# Patient Record
Sex: FEMALE | Race: WHITE | Employment: OTHER | URBAN - METROPOLITAN AREA
[De-identification: names, ages, dates, MRNs, and addresses within clinical notes are randomized per-mention and may not be internally consistent; named-entity substitution may affect disease eponyms.]

---

## 2017-02-02 DIAGNOSIS — K21.9 GASTROESOPHAGEAL REFLUX DISEASE, ESOPHAGITIS PRESENCE NOT SPECIFIED: ICD-10-CM

## 2017-02-02 RX ORDER — PANTOPRAZOLE SODIUM 40 MG/1
40 TABLET, DELAYED RELEASE ORAL DAILY
Qty: 60 TABLET | Refills: 5 | Status: SHIPPED | OUTPATIENT
Start: 2017-02-02 | End: 2017-02-20 | Stop reason: SDUPTHER

## 2017-02-20 DIAGNOSIS — K21.9 GASTROESOPHAGEAL REFLUX DISEASE, ESOPHAGITIS PRESENCE NOT SPECIFIED: ICD-10-CM

## 2017-02-20 RX ORDER — PANTOPRAZOLE SODIUM 40 MG/1
40 TABLET, DELAYED RELEASE ORAL 2 TIMES DAILY
Qty: 180 TABLET | Refills: 1 | Status: SHIPPED | OUTPATIENT
Start: 2017-02-20 | End: 2017-03-06 | Stop reason: SDUPTHER

## 2017-03-03 DIAGNOSIS — E78.2 MIXED HYPERLIPIDEMIA: Primary | ICD-10-CM

## 2017-03-03 RX ORDER — PRAVASTATIN SODIUM 10 MG
10 TABLET ORAL DAILY
Qty: 90 TABLET | Refills: 1 | Status: SHIPPED | OUTPATIENT
Start: 2017-03-03 | End: 2017-08-21 | Stop reason: SDUPTHER

## 2017-03-06 ENCOUNTER — TELEPHONE (OUTPATIENT)
Dept: FAMILY MEDICINE CLINIC | Age: 62
End: 2017-03-06

## 2017-03-06 DIAGNOSIS — K21.9 GASTROESOPHAGEAL REFLUX DISEASE, ESOPHAGITIS PRESENCE NOT SPECIFIED: ICD-10-CM

## 2017-03-06 RX ORDER — PANTOPRAZOLE SODIUM 40 MG/1
40 TABLET, DELAYED RELEASE ORAL DAILY
Qty: 90 TABLET | Refills: 1 | Status: SHIPPED | OUTPATIENT
Start: 2017-03-06 | End: 2017-06-08 | Stop reason: SDUPTHER

## 2017-03-08 ENCOUNTER — TELEPHONE (OUTPATIENT)
Dept: FAMILY MEDICINE CLINIC | Age: 62
End: 2017-03-08

## 2017-03-08 DIAGNOSIS — I10 ESSENTIAL HYPERTENSION: ICD-10-CM

## 2017-03-08 DIAGNOSIS — Z11.59 NEED FOR HEPATITIS C SCREENING TEST: ICD-10-CM

## 2017-03-08 DIAGNOSIS — E78.2 MIXED HYPERLIPIDEMIA: ICD-10-CM

## 2017-03-08 DIAGNOSIS — Z11.4 SCREENING FOR HIV (HUMAN IMMUNODEFICIENCY VIRUS): Primary | ICD-10-CM

## 2017-03-11 DIAGNOSIS — Z11.4 SCREENING FOR HIV (HUMAN IMMUNODEFICIENCY VIRUS): ICD-10-CM

## 2017-03-11 DIAGNOSIS — E78.2 MIXED HYPERLIPIDEMIA: ICD-10-CM

## 2017-03-11 DIAGNOSIS — Z11.59 NEED FOR HEPATITIS C SCREENING TEST: ICD-10-CM

## 2017-03-11 DIAGNOSIS — I10 ESSENTIAL HYPERTENSION: ICD-10-CM

## 2017-03-11 LAB
A/G RATIO: 1.7 (ref 1.1–2.2)
ALBUMIN SERPL-MCNC: 4.3 G/DL (ref 3.4–5)
ALP BLD-CCNC: 98 U/L (ref 40–129)
ALT SERPL-CCNC: 30 U/L (ref 10–40)
ANION GAP SERPL CALCULATED.3IONS-SCNC: 14 MMOL/L (ref 3–16)
AST SERPL-CCNC: 22 U/L (ref 15–37)
BILIRUB SERPL-MCNC: 0.4 MG/DL (ref 0–1)
BUN BLDV-MCNC: 11 MG/DL (ref 7–20)
CALCIUM SERPL-MCNC: 9.6 MG/DL (ref 8.3–10.6)
CHLORIDE BLD-SCNC: 104 MMOL/L (ref 99–110)
CHOLESTEROL, TOTAL: 162 MG/DL (ref 0–199)
CO2: 25 MMOL/L (ref 21–32)
CREAT SERPL-MCNC: <0.5 MG/DL (ref 0.6–1.2)
GFR AFRICAN AMERICAN: >60
GFR NON-AFRICAN AMERICAN: >60
GLOBULIN: 2.5 G/DL
GLUCOSE BLD-MCNC: 89 MG/DL (ref 70–99)
HDLC SERPL-MCNC: 70 MG/DL (ref 40–60)
HEPATITIS C ANTIBODY INTERPRETATION: NORMAL
LDL CHOLESTEROL CALCULATED: 71 MG/DL
POTASSIUM SERPL-SCNC: 5 MMOL/L (ref 3.5–5.1)
SODIUM BLD-SCNC: 143 MMOL/L (ref 136–145)
TOTAL PROTEIN: 6.8 G/DL (ref 6.4–8.2)
TRIGL SERPL-MCNC: 103 MG/DL (ref 0–150)
VLDLC SERPL CALC-MCNC: 21 MG/DL

## 2017-03-13 ENCOUNTER — OFFICE VISIT (OUTPATIENT)
Dept: FAMILY MEDICINE CLINIC | Age: 62
End: 2017-03-13

## 2017-03-13 VITALS
BODY MASS INDEX: 42.14 KG/M2 | DIASTOLIC BLOOD PRESSURE: 80 MMHG | OXYGEN SATURATION: 94 % | HEART RATE: 85 BPM | WEIGHT: 229 LBS | TEMPERATURE: 98 F | HEIGHT: 62 IN | SYSTOLIC BLOOD PRESSURE: 110 MMHG

## 2017-03-13 DIAGNOSIS — B02.8 HERPES ZOSTER WITH OTHER COMPLICATION: ICD-10-CM

## 2017-03-13 DIAGNOSIS — I10 ESSENTIAL HYPERTENSION: ICD-10-CM

## 2017-03-13 DIAGNOSIS — E78.2 MIXED HYPERLIPIDEMIA: ICD-10-CM

## 2017-03-13 DIAGNOSIS — R21 RASH: Primary | ICD-10-CM

## 2017-03-13 LAB — HIV-1 AND HIV-2 ANTIBODIES: NORMAL

## 2017-03-13 PROCEDURE — 99214 OFFICE O/P EST MOD 30 MIN: CPT | Performed by: NURSE PRACTITIONER

## 2017-03-13 RX ORDER — METRONIDAZOLE 7.5 MG/G
GEL TOPICAL
Qty: 1 TUBE | Refills: 2 | Status: SHIPPED | OUTPATIENT
Start: 2017-03-13 | End: 2017-06-08 | Stop reason: ALTCHOICE

## 2017-03-13 RX ORDER — GABAPENTIN 100 MG/1
100 CAPSULE ORAL DAILY
Qty: 90 CAPSULE | Refills: 3 | Status: SHIPPED | OUTPATIENT
Start: 2017-03-13 | End: 2018-03-31 | Stop reason: SDUPTHER

## 2017-03-13 RX ORDER — ALBUTEROL SULFATE 90 UG/1
2 AEROSOL, METERED RESPIRATORY (INHALATION) EVERY 6 HOURS PRN
Qty: 3 INHALER | Refills: 1 | Status: SHIPPED | OUTPATIENT
Start: 2017-03-13 | End: 2020-12-31

## 2017-03-14 ASSESSMENT — ENCOUNTER SYMPTOMS
NAIL CHANGES: 0
BLURRED VISION: 0
EYE PAIN: 0
ORTHOPNEA: 0
COUGH: 0
SORE THROAT: 0
DIARRHEA: 0
RHINORRHEA: 0
ALLERGIC/IMMUNOLOGIC NEGATIVE: 1
GASTROINTESTINAL NEGATIVE: 1
RESPIRATORY NEGATIVE: 1
VOMITING: 0
EYES NEGATIVE: 1
SHORTNESS OF BREATH: 0

## 2017-03-19 ENCOUNTER — PATIENT MESSAGE (OUTPATIENT)
Dept: FAMILY MEDICINE CLINIC | Age: 62
End: 2017-03-19

## 2017-03-19 RX ORDER — AMOXICILLIN 500 MG/1
CAPSULE ORAL
Qty: 12 CAPSULE | Refills: 1 | Status: SHIPPED | OUTPATIENT
Start: 2017-03-19 | End: 2017-06-08 | Stop reason: ALTCHOICE

## 2017-05-28 DIAGNOSIS — F32.4 MAJOR DEPRESSIVE DISORDER WITH SINGLE EPISODE, IN PARTIAL REMISSION (HCC): ICD-10-CM

## 2017-05-28 DIAGNOSIS — I10 ESSENTIAL HYPERTENSION: ICD-10-CM

## 2017-05-28 RX ORDER — BUPROPION HYDROCHLORIDE 300 MG/1
300 TABLET ORAL EVERY MORNING
Qty: 90 TABLET | Refills: 1 | Status: SHIPPED | OUTPATIENT
Start: 2017-05-28 | End: 2017-11-17 | Stop reason: SDUPTHER

## 2017-05-28 RX ORDER — LOSARTAN POTASSIUM 50 MG/1
50 TABLET ORAL DAILY
Qty: 90 TABLET | Refills: 1 | Status: SHIPPED | OUTPATIENT
Start: 2017-05-28 | End: 2017-09-13 | Stop reason: SDUPTHER

## 2017-06-08 ENCOUNTER — OFFICE VISIT (OUTPATIENT)
Dept: FAMILY MEDICINE CLINIC | Age: 62
End: 2017-06-08

## 2017-06-08 VITALS
OXYGEN SATURATION: 96 % | TEMPERATURE: 98 F | WEIGHT: 233 LBS | BODY MASS INDEX: 41.29 KG/M2 | HEIGHT: 63 IN | SYSTOLIC BLOOD PRESSURE: 120 MMHG | DIASTOLIC BLOOD PRESSURE: 80 MMHG | HEART RATE: 59 BPM

## 2017-06-08 DIAGNOSIS — D48.9 NEOPLASM OF UNCERTAIN BEHAVIOR: Primary | ICD-10-CM

## 2017-06-08 DIAGNOSIS — K21.9 GASTROESOPHAGEAL REFLUX DISEASE, ESOPHAGITIS PRESENCE NOT SPECIFIED: ICD-10-CM

## 2017-06-08 PROCEDURE — 99213 OFFICE O/P EST LOW 20 MIN: CPT | Performed by: NURSE PRACTITIONER

## 2017-06-08 PROCEDURE — 11100 PR BIOPSY OF SKIN LESION: CPT | Performed by: NURSE PRACTITIONER

## 2017-06-08 RX ORDER — ACETAMINOPHEN AND CODEINE PHOSPHATE 300; 30 MG/1; MG/1
TABLET ORAL
Refills: 0 | COMMUNITY
Start: 2017-03-21 | End: 2017-06-08 | Stop reason: ALTCHOICE

## 2017-06-08 RX ORDER — HYDROCODONE BITARTRATE AND ACETAMINOPHEN 5; 325 MG/1; MG/1
TABLET ORAL
Refills: 0 | COMMUNITY
Start: 2017-04-24 | End: 2017-06-08 | Stop reason: ALTCHOICE

## 2017-06-08 RX ORDER — PANTOPRAZOLE SODIUM 40 MG/1
40 TABLET, DELAYED RELEASE ORAL 2 TIMES DAILY
Qty: 180 TABLET | Refills: 1 | Status: SHIPPED | OUTPATIENT
Start: 2017-06-08 | End: 2017-07-25 | Stop reason: ALTCHOICE

## 2017-06-08 ASSESSMENT — ENCOUNTER SYMPTOMS
RESPIRATORY NEGATIVE: 1
EYES NEGATIVE: 1
GASTROINTESTINAL NEGATIVE: 1
ALLERGIC/IMMUNOLOGIC NEGATIVE: 1

## 2017-06-12 ENCOUNTER — TELEPHONE (OUTPATIENT)
Dept: FAMILY MEDICINE CLINIC | Age: 62
End: 2017-06-12

## 2017-06-16 ENCOUNTER — OFFICE VISIT (OUTPATIENT)
Dept: FAMILY MEDICINE CLINIC | Age: 62
End: 2017-06-16

## 2017-06-16 VITALS
HEIGHT: 63 IN | WEIGHT: 233.6 LBS | SYSTOLIC BLOOD PRESSURE: 120 MMHG | HEART RATE: 100 BPM | DIASTOLIC BLOOD PRESSURE: 86 MMHG | BODY MASS INDEX: 41.39 KG/M2 | TEMPERATURE: 98.1 F | OXYGEN SATURATION: 99 %

## 2017-06-16 DIAGNOSIS — D04.5 SQUAMOUS CELL CARCINOMA IN SITU OF SKIN OF CHEST: Primary | ICD-10-CM

## 2017-06-16 DIAGNOSIS — R10.31 ABDOMINAL PAIN, RLQ: ICD-10-CM

## 2017-06-16 LAB
BILIRUBIN, POC: NORMAL
BLOOD URINE, POC: NORMAL
CLARITY, POC: CLEAR
COLOR, POC: YELLOW
GLUCOSE URINE, POC: NORMAL
KETONES, POC: NORMAL
LEUKOCYTE EST, POC: NORMAL
NITRITE, POC: NORMAL
PH, POC: 6
PROTEIN, POC: NORMAL
SPECIFIC GRAVITY, POC: 1
UROBILINOGEN, POC: 0.2

## 2017-06-16 PROCEDURE — 99213 OFFICE O/P EST LOW 20 MIN: CPT | Performed by: FAMILY MEDICINE

## 2017-06-16 PROCEDURE — 81002 URINALYSIS NONAUTO W/O SCOPE: CPT | Performed by: FAMILY MEDICINE

## 2017-06-16 PROCEDURE — 11601 EXC TR-EXT MAL+MARG 0.6-1 CM: CPT | Performed by: FAMILY MEDICINE

## 2017-06-17 ASSESSMENT — ENCOUNTER SYMPTOMS
RESPIRATORY NEGATIVE: 1
EYES NEGATIVE: 1
GASTROINTESTINAL NEGATIVE: 1

## 2017-06-25 DIAGNOSIS — I10 ESSENTIAL HYPERTENSION: ICD-10-CM

## 2017-06-25 RX ORDER — MONTELUKAST SODIUM 10 MG/1
10 TABLET ORAL NIGHTLY
Qty: 90 TABLET | Refills: 1 | Status: SHIPPED | OUTPATIENT
Start: 2017-06-25 | End: 2017-12-13 | Stop reason: SDUPTHER

## 2017-06-25 RX ORDER — METOPROLOL SUCCINATE 25 MG/1
25 TABLET, EXTENDED RELEASE ORAL DAILY
Qty: 90 TABLET | Refills: 1 | Status: SHIPPED | OUTPATIENT
Start: 2017-06-25 | End: 2017-12-13 | Stop reason: SDUPTHER

## 2017-06-30 ENCOUNTER — NURSE ONLY (OUTPATIENT)
Dept: FAMILY MEDICINE CLINIC | Age: 62
End: 2017-06-30

## 2017-06-30 DIAGNOSIS — Z48.02 VISIT FOR SUTURE REMOVAL: Primary | ICD-10-CM

## 2017-06-30 PROCEDURE — 99024 POSTOP FOLLOW-UP VISIT: CPT | Performed by: FAMILY MEDICINE

## 2017-07-11 ENCOUNTER — TELEPHONE (OUTPATIENT)
Dept: FAMILY MEDICINE CLINIC | Age: 62
End: 2017-07-11

## 2017-07-25 ENCOUNTER — OFFICE VISIT (OUTPATIENT)
Dept: FAMILY MEDICINE CLINIC | Age: 62
End: 2017-07-25

## 2017-07-25 VITALS
DIASTOLIC BLOOD PRESSURE: 70 MMHG | HEIGHT: 63 IN | OXYGEN SATURATION: 94 % | WEIGHT: 235.6 LBS | SYSTOLIC BLOOD PRESSURE: 120 MMHG | BODY MASS INDEX: 41.75 KG/M2 | HEART RATE: 73 BPM | TEMPERATURE: 98.1 F

## 2017-07-25 DIAGNOSIS — K21.9 GASTROESOPHAGEAL REFLUX DISEASE, ESOPHAGITIS PRESENCE NOT SPECIFIED: ICD-10-CM

## 2017-07-25 DIAGNOSIS — I10 ESSENTIAL HYPERTENSION: ICD-10-CM

## 2017-07-25 PROCEDURE — 99214 OFFICE O/P EST MOD 30 MIN: CPT | Performed by: NURSE PRACTITIONER

## 2017-07-25 RX ORDER — DEXLANSOPRAZOLE 60 MG/1
60 CAPSULE, DELAYED RELEASE ORAL DAILY
Qty: 30 CAPSULE | Refills: 3 | Status: SHIPPED | OUTPATIENT
Start: 2017-07-25 | End: 2018-11-01

## 2017-07-25 ASSESSMENT — ENCOUNTER SYMPTOMS
EYES NEGATIVE: 1
STRIDOR: 0
ALLERGIC/IMMUNOLOGIC NEGATIVE: 1
BELCHING: 1
NAUSEA: 0
SHORTNESS OF BREATH: 0
COUGH: 0
WHEEZING: 0
HOARSE VOICE: 0
WATER BRASH: 1
HEARTBURN: 1
SORE THROAT: 0
ORTHOPNEA: 0
BLURRED VISION: 0
GLOBUS SENSATION: 1
RESPIRATORY NEGATIVE: 1
ABDOMINAL PAIN: 1
CHOKING: 0

## 2017-07-31 RX ORDER — LANSOPRAZOLE 30 MG/1
60 CAPSULE, DELAYED RELEASE ORAL DAILY
Qty: 60 CAPSULE | Refills: 3 | Status: SHIPPED | OUTPATIENT
Start: 2017-07-31 | End: 2018-11-01

## 2017-08-04 ENCOUNTER — TELEPHONE (OUTPATIENT)
Dept: FAMILY MEDICINE CLINIC | Age: 62
End: 2017-08-04

## 2017-08-21 DIAGNOSIS — E78.2 MIXED HYPERLIPIDEMIA: ICD-10-CM

## 2017-08-21 RX ORDER — PRAVASTATIN SODIUM 10 MG
10 TABLET ORAL DAILY
Qty: 90 TABLET | Refills: 1 | Status: SHIPPED | OUTPATIENT
Start: 2017-08-21 | End: 2018-01-11 | Stop reason: SDUPTHER

## 2017-09-13 DIAGNOSIS — I10 ESSENTIAL HYPERTENSION: ICD-10-CM

## 2017-09-13 RX ORDER — LOSARTAN POTASSIUM 100 MG/1
100 TABLET ORAL DAILY
Qty: 90 TABLET | Refills: 1 | Status: SHIPPED | OUTPATIENT
Start: 2017-09-13 | End: 2017-09-15 | Stop reason: SDUPTHER

## 2017-09-15 ENCOUNTER — TELEPHONE (OUTPATIENT)
Dept: FAMILY MEDICINE CLINIC | Age: 62
End: 2017-09-15

## 2017-09-15 DIAGNOSIS — I10 ESSENTIAL HYPERTENSION: ICD-10-CM

## 2017-09-15 RX ORDER — LOSARTAN POTASSIUM 50 MG/1
50 TABLET ORAL DAILY
Qty: 90 TABLET | Refills: 1 | Status: SHIPPED | OUTPATIENT
Start: 2017-09-15 | End: 2018-02-07 | Stop reason: SDUPTHER

## 2017-09-18 ENCOUNTER — TELEPHONE (OUTPATIENT)
Dept: FAMILY MEDICINE CLINIC | Age: 62
End: 2017-09-18

## 2017-09-19 ENCOUNTER — PATIENT MESSAGE (OUTPATIENT)
Dept: FAMILY MEDICINE CLINIC | Age: 62
End: 2017-09-19

## 2017-09-19 DIAGNOSIS — M17.11 ARTHRITIS OF RIGHT KNEE: Primary | ICD-10-CM

## 2017-09-23 DIAGNOSIS — E78.2 MIXED HYPERLIPIDEMIA: ICD-10-CM

## 2017-09-23 LAB
ALBUMIN SERPL-MCNC: 4.2 G/DL (ref 3.4–5)
ANION GAP SERPL CALCULATED.3IONS-SCNC: 14 MMOL/L (ref 3–16)
BUN BLDV-MCNC: 13 MG/DL (ref 7–20)
CALCIUM SERPL-MCNC: 9.8 MG/DL (ref 8.3–10.6)
CHLORIDE BLD-SCNC: 106 MMOL/L (ref 99–110)
CHOLESTEROL, TOTAL: 147 MG/DL (ref 0–199)
CO2: 26 MMOL/L (ref 21–32)
CREAT SERPL-MCNC: 0.5 MG/DL (ref 0.6–1.2)
GFR AFRICAN AMERICAN: >60
GFR NON-AFRICAN AMERICAN: >60
GLUCOSE BLD-MCNC: 99 MG/DL (ref 70–99)
HDLC SERPL-MCNC: 64 MG/DL (ref 40–60)
LDL CHOLESTEROL CALCULATED: 65 MG/DL
PHOSPHORUS: 3.4 MG/DL (ref 2.5–4.9)
POTASSIUM SERPL-SCNC: 5 MMOL/L (ref 3.5–5.1)
SODIUM BLD-SCNC: 146 MMOL/L (ref 136–145)
TRIGL SERPL-MCNC: 92 MG/DL (ref 0–150)
VLDLC SERPL CALC-MCNC: 18 MG/DL

## 2017-09-26 ENCOUNTER — OFFICE VISIT (OUTPATIENT)
Dept: FAMILY MEDICINE CLINIC | Age: 62
End: 2017-09-26

## 2017-09-26 DIAGNOSIS — D23.39 DERMOID CYST OF SKIN OF NOSE: Primary | ICD-10-CM

## 2017-09-26 DIAGNOSIS — Z23 NEED FOR INFLUENZA VACCINATION: ICD-10-CM

## 2017-09-26 DIAGNOSIS — F32.4 MAJOR DEPRESSIVE DISORDER WITH SINGLE EPISODE, IN PARTIAL REMISSION (HCC): ICD-10-CM

## 2017-09-26 DIAGNOSIS — E78.2 MIXED HYPERLIPIDEMIA: ICD-10-CM

## 2017-09-26 PROCEDURE — 99214 OFFICE O/P EST MOD 30 MIN: CPT | Performed by: NURSE PRACTITIONER

## 2017-09-26 PROCEDURE — 90471 IMMUNIZATION ADMIN: CPT | Performed by: NURSE PRACTITIONER

## 2017-09-26 PROCEDURE — 90662 IIV NO PRSV INCREASED AG IM: CPT | Performed by: NURSE PRACTITIONER

## 2017-09-27 VITALS
BODY MASS INDEX: 43.91 KG/M2 | HEART RATE: 76 BPM | TEMPERATURE: 97.8 F | WEIGHT: 238.6 LBS | OXYGEN SATURATION: 97 % | HEIGHT: 62 IN | SYSTOLIC BLOOD PRESSURE: 128 MMHG | DIASTOLIC BLOOD PRESSURE: 70 MMHG

## 2017-09-27 ASSESSMENT — ENCOUNTER SYMPTOMS
SHORTNESS OF BREATH: 0
EYES NEGATIVE: 1
RESPIRATORY NEGATIVE: 1
ALLERGIC/IMMUNOLOGIC NEGATIVE: 1
GASTROINTESTINAL NEGATIVE: 1

## 2017-10-02 ENCOUNTER — OFFICE VISIT (OUTPATIENT)
Dept: ORTHOPEDIC SURGERY | Age: 62
End: 2017-10-02

## 2017-10-02 VITALS — BODY MASS INDEX: 38.98 KG/M2 | WEIGHT: 220 LBS | HEIGHT: 63 IN

## 2017-10-02 DIAGNOSIS — G89.29 CHRONIC PAIN OF BOTH KNEES: Primary | ICD-10-CM

## 2017-10-02 DIAGNOSIS — M25.562 CHRONIC PAIN OF BOTH KNEES: Primary | ICD-10-CM

## 2017-10-02 DIAGNOSIS — M25.561 CHRONIC PAIN OF BOTH KNEES: Primary | ICD-10-CM

## 2017-10-02 PROCEDURE — G8417 CALC BMI ABV UP PARAM F/U: HCPCS | Performed by: ORTHOPAEDIC SURGERY

## 2017-10-02 PROCEDURE — G8484 FLU IMMUNIZE NO ADMIN: HCPCS | Performed by: ORTHOPAEDIC SURGERY

## 2017-10-02 PROCEDURE — 99214 OFFICE O/P EST MOD 30 MIN: CPT | Performed by: ORTHOPAEDIC SURGERY

## 2017-10-02 PROCEDURE — 73564 X-RAY EXAM KNEE 4 OR MORE: CPT | Performed by: ORTHOPAEDIC SURGERY

## 2017-10-02 PROCEDURE — 1036F TOBACCO NON-USER: CPT | Performed by: ORTHOPAEDIC SURGERY

## 2017-10-02 PROCEDURE — G8427 DOCREV CUR MEDS BY ELIG CLIN: HCPCS | Performed by: ORTHOPAEDIC SURGERY

## 2017-10-02 PROCEDURE — 3017F COLORECTAL CA SCREEN DOC REV: CPT | Performed by: ORTHOPAEDIC SURGERY

## 2017-10-02 PROCEDURE — 3014F SCREEN MAMMO DOC REV: CPT | Performed by: ORTHOPAEDIC SURGERY

## 2017-10-02 NOTE — MR AVS SNAPSHOT
After Visit Summary             Sultana Miles   10/2/2017 3:00 PM   Office Visit    Description:  Female : 1955   Provider:  Jodie Wang MD   Department:  [facility] and Future Appointments         Below is a list of your follow-up and future appointments. This may not be a complete list as you may have made appointments directly with providers that we are not aware of or your providers may have made some for you. Please call your providers to confirm appointments. It is important to keep your appointments. Please bring your current insurance card, photo ID, co-pay, and all medication bottles to your appointment. If self-pay, payment is expected at the time of service. Information from Your Visit        Department     Name Address Phone Fax    [address] [facility] [address]  [address] [address],[address]  [address] [phone]      You Were Seen for:         Comments    Acute pain of both knees   [[id number]]         Vital Signs     Height Weight Last Menstrual Period Body Mass Index Smoking Status       5' 3\" (1.6 m) 220 lb (99.8 kg) (LMP Unknown) 38.97 kg/m2 Former Smoker       Additional Information about your Body Mass Index (BMI)           Your BMI as listed above is considered obese (30 or more). BMI is an estimate of body fat, calculated from your height and weight. The higher your BMI, the greater your risk of heart disease, high blood pressure, type 2 diabetes, stroke, gallstones, arthritis, sleep apnea, and certain cancers. BMI is not perfect. It may overestimate body fat in athletes and people who are more muscular. Even a small weight loss (between 5 and 10 percent of your current weight) by decreasing your calorie intake and becoming more physically active will help lower your risk of developing or worsening diseases associated with obesity.      Learn more at: [email or web address].uk for radiology results                 Result Summary for XR Knee Bilateral 1 or 2 VW      Result Information     Status          Final result (Exam End: 10/2/2017  3:01 PM)           10/2/2017  3:01 PM      Narrative & Impression           Radiology result is complete; follow up with provider / physician office for radiology results                       Additional Information        Basic Information     Date Of Birth Sex Race Ethnicity Preferred Language    1955 Female White Non-/Non  English      Problem List as of 10/2/2017  Date Reviewed: 10/2/2017                Mixed hyperlipidemia    Major depressive disorder with single episode, 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[facility] [address]    Essential hypertension    Arthritis of right knee    Degenerative disc disease, lumbar    Left knee DJD (Chronic)    Cardiac dysrhythmia    Asymptomatic varicose veins    Vitamin D deficiency    Cervicalgia    Murmur    Generalized osteoarthrosis, involving multiple sites    Chronic low back pain    Hemiplegic migraine    Allergic rhinitis    GERD (gastroesophageal reflux disease)    Hyperlipidemia      Your Goals as of 10/2/2017 at 4:01 PM                 Weight    Weight < 200 lb (90.719 kg)     Notes    The patient has a goal of losing five pounds in the next three months. He/she feels they have an 8/10 chance of doing this. They will do this through lifestyle changes including less caloric intake and increased exercise.            Immunizations as of 10/2/2017     Name Date    Influenza Virus Vaccine 11/19/2015, 11/11/2014, 11/5/2013, 9/21/2009    Influenza, High Dose 9/26/2017    Influenza, Gerardo Geller, 3 Years and older, IM 10/10/2016, 9/21/2009, 11/11/2008, 10/23/2007, 11/10/2006    Pneumococcal Polysaccharide ([id number]) 3/4/2015    Tdap (Boostrix, Adacel) 11/5/2013      Preventive Care        Date Due    Zoster Vaccine 2/9/2015    Mammograms are recommended every 2 years for low/average risk patients

## 2017-10-30 NOTE — PROGRESS NOTES
Date of Encounter: 10/2/2017  Patient Name:Connie Elige Bamberger  Medical Record Number: [id number]    Chief Complaint   Patient presents with   Geno Marcano, Dr Brenda Wise referral for B knee pain. LT TKR 3/2015, RT TKR 6/2016. no injury. needs a doctor for her yearly checks        History of Present Illness:  Peter Kay is a 58 y.o. female here for evaluation of her  bilateral knees. Her current symptoms are described below and I reviewed them with her today. She underwent total knee arthroplasty on the left side in 2015 by Dr. Lazaro Cordoba and the right knee 6/2016 by Dr. Jadiel Medina  their practice no longer accepts her insurance and she needs a new physician for long-term follow-up of her components. She still gets intermittent fleeting pains in the knees especially with use of stairs. No falls or trauma. No erythema or swelling in the knees. No fevers or chills. She feels she made good improvement after her surgeries with the assistance of physical therapy. She is not on any long-term narcotics.     Pain Assessment  Location of Pain: Knee  Location Modifiers: Right, Left  Severity of Pain: 5  Quality of Pain: Sharp  Duration of Pain: A few minutes  Frequency of Pain: Intermittent  Aggravating Factors: Stairs  Limiting Behavior: Some  Relieving Factors: Rest  Result of Injury: No  Work-Related Injury: No  Are there other pain locations you wish to document?: No    Past Medical History:   Diagnosis Date    Allergic rhinitis     Allergy     environmental    Anxiety     Carpal tunnel syndrome     Carpal tunnel syndrome     Cervicalgia     Chronic back pain     low    Depression     Family history of osteopenia     Fibroids     GERD (gastroesophageal reflux disease)     Headache(784.0)     hemiplegic migraines    Hernia hiatal     History of varicose veins     Hyperlipidemia     Hypertension     Mitral (valve) prolapse     Murmur     Obesity     Osteoarthritis     multiple joints    PONV (postoperative and family histories were reviewed and updated as appropriate. Review of Systems:  Relevant review of systems reviewed and available in the patient's chart    Vital Signs:  Ht 5' 3\" (1.6 m)   Wt 220 lb (99.8 kg)   LMP  (LMP Unknown)   BMI 38.97 kg/m²     General Exam:   Constitutional: She is adequately groomed with no evidence of malnutrition, class II obesity noted  Mental Status: She is oriented to time, place and person. Normal mentation and affect for age. Lymphatic: The lymphatic examination bilaterally reveals all areas to be without enlargement or induration. Vascular: Examination reveals no swelling or calf tenderness. Peripheral pulses are palpable and 2+. Neurological: She has good coordination and balance. There is no focal weakness or sensory deficit. Bilateral Knee Examination:    Inspection:  Both knees show well-healed midline anterior incisions. No significant effusion or soft tissue swelling. Quad muscle bulk and tone match her age and activity level. Palpation:  Both knees have a trace of tenderness to palpation of the soft tissues along the components. Extensor mechanisms both palpates intact. Range of Motion:  Right 0-115, left 0-115 easily in the office    Strength:  Quad 5 minus bilaterally / 5  ; Hamstrings 5 / 5. Gross motor to hip and ankle intact 5/5    Special Tests of both knees:      negative anterior drawer    no posterior drawer    does not open to valgus or varus stress at 0 or 30°     negative Homans    Posterior tibial pulses are +2/4 capillary refill is brisk sensation is intact. Skin: There are no rashes, ulcerations or lesions. Gait: Tandem gait with no significant limp in the office today. She does still utilizes her hands to help get up from a chair. Radiology:     X-rays obtained today and reviewed in office:  Views 3 Bilateral  Knees  Impression No evidence for acute fracture or subluxation / dislocation.   Both knee show stable

## 2017-11-17 DIAGNOSIS — F32.4 MAJOR DEPRESSIVE DISORDER WITH SINGLE EPISODE, IN PARTIAL REMISSION (HCC): ICD-10-CM

## 2017-11-17 RX ORDER — BUPROPION HYDROCHLORIDE 300 MG/1
300 TABLET ORAL EVERY MORNING
Qty: 90 TABLET | Refills: 1 | Status: SHIPPED | OUTPATIENT
Start: 2017-11-17 | End: 2018-05-08 | Stop reason: SDUPTHER

## 2017-12-06 ENCOUNTER — TELEPHONE (OUTPATIENT)
Dept: FAMILY MEDICINE CLINIC | Age: 62
End: 2017-12-06

## 2017-12-06 RX ORDER — AMOXICILLIN 500 MG/1
500 CAPSULE ORAL 3 TIMES DAILY
Qty: 30 CAPSULE | Refills: 0 | Status: SHIPPED | OUTPATIENT
Start: 2017-12-06 | End: 2017-12-16

## 2017-12-06 RX ORDER — BENZONATATE 100 MG/1
100 CAPSULE ORAL 3 TIMES DAILY PRN
Qty: 20 CAPSULE | Refills: 1 | Status: SHIPPED | OUTPATIENT
Start: 2017-12-06 | End: 2017-12-13

## 2017-12-06 NOTE — TELEPHONE ENCOUNTER
Pt has been sick for about a week and has a really bad burning sensation in throat and nose  Feels coughing is what is making her throat so raw  Has sinus and chest congestion, bad cough and wants to know what she can take especially for the burning in her nose and throat  Please advise  Please use pharmacy listed if applicable

## 2017-12-13 DIAGNOSIS — I10 ESSENTIAL HYPERTENSION: ICD-10-CM

## 2017-12-13 RX ORDER — METOPROLOL SUCCINATE 25 MG/1
25 TABLET, EXTENDED RELEASE ORAL DAILY
Qty: 90 TABLET | Refills: 1 | Status: SHIPPED | OUTPATIENT
Start: 2017-12-13 | End: 2018-06-27 | Stop reason: SDUPTHER

## 2017-12-13 RX ORDER — MONTELUKAST SODIUM 10 MG/1
10 TABLET ORAL NIGHTLY
Qty: 90 TABLET | Refills: 1 | Status: SHIPPED | OUTPATIENT
Start: 2017-12-13 | End: 2018-06-19 | Stop reason: SDUPTHER

## 2017-12-17 DIAGNOSIS — K21.9 GASTROESOPHAGEAL REFLUX DISEASE, ESOPHAGITIS PRESENCE NOT SPECIFIED: ICD-10-CM

## 2017-12-17 RX ORDER — PANTOPRAZOLE SODIUM 40 MG/1
40 TABLET, DELAYED RELEASE ORAL 2 TIMES DAILY
Qty: 180 TABLET | Refills: 1 | Status: SHIPPED | OUTPATIENT
Start: 2017-12-17 | End: 2018-06-19 | Stop reason: SDUPTHER

## 2017-12-26 ENCOUNTER — HOSPITAL ENCOUNTER (OUTPATIENT)
Dept: MAMMOGRAPHY | Age: 62
Discharge: HOME | End: 2017-12-26
Attending: FAMILY MEDICINE | Admitting: FAMILY MEDICINE

## 2017-12-26 DIAGNOSIS — Z12.31 VISIT FOR SCREENING MAMMOGRAM: ICD-10-CM

## 2018-01-11 DIAGNOSIS — E78.2 MIXED HYPERLIPIDEMIA: ICD-10-CM

## 2018-01-11 RX ORDER — PRAVASTATIN SODIUM 10 MG
10 TABLET ORAL DAILY
Qty: 90 TABLET | Refills: 1 | Status: SHIPPED | OUTPATIENT
Start: 2018-01-11 | End: 2018-07-20 | Stop reason: SDUPTHER

## 2018-01-26 ENCOUNTER — TELEPHONE (OUTPATIENT)
Dept: FAMILY MEDICINE CLINIC | Age: 63
End: 2018-01-26

## 2018-01-27 DIAGNOSIS — E78.2 MIXED HYPERLIPIDEMIA: ICD-10-CM

## 2018-01-27 LAB
A/G RATIO: 2.2 (ref 1.1–2.2)
ALBUMIN SERPL-MCNC: 4.7 G/DL (ref 3.4–5)
ALP BLD-CCNC: 93 U/L (ref 40–129)
ALT SERPL-CCNC: 23 U/L (ref 10–40)
ANION GAP SERPL CALCULATED.3IONS-SCNC: 12 MMOL/L (ref 3–16)
AST SERPL-CCNC: 19 U/L (ref 15–37)
BILIRUB SERPL-MCNC: 0.4 MG/DL (ref 0–1)
BUN BLDV-MCNC: 12 MG/DL (ref 7–20)
CALCIUM SERPL-MCNC: 9.6 MG/DL (ref 8.3–10.6)
CHLORIDE BLD-SCNC: 106 MMOL/L (ref 99–110)
CHOLESTEROL, TOTAL: 153 MG/DL (ref 0–199)
CO2: 27 MMOL/L (ref 21–32)
CREAT SERPL-MCNC: <0.5 MG/DL (ref 0.6–1.2)
GFR AFRICAN AMERICAN: >60
GFR NON-AFRICAN AMERICAN: >60
GLOBULIN: 2.1 G/DL
GLUCOSE BLD-MCNC: 90 MG/DL (ref 70–99)
HDLC SERPL-MCNC: 70 MG/DL (ref 40–60)
LDL CHOLESTEROL CALCULATED: 62 MG/DL
POTASSIUM SERPL-SCNC: 4.6 MMOL/L (ref 3.5–5.1)
SODIUM BLD-SCNC: 145 MMOL/L (ref 136–145)
T4 FREE: 1.2 NG/DL (ref 0.9–1.8)
TOTAL PROTEIN: 6.8 G/DL (ref 6.4–8.2)
TRIGL SERPL-MCNC: 107 MG/DL (ref 0–150)
TSH SERPL DL<=0.05 MIU/L-ACNC: 1.51 UIU/ML (ref 0.27–4.2)
VLDLC SERPL CALC-MCNC: 21 MG/DL

## 2018-01-29 ENCOUNTER — OFFICE VISIT (OUTPATIENT)
Dept: FAMILY MEDICINE CLINIC | Age: 63
End: 2018-01-29

## 2018-01-29 VITALS
TEMPERATURE: 98.3 F | HEIGHT: 63 IN | SYSTOLIC BLOOD PRESSURE: 130 MMHG | BODY MASS INDEX: 41.57 KG/M2 | DIASTOLIC BLOOD PRESSURE: 86 MMHG | WEIGHT: 234.6 LBS

## 2018-01-29 DIAGNOSIS — I10 ESSENTIAL HYPERTENSION: ICD-10-CM

## 2018-01-29 DIAGNOSIS — F32.4 MAJOR DEPRESSIVE DISORDER WITH SINGLE EPISODE, IN PARTIAL REMISSION (HCC): ICD-10-CM

## 2018-01-29 DIAGNOSIS — E78.2 MIXED HYPERLIPIDEMIA: ICD-10-CM

## 2018-01-29 DIAGNOSIS — K21.9 GASTROESOPHAGEAL REFLUX DISEASE, ESOPHAGITIS PRESENCE NOT SPECIFIED: ICD-10-CM

## 2018-01-29 DIAGNOSIS — L29.9 PRURITIC DERMATITIS: Primary | ICD-10-CM

## 2018-01-29 DIAGNOSIS — M17.11 ARTHRITIS OF RIGHT KNEE: ICD-10-CM

## 2018-01-29 PROCEDURE — 1036F TOBACCO NON-USER: CPT | Performed by: FAMILY MEDICINE

## 2018-01-29 PROCEDURE — 99214 OFFICE O/P EST MOD 30 MIN: CPT | Performed by: FAMILY MEDICINE

## 2018-01-29 PROCEDURE — G8427 DOCREV CUR MEDS BY ELIG CLIN: HCPCS | Performed by: FAMILY MEDICINE

## 2018-01-29 PROCEDURE — G8417 CALC BMI ABV UP PARAM F/U: HCPCS | Performed by: FAMILY MEDICINE

## 2018-01-29 PROCEDURE — G8484 FLU IMMUNIZE NO ADMIN: HCPCS | Performed by: FAMILY MEDICINE

## 2018-01-29 PROCEDURE — 3014F SCREEN MAMMO DOC REV: CPT | Performed by: FAMILY MEDICINE

## 2018-01-29 PROCEDURE — 3017F COLORECTAL CA SCREEN DOC REV: CPT | Performed by: FAMILY MEDICINE

## 2018-01-29 RX ORDER — MOMETASONE FUROATE 1 MG/G
OINTMENT TOPICAL
Qty: 30 G | Refills: 0 | Status: SHIPPED | OUTPATIENT
Start: 2018-01-29 | End: 2018-11-01

## 2018-01-31 ASSESSMENT — ENCOUNTER SYMPTOMS
RESPIRATORY NEGATIVE: 1
GASTROINTESTINAL NEGATIVE: 1
EYES NEGATIVE: 1

## 2018-01-31 NOTE — PROGRESS NOTES
Allergic rhinitis     Allergy     environmental    Anxiety     Carpal tunnel syndrome     Carpal tunnel syndrome     Cervicalgia     Chronic back pain     low    Depression     Family history of osteopenia     Fibroids     GERD (gastroesophageal reflux disease)     Headache(784.0)     hemiplegic migraines    Hernia hiatal     History of varicose veins     Hyperlipidemia     Hypertension     Mitral (valve) prolapse     Murmur     Obesity     Osteoarthritis     multiple joints    PONV (postoperative nausea and vomiting)     Shingles     Sleep apnea     CPAP set of 4     Past Surgical History:   Procedure Laterality Date    BUNIONECTOMY      right     JOINT REPLACEMENT Right 6/7/16    Right total knee arthroplasty    KNEE ARTHROSCOPY      KNEE CARTILAGE SURGERY      LOBECTOMY      partial right lung lobectomy    PARTIAL HYSTERECTOMY      SINUS SURGERY      TOTAL KNEE ARTHROPLASTY Left 3/3/2015    LEFT TOTAL KNEE ARTHROPLASTY               Family History   Problem Relation Age of Onset    High Blood Pressure Mother     Heart Disease Mother     Cancer Mother      vaginal    High Blood Pressure Father     Heart Disease Father     Heart Disease Sister     Cancer Sister      colon    Heart Disease Brother     High Blood Pressure Brother     Cancer Brother      oral    Heart Disease Brother     Hearing Loss Brother      chf and transplant    High Blood Pressure Brother     Diabetes Paternal Aunt      Social History     Social History    Marital status:      Spouse name: N/A    Number of children: N/A    Years of education: N/A     Occupational History    Not on file.      Social History Main Topics    Smoking status: Former Smoker     Quit date: 1/1/1990    Smokeless tobacco: Never Used    Alcohol use No    Drug use: No    Sexual activity: Not on file     Other Topics Concern    Not on file     Social History Narrative    No narrative on file         Any recent diagnostic tests, hospital reports, office notes or consultation letters were reviewed prior to and during the visit. Review of Systems   Constitutional: Negative. HENT: Negative. Eyes: Negative. Respiratory: Negative. Cardiovascular: Negative. Gastrointestinal: Negative. Genitourinary: Negative. Musculoskeletal: Negative. Psychiatric/Behavioral: Negative. Physical Exam   Constitutional: She appears well-developed and well-nourished. No distress. Neck: Normal range of motion. Neck supple. Normal carotid pulses, no hepatojugular reflux and no JVD present. Carotid bruit is not present. No tracheal deviation present. No thyromegaly present. Cardiovascular: Normal rate, regular rhythm, S2 normal, normal heart sounds and intact distal pulses. PMI is not displaced. Exam reveals no gallop and no friction rub. No murmur heard. Pulses:       Carotid pulses are 2+ on the right side, and 2+ on the left side. Dorsalis pedis pulses are 2+ on the right side, and 2+ on the left side. Posterior tibial pulses are 2+ on the right side, and 2+ on the left side. Pulmonary/Chest: Effort normal and breath sounds normal. No stridor. No respiratory distress. She has no wheezes. She has no rales. She exhibits no tenderness. Abdominal: Soft. Bowel sounds are normal. She exhibits no distension and no mass. There is no tenderness. There is no rebound and no guarding. Musculoskeletal:        Right ankle: She exhibits no swelling. Left ankle: She exhibits no swelling. Legs:  Lymphadenopathy:     She has no cervical adenopathy. Skin: She is not diaphoretic. Psychiatric: She has a normal mood and affect. Her behavior is normal. Judgment and thought content normal.         1. Pruritic dermatitis  The condition is deteriorating, will change treatment, investigate cause and make further recommendations when data back.  Will treat  mometasone (ELOCON) 0.1 % ointment 2. Gastroesophageal reflux disease, esophagitis presence not specified  Condition stable continue the medications, treatments, will check labs as appropriate       3. Mixed hyperlipidemia  Condition stable continue the medications, treatments, will check labs as appropriate       The patient received counseling on the following healthy behaviors: nutrition, exercise, medication adherence and decrease in alcohol consumption    Patient given educational materials on Hyperlipidemia and Nutrition if appropriate    I have instructed the patient to complete a self tracking handout on diet and instructed them to bring it with them to the  next appointment. Discussed use, benefit, and side effects of prescribed medications. Barriers to medication compliance addressed. All patient questions answered. Pt voiced understanding. Lipid Panel    Comprehensive Metabolic Panel   4. Essential hypertension  Condition stable continue the medications, treatments, will check labs as appropriate       The patient received counseling on the following healthy behaviors: nutrition, exercise, medication adherence and decrease in alcohol consumption    Patient given educational materials on Nutrition, Exercise and Hypertension as appropriate. I have instructed the patient to complete a self tracking handout on Blood Pressures  and instructed them to bring it with them to the next appointment. Discussed use, benefit, and side effects of prescribed medications. Barriers to medication compliance addressed. All patient questions answered. Pt voiced understanding. Lipid Panel    Comprehensive Metabolic Panel   5. Major depressive disorder with single episode, 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[facility] [address]  Condition stable continue the medications, treatments, will check labs as appropriate       6.  Arthritis of right knee  The condition is deteriorating, will change treatment, investigate cause and make further recommendations when data

## 2018-02-07 DIAGNOSIS — I10 ESSENTIAL HYPERTENSION: ICD-10-CM

## 2018-02-07 RX ORDER — LOSARTAN POTASSIUM 50 MG/1
50 TABLET ORAL DAILY
Qty: 90 TABLET | Refills: 1 | Status: SHIPPED | OUTPATIENT
Start: 2018-02-07 | End: 2018-07-23 | Stop reason: SDUPTHER

## 2018-02-15 ENCOUNTER — PATIENT MESSAGE (OUTPATIENT)
Dept: FAMILY MEDICINE CLINIC | Age: 63
End: 2018-02-15

## 2018-02-15 DIAGNOSIS — B96.89 BACTERIAL URI: Primary | ICD-10-CM

## 2018-02-15 DIAGNOSIS — J06.9 BACTERIAL URI: Primary | ICD-10-CM

## 2018-02-15 PROCEDURE — 99444 PR PHYSICIAN ONLINE EVALUATION & MANAGEMENT SERVICE: CPT | Performed by: FAMILY MEDICINE

## 2018-02-16 RX ORDER — AMOXICILLIN AND CLAVULANATE POTASSIUM 875; 125 MG/1; MG/1
1 TABLET, FILM COATED ORAL 2 TIMES DAILY
Qty: 20 TABLET | Refills: 0 | Status: SHIPPED | OUTPATIENT
Start: 2018-02-16 | End: 2018-02-26

## 2018-02-16 RX ORDER — BENZONATATE 100 MG/1
100 CAPSULE ORAL 3 TIMES DAILY PRN
Qty: 60 CAPSULE | Refills: 1 | Status: SHIPPED | OUTPATIENT
Start: 2018-02-16 | End: 2018-03-18

## 2018-02-22 ENCOUNTER — TELEPHONE (OUTPATIENT)
Dept: ORTHOPEDIC SURGERY | Age: 63
End: 2018-02-22

## 2018-02-22 NOTE — TELEPHONE ENCOUNTER
Pt is calling about getting a letter sent her Dr Aiyana Perez, about what was wrong with her left knee.

## 2018-03-15 ENCOUNTER — PATIENT MESSAGE (OUTPATIENT)
Dept: FAMILY MEDICINE CLINIC | Age: 63
End: 2018-03-15

## 2018-03-15 DIAGNOSIS — B39.2 PULMONARY HISTOPLASMOSIS (HCC): Primary | ICD-10-CM

## 2018-03-19 ENCOUNTER — HOSPITAL ENCOUNTER (OUTPATIENT)
Dept: OTHER | Age: 63
Discharge: HOME | End: 2018-03-19
Attending: FAMILY MEDICINE | Admitting: FAMILY MEDICINE

## 2018-03-19 DIAGNOSIS — B39.2 PULMONARY HISTOPLASMOSIS (HCC): ICD-10-CM

## 2018-03-31 DIAGNOSIS — B02.8 HERPES ZOSTER WITH OTHER COMPLICATION: ICD-10-CM

## 2018-04-01 RX ORDER — GABAPENTIN 100 MG/1
100 CAPSULE ORAL DAILY
Qty: 90 CAPSULE | Refills: 2 | Status: SHIPPED | OUTPATIENT
Start: 2018-04-01 | End: 2018-07-23 | Stop reason: SDUPTHER

## 2018-04-09 ENCOUNTER — TELEPHONE (OUTPATIENT)
Dept: ORTHOPEDIC SURGERY | Age: 63
End: 2018-04-09

## 2018-04-10 ENCOUNTER — OFFICE VISIT (OUTPATIENT)
Dept: ORTHOPEDIC SURGERY | Age: 63
End: 2018-04-10

## 2018-04-10 VITALS
HEART RATE: 76 BPM | BODY MASS INDEX: 42.1 KG/M2 | WEIGHT: 237.6 LBS | HEIGHT: 63 IN | DIASTOLIC BLOOD PRESSURE: 100 MMHG | SYSTOLIC BLOOD PRESSURE: 188 MMHG

## 2018-04-10 DIAGNOSIS — M25.562 CHRONIC PAIN OF BOTH KNEES: Primary | ICD-10-CM

## 2018-04-10 DIAGNOSIS — M70.51 PES ANSERINUS BURSITIS OF RIGHT KNEE: ICD-10-CM

## 2018-04-10 DIAGNOSIS — M25.561 CHRONIC PAIN OF BOTH KNEES: Primary | ICD-10-CM

## 2018-04-10 DIAGNOSIS — G89.29 CHRONIC PAIN OF BOTH KNEES: Primary | ICD-10-CM

## 2018-04-10 PROCEDURE — G8427 DOCREV CUR MEDS BY ELIG CLIN: HCPCS | Performed by: ORTHOPAEDIC SURGERY

## 2018-04-10 PROCEDURE — 99212 OFFICE O/P EST SF 10 MIN: CPT | Performed by: ORTHOPAEDIC SURGERY

## 2018-04-10 PROCEDURE — G8417 CALC BMI ABV UP PARAM F/U: HCPCS | Performed by: ORTHOPAEDIC SURGERY

## 2018-04-10 PROCEDURE — 1036F TOBACCO NON-USER: CPT | Performed by: ORTHOPAEDIC SURGERY

## 2018-04-10 PROCEDURE — 3017F COLORECTAL CA SCREEN DOC REV: CPT | Performed by: ORTHOPAEDIC SURGERY

## 2018-04-10 PROCEDURE — 20551 NJX 1 TENDON ORIGIN/INSJ: CPT | Performed by: ORTHOPAEDIC SURGERY

## 2018-04-11 RX ORDER — BETAMETHASONE SODIUM PHOSPHATE AND BETAMETHASONE ACETATE 3; 3 MG/ML; MG/ML
6 INJECTION, SUSPENSION INTRA-ARTICULAR; INTRALESIONAL; INTRAMUSCULAR; SOFT TISSUE ONCE
Status: DISCONTINUED | OUTPATIENT
Start: 2018-04-11 | End: 2019-11-11 | Stop reason: CLARIF

## 2018-05-08 DIAGNOSIS — F32.4 MAJOR DEPRESSIVE DISORDER WITH SINGLE EPISODE, IN PARTIAL REMISSION (HCC): ICD-10-CM

## 2018-05-08 RX ORDER — BUPROPION HYDROCHLORIDE 300 MG/1
300 TABLET ORAL EVERY MORNING
Qty: 90 TABLET | Refills: 1 | Status: SHIPPED | OUTPATIENT
Start: 2018-05-08 | End: 2019-01-10 | Stop reason: SDUPTHER

## 2018-05-21 DIAGNOSIS — I10 ESSENTIAL HYPERTENSION: ICD-10-CM

## 2018-05-21 DIAGNOSIS — E78.2 MIXED HYPERLIPIDEMIA: ICD-10-CM

## 2018-05-21 LAB
A/G RATIO: 2 (ref 1.1–2.2)
ALBUMIN SERPL-MCNC: 4.3 G/DL (ref 3.4–5)
ALP BLD-CCNC: 78 U/L (ref 40–129)
ALT SERPL-CCNC: 22 U/L (ref 10–40)
ANION GAP SERPL CALCULATED.3IONS-SCNC: 14 MMOL/L (ref 3–16)
AST SERPL-CCNC: 18 U/L (ref 15–37)
BILIRUB SERPL-MCNC: 0.3 MG/DL (ref 0–1)
BUN BLDV-MCNC: 11 MG/DL (ref 7–20)
CALCIUM SERPL-MCNC: 9.4 MG/DL (ref 8.3–10.6)
CHLORIDE BLD-SCNC: 104 MMOL/L (ref 99–110)
CHOLESTEROL, TOTAL: 156 MG/DL (ref 0–199)
CO2: 27 MMOL/L (ref 21–32)
CREAT SERPL-MCNC: 0.5 MG/DL (ref 0.6–1.2)
GFR AFRICAN AMERICAN: >60
GFR NON-AFRICAN AMERICAN: >60
GLOBULIN: 2.1 G/DL
GLUCOSE BLD-MCNC: 92 MG/DL (ref 70–99)
HDLC SERPL-MCNC: 61 MG/DL (ref 40–60)
LDL CHOLESTEROL CALCULATED: 68 MG/DL
POTASSIUM SERPL-SCNC: 4.2 MMOL/L (ref 3.5–5.1)
SODIUM BLD-SCNC: 145 MMOL/L (ref 136–145)
TOTAL PROTEIN: 6.4 G/DL (ref 6.4–8.2)
TRIGL SERPL-MCNC: 134 MG/DL (ref 0–150)
VLDLC SERPL CALC-MCNC: 27 MG/DL

## 2018-06-19 DIAGNOSIS — K21.9 GASTROESOPHAGEAL REFLUX DISEASE, ESOPHAGITIS PRESENCE NOT SPECIFIED: ICD-10-CM

## 2018-06-19 RX ORDER — MONTELUKAST SODIUM 10 MG/1
10 TABLET ORAL NIGHTLY
Qty: 90 TABLET | Refills: 1 | Status: SHIPPED | OUTPATIENT
Start: 2018-06-19 | End: 2019-04-11 | Stop reason: SDUPTHER

## 2018-06-19 RX ORDER — PANTOPRAZOLE SODIUM 40 MG/1
40 TABLET, DELAYED RELEASE ORAL 2 TIMES DAILY
Qty: 180 TABLET | Refills: 1 | Status: SHIPPED | OUTPATIENT
Start: 2018-06-19 | End: 2019-01-22 | Stop reason: SDUPTHER

## 2018-06-20 ENCOUNTER — TELEPHONE (OUTPATIENT)
Dept: FAMILY MEDICINE CLINIC | Age: 63
End: 2018-06-20

## 2018-06-21 ENCOUNTER — OFFICE VISIT (OUTPATIENT)
Dept: FAMILY MEDICINE CLINIC | Age: 63
End: 2018-06-21

## 2018-06-21 VITALS
TEMPERATURE: 97.8 F | SYSTOLIC BLOOD PRESSURE: 116 MMHG | DIASTOLIC BLOOD PRESSURE: 84 MMHG | WEIGHT: 236.6 LBS | BODY MASS INDEX: 41.91 KG/M2

## 2018-06-21 DIAGNOSIS — R07.89 OTHER CHEST PAIN: Primary | ICD-10-CM

## 2018-06-21 DIAGNOSIS — E55.9 VITAMIN D DEFICIENCY: ICD-10-CM

## 2018-06-21 DIAGNOSIS — E78.2 MIXED HYPERLIPIDEMIA: ICD-10-CM

## 2018-06-21 DIAGNOSIS — I10 ESSENTIAL HYPERTENSION: ICD-10-CM

## 2018-06-21 DIAGNOSIS — F41.0 PANIC ATTACK: ICD-10-CM

## 2018-06-21 DIAGNOSIS — R07.89 OTHER CHEST PAIN: ICD-10-CM

## 2018-06-21 DIAGNOSIS — K21.9 GASTROESOPHAGEAL REFLUX DISEASE, ESOPHAGITIS PRESENCE NOT SPECIFIED: ICD-10-CM

## 2018-06-21 LAB
A/G RATIO: 1.9 (ref 1.1–2.2)
ALBUMIN SERPL-MCNC: 4.6 G/DL (ref 3.4–5)
ALP BLD-CCNC: 87 U/L (ref 40–129)
ALT SERPL-CCNC: 27 U/L (ref 10–40)
ANION GAP SERPL CALCULATED.3IONS-SCNC: 14 MMOL/L (ref 3–16)
AST SERPL-CCNC: 20 U/L (ref 15–37)
BASOPHILS ABSOLUTE: 0.1 K/UL (ref 0–0.2)
BASOPHILS RELATIVE PERCENT: 1.1 %
BILIRUB SERPL-MCNC: 0.3 MG/DL (ref 0–1)
BUN BLDV-MCNC: 13 MG/DL (ref 7–20)
CALCIUM SERPL-MCNC: 9.7 MG/DL (ref 8.3–10.6)
CHLORIDE BLD-SCNC: 100 MMOL/L (ref 99–110)
CO2: 27 MMOL/L (ref 21–32)
CREAT SERPL-MCNC: 0.5 MG/DL (ref 0.6–1.2)
EOSINOPHILS ABSOLUTE: 0.2 K/UL (ref 0–0.6)
EOSINOPHILS RELATIVE PERCENT: 2.4 %
GFR AFRICAN AMERICAN: >60
GFR NON-AFRICAN AMERICAN: >60
GLOBULIN: 2.4 G/DL
GLUCOSE BLD-MCNC: 82 MG/DL (ref 70–99)
HCT VFR BLD CALC: 37.6 % (ref 36–48)
HEMOGLOBIN: 13.2 G/DL (ref 12–16)
LYMPHOCYTES ABSOLUTE: 2.6 K/UL (ref 1–5.1)
LYMPHOCYTES RELATIVE PERCENT: 39.3 %
MCH RBC QN AUTO: 31.1 PG (ref 26–34)
MCHC RBC AUTO-ENTMCNC: 35 G/DL (ref 31–36)
MCV RBC AUTO: 88.7 FL (ref 80–100)
MONOCYTES ABSOLUTE: 0.7 K/UL (ref 0–1.3)
MONOCYTES RELATIVE PERCENT: 9.9 %
NEUTROPHILS ABSOLUTE: 3.1 K/UL (ref 1.7–7.7)
NEUTROPHILS RELATIVE PERCENT: 47.3 %
PDW BLD-RTO: 13.6 % (ref 12.4–15.4)
PLATELET # BLD: 259 K/UL (ref 135–450)
PMV BLD AUTO: 7.4 FL (ref 5–10.5)
POTASSIUM SERPL-SCNC: 3.7 MMOL/L (ref 3.5–5.1)
RBC # BLD: 4.24 M/UL (ref 4–5.2)
SODIUM BLD-SCNC: 141 MMOL/L (ref 136–145)
TOTAL PROTEIN: 7 G/DL (ref 6.4–8.2)
WBC # BLD: 6.6 K/UL (ref 4–11)

## 2018-06-21 PROCEDURE — 99214 OFFICE O/P EST MOD 30 MIN: CPT | Performed by: FAMILY MEDICINE

## 2018-06-21 PROCEDURE — G8427 DOCREV CUR MEDS BY ELIG CLIN: HCPCS | Performed by: FAMILY MEDICINE

## 2018-06-21 PROCEDURE — 93000 ELECTROCARDIOGRAM COMPLETE: CPT | Performed by: FAMILY MEDICINE

## 2018-06-21 PROCEDURE — G8417 CALC BMI ABV UP PARAM F/U: HCPCS | Performed by: FAMILY MEDICINE

## 2018-06-21 PROCEDURE — 1036F TOBACCO NON-USER: CPT | Performed by: FAMILY MEDICINE

## 2018-06-21 PROCEDURE — 3017F COLORECTAL CA SCREEN DOC REV: CPT | Performed by: FAMILY MEDICINE

## 2018-06-22 LAB
FOLATE: >20 NG/ML (ref 4.78–24.2)
T4 FREE: 1.3 NG/DL (ref 0.9–1.8)
TSH SERPL DL<=0.05 MIU/L-ACNC: 1.21 UIU/ML (ref 0.27–4.2)
VITAMIN B-12: 670 PG/ML (ref 211–911)
VITAMIN D 25-HYDROXY: 40.1 NG/ML

## 2018-06-24 ASSESSMENT — ENCOUNTER SYMPTOMS
GASTROINTESTINAL NEGATIVE: 1
RESPIRATORY NEGATIVE: 1
EYES NEGATIVE: 1

## 2018-06-27 DIAGNOSIS — I10 ESSENTIAL HYPERTENSION: ICD-10-CM

## 2018-06-27 RX ORDER — METOPROLOL SUCCINATE 25 MG/1
25 TABLET, EXTENDED RELEASE ORAL DAILY
Qty: 90 TABLET | Refills: 0 | Status: SHIPPED | OUTPATIENT
Start: 2018-06-27 | End: 2018-10-22 | Stop reason: SDUPTHER

## 2018-07-20 DIAGNOSIS — E78.2 MIXED HYPERLIPIDEMIA: ICD-10-CM

## 2018-07-20 RX ORDER — PRAVASTATIN SODIUM 10 MG
10 TABLET ORAL DAILY
Qty: 90 TABLET | Refills: 0 | Status: SHIPPED | OUTPATIENT
Start: 2018-07-20 | End: 2019-02-24 | Stop reason: SDUPTHER

## 2018-07-20 NOTE — TELEPHONE ENCOUNTER
Have you picked out a new primary care physician? Because I am retiring after July 27th. Dar Johnson is no longer able to see you at the office. If not, please call [phone] to help locate a [facility] doctor near to your home or office.

## 2018-07-23 ENCOUNTER — TELEPHONE (OUTPATIENT)
Dept: FAMILY MEDICINE CLINIC | Age: 63
End: 2018-07-23

## 2018-07-23 DIAGNOSIS — B02.8 HERPES ZOSTER WITH OTHER COMPLICATION: ICD-10-CM

## 2018-07-23 DIAGNOSIS — I10 ESSENTIAL HYPERTENSION: ICD-10-CM

## 2018-07-23 RX ORDER — GABAPENTIN 100 MG/1
100 CAPSULE ORAL DAILY
Qty: 90 CAPSULE | Refills: 1 | Status: SHIPPED | OUTPATIENT
Start: 2018-07-23 | End: 2019-08-05 | Stop reason: SDUPTHER

## 2018-07-23 RX ORDER — LOSARTAN POTASSIUM 50 MG/1
50 TABLET ORAL DAILY
Qty: 90 TABLET | Refills: 1 | Status: SHIPPED | OUTPATIENT
Start: 2018-07-23 | End: 2019-04-15 | Stop reason: SDUPTHER

## 2018-10-22 DIAGNOSIS — I10 ESSENTIAL HYPERTENSION: ICD-10-CM

## 2018-10-22 RX ORDER — METOPROLOL SUCCINATE 25 MG/1
25 TABLET, EXTENDED RELEASE ORAL DAILY
Qty: 90 TABLET | Refills: 0 | Status: SHIPPED | OUTPATIENT
Start: 2018-10-22 | End: 2019-02-08 | Stop reason: SDUPTHER

## 2018-10-22 NOTE — TELEPHONE ENCOUNTER
Patient requesting a refill last visit was on 06/21/2018 and has an appointment scheduled with Dr. Ja Jimenez on 11/01/2018.

## 2018-11-01 ENCOUNTER — HOSPITAL ENCOUNTER (OUTPATIENT)
Dept: GENERAL RADIOLOGY | Age: 63
Discharge: HOME | End: 2018-11-01
Payer: COMMERCIAL

## 2018-11-01 ENCOUNTER — OFFICE VISIT (OUTPATIENT)
Dept: INTERNAL MEDICINE CLINIC | Age: 63
End: 2018-11-01
Payer: COMMERCIAL

## 2018-11-01 ENCOUNTER — HOSPITAL ENCOUNTER (OUTPATIENT)
Age: 63
Discharge: HOME | End: 2018-11-01
Payer: COMMERCIAL

## 2018-11-01 VITALS
OXYGEN SATURATION: 97 % | SYSTOLIC BLOOD PRESSURE: 124 MMHG | WEIGHT: 243 LBS | BODY MASS INDEX: 43.05 KG/M2 | HEART RATE: 78 BPM | DIASTOLIC BLOOD PRESSURE: 74 MMHG

## 2018-11-01 DIAGNOSIS — M79.641 RIGHT HAND PAIN: ICD-10-CM

## 2018-11-01 DIAGNOSIS — B02.23 SHINGLES (HERPES ZOSTER) POLYNEUROPATHY: ICD-10-CM

## 2018-11-01 DIAGNOSIS — Z23 FLU VACCINE NEED: ICD-10-CM

## 2018-11-01 DIAGNOSIS — K21.9 GASTROESOPHAGEAL REFLUX DISEASE, ESOPHAGITIS PRESENCE NOT SPECIFIED: ICD-10-CM

## 2018-11-01 DIAGNOSIS — E78.2 MIXED HYPERLIPIDEMIA: ICD-10-CM

## 2018-11-01 DIAGNOSIS — J30.1 NON-SEASONAL ALLERGIC RHINITIS DUE TO POLLEN: ICD-10-CM

## 2018-11-01 DIAGNOSIS — L71.9 ROSACEA: ICD-10-CM

## 2018-11-01 DIAGNOSIS — I10 ESSENTIAL HYPERTENSION: Primary | ICD-10-CM

## 2018-11-01 PROCEDURE — 90471 IMMUNIZATION ADMIN: CPT | Performed by: INTERNAL MEDICINE

## 2018-11-01 PROCEDURE — 3017F COLORECTAL CA SCREEN DOC REV: CPT | Performed by: INTERNAL MEDICINE

## 2018-11-01 PROCEDURE — 1036F TOBACCO NON-USER: CPT | Performed by: INTERNAL MEDICINE

## 2018-11-01 PROCEDURE — G8482 FLU IMMUNIZE ORDER/ADMIN: HCPCS | Performed by: INTERNAL MEDICINE

## 2018-11-01 PROCEDURE — 99202 OFFICE O/P NEW SF 15 MIN: CPT | Performed by: INTERNAL MEDICINE

## 2018-11-01 PROCEDURE — 73130 X-RAY EXAM OF HAND: CPT

## 2018-11-01 PROCEDURE — G8427 DOCREV CUR MEDS BY ELIG CLIN: HCPCS | Performed by: INTERNAL MEDICINE

## 2018-11-01 PROCEDURE — G8417 CALC BMI ABV UP PARAM F/U: HCPCS | Performed by: INTERNAL MEDICINE

## 2018-11-01 PROCEDURE — 90686 IIV4 VACC NO PRSV 0.5 ML IM: CPT | Performed by: INTERNAL MEDICINE

## 2018-11-01 RX ORDER — DOXYCYCLINE 100 MG/1
100 CAPSULE ORAL DAILY
Qty: 30 CAPSULE | Refills: 3 | Status: SHIPPED | OUTPATIENT
Start: 2018-11-01 | End: 2019-02-20 | Stop reason: SDUPTHER

## 2018-11-01 RX ORDER — LANSOPRAZOLE 30 MG/1
60 CAPSULE, DELAYED RELEASE ORAL 2 TIMES DAILY
Qty: 60 CAPSULE | Refills: 3
Start: 2018-11-01 | End: 2019-02-06

## 2018-11-01 ASSESSMENT — ENCOUNTER SYMPTOMS
GASTROINTESTINAL NEGATIVE: 1
SHORTNESS OF BREATH: 0
RESPIRATORY NEGATIVE: 1
CHEST TIGHTNESS: 0
WHEEZING: 0

## 2018-11-02 ENCOUNTER — TELEPHONE (OUTPATIENT)
Dept: INTERNAL MEDICINE CLINIC | Age: 63
End: 2018-11-02

## 2018-11-02 NOTE — TELEPHONE ENCOUNTER
Pt is wanting to know when she is due for blood   She has not had blood work in [employer]   Also patient would like to know what type of Arthritis is in her hand

## 2018-12-11 ENCOUNTER — TELEPHONE (OUTPATIENT)
Dept: INTERNAL MEDICINE CLINIC | Age: 63
End: 2018-12-11

## 2018-12-11 DIAGNOSIS — I10 ESSENTIAL HYPERTENSION: ICD-10-CM

## 2018-12-11 DIAGNOSIS — E78.2 MIXED HYPERLIPIDEMIA: Primary | ICD-10-CM

## 2018-12-11 NOTE — TELEPHONE ENCOUNTER
Calling because she would like to have labs put in   Pt would not like to wait until March to have this done      Please put in labs and advise pt

## 2018-12-14 DIAGNOSIS — I10 ESSENTIAL HYPERTENSION: ICD-10-CM

## 2018-12-14 DIAGNOSIS — E78.2 MIXED HYPERLIPIDEMIA: ICD-10-CM

## 2018-12-14 LAB
BASOPHILS ABSOLUTE: 0 K/UL (ref 0–0.2)
BASOPHILS RELATIVE PERCENT: 0.8 %
EOSINOPHILS ABSOLUTE: 0.2 K/UL (ref 0–0.6)
EOSINOPHILS RELATIVE PERCENT: 2.8 %
HCT VFR BLD CALC: 42.9 % (ref 36–48)
HEMOGLOBIN: 14.1 G/DL (ref 12–16)
LYMPHOCYTES ABSOLUTE: 2.1 K/UL (ref 1–5.1)
LYMPHOCYTES RELATIVE PERCENT: 38.1 %
MCH RBC QN AUTO: 30 PG (ref 26–34)
MCHC RBC AUTO-ENTMCNC: 32.9 G/DL (ref 31–36)
MCV RBC AUTO: 91.2 FL (ref 80–100)
MONOCYTES ABSOLUTE: 0.5 K/UL (ref 0–1.3)
MONOCYTES RELATIVE PERCENT: 10 %
NEUTROPHILS ABSOLUTE: 2.6 K/UL (ref 1.7–7.7)
NEUTROPHILS RELATIVE PERCENT: 48.3 %
PDW BLD-RTO: 13.6 % (ref 12.4–15.4)
PLATELET # BLD: 268 K/UL (ref 135–450)
PMV BLD AUTO: 7.7 FL (ref 5–10.5)
RBC # BLD: 4.7 M/UL (ref 4–5.2)
WBC # BLD: 5.4 K/UL (ref 4–11)

## 2018-12-15 LAB
A/G RATIO: 1.9 (ref 1.1–2.2)
ALBUMIN SERPL-MCNC: 4.8 G/DL (ref 3.4–5)
ALP BLD-CCNC: 92 U/L (ref 40–129)
ALT SERPL-CCNC: 22 U/L (ref 10–40)
ANION GAP SERPL CALCULATED.3IONS-SCNC: 17 MMOL/L (ref 3–16)
AST SERPL-CCNC: 18 U/L (ref 15–37)
BILIRUB SERPL-MCNC: 0.5 MG/DL (ref 0–1)
BUN BLDV-MCNC: 14 MG/DL (ref 7–20)
CALCIUM SERPL-MCNC: 10.2 MG/DL (ref 8.3–10.6)
CHLORIDE BLD-SCNC: 106 MMOL/L (ref 99–110)
CHOLESTEROL, FASTING: 171 MG/DL (ref 0–199)
CO2: 23 MMOL/L (ref 21–32)
CREAT SERPL-MCNC: 0.6 MG/DL (ref 0.6–1.2)
GFR AFRICAN AMERICAN: >60
GFR NON-AFRICAN AMERICAN: >60
GLOBULIN: 2.5 G/DL
GLUCOSE FASTING: 90 MG/DL (ref 70–99)
HDLC SERPL-MCNC: 66 MG/DL (ref 40–60)
LDL CHOLESTEROL CALCULATED: 84 MG/DL
POTASSIUM SERPL-SCNC: 5 MMOL/L (ref 3.5–5.1)
SODIUM BLD-SCNC: 146 MMOL/L (ref 136–145)
TOTAL PROTEIN: 7.3 G/DL (ref 6.4–8.2)
TRIGLYCERIDE, FASTING: 105 MG/DL (ref 0–150)
VLDLC SERPL CALC-MCNC: 21 MG/DL

## 2018-12-17 ENCOUNTER — TELEPHONE (OUTPATIENT)
Dept: INTERNAL MEDICINE CLINIC | Age: 63
End: 2018-12-17

## 2018-12-17 NOTE — TELEPHONE ENCOUNTER
Pt had blood drawn Friday  Pt states she received a call saying someone had questions about her blood draw  Please return pt call

## 2019-01-10 ENCOUNTER — TELEPHONE (OUTPATIENT)
Dept: INTERNAL MEDICINE CLINIC | Age: 64
End: 2019-01-10

## 2019-01-10 DIAGNOSIS — F32.4 MAJOR DEPRESSIVE DISORDER WITH SINGLE EPISODE, IN PARTIAL REMISSION (HCC): ICD-10-CM

## 2019-01-10 RX ORDER — BUPROPION HYDROCHLORIDE 300 MG/1
300 TABLET ORAL EVERY MORNING
Qty: 90 TABLET | Refills: 0 | Status: SHIPPED | OUTPATIENT
Start: 2019-01-10 | End: 2019-04-07 | Stop reason: SDUPTHER

## 2019-01-21 ENCOUNTER — HOSPITAL ENCOUNTER (OUTPATIENT)
Dept: MAMMOGRAPHY | Age: 64
Discharge: HOME | End: 2019-01-21
Payer: COMMERCIAL

## 2019-01-21 DIAGNOSIS — Z12.31 VISIT FOR SCREENING MAMMOGRAM: ICD-10-CM

## 2019-01-21 PROCEDURE — 77063 BREAST TOMOSYNTHESIS BI: CPT

## 2019-01-22 DIAGNOSIS — K21.9 GASTROESOPHAGEAL REFLUX DISEASE, ESOPHAGITIS PRESENCE NOT SPECIFIED: ICD-10-CM

## 2019-01-22 RX ORDER — PANTOPRAZOLE SODIUM 40 MG/1
40 TABLET, DELAYED RELEASE ORAL 2 TIMES DAILY
Qty: 180 TABLET | Refills: 1 | Status: SHIPPED | OUTPATIENT
Start: 2019-01-22 | End: 2019-07-28 | Stop reason: SDUPTHER

## 2019-01-23 DIAGNOSIS — I10 ESSENTIAL HYPERTENSION: ICD-10-CM

## 2019-01-23 RX ORDER — METOPROLOL SUCCINATE 25 MG/1
TABLET, EXTENDED RELEASE ORAL
Qty: 90 TABLET | Refills: 0 | OUTPATIENT
Start: 2019-01-23

## 2019-02-06 ENCOUNTER — HOSPITAL ENCOUNTER (OUTPATIENT)
Age: 64
Discharge: HOME | End: 2019-02-06
Payer: COMMERCIAL

## 2019-02-06 ENCOUNTER — OFFICE VISIT (OUTPATIENT)
Dept: INTERNAL MEDICINE CLINIC | Age: 64
End: 2019-02-06
Payer: COMMERCIAL

## 2019-02-06 ENCOUNTER — HOSPITAL ENCOUNTER (OUTPATIENT)
Dept: GENERAL RADIOLOGY | Age: 64
Discharge: HOME | End: 2019-02-06
Payer: COMMERCIAL

## 2019-02-06 VITALS
BODY MASS INDEX: 43.75 KG/M2 | DIASTOLIC BLOOD PRESSURE: 82 MMHG | HEART RATE: 72 BPM | SYSTOLIC BLOOD PRESSURE: 122 MMHG | WEIGHT: 247 LBS | OXYGEN SATURATION: 96 %

## 2019-02-06 DIAGNOSIS — K21.9 GASTROESOPHAGEAL REFLUX DISEASE, ESOPHAGITIS PRESENCE NOT SPECIFIED: ICD-10-CM

## 2019-02-06 DIAGNOSIS — E78.2 MIXED HYPERLIPIDEMIA: ICD-10-CM

## 2019-02-06 DIAGNOSIS — B02.23 SHINGLES (HERPES ZOSTER) POLYNEUROPATHY: ICD-10-CM

## 2019-02-06 DIAGNOSIS — M51.36 DEGENERATIVE DISC DISEASE, LUMBAR: ICD-10-CM

## 2019-02-06 DIAGNOSIS — I10 ESSENTIAL HYPERTENSION: Primary | ICD-10-CM

## 2019-02-06 DIAGNOSIS — L71.9 ROSACEA: ICD-10-CM

## 2019-02-06 PROCEDURE — 99214 OFFICE O/P EST MOD 30 MIN: CPT | Performed by: INTERNAL MEDICINE

## 2019-02-06 PROCEDURE — 72100 X-RAY EXAM L-S SPINE 2/3 VWS: CPT

## 2019-02-06 ASSESSMENT — ENCOUNTER SYMPTOMS
WHEEZING: 0
SHORTNESS OF BREATH: 0
GASTROINTESTINAL NEGATIVE: 1
BACK PAIN: 1
CHEST TIGHTNESS: 0
RESPIRATORY NEGATIVE: 1

## 2019-02-07 ENCOUNTER — TELEPHONE (OUTPATIENT)
Dept: INTERNAL MEDICINE CLINIC | Age: 64
End: 2019-02-07

## 2019-02-07 DIAGNOSIS — I10 ESSENTIAL HYPERTENSION: ICD-10-CM

## 2019-02-07 RX ORDER — METOPROLOL SUCCINATE 25 MG/1
TABLET, EXTENDED RELEASE ORAL
Qty: 90 TABLET | Refills: 0 | OUTPATIENT
Start: 2019-02-07

## 2019-02-08 ENCOUNTER — TELEPHONE (OUTPATIENT)
Dept: INTERNAL MEDICINE CLINIC | Age: 64
End: 2019-02-08

## 2019-02-08 DIAGNOSIS — I10 ESSENTIAL HYPERTENSION: ICD-10-CM

## 2019-02-10 RX ORDER — METOPROLOL SUCCINATE 25 MG/1
25 TABLET, EXTENDED RELEASE ORAL DAILY
Qty: 90 TABLET | Refills: 0 | Status: SHIPPED | OUTPATIENT
Start: 2019-02-10 | End: 2019-04-30

## 2019-02-12 ENCOUNTER — HOSPITAL ENCOUNTER (OUTPATIENT)
Dept: PHYSICAL THERAPY | Age: 64
Setting detail: THERAPIES SERIES
Discharge: HOME | End: 2019-02-12
Payer: COMMERCIAL

## 2019-02-12 PROCEDURE — 97110 THERAPEUTIC EXERCISES: CPT

## 2019-02-12 PROCEDURE — 97162 PT EVAL MOD COMPLEX 30 MIN: CPT

## 2019-02-21 ENCOUNTER — HOSPITAL ENCOUNTER (OUTPATIENT)
Dept: PHYSICAL THERAPY | Age: 64
Setting detail: THERAPIES SERIES
Discharge: HOME | End: 2019-02-21
Payer: COMMERCIAL

## 2019-02-21 PROCEDURE — 97110 THERAPEUTIC EXERCISES: CPT

## 2019-02-24 DIAGNOSIS — E78.2 MIXED HYPERLIPIDEMIA: ICD-10-CM

## 2019-02-25 RX ORDER — PRAVASTATIN SODIUM 10 MG
10 TABLET ORAL DAILY
Qty: 90 TABLET | Refills: 0 | Status: SHIPPED | OUTPATIENT
Start: 2019-02-25 | End: 2019-05-26 | Stop reason: SDUPTHER

## 2019-02-26 ENCOUNTER — HOSPITAL ENCOUNTER (OUTPATIENT)
Dept: PHYSICAL THERAPY | Age: 64
Setting detail: THERAPIES SERIES
Discharge: HOME | End: 2019-02-26
Payer: COMMERCIAL

## 2019-02-26 PROCEDURE — 97110 THERAPEUTIC EXERCISES: CPT

## 2019-03-01 ENCOUNTER — TELEPHONE (OUTPATIENT)
Dept: INTERNAL MEDICINE CLINIC | Age: 64
End: 2019-03-01

## 2019-03-05 ENCOUNTER — HOSPITAL ENCOUNTER (OUTPATIENT)
Dept: PHYSICAL THERAPY | Age: 64
Setting detail: THERAPIES SERIES
Discharge: HOME | End: 2019-03-05
Payer: COMMERCIAL

## 2019-03-05 PROCEDURE — 97110 THERAPEUTIC EXERCISES: CPT

## 2019-03-07 ENCOUNTER — HOSPITAL ENCOUNTER (OUTPATIENT)
Dept: PHYSICAL THERAPY | Age: 64
Setting detail: THERAPIES SERIES
Discharge: HOME | End: 2019-03-07
Payer: COMMERCIAL

## 2019-03-13 ENCOUNTER — TELEPHONE (OUTPATIENT)
Dept: INTERNAL MEDICINE CLINIC | Age: 64
End: 2019-03-13

## 2019-03-19 ENCOUNTER — HOSPITAL ENCOUNTER (OUTPATIENT)
Dept: PHYSICAL THERAPY | Age: 64
Setting detail: THERAPIES SERIES
Discharge: HOME | End: 2019-03-19
Payer: COMMERCIAL

## 2019-03-19 PROCEDURE — 97110 THERAPEUTIC EXERCISES: CPT

## 2019-03-21 ENCOUNTER — HOSPITAL ENCOUNTER (OUTPATIENT)
Dept: PHYSICAL THERAPY | Age: 64
Setting detail: THERAPIES SERIES
Discharge: HOME | End: 2019-03-21
Payer: COMMERCIAL

## 2019-03-26 ENCOUNTER — NURSE ONLY (OUTPATIENT)
Dept: CARDIOLOGY CLINIC | Age: 64
End: 2019-03-26

## 2019-03-26 ENCOUNTER — OFFICE VISIT (OUTPATIENT)
Dept: CARDIOLOGY CLINIC | Age: 64
End: 2019-03-26
Payer: COMMERCIAL

## 2019-03-26 VITALS
DIASTOLIC BLOOD PRESSURE: 80 MMHG | SYSTOLIC BLOOD PRESSURE: 130 MMHG | HEART RATE: 59 BPM | BODY MASS INDEX: 44.29 KG/M2 | WEIGHT: 250 LBS

## 2019-03-26 DIAGNOSIS — Z82.49 FAMILY HISTORY OF EARLY CAD: ICD-10-CM

## 2019-03-26 DIAGNOSIS — I10 ESSENTIAL HYPERTENSION: Primary | ICD-10-CM

## 2019-03-26 DIAGNOSIS — E78.2 MIXED HYPERLIPIDEMIA: ICD-10-CM

## 2019-03-26 DIAGNOSIS — R06.09 EXERTIONAL DYSPNEA: ICD-10-CM

## 2019-03-26 DIAGNOSIS — R00.2 PALPITATIONS: ICD-10-CM

## 2019-03-26 PROCEDURE — 99243 OFF/OP CNSLTJ NEW/EST LOW 30: CPT | Performed by: INTERNAL MEDICINE

## 2019-03-26 PROCEDURE — 93000 ELECTROCARDIOGRAM COMPLETE: CPT | Performed by: INTERNAL MEDICINE

## 2019-03-26 PROCEDURE — 0298T PR EXT ECG > 48HR TO 21 DAY REVIEW AND INTERPRETATN: CPT | Performed by: INTERNAL MEDICINE

## 2019-04-07 DIAGNOSIS — F32.4 MAJOR DEPRESSIVE DISORDER WITH SINGLE EPISODE, IN PARTIAL REMISSION (HCC): ICD-10-CM

## 2019-04-08 RX ORDER — BUPROPION HYDROCHLORIDE 300 MG/1
TABLET ORAL
Qty: 90 TABLET | Refills: 0 | Status: SHIPPED | OUTPATIENT
Start: 2019-04-08 | End: 2019-07-15 | Stop reason: SDUPTHER

## 2019-04-09 ENCOUNTER — HOSPITAL ENCOUNTER (OUTPATIENT)
Dept: NON INVASIVE DIAGNOSTICS | Age: 64
Discharge: HOME | End: 2019-04-09
Payer: COMMERCIAL

## 2019-04-09 DIAGNOSIS — R06.09 EXERTIONAL DYSPNEA: ICD-10-CM

## 2019-04-09 LAB
LV EF: 65 %
LVEF MODALITY: NORMAL

## 2019-04-09 PROCEDURE — 93306 TTE W/DOPPLER COMPLETE: CPT

## 2019-04-15 DIAGNOSIS — I10 ESSENTIAL HYPERTENSION: ICD-10-CM

## 2019-04-15 RX ORDER — MONTELUKAST SODIUM 10 MG/1
10 TABLET ORAL NIGHTLY
Qty: 90 TABLET | Refills: 1 | Status: SHIPPED | OUTPATIENT
Start: 2019-04-15 | End: 2019-07-24 | Stop reason: SDUPTHER

## 2019-04-16 RX ORDER — LOSARTAN POTASSIUM 50 MG/1
TABLET ORAL
Qty: 90 TABLET | Refills: 1 | Status: SHIPPED | OUTPATIENT
Start: 2019-04-16 | End: 2019-04-30 | Stop reason: ALTCHOICE

## 2019-04-22 DIAGNOSIS — R00.2 PALPITATIONS: ICD-10-CM

## 2019-04-22 RX ORDER — DOXYCYCLINE 100 MG/1
CAPSULE ORAL
Qty: 30 CAPSULE | Refills: 1 | Status: SHIPPED | OUTPATIENT
Start: 2019-04-22 | End: 2019-10-04 | Stop reason: SDUPTHER

## 2019-04-30 ENCOUNTER — OFFICE VISIT (OUTPATIENT)
Dept: CARDIOLOGY CLINIC | Age: 64
End: 2019-04-30
Payer: COMMERCIAL

## 2019-04-30 VITALS
HEART RATE: 68 BPM | WEIGHT: 246 LBS | SYSTOLIC BLOOD PRESSURE: 130 MMHG | BODY MASS INDEX: 43.58 KG/M2 | DIASTOLIC BLOOD PRESSURE: 82 MMHG

## 2019-04-30 DIAGNOSIS — R06.09 EXERTIONAL DYSPNEA: ICD-10-CM

## 2019-04-30 DIAGNOSIS — I10 ESSENTIAL HYPERTENSION: ICD-10-CM

## 2019-04-30 DIAGNOSIS — Z82.49 FAMILY HISTORY OF EARLY CAD: ICD-10-CM

## 2019-04-30 DIAGNOSIS — E78.2 MIXED HYPERLIPIDEMIA: ICD-10-CM

## 2019-04-30 DIAGNOSIS — R00.2 PALPITATIONS: Primary | ICD-10-CM

## 2019-04-30 PROCEDURE — 99213 OFFICE O/P EST LOW 20 MIN: CPT | Performed by: INTERNAL MEDICINE

## 2019-04-30 RX ORDER — METOPROLOL SUCCINATE 25 MG/1
25 TABLET, EXTENDED RELEASE ORAL 2 TIMES DAILY
Qty: 180 TABLET | Refills: 5 | Status: SHIPPED | OUTPATIENT
Start: 2019-04-30 | End: 2019-05-09

## 2019-04-30 NOTE — PROGRESS NOTES
mouth sores or sore throat. CARDIOVASCULAR: No chest pain/chest pressure/chest discomfort. No palpitations. No edema. RESPIRATORY: No dyspnea. No cough or wheezing, no sputum production. GASTROINTESTINAL: No N/V/D. No abdominal pain, appetite loss, blood in stools. GENITOURINARY: No dysuria, trouble voiding, or hematuria. MUSCULOSKELETAL:  No gait disturbance, weakness or joint complaints. NEUROLOGICAL: No headache, diplopia, change in muscle strength, numbness or tingling. No change in gait, balance, coordination, mood, affect, memory, mentation, behavior. PSHYCH: No anxiety, loss of interest, change in sexual behavior, feelings of self-harm, or confusion. ENDOCRINE: No excessive thirst, fluid intake, or urination. No tremor. HEMATOLOGIC: No abnormal bruising or bleeding. ALLERGY: No nasal congestion or hives.       Physical Examination:     Vitals:    04/30/19 1600   BP: 130/82   Pulse: 68   Weight: 246 lb (111.6 kg)       Wt Readings from Last 3 Encounters:   04/30/19 246 lb (111.6 kg)   03/26/19 250 lb (113.4 kg)   02/06/19 247 lb (112 kg)         General Appearance:  Alert, cooperative, no distress, appears stated age Appropriate weight   Head:  Normocephalic, without obvious abnormality, atraumatic   Eyes:  PERRL, conjunctiva/corneas clear EOM intact  Ears normal   Throat no lesions       Nose: Nares normal, no drainage or sinus tenderness   Throat: Lips, mucosa, and tongue normal   Neck: Supple, symmetrical, trachea midline, no adenopathy, thyroid: not enlarged, symmetric, no tenderness/mass/nodules, no carotid bruit       Lungs:   Clear to auscultation bilaterally, respirations unlabored   Chest Wall:  No tenderness or deformity   Heart:  Regular rhythm, rate is controlled, S1, S2 normal, there is no murmur, there is no rub or gallop, no jvd, no bilateral lower extremity edema   Abdomen:   Soft, non-tender, bowel sounds active all four quadrants,  no masses, no organomegaly       Extremities: No      Diabetic: No      Tobacco smoker: No      Systolic Blood Pressure: 646 mmHg      Is BP treated: No      HDL Cholesterol: 66 mg/dL      Total Cholesterol: 171 mg/dL    Imaging:       Assessment / Plan:      Diagnosis Orders   1. Palpitations     2. Essential hypertension  metoprolol succinate (TOPROL XL) 25 MG extended release tablet   3. Exertional dyspnea     4. Family history of early CAD     11. Mixed hyperlipidemia          1. SVT:   Patient has palpitations and monitor revealed episodes of SVT. -Will increase Toprol to 25 bid from daily  -Stop losartan 50 daily to avoid hypotension  -Will refer to Dr. Sachi Enamorado (EP) for further evaluation regarding her arrhythmia  -She will need to decrease the amount of caffeine in her diet     2. Exertional dyspnea:   I doubt significant CAD in view of normal coronaries in 2014. Echo was normal this time.      3. HLP:   She is on pravastatin 10 daily     4. HTN:   BP is controlled with current meds. Return in about 2 months (around 6/30/2019). I have personally reviewed the reports and images of labs, radiological studies, cardiac studies including ECG's and telemetry, current and old medical records. The note was completed using EMR. Every effort was made to ensure accuracy; however, inadvertent computerized transcription errors may be present. All questions and concerns were addressed to the patient/family. Alternatives to my treatment were discussed. I would like to thank you for providing me the opportunity to participate in the care of your patient. If you have any questions, please do not hesitate to contact me.      Jordan Olivera MD, [facility] [place], [address]  The [address] [address]  Ph: [phone]  Fax: [phone]

## 2019-05-01 ENCOUNTER — HOSPITAL ENCOUNTER (OUTPATIENT)
Dept: PHYSICAL THERAPY | Age: 64
Setting detail: THERAPIES SERIES
Discharge: HOME | End: 2019-05-01
Payer: COMMERCIAL

## 2019-05-06 NOTE — PROGRESS NOTES
1: Pt will demo good understanding and knowledge of HEP progressions MET  Long term goal 2: Decreased pain 3/10 at worst to allow for pt able to get comfortable to be able to get to sleep without difficulty partially met  Long term goal 3: Trunk AROM WFLs, hip PROM IR WFLs and B HS 90-90 (-) 15 deg to allow for N gait pattern and no difficulty after prolonged sitting partially met  Long term goal 4: Strength B hips 4+/5 to allow for standing/walking tolerance as desired without increased pain partially met  Long term goal 5: Decreased impairment per modified oswestry <20% impaired not assessed     Goal Status:  [] Achieved [x] Partially Achieved  [] Not Achieved     Patient Status: [x] Patient now discharged, pt has made good progress towards goals. After last seen, pt cancelled visit due to having a bad HA and did not reschedule. Will D/C at this time. Recommend pt continue with HEP as instructed and F/U with Dr as needed. Electronically signed by:   Helen Lance, DPT [id number]

## 2019-05-09 DIAGNOSIS — I10 ESSENTIAL HYPERTENSION: ICD-10-CM

## 2019-05-09 RX ORDER — METOPROLOL SUCCINATE 25 MG/1
TABLET, EXTENDED RELEASE ORAL
Qty: 90 TABLET | Refills: 0 | Status: SHIPPED | OUTPATIENT
Start: 2019-05-09 | End: 2019-07-24 | Stop reason: SDUPTHER

## 2019-05-26 DIAGNOSIS — E78.2 MIXED HYPERLIPIDEMIA: ICD-10-CM

## 2019-05-28 RX ORDER — PRAVASTATIN SODIUM 10 MG
10 TABLET ORAL DAILY
Qty: 90 TABLET | Refills: 0 | Status: SHIPPED | OUTPATIENT
Start: 2019-05-28 | End: 2019-09-07 | Stop reason: SDUPTHER

## 2019-06-03 ENCOUNTER — OFFICE VISIT (OUTPATIENT)
Dept: INTERNAL MEDICINE CLINIC | Age: 64
End: 2019-06-03
Payer: COMMERCIAL

## 2019-06-03 VITALS
WEIGHT: 244 LBS | TEMPERATURE: 98.6 F | BODY MASS INDEX: 43.22 KG/M2 | SYSTOLIC BLOOD PRESSURE: 152 MMHG | OXYGEN SATURATION: 94 % | DIASTOLIC BLOOD PRESSURE: 88 MMHG | HEART RATE: 72 BPM

## 2019-06-03 DIAGNOSIS — I10 ESSENTIAL HYPERTENSION: ICD-10-CM

## 2019-06-03 DIAGNOSIS — J40 BRONCHITIS: Primary | ICD-10-CM

## 2019-06-03 PROBLEM — Z82.49 FAMILY HISTORY OF EARLY CAD: Status: RESOLVED | Noted: 2019-03-26 | Resolved: 2019-06-03

## 2019-06-03 PROCEDURE — 99213 OFFICE O/P EST LOW 20 MIN: CPT | Performed by: INTERNAL MEDICINE

## 2019-06-03 RX ORDER — LOSARTAN POTASSIUM 50 MG/1
TABLET ORAL
Qty: 30 TABLET | Refills: 1 | Status: SHIPPED | OUTPATIENT
Start: 2019-06-03 | End: 2019-07-24 | Stop reason: SDUPTHER

## 2019-06-03 ASSESSMENT — ENCOUNTER SYMPTOMS
VOMITING: 0
COUGH: 1
SINUS PRESSURE: 1
CHEST TIGHTNESS: 0
RHINORRHEA: 1
FACIAL SWELLING: 0
SHORTNESS OF BREATH: 0
NAUSEA: 0
WHEEZING: 0

## 2019-06-03 ASSESSMENT — PATIENT HEALTH QUESTIONNAIRE - PHQ9
2. FEELING DOWN, DEPRESSED OR HOPELESS: 0
SUM OF ALL RESPONSES TO PHQ QUESTIONS 1-9: 0
SUM OF ALL RESPONSES TO PHQ QUESTIONS 1-9: 0
1. LITTLE INTEREST OR PLEASURE IN DOING THINGS: 0
SUM OF ALL RESPONSES TO PHQ9 QUESTIONS 1 & 2: 0

## 2019-06-03 NOTE — PROGRESS NOTES
Subjective:      Patient ID: Epifanio Bui is a 59 y.o. y.o. female. Chief Complaint   Patient presents with    Cough       HPI    Patient is here for a sick visit. Vitals:    06/03/19 1645   BP: (!) 152/88   Pulse:    Temp: 98.6 °F (37 °C)   SpO2:        Wt Readings from Last 3 Encounters:   06/03/19 244 lb (110.7 kg)   04/30/19 246 lb (111.6 kg)   03/26/19 250 lb (113.4 kg)     She complains of coughing for the last week. She complains of cough productive thick yellow phlegm. She denies fever but has had chills. She feels sinus pressure and pain. She has no chest pain or palpitations. Discussed use, benefit, and side effects of prescribed medications. Barriers to medication compliance addressed. All patient questions answered. Pt voiced understanding. Review of Systems   Constitutional: Negative for activity change, appetite change, chills, diaphoresis and fever. HENT: Positive for congestion, postnasal drip, rhinorrhea, sinus pressure and sneezing. Negative for ear discharge, ear pain, facial swelling and hearing loss. Respiratory: Positive for cough. Negative for chest tightness, shortness of breath and wheezing. Gastrointestinal: Negative for nausea and vomiting. Musculoskeletal: Negative for neck pain. Objective:   Physical Exam   Constitutional: She appears well-developed and well-nourished. HENT:   Head: Normocephalic and atraumatic. Neck: Carotid bruit is not present. No thyromegaly present. Cardiovascular: Normal rate, regular rhythm, S1 normal, S2 normal and normal heart sounds. Pulmonary/Chest: Effort normal. She has wheezes. She has no rhonchi. She has no rales. Musculoskeletal: She exhibits no edema.        Assessment:      See Problem List assessment and plan       Plan:     Bronchitis  Doxycyline 100 mg bid for 10 days  Mucin ex bid  Call if no better      Essential hypertension  BP is up  Cardiology stopped Losartan  She was advised to take Metoprolol twice daily. She is only taking it once daily and does not want to take it twice daily  Restart Losartan  BP check two weeks      Patient engaged in shared decision making. Information given to evaluateoptions of treatment, understand what is needed and discuss importance of following plan.

## 2019-06-03 NOTE — ASSESSMENT & PLAN NOTE
BP is up  Cardiology stopped Losartan  She was advised to take Metoprolol twice daily.   She is only taking it once daily and does not want to take it twice daily  Restart Losartan  BP check two weeks

## 2019-06-08 DIAGNOSIS — I10 ESSENTIAL HYPERTENSION: ICD-10-CM

## 2019-06-08 LAB
A/G RATIO: 2 (ref 1.1–2.2)
ALBUMIN SERPL-MCNC: 4.4 G/DL (ref 3.4–5)
ALP BLD-CCNC: 91 U/L (ref 40–129)
ALT SERPL-CCNC: 25 U/L (ref 10–40)
ANION GAP SERPL CALCULATED.3IONS-SCNC: 13 MMOL/L (ref 3–16)
AST SERPL-CCNC: 20 U/L (ref 15–37)
BILIRUB SERPL-MCNC: 0.3 MG/DL (ref 0–1)
BUN BLDV-MCNC: 10 MG/DL (ref 7–20)
CALCIUM SERPL-MCNC: 9.5 MG/DL (ref 8.3–10.6)
CHLORIDE BLD-SCNC: 106 MMOL/L (ref 99–110)
CHOLESTEROL, TOTAL: 148 MG/DL (ref 0–199)
CO2: 25 MMOL/L (ref 21–32)
CREAT SERPL-MCNC: <0.5 MG/DL (ref 0.6–1.2)
GFR AFRICAN AMERICAN: >60
GFR NON-AFRICAN AMERICAN: >60
GLOBULIN: 2.2 G/DL
GLUCOSE BLD-MCNC: 96 MG/DL (ref 70–99)
HDLC SERPL-MCNC: 59 MG/DL (ref 40–60)
LDL CHOLESTEROL CALCULATED: 69 MG/DL
POTASSIUM SERPL-SCNC: 4 MMOL/L (ref 3.5–5.1)
SODIUM BLD-SCNC: 144 MMOL/L (ref 136–145)
TOTAL PROTEIN: 6.6 G/DL (ref 6.4–8.2)
TRIGL SERPL-MCNC: 101 MG/DL (ref 0–150)
VLDLC SERPL CALC-MCNC: 20 MG/DL

## 2019-07-10 ENCOUNTER — OFFICE VISIT (OUTPATIENT)
Dept: ORTHOPEDIC SURGERY | Age: 64
End: 2019-07-10
Payer: COMMERCIAL

## 2019-07-10 VITALS
HEIGHT: 63 IN | HEART RATE: 66 BPM | DIASTOLIC BLOOD PRESSURE: 78 MMHG | WEIGHT: 245 LBS | SYSTOLIC BLOOD PRESSURE: 137 MMHG | BODY MASS INDEX: 43.41 KG/M2

## 2019-07-10 DIAGNOSIS — T84.84XD PAIN OF BOTH LOWER EXTREMITIES DUE TO BILATERAL TOTAL KNEE REPLACEMENT, SUBSEQUENT ENCOUNTER: Primary | ICD-10-CM

## 2019-07-10 DIAGNOSIS — Z96.653 PAIN OF BOTH LOWER EXTREMITIES DUE TO BILATERAL TOTAL KNEE REPLACEMENT, SUBSEQUENT ENCOUNTER: Primary | ICD-10-CM

## 2019-07-10 PROCEDURE — 99213 OFFICE O/P EST LOW 20 MIN: CPT | Performed by: ORTHOPAEDIC SURGERY

## 2019-07-10 NOTE — LETTER
[facility]  [address] [address] [address]  Phone: [phone]  Fax: [phone]    No ref. provider found        July 28, 2019       Patient: Diallo Londono   MR Number: [id number]   YOB: 1955   Date of Visit: 7/10/2019       Dear Dr. Sirena Kamara ref. provider found: Thank you for the request for consultation for Bonilla Lorenz to me for the evaluation of bilateral knee replacements and pes tendonitis. Below are the relevant portions of my assessment and plan of care. If you have questions, please do not hesitate to call me. I look forward to following Alireza Bautista along with you.     Sincerely,        Doug Anthony MD    CC providers:  Brennen Kamara MD  [address]  [place] [address]

## 2019-07-15 DIAGNOSIS — F32.4 MAJOR DEPRESSIVE DISORDER WITH SINGLE EPISODE, IN PARTIAL REMISSION (HCC): ICD-10-CM

## 2019-07-16 RX ORDER — BUPROPION HYDROCHLORIDE 300 MG/1
TABLET ORAL
Qty: 30 TABLET | Refills: 2 | Status: SHIPPED | OUTPATIENT
Start: 2019-07-16 | End: 2019-10-04 | Stop reason: SDUPTHER

## 2019-07-17 NOTE — PROGRESS NOTES
HYSTERECTOMY      SINUS SURGERY      TOTAL KNEE ARTHROPLASTY Bilateral 03/03/2015       Allergies: Allergies   Allergen Reactions    Latex Rash    Reglan [Metoclopramide Hcl] Rash    Univasc [Moexipril]     Celecoxib Palpitations    Keflex [Cephalexin] Diarrhea    Lipitor     Metoclopramide Anxiety    Prochlorperazine Anxiety    Prochlorperazine Edisylate     Sulfa Antibiotics Nausea And Vomiting    Vioxx        Medication:   Prior to Admission medications    Medication Sig Start Date End Date Taking? Authorizing Provider   aspirin 81 MG tablet Take 81 mg by mouth daily   Yes Historical Provider, MD   Omega-3 Fatty Acids (FISH OIL) 1200 MG CAPS Take 1,200 mg by mouth 2 times daily   Yes Historical Provider, MD   buPROPion (WELLBUTRIN XL) 300 MG extended release tablet TAKE 1 TABLET BY MOUTH EVERY DAY IN THE MORNING 7/16/19  Yes Isabel Nielsen MD   losartan (COZAAR) 50 MG tablet TAKE 1 TABLET BY MOUTH EVERY DAY 6/3/19  Yes Isabel Nielsen MD   pravastatin (PRAVACHOL) 10 MG tablet TAKE 1 TABLET BY MOUTH DAILY 5/28/19  Yes Isabel Nielsen MD   metoprolol succinate (TOPROL XL) 25 MG extended release tablet TAKE 1 TABLET BY MOUTH EVERY DAY 5/9/19  Yes Isabel Nielsen MD   doxycycline monohydrate (MONODOX) 100 MG capsule TAKE 1 CAPSULE BY MOUTH EVERY DAY 4/22/19  Yes LISA Rojo CNP   montelukast (SINGULAIR) 10 MG tablet TAKE 1 TABLET BY MOUTH NIGHTLY 4/15/19  Yes Isabel Nielsen MD   pantoprazole (PROTONIX) 40 MG tablet TAKE 1 TABLET BY MOUTH 2 TIMES DAILY 1/22/19  Yes Isabel Nielsen MD   gabapentin (NEURONTIN) 100 MG capsule Take 1 capsule by mouth daily. . 7/23/18 7/23/19 Yes Jamie Booker MD   albuterol sulfate HFA (PROVENTIL HFA) 108 (90 BASE) MCG/ACT inhaler Inhale 2 puffs into the lungs every 6 hours as needed for Wheezing 3/13/17  Yes LISA Joaquin CNP   Multiple Vitamins-Minerals (CENTRUM SILVER PO) Take 1 tablet by mouth daily    Yes Historical Provider, MD       Social History:   reports that she quit smoking about 29 years ago. She has never used smokeless tobacco. She reports that she does not drink alcohol or use drugs. Family History:  family history includes Cancer in her brother and mother; Colon Cancer in her sister; Hearing Loss in her brother; Heart Disease in her brother, brother, father, mother, and sister; High Blood Pressure in her brother, brother, father, and mother. Reviewed. Denies family history of sudden cardiac death, arrhythmia, premature CAD    Review of System:    · General ROS: negative for - chills, fever   · Psychological ROS: negative for - anxiety or depression  · Ophthalmic ROS: negative for - eye pain or loss of vision  · ENT ROS: negative for - epistaxis, headaches, nasal discharge, sore throat   · Allergy and Immunology ROS: negative for - hives, nasal congestion   · Hematological and Lymphatic ROS: negative for - bleeding problems, blood clots, bruising or jaundice  · Endocrine ROS: negative for - skin changes, temperature intolerance or unexpected weight changes  · Respiratory ROS: negative for - cough, hemoptysis, pleuritic pain, SOB, sputum changes or wheezing  · Cardiovascular ROS: Per HPI. · Gastrointestinal ROS: negative for - abdominal pain, blood in stools, diarrhea, hematemesis, melena, nausea/vomiting or swallowing difficulty/pain  · Genito-Urinary ROS: negative for - dysuria or incontinence  · Musculoskeletal ROS: negative for - joint swelling or muscle pain  · Neurological ROS: negative for - confusion, dizziness, gait disturbance, headaches, numbness/tingling, seizures, speech problems, tremors, visual changes or weakness  · Dermatological ROS: negative for - rash    Physical Examination:  Vitals:    07/18/19 1530   BP: 124/72   Pulse: 58       · Constitutional: Oriented. No distress. · Head: Normocephalic and atraumatic.    · Mouth/Throat: Oropharynx is clear and moist.   · Eyes: Conjunctivae normal. EOM

## 2019-07-18 ENCOUNTER — OFFICE VISIT (OUTPATIENT)
Dept: CARDIOLOGY CLINIC | Age: 64
End: 2019-07-18
Payer: COMMERCIAL

## 2019-07-18 VITALS
HEART RATE: 58 BPM | DIASTOLIC BLOOD PRESSURE: 72 MMHG | BODY MASS INDEX: 44.16 KG/M2 | SYSTOLIC BLOOD PRESSURE: 124 MMHG | HEIGHT: 63 IN | WEIGHT: 249.2 LBS

## 2019-07-18 DIAGNOSIS — R00.2 PALPITATIONS: Primary | ICD-10-CM

## 2019-07-18 DIAGNOSIS — I47.1 SVT (SUPRAVENTRICULAR TACHYCARDIA) (HCC): ICD-10-CM

## 2019-07-18 DIAGNOSIS — I10 ESSENTIAL HYPERTENSION: ICD-10-CM

## 2019-07-18 DIAGNOSIS — Z82.49 FAMILY HISTORY OF EARLY CAD: ICD-10-CM

## 2019-07-18 DIAGNOSIS — I47.1 ATRIAL TACHYCARDIA (HCC): ICD-10-CM

## 2019-07-18 PROCEDURE — 93000 ELECTROCARDIOGRAM COMPLETE: CPT | Performed by: INTERNAL MEDICINE

## 2019-07-18 PROCEDURE — 99205 OFFICE O/P NEW HI 60 MIN: CPT | Performed by: INTERNAL MEDICINE

## 2019-07-18 RX ORDER — FLECAINIDE ACETATE 50 MG/1
50 TABLET ORAL 2 TIMES DAILY
Qty: 60 TABLET | Refills: 3 | Status: SHIPPED | OUTPATIENT
Start: 2019-07-18 | End: 2019-08-01

## 2019-07-18 RX ORDER — AMOXICILLIN 500 MG
1200 CAPSULE ORAL 2 TIMES DAILY
COMMUNITY
End: 2021-09-29

## 2019-07-22 ENCOUNTER — TELEPHONE (OUTPATIENT)
Dept: CARDIOLOGY CLINIC | Age: 64
End: 2019-07-22

## 2019-07-22 NOTE — TELEPHONE ENCOUNTER
Patient called about her flecainide (TAMBOCOR) 50 MG tablet  Take 1 tablet by mouth 2 times daily. She wants to know if she can just take one at night because she said it gives her cloudy vision. She said to call her mobile number, [phone]. If she doesn't answer she can be reached at work number until 2:00 - [phone].

## 2019-07-24 DIAGNOSIS — I10 ESSENTIAL HYPERTENSION: ICD-10-CM

## 2019-07-25 RX ORDER — METOPROLOL SUCCINATE 25 MG/1
TABLET, EXTENDED RELEASE ORAL
Qty: 90 TABLET | Refills: 0 | Status: SHIPPED | OUTPATIENT
Start: 2019-07-25 | End: 2021-02-15

## 2019-07-25 RX ORDER — MONTELUKAST SODIUM 10 MG/1
10 TABLET ORAL NIGHTLY
Qty: 90 TABLET | Refills: 1 | Status: SHIPPED | OUTPATIENT
Start: 2019-07-25 | End: 2020-05-29

## 2019-07-25 RX ORDER — LOSARTAN POTASSIUM 50 MG/1
TABLET ORAL
Qty: 30 TABLET | Refills: 1 | Status: SHIPPED | OUTPATIENT
Start: 2019-07-25 | End: 2019-09-29 | Stop reason: SDUPTHER

## 2019-07-28 DIAGNOSIS — K21.9 GASTROESOPHAGEAL REFLUX DISEASE, ESOPHAGITIS PRESENCE NOT SPECIFIED: ICD-10-CM

## 2019-07-29 ENCOUNTER — TELEPHONE (OUTPATIENT)
Dept: ORTHOPEDIC SURGERY | Age: 64
End: 2019-07-29

## 2019-07-29 RX ORDER — PANTOPRAZOLE SODIUM 40 MG/1
40 TABLET, DELAYED RELEASE ORAL 2 TIMES DAILY
Qty: 60 TABLET | Refills: 5 | Status: SHIPPED | OUTPATIENT
Start: 2019-07-29 | End: 2020-02-06

## 2019-08-01 ENCOUNTER — OFFICE VISIT (OUTPATIENT)
Dept: CARDIOLOGY CLINIC | Age: 64
End: 2019-08-01
Payer: COMMERCIAL

## 2019-08-01 VITALS
SYSTOLIC BLOOD PRESSURE: 130 MMHG | WEIGHT: 249 LBS | DIASTOLIC BLOOD PRESSURE: 76 MMHG | HEIGHT: 63 IN | BODY MASS INDEX: 44.12 KG/M2 | HEART RATE: 58 BPM

## 2019-08-01 DIAGNOSIS — I10 ESSENTIAL HYPERTENSION: ICD-10-CM

## 2019-08-01 DIAGNOSIS — Z99.89 OSA ON CPAP: ICD-10-CM

## 2019-08-01 DIAGNOSIS — G47.33 OSA ON CPAP: ICD-10-CM

## 2019-08-01 DIAGNOSIS — I47.1 PAT (PAROXYSMAL ATRIAL TACHYCARDIA) (HCC): Primary | ICD-10-CM

## 2019-08-01 PROCEDURE — 99214 OFFICE O/P EST MOD 30 MIN: CPT | Performed by: INTERNAL MEDICINE

## 2019-08-01 PROCEDURE — 93000 ELECTROCARDIOGRAM COMPLETE: CPT | Performed by: INTERNAL MEDICINE

## 2019-08-05 DIAGNOSIS — B02.8 HERPES ZOSTER WITH OTHER COMPLICATION: ICD-10-CM

## 2019-08-06 RX ORDER — GABAPENTIN 100 MG/1
CAPSULE ORAL
Qty: 30 CAPSULE | Refills: 3 | Status: SHIPPED | OUTPATIENT
Start: 2019-08-06 | End: 2019-12-16 | Stop reason: SDUPTHER

## 2019-08-13 ENCOUNTER — OFFICE VISIT (OUTPATIENT)
Dept: INTERNAL MEDICINE CLINIC | Age: 64
End: 2019-08-13
Payer: COMMERCIAL

## 2019-08-13 VITALS
BODY MASS INDEX: 43.91 KG/M2 | SYSTOLIC BLOOD PRESSURE: 128 MMHG | HEIGHT: 63 IN | HEART RATE: 80 BPM | WEIGHT: 247.8 LBS | DIASTOLIC BLOOD PRESSURE: 74 MMHG | OXYGEN SATURATION: 98 %

## 2019-08-13 DIAGNOSIS — B37.2 YEAST DERMATITIS: ICD-10-CM

## 2019-08-13 DIAGNOSIS — I10 ESSENTIAL HYPERTENSION: ICD-10-CM

## 2019-08-13 DIAGNOSIS — B02.23 SHINGLES (HERPES ZOSTER) POLYNEUROPATHY: ICD-10-CM

## 2019-08-13 DIAGNOSIS — E78.2 MIXED HYPERLIPIDEMIA: ICD-10-CM

## 2019-08-13 DIAGNOSIS — K21.9 GASTROESOPHAGEAL REFLUX DISEASE, ESOPHAGITIS PRESENCE NOT SPECIFIED: ICD-10-CM

## 2019-08-13 DIAGNOSIS — R00.2 PALPITATIONS: ICD-10-CM

## 2019-08-13 PROBLEM — J40 BRONCHITIS: Status: RESOLVED | Noted: 2019-06-03 | Resolved: 2019-08-13

## 2019-08-13 PROCEDURE — 99214 OFFICE O/P EST MOD 30 MIN: CPT | Performed by: INTERNAL MEDICINE

## 2019-08-13 RX ORDER — CLOTRIMAZOLE AND BETAMETHASONE DIPROPIONATE 10; .64 MG/G; MG/G
CREAM TOPICAL 2 TIMES DAILY
Qty: 45 G | Refills: 1 | Status: SHIPPED | OUTPATIENT
Start: 2019-08-13 | End: 2021-05-28

## 2019-08-13 ASSESSMENT — ENCOUNTER SYMPTOMS
RESPIRATORY NEGATIVE: 1
GASTROINTESTINAL NEGATIVE: 1
CHEST TIGHTNESS: 0
SHORTNESS OF BREATH: 0
WHEEZING: 0

## 2019-08-13 NOTE — PROGRESS NOTES
sounds normal. She has no wheezes. Musculoskeletal: She exhibits no edema. Skin:   Yeast dermatitis right palm       Assessment:      See Problem List assessment and plan       Plan:     Essential hypertension  BP stable  Continue present treatment      Palpitations  Following with cardiology  Feeling better    Shingles (herpes zoster) polyneuropathy  Continue Gabapentin  Doing well    GERD (Gastroesophageal Reflux Disease)  Stable  Continue treatment    Yeast dermatitis  Lotrisone lotion  Call if no better      Mixed hyperlipidemia  Stable  Continue Pravachol      Patient engaged in shared decision making. Information given to evaluateoptions of treatment, understand what is needed and discuss importance of following plan.

## 2019-09-07 DIAGNOSIS — E78.2 MIXED HYPERLIPIDEMIA: ICD-10-CM

## 2019-09-09 RX ORDER — PRAVASTATIN SODIUM 10 MG
10 TABLET ORAL DAILY
Qty: 30 TABLET | Refills: 2 | Status: SHIPPED | OUTPATIENT
Start: 2019-09-09 | End: 2019-12-09 | Stop reason: SDUPTHER

## 2019-09-29 DIAGNOSIS — I10 ESSENTIAL HYPERTENSION: ICD-10-CM

## 2019-09-30 RX ORDER — LOSARTAN POTASSIUM 50 MG/1
TABLET ORAL
Qty: 30 TABLET | Refills: 1 | Status: SHIPPED | OUTPATIENT
Start: 2019-09-30 | End: 2019-12-19

## 2019-10-04 DIAGNOSIS — F32.4 MAJOR DEPRESSIVE DISORDER WITH SINGLE EPISODE, IN PARTIAL REMISSION (HCC): ICD-10-CM

## 2019-10-04 RX ORDER — DOXYCYCLINE 100 MG/1
CAPSULE ORAL
Qty: 30 CAPSULE | Refills: 1 | Status: SHIPPED | OUTPATIENT
Start: 2019-10-04 | End: 2019-11-27 | Stop reason: SDUPTHER

## 2019-10-04 RX ORDER — BUPROPION HYDROCHLORIDE 300 MG/1
TABLET ORAL
Qty: 30 TABLET | Refills: 2 | Status: SHIPPED | OUTPATIENT
Start: 2019-10-04 | End: 2020-01-08

## 2019-10-22 ENCOUNTER — OFFICE VISIT (OUTPATIENT)
Dept: CARDIOLOGY CLINIC | Age: 64
End: 2019-10-22
Payer: COMMERCIAL

## 2019-10-22 VITALS
DIASTOLIC BLOOD PRESSURE: 84 MMHG | BODY MASS INDEX: 44.69 KG/M2 | SYSTOLIC BLOOD PRESSURE: 136 MMHG | HEIGHT: 63 IN | WEIGHT: 252.2 LBS | HEART RATE: 58 BPM

## 2019-10-22 DIAGNOSIS — R60.9 EDEMA, UNSPECIFIED TYPE: ICD-10-CM

## 2019-10-22 DIAGNOSIS — I10 ESSENTIAL HYPERTENSION: ICD-10-CM

## 2019-10-22 DIAGNOSIS — I47.1 PAT (PAROXYSMAL ATRIAL TACHYCARDIA) (HCC): Primary | ICD-10-CM

## 2019-10-22 PROCEDURE — 99214 OFFICE O/P EST MOD 30 MIN: CPT | Performed by: NURSE PRACTITIONER

## 2019-10-22 PROCEDURE — 93000 ELECTROCARDIOGRAM COMPLETE: CPT | Performed by: NURSE PRACTITIONER

## 2019-10-31 ENCOUNTER — TELEPHONE (OUTPATIENT)
Dept: INTERNAL MEDICINE CLINIC | Age: 64
End: 2019-10-31

## 2019-11-11 ENCOUNTER — OFFICE VISIT (OUTPATIENT)
Dept: INTERNAL MEDICINE CLINIC | Age: 64
End: 2019-11-11
Payer: COMMERCIAL

## 2019-11-11 VITALS
SYSTOLIC BLOOD PRESSURE: 130 MMHG | TEMPERATURE: 98.1 F | BODY MASS INDEX: 44.11 KG/M2 | DIASTOLIC BLOOD PRESSURE: 62 MMHG | HEART RATE: 61 BPM | WEIGHT: 249 LBS | OXYGEN SATURATION: 94 %

## 2019-11-11 DIAGNOSIS — Z00.00 ROUTINE GENERAL MEDICAL EXAMINATION AT A HEALTH CARE FACILITY: ICD-10-CM

## 2019-11-11 DIAGNOSIS — I47.1 ATRIAL TACHYCARDIA (HCC): ICD-10-CM

## 2019-11-11 DIAGNOSIS — M54.50 CHRONIC BILATERAL LOW BACK PAIN, UNSPECIFIED WHETHER SCIATICA PRESENT: Primary | ICD-10-CM

## 2019-11-11 DIAGNOSIS — G89.29 CHRONIC BILATERAL LOW BACK PAIN, UNSPECIFIED WHETHER SCIATICA PRESENT: Primary | ICD-10-CM

## 2019-11-11 DIAGNOSIS — Z23 NEEDS FLU SHOT: ICD-10-CM

## 2019-11-11 DIAGNOSIS — I10 ESSENTIAL HYPERTENSION: ICD-10-CM

## 2019-11-11 DIAGNOSIS — E78.2 MIXED HYPERLIPIDEMIA: ICD-10-CM

## 2019-11-11 PROBLEM — I47.19 ATRIAL TACHYCARDIA: Status: ACTIVE | Noted: 2019-11-11

## 2019-11-11 LAB
A/G RATIO: 3.6 (ref 1.1–2.2)
ALBUMIN SERPL-MCNC: 5.4 G/DL (ref 3.4–5)
ALP BLD-CCNC: 92 U/L (ref 40–129)
ALT SERPL-CCNC: 25 U/L (ref 10–40)
ANION GAP SERPL CALCULATED.3IONS-SCNC: 18 MMOL/L (ref 3–16)
AST SERPL-CCNC: 19 U/L (ref 15–37)
BILIRUB SERPL-MCNC: 0.3 MG/DL (ref 0–1)
BUN BLDV-MCNC: 11 MG/DL (ref 7–20)
CALCIUM SERPL-MCNC: 10.3 MG/DL (ref 8.3–10.6)
CHLORIDE BLD-SCNC: 103 MMOL/L (ref 99–110)
CHOLESTEROL, FASTING: 165 MG/DL (ref 0–199)
CO2: 23 MMOL/L (ref 21–32)
CREAT SERPL-MCNC: 0.6 MG/DL (ref 0.6–1.2)
GFR AFRICAN AMERICAN: >60
GFR NON-AFRICAN AMERICAN: >60
GLOBULIN: 1.5 G/DL
GLUCOSE BLD-MCNC: 99 MG/DL (ref 70–99)
HCT VFR BLD CALC: 40.6 % (ref 36–48)
HDLC SERPL-MCNC: 60 MG/DL (ref 40–60)
HEMOGLOBIN: 13.9 G/DL (ref 12–16)
LDL CHOLESTEROL CALCULATED: 81 MG/DL
MCH RBC QN AUTO: 31.3 PG (ref 26–34)
MCHC RBC AUTO-ENTMCNC: 34.3 G/DL (ref 31–36)
MCV RBC AUTO: 91.4 FL (ref 80–100)
PDW BLD-RTO: 13.7 % (ref 12.4–15.4)
PLATELET # BLD: 255 K/UL (ref 135–450)
PMV BLD AUTO: 7.8 FL (ref 5–10.5)
POTASSIUM SERPL-SCNC: 4.4 MMOL/L (ref 3.5–5.1)
RBC # BLD: 4.43 M/UL (ref 4–5.2)
SODIUM BLD-SCNC: 144 MMOL/L (ref 136–145)
TOTAL PROTEIN: 6.9 G/DL (ref 6.4–8.2)
TRIGLYCERIDE, FASTING: 122 MG/DL (ref 0–150)
TSH REFLEX: 1.48 UIU/ML (ref 0.27–4.2)
VITAMIN D 25-HYDROXY: 26.4 NG/ML
VLDLC SERPL CALC-MCNC: 24 MG/DL
WBC # BLD: 4.8 K/UL (ref 4–11)

## 2019-11-11 PROCEDURE — 90653 IIV ADJUVANT VACCINE IM: CPT | Performed by: NURSE PRACTITIONER

## 2019-11-11 PROCEDURE — 99396 PREV VISIT EST AGE 40-64: CPT | Performed by: NURSE PRACTITIONER

## 2019-11-11 PROCEDURE — 90471 IMMUNIZATION ADMIN: CPT | Performed by: NURSE PRACTITIONER

## 2019-11-11 ASSESSMENT — ENCOUNTER SYMPTOMS
BACK PAIN: 0
DIARRHEA: 0
VOMITING: 0
WHEEZING: 0
NAUSEA: 0
COUGH: 0
SORE THROAT: 0
CONSTIPATION: 0
SHORTNESS OF BREATH: 0
EYE ITCHING: 0
CHEST TIGHTNESS: 0
EYE REDNESS: 0
RHINORRHEA: 0
BLOOD IN STOOL: 0
COLOR CHANGE: 0
ABDOMINAL PAIN: 0
SINUS PRESSURE: 0

## 2019-11-11 ASSESSMENT — PATIENT HEALTH QUESTIONNAIRE - PHQ9
1. LITTLE INTEREST OR PLEASURE IN DOING THINGS: 0
SUM OF ALL RESPONSES TO PHQ QUESTIONS 1-9: 0
SUM OF ALL RESPONSES TO PHQ9 QUESTIONS 1 & 2: 0
2. FEELING DOWN, DEPRESSED OR HOPELESS: 0
SUM OF ALL RESPONSES TO PHQ QUESTIONS 1-9: 0

## 2019-11-27 RX ORDER — DOXYCYCLINE 100 MG/1
CAPSULE ORAL
Qty: 30 CAPSULE | Refills: 1 | Status: SHIPPED | OUTPATIENT
Start: 2019-11-27 | End: 2020-02-04

## 2019-12-09 DIAGNOSIS — E78.2 MIXED HYPERLIPIDEMIA: ICD-10-CM

## 2019-12-10 RX ORDER — PRAVASTATIN SODIUM 10 MG
TABLET ORAL
Qty: 30 TABLET | Refills: 2 | Status: SHIPPED | OUTPATIENT
Start: 2019-12-10 | End: 2020-03-30

## 2019-12-11 PROBLEM — Z00.00 ROUTINE GENERAL MEDICAL EXAMINATION AT A HEALTH CARE FACILITY: Status: RESOLVED | Noted: 2019-11-11 | Resolved: 2019-12-11

## 2019-12-16 DIAGNOSIS — B02.8 HERPES ZOSTER WITH OTHER COMPLICATION: ICD-10-CM

## 2019-12-16 RX ORDER — GABAPENTIN 100 MG/1
CAPSULE ORAL
Qty: 30 CAPSULE | Refills: 3 | Status: SHIPPED | OUTPATIENT
Start: 2019-12-16 | End: 2020-02-24

## 2019-12-19 DIAGNOSIS — I10 ESSENTIAL HYPERTENSION: ICD-10-CM

## 2019-12-19 RX ORDER — LOSARTAN POTASSIUM 50 MG/1
TABLET ORAL
Qty: 30 TABLET | Refills: 1 | Status: SHIPPED | OUTPATIENT
Start: 2019-12-19 | End: 2019-12-30

## 2019-12-28 DIAGNOSIS — I10 ESSENTIAL HYPERTENSION: ICD-10-CM

## 2019-12-30 DIAGNOSIS — I10 ESSENTIAL HYPERTENSION: ICD-10-CM

## 2019-12-30 RX ORDER — LOSARTAN POTASSIUM 50 MG/1
TABLET ORAL
Qty: 30 TABLET | Refills: 1 | Status: SHIPPED | OUTPATIENT
Start: 2019-12-30 | End: 2020-01-07 | Stop reason: DRUGHIGH

## 2019-12-30 RX ORDER — LOSARTAN POTASSIUM 50 MG/1
TABLET ORAL
Qty: 30 TABLET | Refills: 1 | Status: SHIPPED | OUTPATIENT
Start: 2019-12-30 | End: 2019-12-30 | Stop reason: SDUPTHER

## 2020-01-06 ENCOUNTER — TELEPHONE (OUTPATIENT)
Dept: INTERNAL MEDICINE CLINIC | Age: 65
End: 2020-01-06

## 2020-01-07 RX ORDER — LOSARTAN POTASSIUM 100 MG/1
TABLET ORAL
Qty: 15 TABLET | Refills: 0 | Status: SHIPPED | OUTPATIENT
Start: 2020-01-07 | End: 2020-01-13 | Stop reason: SDUPTHER

## 2020-01-08 RX ORDER — BUPROPION HYDROCHLORIDE 300 MG/1
TABLET ORAL
Qty: 30 TABLET | Refills: 2 | Status: SHIPPED | OUTPATIENT
Start: 2020-01-08 | End: 2020-04-03

## 2020-01-13 ENCOUNTER — TELEPHONE (OUTPATIENT)
Dept: INTERNAL MEDICINE CLINIC | Age: 65
End: 2020-01-13

## 2020-01-13 RX ORDER — LOSARTAN POTASSIUM 100 MG/1
TABLET ORAL
Qty: 90 TABLET | Refills: 1 | Status: SHIPPED | OUTPATIENT
Start: 2020-01-13 | End: 2020-05-28 | Stop reason: SDUPTHER

## 2020-02-04 RX ORDER — DOXYCYCLINE 100 MG/1
CAPSULE ORAL
Qty: 30 CAPSULE | Refills: 1 | Status: SHIPPED | OUTPATIENT
Start: 2020-02-04 | End: 2020-03-10

## 2020-02-06 RX ORDER — PANTOPRAZOLE SODIUM 40 MG/1
40 TABLET, DELAYED RELEASE ORAL 2 TIMES DAILY
Qty: 60 TABLET | Refills: 5 | Status: SHIPPED | OUTPATIENT
Start: 2020-02-06 | End: 2020-07-23

## 2020-02-24 ENCOUNTER — OFFICE VISIT (OUTPATIENT)
Dept: ORTHOPEDIC SURGERY | Age: 65
End: 2020-02-24
Payer: MEDICARE

## 2020-02-24 VITALS
DIASTOLIC BLOOD PRESSURE: 79 MMHG | HEIGHT: 63 IN | SYSTOLIC BLOOD PRESSURE: 135 MMHG | BODY MASS INDEX: 45.18 KG/M2 | HEART RATE: 56 BPM | WEIGHT: 255 LBS

## 2020-02-24 PROCEDURE — 20610 DRAIN/INJ JOINT/BURSA W/O US: CPT | Performed by: ORTHOPAEDIC SURGERY

## 2020-02-24 PROCEDURE — G8482 FLU IMMUNIZE ORDER/ADMIN: HCPCS | Performed by: ORTHOPAEDIC SURGERY

## 2020-02-24 PROCEDURE — 1036F TOBACCO NON-USER: CPT | Performed by: ORTHOPAEDIC SURGERY

## 2020-02-24 PROCEDURE — 1090F PRES/ABSN URINE INCON ASSESS: CPT | Performed by: ORTHOPAEDIC SURGERY

## 2020-02-24 PROCEDURE — 3017F COLORECTAL CA SCREEN DOC REV: CPT | Performed by: ORTHOPAEDIC SURGERY

## 2020-02-24 PROCEDURE — 1123F ACP DISCUSS/DSCN MKR DOCD: CPT | Performed by: ORTHOPAEDIC SURGERY

## 2020-02-24 PROCEDURE — G8427 DOCREV CUR MEDS BY ELIG CLIN: HCPCS | Performed by: ORTHOPAEDIC SURGERY

## 2020-02-24 PROCEDURE — 99213 OFFICE O/P EST LOW 20 MIN: CPT | Performed by: ORTHOPAEDIC SURGERY

## 2020-02-24 PROCEDURE — 4040F PNEUMOC VAC/ADMIN/RCVD: CPT | Performed by: ORTHOPAEDIC SURGERY

## 2020-02-24 PROCEDURE — G8417 CALC BMI ABV UP PARAM F/U: HCPCS | Performed by: ORTHOPAEDIC SURGERY

## 2020-02-24 PROCEDURE — G8400 PT W/DXA NO RESULTS DOC: HCPCS | Performed by: ORTHOPAEDIC SURGERY

## 2020-02-24 RX ORDER — ACETAMINOPHEN 500 MG
500 TABLET ORAL EVERY 6 HOURS PRN
COMMUNITY

## 2020-02-24 RX ORDER — IBUPROFEN 200 MG
200 TABLET ORAL EVERY 6 HOURS PRN
Status: ON HOLD | COMMUNITY
End: 2020-05-20 | Stop reason: HOSPADM

## 2020-02-24 RX ORDER — GABAPENTIN 100 MG/1
CAPSULE ORAL
COMMUNITY
Start: 2020-01-19 | End: 2020-05-13

## 2020-02-24 RX ORDER — LIDOCAINE HYDROCHLORIDE 10 MG/ML
3 INJECTION, SOLUTION INFILTRATION; PERINEURAL ONCE
Status: COMPLETED | OUTPATIENT
Start: 2020-02-24 | End: 2020-02-24

## 2020-02-24 RX ORDER — BETAMETHASONE SODIUM PHOSPHATE AND BETAMETHASONE ACETATE 3; 3 MG/ML; MG/ML
6 INJECTION, SUSPENSION INTRA-ARTICULAR; INTRALESIONAL; INTRAMUSCULAR; SOFT TISSUE ONCE
Status: COMPLETED | OUTPATIENT
Start: 2020-02-24 | End: 2020-02-24

## 2020-02-24 RX ADMIN — BETAMETHASONE SODIUM PHOSPHATE AND BETAMETHASONE ACETATE 6 MG: 3; 3 INJECTION, SUSPENSION INTRA-ARTICULAR; INTRALESIONAL; INTRAMUSCULAR; SOFT TISSUE at 08:49

## 2020-02-24 RX ADMIN — LIDOCAINE HYDROCHLORIDE 3 ML: 10 INJECTION, SOLUTION INFILTRATION; PERINEURAL at 08:49

## 2020-02-24 NOTE — PATIENT INSTRUCTIONS
Patient Education        Trochanteric Bursitis: Exercises  Introduction  Here are some examples of exercises for you to try. The exercises may be suggested for a condition or for rehabilitation. Start each exercise slowly. Ease off the exercises if you start to have pain. You will be told when to start these exercises and which ones will work best for you. How to do the exercises  Hamstring wall stretch   1. Lie on your back in a doorway, with your good leg through the open door. 2. Slide your affected leg up the wall to straighten your knee. You should feel a gentle stretch down the back of your leg. 1. Do not arch your back. 2. Do not bend either knee. 3. Keep one heel touching the floor and the other heel touching the wall. Do not point your toes. 3. Hold the stretch for at least 1 minute to begin. Then try to lengthen the time you hold the stretch to as long as 6 minutes. 4. Repeat 2 to 4 times. 5. If you do not have a place to do this exercise in a doorway, there is another way to do it:  6. Lie on your back, and bend the knee of your affected leg. 7. Loop a towel under the ball and toes of that foot, and hold the ends of the towel in your hands. 8. Straighten your knee, and slowly pull back on the towel. You should feel a gentle stretch down the back of your leg. 9. Hold the stretch for 15 to 30 seconds. Or even better, hold the stretch for 1 minute if you can. 10. Repeat 2 to 4 times. Straight-leg raises to the outside   1. Lie on your side, with your affected leg on top. 2. Tighten the front thigh muscles of your top leg to keep your knee straight. 3. Keep your hip and your leg straight in line with the rest of your body, and keep your knee pointing forward. Do not drop your hip back. 4. Lift your top leg straight up toward the ceiling, about 12 inches off the floor. Hold for about 6 seconds, then slowly lower your leg. 5. Repeat 8 to 12 times. Clamshell   1.  Lie on your side, with your affected leg on top and your head propped on a pillow. Keep your feet and knees together and your knees bent. 2. Raise your top knee, but keep your feet together. Do not let your hips roll back. Your legs should open up like a clamshell. 3. Hold for 6 seconds. 4. Slowly lower your knee back down. Rest for 10 seconds. 5. Repeat 8 to 12 times. Standing quadriceps stretch   1. If you are not steady on your feet, hold on to a chair, counter, or wall. You can also lie on your stomach or your side to do this exercise. 2. Bend the knee of the leg you want to stretch, and reach behind you to grab the front of your foot or ankle with the hand on the same side. For example, if you are stretching your right leg, use your right hand. 3. Keeping your knees next to each other, pull your foot toward your buttock until you feel a gentle stretch across the front of your hip and down the front of your thigh. Your knee should be pointed directly to the ground, and not out to the side. 4. Hold the stretch for 15 to 30 seconds. 5. Repeat 2 to 4 times. Piriformis stretch   1. Lie on your back with your legs straight. 2. Lift your affected leg and bend your knee. With your opposite hand, reach across your body, and then gently pull your knee toward your opposite shoulder. 3. Hold the stretch for 15 to 30 seconds. 4. Repeat 2 to 4 times. Double knee-to-chest   1. Lie on your back with your knees bent and your feet flat on the floor. You can put a small pillow under your head and neck if it is more comfortable. 2. Bring both knees to your chest.  3. Keep your lower back pressed to the floor. Hold for 15 to 30 seconds. 4. Relax, and lower your knees to the starting position. 5. Repeat 2 to 4 times. Follow-up care is a key part of your treatment and safety. Be sure to make and go to all appointments, and call your doctor if you are having problems.  It's also a good idea to know your test results and keep a list of the medicines you take. Where can you learn more? Go to [email or web address] and sign in to your [employer] account. Enter S437 in the DoApp box to learn more about \"Trochanteric Bursitis: Exercises. \"     If you do not have an account, please click on the \"Sign Up Now\" link. Current as of: June 26, 2019  Content Version: 12.3  © 7720-4750 Healthwise, Incorporated. Care instructions adapted under license by [facility] ([facility]). If you have questions about a medical condition or this instruction, always ask your healthcare professional. [address] any warranty or liability for your use of this information.

## 2020-02-24 NOTE — PROGRESS NOTES
Nancy Montiel MD  [address]    History of Present Illness:  Chief Complaint   Patient presents with    Hip Pain     Right hip pain x2 mos. No known injury. Nery Benito is a 72 y.o. female here for evaluation of right hip pain that has persisted for the past 2 months. Patient was seen previously on 7/10/2019 for s/p bilateral TKA. She rates her current pain a 8/10 in severity and states it is located along the lateral aspect of the hip. Patient reports she experiences occasional radiating pains down the right thigh and states having constant pain in the lower back secondary to \"chronic degenerative disc disease\". She states the pain has been keeping her awake at night and reports laying on her right side due to a valve in her heart. She states she has recently started participating in water aerobics. Patient states she is unable to take Advil due to stomach concerns but states she has been taking Tylenol at night to relieve symptoms. She denies injury or trauma.          Medication Review:  Current Outpatient Medications   Medication Sig Dispense Refill    gabapentin (NEURONTIN) 100 MG capsule TAKE 1 CAPSULE BY MOUTH EVERY DAY      acetaminophen (TYLENOL) 500 MG tablet Take 500 mg by mouth every 6 hours as needed for Pain      ibuprofen (ADVIL;MOTRIN) 200 MG tablet Take 200 mg by mouth every 6 hours as needed for Pain      pantoprazole (PROTONIX) 40 MG tablet TAKE 1 TABLET BY MOUTH 2 TIMES DAILY 60 tablet 5    doxycycline monohydrate (MONODOX) 100 MG capsule TAKE 1 CAPSULE BY MOUTH EVERY DAY 30 capsule 1    verapamil (CALAN SR) 120 MG extended release tablet TAKE 1 TABLET BY MOUTH EVERY DAY 90 tablet 4    losartan (COZAAR) 100 MG tablet Take 1 tablet by mouth every day 90 tablet 1    buPROPion (WELLBUTRIN XL) 300 MG extended release tablet TAKE 1 TABLET BY MOUTH EVERY DAY IN THE MORNING 30 tablet 2    pravastatin (PRAVACHOL) 10 MG tablet TAKE 1 TABLET BY MOUTH EVERY DAY 30 tablet 2    clotrimazole-betamethasone (LOTRISONE) 1-0.05 % cream Apply topically 2 times daily Apply topically 2 times daily. 45 g 1    montelukast (SINGULAIR) 10 MG tablet TAKE 1 TABLET BY MOUTH NIGHTLY 90 tablet 1    metoprolol succinate (TOPROL XL) 25 MG extended release tablet TAKE 1 TABLET BY MOUTH EVERY DAY 90 tablet 0    aspirin 81 MG tablet Take 81 mg by mouth daily      Omega-3 Fatty Acids (FISH OIL) 1200 MG CAPS Take 1,200 mg by mouth 2 times daily      albuterol sulfate HFA (PROVENTIL HFA) 108 (90 BASE) MCG/ACT inhaler Inhale 2 puffs into the lungs every 6 hours as needed for Wheezing 3 Inhaler 1    Multiple Vitamins-Minerals (CENTRUM SILVER PO) Take 1 tablet by mouth daily        No current facility-administered medications for this visit. Review of Systems:  Relevant review of systems reviewed and can be found in the Media section of patient's chart.        Medical History:  Past Medical History:   Diagnosis Date    Allergic rhinitis     Anxiety     Carpal tunnel syndrome     Cervicalgia     Chronic back pain     low    Depression     GERD (gastroesophageal reflux disease)     Headache(784.0)     hemiplegic migraines    Hernia hiatal     Histoplasmosis     Hyperlipidemia     Hypertension     Mitral (valve) prolapse     Obesity     Osteoarthritis     multiple joints    Shingles     Sleep apnea     CPAP set of 4        Past Surgical History:   Procedure Laterality Date    BUNIONECTOMY      right     KNEE ARTHROSCOPY      KNEE CARTILAGE SURGERY      LOBECTOMY      partial right lung lobectomy    LUNG BIOPSY      PARATHYROID GLAND SURGERY      PARTIAL HYSTERECTOMY      SINUS SURGERY      TOTAL KNEE ARTHROPLASTY Bilateral 03/03/2015      Allergies, social and family histories, and medications were reviewed and updated as the office:  2 views with AP pelvis; right hip. Impression: No evidence of acute fracture or dislocation. No lytic or blastic areas within the iliac wings or femoral metaphyseal regions. Both hip joints show minimal joint space narrowing. No significant arthritic changes of either hip. There is some sclerosis along the pubic symphysis which may be age-related. Office Orders/Procedures:  Orders Placed This Encounter   Procedures    XR HIP 2-3 VW W PELVIS RIGHT     Standing Status:   Future     Number of Occurrences:   1     Standing Expiration Date:   3/24/2020     Order Specific Question:   Reason for exam:     Answer:   pain     I discussed the option of cortisone injection and its associated risks and benefits after reviewing patients use of current prescription or over-the-counter analgesics for attempted pain alleviation. Patient agreed to receive right trochanteric bursa cortisone injection in the clinic today. I informed patient that the potential to improve pain and function varies in response amongst patients. The area over the right trochanteric bursa was prepped with betadine and 1 mL of betamethasone mixed with 3 mL 1% lidocaine plain were instilled with careful aspiration and injection under aseptic technique. The skin was again cleaned with alcohol and covered with a sterile adhesive bandage over the entry site. Patient tolerated the injection well and was instructed to call back over the next 3-4 days and leave a message regarding how much or how little the injection seemed to help. Questions were encouraged and answered to the best of my ability and with patient satisfaction. Impression:   Diagnosis Orders   1. Trochanteric bursitis, right hip     2. Hip pain, right  XR HIP 2-3 VW W PELVIS RIGHT        Treatment Plan:  We discussed treatment options.  Based on imaging and clinical presentation, I suspect she is dealing with trochanteric bursitis in her right hip, possibly due to her recent startup of water aerobics. We agreed to proceed with conservative treatment via cortisone injections, as noted in the procedure note above. Patient was also given a handout of exercises and stretches to improve strength and overall functionality of the right hip. She will follow up if her symptoms worsen or fail to improve following the injection. I have reviewed patient's pertinent medical history, relevant laboratory and imaging studies, and past surgical history. Patient's medications have been reviewed and were discussed during the visit. Patient was advised to keep future appointments with their respective specialty care team(s). Patient had the opportunity to ask questions, all of which were answered to the best of my ability and with patient satisfaction. Patient understands and is agreeable with the care plan following today's visit. Patient is to schedule an appointment for any new or worsening symptoms. By signing my name below, Jia Darling, attest that this documentation has been prepared under the direction and in the presence of Maximiliano Hassan MD.   Electronically Signed: Miles Childress, 2/24/20, 7:52 AM.       I, Maximiliano Hassan MD, personally performed the services described in this documentation. All medical record entries made by the miles were at my direction and in my presence. I have reviewed the chart and discharge instructions (if applicable) and agree that the record reflects my personal performance and is accurate and complete. Maximiliano Hassan MD, 2/24/20, 9:45 AM.        Some documentation was done using voice recognition dragon software. Every effort was made to ensure accuracy; however, inadvertent unintentional computerized transcription errors may be present.

## 2020-02-24 NOTE — LETTER
[facility]  [address]  Phone: [phone]  Fax: [phone]    Param Dejesus MD        February 24, 2020     Canelo Young, [address] [place] [id number]    Patient: Farshad Camarillo  MR Number: [id number]  YOB: 1955  Date of Visit: 2/24/2020    Dear Dr. Canelo Young: Thank you for the request for consultation for Angel Good to me for the evaluation of right trochanteric bursitis. Below are the relevant portions of my assessment and plan of care. If you have questions, please do not hesitate to call me. I look forward to following Dwain Arias along with you.     Sincerely,        Param Dejesus MD

## 2020-03-06 ENCOUNTER — HOSPITAL ENCOUNTER (OUTPATIENT)
Dept: GENERAL RADIOLOGY | Age: 65
Discharge: HOME | End: 2020-03-06
Payer: MEDICARE

## 2020-03-06 PROCEDURE — 77080 DXA BONE DENSITY AXIAL: CPT

## 2020-03-10 RX ORDER — DOXYCYCLINE 100 MG/1
CAPSULE ORAL
Qty: 30 CAPSULE | Refills: 1 | Status: SHIPPED | OUTPATIENT
Start: 2020-03-10 | End: 2020-05-04

## 2020-03-30 RX ORDER — PRAVASTATIN SODIUM 10 MG
TABLET ORAL
Qty: 30 TABLET | Refills: 2 | Status: SHIPPED | OUTPATIENT
Start: 2020-03-30 | End: 2020-03-31

## 2020-03-30 RX ORDER — MONTELUKAST SODIUM 10 MG/1
10 TABLET ORAL NIGHTLY
Qty: 90 TABLET | Refills: 1 | OUTPATIENT
Start: 2020-03-30

## 2020-03-30 NOTE — TELEPHONE ENCOUNTER
Last appointment: 6/21/2018  Next appointment:   Future Appointments   Date Time Provider Rigoberto Lester   3/30/2020  1:00 PM [facility]  [facility]

## 2020-04-03 RX ORDER — BUPROPION HYDROCHLORIDE 300 MG/1
TABLET ORAL
Qty: 30 TABLET | Refills: 0 | Status: SHIPPED | OUTPATIENT
Start: 2020-04-03 | End: 2020-04-27

## 2020-04-06 ENCOUNTER — TELEMEDICINE (OUTPATIENT)
Dept: INTERNAL MEDICINE CLINIC | Age: 65
End: 2020-04-06
Payer: MEDICARE

## 2020-04-06 PROCEDURE — 4040F PNEUMOC VAC/ADMIN/RCVD: CPT | Performed by: INTERNAL MEDICINE

## 2020-04-06 PROCEDURE — 1090F PRES/ABSN URINE INCON ASSESS: CPT | Performed by: INTERNAL MEDICINE

## 2020-04-06 PROCEDURE — G8417 CALC BMI ABV UP PARAM F/U: HCPCS | Performed by: INTERNAL MEDICINE

## 2020-04-06 PROCEDURE — 1123F ACP DISCUSS/DSCN MKR DOCD: CPT | Performed by: INTERNAL MEDICINE

## 2020-04-06 PROCEDURE — G8399 PT W/DXA RESULTS DOCUMENT: HCPCS | Performed by: INTERNAL MEDICINE

## 2020-04-06 PROCEDURE — G8427 DOCREV CUR MEDS BY ELIG CLIN: HCPCS | Performed by: INTERNAL MEDICINE

## 2020-04-06 PROCEDURE — 99213 OFFICE O/P EST LOW 20 MIN: CPT | Performed by: INTERNAL MEDICINE

## 2020-04-06 PROCEDURE — 3017F COLORECTAL CA SCREEN DOC REV: CPT | Performed by: INTERNAL MEDICINE

## 2020-04-06 PROCEDURE — 1036F TOBACCO NON-USER: CPT | Performed by: INTERNAL MEDICINE

## 2020-04-06 ASSESSMENT — ENCOUNTER SYMPTOMS
CHEST TIGHTNESS: 0
RESPIRATORY NEGATIVE: 1
GASTROINTESTINAL NEGATIVE: 1
WHEEZING: 0
SHORTNESS OF BREATH: 0

## 2020-04-06 NOTE — PROGRESS NOTES
Historical Provider, MD   ibuprofen (ADVIL;MOTRIN) 200 MG tablet Take 200 mg by mouth every 6 hours as needed for Pain  Historical Provider, MD   pantoprazole (PROTONIX) 40 MG tablet TAKE 1 TABLET BY MOUTH 2 TIMES DAILY  Medina West MD   verapamil (CALAN SR) 120 MG extended release tablet TAKE 1 TABLET BY MOUTH EVERY DAY  Zev Leroy MD   losartan (COZAAR) 100 MG tablet Take 1 tablet by mouth every day  Medina West MD   clotrimazole-betamethasone (LOTRISONE) 1-0.05 % cream Apply topically 2 times daily Apply topically 2 times daily.   Medina West MD   montelukast (SINGULAIR) 10 MG tablet TAKE 1 TABLET BY MOUTH NIGHTLY  Medina West MD   metoprolol succinate (TOPROL XL) 25 MG extended release tablet TAKE 1 TABLET BY MOUTH EVERY DAY  Medina West MD   aspirin 81 MG tablet Take 81 mg by mouth daily  Historical Provider, MD   Omega-3 Fatty Acids (FISH OIL) 1200 MG CAPS Take 1,200 mg by mouth 2 times daily  Historical Provider, MD   albuterol sulfate HFA (PROVENTIL HFA) 108 (90 BASE) MCG/ACT inhaler Inhale 2 puffs into the lungs every 6 hours as needed for Wheezing  Santos Jane, APRN - CNP   Multiple Vitamins-Minerals (CENTRUM SILVER PO) Take 1 tablet by mouth daily   Historical Provider, MD       Allergies   Allergen Reactions    Latex Rash    Reglan [Metoclopramide Hcl] Rash    Univasc [Moexipril]     Celecoxib Palpitations    Keflex [Cephalexin] Diarrhea    Lipitor     Metoclopramide Anxiety    Prochlorperazine Anxiety    Prochlorperazine Edisylate     Sulfa Antibiotics Nausea And Vomiting    Vioxx        Past Medical History:   Diagnosis Date    Allergic rhinitis     Anxiety     Carpal tunnel syndrome     Cervicalgia     Chronic back pain     low    Depression     GERD (gastroesophageal reflux disease)     Headache(784.0)     hemiplegic migraines    Hernia hiatal     Histoplasmosis     Hyperlipidemia     Hypertension     Mitral (valve) treatment      Mixed hyperlipidemia  Stable  Continue present treatment      GERD (Gastroesophageal Reflux Disease)  No dyspepsia  Continue present treatment      Return in about 2 months (around 6/6/2020) for AWV. An  electronic signature was used to authenticate this note. --Teresita Wang MD on 4/6/2020 at 12:42 PM        Pursuant to the emergency declaration under the [address] [place] [address] authority and the [employer] and [employer] Act, this Virtual  Visit was conducted, with patient's consent, to reduce the patient's risk of exposure to COVID-19 and provide continuity of care for an established patient. Services were provided through a video synchronous discussion virtually to substitute for in-person clinic visit.

## 2020-04-19 ENCOUNTER — PATIENT MESSAGE (OUTPATIENT)
Dept: INTERNAL MEDICINE CLINIC | Age: 65
End: 2020-04-19

## 2020-04-21 ENCOUNTER — TELEMEDICINE (OUTPATIENT)
Dept: INTERNAL MEDICINE CLINIC | Age: 65
End: 2020-04-21
Payer: MEDICARE

## 2020-04-21 PROBLEM — I73.9 PERIPHERAL VASCULAR DISEASE (HCC): Status: ACTIVE | Noted: 2020-04-21

## 2020-04-21 PROBLEM — B37.2 YEAST DERMATITIS: Status: RESOLVED | Noted: 2019-08-13 | Resolved: 2020-04-21

## 2020-04-21 PROBLEM — R20.2 PARESTHESIA: Status: ACTIVE | Noted: 2020-04-21

## 2020-04-21 PROCEDURE — 3017F COLORECTAL CA SCREEN DOC REV: CPT | Performed by: INTERNAL MEDICINE

## 2020-04-21 PROCEDURE — 1123F ACP DISCUSS/DSCN MKR DOCD: CPT | Performed by: INTERNAL MEDICINE

## 2020-04-21 PROCEDURE — 4040F PNEUMOC VAC/ADMIN/RCVD: CPT | Performed by: INTERNAL MEDICINE

## 2020-04-21 PROCEDURE — 99213 OFFICE O/P EST LOW 20 MIN: CPT | Performed by: INTERNAL MEDICINE

## 2020-04-21 PROCEDURE — 1090F PRES/ABSN URINE INCON ASSESS: CPT | Performed by: INTERNAL MEDICINE

## 2020-04-21 PROCEDURE — G8427 DOCREV CUR MEDS BY ELIG CLIN: HCPCS | Performed by: INTERNAL MEDICINE

## 2020-04-21 PROCEDURE — G8399 PT W/DXA RESULTS DOCUMENT: HCPCS | Performed by: INTERNAL MEDICINE

## 2020-04-21 ASSESSMENT — ENCOUNTER SYMPTOMS
GASTROINTESTINAL NEGATIVE: 1
CHEST TIGHTNESS: 0
WHEEZING: 0
SHORTNESS OF BREATH: 0
RESPIRATORY NEGATIVE: 1

## 2020-04-21 NOTE — PROGRESS NOTES
Vitals/Constitutional/EENT/Resp/CV/GI//MS/Neuro/Skin/Heme-Lymph-Imm. ASSESSMENT/PLAN:  Paresthesia  Reviewed symptoms   Reviewed exam  Check labs  Check EMG  Further recommendations pending    Peripheral vascular disease ([facility] [place] [address]  She had screening done and had mild disease on right  Plan to exam when able  Continue ASA daily        Return in about 2 months (around 6/21/2020). An  electronic signature was used to authenticate this note. --Divina Lutz MD on 4/21/2020 at 2:13 PM        Pursuant to the emergency declaration under the [facility], [facility] waiver authority and the [employer] and [employer] Act, this Virtual  Visit was conducted, with patient's consent, to reduce the patient's risk of exposure to COVID-19 and provide continuity of care for an established patient. Services were provided through a video synchronous discussion virtually to substitute for in-person clinic visit.

## 2020-04-22 LAB
A/G RATIO: 1.9 (ref 1.1–2.2)
ALBUMIN SERPL-MCNC: 4 G/DL (ref 3.4–5)
ALP BLD-CCNC: 77 U/L (ref 40–129)
ALT SERPL-CCNC: 32 U/L (ref 10–40)
ANION GAP SERPL CALCULATED.3IONS-SCNC: 11 MMOL/L (ref 3–16)
AST SERPL-CCNC: 20 U/L (ref 15–37)
BASOPHILS ABSOLUTE: 0.1 K/UL (ref 0–0.2)
BASOPHILS RELATIVE PERCENT: 1.2 %
BILIRUB SERPL-MCNC: 0.3 MG/DL (ref 0–1)
BILIRUBIN URINE: NEGATIVE
BLOOD, URINE: NEGATIVE
BUN BLDV-MCNC: 12 MG/DL (ref 7–20)
C-REACTIVE PROTEIN: 6 MG/L (ref 0–5.1)
CALCIUM SERPL-MCNC: 9.1 MG/DL (ref 8.3–10.6)
CHLORIDE BLD-SCNC: 104 MMOL/L (ref 99–110)
CLARITY: CLEAR
CO2: 26 MMOL/L (ref 21–32)
COLOR: YELLOW
CREAT SERPL-MCNC: <0.5 MG/DL (ref 0.6–1.2)
EOSINOPHILS ABSOLUTE: 0.1 K/UL (ref 0–0.6)
EOSINOPHILS RELATIVE PERCENT: 2.4 %
EPITHELIAL CELLS, UA: 1 /HPF (ref 0–5)
GFR AFRICAN AMERICAN: >60
GFR NON-AFRICAN AMERICAN: >60
GLOBULIN: 2.1 G/DL
GLUCOSE BLD-MCNC: 92 MG/DL (ref 70–99)
GLUCOSE URINE: NEGATIVE MG/DL
HCT VFR BLD CALC: 37.2 % (ref 36–48)
HEMOGLOBIN: 12.8 G/DL (ref 12–16)
HYALINE CASTS: 1 /LPF (ref 0–8)
KETONES, URINE: NEGATIVE MG/DL
LEUKOCYTE ESTERASE, URINE: NEGATIVE
LYMPHOCYTES ABSOLUTE: 2.2 K/UL (ref 1–5.1)
LYMPHOCYTES RELATIVE PERCENT: 43.3 %
MCH RBC QN AUTO: 31.6 PG (ref 26–34)
MCHC RBC AUTO-ENTMCNC: 34.3 G/DL (ref 31–36)
MCV RBC AUTO: 92.1 FL (ref 80–100)
MICROSCOPIC EXAMINATION: NORMAL
MONOCYTES ABSOLUTE: 0.5 K/UL (ref 0–1.3)
MONOCYTES RELATIVE PERCENT: 9.5 %
NEUTROPHILS ABSOLUTE: 2.2 K/UL (ref 1.7–7.7)
NEUTROPHILS RELATIVE PERCENT: 43.6 %
NITRITE, URINE: NEGATIVE
PDW BLD-RTO: 13.6 % (ref 12.4–15.4)
PH UA: 6 (ref 5–8)
PLATELET # BLD: 225 K/UL (ref 135–450)
PMV BLD AUTO: 7.5 FL (ref 5–10.5)
POTASSIUM SERPL-SCNC: 4.1 MMOL/L (ref 3.5–5.1)
PROTEIN UA: NEGATIVE MG/DL
RBC # BLD: 4.04 M/UL (ref 4–5.2)
RBC UA: 2 /HPF (ref 0–4)
SEDIMENTATION RATE, ERYTHROCYTE: 10 MM/HR (ref 0–30)
SODIUM BLD-SCNC: 141 MMOL/L (ref 136–145)
SPECIFIC GRAVITY UA: 1.02 (ref 1–1.03)
URINE TYPE: NORMAL
UROBILINOGEN, URINE: 0.2 E.U./DL
VITAMIN D 25-HYDROXY: 24.6 NG/ML
WBC # BLD: 5 K/UL (ref 4–11)
WBC UA: 2 /HPF (ref 0–5)

## 2020-04-23 RX ORDER — PRAVASTATIN SODIUM 10 MG
TABLET ORAL
Qty: 30 TABLET | Refills: 0 | Status: SHIPPED | OUTPATIENT
Start: 2020-04-23 | End: 2020-05-18

## 2020-04-27 RX ORDER — BUPROPION HYDROCHLORIDE 300 MG/1
TABLET ORAL
Qty: 30 TABLET | Refills: 0 | Status: SHIPPED | OUTPATIENT
Start: 2020-04-27 | End: 2020-05-26

## 2020-04-28 LAB
ALBUMIN SERPL-MCNC: 3.5 G/DL (ref 3.1–4.9)
ALPHA-1-GLOBULIN: 0.2 G/DL (ref 0.2–0.4)
ALPHA-2-GLOBULIN: 0.7 G/DL (ref 0.4–1.1)
BETA GLOBULIN: 1 G/DL (ref 0.9–1.6)
GAMMA GLOBULIN: 0.7 G/DL (ref 0.6–1.8)
IGA: 14 MG/DL (ref 70–400)
IGG: 765 MG/DL (ref 700–1600)
IGM: 35 MG/DL (ref 40–230)
M SPIKE 1 SERUM PROTEIN ELEC: 0.4 G/DL
SPE/IFE INTERPRETATION: NORMAL
TOTAL PROTEIN: 6.1 G/DL (ref 6.4–8.2)

## 2020-05-01 DIAGNOSIS — R77.8 ABNORMAL SPEP: ICD-10-CM

## 2020-05-01 DIAGNOSIS — R20.2 PARESTHESIA: ICD-10-CM

## 2020-05-01 LAB
24HR URINE VOLUME (ML): 2400 ML
PROTEIN 24 HOUR URINE: 0.14 G/24HR

## 2020-05-04 RX ORDER — DOXYCYCLINE 100 MG/1
CAPSULE ORAL
Qty: 30 CAPSULE | Refills: 1 | Status: ON HOLD | OUTPATIENT
Start: 2020-05-04 | End: 2020-05-20 | Stop reason: HOSPADM

## 2020-05-06 LAB — URINE ELECTROPHORESIS INTERP: NORMAL

## 2020-05-07 ENCOUNTER — VIRTUAL VISIT (OUTPATIENT)
Dept: INTERNAL MEDICINE CLINIC | Age: 65
End: 2020-05-07

## 2020-05-13 RX ORDER — GABAPENTIN 100 MG/1
CAPSULE ORAL
Qty: 28 CAPSULE | Refills: 3 | Status: SHIPPED | OUTPATIENT
Start: 2020-05-13 | End: 2020-08-27

## 2020-05-18 RX ORDER — PRAVASTATIN SODIUM 10 MG
TABLET ORAL
Qty: 30 TABLET | Refills: 0 | Status: SHIPPED | OUTPATIENT
Start: 2020-05-18 | End: 2020-06-22

## 2020-05-19 ENCOUNTER — HOSPITAL ENCOUNTER (INPATIENT)
Age: 65
LOS: 1 days | Discharge: HOME | DRG: 287 | End: 2020-05-21
Attending: EMERGENCY MEDICINE | Admitting: INTERNAL MEDICINE
Payer: MEDICARE

## 2020-05-19 ENCOUNTER — APPOINTMENT (OUTPATIENT)
Dept: GENERAL RADIOLOGY | Age: 65
DRG: 287 | End: 2020-05-19
Payer: MEDICARE

## 2020-05-19 ENCOUNTER — PATIENT MESSAGE (OUTPATIENT)
Dept: INTERNAL MEDICINE CLINIC | Age: 65
End: 2020-05-19

## 2020-05-19 PROBLEM — R07.9 CHEST PAIN: Status: ACTIVE | Noted: 2020-05-19

## 2020-05-19 LAB
ANION GAP SERPL CALCULATED.3IONS-SCNC: 12 MMOL/L (ref 3–16)
BASOPHILS ABSOLUTE: 0.1 K/UL (ref 0–0.2)
BASOPHILS RELATIVE PERCENT: 1 %
BUN BLDV-MCNC: 11 MG/DL (ref 7–20)
CALCIUM SERPL-MCNC: 9.6 MG/DL (ref 8.3–10.6)
CHLORIDE BLD-SCNC: 103 MMOL/L (ref 99–110)
CO2: 25 MMOL/L (ref 21–32)
CREAT SERPL-MCNC: 0.8 MG/DL (ref 0.6–1.2)
EKG ATRIAL RATE: 87 BPM
EKG DIAGNOSIS: NORMAL
EKG P AXIS: 64 DEGREES
EKG P-R INTERVAL: 170 MS
EKG Q-T INTERVAL: 368 MS
EKG QRS DURATION: 86 MS
EKG QTC CALCULATION (BAZETT): 442 MS
EKG R AXIS: 36 DEGREES
EKG T AXIS: 47 DEGREES
EKG VENTRICULAR RATE: 87 BPM
EOSINOPHILS ABSOLUTE: 0.1 K/UL (ref 0–0.6)
EOSINOPHILS RELATIVE PERCENT: 2.7 %
GFR AFRICAN AMERICAN: >60
GFR NON-AFRICAN AMERICAN: >60
GLUCOSE BLD-MCNC: 124 MG/DL (ref 70–99)
HCT VFR BLD CALC: 40.7 % (ref 36–48)
HEMOGLOBIN: 14 G/DL (ref 12–16)
LYMPHOCYTES ABSOLUTE: 2.2 K/UL (ref 1–5.1)
LYMPHOCYTES RELATIVE PERCENT: 43.4 %
MCH RBC QN AUTO: 31.5 PG (ref 26–34)
MCHC RBC AUTO-ENTMCNC: 34.3 G/DL (ref 31–36)
MCV RBC AUTO: 91.8 FL (ref 80–100)
MONOCYTES ABSOLUTE: 0.5 K/UL (ref 0–1.3)
MONOCYTES RELATIVE PERCENT: 9.6 %
NEUTROPHILS ABSOLUTE: 2.2 K/UL (ref 1.7–7.7)
NEUTROPHILS RELATIVE PERCENT: 43.3 %
PDW BLD-RTO: 13.2 % (ref 12.4–15.4)
PLATELET # BLD: 243 K/UL (ref 135–450)
PMV BLD AUTO: 7.1 FL (ref 5–10.5)
POTASSIUM SERPL-SCNC: 4.1 MMOL/L (ref 3.5–5.1)
RBC # BLD: 4.43 M/UL (ref 4–5.2)
SODIUM BLD-SCNC: 140 MMOL/L (ref 136–145)
TROPONIN: <0.01 NG/ML
TROPONIN: <0.01 NG/ML
WBC # BLD: 5 K/UL (ref 4–11)

## 2020-05-19 PROCEDURE — 94761 N-INVAS EAR/PLS OXIMETRY MLT: CPT

## 2020-05-19 PROCEDURE — 6370000000 HC RX 637 (ALT 250 FOR IP): Performed by: EMERGENCY MEDICINE

## 2020-05-19 PROCEDURE — G0378 HOSPITAL OBSERVATION PER HR: HCPCS

## 2020-05-19 PROCEDURE — 85025 COMPLETE CBC W/AUTO DIFF WBC: CPT

## 2020-05-19 PROCEDURE — 99285 EMERGENCY DEPT VISIT HI MDM: CPT

## 2020-05-19 PROCEDURE — 2580000003 HC RX 258: Performed by: INTERNAL MEDICINE

## 2020-05-19 PROCEDURE — 96372 THER/PROPH/DIAG INJ SC/IM: CPT

## 2020-05-19 PROCEDURE — 6360000002 HC RX W HCPCS: Performed by: INTERNAL MEDICINE

## 2020-05-19 PROCEDURE — 6370000000 HC RX 637 (ALT 250 FOR IP): Performed by: INTERNAL MEDICINE

## 2020-05-19 PROCEDURE — 94150 VITAL CAPACITY TEST: CPT

## 2020-05-19 PROCEDURE — 71046 X-RAY EXAM CHEST 2 VIEWS: CPT

## 2020-05-19 PROCEDURE — 36415 COLL VENOUS BLD VENIPUNCTURE: CPT

## 2020-05-19 PROCEDURE — 93005 ELECTROCARDIOGRAM TRACING: CPT | Performed by: EMERGENCY MEDICINE

## 2020-05-19 PROCEDURE — 84484 ASSAY OF TROPONIN QUANT: CPT

## 2020-05-19 PROCEDURE — 80048 BASIC METABOLIC PNL TOTAL CA: CPT

## 2020-05-19 RX ORDER — ALBUTEROL SULFATE 2.5 MG/3ML
2.5 SOLUTION RESPIRATORY (INHALATION) EVERY 6 HOURS PRN
Status: DISCONTINUED | OUTPATIENT
Start: 2020-05-19 | End: 2020-05-21 | Stop reason: HOSPADM

## 2020-05-19 RX ORDER — PANTOPRAZOLE SODIUM 20 MG/1
40 TABLET, DELAYED RELEASE ORAL 2 TIMES DAILY
Status: DISCONTINUED | OUTPATIENT
Start: 2020-05-19 | End: 2020-05-21 | Stop reason: HOSPADM

## 2020-05-19 RX ORDER — SODIUM CHLORIDE 0.9 % (FLUSH) 0.9 %
10 SYRINGE (ML) INJECTION PRN
Status: DISCONTINUED | OUTPATIENT
Start: 2020-05-19 | End: 2020-05-21 | Stop reason: HOSPADM

## 2020-05-19 RX ORDER — MONTELUKAST SODIUM 10 MG/1
10 TABLET ORAL NIGHTLY
Status: DISCONTINUED | OUTPATIENT
Start: 2020-05-19 | End: 2020-05-21 | Stop reason: HOSPADM

## 2020-05-19 RX ORDER — ASPIRIN 81 MG/1
325 TABLET, CHEWABLE ORAL ONCE
Status: COMPLETED | OUTPATIENT
Start: 2020-05-19 | End: 2020-05-19

## 2020-05-19 RX ORDER — AMOXICILLIN 500 MG
1200 CAPSULE ORAL 2 TIMES DAILY
Status: DISCONTINUED | OUTPATIENT
Start: 2020-05-19 | End: 2020-05-19

## 2020-05-19 RX ORDER — SODIUM CHLORIDE 0.9 % (FLUSH) 0.9 %
10 SYRINGE (ML) INJECTION EVERY 12 HOURS SCHEDULED
Status: DISCONTINUED | OUTPATIENT
Start: 2020-05-19 | End: 2020-05-21 | Stop reason: HOSPADM

## 2020-05-19 RX ORDER — ACETAMINOPHEN 325 MG/1
650 TABLET ORAL EVERY 6 HOURS PRN
Status: DISCONTINUED | OUTPATIENT
Start: 2020-05-19 | End: 2020-05-21 | Stop reason: HOSPADM

## 2020-05-19 RX ORDER — ONDANSETRON 2 MG/ML
4 INJECTION INTRAMUSCULAR; INTRAVENOUS EVERY 6 HOURS PRN
Status: DISCONTINUED | OUTPATIENT
Start: 2020-05-19 | End: 2020-05-21 | Stop reason: HOSPADM

## 2020-05-19 RX ORDER — PROMETHAZINE HYDROCHLORIDE 25 MG/1
12.5 TABLET ORAL EVERY 6 HOURS PRN
Status: DISCONTINUED | OUTPATIENT
Start: 2020-05-19 | End: 2020-05-21 | Stop reason: HOSPADM

## 2020-05-19 RX ORDER — LOSARTAN POTASSIUM 50 MG/1
100 TABLET ORAL DAILY
Status: DISCONTINUED | OUTPATIENT
Start: 2020-05-19 | End: 2020-05-21

## 2020-05-19 RX ORDER — PRAVASTATIN SODIUM 10 MG
10 TABLET ORAL NIGHTLY
Status: DISCONTINUED | OUTPATIENT
Start: 2020-05-19 | End: 2020-05-21 | Stop reason: HOSPADM

## 2020-05-19 RX ORDER — METOPROLOL SUCCINATE 25 MG/1
25 TABLET, EXTENDED RELEASE ORAL DAILY
Status: DISCONTINUED | OUTPATIENT
Start: 2020-05-19 | End: 2020-05-21 | Stop reason: HOSPADM

## 2020-05-19 RX ORDER — POLYETHYLENE GLYCOL 3350 17 G/17G
17 POWDER, FOR SOLUTION ORAL DAILY PRN
Status: DISCONTINUED | OUTPATIENT
Start: 2020-05-19 | End: 2020-05-21 | Stop reason: HOSPADM

## 2020-05-19 RX ORDER — GABAPENTIN 100 MG/1
100 CAPSULE ORAL NIGHTLY
Status: DISCONTINUED | OUTPATIENT
Start: 2020-05-19 | End: 2020-05-20

## 2020-05-19 RX ORDER — BUPROPION HYDROCHLORIDE 150 MG/1
300 TABLET ORAL DAILY
Status: DISCONTINUED | OUTPATIENT
Start: 2020-05-20 | End: 2020-05-21 | Stop reason: HOSPADM

## 2020-05-19 RX ORDER — ACETAMINOPHEN 650 MG/1
650 SUPPOSITORY RECTAL EVERY 6 HOURS PRN
Status: DISCONTINUED | OUTPATIENT
Start: 2020-05-19 | End: 2020-05-21 | Stop reason: HOSPADM

## 2020-05-19 RX ADMIN — PANTOPRAZOLE SODIUM 40 MG: 20 TABLET, DELAYED RELEASE ORAL at 22:52

## 2020-05-19 RX ADMIN — ENOXAPARIN SODIUM 40 MG: 40 INJECTION SUBCUTANEOUS at 22:51

## 2020-05-19 RX ADMIN — Medication 10 ML: at 22:53

## 2020-05-19 RX ADMIN — LOSARTAN POTASSIUM 100 MG: 50 TABLET, FILM COATED ORAL at 22:52

## 2020-05-19 RX ADMIN — MONTELUKAST 10 MG: 10 TABLET, FILM COATED ORAL at 22:52

## 2020-05-19 RX ADMIN — ACETAMINOPHEN 650 MG: 325 TABLET ORAL at 23:55

## 2020-05-19 RX ADMIN — GABAPENTIN 100 MG: 100 CAPSULE ORAL at 22:52

## 2020-05-19 RX ADMIN — PRAVASTATIN SODIUM 10 MG: 10 TABLET ORAL at 22:53

## 2020-05-19 RX ADMIN — ASPIRIN 81 MG 325 MG: 81 TABLET ORAL at 18:43

## 2020-05-19 ASSESSMENT — PAIN DESCRIPTION - DIRECTION: RADIATING_TOWARDS: MID

## 2020-05-19 ASSESSMENT — PAIN SCALES - GENERAL
PAINLEVEL_OUTOF10: 5
PAINLEVEL_OUTOF10: 7
PAINLEVEL_OUTOF10: 5

## 2020-05-19 ASSESSMENT — PAIN DESCRIPTION - FREQUENCY
FREQUENCY: CONTINUOUS

## 2020-05-19 ASSESSMENT — PAIN DESCRIPTION - ORIENTATION
ORIENTATION: LEFT
ORIENTATION: LEFT

## 2020-05-19 ASSESSMENT — PAIN DESCRIPTION - PAIN TYPE
TYPE: ACUTE PAIN
TYPE: ACUTE PAIN

## 2020-05-19 ASSESSMENT — PAIN DESCRIPTION - DESCRIPTORS
DESCRIPTORS: DISCOMFORT
DESCRIPTORS: DISCOMFORT;DULL;SQUEEZING
DESCRIPTORS: HEADACHE

## 2020-05-19 ASSESSMENT — PAIN DESCRIPTION - LOCATION
LOCATION: HEAD
LOCATION: CHEST
LOCATION: CHEST

## 2020-05-19 ASSESSMENT — PAIN DESCRIPTION - ONSET
ONSET: ON-GOING
ONSET: ON-GOING

## 2020-05-19 ASSESSMENT — PAIN DESCRIPTION - PROGRESSION
CLINICAL_PROGRESSION: GRADUALLY WORSENING

## 2020-05-19 NOTE — H&P
Date    Allergic rhinitis     Anxiety     Carpal tunnel syndrome     Cervicalgia     Chronic back pain     low    Depression     GERD (gastroesophageal reflux disease)     Headache(784.0)     hemiplegic migraines    Hernia hiatal     Histoplasmosis     Hyperlipidemia     Hypertension     Mitral (valve) prolapse     Obesity     Osteoarthritis     multiple joints    Shingles     Sleep apnea     CPAP set of 4       Past Surgical History:   Procedure Laterality Date    BUNIONECTOMY      right     KNEE ARTHROSCOPY      KNEE CARTILAGE SURGERY      LOBECTOMY      partial right lung lobectomy    LUNG BIOPSY      PARATHYROID GLAND SURGERY      PARTIAL HYSTERECTOMY      SINUS SURGERY      TOTAL KNEE ARTHROPLASTY Bilateral 03/03/2015       Medications Prior to Admission:    No current facility-administered medications on file prior to encounter.       Current Outpatient Medications on File Prior to Encounter   Medication Sig Dispense Refill    pravastatin (PRAVACHOL) 10 MG tablet TAKE 1 TABLET BY MOUTH EVERY DAY 30 tablet 0    gabapentin (NEURONTIN) 100 MG capsule TAKE 1 CAPSULE BY MOUTH EVERY DAY 28 capsule 3    doxycycline monohydrate (MONODOX) 100 MG capsule TAKE 1 CAPSULE BY MOUTH EVERY DAY 30 capsule 1    buPROPion (WELLBUTRIN XL) 300 MG extended release tablet TAKE 1 TABLET BY MOUTH EVERY DAY IN THE MORNING 30 tablet 0    acetaminophen (TYLENOL) 500 MG tablet Take 500 mg by mouth every 6 hours as needed for Pain      ibuprofen (ADVIL;MOTRIN) 200 MG tablet Take 200 mg by mouth every 6 hours as needed for Pain      pantoprazole (PROTONIX) 40 MG tablet TAKE 1 TABLET BY MOUTH 2 TIMES DAILY 60 tablet 5    verapamil (CALAN SR) 120 MG extended release tablet TAKE 1 TABLET BY MOUTH EVERY DAY 90 tablet 4    losartan (COZAAR) 100 MG tablet Take 1 tablet by mouth every day 90 tablet 1    clotrimazole-betamethasone (LOTRISONE) 1-0.05 % cream Apply topically 2 times daily Apply topically 2 times daily. 45 g 1    montelukast (SINGULAIR) 10 MG tablet TAKE 1 TABLET BY MOUTH NIGHTLY 90 tablet 1    metoprolol succinate (TOPROL XL) 25 MG extended release tablet TAKE 1 TABLET BY MOUTH EVERY DAY 90 tablet 0    aspirin 81 MG tablet Take 81 mg by mouth daily      Omega-3 Fatty Acids (FISH OIL) 1200 MG CAPS Take 1,200 mg by mouth 2 times daily      albuterol sulfate HFA (PROVENTIL HFA) 108 (90 BASE) MCG/ACT inhaler Inhale 2 puffs into the lungs every 6 hours as needed for Wheezing 3 Inhaler 1    Multiple Vitamins-Minerals (CENTRUM SILVER PO) Take 1 tablet by mouth daily          Allergies:  Latex; Reglan [metoclopramide hcl]; Univasc [moexipril]; Celecoxib; Keflex [cephalexin]; Lipitor; Metoclopramide; Prochlorperazine; Prochlorperazine edisylate; Sulfa antibiotics; and Vioxx    Social History:   TOBACCO:   reports that she quit smoking about 30 years ago. She has a 10.00 pack-year smoking history. She has never used smokeless tobacco.     ETOH:   reports no history of alcohol use. Family History:       Problem Relation Age of Onset    High Blood Pressure Mother     Heart Disease Mother     Cancer Mother         vaginal    High Blood Pressure Father     Heart Disease Father     Heart Disease Sister     Colon Cancer Sister     Heart Disease Brother     High Blood Pressure Brother     Cancer Brother         oral    Heart Disease Brother     Hearing Loss Brother         chf and transplant    High Blood Pressure Brother        ROS: Review of Systems - Negative except as in HPIi above. .   All other systems reviewed and are negative. PHYSICAL EXAM:  BP (!) 165/81   Pulse 66   Temp 98 °F (36.7 °C) (Oral)   Resp 21   Ht 5' 3\" (1.6 m)   Wt 255 lb (115.7 kg)   LMP  (LMP Unknown)   SpO2 99%   BMI 45.17 kg/m²     No results for input(s): POCGLU in the last 72 hours. General: Well developed and well nourished. No acute distress.   HEENT: NCAT, PERRL, moist mucous membranes, TMs normal PVC's (<1%).     - Echo 4/9/19:    Left ventricular cavity size is normal. Normal left ventricular wall   thickness. Overall left ventricular systolic function appears hyperdynamic   with an ejection fraction >65%. No regional wall motion abnormalities are   noted. Normal diastolic function. Moderate tricuspid regurgitation.   Estimated pulmonary artery systolic pressure is at 44 mmHg assuming a right   atrial pressure of 3 mmHg. Compare to prior echo on (4/9/13).    - Cath 7/14/14 (Angela Patterson): Impression[de-identified]   1. Globally preserved left ventricular systolic function with an   estimated EF of 70%  2. Normal coronary arteries, RCA dominant      -Received aspirin in the ED  -Continue cardiac medications: Metoprolol, losartan and aspirin  -Monitor on telemetry  -Trend troponin  -Consider stress test  -Cardiology consult        2. Hx of Symptomatic nonsustained AT  - In sinus rhythm now  -Continue cardiac meds: Verapamil  -Monitor on telemetry         3.  Essential HTN  - Continue home medication: Verapamil, losartan and metoprolol  -Monitor BP      4. SAL on CPAP        Thank you for allowing me to participate in the care of Bren Longo. All questions and concerns were addressed to the patient/family. Alternatives to my treatment were discussed. The patient and / or the family were informed of the results of any tests, a time was given to answer questions, a plan was proposed and they agreed with plan. Thank you Dr. Arturo Beltre MD for the opportunity to be involved in this patients care. If you have any questions or concerns please feel free to contact me at [phone].   Prior    Bashir Ross MD

## 2020-05-19 NOTE — ED PROVIDER NOTES
[address] [facility] RESIDENT NOTE       Date of evaluation: 5/19/2020    Chief Complaint     Chest Pain      History of Present Illness     Neo Alcantar is a 72 y.o. female who presents to the emergency department with chest pain. This pain began yesterday morning when she was doing housework and persisted throughout much of the day before subsiding prior to going to bed. It began again this morning and has been persistent since onset. The pain is left-sided, radiating down the left arm, pressure-like, worse with exertion including using her elliptical machine. She denies associated shortness of breath, nausea, diaphoresis. Also denies fevers, cough. She does note some lower extremity edema over the past 2 weeks. Sleeps propped on 3 pillows denies any recent need for increased elevation. Patient has history of hypertension, hyperlipidemia, former smoker quit approximately 30 years ago. Has strong family history of cardiovascular disease with several first-degree relatives having suffered myocardial infarction in their 45s. Review of Systems     Review of Systems    10-point review of systems completed and negative other than those items listed in the HPI above. Past Medical, Surgical, Family, and Social History     She has a past medical history of Allergic rhinitis, Anxiety, Carpal tunnel syndrome, Cervicalgia, Chronic back pain, Depression, GERD (gastroesophageal reflux disease), Headache(784.0), Hernia hiatal, Histoplasmosis, Hyperlipidemia, Hypertension, Mitral (valve) prolapse, Obesity, Osteoarthritis, Shingles, and Sleep apnea. She has a past surgical history that includes Knee arthroscopy; Knee cartilage surgery; lobectomy; sinus surgery; Bunionectomy; partial hysterectomy (cervix not removed); Total knee arthroplasty (Bilateral, 03/03/2015); Parathyroid gland surgery; and Lung biopsy.   Her family history includes Cancer in her brother and

## 2020-05-20 LAB
ANION GAP SERPL CALCULATED.3IONS-SCNC: 10 MMOL/L (ref 3–16)
BUN BLDV-MCNC: 11 MG/DL (ref 7–20)
CALCIUM SERPL-MCNC: 9.7 MG/DL (ref 8.3–10.6)
CHLORIDE BLD-SCNC: 106 MMOL/L (ref 99–110)
CO2: 27 MMOL/L (ref 21–32)
CREAT SERPL-MCNC: 0.6 MG/DL (ref 0.6–1.2)
GFR AFRICAN AMERICAN: >60
GFR NON-AFRICAN AMERICAN: >60
GLUCOSE BLD-MCNC: 95 MG/DL (ref 70–99)
HCT VFR BLD CALC: 39.1 % (ref 36–48)
HEMOGLOBIN: 13.4 G/DL (ref 12–16)
LV EF: 78 %
LVEF MODALITY: NORMAL
MCH RBC QN AUTO: 31.6 PG (ref 26–34)
MCHC RBC AUTO-ENTMCNC: 34.2 G/DL (ref 31–36)
MCV RBC AUTO: 92.5 FL (ref 80–100)
PDW BLD-RTO: 13.3 % (ref 12.4–15.4)
PLATELET # BLD: 225 K/UL (ref 135–450)
PMV BLD AUTO: 7.3 FL (ref 5–10.5)
POTASSIUM REFLEX MAGNESIUM: 4.7 MMOL/L (ref 3.5–5.1)
RBC # BLD: 4.23 M/UL (ref 4–5.2)
SODIUM BLD-SCNC: 143 MMOL/L (ref 136–145)
TROPONIN: <0.01 NG/ML
WBC # BLD: 5.2 K/UL (ref 4–11)

## 2020-05-20 PROCEDURE — G0378 HOSPITAL OBSERVATION PER HR: HCPCS

## 2020-05-20 PROCEDURE — 2580000003 HC RX 258: Performed by: INTERNAL MEDICINE

## 2020-05-20 PROCEDURE — 84484 ASSAY OF TROPONIN QUANT: CPT

## 2020-05-20 PROCEDURE — A9502 TC99M TETROFOSMIN: HCPCS | Performed by: INTERNAL MEDICINE

## 2020-05-20 PROCEDURE — 36415 COLL VENOUS BLD VENIPUNCTURE: CPT

## 2020-05-20 PROCEDURE — 3430000000 HC RX DIAGNOSTIC RADIOPHARMACEUTICAL: Performed by: INTERNAL MEDICINE

## 2020-05-20 PROCEDURE — 6370000000 HC RX 637 (ALT 250 FOR IP): Performed by: INTERNAL MEDICINE

## 2020-05-20 PROCEDURE — 6360000002 HC RX W HCPCS: Performed by: INTERNAL MEDICINE

## 2020-05-20 PROCEDURE — 82607 VITAMIN B-12: CPT

## 2020-05-20 PROCEDURE — 99222 1ST HOSP IP/OBS MODERATE 55: CPT | Performed by: INTERNAL MEDICINE

## 2020-05-20 PROCEDURE — 96372 THER/PROPH/DIAG INJ SC/IM: CPT

## 2020-05-20 PROCEDURE — 85027 COMPLETE CBC AUTOMATED: CPT

## 2020-05-20 PROCEDURE — 80048 BASIC METABOLIC PNL TOTAL CA: CPT

## 2020-05-20 RX ORDER — GABAPENTIN 100 MG/1
100 CAPSULE ORAL 2 TIMES DAILY
Status: DISCONTINUED | OUTPATIENT
Start: 2020-05-20 | End: 2020-05-21 | Stop reason: HOSPADM

## 2020-05-20 RX ORDER — SODIUM CHLORIDE 0.9 % (FLUSH) 0.9 %
10 SYRINGE (ML) INJECTION PRN
Status: COMPLETED | OUTPATIENT
Start: 2020-05-20 | End: 2020-05-21

## 2020-05-20 RX ADMIN — PANTOPRAZOLE SODIUM 40 MG: 20 TABLET, DELAYED RELEASE ORAL at 21:02

## 2020-05-20 RX ADMIN — MONTELUKAST 10 MG: 10 TABLET, FILM COATED ORAL at 21:02

## 2020-05-20 RX ADMIN — ACETAMINOPHEN 650 MG: 325 TABLET ORAL at 18:25

## 2020-05-20 RX ADMIN — Medication 10 ML: at 21:41

## 2020-05-20 RX ADMIN — ENOXAPARIN SODIUM 40 MG: 40 INJECTION SUBCUTANEOUS at 14:56

## 2020-05-20 RX ADMIN — VERAPAMIL HYDROCHLORIDE 120 MG: 120 TABLET, FILM COATED, EXTENDED RELEASE ORAL at 21:00

## 2020-05-20 RX ADMIN — BUPROPION HYDROCHLORIDE 300 MG: 150 TABLET, FILM COATED, EXTENDED RELEASE ORAL at 14:56

## 2020-05-20 RX ADMIN — PANTOPRAZOLE SODIUM 40 MG: 20 TABLET, DELAYED RELEASE ORAL at 14:56

## 2020-05-20 RX ADMIN — Medication 10 ML: at 12:24

## 2020-05-20 RX ADMIN — GABAPENTIN 100 MG: 100 CAPSULE ORAL at 21:01

## 2020-05-20 RX ADMIN — METOPROLOL SUCCINATE 25 MG: 25 TABLET, EXTENDED RELEASE ORAL at 14:55

## 2020-05-20 RX ADMIN — PRAVASTATIN SODIUM 10 MG: 10 TABLET ORAL at 21:02

## 2020-05-20 RX ADMIN — TETROFOSMIN 36 MILLICURIE: 1.38 INJECTION, POWDER, LYOPHILIZED, FOR SOLUTION INTRAVENOUS at 12:24

## 2020-05-20 RX ADMIN — Medication 10 ML: at 14:57

## 2020-05-20 ASSESSMENT — PAIN DESCRIPTION - PROGRESSION
CLINICAL_PROGRESSION: GRADUALLY IMPROVING
CLINICAL_PROGRESSION: GRADUALLY IMPROVING
CLINICAL_PROGRESSION: NOT CHANGED
CLINICAL_PROGRESSION: GRADUALLY IMPROVING

## 2020-05-20 ASSESSMENT — PAIN DESCRIPTION - PAIN TYPE
TYPE: ACUTE PAIN

## 2020-05-20 ASSESSMENT — PAIN SCALES - GENERAL
PAINLEVEL_OUTOF10: 0
PAINLEVEL_OUTOF10: 3
PAINLEVEL_OUTOF10: 4
PAINLEVEL_OUTOF10: 4
PAINLEVEL_OUTOF10: 3

## 2020-05-20 ASSESSMENT — PAIN DESCRIPTION - LOCATION
LOCATION: CHEST;ARM
LOCATION: CHEST
LOCATION: FOOT;LEG
LOCATION: CHEST

## 2020-05-20 ASSESSMENT — PAIN DESCRIPTION - FREQUENCY
FREQUENCY: CONTINUOUS

## 2020-05-20 ASSESSMENT — PAIN DESCRIPTION - DESCRIPTORS
DESCRIPTORS: DULL
DESCRIPTORS: SORE
DESCRIPTORS: ACHING;TINGLING
DESCRIPTORS: DISCOMFORT;SORE

## 2020-05-20 ASSESSMENT — PAIN DESCRIPTION - ONSET
ONSET: ON-GOING

## 2020-05-20 ASSESSMENT — PAIN DESCRIPTION - ORIENTATION
ORIENTATION: LEFT
ORIENTATION: RIGHT;LEFT
ORIENTATION: LEFT

## 2020-05-20 NOTE — CARE COORDINATION
[address]  Cardiology consulted   and noted      - We will obtain an exercise nuclear stress test today. -If stress test normal patient may be released home (no need for aspirin). If stress test abnormal, will plan for [facility]. Electronically signed by Tito Yuan RN on 5/20/2020 at 3:08 PM         CM Following for D/C planning:  Observation Status ,  NPO  For  Stress test this AM .  Cardiology consulted. Poss  D/c  After  Test  Completed will cont to follow . Electronically signed by Tito Yuan RN on 5/20/2020 at 10:24 AM      Tito Yuan RN Case Manager  [facility]  [address] [place] [address].   [facility] [address]  [phone]  Fax [phone]

## 2020-05-20 NOTE — PROGRESS NOTES
Hospitalist Progress Note      PCP: Erwin Moore MD    Date of Admission: 5/19/2020    Chief Complaint: chest pain    Hospital Course: 71 yo admitted with chest pain    Subjective: Chest pain however not completely resolved. Denies shortness of breath, lightheadedness or dizziness      Medications:  Reviewed    Infusion Medications   Scheduled Medications    buPROPion  300 mg Oral Daily    gabapentin  100 mg Oral Nightly    losartan  100 mg Oral Daily    metoprolol succinate  25 mg Oral Daily    verapamil  120 mg Oral Daily    pravastatin  10 mg Oral Nightly    pantoprazole  40 mg Oral BID    montelukast  10 mg Oral Nightly    sodium chloride flush  10 mL Intravenous 2 times per day    enoxaparin  40 mg Subcutaneous Daily     PRN Meds: regadenoson, sodium chloride flush, albuterol, sodium chloride flush, acetaminophen **OR** acetaminophen, polyethylene glycol, promethazine **OR** ondansetron    No intake or output data in the 24 hours ending 05/20/20 4027    Physical Exam Performed:    /75   Pulse 61   Temp 97.7 °F (36.5 °C) (Oral)   Resp 18   Ht 5' 3\" (1.6 m)   Wt 255 lb (115.7 kg)   LMP  (LMP Unknown)   SpO2 96%   BMI 45.17 kg/m²     General appearance: No apparent distress, appears stated age and cooperative. HEENT: Pupils equal, round, and reactive to light. Conjunctivae/corneas clear. Neck: Supple, with full range of motion. No jugular venous distention. Trachea midline. Respiratory:  Normal respiratory effort. Clear to auscultation, bilaterally without Rales/Wheezes/Rhonchi. Cardiovascular: Regular rate and rhythm with normal S1/S2 without murmurs, rubs or gallops. Abdomen: Soft, non-tender, non-distended with normal bowel sounds. Musculoskeletal: No clubbing, cyanosis or edema bilaterally. Full range of motion without deformity. Skin: Skin color, texture, turgor normal.  No rashes or lesions.   Neurologic:  Neurovascularly intact without any focal sensory/motor

## 2020-05-20 NOTE — CONSULTS
Cardiology Consultation                                                                    Pt Name: Vitaly Lr  Age: 72 y.o. Sex: female  : 1955  Location: [facility]/6302-01    Referring Physician: Trish Vick MD  Primary cardiologist: Dr Lexii Mon / Dr. Toma Bean      Reason for Consult:     Reason for Consultation/Chief Complaint: chest pain    HPI:     Ms Cintia Mayorga is a 73 yo overweight woman (BMI 39) with h/o SAL on CPAP, GERD, HTN, HLP, past smoker (quit '89), SVT/PAT.      Patient was seen by Dr. Laureen Ellington and Dr Carlo aGrland at [facility] in the past, last visit in . She had chest pains at that time and underwent cardiac evaluation.      Echo 2014: normal except for diastolic I (note, no valvular disease).    Cath 2014: normal coronaries, normal EF 70%.      ECG 3/26/19: normal.      Patient reported strong family history of premature CAD and heart failure. Her brother had a cardiac transplant in his 42's and her sisters had stents in their 42-51s. Patient reported palpitations with lightheadedness. 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      Echo 2019: normal (no mitral valve prolapse) except for moderate TR, RVSP 44.      Zio monitor x 14 days (19): NSR with frequent SVT's. Patient was seen by Dr. Toma Bean and was initially started on flecainide in addition to Toprol however patient developed blurry vision and flecainide was changed to verapamil. Patient was doing well until a couple of days ago. She started having chest discomfort under her left breast and towards her left flank. It started on  and was persistent throughout the day, with intensity ranging between 3/10- 7/10. Pain was achy, worse with deep breathing, associated with mild tingling of her left upper extremity. It was unrelated to activity, position, food intake. Patient admits her chronic mild cough has been getting mildly worse lately.   However she denies any Oral Nightly    sodium chloride flush  10 mL Intravenous 2 times per day    enoxaparin  40 mg Subcutaneous Daily       IV drips:      PRN:  regadenoson, albuterol, sodium chloride flush, acetaminophen **OR** acetaminophen, polyethylene glycol, promethazine **OR** ondansetron    Allergy:     Latex; Reglan [metoclopramide hcl]; Univasc [moexipril]; Celecoxib; Keflex [cephalexin]; Lipitor; Metoclopramide; Prochlorperazine; Prochlorperazine edisylate; Sulfa antibiotics; and Vioxx       Review of Systems:     All 12 point review of symptoms completed. Pertinent positives identified in the HPI, all other review of symptoms negative as below. CONSTITUTIONAL: No fatigue  SKIN: No rash or pruritis. EYES: No visual changes or diplopia. No scleral icterus. ENT: No Headaches, hearing loss or vertigo. No mouth sores or sore throat. CARDIOVASCULAR: + chest pain/chest pressure/chest discomfort. No palpitations. No edema. RESPIRATORY: No dyspnea. No cough or wheezing, no sputum production. GASTROINTESTINAL: No N/V/D. No abdominal pain, appetite loss, blood in stools. GENITOURINARY: No dysuria, trouble voiding, or hematuria. MUSCULOSKELETAL:  No gait disturbance, weakness or joint complaints. NEUROLOGICAL: No headache, diplopia, change in muscle strength, numbness or tingling. No change in gait, balance, coordination, mood, affect, memory, mentation, behavior. ENDOCRINE: No excessive thirst, fluid intake, or urination. No tremor. HEMATOLOGIC: No abnormal bruising or bleeding. ALLERGY: No nasal congestion or hives.       Physical Examination:     Vitals:    05/19/20 2356 05/20/20 0551 05/20/20 0801 05/20/20 0803   BP: (!) 157/79 112/75 (!) 151/76 132/79   Pulse: 58 56 53    Resp: 16 16 18    Temp: 97 °F (36.1 °C) 97.5 °F (36.4 °C) 97 °F (36.1 °C)    TempSrc: Oral Oral Oral    SpO2: 95% 94% 95%    Weight:       Height:           Wt Readings from Last 3 Encounters:   05/19/20 255 lb (115.7 kg)   02/24/20 255 lb (115.7 kg)   11/11/19 249 lb (112.9 kg)         General Appearance:  Alert, cooperative, no distress, appears stated age Appropriate weight   Head:  Normocephalic, without obvious abnormality, atraumatic   Eyes:  PERRL, conjunctiva/corneas clear EOM intact  Ears normal   Throat no lesions       Nose: Nares normal, no drainage or sinus tenderness   Throat: Lips, mucosa, and tongue normal   Neck: Supple, symmetrical, trachea midline, no adenopathy, thyroid: not enlarged, symmetric, no tenderness/mass/nodules, no carotid bruit. Lungs:   Respirations unlabored, clear to auscultation bilaterally, without any wheezes, rubs or ronchi. Chest Wall:  + TTP at xyphoid process and left lower chest under her breast.    Heart:  Regular rhythm, rate is controlled, S1, S2 normal, there is no murmur, there is no rub or gallop, no jvd, no bilateral lower extremity edema   Abdomen:   Soft, non-tender, bowel sounds active all four quadrants,  no masses, no organomegaly       Extremities: Extremities normal, atraumatic, no cyanosis. Pulses: 2+ and symmetric   Skin: Skin color, texture, turgor normal, no rashes or lesions   Pysch: Normal mood and affect   Neurologic: Normal gross motor and sensory exam.  Cranial nerves intact        Labs:     Recent Labs     05/19/20  1729 05/20/20  0513    143   K 4.1 4.7   BUN 11 11   CREATININE 0.8 0.6    106   CO2 25 27   GLUCOSE 124* 95   CALCIUM 9.6 9.7     Recent Labs     05/19/20  1730 05/20/20  0513   WBC 5.0 5.2   HGB 14.0 13.4   HCT 40.7 39.1    225   MCV 91.8 92.5     No results for input(s): CHOLTOT, TRIG, HDL in the last 72 hours. Invalid input(s): LIPIDCOMM, CHOLHDL, VLDCHOL, LDL  No results for input(s): PTT, INR in the last 72 hours. Invalid input(s): PT  Recent Labs     05/19/20  1729 05/19/20  1927 05/19/20  2346 05/20/20  0513 05/20/20  1059   TROPONINI <0.01 <0.01 <0.01 <0.01 <0.01     No results for input(s): BNP in the last 72 hours.   No results for

## 2020-05-20 NOTE — PROGRESS NOTES
 LOBECTOMY      partial right lung lobectomy    LUNG BIOPSY      PARATHYROID GLAND SURGERY      PARTIAL HYSTERECTOMY      SINUS SURGERY      TOTAL KNEE ARTHROPLASTY Bilateral 03/03/2015       Level of Consciousness: Alert, Oriented, and Cooperative = 0    Level of Activity: Walking unassisted = 0    Respiratory Pattern: Regular Pattern; RR 8-20 = 0    Breath Sounds: Clear = 0    Sputum   ,  , Sputum How Obtained: Spontaneous cough  Cough: Strong, spontaneous, non-productive = 0    Vital Signs   BP (!) 157/79   Pulse 58   Temp 97 °F (36.1 °C) (Oral)   Resp 16   Ht 5' 3\" (1.6 m)   Wt 255 lb (115.7 kg)   LMP  (LMP Unknown)   SpO2 95%   BMI 45.17 kg/m²   SPO2 (COPD values may differ): Greater than or equal to 92% on room air = 0    Peak Flow (asthma only): not applicable = 0    RSI: 0-4 = See once and convert to home regimen or discontinue        Plan       Goals: Medication delivery      Patient/caregiver was educated on the proper method of use for Respiratory Care Devices:  NA      Level of patient/caregiver understanding able to:   ? Verbalize understanding   ? Demonstrate understanding       ? Teach back        ? Needs reinforcement       ? No available caregiver               ? Other:     Response to education:  NA     Is patient being placed on Home Treatment Regimen? Yes     Does the patient have everything they need prior to discharge? NA     Plan of Care: Continue with home regimen as ordered     Electronically signed by Jesse Pisano RCP on 5/20/2020 at 4:53 AM    Respiratory Protocol Guidelines     1. Assessment and treatment by Respiratory Therapy will be initiated for medication and therapeutic interventions upon initiation of aerosolized medication. 2. Physician will be contacted for respiratory rate (RR) greater than 35 breaths per minute. Therapy will be held for heart rate (HR) greater than 140 beats per minute, pending direction from physician.   3. Bronchodilators will be of lung disease, is not using inhaled anti-inflammatory medication at home, and lacks wheezing by examination or by history for at least 24 hours, contact physician for possible discontinuation.

## 2020-05-20 NOTE — PROGRESS NOTES
Pt is awake in bed. Bed is locked and in lowest position with call light and bedside table within reach. Pt is A&O. VSS. Assessment is complete and documented. Pt c/o pain in chest and L arm as 4/10, but states that the pain is improving. Pt had no concerns at this time. Pt encouraged to notify RN with any needs. Pt verbalized understanding. Will continue to monitor.

## 2020-05-21 VITALS
TEMPERATURE: 97.2 F | BODY MASS INDEX: 45.18 KG/M2 | DIASTOLIC BLOOD PRESSURE: 71 MMHG | HEART RATE: 58 BPM | HEIGHT: 63 IN | WEIGHT: 255 LBS | SYSTOLIC BLOOD PRESSURE: 136 MMHG | OXYGEN SATURATION: 95 % | RESPIRATION RATE: 18 BRPM

## 2020-05-21 PROBLEM — I20.9 ANGINA PECTORIS (HCC): Status: ACTIVE | Noted: 2020-05-21

## 2020-05-21 LAB
TROPONIN: <0.01 NG/ML
VITAMIN B-12: 589 PG/ML (ref 211–911)

## 2020-05-21 PROCEDURE — C1894 INTRO/SHEATH, NON-LASER: HCPCS

## 2020-05-21 PROCEDURE — 3430000000 HC RX DIAGNOSTIC RADIOPHARMACEUTICAL: Performed by: INTERNAL MEDICINE

## 2020-05-21 PROCEDURE — 93017 CV STRESS TEST TRACING ONLY: CPT

## 2020-05-21 PROCEDURE — B2111ZZ FLUOROSCOPY OF MULTIPLE CORONARY ARTERIES USING LOW OSMOLAR CONTRAST: ICD-10-PCS | Performed by: INTERNAL MEDICINE

## 2020-05-21 PROCEDURE — B2151ZZ FLUOROSCOPY OF LEFT HEART USING LOW OSMOLAR CONTRAST: ICD-10-PCS | Performed by: INTERNAL MEDICINE

## 2020-05-21 PROCEDURE — 36415 COLL VENOUS BLD VENIPUNCTURE: CPT

## 2020-05-21 PROCEDURE — 78452 HT MUSCLE IMAGE SPECT MULT: CPT

## 2020-05-21 PROCEDURE — 6370000000 HC RX 637 (ALT 250 FOR IP): Performed by: INTERNAL MEDICINE

## 2020-05-21 PROCEDURE — 2580000003 HC RX 258: Performed by: INTERNAL MEDICINE

## 2020-05-21 PROCEDURE — A9502 TC99M TETROFOSMIN: HCPCS | Performed by: INTERNAL MEDICINE

## 2020-05-21 PROCEDURE — C1769 GUIDE WIRE: HCPCS

## 2020-05-21 PROCEDURE — 99152 MOD SED SAME PHYS/QHP 5/>YRS: CPT | Performed by: INTERNAL MEDICINE

## 2020-05-21 PROCEDURE — 2709999900 HC NON-CHARGEABLE SUPPLY

## 2020-05-21 PROCEDURE — 6370000000 HC RX 637 (ALT 250 FOR IP)

## 2020-05-21 PROCEDURE — 1200000000 HC SEMI PRIVATE

## 2020-05-21 PROCEDURE — 99153 MOD SED SAME PHYS/QHP EA: CPT

## 2020-05-21 PROCEDURE — C1725 CATH, TRANSLUMIN NON-LASER: HCPCS

## 2020-05-21 PROCEDURE — 2500000003 HC RX 250 WO HCPCS

## 2020-05-21 PROCEDURE — 84484 ASSAY OF TROPONIN QUANT: CPT

## 2020-05-21 PROCEDURE — 99233 SBSQ HOSP IP/OBS HIGH 50: CPT | Performed by: INTERNAL MEDICINE

## 2020-05-21 PROCEDURE — 93458 L HRT ARTERY/VENTRICLE ANGIO: CPT | Performed by: INTERNAL MEDICINE

## 2020-05-21 PROCEDURE — 99152 MOD SED SAME PHYS/QHP 5/>YRS: CPT

## 2020-05-21 PROCEDURE — 6360000002 HC RX W HCPCS

## 2020-05-21 PROCEDURE — 93458 L HRT ARTERY/VENTRICLE ANGIO: CPT

## 2020-05-21 PROCEDURE — 4A023N7 MEASUREMENT OF CARDIAC SAMPLING AND PRESSURE, LEFT HEART, PERCUTANEOUS APPROACH: ICD-10-PCS | Performed by: INTERNAL MEDICINE

## 2020-05-21 RX ORDER — ASPIRIN 81 MG/1
81 TABLET, CHEWABLE ORAL DAILY
Status: DISCONTINUED | OUTPATIENT
Start: 2020-05-21 | End: 2020-05-21 | Stop reason: HOSPADM

## 2020-05-21 RX ORDER — LOSARTAN POTASSIUM 50 MG/1
50 TABLET ORAL DAILY
Status: DISCONTINUED | OUTPATIENT
Start: 2020-05-21 | End: 2020-05-21 | Stop reason: HOSPADM

## 2020-05-21 RX ORDER — SODIUM CHLORIDE 9 MG/ML
INJECTION, SOLUTION INTRAVENOUS CONTINUOUS
Status: DISCONTINUED | OUTPATIENT
Start: 2020-05-21 | End: 2020-05-21 | Stop reason: HOSPADM

## 2020-05-21 RX ORDER — ACETAMINOPHEN 325 MG/1
650 TABLET ORAL EVERY 4 HOURS PRN
Status: DISCONTINUED | OUTPATIENT
Start: 2020-05-21 | End: 2020-05-21 | Stop reason: SDUPTHER

## 2020-05-21 RX ORDER — SODIUM CHLORIDE 0.9 % (FLUSH) 0.9 %
10 SYRINGE (ML) INJECTION EVERY 12 HOURS SCHEDULED
Status: DISCONTINUED | OUTPATIENT
Start: 2020-05-21 | End: 2020-05-21 | Stop reason: HOSPADM

## 2020-05-21 RX ORDER — SODIUM CHLORIDE 0.9 % (FLUSH) 0.9 %
10 SYRINGE (ML) INJECTION PRN
Status: DISCONTINUED | OUTPATIENT
Start: 2020-05-21 | End: 2020-05-21 | Stop reason: HOSPADM

## 2020-05-21 RX ADMIN — LOSARTAN POTASSIUM 50 MG: 50 TABLET, FILM COATED ORAL at 00:26

## 2020-05-21 RX ADMIN — SODIUM CHLORIDE: 9 INJECTION, SOLUTION INTRAVENOUS at 13:15

## 2020-05-21 RX ADMIN — Medication 10 ML: at 07:57

## 2020-05-21 RX ADMIN — Medication 10 ML: at 10:52

## 2020-05-21 RX ADMIN — ACETAMINOPHEN 650 MG: 325 TABLET ORAL at 14:20

## 2020-05-21 RX ADMIN — ASPIRIN 81 MG 81 MG: 81 TABLET ORAL at 15:32

## 2020-05-21 RX ADMIN — TETROFOSMIN 36 MILLICURIE: 1.38 INJECTION, POWDER, LYOPHILIZED, FOR SOLUTION INTRAVENOUS at 07:57

## 2020-05-21 ASSESSMENT — PAIN DESCRIPTION - FREQUENCY: FREQUENCY: INTERMITTENT

## 2020-05-21 ASSESSMENT — PAIN DESCRIPTION - PAIN TYPE: TYPE: ACUTE PAIN

## 2020-05-21 ASSESSMENT — PAIN DESCRIPTION - DESCRIPTORS: DESCRIPTORS: ACHING

## 2020-05-21 ASSESSMENT — PAIN DESCRIPTION - PROGRESSION: CLINICAL_PROGRESSION: NOT CHANGED

## 2020-05-21 ASSESSMENT — PAIN DESCRIPTION - ONSET: ONSET: GRADUAL

## 2020-05-21 ASSESSMENT — PAIN SCALES - GENERAL
PAINLEVEL_OUTOF10: 5
PAINLEVEL_OUTOF10: 0
PAINLEVEL_OUTOF10: 8
PAINLEVEL_OUTOF10: 0
PAINLEVEL_OUTOF10: 8

## 2020-05-21 ASSESSMENT — PAIN DESCRIPTION - LOCATION: LOCATION: BACK

## 2020-05-21 ASSESSMENT — PAIN - FUNCTIONAL ASSESSMENT: PAIN_FUNCTIONAL_ASSESSMENT: ACTIVITIES ARE NOT PREVENTED

## 2020-05-21 ASSESSMENT — PAIN DESCRIPTION - ORIENTATION: ORIENTATION: MID

## 2020-05-21 NOTE — PROGRESS NOTES
appears stated age Appropriate weight   Head:  Normocephalic, without obvious abnormality, atraumatic   Eyes:  PERRL, conjunctiva/corneas clear EOM intact  Ears normal   Throat no lesions       Nose: Nares normal, no drainage or sinus tenderness   Throat: Lips, mucosa, and tongue normal   Neck: Supple, symmetrical, trachea midline, no adenopathy, thyroid: not enlarged, symmetric, no tenderness/mass/nodules, no carotid bruit. Lungs:   Normal respiratory rate, lungs clear to auscultation without any wheezes, rubs or ronchi bilaterally. Chest Wall:  No tenderness or deformity   Heart:  Regular rhythm, rate is controlled, S1, S2 normal, there is no murmur, there is no rub or gallop, no jvd, no bilateral lower extremity edema   Abdomen:   Soft, non-tender, bowel sounds active all four quadrants,  no masses, no organomegaly       Extremities: Extremities normal, atraumatic, no cyanosis. Pulses: 2+ and symmetric   Skin: Skin color, texture, turgor normal, no rashes or lesions   Pysch: Normal mood and affect   Neurologic: Normal gross motor and sensory exam.  Cranial nerves intact       Labs:   Recent Labs     05/19/20  1729 05/20/20  0513    143   K 4.1 4.7   BUN 11 11   CREATININE 0.8 0.6    106   CO2 25 27   GLUCOSE 124* 95   CALCIUM 9.6 9.7     Recent Labs     05/19/20  1730 05/20/20  0513   WBC 5.0 5.2   HGB 14.0 13.4   HCT 40.7 39.1    225   MCV 91.8 92.5     No results for input(s): CHOLTOT, TRIG, HDL in the last 72 hours. Invalid input(s): LIPIDCOMM, CHOLHDL, VLDCHOL, LDL  No results for input(s): PTT, INR in the last 72 hours. Invalid input(s): PT  Recent Labs     05/20/20  0513 05/20/20  1059 05/20/20  1846 05/20/20  2321 05/21/20  0445   TROPONINI <0.01 <0.01 <0.01 <0.01 <0.01     No results for input(s): BNP in the last 72 hours. No results for input(s): NTPROBNP in the last 72 hours. No results for input(s): TSH in the last 72 hours.     Imaging:       Assessment & Plan: 1.  Atypical chest pain:  Patient symptoms are pleuritic in nature most likely due to increased coughing spells lately. Patient had a normal LHC and echo in 2014. However she does have family history of premature CAD.     -Will plan for LHC with Dr Nabeel Klein today. If no intervention with LHC, patient may be released home later today.      2. PSVT:  Patient has a history of paroxysmal atrial tachycardia and is being followed by EP.     -We will continue her home meds, Toprol 25 daily and verapamil extended release 120 mg daily.     3. Hypertension:  Patient's BP is well controlled with current medications.     - Continue with metoprolol, verapamil, losartan 100 mg daily.     4. Hyperlipidemia:  Patient is on a statin. I have spent 35 minutes of face to face time with the patient with more than 50% spent counseling and coordinating care. I have personally reviewed the reports and images of labs, radiological studies, cardiac studies including ECG's and telemetry, current and old medical records. The note was completed using EMR and Dragon dictation system. Every effort was made to ensure accuracy; however, inadvertent computerized transcription errors may be present. All questions and concerns were addressed to the patient/family. Alternatives to my treatment were discussed. Thank you for allowing to us to participate in the care or Marita Ray. Please call our service with questions.     Carmen Parish MD, [facility], [address]  The [address]  [address] [address] Leatha [place] [address]  Ph: [phone]  Fax: [phone]

## 2020-05-21 NOTE — PROGRESS NOTES
Pt returned from cardio. VSS. Bed locked in low position. Call light and belongings within in reach. Pt denied needs at this time. Pt encouraged to notify RN with any concerns. Pt verbalized understanding. Will continue to monitor.

## 2020-05-21 NOTE — PROGRESS NOTES
No rashes or lesions. Neurologic:  Neurovascularly intact without any focal sensory/motor deficits. Cranial nerves: II-XII intact, grossly non-focal.  Psychiatric: Alert and oriented, thought content appropriate, normal insight  Capillary Refill: Brisk,< 3 seconds   Peripheral Pulses: +2 palpable, equal bilaterally       Labs:   Recent Labs     05/19/20  1730 05/20/20  0513   WBC 5.0 5.2   HGB 14.0 13.4   HCT 40.7 39.1    225     Recent Labs     05/19/20  1729 05/20/20  0513    143   K 4.1 4.7    106   CO2 25 27   BUN 11 11   CREATININE 0.8 0.6   CALCIUM 9.6 9.7     No results for input(s): AST, ALT, BILIDIR, BILITOT, ALKPHOS in the last 72 hours. No results for input(s): INR in the last 72 hours. Recent Labs     05/20/20  1846 05/20/20  2321 05/21/20  0445   TROPONINI <0.01 <0.01 <0.01       Urinalysis:      Lab Results   Component Value Date    NITRU Negative 04/22/2020    WBCUA 2 04/22/2020    RBCUA 2 04/22/2020    BLOODU Negative 04/22/2020    SPECGRAV 1.019 04/22/2020    GLUCOSEU Negative 04/22/2020       Radiology:  NM Cardiac Stress Test Nuclear Imaging   Final Result      XR CHEST STANDARD (2 VW)   Final Result      1. No findings for acute cardiopulmonary disease. No significant change in appearance of the chest from prior exam.              Assessment/Plan:    Active Hospital Problems    Diagnosis    Angina pectoris ([address] [I20.9]    Chest pain [R07.9]     1. Chest pain with concern for angina, rule out ischemic etiology  -Continue cardiac medications: Metoprolol, losartan and aspirin, pravastatin  -Monitor on telemetry  -Cardiology following, stress test positive for Small and mild reversible defect in the anteroseptum suggestive of possible ischemia  -plan for cardiac catheterization today     2. Hx of PVST  - In sinus rhythm now  -continue metoprolol and verapamil  -Monitor on telemetry     3.  Essential HTN  -Continue blood pressure medications     4.  SAL on CPAP     DVT

## 2020-05-21 NOTE — PROGRESS NOTES
Pts HR 55, metoprolol and verapamil held per parameters. Notified Dr Keren Craven at bedside. No new orders at thistime.

## 2020-05-21 NOTE — PROGRESS NOTES
Spoke with Juana Nguyen in cath lab who stated they would obtain consent. Pt to cath lab. Patient notified family.

## 2020-05-23 NOTE — DISCHARGE SUMMARY
verapamil  -Monitor on telemetry     3.  Essential HTN  -Continue blood pressure medications     4.  SAL on CPAP     Discharge Medications:   Discharge Medication List as of 5/21/2020  6:07 PM        Discharge Medication List as of 5/21/2020  6:07 PM        Discharge Medication List as of 5/21/2020  6:07 PM      CONTINUE these medications which have NOT CHANGED    Details   pravastatin (PRAVACHOL) 10 MG tablet TAKE 1 TABLET BY MOUTH EVERY DAY, Disp-30 tablet, R-0Normal      gabapentin (NEURONTIN) 100 MG capsule TAKE 1 CAPSULE BY MOUTH EVERY DAY, Disp-28 capsule, R-3Normal      buPROPion (WELLBUTRIN XL) 300 MG extended release tablet TAKE 1 TABLET BY MOUTH EVERY DAY IN THE MORNING, Disp-30 tablet, R-0Normal      acetaminophen (TYLENOL) 500 MG tablet Take 500 mg by mouth every 6 hours as needed for PainHistorical Med      pantoprazole (PROTONIX) 40 MG tablet TAKE 1 TABLET BY MOUTH 2 TIMES DAILY, Disp-60 tablet, R-5Normal      verapamil (CALAN SR) 120 MG extended release tablet TAKE 1 TABLET BY MOUTH EVERY DAY, Disp-90 tablet, R-4Normal      losartan (COZAAR) 100 MG tablet Take 1 tablet by mouth every day, Disp-90 tablet, R-1Normal      clotrimazole-betamethasone (LOTRISONE) 1-0.05 % cream Apply topically 2 times daily Apply topically 2 times daily. , Topical, 2 TIMES DAILY Starting Tue 8/13/2019, Disp-45 g, R-1, Normal      montelukast (SINGULAIR) 10 MG tablet TAKE 1 TABLET BY MOUTH NIGHTLY, Disp-90 tablet, R-1Normal      metoprolol succinate (TOPROL XL) 25 MG extended release tablet TAKE 1 TABLET BY MOUTH EVERY DAY, Disp-90 tablet, R-0Normal      Omega-3 Fatty Acids (FISH OIL) 1200 MG CAPS Take 1,200 mg by mouth 2 times dailyHistorical Med      albuterol sulfate HFA (PROVENTIL HFA) 108 (90 BASE) MCG/ACT inhaler Inhale 2 puffs into the lungs every 6 hours as needed for Wheezing, Disp-3 Inhaler, R-1Normal      Multiple Vitamins-Minerals (CENTRUM SILVER PO) Take 1 tablet by mouth daily            Discharge Medication List 5. 4 METS  Predicted HR: 155 bpm  % of predicted HR: 103  Test duration:3 min  Reason for termination:Fatigue   ECG Findings  Normal sinus rhythm. Normal ECG. Arrhythmias  No significant rhythm abnormality. Symptoms  Complaint of chest pain 4/10 pre test, 7/10 during GXT, 3/6 at rest.   Complications  Procedure complication was none. Stress Interpretation  Normal EKG response. Imaging Protocols   - Two Day   - One Day   Rest                          Stress   Isotope:Myoview/Tetrofosmin   Isotope: Myoview/Tetrofosmin  Isotope dose:36 mCi           Isotope dose:36 mCi  Administration Route:I.V. Administration Route:I.V.  Date:05/21/2020 00:00         Date:05/20/2020 00:00                                 Technique:      Gated  Imaging Results    Stress ejection    Ejection fraction:78 %    EDV :81 ml    ESV :18 ml    Stroke volume :63 ml    LV mass :126 gr  Medical History  Signatures   ------------------------------------------------------------------  Electronically signed by Huma Crews MD (Interpreting  physician) on 05/21/2020 at 10:29  ------------------------------------------------------------------            Last Labs on Discharge:     No results found for this or any previous visit (from the past 24 hour(s)). Follow up: with Arturo Beltre MD    Note that over 30 minutes was spent in preparing discharge papers, discussing discharge with patient, medication review, etc.    Thank you Arturo Beltre MD for the opportunity to be involved in this patient's care. If you have any questions or concerns please feel free to contact me at [phone].     Electronically signed by Ayleen Garces MD on 5/23/2020 at 1:38 PM

## 2020-05-26 RX ORDER — BUPROPION HYDROCHLORIDE 300 MG/1
TABLET ORAL
Qty: 30 TABLET | Refills: 0 | Status: SHIPPED | OUTPATIENT
Start: 2020-05-26 | End: 2020-06-22

## 2020-05-28 ENCOUNTER — TELEMEDICINE (OUTPATIENT)
Dept: INTERNAL MEDICINE CLINIC | Age: 65
End: 2020-05-28
Payer: MEDICARE

## 2020-05-28 PROBLEM — I20.9 ANGINA PECTORIS (HCC): Status: RESOLVED | Noted: 2020-05-21 | Resolved: 2020-05-28

## 2020-05-28 PROBLEM — R06.09 EXERTIONAL DYSPNEA: Status: RESOLVED | Noted: 2019-03-26 | Resolved: 2020-05-28

## 2020-05-28 PROBLEM — R07.9 CHEST PAIN: Status: RESOLVED | Noted: 2020-05-19 | Resolved: 2020-05-28

## 2020-05-28 PROCEDURE — 99495 TRANSJ CARE MGMT MOD F2F 14D: CPT | Performed by: INTERNAL MEDICINE

## 2020-05-28 PROCEDURE — 1111F DSCHRG MED/CURRENT MED MERGE: CPT | Performed by: INTERNAL MEDICINE

## 2020-05-28 RX ORDER — LOSARTAN POTASSIUM 100 MG/1
50 TABLET ORAL DAILY
Qty: 45 TABLET | Refills: 1
Start: 2020-05-28 | End: 2020-07-06

## 2020-05-28 ASSESSMENT — ENCOUNTER SYMPTOMS
CHEST TIGHTNESS: 0
RESPIRATORY NEGATIVE: 1
GASTROINTESTINAL NEGATIVE: 1
SHORTNESS OF BREATH: 0
WHEEZING: 0

## 2020-05-28 NOTE — PROGRESS NOTES
Laterality Date    BUNIONECTOMY      right     KNEE ARTHROSCOPY      KNEE CARTILAGE SURGERY      LOBECTOMY      partial right lung lobectomy    LUNG BIOPSY      PARATHYROID GLAND SURGERY      PARTIAL HYSTERECTOMY      SINUS SURGERY      TOTAL KNEE ARTHROPLASTY Bilateral 2015        Social History     Tobacco Use    Smoking status: Former Smoker     Packs/day: 2.00     Years: 5.00     Pack years: 10.00     Last attempt to quit: 1990     Years since quittin.4    Smokeless tobacco: Never Used   Substance Use Topics    Alcohol use: No    Drug use: No        Family History   Problem Relation Age of Onset    High Blood Pressure Mother     Heart Disease Mother     Cancer Mother         vaginal    High Blood Pressure Father     Heart Disease Father     Heart Disease Sister     Colon Cancer Sister     Heart Disease Brother     High Blood Pressure Brother     Cancer Brother         oral    Heart Disease Brother     Hearing Loss Brother         chf and transplant    High Blood Pressure Brother        Physical Exam  Constitutional:       General: She is not in acute distress. Appearance: Normal appearance. She is not ill-appearing. HENT:      Head: Normocephalic and atraumatic. Pulmonary:      Effort: Pulmonary effort is normal.   Neurological:      General: No focal deficit present. Mental Status: She is alert and oriented to person, place, and time. Cranial Nerves: Cranial nerves are intact. Due to this being a TeleHealth encounter, evaluation of the following organ systems is limited: Vitals/Constitutional/EENT/Resp/CV/GI//MS/Neuro/Skin/Heme-Lymph-Imm. ASSESSMENT/PLAN:  Paresthesia  Scheduled for EMG  Await results    Essential hypertension  BP stable  Continue present treatment      GERD (Gastroesophageal Reflux Disease)  No dyspepsia  Continue treatment    Chest pain  Discharge Summary reviewed  GXT abnormal  Angiogram negative  ?  Pain secondary to

## 2020-05-29 RX ORDER — MONTELUKAST SODIUM 10 MG/1
10 TABLET ORAL NIGHTLY
Qty: 90 TABLET | Refills: 1 | Status: SHIPPED | OUTPATIENT
Start: 2020-05-29 | End: 2021-05-28

## 2020-06-03 ENCOUNTER — HOSPITAL ENCOUNTER (OUTPATIENT)
Dept: NEUROLOGY | Age: 65
Discharge: HOME | End: 2020-06-03
Payer: MEDICARE

## 2020-06-03 PROCEDURE — 95909 NRV CNDJ TST 5-6 STUDIES: CPT

## 2020-06-03 PROCEDURE — 95886 MUSC TEST DONE W/N TEST COMP: CPT

## 2020-06-03 NOTE — PROCEDURES
Electrodiagnostic Report  [address] [place] [address]  Physical Medicine & Rehabilitation    06/03/20    Bren Longo  72 y.o.  1955  [id number]  Referring Provider: Katheryn Callaway MD    Clinical Problem:  71 y/o with history of bilateral foot paresthesias right more severe than left. Onset months ago. PMH: no endocrine disease. + bilateral knee replacement 2015 and 2016 left bunion surgery + neuromas of feet with years ago.  PE: reflexes absent, normal strength + edema both lowers    EMG SUMMARY TABLE RIGHT/LEFT LOWER       Spontaneous     MUAP   Recruitment    Insertional Activity Fibrillation Potential Positive Sharp Waves   Fasiculation High Frequency Potentials   Amp Duration PPP Pattern   Vastus Medialis L2-4 Femoral normal none none none none normal normal normal normal   Anterior Tibialis L4,5 Deep Peroneal normal none none none none normal normal normal normal   Gluteus Medius L4-S1 Superior Gut normal none none none none normal normal normal normal   Peroneus Longus L5-S1 Sup Peroneal normal none none none none normal normal normal normal   Posterior Tibialis L5-S1 Tibial normal none none none none normal normal normal normal   Medial Gastroc S1,2 Tibial normal none none none none normal normal normal normal   Extensor Hallicis X3-W5 Peroneal normal none none none none normal normal normal normal   Extensor Dig Brevis L5-S1 Peroneal normal none none none none normal normal normal normal   LS Paraspinals Posterior Rami       normal none none none none normal normal normal normal       Nerve Conduction Studies     Nerve Sensory Distal Latency (msec) Sensory Distal Latency (msec) Amp uv Amp uv Motor Distal Latency (msec) Motor Distal Latency (msec) Amp uv Amp uv Motor NCV (m/sec) Motor NCV (m/sec)    Right Left Right Left Right Left Right Left Right Left   Sural 4.2 (n<4.2) 4.2 (n<4.2) 3  5         Peroneal     3.5 (n<5.5) 3.6 (n<5.5) 2.3 4.4  40 (n>40) 45(n>40)   Above Knee         (n>40)

## 2020-06-22 RX ORDER — PRAVASTATIN SODIUM 10 MG
TABLET ORAL
Qty: 30 TABLET | Refills: 0 | Status: SHIPPED | OUTPATIENT
Start: 2020-06-22 | End: 2020-07-17

## 2020-07-06 RX ORDER — LOSARTAN POTASSIUM 100 MG/1
TABLET ORAL
Qty: 90 TABLET | Refills: 1 | Status: SHIPPED | OUTPATIENT
Start: 2020-07-06 | End: 2020-09-24 | Stop reason: SDUPTHER

## 2020-07-17 RX ORDER — PRAVASTATIN SODIUM 10 MG
TABLET ORAL
Qty: 30 TABLET | Refills: 0 | Status: SHIPPED | OUTPATIENT
Start: 2020-07-17 | End: 2020-08-14 | Stop reason: SDUPTHER

## 2020-07-17 NOTE — TELEPHONE ENCOUNTER
Requested Prescriptions     Pending Prescriptions Disp Refills    pravastatin (PRAVACHOL) 10 MG tablet [Pharmacy Med Name: PRAVASTATIN SODIUM 10 MG TAB] 30 tablet 0     Sig: TAKE 1 TABLET BY MOUTH EVERY DAY       LOV: 4/21/2020  FOV: 9/24/2020  Labs: 5/20/2020

## 2020-07-23 RX ORDER — PANTOPRAZOLE SODIUM 40 MG/1
TABLET, DELAYED RELEASE ORAL
Qty: 60 TABLET | Refills: 5 | Status: SHIPPED | OUTPATIENT
Start: 2020-07-23 | End: 2021-03-01 | Stop reason: SDUPTHER

## 2020-07-23 NOTE — TELEPHONE ENCOUNTER
Requested Prescriptions     Pending Prescriptions Disp Refills    pantoprazole (PROTONIX) 40 MG tablet [Pharmacy Med Name: PANTOPRAZOLE SOD DR 40 MG TAB] 60 tablet 5     Sig: TAKE 1 TABLET BY MOUTH TWICE A DAY       LOV:5/7/2020  FOV: 9/24/2020

## 2020-08-03 ENCOUNTER — TELEPHONE (OUTPATIENT)
Dept: INTERNAL MEDICINE CLINIC | Age: 65
End: 2020-08-03

## 2020-08-03 RX ORDER — CLOTRIMAZOLE AND BETAMETHASONE DIPROPIONATE 10; .64 MG/G; MG/G
CREAM TOPICAL
Qty: 45 G | Refills: 0 | Status: SHIPPED | OUTPATIENT
Start: 2020-08-03 | End: 2021-07-16 | Stop reason: SDUPTHER

## 2020-08-03 NOTE — TELEPHONE ENCOUNTER
Patient requesting a medication refill. Medication clotrimazole-betamethasone (LOTRISONE)  Dosage  1-0.05 % cream   FrequencyApply topically 2 times daily Apply topically 2 times daily.    Last [facility] 8/13/19  [id number] [address] - P [phone] - F [phone]

## 2020-08-14 NOTE — TELEPHONE ENCOUNTER
Requested Prescriptions     Pending Prescriptions Disp Refills    pravastatin (PRAVACHOL) 10 MG tablet 30 tablet 0     LOV:5/28/2020  FOV:9/24/2020

## 2020-08-16 RX ORDER — PRAVASTATIN SODIUM 10 MG
TABLET ORAL
Qty: 90 TABLET | Refills: 1 | Status: SHIPPED | OUTPATIENT
Start: 2020-08-16 | End: 2021-01-20

## 2020-08-17 DIAGNOSIS — I10 ESSENTIAL HYPERTENSION: ICD-10-CM

## 2020-08-17 DIAGNOSIS — E78.2 MIXED HYPERLIPIDEMIA: ICD-10-CM

## 2020-08-17 DIAGNOSIS — E55.9 VITAMIN D DEFICIENCY: ICD-10-CM

## 2020-08-17 LAB
A/G RATIO: 2.1 (ref 1.1–2.2)
ALBUMIN SERPL-MCNC: 4.1 G/DL (ref 3.4–5)
ALP BLD-CCNC: 84 U/L (ref 40–129)
ALT SERPL-CCNC: 29 U/L (ref 10–40)
ANION GAP SERPL CALCULATED.3IONS-SCNC: 13 MMOL/L (ref 3–16)
AST SERPL-CCNC: 21 U/L (ref 15–37)
BASOPHILS ABSOLUTE: 0 K/UL (ref 0–0.2)
BASOPHILS RELATIVE PERCENT: 0.8 %
BILIRUB SERPL-MCNC: 0.3 MG/DL (ref 0–1)
BUN BLDV-MCNC: 11 MG/DL (ref 7–20)
CALCIUM SERPL-MCNC: 9.4 MG/DL (ref 8.3–10.6)
CHLORIDE BLD-SCNC: 106 MMOL/L (ref 99–110)
CHOLESTEROL, FASTING: 144 MG/DL (ref 0–199)
CO2: 22 MMOL/L (ref 21–32)
CREAT SERPL-MCNC: 0.7 MG/DL (ref 0.6–1.2)
EOSINOPHILS ABSOLUTE: 0.1 K/UL (ref 0–0.6)
EOSINOPHILS RELATIVE PERCENT: 2.1 %
GFR AFRICAN AMERICAN: >60
GFR NON-AFRICAN AMERICAN: >60
GLOBULIN: 2 G/DL
GLUCOSE FASTING: 92 MG/DL (ref 70–99)
HCT VFR BLD CALC: 39.8 % (ref 36–48)
HDLC SERPL-MCNC: 53 MG/DL (ref 40–60)
HEMOGLOBIN: 13.5 G/DL (ref 12–16)
LDL CHOLESTEROL CALCULATED: 66 MG/DL
LYMPHOCYTES ABSOLUTE: 2.5 K/UL (ref 1–5.1)
LYMPHOCYTES RELATIVE PERCENT: 45.3 %
MCH RBC QN AUTO: 31.2 PG (ref 26–34)
MCHC RBC AUTO-ENTMCNC: 33.8 G/DL (ref 31–36)
MCV RBC AUTO: 92.3 FL (ref 80–100)
MONOCYTES ABSOLUTE: 0.4 K/UL (ref 0–1.3)
MONOCYTES RELATIVE PERCENT: 8.2 %
NEUTROPHILS ABSOLUTE: 2.4 K/UL (ref 1.7–7.7)
NEUTROPHILS RELATIVE PERCENT: 43.6 %
PDW BLD-RTO: 13.6 % (ref 12.4–15.4)
PLATELET # BLD: 246 K/UL (ref 135–450)
PMV BLD AUTO: 7.7 FL (ref 5–10.5)
POTASSIUM SERPL-SCNC: 4.5 MMOL/L (ref 3.5–5.1)
RBC # BLD: 4.31 M/UL (ref 4–5.2)
SODIUM BLD-SCNC: 141 MMOL/L (ref 136–145)
TOTAL PROTEIN: 6.1 G/DL (ref 6.4–8.2)
TRIGLYCERIDE, FASTING: 127 MG/DL (ref 0–150)
VLDLC SERPL CALC-MCNC: 25 MG/DL
WBC # BLD: 5.4 K/UL (ref 4–11)

## 2020-08-18 LAB
TSH SERPL DL<=0.05 MIU/L-ACNC: 2.59 UIU/ML (ref 0.27–4.2)
VITAMIN D 25-HYDROXY: 50.3 NG/ML

## 2020-08-27 RX ORDER — GABAPENTIN 100 MG/1
100 CAPSULE ORAL NIGHTLY
Qty: 28 CAPSULE | Refills: 3 | Status: SHIPPED | OUTPATIENT
Start: 2020-08-27 | End: 2021-01-08 | Stop reason: SDUPTHER

## 2020-09-22 RX ORDER — BUPROPION HYDROCHLORIDE 300 MG/1
TABLET ORAL
Qty: 30 TABLET | Refills: 3 | Status: SHIPPED | OUTPATIENT
Start: 2020-09-22 | End: 2021-02-18

## 2020-09-24 ENCOUNTER — HOSPITAL ENCOUNTER (OUTPATIENT)
Dept: GENERAL RADIOLOGY | Age: 65
Discharge: HOME | End: 2020-09-24
Payer: MEDICARE

## 2020-09-24 ENCOUNTER — OFFICE VISIT (OUTPATIENT)
Dept: INTERNAL MEDICINE CLINIC | Age: 65
End: 2020-09-24
Payer: MEDICARE

## 2020-09-24 ENCOUNTER — HOSPITAL ENCOUNTER (OUTPATIENT)
Age: 65
Discharge: HOME | End: 2020-09-24
Payer: MEDICARE

## 2020-09-24 VITALS
OXYGEN SATURATION: 96 % | HEART RATE: 97 BPM | DIASTOLIC BLOOD PRESSURE: 82 MMHG | BODY MASS INDEX: 44.46 KG/M2 | SYSTOLIC BLOOD PRESSURE: 122 MMHG | WEIGHT: 251 LBS | TEMPERATURE: 98.6 F

## 2020-09-24 PROBLEM — E66.01 MORBIDLY OBESE (HCC): Status: ACTIVE | Noted: 2020-09-24

## 2020-09-24 PROCEDURE — 1123F ACP DISCUSS/DSCN MKR DOCD: CPT | Performed by: INTERNAL MEDICINE

## 2020-09-24 PROCEDURE — 90694 VACC AIIV4 NO PRSRV 0.5ML IM: CPT | Performed by: INTERNAL MEDICINE

## 2020-09-24 PROCEDURE — 4040F PNEUMOC VAC/ADMIN/RCVD: CPT | Performed by: INTERNAL MEDICINE

## 2020-09-24 PROCEDURE — 3017F COLORECTAL CA SCREEN DOC REV: CPT | Performed by: INTERNAL MEDICINE

## 2020-09-24 PROCEDURE — G0008 ADMIN INFLUENZA VIRUS VAC: HCPCS | Performed by: INTERNAL MEDICINE

## 2020-09-24 PROCEDURE — G0009 ADMIN PNEUMOCOCCAL VACCINE: HCPCS | Performed by: INTERNAL MEDICINE

## 2020-09-24 PROCEDURE — 93000 ELECTROCARDIOGRAM COMPLETE: CPT | Performed by: INTERNAL MEDICINE

## 2020-09-24 PROCEDURE — 90670 PCV13 VACCINE IM: CPT | Performed by: INTERNAL MEDICINE

## 2020-09-24 PROCEDURE — 72100 X-RAY EXAM L-S SPINE 2/3 VWS: CPT

## 2020-09-24 PROCEDURE — G0402 INITIAL PREVENTIVE EXAM: HCPCS | Performed by: INTERNAL MEDICINE

## 2020-09-24 RX ORDER — LOSARTAN POTASSIUM 50 MG/1
50 TABLET ORAL DAILY
Qty: 90 TABLET | Refills: 1
Start: 2020-09-24 | End: 2021-03-03 | Stop reason: SDUPTHER

## 2020-09-24 ASSESSMENT — ENCOUNTER SYMPTOMS
TROUBLE SWALLOWING: 0
ABDOMINAL PAIN: 0
FACIAL SWELLING: 0
ABDOMINAL DISTENTION: 0
VOMITING: 0
BACK PAIN: 1
CONSTIPATION: 0
STRIDOR: 0
SORE THROAT: 0
BLOOD IN STOOL: 0
NAUSEA: 0
EYE REDNESS: 0
SINUS PRESSURE: 0
RECTAL PAIN: 0
ANAL BLEEDING: 0
CHEST TIGHTNESS: 0
PHOTOPHOBIA: 0
EYE DISCHARGE: 0
COLOR CHANGE: 0
RHINORRHEA: 0
EYE PAIN: 0
COUGH: 0
SHORTNESS OF BREATH: 0
WHEEZING: 0
EYE ITCHING: 0
APNEA: 0
VOICE CHANGE: 0
DIARRHEA: 0
CHOKING: 0

## 2020-09-24 ASSESSMENT — PATIENT HEALTH QUESTIONNAIRE - PHQ9
2. FEELING DOWN, DEPRESSED OR HOPELESS: 0
SUM OF ALL RESPONSES TO PHQ9 QUESTIONS 1 & 2: 0
SUM OF ALL RESPONSES TO PHQ QUESTIONS 1-9: 0
SUM OF ALL RESPONSES TO PHQ QUESTIONS 1-9: 0
1. LITTLE INTEREST OR PLEASURE IN DOING THINGS: 0

## 2020-09-24 NOTE — ASSESSMENT & PLAN NOTE
Reviewed diet and exercise  Check labs  Reviewed immunizations  Reviewed colonoscopy  Reviewed mammogram  Reviewed DEXA

## 2020-09-24 NOTE — PATIENT INSTRUCTIONS
diet is one that limits sodium , certain types of fat , and cholesterol . This type of diet is recommended for:   People with any form of cardiovascular disease (eg, coronary heart disease , peripheral vascular disease , previous heart attack , previous stroke )   People with risk factors for cardiovascular disease, such as high blood pressure , high cholesterol , or diabetes   Anyone who wants to lower their risk of developing cardiovascular disease   Sodium    Sodium is a mineral found in many foods. In general, most people consume much more sodium than they need. Diets high in sodium can increase blood pressure and lead to edema (water retention). On a heart-healthy diet, you should consume no more than 2,300 mg (milligrams) of sodium per dayabout the amount in one teaspoon of table salt. The foods highest in sodium include table salt (about 50% sodium), processed foods, convenience foods, and preserved foods. Cholesterol    Cholesterol is a fat-like, waxy substance in your blood. Our bodies make some cholesterol. It is also found in animal products, with the highest amounts in fatty meat, egg yolks, whole milk, cheese, shellfish, and organ meats. On a heart-healthy diet, you should limit your cholesterol intake to less than 200 mg per day. It is normal and important to have some cholesterol in your bloodstream. But too much cholesterol can cause plaque to build up within your arteries, which can eventually lead to a heart attack or stroke. The two types of cholesterol that are most commonly referred to are:   Low-density lipoprotein (LDL) cholesterol  Also known as bad cholesterol, this is the cholesterol that tends to build up along your arteries. Bad cholesterol levels are increased by eating fats that are saturated or hydrogenated. Optimal level of this cholesterol is less than 100. Over 130 starts to get risky for heart disease.    High-density lipoprotein (HDL) cholesterol  Also known as good cholesterol, this type of cholesterol actually carries cholesterol away from your arteries and may, therefore, help lower your risk of having a heart attack. You want this level to be high (ideally greater than 60). It is a risk to have a level less than 40. You can raise this good cholesterol by eating olive oil, canola oil, avocados, or nuts. Exercise raises this level, too. Fat    Fat is calorie dense and packs a lot of calories into a small amount of food. Even though fats should be limited due to their high calorie content, not all fats are bad. In fact, some fats are quite healthful. Fat can be broken down into four main types. The good-for-you fats are:   Monounsaturated fat  found in oils such as olive and canola, avocados, and nuts and natural nut butters; can decrease cholesterol levels, while keeping levels of HDL cholesterol high   Polyunsaturated fat  found in oils such as safflower, sunflower, soybean, corn, and sesame; can decrease total cholesterol and LDL cholesterol   Omega-3 fatty acids  particularly those found in fatty fish (such as salmon, trout, tuna, mackerel, herring, and sardines); can decrease risk of arrhythmias, decrease triglyceride levels, and slightly lower blood pressure   The fats that you want to limit are:   Saturated fat  found in animal products, many fast foods, and a few vegetables; increases total blood cholesterol, including LDL levels   Animal fats that are saturated include: butter, lard, whole-milk dairy products, meat fat, and poultry skin   Vegetable fats that are saturated include: hydrogenated shortening, palm oil, coconut oil, cocoa butter   Hydrogenated or trans fat  found in margarine and vegetable shortening, most shelf stable snack foods, and fried foods; increases LDL and decreases HDL     It is generally recommended that you limit your total fat for the day to less than 30% of your total calories.  If you follow an 1800-calorie heart healthy diet, for example, this would mean 60 grams of fat or less per day. Saturated fat and trans fat in your diet raises your blood cholesterol the most, much more than dietary cholesterol does. For this reason, on a heart-healthy diet, less than 7% of your calories should come from saturated fat and ideally 0% from trans fat. On an 1800-calorie diet, this translates into less than 14 grams of saturated fat per day, leaving 46 grams of fat to come from mono- and polyunsaturated fats.    Food Choices on a Heart Healthy Diet   Food Category   Foods Recommended   Foods to Avoid   Grains   Breads and rolls without salted tops Most dry and cooked cereals Unsalted crackers and breadsticks Low-sodium or homemade breadcrumbs or stuffing All rice and pastas   Breads, rolls, and crackers with salted tops High-fat baked goods (eg, muffins, donuts, pastries) Quick breads, self-rising flour, and biscuit mixes Regular bread crumbs Instant hot cereals Commercially prepared rice, pasta, or stuffing mixes   Vegetables   Most fresh, frozen, and low-sodium canned vegetables Low-sodium and salt-free vegetable juices Canned vegetables if unsalted or rinsed   Regular canned vegetables and juices, including sauerkraut and pickled vegetables Frozen vegetables with sauces Commercially prepared potato and vegetable mixes   Fruits   Most fresh, frozen, and canned fruits All fruit juices   Fruits processed with salt or sodium   Milk   Nonfat or low-fat (1%) milk Nonfat or low-fat yogurt Cottage cheese, low-fat ricotta, cheeses labeled as low-fat and low-sodium   Whole milk Reduced-fat (2%) milk Malted and chocolate milk Full fat yogurt Most cheeses (unless low-fat and low salt) Buttermilk (no more than 1 cup per week)   Meats and Beans   Lean cuts of fresh or frozen beef, veal, lamb, or pork (look for the word loin) Fresh or frozen poultry without the skin Fresh or frozen fish and some shellfish Egg whites and egg substitutes (Limit whole eggs to three per week) Tofu Nuts or seeds (unsalted, dry-roasted), low-sodium peanut butter Dried peas, beans, and lentils   Any smoked, cured, salted, or canned meat, fish, or poultry (including perkins, chipped beef, cold cuts, hot dogs, sausages, sardines, and anchovies) Poultry skins Breaded and/or fried fish or meats Canned peas, beans, and lentils Salted nuts   Fats and Oils   Olive oil and canola oil Low-sodium, low-fat salad dressings and mayonnaise   Butter, margarine, coconut and palm oils, perkins fat   Snacks, Sweets, and Condiments   Low-sodium or unsalted versions of broths, soups, soy sauce, and condiments Pepper, herbs, and spices; vinegar, lemon, or lime juice Low-fat frozen desserts (yogurt, sherbet, fruit bars) Sugar, cocoa powder, honey, syrup, jam, and preserves Low-fat, trans-fat free cookies, cakes, and pies Bo and animal crackers, fig bars, jos snaps   High-fat desserts Broth, soups, gravies, and sauces, made from instant mixes or other high-sodium ingredients Salted snack foods Canned olives Meat tenderizers, seasoning salt, and most flavored vinegars   Beverages   Low-sodium carbonated beverages Tea and coffee in moderation Soy milk   Commercially softened water   Suggestions   Make whole grains, fruits, and vegetables the base of your diet. Choose heart-healthy fats such as canola, olive, and flaxseed oil, and foods high in heart-healthy fats, such as nuts, seeds, soybeans, tofu, and fish. Eat fish at least twice per week; the fish highest in omega-3 fatty acids and lowest in mercury include salmon, herring, mackerel, sardines, and canned chunk light tuna. If you eat fish less than twice per week or have high triglycerides, talk to your doctor about taking fish oil supplements. Read food labels.    For products low in fat and cholesterol, look for fat free, low-fat, cholesterol free, saturated fat free, and trans fat freeAlso scan the Nutrition Facts Label, which lists saturated fat, trans fat, and cholesterol amounts. For products low in sodium, look for sodium free, very low sodium, low sodium, no added salt, and unsalted   Skip the salt when cooking or at the table; if food needs more flavor, get creative and try out different herbs and spices. Garlic and onion also add substantial flavor to foods. Trim any visible fat off meat and poultry before cooking, and drain the fat off after hooper. Use cooking methods that require little or no added fat, such as grilling, boiling, baking, poaching, broiling, roasting, steaming, stir-frying, and sauting. Avoid fast food and convenience food. They tend to be high in saturated and trans fat and have a lot of added salt. Talk to a registered dietitian for individualized diet advice. Last Reviewed: March 2011 Mallorie Maharaj MS, MPH, RD   Updated: 3/29/2011   ·     Keeping Home a [facility]       As we get older, changes in balance, gait, strength, vision, hearing, and cognition make even the most youthful senior more prone to accidents. Falls are one of the leading health risks for older people. This increased risk of falling is related to:   Aging process (eg, decreased muscle strength, slowed reflexes)   Higher incidence of chronic health problems (eg, arthritis, diabetes) that may limit mobility, agility or sensory awareness   Side effects of medicine (eg, dizziness, blurred vision)especially medicines like prescription pain medicines and drugs used to treat mental health conditions   Depending on the brittleness of your bones, the consequences of a fall can be serious and long lasting. Home Life   Research by the Association of Aging [facility]) shows that some home accidents among older adults can be prevented by making simple lifestyle changes and basic modifications and repairs to the home environment. Here are some lifestyle changes that experts recommend:   Have your hearing and vision checked regularly.  Be sure to wear prescription glasses that are right for you. Speak to your doctor or pharmacist about the possible side effects of your medicines. A number of medicines can cause dizziness. If you have problems with sleep, talk to your doctor. Limit your intake of alcohol. If necessary, use a cane or walker to help maintain your balance. Wear supportive, rubber-soled shoes, even at home. If you live in a region that gets wintry weather, you may want to put special cleats on your shoes to prevent you from slipping on the snow and ice. Exercise regularly to help maintain muscle tone, agility, and balance. Always hold the banister when going up or down stairs. Also, use  bars when getting in or out of the bath or shower, or using the toilet. To avoid dizziness, get up slowly from a lying down position. Sit up first, dangling your legs for a minute or two before rising to a standing position. Overall Home Safety Check   According to the Consumer Product Safety Commision's \"Older Consumer Home Safety Checklist,\" it is important to check for potential hazards in each room. And remember, proper lighting is an essential factor in home safety. If you cannot see clearly, you are more likely to fall. Important questions to ask yourself include:   Are lamp, electric, extension, and telephone cords placed out of the flow of traffic and maintained in good condition? Have frayed cords been replaced? Are all small rugs and runners slip resistant? If not, you can secure them to the floor with a special double-sided carpet tape. Are smoke detectors properly locatedone on every floor of your home and one outside of every sleeping area? Are they in good working order? Are batteries replaced at least once a year? Do you have a well-maintained carbon monoxide detector outside every sleeping are in your home? Does your furniture layout leave plenty of space to maneuver between and around chairs, tables, beds, and sofas?    Are hallways, stairs and passages between rooms well lit? Can you reach a lamp without getting out of bed? Are floor surfaces well maintained? Shag rugs, high-pile carpeting, tile floors, and polished wood floors can be particularly slippery. Stairs should always have handrails and be carpeted or fitted with a non-skid tread. Is your telephone easily reachable. Is the cord safely tucked away? Room by Room   According to the Association of Aging, bathrooms and yenni are the two most potentially hazardous rooms in your home. In the Kitchen    Be sure your stove is in proper working order and always make sure burners and the oven are off before you go out or go to sleep. Keep pots on the back burners, turn handles away from the front of the stove, and keep stove clean and free of grease build-up. Kitchen ventilation systems and range exhausts should be working properly. Keep flammable objects such as towels and pot holders away from the cooking area except when in use. Make sure kitchen curtains are tied back. Move cords and appliances away from the sink and hot surfaces. If extension cords are needed, install wiring guides so they do not hang over the sink, range, or working areas. Look for coffee pots, kettles and toaster ovens with automatic shut-offs. Keep a mop handy in the kitchen so you can wipe up spills instantly. You should also have a small fire extinguisher. Arrange your kitchen with frequently used items on lower shelves to avoid the need to stand on a stepstool to reach them. Make sure countertops are well-lit to avoid injuries while cutting and preparing food. In the Bathroom    Use a non-slip mat or decals in the tub and shower, since wet, soapy tile or porcelain surfaces are extremely slippery. Make sure bathroom rugs are non-skid or tape them firmly to the floor. Bathtubs should have at least one, preferably two, grab bars, firmly attached to structural supports in the wall.  (Do not use built-in soap holders or glass shower doors as grab bars.)    Tub seats fitted with non-slip material on the legs allow you to wash sitting down. For people with limited mobility, bathtub transfer benches allow you to slide safely into the tub. Raised toilet seats and toilet safety rails are helpful for those with knee or hip problems. In the [facility]    Make sure you use a nightlight and that the area around your bed is clear of potential obstacles. Be careful with electric blankets and never go to sleep with a heating pad, which can cause serious burns even if on a low setting. Use fire-resistant mattress covers and pillows, and NEVER smoke in bed. Keep a phone next to the bed that is programmed to dial 911 at the push of a button. If you have a chronic condition, you may want to sign on with an automatic call-in service. Typically the system includes a small pendant that connects directly to an emergency medical voice-response system. You should also make arrangements to stay in contact with someonefriend, neighbor, family memberon a regular schedule. Fire Prevention   According to the [employer] (Smoke Alarms for Every) [address] [place] [address], senior citizens are one of the two highest risk groups for death and serious injuries due to residential fires. When cooking, wear short-sleeved items, never a bulky long-sleeved robe. The [facility]'s Safety Checklist for Older Consumers emphasizes the importance of checking basements, garages, workshops and storage areas for fire hazards, such as volatile liquids, piles of old rags or clothing and overloaded circuits. Never smoke in bed or when lying down on a couch or recliner chair. Small portable electric or kerosene heaters are responsible for many home fires and should be used cautiously if at all. If you do use one, be sure to keep them away from flammable materials.     In case of fire, make sure you have a pre-established treatments at the end of your life if you become unable to speak for yourself. Living contreras tell doctors to use or not use treatments that would keep you alive. You must have one or two witnesses or a notary present when you sign this form. A living will may be called something else in your state. Consider a medical power of . This form allows you to name a person to make decisions about your care if you are not able to. Most people ask a close friend or family member. Talk to this person about the kinds of treatments you want and those that you do not want. Make sure this person understands your wishes. A medical power of  may be called something else in your state. These legal papers are simple to change. Tell your doctor what you want to change, and ask him or her to make a note in your medical file. Give your family updated copies of the papers. Where can you learn more? Go to https://[email or web address] org and sign in to your Artsy account. Enter P184 in the Edvert box to learn more about \"Advance Care Planning: Care Instructions. \"     If you do not have an account, please click on the \"Sign Up Now\" link. Current as of: December 9, 2019               Content Version: 12.5  © 9534-6318 Healthwise, Incorporated. Care instructions adapted under license by [facility] ([facility]). If you have questions about a medical condition or this instruction, always ask your healthcare professional. [place] [address] any warranty or liability for your use of this information. ·        Learning About Living Dwight Peña  What is a living will? A living will, also called a declaration, is a legal form. It tells your family and your doctor your wishes when you can't speak for yourself. It's used by the health professionals who will treat you as you near the end of your life or if you get seriously hurt or ill.   If you put your wishes in writing, your loved ones and your wishes even if you have a form from a different state. You might use a universal form that has been approved by many states. This kind of form can sometimes be filled out and stored online. Your digital copy will then be available wherever you have a connection to the internet. The doctors and nurses who need to treat you can find it right away. Your state may offer an online registry. This is another place where you can store your living will online. It's a good idea to get your living will notarized. This means using a person called a Doctor At Work to watch two people sign, or witness, your living will. What should you know when you create a living will? Here are some questions to ask yourself as you make your living will:  Do you know enough about life support methods that might be used? If not, talk to your doctor so you know what might be done if you can't breathe on your own, your heart stops, or you can't swallow. What things would you still want to be able to do after you receive life-support methods? Would you want to be able to walk? To speak? To eat on your own? To live without the help of machines? Do you want certain Christian practices performed if you become very ill? If you have a choice, where do you want to be cared for? In your home? At a hospital or nursing home? If you have a choice at the end of your life, where would you prefer to die? At home? In a hospital or nursing home? Somewhere else? Would you prefer to be buried or cremated? Do you want your organs to be donated after you die? What should you do with your living will? Make sure that your family members and your health care agent have copies of your living will (also called a declaration). Give your doctor a copy of your living will. Ask him or her to keep it as part of your medical record. If you have more than one doctor, make sure that each one has a copy.   Put a copy of your living will where it can be easily found. For example, some people may put a copy on their refrigerator door. If you are using a digital copy, be sure your doctor, family members, and health care agent know how to find and access it. Where can you learn more? Go to https://chratna.[email or web address]. org and sign in to your Lifeables account. Enter T061 in the [facility] box to learn more about \"Learning About Living Perroy. \"     If you do not have an account, please click on the \"Sign Up Now\" link. Current as of: December 9, 2019               Content Version: 12.5  © 9508-7630 [employer]. Care instructions adapted under license by [facility] ([facility]). If you have questions about a medical condition or this instruction, always ask your healthcare professional. [address] any warranty or liability for your use of this information. ·        Learning About Medical Power of   What is a medical power of ? A medical power of , also called a durable power of  for health care, is one type of the legal forms called advance directives. It lets you name the person you want to make treatment decisions for you if you can't speak or decide for yourself. The person you choose is called your health care agent. This person is also called a health care proxy or health care surrogate. A medical power of  may be called something else in your state. How do you choose a health care agent? Choose your health care agent carefully. This person may or may not be a family member. Talk to the person before you make your final decision. Make sure he or she is comfortable with this responsibility. It's a good idea to choose someone who:  Is at least 25years old. Knows you well and understands what makes life meaningful for you. Understands your Worship and moral values. Will do what you want, not what he or she wants.   Will be able to make difficult choices at a stressful time.  Will be able to refuse or stop treatment, if that is what you would want, even if you could die. Will be firm and confident with health professionals if needed. Will ask questions to get needed information. Lives near you or agrees to travel to you if needed. Your family may help you make medical decisions while you can still be part of that process. But it's important to choose one person to be your health care agent in case you aren't able to make decisions for yourself. If you don't fill out the legal form and name a health care agent, the decisions your family can make may be limited. A health care agent may be called something else in your state. Who will make decisions for you if you don't have a health care agent? If you don't have a health care agent or a living will, you may not get the care you want. Decisions may be made by family members who disagree about your medical care. Or decisions may be made by a medical professional who doesn't know you well. In some cases, a  makes the decisions. When you name a health care agent, it is very clear who has the power to make health decisions for you. How do you name a health care agent? You name your health care agent on a legal form. This form is usually called a medical power of . Ask your hospital, state bar association, or office on aging where to find these forms. You must sign the form to make it legal. Some states require you to get the form notarized. This means that a person called a  watches you sign the form and then he or she signs the form. Some states also require that two or more witnesses sign the form. Be sure to tell your family members and doctors who your health care agent is. Where can you learn more? Go to https://chlamineweb.Liibook. org and sign in to your Moondo account. Enter [id number] in the iwi box to learn more about \"Learning About [address]. \" If you do not have an account, please click on the \"Sign Up Now\" link. Current as of: December 9, 2019               Content Version: 12.5  © 2643-1392 Healthwise, Incorporated. Care instructions adapted under license by [facility] ([facility]). If you have questions about a medical condition or this instruction, always ask your healthcare professional. [address] any warranty or liability for your use of this information.     ·

## 2020-09-24 NOTE — PROGRESS NOTES
Subjective:      Patient ID: Gisela Cain is a 72 y.o. female. Chief Complaint   Patient presents with    Medicare AWV       HPI  Patient is here for AWV. Review of Systems   Constitutional: Negative for activity change, appetite change, chills, diaphoresis, fatigue, fever and unexpected weight change. HENT: Negative for congestion, dental problem, drooling, ear discharge, ear pain, facial swelling, hearing loss, mouth sores, nosebleeds, postnasal drip, rhinorrhea, sinus pressure, sneezing, sore throat, tinnitus, trouble swallowing and voice change. Eyes: Negative for photophobia, pain, discharge, redness, itching and visual disturbance. Respiratory: Negative for apnea, cough, choking, chest tightness, shortness of breath, wheezing and stridor. Cardiovascular: Negative for chest pain, palpitations and leg swelling. Gastrointestinal: Negative for abdominal distention, abdominal pain, anal bleeding, blood in stool, constipation, diarrhea, nausea, rectal pain and vomiting. Genitourinary: Negative for decreased urine volume, difficulty urinating, dyspareunia, dysuria, enuresis, flank pain, frequency, genital sores, hematuria, menstrual problem, pelvic pain, urgency, vaginal bleeding, vaginal discharge and vaginal pain. Musculoskeletal: Positive for back pain. Negative for arthralgias, gait problem, joint swelling, myalgias, neck pain and neck stiffness. Constant back pain  Help with Tylenol   Skin: Negative for color change, pallor, rash and wound. Neurological: Negative for dizziness, tremors, seizures, syncope, facial asymmetry, speech difficulty, weakness, light-headedness, numbness and headaches. Hematological: Negative for adenopathy. Does not bruise/bleed easily. Objective:   Physical Exam  Constitutional:       General: She is awake. Appearance: She is well-developed. HENT:      Head: Normocephalic and atraumatic.       Right Ear: Tympanic membrane and ear canal Encounter. Allergies   Allergen Reactions    Latex Rash    Reglan [Metoclopramide Hcl] Rash    Univasc [Moexipril]     Celecoxib Palpitations    Keflex [Cephalexin] Diarrhea    Lipitor     Metoclopramide Anxiety    Prochlorperazine Anxiety    Prochlorperazine Edisylate     Sulfa Antibiotics Nausea And Vomiting    Vioxx          Prior to Visit Medications    Medication Sig Taking? Authorizing Provider   losartan (COZAAR) 50 MG tablet Take 1 tablet by mouth daily Yes Jose Chavez MD   buPROPion (WELLBUTRIN XL) 300 MG extended release tablet TAKE 1 TABLET BY MOUTH EVERY DAY IN THE MORNING Yes Jose Chavez MD   gabapentin (NEURONTIN) 100 MG capsule Take 1 capsule by mouth nightly for 28 days. Yes Jose Chavez MD   pravastatin (PRAVACHOL) 10 MG tablet TAKE 1 TABLET BY MOUTH EVERY DAY Yes Jose Chavez MD   clotrimazole-betamethasone (LOTRISONE) 1-0.05 % cream Apply topically 2 times daily. Yes Lum Lias, APRN - CNP   pantoprazole (PROTONIX) 40 MG tablet TAKE 1 TABLET BY MOUTH TWICE A DAY Yes Jose Chavez MD   montelukast (SINGULAIR) 10 MG tablet TAKE 1 TABLET BY MOUTH NIGHTLY Yes Jose Chavez MD   acetaminophen (TYLENOL) 500 MG tablet Take 500 mg by mouth every 6 hours as needed for Pain Yes Historical Provider, MD   verapamil (CALAN SR) 120 MG extended release tablet TAKE 1 TABLET BY MOUTH EVERY DAY  Patient taking differently: nightly Do not crush or chew. Yes Blaise Rush MD   clotrimazole-betamethasone (LOTRISONE) 1-0.05 % cream Apply topically 2 times daily Apply topically 2 times daily.  Yes Jose Chavez MD   metoprolol succinate (TOPROL XL) 25 MG extended release tablet TAKE 1 TABLET BY MOUTH EVERY DAY Yes Jose Chavez MD   Omega-3 Fatty Acids (FISH OIL) 1200 MG CAPS Take 1,200 mg by mouth 2 times daily Yes Historical Provider, MD   albuterol sulfate HFA (PROVENTIL HFA) 108 (90 BASE) MCG/ACT inhaler Inhale 2 puffs into the lungs every 6 hours as needed for Wheezing Yes Ashlyn Garcia, APRN - CNP   Multiple Vitamins-Minerals (CENTRUM SILVER PO) Take 1 tablet by mouth daily  Yes Historical Provider, MD         Past Medical History:   Diagnosis Date    Allergic rhinitis     Anxiety     Carpal tunnel syndrome     Cervicalgia     Chronic back pain     low    Depression     GERD (gastroesophageal reflux disease)     Headache(784.0)     hemiplegic migraines    Hernia hiatal     Histoplasmosis     Hyperlipidemia     Hypertension     Mitral (valve) prolapse     Obesity     Osteoarthritis     multiple joints    Shingles     Sleep apnea     CPAP set of 4       Past Surgical History:   Procedure Laterality Date    BUNIONECTOMY      right     COLONOSCOPY      KNEE ARTHROSCOPY      KNEE CARTILAGE SURGERY      LOBECTOMY      partial right lung lobectomy    LUNG BIOPSY      PARATHYROID GLAND SURGERY      PARTIAL HYSTERECTOMY      SINUS SURGERY      TOTAL KNEE ARTHROPLASTY Bilateral 03/03/2015         Family History   Problem Relation Age of Onset    High Blood Pressure Mother     Heart Disease Mother     Cancer Mother         vaginal    High Blood Pressure Father     Heart Disease Father     Heart Disease Sister     Colon Cancer Sister     Heart Disease Brother     High Blood Pressure Brother     Cancer Brother         oral    Breast Cancer Sister     COPD Sister     Heart Disease Brother     Hearing Loss Brother         chf and transplant    High Blood Pressure Brother        CareTeam (Including outside providers/suppliers regularly involved in providing care):   Patient Care Team:  Cayetano Thornton MD as PCP - General (Internal Medicine)  Cayetano Thornton MD as PCP - [facility] Empaneled Provider    Wt Readings from Last 3 Encounters:   09/24/20 251 lb (113.9 kg)   05/19/20 255 lb (115.7 kg)   02/24/20 255 lb (115.7 kg)     Vitals:    09/24/20 1351   BP: 122/82   Site: Left Upper Arm   Position: Sitting Cuff Size: Large Adult   Pulse: 97   Temp: 98.6 °F (37 °C)   SpO2: 96%   Weight: 251 lb (113.9 kg)     Body mass index is 44.46 kg/m². Based upon direct observation of the patient, evaluation of cognition reveals recent and remote memory intact. Patient's complete Health Risk Assessment and screening values have been reviewed and are found in Flowsheets. The following problems were reviewed today and where indicated follow up appointments were made and/or referrals ordered. Positive Risk Factor Screenings with Interventions:     Health Habits/Nutrition:  Health Habits/Nutrition  Do you exercise for at least 20 minutes 2-3 times per week?: Yes  Have you lost any weight without trying in the past 3 months?: No  Do you eat fewer than 2 meals per day?: No  Have you seen a dentist within the past year?: Yes  Body mass index is 44.46 kg/m². Health Habits/Nutrition Interventions:  · Nutritional issues:  educational materials for healthy, well-balanced diet provided    ADL:  ADLs  In the past 7 days, did you need help from others to perform any of the following everyday activities? Eating, dressing, grooming, bathing, toileting, or walking/balance?: None  In the past 7 days, did you need help from others to take care of any of the following?  Laundry, housekeeping, banking/finances, shopping, telephone use, food preparation, transportation, or taking medications?: (!) Taking Medications  ADL Interventions:  · Patient declines any further evaluation/treatment for this issue    Personalized Preventive Plan   Current Health Maintenance Status  Immunization History   Administered Date(s) Administered    Influenza 11/05/2013    Influenza Virus Vaccine 09/21/2009, 11/11/2014, 11/19/2015    Influenza, High Dose (Fluzone 65 yrs and older) 09/26/2017    Influenza, Dock Race, IM, (6 mo and older Fluzone, Flulaval, Fluarix and 3 yrs and older Afluria) 11/10/2006, 10/23/2007, 11/11/2008, 09/21/2009, 10/10/2016    Influenza, Quadv, IM, PF (6 mo and older Fluzone, Flulaval, Fluarix, and 3 yrs and older Afluria) 11/01/2018    Influenza, Quadv, adjuvanted, 65 yrs +, IM, PF (Fluad) 09/24/2020    Influenza, Triv, inactivated, subunit, adjuvanted, IM (Fluad 65 yrs and older) 11/11/2019    Pneumococcal Conjugate 13-valent ([id number]) 09/24/2020    Pneumococcal Polysaccharide ([id number]) 03/04/2015    Tdap (Boostrix, Adacel) 11/05/2013        Health Maintenance   Topic Date Due    Cervical cancer screen  02/16/2018    Breast cancer screen  01/21/2020    Annual Wellness Visit (AWV)  02/24/2020    Shingles Vaccine (1 of 2) 04/06/2021 (Originally 2/9/2005)    Statin Therapy  08/16/2021    Lipid screen  08/17/2021    Potassium monitoring  08/17/2021    Creatinine monitoring  08/17/2021    Pneumococcal 65+ years Vaccine (2 of 2 - PPSV23) 09/24/2021    DTaP/Tdap/Td vaccine (2 - Td) 11/05/2023    Colon cancer screen colonoscopy  10/09/2028    DEXA (modify frequency per FRAX score)  Completed    Flu vaccine  Completed    Hepatitis C screen  Completed    HIV screen  Completed    Hepatitis A vaccine  Aged Out    Hepatitis B vaccine  Aged Out    Hib vaccine  Aged Out    Meningococcal (ACWY) vaccine  Aged Out     Recommendations for Social Pulse Due: see orders and patient instructions/AVS.  . Recommended screening schedule for the next 5-10 years is provided to the patient in written form: see Patient Instructions/AVS.    Jt Hearn was seen today for medicare aw. Diagnoses and all orders for this visit:    Routine general medical examination at a health care facility    Essential hypertension  -     EKG 12 Lead  -     losartan (COZAAR) 50 MG tablet; Take 1 tablet by mouth daily  -     Blood Occult Stool Screen #1; Future  -     Blood Occult Stool Screen #2; Future  -     Blood Occult Stool Screen #3; Future  -     CBC Auto Differential; Future  -     Comprehensive Metabolic Panel;  Future  -     Lipid Panel; Future  -     TSH without Reflex; Future  -     Urinalysis with Microscopic; Future  -     Vitamin D 25 Hydroxy; Future    Flu vaccine need  -     INFLUENZA, QUADV, ADJUVANTED, 72 YRS =, IM, PF, PREFILL SYR, 0.5ML (FLUAD)    Screening for breast cancer  -     [facility] NICOLE DIGITAL SCREEN BILATERAL; Future    Major [facility] with single episode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  -     Blood Occult Stool Screen #1; Future  -     Blood Occult Stool Screen #2; Future  -     Blood Occult Stool Screen #3; Future  -     CBC Auto Differential; Future  -     Comprehensive Metabolic Panel; Future  -     Lipid Panel; Future  -     TSH without Reflex; Future  -     Urinalysis with Microscopic; Future  -     Vitamin D 25 Hydroxy; Future    Morbidly obese ([facility] [address]    Encounter for screening mammogram for malignant neoplasm of breast   -     [facility] NICOLE DIGITAL SCREEN BILATERAL; Future    Disorder of bone, unspecified   -     Vitamin D 25 Hydroxy; Future    Chronic bilateral low back pain without sciatica  -     XR LUMBAR SPINE (2-3 VIEWS);  Future    Need for 23-polyvalent pneumococcal polysaccharide vaccine  -     PREVNAR 13 IM (Pneumococcal conjugate vaccine 13-valent)

## 2020-09-24 NOTE — LETTER
[facility] [address]  [address] [id number]  Phone: [phone]  Fax: [phone]    Roman Beltrán MD        September 24, 2020     Patient: Song Robison   YOB: 1955   Date of Visit: 9/24/2020       To Whom It May Concern: It is my medical opinion that Gilda Aguirre requires a disability parking placard for the following reasons:  She cannot walk 200 feet without stopping to rest.  Duration of need: 5 years    If you have any questions or concerns, please don't hesitate to call.     Sincerely,          Roman Beltrán MD

## 2020-09-28 ENCOUNTER — TELEPHONE (OUTPATIENT)
Dept: ADMINISTRATIVE | Age: 65
End: 2020-09-28

## 2020-09-30 ENCOUNTER — TELEPHONE (OUTPATIENT)
Dept: INTERNAL MEDICINE CLINIC | Age: 65
End: 2020-09-30

## 2020-09-30 DIAGNOSIS — F32.4 MAJOR DEPRESSIVE DISORDER WITH SINGLE EPISODE, IN PARTIAL REMISSION (HCC): ICD-10-CM

## 2020-09-30 DIAGNOSIS — I10 ESSENTIAL HYPERTENSION: ICD-10-CM

## 2020-09-30 LAB
HEMOCCULT SP2 STL QL: NORMAL
HEMOCCULT SP3 STL QL: NORMAL
OCCULT BLOOD SCREENING: NORMAL

## 2020-09-30 RX ORDER — LIDOCAINE 50 MG/G
1 PATCH TOPICAL DAILY
Qty: 30 PATCH | Refills: 0 | Status: SHIPPED | OUTPATIENT
Start: 2020-09-30 | End: 2020-10-30

## 2020-10-06 ENCOUNTER — E-VISIT (OUTPATIENT)
Dept: INTERNAL MEDICINE CLINIC | Age: 65
End: 2020-10-06

## 2020-10-06 PROCEDURE — 99999 PR OFFICE/OUTPT VISIT,PROCEDURE ONLY: CPT | Performed by: NURSE PRACTITIONER

## 2020-10-07 ENCOUNTER — OFFICE VISIT (OUTPATIENT)
Dept: INTERNAL MEDICINE CLINIC | Age: 65
End: 2020-10-07

## 2020-10-07 VITALS — TEMPERATURE: 97.7 F

## 2020-10-07 PROCEDURE — 99999 PR OFFICE/OUTPT VISIT,PROCEDURE ONLY: CPT | Performed by: DIETITIAN, REGISTERED

## 2020-10-07 NOTE — PROGRESS NOTES
Medical Nutrition Therapy Follow Up  Patient Care Team:  LISA Soliman CNP as PCP - General (Nurse Practitioner)  Jose Adams MD as PCP - [facility] Empaneled Provider    Reason for visit: f/u for Weight Concerns  Patient self-assessment of progress: \"I've lost 4 lb in the last month. Prior to that, I had gained weight after our visit\"        ASSESSMENT/PLAN:   NUTRITION DIAGNOSIS    #1 Problem: Overweight/Obesity (NC-3.3)  Related to: Excessive energy intake or physical inactivity  As Evidenced by: BMI more than normative standard for age and sex (BMI=44.46)    NUTRITION INTERVENTION  Nutrition Prescription: Plan meals to follow Plate Guide, aiming for three servings of vegetables, fruits, and whole grains daily. Interventions:  MyPlate  Hunger monitoring      NUTRITION MONITORING AND EVALUATION  5=always  4 = most of the time   3 = half the days  2 = sometimes  1 = hasn't started  Indicator/Goal Criteria   #1  Follow Plate Guide  #1 3/5   #2  Hunger monitoring #2 1/5   #3  Exercise at home  #3 3 days per week          Patient Active Problem List   Diagnosis    Vitamin D deficiency    Generalized osteoarthrosis, involving multiple sites    Chronic low back pain    Hemiplegic migraine    Allergic rhinitis    GERD (gastroesophageal reflux disease)    Degenerative disc disease, lumbar    Essential hypertension    Major depressive disorder with single episode, 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HCC)    Mixed hyperlipidemia    Rosacea    Shingles (herpes zoster) polyneuropathy    Palpitations    Routine general medical examination at a health care facility    Atrial tachycardia ([facility] [address]    Paresthesia    Peripheral vascular disease (HCC)    Chest pain    Morbidly obese (HCC)       Current Outpatient Medications   Medication Sig Dispense Refill    lidocaine (LIDODERM) 5 % Place 1 patch onto the skin daily 12 hours on, 12 hours off.  30 patch 0    losartan (COZAAR) 50 MG tablet Take 1 tablet by mouth daily 90 tablet 1    buPROPion (WELLBUTRIN XL) 300 MG extended release tablet TAKE 1 TABLET BY MOUTH EVERY DAY IN THE MORNING 30 tablet 3    gabapentin (NEURONTIN) 100 MG capsule Take 1 capsule by mouth nightly for 28 days. 28 capsule 3    pravastatin (PRAVACHOL) 10 MG tablet TAKE 1 TABLET BY MOUTH EVERY DAY 90 tablet 1    clotrimazole-betamethasone (LOTRISONE) 1-0.05 % cream Apply topically 2 times daily. 45 g 0    pantoprazole (PROTONIX) 40 MG tablet TAKE 1 TABLET BY MOUTH TWICE A DAY 60 tablet 5    montelukast (SINGULAIR) 10 MG tablet TAKE 1 TABLET BY MOUTH NIGHTLY 90 tablet 1    acetaminophen (TYLENOL) 500 MG tablet Take 500 mg by mouth every 6 hours as needed for Pain      verapamil (CALAN SR) 120 MG extended release tablet TAKE 1 TABLET BY MOUTH EVERY DAY (Patient taking differently: nightly Do not crush or chew.) 90 tablet 4    clotrimazole-betamethasone (LOTRISONE) 1-0.05 % cream Apply topically 2 times daily Apply topically 2 times daily. 45 g 1    metoprolol succinate (TOPROL XL) 25 MG extended release tablet TAKE 1 TABLET BY MOUTH EVERY DAY 90 tablet 0    Omega-3 Fatty Acids (FISH OIL) 1200 MG CAPS Take 1,200 mg by mouth 2 times daily      albuterol sulfate HFA (PROVENTIL HFA) 108 (90 BASE) MCG/ACT inhaler Inhale 2 puffs into the lungs every 6 hours as needed for Wheezing 3 Inhaler 1    Multiple Vitamins-Minerals (CENTRUM SILVER PO) Take 1 tablet by mouth daily        No current facility-administered medications for this visit. NUTRITION RE-ASSESSMENT    Anthropometric Measurement Changes:     Wt:   Wt Readings from Last 3 Encounters:   09/24/20 251 lb (113.9 kg)   05/19/20 255 lb (115.7 kg)   02/24/20 255 lb (115.7 kg)       BMI:   BMI Readings from Last 3 Encounters:   09/24/20 44.46 kg/m²   05/19/20 45.17 kg/m²   02/24/20 45.17 kg/m²     Weight Change:Yes down 6 lb from high of 257    Food and Nutrition Changes:     What foods are eaten: \"watching carbs\"  Portion sizes: smaller serving of meat/starch  B: 1 sausage juma with 2 toast and spray butter and jelly  OR eggs with cheese and toast OR 2 egg omelet with veggies  OR oatmeal (1 cup cooked) with cinnamon sugar and spray butter  L fruit (peach) and popcorn  D: chicken or fish with a starch and a veggie or a salad  S: popcorn or fruit       Physical Activity Changes:   Current Activity: 3 days per week: elliptical for 25 minutes, stretching on exercise ball and resistance bands (50 minutes total)    Barriers:   High stress level in home life    Follow Up Plan: declined    Referring Provider: LISA Forman CNP   Time spent with patient: 45 minutes

## 2020-10-24 PROBLEM — Z00.00 ROUTINE GENERAL MEDICAL EXAMINATION AT A HEALTH CARE FACILITY: Status: RESOLVED | Noted: 2019-11-11 | Resolved: 2020-10-24

## 2020-11-11 ENCOUNTER — HOSPITAL ENCOUNTER (OUTPATIENT)
Dept: MAMMOGRAPHY | Age: 65
Discharge: HOME | End: 2020-11-11
Payer: MEDICARE

## 2020-11-11 PROCEDURE — 77063 BREAST TOMOSYNTHESIS BI: CPT

## 2020-11-20 ENCOUNTER — TELEPHONE (OUTPATIENT)
Dept: INTERNAL MEDICINE CLINIC | Age: 65
End: 2020-11-20

## 2020-11-20 NOTE — TELEPHONE ENCOUNTER
[facility] received a call from:    Name of Caller: Manjit Nair    Relationship to patient: N/A    Organization name: [employer] contact number: [phone]    Reason for call: Manjit Nair calling in for chart notes from pt from 02/01/2020-Present. C-PAP supplier.  Fax to [phone]

## 2020-12-31 ENCOUNTER — OFFICE VISIT (OUTPATIENT)
Dept: FAMILY MEDICINE CLINIC | Age: 65
End: 2020-12-31
Payer: MEDICARE

## 2020-12-31 VITALS
BODY MASS INDEX: 44.83 KG/M2 | OXYGEN SATURATION: 98 % | HEART RATE: 58 BPM | DIASTOLIC BLOOD PRESSURE: 80 MMHG | HEIGHT: 63 IN | SYSTOLIC BLOOD PRESSURE: 108 MMHG | TEMPERATURE: 97.3 F | WEIGHT: 253 LBS

## 2020-12-31 PROBLEM — R10.13 EPIGASTRIC DISCOMFORT: Status: ACTIVE | Noted: 2020-12-31

## 2020-12-31 PROCEDURE — 1036F TOBACCO NON-USER: CPT | Performed by: INTERNAL MEDICINE

## 2020-12-31 PROCEDURE — 4040F PNEUMOC VAC/ADMIN/RCVD: CPT | Performed by: INTERNAL MEDICINE

## 2020-12-31 PROCEDURE — 3017F COLORECTAL CA SCREEN DOC REV: CPT | Performed by: INTERNAL MEDICINE

## 2020-12-31 PROCEDURE — G8484 FLU IMMUNIZE NO ADMIN: HCPCS | Performed by: INTERNAL MEDICINE

## 2020-12-31 PROCEDURE — G8427 DOCREV CUR MEDS BY ELIG CLIN: HCPCS | Performed by: INTERNAL MEDICINE

## 2020-12-31 PROCEDURE — G8399 PT W/DXA RESULTS DOCUMENT: HCPCS | Performed by: INTERNAL MEDICINE

## 2020-12-31 PROCEDURE — 99214 OFFICE O/P EST MOD 30 MIN: CPT | Performed by: INTERNAL MEDICINE

## 2020-12-31 PROCEDURE — 1090F PRES/ABSN URINE INCON ASSESS: CPT | Performed by: INTERNAL MEDICINE

## 2020-12-31 PROCEDURE — 1123F ACP DISCUSS/DSCN MKR DOCD: CPT | Performed by: INTERNAL MEDICINE

## 2020-12-31 PROCEDURE — G8417 CALC BMI ABV UP PARAM F/U: HCPCS | Performed by: INTERNAL MEDICINE

## 2020-12-31 NOTE — PATIENT INSTRUCTIONS
Please try either Claritin 10 mg or Flonase nasal spray Flonase. Please call the office (and ask to see me) or be seen by other provider for any worsening or lack of improvement of the symptoms or for any new symptoms as soon as possible. Please make sure to schedule your follow appointment as discussed. Please let me know if ay further questions. Please let me know if there are any symptoms or concerns that you feel that needs to be further addressed. Patient Education      Abdominal Pain: Care Instructions  Your Care Instructions     Abdominal pain has many possible causes. Some aren't serious and get better on their own in a few days. Others need more testing and treatment. If your pain continues or gets worse, you need to be rechecked and may need more tests to find out what is wrong. You may need surgery to correct the problem. Don't ignore new symptoms, such as fever, nausea and vomiting, urination problems, pain that gets worse, and dizziness. These may be signs of a more serious problem. Your doctor may have recommended a follow-up visit in the next 8 to 12 hours. If you are not getting better, you may need more tests or treatment. The doctor has checked you carefully, but problems can develop later. If you notice any problems or new symptoms, get medical treatment right away. Follow-up care is a key part of your treatment and safety. Be sure to make and go to all appointments, and call your doctor if you are having problems. It's also a good idea to know your test results and keep a list of the medicines you take. How can you care for yourself at home? · Rest until you feel better. Go to [email or web address] and sign in to your [employer] account. Enter D944 in the [facility] box to learn more about \"Abdominal Pain: Care Instructions. \"     If you do not have an account, please click on the \"Sign Up Now\" link. Current as of: June 26, 2019               Content Version: 12.6  © 7938-9547 [employer] Care instructions adapted under license by [facility] ([facility]). If you have questions about a medical condition or this instruction, always ask your healthcare professional. [address] any warranty or liability for your use of this information.

## 2020-12-31 NOTE — PROGRESS NOTES
Outpatient Note for established Patient - regular Visit     History Obtained From:  patient, electronic medical record  Is the patient new to me ? - yes    HISTORY OF PRESENT ILLNESS:   The patient is a 72 y.o. female who is here today for :  Whitney Ramón was seen today for establish care. Diagnoses and all orders for this visit:    Morbidly obese ([address]    Mixed hyperlipidemia    Essential hypertension    Epigastric discomfort  Comments:  Hx of EGD and esophageal dilation by Dr Sarah Kwon   Orders:  -     H. PYLORI ANTIGEN, STOOL; Future     BP is well controlled here  She is on Losartan, Verapamil, BB  Pt has PSVT  Recently neg cath for chest pain. Pt denies CP/SOB/palpitations/abdominal pain/N/V. No fever/ chills/ cough  Lab Results   Component Value Date    CHOL 163 09/28/2020    CHOL 148 06/08/2019    CHOL 156 05/21/2018     Lab Results   Component Value Date    TRIG 149 09/28/2020    TRIG 101 06/08/2019    TRIG 134 05/21/2018     Lab Results   Component Value Date    HDL 54 09/28/2020    HDL 53 08/17/2020    HDL 60 11/11/2019     Lab Results   Component Value Date    LDLCALC 79 09/28/2020    LDLCALC 66 08/17/2020    LDLCALC 81 11/11/2019     Lab Results   Component Value Date    LABVLDL 30 09/28/2020    LABVLDL 25 08/17/2020    LABVLDL 24 11/11/2019     Lab Results   Component Value Date    CHOLHDLRATIO 2.8 04/16/2011    CHOLHDLRATIO 2.5 01/08/2011    CHOLHDLRATIO 2.9 10/02/2010   on statin  No side effects     She has foor OA   She is on gabapnetin which she still takes for neuropathic pain   shje is on Singulair for nasal drainage     She does have chronic cough - regular- she had the cough for 1 year  She has remote smoking , quit 1989, she smoked 7 years 1 pack a day   CXR 5/2020 (takemn yearly for Hx of histoplasmosis)- neg     Preventive:  1) colon cancer screening completed? yes, 9/2020- neg occult blood X3  2) breast cancer screening completed (or not needed) ?  yes, 11/2020- neg 3) PAP smear completed (or not needed) ? yes, q 2 years     Past Medical History:        Diagnosis Date    Allergic rhinitis     Anxiety     Carpal tunnel syndrome     Cervicalgia     Chronic back pain     low    Depression     GERD (gastroesophageal reflux disease)     Headache(784.0)     hemiplegic migraines    Hernia hiatal     Histoplasmosis     Hyperlipidemia     Hypertension     Mitral (valve) prolapse     Obesity     Osteoarthritis     multiple joints    Shingles     Sleep apnea     CPAP set of 4     Social History:   TOBACCO:   reports that she quit smoking about 31 years ago. She has a 10.00 pack-year smoking history. She has never used smokeless tobacco.    ETOH:   reports no history of alcohol use. Current Medications:    Prior to Admission medications    Medication Sig Start Date End Date Taking? Authorizing Provider   losartan (COZAAR) 50 MG tablet Take 1 tablet by mouth daily 9/24/20  Yes Norman Celaya MD   buPROPion (WELLBUTRIN XL) 300 MG extended release tablet TAKE 1 TABLET BY MOUTH EVERY DAY IN THE MORNING 9/22/20  Yes Norman Celaya MD   pravastatin (PRAVACHOL) 10 MG tablet TAKE 1 TABLET BY MOUTH EVERY DAY 8/16/20  Yes Norman Celaya MD   clotrimazole-betamethasone (LOTRISONE) 1-0.05 % cream Apply topically 2 times daily. 8/3/20  Yes LISA Comer - CNP   pantoprazole (PROTONIX) 40 MG tablet TAKE 1 TABLET BY MOUTH TWICE A DAY 7/23/20  Yes Norman Celaya MD   montelukast (SINGULAIR) 10 MG tablet TAKE 1 TABLET BY MOUTH NIGHTLY 5/29/20  Yes Norman Celaya MD   acetaminophen (TYLENOL) 500 MG tablet Take 500 mg by mouth every 6 hours as needed for Pain   Yes Historical Provider, MD   verapamil (CALAN SR) 120 MG extended release tablet TAKE 1 TABLET BY MOUTH EVERY DAY  Patient taking differently: nightly Do not crush or chew.  1/29/20  Yes Tomy Ramos MD clotrimazole-betamethasone (LOTRISONE) 1-0.05 % cream Apply topically 2 times daily Apply topically 2 times daily. 8/13/19  Yes Jhoan Green MD   metoprolol succinate (TOPROL XL) 25 MG extended release tablet TAKE 1 TABLET BY MOUTH EVERY DAY 7/25/19  Yes Jhoan Green MD   Omega-3 Fatty Acids (FISH OIL) 1200 MG CAPS Take 1,200 mg by mouth 2 times daily   Yes Historical Provider, MD   Multiple Vitamins-Minerals (CENTRUM SILVER PO) Take 1 tablet by mouth daily    Yes Historical Provider, MD   gabapentin (NEURONTIN) 100 MG capsule Take 1 capsule by mouth nightly for 28 days. 8/27/20 9/24/20  Jhoan Green MD       REVIEW OF SYSTEMS:  Review of Systems   Constitutional: Negative for activity change, appetite change, chills, fever and unexpected weight change. HENT: Positive for postnasal drip and sore throat. Negative for hearing loss. Eyes: Negative for visual disturbance. Respiratory: Negative for chest tightness and shortness of breath. Cardiovascular: Negative for chest pain. Gastrointestinal: Positive for abdominal pain (epigastric discomfort). Negative for blood in stool, constipation, nausea and vomiting. Genitourinary: Negative for difficulty urinating and hematuria. Skin: Negative for rash. Allergic/Immunologic: Negative for immunocompromised state. Neurological: Negative for dizziness and headaches. Psychiatric/Behavioral: Positive for dysphoric mood ('I can deal with it'). Negative for self-injury and suicidal ideas. The patient is nervous/anxious. Physical Exam:      Vitals: /80 (Site: Left Upper Arm, Position: Sitting, Cuff Size: Large Adult)   Pulse 58   Temp 97.3 °F (36.3 °C)   Ht 5' 3\" (1.6 m)   Wt 253 lb (114.8 kg)   LMP  (LMP Unknown)   SpO2 98%   BMI 44.82 kg/m²     Body mass index is 44.82 kg/m².   Wt Readings from Last 3 Encounters:   12/31/20 253 lb (114.8 kg)   09/24/20 251 lb (113.9 kg)   05/19/20 255 lb (115.7 kg)     Physical Exam Constitutional:       General: She is not in acute distress. Appearance: She is well-developed. She is not diaphoretic. HENT:      Head: Normocephalic and atraumatic. Mouth/Throat:      Pharynx: No oropharyngeal exudate. Eyes:      General: No scleral icterus. Right eye: No discharge. Left eye: No discharge. Conjunctiva/sclera: Conjunctivae normal.   Neck:      Musculoskeletal: Normal range of motion and neck supple. Cardiovascular:      Rate and Rhythm: Normal rate. Heart sounds: Normal heart sounds. No murmur. Pulmonary:      Effort: Pulmonary effort is normal. No respiratory distress. Breath sounds: Normal breath sounds. No wheezing or rales. Abdominal:      General: There is no distension. Palpations: Abdomen is soft. Tenderness: There is abdominal tenderness (epigastric ). There is no guarding. Musculoskeletal:         General: No deformity. Right lower leg: No edema. Lymphadenopathy:      Head:      Right side of head: No submental or submandibular adenopathy. Left side of head: No submental or submandibular adenopathy. Cervical: No cervical adenopathy. Right cervical: No superficial or deep cervical adenopathy. Left cervical: No superficial or deep cervical adenopathy. Skin:     Findings: No rash. Neurological:      Mental Status: She is alert and oriented to person, place, and time. GCS: GCS eye subscore is 4. GCS verbal subscore is 5. GCS motor subscore is 6. Motor: No abnormal muscle tone. Deep Tendon Reflexes: Reflexes are normal and symmetric. Psychiatric:         Behavior: Behavior normal.         Thought Content: Thought content normal.          Assessment/Plan:   Julia Ocasio was seen today for establish care.     Diagnoses and all orders for this visit:    Morbidly obese ([address]  Discussed weight loss patient will work on diet and physical activity    Mixed hyperlipidemia Don't ignore new symptoms, such as fever, nausea and vomiting, urination problems, pain that gets worse, and dizziness. These may be signs of a more serious problem. Your doctor may have recommended a follow-up visit in the next 8 to 12 hours. If you are not getting better, you may need more tests or treatment. The doctor has checked you carefully, but problems can develop later. If you notice any problems or new symptoms, get medical treatment right away. Follow-up care is a key part of your treatment and safety. Be sure to make and go to all appointments, and call your doctor if you are having problems. It's also a good idea to know your test results and keep a list of the medicines you take. How can you care for yourself at home? · Rest until you feel better. · To prevent dehydration, drink plenty of fluids, enough so that your urine is light yellow or clear like water. Choose water and other caffeine-free clear liquids until you feel better. If you have kidney, heart, or liver disease and have to limit fluids, talk with your doctor before you increase the amount of fluids you drink. · If your stomach is upset, eat mild foods, such as rice, dry toast or crackers, bananas, and applesauce. Try eating several small meals instead of two or three large ones. · Wait until 48 hours after all symptoms have gone away before you have spicy foods, alcohol, and drinks that contain caffeine. · Do not eat foods that are high in fat. · Avoid anti-inflammatory medicines such as aspirin, ibuprofen (Advil, Motrin), and naproxen (Aleve). These can cause stomach upset. Talk to your doctor if you take daily aspirin for another health problem. When should you call for help? Call 911 anytime you think you may need emergency care. For example, call if:    · You passed out (lost consciousness).     · You pass maroon or very bloody stools.     · You vomit blood or what looks like coffee grounds.   · You have new, severe belly pain. Call your doctor now or seek immediate medical care if:    · Your pain gets worse, especially if it becomes focused in one area of your belly.     · You have a new or higher fever.     · Your stools are black and look like tar, or they have streaks of blood.     · You have unexpected vaginal bleeding.     · You have symptoms of a urinary tract infection. These may include:  ? Pain when you urinate. ? Urinating more often than usual.  ? Blood in your urine.     · You are dizzy or lightheaded, or you feel like you may faint. Watch closely for changes in your health, and be sure to contact your doctor if:    · You are not getting better after 1 day (24 hours). Where can you learn more? Go to [email or web address] and sign in to your [employer] account. Enter O283 in the [employer] box to learn more about \"Abdominal Pain: Care Instructions. \"     If you do not have an account, please click on the \"Sign Up Now\" link. Current as of: June 26, 2019               Content Version: 12.6  © 9726-2409 [employer]. Care instructions adapted under license by [facility] ([facility]). If you have questions about a medical condition or this instruction, always ask your healthcare professional. [address] any warranty or liability for your use of this information. Zion Zepeda M.D.   12/31/2020, 1:34 PM      NOTE: This report was transcribed using voice recognition software. Every effort was made to ensure accuracy, however, inadvertent computerized transcription errors may be present.

## 2021-01-08 RX ORDER — GABAPENTIN 100 MG/1
100 CAPSULE ORAL NIGHTLY
Qty: 30 CAPSULE | Refills: 3 | Status: SHIPPED | OUTPATIENT
Start: 2021-01-08 | End: 2021-04-06 | Stop reason: ALTCHOICE

## 2021-01-18 DIAGNOSIS — R10.13 EPIGASTRIC DISCOMFORT: ICD-10-CM

## 2021-01-20 ENCOUNTER — TELEPHONE (OUTPATIENT)
Dept: FAMILY MEDICINE CLINIC | Age: 66
End: 2021-01-20

## 2021-01-20 DIAGNOSIS — E78.2 MIXED HYPERLIPIDEMIA: ICD-10-CM

## 2021-01-20 LAB — H PYLORI ANTIGEN STOOL: NEGATIVE

## 2021-01-20 RX ORDER — PRAVASTATIN SODIUM 10 MG
TABLET ORAL
Qty: 90 TABLET | Refills: 1 | Status: SHIPPED | OUTPATIENT
Start: 2021-01-20 | End: 2021-03-03

## 2021-01-20 NOTE — TELEPHONE ENCOUNTER
1. Schedule appt w/ GI   2. I do not recall cough. If chronic- wait for appt.  If new- do Covid testing before appt

## 2021-01-20 NOTE — TELEPHONE ENCOUNTER
H pylori stool was negative. Still has a chronic cough. Anything to do?   Has an appt 2/3      OK to leave message on [employer]   [phone]  ---------------------------------------------------------------------------

## 2021-02-10 RX ORDER — ASPIRIN 81 MG/1
81 TABLET ORAL DAILY
Qty: 90 TABLET | Refills: 1 | Status: ON HOLD | OUTPATIENT
Start: 2021-02-10 | End: 2021-08-13 | Stop reason: HOSPADM

## 2021-02-10 RX ORDER — ROSUVASTATIN CALCIUM 20 MG/1
20 TABLET, COATED ORAL NIGHTLY
Qty: 30 TABLET | Refills: 3 | Status: SHIPPED | OUTPATIENT
Start: 2021-02-10 | End: 2021-05-28

## 2021-02-11 ENCOUNTER — TELEPHONE (OUTPATIENT)
Dept: SURGERY | Age: 66
End: 2021-02-11

## 2021-02-11 NOTE — TELEPHONE ENCOUNTER
New pt referred by Renny Fothergill Shitrit PAD Bracial Right Ankle Index 0.76, testing from [address] will bring results to office before appt on 02/25/2021. Burning sensation right ankle, neuropathy testing was normal.    High risk for cardiovascular disease, protein in urine.

## 2021-02-12 DIAGNOSIS — I73.9 PERIPHERAL VASCULAR DISEASE (HCC): Primary | ICD-10-CM

## 2021-02-12 NOTE — TELEPHONE ENCOUNTER
Left detailed message on pt's VM. Pt to contact  [facility] to schedule. Phone number provided.

## 2021-02-13 DIAGNOSIS — I10 ESSENTIAL HYPERTENSION: ICD-10-CM

## 2021-02-15 RX ORDER — METOPROLOL SUCCINATE 25 MG/1
TABLET, EXTENDED RELEASE ORAL
Qty: 60 TABLET | Refills: 0 | Status: SHIPPED | OUTPATIENT
Start: 2021-02-15 | End: 2021-02-15

## 2021-02-17 DIAGNOSIS — F32.4 MAJOR DEPRESSIVE DISORDER WITH SINGLE EPISODE, IN PARTIAL REMISSION (HCC): ICD-10-CM

## 2021-02-18 ENCOUNTER — HOSPITAL ENCOUNTER (OUTPATIENT)
Dept: VASCULAR LAB | Age: 66
Discharge: HOME | End: 2021-02-18
Payer: MEDICARE

## 2021-02-18 DIAGNOSIS — I73.9 PERIPHERAL VASCULAR DISEASE (HCC): ICD-10-CM

## 2021-02-18 PROCEDURE — 93925 LOWER EXTREMITY STUDY: CPT

## 2021-02-18 RX ORDER — BUPROPION HYDROCHLORIDE 300 MG/1
TABLET ORAL
Qty: 90 TABLET | Refills: 1 | Status: SHIPPED | OUTPATIENT
Start: 2021-02-18 | End: 2021-05-28

## 2021-02-23 NOTE — TELEPHONE ENCOUNTER
Pt had screening KHADIJAH's done at a mobile unit. Right KHADIJAH was 0.76. Patient will bring complete report to office. Dr. Angel Carmichael,  Do you want pt to have any additional testing prior to her appt with you? 5

## 2021-02-25 ENCOUNTER — OFFICE VISIT (OUTPATIENT)
Dept: VASCULAR SURGERY | Age: 66
End: 2021-02-25
Payer: MEDICARE

## 2021-02-25 VITALS
HEIGHT: 63 IN | HEART RATE: 54 BPM | BODY MASS INDEX: 44.83 KG/M2 | SYSTOLIC BLOOD PRESSURE: 142 MMHG | DIASTOLIC BLOOD PRESSURE: 92 MMHG | WEIGHT: 253 LBS

## 2021-02-25 DIAGNOSIS — M79.671 RIGHT FOOT PAIN: Primary | ICD-10-CM

## 2021-02-25 PROCEDURE — 4040F PNEUMOC VAC/ADMIN/RCVD: CPT | Performed by: SURGERY

## 2021-02-25 PROCEDURE — 1090F PRES/ABSN URINE INCON ASSESS: CPT | Performed by: SURGERY

## 2021-02-25 PROCEDURE — G8427 DOCREV CUR MEDS BY ELIG CLIN: HCPCS | Performed by: SURGERY

## 2021-02-25 PROCEDURE — G8399 PT W/DXA RESULTS DOCUMENT: HCPCS | Performed by: SURGERY

## 2021-02-25 PROCEDURE — 1036F TOBACCO NON-USER: CPT | Performed by: SURGERY

## 2021-02-25 PROCEDURE — G8417 CALC BMI ABV UP PARAM F/U: HCPCS | Performed by: SURGERY

## 2021-02-25 PROCEDURE — G8484 FLU IMMUNIZE NO ADMIN: HCPCS | Performed by: SURGERY

## 2021-02-25 PROCEDURE — 1123F ACP DISCUSS/DSCN MKR DOCD: CPT | Performed by: SURGERY

## 2021-02-25 PROCEDURE — 99203 OFFICE O/P NEW LOW 30 MIN: CPT | Performed by: SURGERY

## 2021-02-25 PROCEDURE — 3017F COLORECTAL CA SCREEN DOC REV: CPT | Performed by: SURGERY

## 2021-02-25 RX ORDER — VIT C/B6/B5/MAGNESIUM/HERB 173 50-5-6-5MG
CAPSULE ORAL
COMMUNITY
Start: 2021-01-01 | End: 2021-09-03

## 2021-02-25 RX ORDER — LANOLIN ALCOHOL/MO/W.PET/CERES
CREAM (GRAM) TOPICAL
COMMUNITY
Start: 2021-01-01 | End: 2022-03-02 | Stop reason: ALTCHOICE

## 2021-02-25 RX ORDER — GABAPENTIN 100 MG/1
100 CAPSULE ORAL NIGHTLY
COMMUNITY
Start: 2020-12-04 | End: 2021-05-13

## 2021-02-25 NOTE — PROGRESS NOTES
[facility] Vascular Surgery  Brooke Nava MD, Jesika Warren, [address]    OUTPATIENT CONSULTATION          Date of Consultation:  2/25/2021    PCP:  Adelso Bahena MD       Chief Complaint: Right foot pain    History of Present Illness:   Vimal Husain is a 77 y.o. female who presents today for evaluation of foot pain and abnormal [facility] Scre[facility] results. She had screening for carotid and peripheral artery disease there in Jan, and ABIs were documented as 1.12 on the left and 0.76 on the right. She does not describe claudication or rest pain. No ulcerations. No cyanosis or acute swelling. She does describe a burning sensation on the lateral side of her right ankle. This is present with or without walking with no alleviating factors. She is a former smoker, quit many years ago. She is retired, relatively sedentary lifestyle currently. We repeated her arterial duplex study which was NORMAL. I personally reviewed images the following study:  VL DUP LOWER EXTREMITY ARTERIES BILATERAL  2/18/21  Impressions   Right Impression   The ankle/brachial index is 1.02 on the right (,  mmHg). Normal multiphasic flow in the common femoral artery indicates no significant   aorto-iliac inflow disease. Multiphasic flow is noted throughout the right leg with atherosclerotic plaque   in the mid superficial femoral artery indicating a <50% stenosis (minimal   plaque). Left Impression   The ankle/brachial index is 1.08 on the left (,  mmHg). Normal multiphasic flow in the common femoral artery indicates no significant   aorto-iliac inflow disease. Multiphasic flow is noted throughout the left leg with calcific wall changes   at the mid superficial femoral artery. There are no previous studies for comparison.       PastMedical History:  Past Medical History:   Diagnosis Date    Allergic rhinitis     Anxiety     Carpal tunnel syndrome     Cervicalgia  Chronic back pain     low    Depression     GERD (gastroesophageal reflux disease)     Headache(784.0)     hemiplegic migraines    Hernia hiatal     Histoplasmosis     Hyperlipidemia     Hypertension     Mitral (valve) prolapse     Obesity     Osteoarthritis     multiple joints    Shingles     Sleep apnea     CPAP set of 4     Past Surgical History:   Past Surgical History:   Procedure Laterality Date    BUNIONECTOMY      right     COLONOSCOPY      KNEE ARTHROSCOPY      KNEE CARTILAGE SURGERY      LOBECTOMY      partial right lung lobectomy    LUNG BIOPSY      PARATHYROID GLAND SURGERY      PARTIAL HYSTERECTOMY      SINUS SURGERY      TOTAL KNEE ARTHROPLASTY Bilateral 03/03/2015     Home Medications:   Prior to Admission medications    Medication Sig Start Date End Date Taking? Authorizing Provider   buPROPion (WELLBUTRIN XL) 300 MG extended release tablet TAKE 1 TABLET BY MOUTH EVERY DAY IN THE MORNING 2/18/21   Adelso Bahena MD   rosuvastatin (CRESTOR) 20 MG tablet Take 1 tablet by mouth nightly 2/10/21   Adelso Bahena MD   aspirin EC 81 MG EC tablet Take 1 tablet by mouth daily 2/10/21   Adelso Bahena MD   pravastatin (PRAVACHOL) 10 MG tablet TAKE 1 TABLET BY MOUTH EVERY DAY 1/20/21   Adelso Bahena MD   gabapentin (NEURONTIN) 100 MG capsule Take 1 capsule by mouth nightly for 30 days. 1/8/21 2/7/21  Adelso Bahena MD   losartan (COZAAR) 50 MG tablet Take 1 tablet by mouth daily 9/24/20   Tom Clement MD   clotrimazole-betamethasone (LOTRISONE) 1-0.05 % cream Apply topically 2 times daily.  8/3/20   LISA Gaitan CNP   pantoprazole (PROTONIX) 40 MG tablet TAKE 1 TABLET BY MOUTH TWICE A DAY 7/23/20   Tom Clement MD   montelukast (SINGULAIR) 10 MG tablet TAKE 1 TABLET BY MOUTH NIGHTLY 5/29/20   Tom Clement MD acetaminophen (TYLENOL) 500 MG tablet Take 500 mg by mouth every 6 hours as needed for Pain    Historical Provider, MD   verapamil (CALAN SR) 120 MG extended release tablet TAKE 1 TABLET BY MOUTH EVERY DAY  Patient taking differently: nightly Do not crush or chew. 20   Amber Eastman MD   clotrimazole-betamethasone (LOTRISONE) 1-0.05 % cream Apply topically 2 times daily Apply topically 2 times daily.  19   Margie Stanford MD   Omega-3 Fatty Acids (FISH OIL) 1200 MG CAPS Take 1,200 mg by mouth 2 times daily    Historical Provider, MD   Multiple Vitamins-Minerals (CENTRUM SILVER PO) Take 1 tablet by mouth daily     Historical Provider, MD        Allergies:  Latex, Reglan [metoclopramide hcl], Univasc [moexipril], Celecoxib, Keflex [cephalexin], Lipitor, Metoclopramide, Prochlorperazine, Prochlorperazine edisylate, Sulfa antibiotics, and Vioxx     Social History:    Social History     Socioeconomic History    Marital status:      Spouse name: Not on file    Number of children: 2    Years of education: Not on file    Highest education level: Not on file   Occupational History    Occupation:    Social Needs    Financial resource strain: Not on file    Food insecurity     Worry: Not on file     Inability: Not on file   [employer] needs     Medical: Not on file     Non-medical: Not on file   Tobacco Use    Smoking status: Former Smoker     Packs/day: 2.00     Years: 5.00     Pack years: 10.00     Quit date: 1990     Years since quittin.1    Smokeless tobacco: Never Used   Substance and Sexual Activity    Alcohol use: No    Drug use: No    Sexual activity: Not on file   Lifestyle    Physical activity     Days per week: Not on file     Minutes per session: Not on file    Stress: Not on file   Relationships    Social connections     Talks on phone: Not on file     Gets together: Not on file     Attends Orthodox service: Not on file Effort: Pulmonary effort is normal. No respiratory distress. Breath sounds: Normal breath sounds. No stridor. No wheezing, rhonchi or rales. Abdominal:      General: Abdomen is flat. Bowel sounds are normal. There is no distension. Palpations: Abdomen is soft. There is no mass. Tenderness: There is no abdominal tenderness. There is no guarding or rebound. Hernia: No hernia is present. Musculoskeletal: Normal range of motion. General: No deformity. Comments: Chronic nonpitting edema of the bilateral lower extremities. Few telangiectasias. Skin:     General: Skin is warm and dry. Capillary Refill: Capillary refill takes less than 2 seconds. Findings: No rash. Comments: No significant stasis changes. No cyanosis. No ulcerations. Neurological:      General: No focal deficit present. Mental Status: She is alert and oriented to person, place, and time. Cranial Nerves: No cranial nerve deficit. Motor: No weakness. Gait: Gait normal.   Psychiatric:         Mood and Affect: Mood normal.         Behavior: Behavior normal.         Thought Content: Thought content normal.         Judgment: Judgment normal.     Vascular:  B femoral, Dp,PT are 2+ palpable. Diagnosis:    1. Right lateral foot pain, likely secondary to musculoskeletal etiology  2. No evidence of clinically significant arterial insufficiency of the bilateral lower extremities    Plan:   1. Encouraged walking, weight loss and increase cardiovascular activity. 2.  She sees Dr. Claudene Quick of podiatry, and may need MRI or x-ray to evaluate for musculoskeletal etiology of her foot pain. It does not appear to be secondary to arterial insufficiency. 3. Smoking cessation: She is currently non-smoker. Thank you for this consult and the opportunity to take part in this patients care. I will be happy to see her again on an as-needed basis.

## 2021-03-01 DIAGNOSIS — K21.9 GASTROESOPHAGEAL REFLUX DISEASE, UNSPECIFIED WHETHER ESOPHAGITIS PRESENT: ICD-10-CM

## 2021-03-01 RX ORDER — PANTOPRAZOLE SODIUM 40 MG/1
TABLET, DELAYED RELEASE ORAL
Qty: 180 TABLET | Refills: 2 | Status: SHIPPED | OUTPATIENT
Start: 2021-03-01 | End: 2021-03-09 | Stop reason: SDUPTHER

## 2021-03-03 ENCOUNTER — OFFICE VISIT (OUTPATIENT)
Dept: CARDIOLOGY CLINIC | Age: 66
End: 2021-03-03
Payer: MEDICARE

## 2021-03-03 VITALS
DIASTOLIC BLOOD PRESSURE: 80 MMHG | TEMPERATURE: 97.8 F | BODY MASS INDEX: 45.61 KG/M2 | WEIGHT: 257.4 LBS | HEIGHT: 63 IN | SYSTOLIC BLOOD PRESSURE: 126 MMHG | HEART RATE: 80 BPM

## 2021-03-03 DIAGNOSIS — I73.9 PAD (PERIPHERAL ARTERY DISEASE) (HCC): ICD-10-CM

## 2021-03-03 DIAGNOSIS — I10 ESSENTIAL HYPERTENSION: ICD-10-CM

## 2021-03-03 DIAGNOSIS — I47.1 PAT (PAROXYSMAL ATRIAL TACHYCARDIA) (HCC): ICD-10-CM

## 2021-03-03 DIAGNOSIS — I47.1 ATRIAL TACHYCARDIA (HCC): ICD-10-CM

## 2021-03-03 DIAGNOSIS — E78.2 MIXED HYPERLIPIDEMIA: ICD-10-CM

## 2021-03-03 DIAGNOSIS — R07.9 CHEST PAIN, UNSPECIFIED TYPE: Primary | ICD-10-CM

## 2021-03-03 PROCEDURE — 1090F PRES/ABSN URINE INCON ASSESS: CPT | Performed by: INTERNAL MEDICINE

## 2021-03-03 PROCEDURE — 1123F ACP DISCUSS/DSCN MKR DOCD: CPT | Performed by: INTERNAL MEDICINE

## 2021-03-03 PROCEDURE — G8427 DOCREV CUR MEDS BY ELIG CLIN: HCPCS | Performed by: INTERNAL MEDICINE

## 2021-03-03 PROCEDURE — 4040F PNEUMOC VAC/ADMIN/RCVD: CPT | Performed by: INTERNAL MEDICINE

## 2021-03-03 PROCEDURE — G8399 PT W/DXA RESULTS DOCUMENT: HCPCS | Performed by: INTERNAL MEDICINE

## 2021-03-03 PROCEDURE — 1036F TOBACCO NON-USER: CPT | Performed by: INTERNAL MEDICINE

## 2021-03-03 PROCEDURE — 99215 OFFICE O/P EST HI 40 MIN: CPT | Performed by: INTERNAL MEDICINE

## 2021-03-03 PROCEDURE — G8484 FLU IMMUNIZE NO ADMIN: HCPCS | Performed by: INTERNAL MEDICINE

## 2021-03-03 PROCEDURE — 3017F COLORECTAL CA SCREEN DOC REV: CPT | Performed by: INTERNAL MEDICINE

## 2021-03-03 PROCEDURE — G8417 CALC BMI ABV UP PARAM F/U: HCPCS | Performed by: INTERNAL MEDICINE

## 2021-03-03 RX ORDER — LOSARTAN POTASSIUM 50 MG/1
50 TABLET ORAL DAILY
Qty: 90 TABLET | Refills: 3 | Status: SHIPPED | OUTPATIENT
Start: 2021-03-03 | End: 2021-09-29

## 2021-03-03 RX ORDER — PRAVASTATIN SODIUM 20 MG
20 TABLET ORAL DAILY
Qty: 90 TABLET | Refills: 3 | Status: SHIPPED | OUTPATIENT
Start: 2021-03-03 | End: 2022-04-04

## 2021-03-03 NOTE — PROGRESS NOTES
Cc:  HTN, HLP, atypical CP, PAD, PAT    HPI:     Ms Meredith Cutler is a 74 yo overweight woman (BMI 39) with h/o SAL on CPAP, GERD, HTN, HLP, past smoker (quit '89), SVT/PAT, PAD.      Patient was seen by Dr. Asif Woodruff and Dr Neelima Pandey at [facility] in the past, last visit in 2014. She had chest pains at that time and underwent cardiac evaluation.      Echo 07/2014: normal except for diastolic I (note, no valvular disease).    Cath 07/2014: normal coronaries, normal EF 70%.      ECG 3/26/19: normal.      Patient reported strong family history of premature CAD and heart failure. Her brother had a cardiac transplant in his 42's and her sisters had stents in their 42-51s. Patient reported palpitations with lightheadedness. 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      Echo 04/2019: normal (no mitral valve prolapse) except for moderate TR, RVSP 44.      Zio monitor x 14 days (4/22/19): NSR with frequent SVT's.     Patient was seen by Dr. Scruggs Don was initially started on flecainide in addition to Toprol however patient developed blurry vision and flecainide was changed to verapamil. Exe nuc stress 5/20/20: small mild reversible defect in distal anteroseptal wall (apex is spared), nl EF and wall motion. Patient had mild CP prior to test, increased pain with exertion, improving with rest. ECG no changes, normal, duration only 3 minutes. C 5/21/2020: Normal coronaries, LVEDP 13 mmHg, LVEF normal.    Bilateral lower extremity arterial ultrasound 2/18/2021: Right mid SFA less than 50% stenosis    FLP 9/28/2020: , HDL 54, LDL 79,  (on Pravachol 10 mg daily)    Patient is here for follow-up. Her Pravachol 10 mg daily was changed to Crestor 20 mg daily about 3 weeks ago due to reported PAD. She now has worsening of her chronic low back pain. Her Crestor was held the last couple of days to assess for any improvement in her symptoms.     Histories Past Medical History:   has a past medical history of Allergic rhinitis, Anxiety, Carpal tunnel syndrome, Cervicalgia, Chronic back pain, Depression, GERD (gastroesophageal reflux disease), Headache(784.0), Hernia hiatal, Histoplasmosis, Hyperlipidemia, Hypertension, Mitral (valve) prolapse, Obesity, Osteoarthritis, Shingles, and Sleep apnea. Surgical History:   has a past surgical history that includes Knee arthroscopy; Knee cartilage surgery; lobectomy; sinus surgery; Bunionectomy; partial hysterectomy (cervix not removed); Total knee arthroplasty (Bilateral, 03/03/2015); Parathyroid gland surgery; Lung biopsy; and Colonoscopy. Social History:   reports that she quit smoking about 31 years ago. She has a 10.00 pack-year smoking history. She has never used smokeless tobacco. She reports that she does not drink alcohol or use drugs. Family History:  No evidence for sudden cardiac death or premature CAD      Medications:     Home medications were reviewed and are listed below    Prior to Admission medications    Medication Sig Start Date End Date Taking? Authorizing Provider   Metoprolol Succinate 25 MG CS24 Take 25 mg by mouth daily 3/3/21  Yes Kenya Novoa MD   losartan (COZAAR) 50 MG tablet Take 1 tablet by mouth daily 3/3/21  Yes Kenya Novoa MD   verapamil (CALAN SR) 120 MG extended release tablet Take 1 tablet by mouth nightly Do not crush or chew. 3/3/21  Yes Kenya Novoa MD   pravastatin (PRAVACHOL) 20 MG tablet Take 1 tablet by mouth daily 3/3/21  Yes Kenya Novoa MD   pantoprazole (PROTONIX) 40 MG tablet TAKE 1 TABLET BY MOUTH TWICE A DAY 3/1/21  Yes Dalila Hobson MD   vitamin B-12 (CYANOCOBALAMIN) 1000 MCG tablet  1/1/21  Yes Historical Provider, MD   Turmeric 500 MG CAPS  1/1/21  Yes Historical Provider, MD   gabapentin (NEURONTIN) 100 MG capsule Take 100 mg by mouth nightly.  12/4/20  Yes Historical Provider, MD buPROPion (WELLBUTRIN XL) 300 MG extended release tablet TAKE 1 TABLET BY MOUTH EVERY DAY IN THE MORNING 2/18/21  Yes Marie Goldsmith MD   rosuvastatin (CRESTOR) 20 MG tablet Take 1 tablet by mouth nightly 2/10/21  Yes Marie Goldsmith MD   aspirin EC 81 MG EC tablet Take 1 tablet by mouth daily 2/10/21  Yes Marie Goldsmith MD   clotrimazole-betamethasone (LOTRISONE) 1-0.05 % cream Apply topically 2 times daily. 8/3/20  Yes LISA Boyer CNP   acetaminophen (TYLENOL) 500 MG tablet Take 500 mg by mouth every 6 hours as needed for Pain   Yes Historical Provider, MD   clotrimazole-betamethasone (LOTRISONE) 1-0.05 % cream Apply topically 2 times daily Apply topically 2 times daily. 8/13/19  Yes Veronica Thornton MD   Omega-3 Fatty Acids (FISH OIL) 1200 MG CAPS Take 1,200 mg by mouth 2 times daily   Yes Historical Provider, MD   Multiple Vitamins-Minerals (CENTRUM SILVER PO) Take 1 tablet by mouth daily    Yes Historical Provider, MD   gabapentin (NEURONTIN) 100 MG capsule Take 1 capsule by mouth nightly for 30 days. 1/8/21 2/7/21  Marie Goldsmith MD   montelukast (SINGULAIR) 10 MG tablet TAKE 1 TABLET BY MOUTH NIGHTLY  Patient not taking: Reported on 3/3/2021 5/29/20   Veronica Thornton MD          Allergy:     Latex, Reglan [metoclopramide hcl], Univasc [moexipril], Celecoxib, Keflex [cephalexin], Lipitor, Metoclopramide, Prochlorperazine, Prochlorperazine edisylate, Sulfa antibiotics, and Vioxx       Review of Systems:     All 12 point review of symptoms completed. Pertinent positives identified in the HPI, all other review of symptoms negative as below. CONSTITUTIONAL: No fatigue  SKIN: No rash or pruritis. EYES: No visual changes or diplopia. No scleral icterus. ENT: No Headaches, hearing loss or vertigo. No mouth sores or sore throat. CARDIOVASCULAR: No chest pain/chest pressure/chest discomfort. No palpitations. No edema. RESPIRATORY: No dyspnea. No cough or wheezing, no sputum production. GASTROINTESTINAL: No N/V/D. No abdominal pain, appetite loss, blood in stools. GENITOURINARY: No dysuria, trouble voiding, or hematuria. MUSCULOSKELETAL:  No gait disturbance, weakness or joint complaints. NEUROLOGICAL: No headache, diplopia, change in muscle strength, numbness or tingling. No change in gait, balance, coordination, mood, affect, memory, mentation, behavior. PSHYCH: No anxiety, loss of interest, change in sexual behavior, feelings of self-harm, or confusion. ENDOCRINE: No excessive thirst, fluid intake, or urination. No tremor. HEMATOLOGIC: No abnormal bruising or bleeding. ALLERGY: No nasal congestion or hives.       Physical Examination:     Vitals:    03/03/21 1022   BP: 126/80   Pulse: 80   Temp: 97.8 °F (36.6 °C)   Weight: 257 lb 6.4 oz (116.8 kg)   Height: 5' 3\" (1.6 m)       Wt Readings from Last 3 Encounters:   03/03/21 257 lb 6.4 oz (116.8 kg)   02/25/21 253 lb (114.8 kg)   12/31/20 253 lb (114.8 kg)         General Appearance:  Alert, cooperative, no distress, appears stated age Appropriate weight   Head:  Normocephalic, without obvious abnormality, atraumatic   Eyes:  PERRL, conjunctiva/corneas clear EOM intact  Ears normal   Throat no lesions       Nose: Nares normal, no drainage or sinus tenderness   Throat: Lips, mucosa, and tongue normal   Neck: Supple, symmetrical, trachea midline, no adenopathy, thyroid: not enlarged, symmetric, no tenderness/mass/nodules, no carotid bruit       Lungs:   Clear to auscultation bilaterally, respirations unlabored   Chest Wall:  No tenderness or deformity   Heart:  Regular rhythm, rate is controlled, S1, S2 normal, there is no murmur, there is no rub or gallop, no jvd, no bilateral lower extremity edema   Abdomen:   Soft, non-tender, bowel sounds active all four quadrants,  no masses, no organomegaly       Extremities: Extremities normal, atraumatic, no cyanosis Pulses: 2+ and symmetric   Skin: Skin color, texture, turgor normal, no rashes or lesions   Pysch: Normal mood and affect   Neurologic: Normal gross motor and sensory exam.  Cranial nerves intact        Labs:     Lab Results   Component Value Date    WBC 5.3 09/28/2020    HGB 14.0 09/28/2020    HCT 42.0 09/28/2020    MCV 90.4 09/28/2020     09/28/2020     Lab Results   Component Value Date     09/28/2020    K 4.8 09/28/2020     09/28/2020    CO2 26 09/28/2020    BUN 10 09/28/2020    CREATININE 0.6 09/28/2020    GLUCOSE 96 09/28/2020    CALCIUM 9.5 09/28/2020    PROT 6.4 09/28/2020    LABALBU 4.5 09/28/2020    BILITOT 0.3 09/28/2020    ALKPHOS 96 09/28/2020    AST 19 09/28/2020    ALT 27 09/28/2020    LABGLOM >60 09/28/2020    GFRAA >60 09/28/2020    AGRATIO 2.4 (H) 09/28/2020    GLOB 1.9 09/28/2020         Lab Results   Component Value Date    CHOL 163 09/28/2020    CHOL 148 06/08/2019    CHOL 156 05/21/2018     Lab Results   Component Value Date    TRIG 149 09/28/2020    TRIG 101 06/08/2019    TRIG 134 05/21/2018     Lab Results   Component Value Date    HDL 54 09/28/2020    HDL 53 08/17/2020    HDL 60 11/11/2019     Lab Results   Component Value Date    LDLCALC 79 09/28/2020    LDLCALC 66 08/17/2020    LDLCALC 81 11/11/2019     Lab Results   Component Value Date    LABVLDL 30 09/28/2020    LABVLDL 25 08/17/2020    LABVLDL 24 11/11/2019     Lab Results   Component Value Date    CHOLHDLRATIO 2.8 04/16/2011    CHOLHDLRATIO 2.5 01/08/2011    CHOLHDLRATIO 2.9 10/02/2010       Lab Results   Component Value Date    INR 0.97 06/07/2016    INR 0.99 06/06/2016    INR 0.93 03/03/2015    PROTIME 11.0 06/07/2016    PROTIME 11.3 06/06/2016    PROTIME 10.0 03/03/2015       The 10-year ASCVD risk score (Dave Gregg., et al., 2013) is: 7.3%    Values used to calculate the score:      Age: 77 years      Sex: Female      Is Non- : No      Diabetic: No      Tobacco smoker: No Systolic Blood Pressure: 116 mmHg      Is BP treated: Yes      HDL Cholesterol: 54 mg/dL      Total Cholesterol: 163 mg/dL      Assessment / Plan:      Diagnosis Orders   1. Chest pain, unspecified type     2. Essential hypertension  losartan (COZAAR) 50 MG tablet   3. PAT (paroxysmal atrial tachycardia) ([facility])  verapamil (CALAN SR) 120 MG extended release tablet   4. Mixed hyperlipidemia  pravastatin (PRAVACHOL) 20 MG tablet   5. Atrial tachycardia ([address]     6. PAD (peripheral artery disease) ([facility])          1.  Atypical chest pain:  Patient symptoms were pleuritic in nature most likely due to increased cough.  Patient had a normal LHC and echo in 2014. [place] she does have family history of premature CAD. Repeat LHC in 2020 revealed normal coronaries. Symptoms have now resolved.     No further testing is necessary.     2.  PSVT:  Patient has a history of paroxysmal atrial tachycardia and is being followed by EP.     We will continue her home meds, Toprol 25 daily and verapamil extended release 120 mg daily.     3.  Hypertension:  Patient's BP is well controlled with current medications.      Continue with metoprolol, verapamil, losartan 50 mg daily.     4.  Hyperlipidemia:  Patient has been having myalgias (increasing lower back pain) on intermediate dose Crestor. I will resume Pravachol at 20 mg p.o. daily. Patient will have 2 follow her lipid profile on a yearly basis. 5.  PAD:  Patient has evidence of mild PAD in the right lower extremity. Continue with low-dose aspirin 81 mg daily and statin per #4. Return in about 1 year (around 3/3/2022). I have spent 45 minutes of face to face time with the patient with more than 50% spent counseling and coordinating care. I have personally reviewed the reports and images of labs, radiological studies, cardiac studies including ECG's and telemetry, current and old medical records. The note was completed using EMR and Dragon dictation system. Every effort was made to ensure accuracy; however, inadvertent computerized transcription errors may be present. All questions and concerns were addressed to the patient/family. Alternatives to my treatment were discussed. I would like to thank you for providing me the opportunity to participate in the care of your patient. If you have any questions, please do not hesitate to contact me.      Jose Daniel Flores MD, [place] [address] Phone: [phone]  Heart Failure Hotline: [phone]  Fax: [phone]

## 2021-03-09 DIAGNOSIS — K21.9 GASTROESOPHAGEAL REFLUX DISEASE, UNSPECIFIED WHETHER ESOPHAGITIS PRESENT: ICD-10-CM

## 2021-03-09 RX ORDER — PANTOPRAZOLE SODIUM 40 MG/1
TABLET, DELAYED RELEASE ORAL
Qty: 180 TABLET | Refills: 2 | Status: SHIPPED | OUTPATIENT
Start: 2021-03-09 | End: 2022-03-10 | Stop reason: SDUPTHER

## 2021-03-11 DIAGNOSIS — I10 ESSENTIAL HYPERTENSION: ICD-10-CM

## 2021-03-11 RX ORDER — METOPROLOL SUCCINATE 25 MG/1
TABLET, EXTENDED RELEASE ORAL
Qty: 60 TABLET | Refills: 3 | Status: SHIPPED | OUTPATIENT
Start: 2021-03-11 | End: 2021-04-20

## 2021-03-11 NOTE — TELEPHONE ENCOUNTER
Requested Prescriptions     Pending Prescriptions Disp Refills    metoprolol succinate (TOPROL XL) 25 MG extended release tablet [Pharmacy Med Name: METOPROLOL SUCC ER 25 MG TAB] 60 tablet 0     Sig: TAKE 1 TABLET BY MOUTH TWICE A DAY          Number: 60    Refills: 3    Last Office Visit: 3/3/2021     Next Office Visit: Visit date not found

## 2021-03-24 ENCOUNTER — NURSE TRIAGE (OUTPATIENT)
Dept: OTHER | Facility: CLINIC | Age: 66
End: 2021-03-24

## 2021-03-24 ENCOUNTER — HOSPITAL ENCOUNTER (EMERGENCY)
Age: 66
Discharge: HOME | End: 2021-03-24
Attending: EMERGENCY MEDICINE
Payer: MEDICARE

## 2021-03-24 ENCOUNTER — APPOINTMENT (OUTPATIENT)
Dept: CT IMAGING | Age: 66
End: 2021-03-24
Payer: MEDICARE

## 2021-03-24 VITALS
RESPIRATION RATE: 26 BRPM | SYSTOLIC BLOOD PRESSURE: 178 MMHG | DIASTOLIC BLOOD PRESSURE: 101 MMHG | OXYGEN SATURATION: 99 % | TEMPERATURE: 97.8 F | HEART RATE: 82 BPM

## 2021-03-24 DIAGNOSIS — R10.9 RIGHT FLANK PAIN: Primary | ICD-10-CM

## 2021-03-24 LAB
ALBUMIN SERPL-MCNC: 4.7 G/DL (ref 3.4–5)
ALP BLD-CCNC: 99 U/L (ref 40–129)
ALT SERPL-CCNC: 30 U/L (ref 10–40)
ANION GAP SERPL CALCULATED.3IONS-SCNC: 14 MMOL/L (ref 3–16)
AST SERPL-CCNC: 22 U/L (ref 15–37)
BASOPHILS ABSOLUTE: 0.1 K/UL (ref 0–0.2)
BASOPHILS RELATIVE PERCENT: 0.8 %
BILIRUB SERPL-MCNC: 0.4 MG/DL (ref 0–1)
BILIRUBIN DIRECT: <0.2 MG/DL (ref 0–0.3)
BILIRUBIN URINE: NEGATIVE
BILIRUBIN, INDIRECT: NORMAL MG/DL (ref 0–1)
BLOOD, URINE: NEGATIVE
BUN BLDV-MCNC: 18 MG/DL (ref 7–20)
CALCIUM SERPL-MCNC: 9.8 MG/DL (ref 8.3–10.6)
CHLORIDE BLD-SCNC: 98 MMOL/L (ref 99–110)
CLARITY: CLEAR
CO2: 23 MMOL/L (ref 21–32)
COLOR: YELLOW
CREAT SERPL-MCNC: 0.6 MG/DL (ref 0.6–1.2)
EOSINOPHILS ABSOLUTE: 0.2 K/UL (ref 0–0.6)
EOSINOPHILS RELATIVE PERCENT: 1.9 %
EPITHELIAL CELLS, UA: ABNORMAL /HPF (ref 0–5)
GFR AFRICAN AMERICAN: >60
GFR NON-AFRICAN AMERICAN: >60
GLUCOSE BLD-MCNC: 95 MG/DL (ref 70–99)
GLUCOSE URINE: NEGATIVE MG/DL
HCT VFR BLD CALC: 42.5 % (ref 36–48)
HEMOGLOBIN: 14.6 G/DL (ref 12–16)
KETONES, URINE: NEGATIVE MG/DL
LEUKOCYTE ESTERASE, URINE: ABNORMAL
LIPASE: 34 U/L (ref 13–60)
LYMPHOCYTES ABSOLUTE: 3.3 K/UL (ref 1–5.1)
LYMPHOCYTES RELATIVE PERCENT: 36.3 %
MCH RBC QN AUTO: 31.3 PG (ref 26–34)
MCHC RBC AUTO-ENTMCNC: 34.5 G/DL (ref 31–36)
MCV RBC AUTO: 90.8 FL (ref 80–100)
MICROSCOPIC EXAMINATION: YES
MONOCYTES ABSOLUTE: 0.7 K/UL (ref 0–1.3)
MONOCYTES RELATIVE PERCENT: 8.1 %
NEUTROPHILS ABSOLUTE: 4.9 K/UL (ref 1.7–7.7)
NEUTROPHILS RELATIVE PERCENT: 52.9 %
NITRITE, URINE: NEGATIVE
PDW BLD-RTO: 13.6 % (ref 12.4–15.4)
PH UA: 6 (ref 5–8)
PLATELET # BLD: 287 K/UL (ref 135–450)
PMV BLD AUTO: 7.2 FL (ref 5–10.5)
POTASSIUM SERPL-SCNC: 4 MMOL/L (ref 3.5–5.1)
PROTEIN UA: NEGATIVE MG/DL
RBC # BLD: 4.68 M/UL (ref 4–5.2)
RBC UA: ABNORMAL /HPF (ref 0–4)
SODIUM BLD-SCNC: 135 MMOL/L (ref 136–145)
SPECIFIC GRAVITY UA: 1.01 (ref 1–1.03)
TOTAL PROTEIN: 7.3 G/DL (ref 6.4–8.2)
URINE TYPE: ABNORMAL
UROBILINOGEN, URINE: 0.2 E.U./DL
WBC # BLD: 9.2 K/UL (ref 4–11)
WBC UA: ABNORMAL /HPF (ref 0–5)

## 2021-03-24 PROCEDURE — 96375 TX/PRO/DX INJ NEW DRUG ADDON: CPT

## 2021-03-24 PROCEDURE — 83690 ASSAY OF LIPASE: CPT

## 2021-03-24 PROCEDURE — 99283 EMERGENCY DEPT VISIT LOW MDM: CPT

## 2021-03-24 PROCEDURE — 96374 THER/PROPH/DIAG INJ IV PUSH: CPT

## 2021-03-24 PROCEDURE — 85025 COMPLETE CBC W/AUTO DIFF WBC: CPT

## 2021-03-24 PROCEDURE — 6360000004 HC RX CONTRAST MEDICATION: Performed by: NURSE PRACTITIONER

## 2021-03-24 PROCEDURE — 6360000002 HC RX W HCPCS: Performed by: NURSE PRACTITIONER

## 2021-03-24 PROCEDURE — 74177 CT ABD & PELVIS W/CONTRAST: CPT

## 2021-03-24 PROCEDURE — 80076 HEPATIC FUNCTION PANEL: CPT

## 2021-03-24 PROCEDURE — 2580000003 HC RX 258: Performed by: NURSE PRACTITIONER

## 2021-03-24 PROCEDURE — 81001 URINALYSIS AUTO W/SCOPE: CPT

## 2021-03-24 PROCEDURE — 80048 BASIC METABOLIC PNL TOTAL CA: CPT

## 2021-03-24 RX ORDER — MORPHINE SULFATE 4 MG/ML
4 INJECTION, SOLUTION INTRAMUSCULAR; INTRAVENOUS ONCE
Status: COMPLETED | OUTPATIENT
Start: 2021-03-24 | End: 2021-03-24

## 2021-03-24 RX ORDER — ONDANSETRON 2 MG/ML
4 INJECTION INTRAMUSCULAR; INTRAVENOUS ONCE
Status: COMPLETED | OUTPATIENT
Start: 2021-03-24 | End: 2021-03-24

## 2021-03-24 RX ORDER — METHOCARBAMOL 500 MG/1
500 TABLET, FILM COATED ORAL 4 TIMES DAILY PRN
Qty: 30 TABLET | Refills: 0 | Status: SHIPPED | OUTPATIENT
Start: 2021-03-24 | End: 2021-04-03

## 2021-03-24 RX ORDER — METHOCARBAMOL 500 MG/1
500 TABLET, FILM COATED ORAL 4 TIMES DAILY
Qty: 40 TABLET | Refills: 0 | Status: SHIPPED | OUTPATIENT
Start: 2021-03-24 | End: 2021-03-24 | Stop reason: SDUPTHER

## 2021-03-24 RX ORDER — 0.9 % SODIUM CHLORIDE 0.9 %
1000 INTRAVENOUS SOLUTION INTRAVENOUS ONCE
Status: COMPLETED | OUTPATIENT
Start: 2021-03-24 | End: 2021-03-24

## 2021-03-24 RX ADMIN — IOPAMIDOL 80 ML: 755 INJECTION, SOLUTION INTRAVENOUS at 18:08

## 2021-03-24 RX ADMIN — SODIUM CHLORIDE 1000 ML: 9 INJECTION, SOLUTION INTRAVENOUS at 17:48

## 2021-03-24 RX ADMIN — MORPHINE SULFATE 4 MG: 4 INJECTION INTRAVENOUS at 17:48

## 2021-03-24 RX ADMIN — ONDANSETRON 4 MG: 2 INJECTION INTRAMUSCULAR; INTRAVENOUS at 17:48

## 2021-03-24 ASSESSMENT — ENCOUNTER SYMPTOMS
BLOOD IN STOOL: 0
VOMITING: 0
RESPIRATORY NEGATIVE: 1
ABDOMINAL PAIN: 1
NAUSEA: 0
DIARRHEA: 0
BACK PAIN: 1

## 2021-03-24 ASSESSMENT — PAIN DESCRIPTION - PAIN TYPE: TYPE: ACUTE PAIN

## 2021-03-24 ASSESSMENT — PAIN DESCRIPTION - ORIENTATION: ORIENTATION: RIGHT

## 2021-03-24 ASSESSMENT — PAIN SCALES - GENERAL: PAINLEVEL_OUTOF10: 10

## 2021-03-24 NOTE — TELEPHONE ENCOUNTER
Received call from [place]  at [facility]) with [employer] Complaint. Brief description of triage: Severe abd pain    Triage indicates for patient to ED    Care advice provided, patient verbalizes understanding; denies any other questions or concerns; instructed to call back for any new or worsening symptoms. Reason for Disposition   [1] SEVERE pain (e.g., excruciating) AND [2] present > 1 hour    Answer Assessment - Initial Assessment Questions  1. LOCATION: \"Where does it hurt? \"       Right side     2. RADIATION: \"Does the pain shoot anywhere else? \" (e.g., chest, back)   Denies      3. ONSET: \"When did the pain begin? \" (e.g., minutes, hours or days ago)    3 hours ago     4. SUDDEN: \"Gradual or sudden onset? \"  Gradual    5. PATTERN \"Does the pain come and go, or is it constant? \"     - If constant: \"Is it getting better, staying the same, or worsening? \"       (Note: Constant means the pain never goes away completely; most serious pain is constant and it progresses)      - If intermittent: \"How long does it last?\" \"Do you have pain now? \"      (Note: Intermittent means the pain goes away completely between bouts)  Constant     6. SEVERITY: \"How bad is the pain? \"  (e.g., Scale 1-10; mild, moderate, or severe)    - MILD (1-3): doesn't interfere with normal activities, abdomen soft and not tender to touch     - MODERATE (4-7): interferes with normal activities or awakens from sleep, tender to touch     - SEVERE (8-10): excruciating pain, doubled over, unable to do any normal activities   10/10    7. RECURRENT SYMPTOM: \"Have you ever had this type of abdominal pain before? \" If so, ask: \"When was the last time? \" and \"What happened that time? \"    Denies, reports chronic back pain but \"no pain like this before\"    8. CAUSE: \"What do you think is causing the abdominal pain? \"  Unsure    9. RELIEVING/AGGRAVATING FACTORS: \"What makes it better or worse? \" (e.g., movement, antacids, bowel movement)  Worse when walking and sitting     10. OTHER SYMPTOMS: \"Has there been any vomiting, diarrhea, constipation, or urine problems? \"   Sweating, nausea     11. PREGNANCY: \"Is there any chance you are pregnant? \" \"When was your last menstrual period? \"     No    Protocols used: ABDOMINAL PAIN [facility]      Attention Provider: Thank you for allowing me to participate in the care of your patient. The patient was connected to triage in response to information provided to the [facility]. Please do not respond through this encounter as the response is not directed to a shared pool.

## 2021-03-24 NOTE — ED PROVIDER NOTES
1 [facility]  EMERGENCY DEPARTMENT ENCOUNTER          NURSE PRACTITIONER NOTE       Date of evaluation: 3/24/2021    Chief Complaint     Flank Pain (right)      History of Present Illness     Nilsa Noriega is a 77 y.o. female with a past medical history of chronic back pain, GERD, hiatal hernia, hyperlipidemia, hypertension, mitral valve prolapse; who presents to the emerge department with a complaint of sudden onset right-sided flank/abdominal pain that started 3-1/2 hours prior to arrival to the emergency department. Patient states the pain started suddenly when she was shopping, and seem to wax and wane in severity although was there constantly. Worsened with position change. Wrapping around to her right upper and lower abdomen. Denies associated urinary changes including hematuria, urgency or frequency. Denies associated nausea or vomiting. Denies recent illnesses, denies fevers, chills, sweats. Patient went to buy some Tylenol and took this a little after 4:00 today, however had no relief with this and elected to come to the emergency department for further evaluation. She does have a history of a hysterectomy, otherwise denies history of abdominal surgeries, denies history of nephrolithiasis or cholelithiasis. Review of Systems     Review of Systems   Constitutional: Negative. Respiratory: Negative. Cardiovascular: Negative. Gastrointestinal: Positive for abdominal pain. Negative for blood in stool, diarrhea, nausea and vomiting. Genitourinary: Positive for flank pain. Negative for dysuria, frequency, pelvic pain and urgency. Musculoskeletal: Positive for back pain. Skin: Negative. Allergic/Immunologic: Negative for immunocompromised state. Neurological: Negative. Psychiatric/Behavioral: Negative.         Past Medical, Surgical, Family, and Social History     She has a past medical history of Allergic rhinitis, Anxiety, Carpal tunnel syndrome, Cervicalgia, Chronic back pain, MG tablet Take 1 tablet by mouth daily  Qty: 90 tablet, Refills: 3    Comments: DX Code Needed  . Associated Diagnoses: Mixed hyperlipidemia      vitamin B-12 (CYANOCOBALAMIN) 1000 MCG tablet       Turmeric 500 MG CAPS       !! gabapentin (NEURONTIN) 100 MG capsule Take 100 mg by mouth nightly. buPROPion (WELLBUTRIN XL) 300 MG extended release tablet TAKE 1 TABLET BY MOUTH EVERY DAY IN THE MORNING  Qty: 90 tablet, Refills: 1    Associated Diagnoses: Major depressive disorder with single episode, 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HCC)      rosuvastatin (CRESTOR) 20 MG tablet Take 1 tablet by mouth nightly  Qty: 30 tablet, Refills: 3      aspirin EC 81 MG EC tablet Take 1 tablet by mouth daily  Qty: 90 tablet, Refills: 1      !! gabapentin (NEURONTIN) 100 MG capsule Take 1 capsule by mouth nightly for 30 days. Qty: 30 capsule, Refills: 3      acetaminophen (TYLENOL) 500 MG tablet Take 500 mg by mouth every 6 hours as needed for Pain      Omega-3 Fatty Acids (FISH OIL) 1200 MG CAPS Take 1,200 mg by mouth 2 times daily      Multiple Vitamins-Minerals (CENTRUM SILVER PO) Take 1 tablet by mouth daily       !! clotrimazole-betamethasone (LOTRISONE) 1-0.05 % cream Apply topically 2 times daily. Qty: 45 g, Refills: 0      montelukast (SINGULAIR) 10 MG tablet TAKE 1 TABLET BY MOUTH NIGHTLY  Qty: 90 tablet, Refills: 1      !! clotrimazole-betamethasone (LOTRISONE) 1-0.05 % cream Apply topically 2 times daily Apply topically 2 times daily. Qty: 45 g, Refills: 1       !! - Potential duplicate medications found. Please discuss with provider. Allergies     She is allergic to latex; reglan [metoclopramide hcl]; univasc [moexipril]; celecoxib; keflex [cephalexin]; lipitor; metoclopramide; prochlorperazine; prochlorperazine edisylate; sulfa antibiotics; and vioxx.     Physical Exam     INITIAL VITALS: BP: (!) 178/101, Temp: 97.8 °F (36.6 °C), Pulse: 82, Resp: 26, SpO2: 99 %   Physical Exam  Vitals signs and nursing note reviewed. Constitutional:       Appearance: Normal appearance. She is obese. Cardiovascular:      Rate and Rhythm: Normal rate and regular rhythm. Pulses: Normal pulses. Heart sounds: Normal heart sounds. No murmur. Pulmonary:      Effort: Pulmonary effort is normal. No respiratory distress. Breath sounds: Normal breath sounds. Abdominal:      General: Bowel sounds are normal. There is no distension. Palpations: Abdomen is soft. There is no mass. Tenderness: There is abdominal tenderness (right CVA, RUQ and RLQ). There is right CVA tenderness and guarding. There is no rebound. Hernia: No hernia is present. Musculoskeletal: Normal range of motion. Skin:     General: Skin is warm and dry. Capillary Refill: Capillary refill takes less than 2 seconds. Neurological:      Mental Status: She is alert and oriented to person, place, and time. Psychiatric:         Mood and Affect: Mood normal.         Behavior: Behavior normal.           Diagnostic Results     RADIOLOGY:  CT ABDOMEN PELVIS W IV CONTRAST Additional Contrast? None   Final Result      No acute intra-abdominopelvic abnormality.           LABS:   Results for orders placed or performed during the hospital encounter of 03/24/21   Urinalysis, reflex to microscopic (Lab)   Result Value Ref Range    Color, UA Yellow Straw/Yellow    Clarity, UA Clear Clear    Glucose, Ur Negative Negative mg/dL    Bilirubin Urine Negative Negative    Ketones, Urine Negative Negative mg/dL    Specific Gravity, UA 1.015 1.005 - 1.030    Blood, Urine Negative Negative    pH, UA 6.0 5.0 - 8.0    Protein, UA Negative Negative mg/dL    Urobilinogen, Urine 0.2 <2.0 E.U./dL    Nitrite, Urine Negative Negative    Leukocyte Esterase, Urine SMALL (A) Negative    Microscopic Examination YES     Urine Type NotGiven    CBC auto differential   Result Value Ref Range    WBC 9.2 4.0 - 11.0 K/uL    RBC 4.68 4.00 - 5.20 M/uL    Hemoglobin 14.6 12.0 - 16.0 g/dL    Hematocrit 42.5 36.0 - 48.0 %    MCV 90.8 80.0 - 100.0 fL    MCH 31.3 26.0 - 34.0 pg    MCHC 34.5 31.0 - 36.0 g/dL    RDW 13.6 12.4 - 15.4 %    Platelets 925 440 - 142 K/uL    MPV 7.2 5.0 - 10.5 fL    Neutrophils % 52.9 %    Lymphocytes % 36.3 %    Monocytes % 8.1 %    Eosinophils % 1.9 %    Basophils % 0.8 %    Neutrophils Absolute 4.9 1.7 - 7.7 K/uL    Lymphocytes Absolute 3.3 1.0 - 5.1 K/uL    Monocytes Absolute 0.7 0.0 - 1.3 K/uL    Eosinophils Absolute 0.2 0.0 - 0.6 K/uL    Basophils Absolute 0.1 0.0 - 0.2 K/uL   Basic Metabolic Panel   Result Value Ref Range    Sodium 135 (L) 136 - 145 mmol/L    Potassium 4.0 3.5 - 5.1 mmol/L    Chloride 98 (L) 99 - 110 mmol/L    CO2 23 21 - 32 mmol/L    Anion Gap 14 3 - 16    Glucose 95 70 - 99 mg/dL    BUN 18 7 - 20 mg/dL    CREATININE 0.6 0.6 - 1.2 mg/dL    GFR Non-African American >60 >60    GFR African American >60 >60    Calcium 9.8 8.3 - 10.6 mg/dL   Hepatic function panel (LFTs)   Result Value Ref Range    Total Protein 7.3 6.4 - 8.2 g/dL    Albumin 4.7 3.4 - 5.0 g/dL    Alkaline Phosphatase 99 40 - 129 U/L    ALT 30 10 - 40 U/L    AST 22 15 - 37 U/L    Total Bilirubin 0.4 0.0 - 1.0 mg/dL    Bilirubin, Direct <0.2 0.0 - 0.3 mg/dL    Bilirubin, Indirect see below 0.0 - 1.0 mg/dL   Lipase   Result Value Ref Range    Lipase 34.0 13.0 - 60.0 U/L   Microscopic Urinalysis   Result Value Ref Range    WBC, UA 6-9 (A) 0 - 5 /HPF    RBC, UA 0-2 0 - 4 /HPF    Epithelial Cells, UA 6-10 (A) 0 - 5 /HPF     RECENT VITALS:  BP: (!) 178/101, Temp: 97.8 °F (36.6 °C), Pulse: 82, Resp: 26, SpO2: 99 %       ED Course     Nursing Notes, Past Medical Hx, Past Surgical Hx, Social Hx, Allergies, and Family Hx were reviewed.     The patient was given the following medications:  Orders Placed This Encounter   Medications    0.9 % sodium chloride bolus    morphine injection 4 mg    ondansetron (ZOFRAN) injection 4 mg    iopamidol (ISOVUE-370) 76 % injection 80 mL    DISCONTD: methocarbamol (ROBAXIN) 500 MG tablet     Sig: Take 1 tablet by mouth 4 times daily for 10 days     Dispense:  40 tablet     Refill:  0    methocarbamol (ROBAXIN) 500 MG tablet     Sig: Take 1 tablet by mouth 4 times daily as needed (muscle pain/tightness)     Dispense:  30 tablet     Refill:  0            CONSULTS:  None    MEDICAL DECISION MAKING / ASSESSMENT / PLAN     Candace Torres is a 77 y.o. female who presents with complaints as noted in HPI. Patient presents to the emergency department with a complaint of sudden onset of right-sided flank/abdominal pain earlier this afternoon, which progressively worsened. On presentation she describes a constant pain with the waxing and waning component, it is worsened with position change/twisting. Given reproducible nature of the pain, there is concerns for possible musculoskeletal injury versus cholelithiasis versus nephrolithiasis. Patient had an IV placed the appropriate lab work was drawn including a CBC, MP 1, LFTs, lipase, urinalysis; lipase unremarkable, LFTs unremarkable, urinalysis with 6-9 WBCs however contaminated sample with 6-10 epithelial cells as well, negative for nitrites. BMP with sodium 135, creatinine 0.6. CBC without leukocytosis, stable H&H of 14.6/42. 6. CT scan of the abdomen and pelvis with IV contrast was obtained which shows no evidence of acute abdominal/pelvic process. Patient's pain favored to be musculoskeletal in nature at this time. She will be instructed on warm compresses, as well as continue Tylenol use. Prescription was provided for Robaxin to help with muscle tightness and inflammation. Patient verbalized understanding of discharge instructions as well as strict return precautions. Follow-up with your PCP in 1 to 2 weeks if not feeling improved. This patient was also evaluated by the attending physician. All care plans were discussed and agreed upon. Clinical Impression     1.  Right flank pain        Disposition

## 2021-03-25 NOTE — ED PROVIDER NOTES
ED Attending Attestation Note     Date of evaluation: 3/24/2021    This patient was seen by the advance practice provider. I have seen and examined the patient, agree with the workup, evaluation, management and diagnosis. The care plan has been discussed. My assessment reveals a well-appearing woman in no acute distress with a soft, nontender nondistended abdomen. She has minimal tenderness of her latissimus muscle. No skin lesions.      Pro Pickens MD  03/24/21 2043

## 2021-03-29 ENCOUNTER — TELEPHONE (OUTPATIENT)
Dept: FAMILY MEDICINE CLINIC | Age: 66
End: 2021-03-29

## 2021-03-29 NOTE — TELEPHONE ENCOUNTER
Please advise     ----- Message from Libertadchrissy Gironley sent at 3/29/2021 11:14 AM EDT -----  Subject: Message to Provider    QUESTIONS  Information for Provider? Patient wants to know if its safe for her to   take the covid vaccine since she is on a lot of medication.  ---------------------------------------------------------------------------  --------------  CALL BACK INFO  What is the best way for the office to contact you? OK to leave message on   voicemail  Preferred Call Back Phone Number? [phone]  ---------------------------------------------------------------------------  --------------  SCRIPT ANSWERS  Relationship to Patient?  Self

## 2021-03-31 ENCOUNTER — IMMUNIZATION (OUTPATIENT)
Dept: FAMILY MEDICINE CLINIC | Age: 66
End: 2021-03-31
Payer: MEDICARE

## 2021-03-31 PROCEDURE — 0001A COVID-19, PFIZER VACCINE 30MCG/0.3ML DOSE: CPT | Performed by: FAMILY MEDICINE

## 2021-03-31 PROCEDURE — 91300 COVID-19, PFIZER VACCINE 30MCG/0.3ML DOSE: CPT | Performed by: FAMILY MEDICINE

## 2021-04-06 ENCOUNTER — HOSPITAL ENCOUNTER (OUTPATIENT)
Age: 66
Discharge: HOME | End: 2021-04-06
Payer: MEDICARE

## 2021-04-06 ENCOUNTER — OFFICE VISIT (OUTPATIENT)
Dept: FAMILY MEDICINE CLINIC | Age: 66
End: 2021-04-06
Payer: MEDICARE

## 2021-04-06 ENCOUNTER — HOSPITAL ENCOUNTER (OUTPATIENT)
Dept: GENERAL RADIOLOGY | Age: 66
Discharge: HOME | End: 2021-04-06
Payer: MEDICARE

## 2021-04-06 VITALS
OXYGEN SATURATION: 95 % | WEIGHT: 258 LBS | TEMPERATURE: 97.4 F | BODY MASS INDEX: 45.71 KG/M2 | HEART RATE: 74 BPM | DIASTOLIC BLOOD PRESSURE: 80 MMHG | SYSTOLIC BLOOD PRESSURE: 120 MMHG | HEIGHT: 63 IN

## 2021-04-06 DIAGNOSIS — R63.5 WEIGHT GAIN: ICD-10-CM

## 2021-04-06 DIAGNOSIS — M54.50 CHRONIC MIDLINE LOW BACK PAIN WITHOUT SCIATICA: Primary | ICD-10-CM

## 2021-04-06 DIAGNOSIS — R05.3 CHRONIC COUGH: ICD-10-CM

## 2021-04-06 DIAGNOSIS — R10.13 EPIGASTRIC PAIN: ICD-10-CM

## 2021-04-06 DIAGNOSIS — I10 ESSENTIAL HYPERTENSION: ICD-10-CM

## 2021-04-06 DIAGNOSIS — G89.29 CHRONIC MIDLINE LOW BACK PAIN WITHOUT SCIATICA: Primary | ICD-10-CM

## 2021-04-06 LAB — TSH REFLEX: 1.95 UIU/ML (ref 0.27–4.2)

## 2021-04-06 PROCEDURE — 1036F TOBACCO NON-USER: CPT | Performed by: INTERNAL MEDICINE

## 2021-04-06 PROCEDURE — 71046 X-RAY EXAM CHEST 2 VIEWS: CPT

## 2021-04-06 PROCEDURE — G8399 PT W/DXA RESULTS DOCUMENT: HCPCS | Performed by: INTERNAL MEDICINE

## 2021-04-06 PROCEDURE — 4040F PNEUMOC VAC/ADMIN/RCVD: CPT | Performed by: INTERNAL MEDICINE

## 2021-04-06 PROCEDURE — G8417 CALC BMI ABV UP PARAM F/U: HCPCS | Performed by: INTERNAL MEDICINE

## 2021-04-06 PROCEDURE — 1123F ACP DISCUSS/DSCN MKR DOCD: CPT | Performed by: INTERNAL MEDICINE

## 2021-04-06 PROCEDURE — 3017F COLORECTAL CA SCREEN DOC REV: CPT | Performed by: INTERNAL MEDICINE

## 2021-04-06 PROCEDURE — 99214 OFFICE O/P EST MOD 30 MIN: CPT | Performed by: INTERNAL MEDICINE

## 2021-04-06 PROCEDURE — 1090F PRES/ABSN URINE INCON ASSESS: CPT | Performed by: INTERNAL MEDICINE

## 2021-04-06 PROCEDURE — G8427 DOCREV CUR MEDS BY ELIG CLIN: HCPCS | Performed by: INTERNAL MEDICINE

## 2021-04-06 ASSESSMENT — ENCOUNTER SYMPTOMS
ABDOMINAL PAIN: 0
CHEST TIGHTNESS: 0
VOMITING: 0
NAUSEA: 0
SHORTNESS OF BREATH: 0

## 2021-04-06 ASSESSMENT — PATIENT HEALTH QUESTIONNAIRE - PHQ9
SUM OF ALL RESPONSES TO PHQ QUESTIONS 1-9: 0
2. FEELING DOWN, DEPRESSED OR HOPELESS: 0
SUM OF ALL RESPONSES TO PHQ9 QUESTIONS 1 & 2: 0
SUM OF ALL RESPONSES TO PHQ QUESTIONS 1-9: 0

## 2021-04-06 NOTE — PROGRESS NOTES
tobacco.  ETOH:   reports no history of alcohol use. Current Medications:    Prior to Admission medications    Medication Sig Start Date End Date Taking? Authorizing Provider   metoprolol succinate (TOPROL XL) 25 MG extended release tablet TAKE 1 TABLET BY MOUTH TWICE A DAY 3/11/21  Yes LISA Almonte CNP   pantoprazole (PROTONIX) 40 MG tablet TAKE 1 TABLET BY MOUTH TWICE A DAY 3/9/21  Yes Mercy Riley MD   Metoprolol Succinate 25 MG CS24 Take 25 mg by mouth daily 3/3/21  Yes Ginger Isaacs MD   losartan (COZAAR) 50 MG tablet Take 1 tablet by mouth daily 3/3/21  Yes Ginger Isaacs MD   verapamil (CALAN SR) 120 MG extended release tablet Take 1 tablet by mouth nightly Do not crush or chew. 3/3/21  Yes Ginger Isaacs MD   pravastatin (PRAVACHOL) 20 MG tablet Take 1 tablet by mouth daily 3/3/21  Yes Ginger Isaacs MD   vitamin B-12 (CYANOCOBALAMIN) 1000 MCG tablet  1/1/21  Yes Historical Provider, MD   Turmeric 500 MG CAPS  1/1/21  Yes Historical Provider, MD   gabapentin (NEURONTIN) 100 MG capsule Take 100 mg by mouth nightly. 12/4/20  Yes Historical Provider, MD   buPROPion (WELLBUTRIN XL) 300 MG extended release tablet TAKE 1 TABLET BY MOUTH EVERY DAY IN THE MORNING 2/18/21  Yes Mercy Riley MD   rosuvastatin (CRESTOR) 20 MG tablet Take 1 tablet by mouth nightly 2/10/21  Yes Mercy Riley MD   aspirin EC 81 MG EC tablet Take 1 tablet by mouth daily 2/10/21  Yes Mercy Riley MD   clotrimazole-betamethasone (LOTRISONE) 1-0.05 % cream Apply topically 2 times daily. 8/3/20  Yes LISA Bautista - CNP   montelukast (SINGULAIR) 10 MG tablet TAKE 1 TABLET BY MOUTH NIGHTLY 5/29/20  Yes Bernice Benoit MD   acetaminophen (TYLENOL) 500 MG tablet Take 500 mg by mouth every 6 hours as needed for Pain   Yes Historical Provider, MD   clotrimazole-betamethasone (LOTRISONE) 1-0.05 % cream Apply topically 2 times daily Apply topically 2 times daily.  8/13/19  Yes Sirisha Mackey MD   Omega-3 Fatty Acids (FISH OIL) 1200 MG CAPS Take 1,200 mg by mouth 2 times daily   Yes Historical Provider, MD   Multiple Vitamins-Minerals (CENTRUM SILVER PO) Take 1 tablet by mouth daily    Yes Historical Provider, MD       REVIEW OF SYSTEMS:  Review of Systems   Constitutional: Negative for activity change, appetite change, chills, fever and unexpected weight change. HENT: Negative for hearing loss. Eyes: Negative for visual disturbance. Respiratory: Negative for chest tightness and shortness of breath. Cardiovascular: Negative for chest pain. Gastrointestinal: Negative for abdominal pain, nausea and vomiting. Genitourinary: Negative for hematuria. Skin: Negative for rash. Allergic/Immunologic: Negative for immunocompromised state. Neurological: Negative for dizziness and headaches. Psychiatric/Behavioral: Negative for dysphoric mood and suicidal ideas. Physical Exam:      Vitals: /80   Pulse 74   Temp 97.4 °F (36.3 °C)   Ht 5' 3\" (1.6 m)   Wt 258 lb (117 kg)   LMP  (LMP Unknown)   SpO2 95%   BMI 45.70 kg/m²     Body mass index is 45.7 kg/m². Wt Readings from Last 3 Encounters:   04/06/21 258 lb (117 kg)   03/03/21 257 lb 6.4 oz (116.8 kg)   02/25/21 253 lb (114.8 kg)     Physical Exam  Constitutional:       General: She is not in acute distress. Appearance: She is well-developed. She is not diaphoretic. HENT:      Head: Normocephalic and atraumatic. Mouth/Throat:      Pharynx: No oropharyngeal exudate. Eyes:      General: No scleral icterus. Right eye: No discharge. Left eye: No discharge. Conjunctiva/sclera: Conjunctivae normal.   Neck:      Musculoskeletal: Normal range of motion and neck supple. Cardiovascular:      Rate and Rhythm: Normal rate. Heart sounds: Normal heart sounds. No murmur. Pulmonary:      Effort: Pulmonary effort is normal. No respiratory distress.       Breath sounds: Normal breath sounds. No wheezing or rales. Abdominal:      General: There is no distension. Palpations: Abdomen is soft. Tenderness: There is no abdominal tenderness. There is no guarding. Musculoskeletal:         General: No deformity. Comments: Lower extremities-   No sensory motor deficit  SLR neg b/l      Lymphadenopathy:      Head:      Right side of head: No submental or submandibular adenopathy. Left side of head: No submental or submandibular adenopathy. Cervical: No cervical adenopathy. Right cervical: No superficial or deep cervical adenopathy. Left cervical: No superficial or deep cervical adenopathy. Skin:     Findings: No rash. Neurological:      Mental Status: She is alert and oriented to person, place, and time. GCS: GCS eye subscore is 4. GCS verbal subscore is 5. GCS motor subscore is 6. Motor: No abnormal muscle tone. Deep Tendon Reflexes: Reflexes are normal and symmetric. Psychiatric:         Behavior: Behavior normal.         Thought Content: Thought content normal.        Assessment/Plan:   Burak Banks was seen today for weight gain and back pain. Diagnoses and all orders for this visit:    Chronic midline low back pain without sciatica  Chronic pain  Worse   No red flags  Do MRI which pt postponed   F/u with results   -     MRI LUMBAR SPINE [address]; Future    Chronic cough  Hx of remote smoking and histoplasmosis  Repeat CXR   If neg - consider CT chest   -     XR CHEST (2 VW); Future    Weight gain  Check thyroid first  consider further work up accordingly   -     TSH with Reflex; Future    Epigastric pain  Ongoing pain  Referral to GI for f/u   -     [facility]FL - Zhane Reilly MD, Gastroenterology, [facility]    Essential hypertension  Well controlled- no changes in medication today.      - Patient was encouraged to call the office (and ask to see me) or be seen by other provider for any worsening or lack of improvement of the symptoms or

## 2021-04-11 DIAGNOSIS — R05.3 CHRONIC COUGH: Primary | ICD-10-CM

## 2021-04-11 RX ORDER — LORATADINE 10 MG/1
10 TABLET ORAL DAILY
Qty: 30 TABLET | Refills: 2 | Status: SHIPPED | OUTPATIENT
Start: 2021-04-11 | End: 2021-05-07

## 2021-04-11 RX ORDER — FLUTICASONE PROPIONATE 50 MCG
1 SPRAY, SUSPENSION (ML) NASAL DAILY
Qty: 2 BOTTLE | Refills: 1 | Status: SHIPPED | OUTPATIENT
Start: 2021-04-11 | End: 2021-05-28

## 2021-04-19 DIAGNOSIS — I10 ESSENTIAL HYPERTENSION: ICD-10-CM

## 2021-04-20 RX ORDER — METOPROLOL SUCCINATE 25 MG/1
TABLET, EXTENDED RELEASE ORAL
Qty: 180 TABLET | Refills: 3 | Status: SHIPPED | OUTPATIENT
Start: 2021-04-20 | End: 2021-05-28

## 2021-04-21 ENCOUNTER — IMMUNIZATION (OUTPATIENT)
Dept: FAMILY MEDICINE CLINIC | Age: 66
End: 2021-04-21
Payer: MEDICARE

## 2021-04-21 ENCOUNTER — HOSPITAL ENCOUNTER (OUTPATIENT)
Dept: MRI IMAGING | Age: 66
Discharge: HOME | End: 2021-04-21
Payer: MEDICARE

## 2021-04-21 DIAGNOSIS — G89.29 CHRONIC MIDLINE LOW BACK PAIN WITHOUT SCIATICA: ICD-10-CM

## 2021-04-21 DIAGNOSIS — M54.50 CHRONIC MIDLINE LOW BACK PAIN WITHOUT SCIATICA: ICD-10-CM

## 2021-04-21 PROCEDURE — 0002A COVID-19, PFIZER VACCINE 30MCG/0.3ML DOSE: CPT | Performed by: FAMILY MEDICINE

## 2021-04-21 PROCEDURE — 72148 MRI LUMBAR SPINE W/O DYE: CPT

## 2021-04-21 PROCEDURE — 91300 COVID-19, PFIZER VACCINE 30MCG/0.3ML DOSE: CPT | Performed by: FAMILY MEDICINE

## 2021-04-22 DIAGNOSIS — M15.9 GENERALIZED OSTEOARTHROSIS, INVOLVING MULTIPLE SITES: Primary | ICD-10-CM

## 2021-05-06 DIAGNOSIS — R05.3 CHRONIC COUGH: ICD-10-CM

## 2021-05-07 RX ORDER — LORATADINE 10 MG/1
TABLET ORAL
Qty: 30 TABLET | Refills: 2 | Status: ON HOLD | OUTPATIENT
Start: 2021-05-07 | End: 2021-08-03

## 2021-05-13 RX ORDER — GABAPENTIN 100 MG/1
CAPSULE ORAL
Qty: 30 CAPSULE | Refills: 3 | Status: SHIPPED | OUTPATIENT
Start: 2021-05-13 | End: 2021-05-28

## 2021-05-28 ENCOUNTER — OFFICE VISIT (OUTPATIENT)
Dept: INTERNAL MEDICINE CLINIC | Age: 66
End: 2021-05-28
Payer: MEDICARE

## 2021-05-28 VITALS
TEMPERATURE: 98 F | BODY MASS INDEX: 46.25 KG/M2 | WEIGHT: 261 LBS | RESPIRATION RATE: 16 BRPM | HEART RATE: 56 BPM | SYSTOLIC BLOOD PRESSURE: 140 MMHG | HEIGHT: 63 IN | OXYGEN SATURATION: 96 % | DIASTOLIC BLOOD PRESSURE: 82 MMHG

## 2021-05-28 DIAGNOSIS — I10 ESSENTIAL HYPERTENSION: ICD-10-CM

## 2021-05-28 DIAGNOSIS — F32.4 MAJOR DEPRESSIVE DISORDER WITH SINGLE EPISODE, IN PARTIAL REMISSION (HCC): Primary | ICD-10-CM

## 2021-05-28 DIAGNOSIS — E66.01 MORBIDLY OBESE (HCC): ICD-10-CM

## 2021-05-28 DIAGNOSIS — M54.50 CHRONIC BILATERAL LOW BACK PAIN WITHOUT SCIATICA: ICD-10-CM

## 2021-05-28 DIAGNOSIS — G89.29 CHRONIC BILATERAL LOW BACK PAIN WITHOUT SCIATICA: ICD-10-CM

## 2021-05-28 PROCEDURE — G8427 DOCREV CUR MEDS BY ELIG CLIN: HCPCS | Performed by: INTERNAL MEDICINE

## 2021-05-28 PROCEDURE — G8399 PT W/DXA RESULTS DOCUMENT: HCPCS | Performed by: INTERNAL MEDICINE

## 2021-05-28 PROCEDURE — G8417 CALC BMI ABV UP PARAM F/U: HCPCS | Performed by: INTERNAL MEDICINE

## 2021-05-28 PROCEDURE — 4040F PNEUMOC VAC/ADMIN/RCVD: CPT | Performed by: INTERNAL MEDICINE

## 2021-05-28 PROCEDURE — 1036F TOBACCO NON-USER: CPT | Performed by: INTERNAL MEDICINE

## 2021-05-28 PROCEDURE — 99214 OFFICE O/P EST MOD 30 MIN: CPT | Performed by: INTERNAL MEDICINE

## 2021-05-28 PROCEDURE — 1123F ACP DISCUSS/DSCN MKR DOCD: CPT | Performed by: INTERNAL MEDICINE

## 2021-05-28 PROCEDURE — 1090F PRES/ABSN URINE INCON ASSESS: CPT | Performed by: INTERNAL MEDICINE

## 2021-05-28 PROCEDURE — 3017F COLORECTAL CA SCREEN DOC REV: CPT | Performed by: INTERNAL MEDICINE

## 2021-05-28 RX ORDER — BUPROPION HYDROCHLORIDE 150 MG/1
150 TABLET ORAL EVERY MORNING
Qty: 30 TABLET | Refills: 3
Start: 2021-05-28 | End: 2021-09-16

## 2021-05-28 RX ORDER — GABAPENTIN 300 MG/1
300 CAPSULE ORAL 3 TIMES DAILY
Qty: 90 CAPSULE | Refills: 0 | Status: SHIPPED | OUTPATIENT
Start: 2021-05-28 | End: 2021-06-15

## 2021-05-28 RX ORDER — METOPROLOL SUCCINATE 25 MG/1
25 TABLET, EXTENDED RELEASE ORAL DAILY
Qty: 90 TABLET | Refills: 0
Start: 2021-05-28 | End: 2022-10-26

## 2021-05-28 RX ORDER — BUPROPION HYDROCHLORIDE 150 MG/1
150 TABLET ORAL EVERY MORNING
Qty: 30 TABLET | Refills: 3 | Status: SHIPPED | OUTPATIENT
Start: 2021-05-28 | End: 2021-05-28

## 2021-05-28 SDOH — ECONOMIC STABILITY: FOOD INSECURITY: WITHIN THE PAST 12 MONTHS, YOU WORRIED THAT YOUR FOOD WOULD RUN OUT BEFORE YOU GOT MONEY TO BUY MORE.: NEVER TRUE

## 2021-05-28 ASSESSMENT — SOCIAL DETERMINANTS OF HEALTH (SDOH): HOW HARD IS IT FOR YOU TO PAY FOR THE VERY BASICS LIKE FOOD, HOUSING, MEDICAL CARE, AND HEATING?: NOT HARD AT ALL

## 2021-05-28 NOTE — PROGRESS NOTES
Alejandro Hutton (:  1955) is a 77 y.o. female, here for evaluation of the following chief complaint(s):    Established New Doctor (Seeing neurologist for spondolosis of back. Has not taken blood presuure medication yet this morning.)      ASSESSMENT/PLAN:  1. Major depressive disorder with single episode, 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[address]  -  Well controlled. Will reduce to buPROPion (WELLBUTRIN XL) 150 MG extended release tablet; Take 1 tablet by mouth every morning, Disp-30 tablet, R-3NO PRINT  2. Morbidly obese ([address]  Patient will work on diet and exercise. 3. Essential hypertension. Also with atrial tachycardia. Reasonable control on losartan, metoprolol, and verapamil    4. Chronic bilateral low back pain without sciatica  Symptoms not well controlled. Neurosurgery appt pending. MRI Lumbar reviewed. Using lumbar corset. Will increase gabapentin to 300 mg 3 times daily  -     gabapentin (NEURONTIN) 300 MG capsule; Take 1 capsule by mouth 3 times daily for 30 days. , Disp-90 capsule, R-0Normal  5. Family history of breast cancer-advised checking to see if eligible for genetic testing  6. Epigastric pain - EGD pending    Return in about 3 months (around 2021). SUBJECTIVE/OBJECTIVE:  HPI   Patient is here to transfer care. Chart reviewed and updated. Overall she feels in her usual state of health. Her depression has been well controlled. She started Wellbutrin for hot flashes and this was many years ago. She continues with low back pain for which current dose of gabapentin is only partially effective. Review of Systems   Constitutional: Positive for unexpected weight change. HENT: Nosebleeds: epigastric. Respiratory: Negative for shortness of breath. Cardiovascular: Negative for chest pain. Gastrointestinal: Positive for abdominal pain. Musculoskeletal: Positive for back pain and neck pain. Neurological: Negative for headaches (doing better).    Psychiatric/Behavioral: Negative for dysphoric mood. Past Medical History:   Diagnosis Date    Allergic rhinitis     Anxiety     Atrial tachycardia ([address]     dr Lloyd Aiken ([facility])    Carpal tunnel syndrome     Cervicalgia     Chronic back pain     low    Depression     GERD (gastroesophageal reflux disease)     has schatzki ring    Headache(784.0)     hemiplegic migraines, improved    Hernia hiatal     Histoplasmosis     Hot flashes     takes wellbutrin    Hyperlipidemia     Hypertension     Mitral (valve) prolapse     Obesity     Osteoarthritis     multiple joints    Shingles     nerve pain in her low back persists    Sleep apnea     CPAP set of 4       Current Outpatient Medications   Medication Sig Dispense Refill    metoprolol succinate (TOPROL XL) 25 MG extended release tablet Take 1 tablet by mouth daily 90 tablet 0    gabapentin (NEURONTIN) 300 MG capsule Take 1 capsule by mouth 3 times daily for 30 days. 90 capsule 0    buPROPion (WELLBUTRIN XL) 150 MG extended release tablet Take 1 tablet by mouth every morning 30 tablet 3    loratadine (CLARITIN) 10 MG tablet TAKE 1 TABLET BY MOUTH EVERY DAY 30 tablet 2    pantoprazole (PROTONIX) 40 MG tablet TAKE 1 TABLET BY MOUTH TWICE A  tablet 2    losartan (COZAAR) 50 MG tablet Take 1 tablet by mouth daily 90 tablet 3    verapamil (CALAN SR) 120 MG extended release tablet Take 1 tablet by mouth nightly Do not crush or chew. 90 tablet 3    pravastatin (PRAVACHOL) 20 MG tablet Take 1 tablet by mouth daily 90 tablet 3    vitamin B-12 (CYANOCOBALAMIN) 1000 MCG tablet       Turmeric 500 MG CAPS       aspirin EC 81 MG EC tablet Take 1 tablet by mouth daily 90 tablet 1    clotrimazole-betamethasone (LOTRISONE) 1-0.05 % cream Apply topically 2 times daily.  45 g 0    acetaminophen (TYLENOL) 500 MG tablet Take 500 mg by mouth every 6 hours as needed for Pain      Omega-3 Fatty Acids (FISH OIL) 1200 MG CAPS Take 1,200 mg by mouth 2 times daily      Multiple Vitamins-Minerals (CENTRUM SILVER PO) Take 1 tablet by mouth daily        No current facility-administered medications for this visit. Physical Exam  Vitals reviewed. Constitutional:       General: She is not in acute distress. HENT:      Head: Normocephalic and atraumatic. Eyes:      Extraocular Movements: Extraocular movements intact. Cardiovascular:      Rate and Rhythm: Normal rate and regular rhythm. Pulmonary:      Effort: Pulmonary effort is normal.      Breath sounds: Normal breath sounds. Abdominal:      General: Abdomen is flat. Bowel sounds are normal.   Musculoskeletal:      Right lower leg: No edema. Left lower leg: No edema. Neurological:      General: No focal deficit present. Mental Status: She is alert and oriented to person, place, and time. Psychiatric:         Mood and Affect: Mood normal.               This note was generated completely or in part utilizing Dragon dictation speech recognition software. Occasionally, words are mistranscribed and despite editing, the text may contain inaccuracies due to incorrect word recognition. If further clarification is needed please contact the office at [phone]          An electronic signature was used to authenticate this note.     --Tyra Riley MD

## 2021-05-28 NOTE — PATIENT INSTRUCTIONS
[facility] at a pharmacy-    Genetic testing for breast cancer    Increase to Gabapentin 300 mg 3/day  Reduce Wellbutrin to 150 mg daily

## 2021-05-31 ASSESSMENT — ENCOUNTER SYMPTOMS
BACK PAIN: 1
ABDOMINAL PAIN: 1
SHORTNESS OF BREATH: 0

## 2021-06-14 DIAGNOSIS — M54.50 CHRONIC BILATERAL LOW BACK PAIN WITHOUT SCIATICA: ICD-10-CM

## 2021-06-14 DIAGNOSIS — G89.29 CHRONIC BILATERAL LOW BACK PAIN WITHOUT SCIATICA: ICD-10-CM

## 2021-06-15 RX ORDER — GABAPENTIN 300 MG/1
300 CAPSULE ORAL 3 TIMES DAILY
Qty: 90 CAPSULE | Refills: 0 | Status: SHIPPED | OUTPATIENT
Start: 2021-06-15 | End: 2021-09-03

## 2021-07-16 ENCOUNTER — OFFICE VISIT (OUTPATIENT)
Dept: INTERNAL MEDICINE CLINIC | Age: 66
End: 2021-07-16
Payer: MEDICARE

## 2021-07-16 VITALS
HEIGHT: 63 IN | RESPIRATION RATE: 16 BRPM | SYSTOLIC BLOOD PRESSURE: 136 MMHG | DIASTOLIC BLOOD PRESSURE: 78 MMHG | WEIGHT: 257 LBS | BODY MASS INDEX: 45.54 KG/M2 | TEMPERATURE: 98 F | OXYGEN SATURATION: 95 % | HEART RATE: 60 BPM

## 2021-07-16 DIAGNOSIS — Z90.2 S/P PARTIAL LOBECTOMY OF LUNG: ICD-10-CM

## 2021-07-16 DIAGNOSIS — G08 INTRACRANIAL AND INTRASPINAL PHLEBITIS AND THROMBOPHLEBITIS: ICD-10-CM

## 2021-07-16 DIAGNOSIS — Z01.818 PREOP EXAMINATION: Primary | ICD-10-CM

## 2021-07-16 PROCEDURE — 1090F PRES/ABSN URINE INCON ASSESS: CPT | Performed by: INTERNAL MEDICINE

## 2021-07-16 PROCEDURE — 99214 OFFICE O/P EST MOD 30 MIN: CPT | Performed by: INTERNAL MEDICINE

## 2021-07-16 PROCEDURE — G8417 CALC BMI ABV UP PARAM F/U: HCPCS | Performed by: INTERNAL MEDICINE

## 2021-07-16 PROCEDURE — G8427 DOCREV CUR MEDS BY ELIG CLIN: HCPCS | Performed by: INTERNAL MEDICINE

## 2021-07-16 RX ORDER — CLOTRIMAZOLE AND BETAMETHASONE DIPROPIONATE 10; .64 MG/G; MG/G
CREAM TOPICAL
Qty: 45 G | Refills: 0 | Status: SHIPPED | OUTPATIENT
Start: 2021-07-16 | End: 2021-12-13

## 2021-07-16 ASSESSMENT — ENCOUNTER SYMPTOMS
SORE THROAT: 0
BACK PAIN: 1
SHORTNESS OF BREATH: 0
DIARRHEA: 0
COUGH: 0
ABDOMINAL PAIN: 0
RHINORRHEA: 0
NAUSEA: 0
TROUBLE SWALLOWING: 0

## 2021-07-16 NOTE — PROGRESS NOTES
Preoperative Consultation      Guru Schroeder  YOB: 1955    Date of Service:  7/16/2021    Vitals:    07/16/21 1045   BP: 136/78   Site: Right Upper Arm   Position: Sitting   Cuff Size: Large Adult   Pulse: 60  Comment: Regular   Resp: 16   Temp: 98 °F (36.7 °C)   TempSrc: Oral   SpO2: 95%  Comment: Room Air   Weight: 257 lb (116.6 kg)   Height: 5' 3\" (1.6 m)           Chief Complaint   Patient presents with    Pre-op Exam     Scheduled for lumbar infusion on 07/29/2021 by Dr. Steven Morrissey at [facility]..  Schedule  for cardiology visit on 07/21/2021. HPI:    This patient presents tothe office today for a preoperative consultation at the request of surgeon, Dr. Freddy Evans, who plans on performing L4-S1 anterior lumbar interbody fusion with pedicle screw fixation on July 29 at [place] [address].  The current problem began 4 years ago and symptoms have been worsening with time. Conservative therapy: not effective.     Planned anesthesia: General   Known anesthesia problems: gets nausea and vomiting with anesthesia and has required Zofran  Bleeding risk: No recent or remote history of abnormal bleeding  Personal or FH of DVT/PE: No   Recent steroid use: yes - Medrol pack 7/9  Exercise tolerance: greater than 4 METs  Patient objection to receiving blood products: No      Past Medical History:   Diagnosis Date    Allergic rhinitis     Anxiety     Atrial tachycardia ([address]     dr Kimberlee Carey ([facility] cors)    Carpal tunnel syndrome     Cervicalgia     Chronic back pain     low    Depression     GERD (gastroesophageal reflux disease)     has schatzki ring    Headache(784.0)     hemiplegic migraines, improved    Hernia hiatal     Histoplasmosis     Hot flashes     takes wellbutrin    Hyperlipidemia     Hypertension     Obesity     Osteoarthritis     multiple joints    Shingles     nerve pain in her low back persists    Sleep apnea     CPAP set of 4    Tricuspid and shortness of breath. Cardiovascular: Negative for chest pain and palpitations. Gastrointestinal: Negative for abdominal pain, diarrhea and nausea. Genitourinary: Negative for decreased urine volume, dysuria and frequency. Musculoskeletal: Positive for arthralgias, back pain and neck pain. Negative for myalgias. Skin: Negative for rash. Allergic/Immunologic: Negative for immunocompromised state. Neurological: Negative for dizziness, numbness and headaches. Hematological: Does not bruise/bleed easily. Psychiatric/Behavioral: Positive for dysphoric mood (on medication). Negative for sleep disturbance. The patient is not nervous/anxious. Physical Exam  Vitals reviewed. Constitutional:       General: She is not in acute distress. HENT:      Head: Normocephalic and atraumatic. Mouth/Throat:      Pharynx: Oropharynx is clear. No oropharyngeal exudate. Eyes:      Extraocular Movements: Extraocular movements intact. Neck:      Vascular: No carotid bruit. Cardiovascular:      Rate and Rhythm: Normal rate and regular rhythm. Heart sounds: Murmur heard. Pulmonary:      Effort: Pulmonary effort is normal.      Breath sounds: Normal breath sounds. Abdominal:      General: Abdomen is flat. Bowel sounds are normal.   Musculoskeletal:      Right lower leg: No edema. Left lower leg: No edema. Neurological:      General: No focal deficit present. Mental Status: She is alert and oriented to person, place, and time. Cranial Nerves: No cranial nerve deficit. Psychiatric:         Mood and Affect: Mood normal.                Assessment/Plan:     1. Preop examination  History and physical above. - Comprehensive Metabolic Panel; Future  - CBC; Future  - PT/INR  - APTT;  Future  - Full PFT Study With Bronchodilator; -ho partial lobectomy       Pre-Operative Risk assessment using 2014 ACC/AHA guidelines     Emergent procedure No  Active Cardiac Condition Yes - atrial tachycardia, followed by electrophysiology  Risk Level of Procedure Intermediate Risk (intraperitoneal, intrathoracic, HENT, orthopedic, or carotid endarterectomy, etc.)  Revised Cardiac Risk Index Risk factors: [facility] in 2020 normal cors  Measurement of Exercise Tolerance before Surgery >4 Yes    Patient will be seeing Cardiology 7/21/21 for EKG and cardiac assessment. Medications recommended to continue should be taken with a sip of water even when NPO on day of surgery include: Metoprolol and Verapamil.

## 2021-07-20 ENCOUNTER — PATIENT MESSAGE (OUTPATIENT)
Dept: INTERNAL MEDICINE CLINIC | Age: 66
End: 2021-07-20

## 2021-07-20 NOTE — TELEPHONE ENCOUNTER
From: Diamond Peralta  To: oTnya Benoit MD  Sent: 7/20/2021 12:34 PM EDT  Subject: Non-Urgent Medical Question    Just wondering if you got my message on what to take for my rosea .  it is very bad thank you

## 2021-07-21 ENCOUNTER — HOSPITAL ENCOUNTER (OUTPATIENT)
Dept: PULMONOLOGY | Age: 66
Discharge: HOME | End: 2021-07-21
Payer: MEDICARE

## 2021-07-21 ENCOUNTER — OFFICE VISIT (OUTPATIENT)
Dept: CARDIOLOGY CLINIC | Age: 66
End: 2021-07-21
Payer: MEDICARE

## 2021-07-21 VITALS
SYSTOLIC BLOOD PRESSURE: 122 MMHG | BODY MASS INDEX: 46.02 KG/M2 | DIASTOLIC BLOOD PRESSURE: 70 MMHG | WEIGHT: 259.8 LBS | HEART RATE: 58 BPM

## 2021-07-21 DIAGNOSIS — Z01.818 PRE-OP EVALUATION: Primary | ICD-10-CM

## 2021-07-21 DIAGNOSIS — Z90.2 S/P PARTIAL LOBECTOMY OF LUNG: ICD-10-CM

## 2021-07-21 DIAGNOSIS — I47.1 ATRIAL TACHYCARDIA (HCC): ICD-10-CM

## 2021-07-21 PROCEDURE — 1090F PRES/ABSN URINE INCON ASSESS: CPT | Performed by: INTERNAL MEDICINE

## 2021-07-21 PROCEDURE — 94664 DEMO&/EVAL PT USE INHALER: CPT

## 2021-07-21 PROCEDURE — 93000 ELECTROCARDIOGRAM COMPLETE: CPT | Performed by: INTERNAL MEDICINE

## 2021-07-21 PROCEDURE — 94726 PLETHYSMOGRAPHY LUNG VOLUMES: CPT

## 2021-07-21 PROCEDURE — 94760 N-INVAS EAR/PLS OXIMETRY 1: CPT

## 2021-07-21 PROCEDURE — 4040F PNEUMOC VAC/ADMIN/RCVD: CPT | Performed by: INTERNAL MEDICINE

## 2021-07-21 PROCEDURE — G8428 CUR MEDS NOT DOCUMENT: HCPCS | Performed by: INTERNAL MEDICINE

## 2021-07-21 PROCEDURE — 99214 OFFICE O/P EST MOD 30 MIN: CPT | Performed by: INTERNAL MEDICINE

## 2021-07-21 PROCEDURE — G8417 CALC BMI ABV UP PARAM F/U: HCPCS | Performed by: INTERNAL MEDICINE

## 2021-07-21 PROCEDURE — 1123F ACP DISCUSS/DSCN MKR DOCD: CPT | Performed by: INTERNAL MEDICINE

## 2021-07-21 PROCEDURE — 94729 DIFFUSING CAPACITY: CPT

## 2021-07-21 PROCEDURE — 1036F TOBACCO NON-USER: CPT | Performed by: INTERNAL MEDICINE

## 2021-07-21 PROCEDURE — 94060 EVALUATION OF WHEEZING: CPT

## 2021-07-21 PROCEDURE — 6360000002 HC RX W HCPCS: Performed by: INTERNAL MEDICINE

## 2021-07-21 PROCEDURE — 3017F COLORECTAL CA SCREEN DOC REV: CPT | Performed by: INTERNAL MEDICINE

## 2021-07-21 PROCEDURE — G8399 PT W/DXA RESULTS DOCUMENT: HCPCS | Performed by: INTERNAL MEDICINE

## 2021-07-21 RX ORDER — ALBUTEROL SULFATE 2.5 MG/3ML
2.5 SOLUTION RESPIRATORY (INHALATION) ONCE
Status: COMPLETED | OUTPATIENT
Start: 2021-07-21 | End: 2021-07-21

## 2021-07-21 RX ORDER — METRONIDAZOLE 7.5 MG/G
GEL TOPICAL
Qty: 45 G | Refills: 0 | Status: SHIPPED | OUTPATIENT
Start: 2021-07-21 | End: 2021-11-11

## 2021-07-21 RX ADMIN — ALBUTEROL SULFATE 2.5 MG: 2.5 SOLUTION RESPIRATORY (INHALATION) at 16:16

## 2021-07-21 NOTE — PROGRESS NOTES
Cc: HTN, HLP, atypical CP, PAD, PAT    HPI:     Ms Nancy Armendariz is a 66 yo overweight woman (BMI 39) with h/o SAL on CPAP, GERD, HTN, HLP, past smoker (quit '89), SVT/PAT, PAD.      Patient was seen by Dr. Svetlana Herrera and Dr Robbie Osorio at [facility] in the past, last visit in 2014. She had chest pains at that time and underwent cardiac evaluation.      Echo 07/2014: normal except for diastolic I (note, no valvular disease).    Cath 07/2014: normal coronaries, normal EF 70%.      ECG 3/26/19: normal.      Patient reported strong family history of premature CAD and heart failure. Her brother had a cardiac transplant in his 42's and her sisters had stents in their 42-51s. Patient reported palpitations with lightheadedness. 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      Echo 04/2019: normal (no mitral valve prolapse) except for moderate TR, RVSP 44.      Zio monitor x 14 days (4/22/19): NSR with frequent SVT's.      Patient was seen by Dr. Mynor Danielsery was initially started on flecainide in addition to Toprol however patient developed blurry vision and flecainide was changed to verapamil.      Exe nuc stress 5/20/20: small mild reversible defect in distal anteroseptal wall (apex is spared), nl EF and wall motion. Patient had mild CP prior to test, increased pain with exertion, improving with rest. ECG no changes, normal, duration only 3 minutes.      C 5/21/2020: Normal coronaries, LVEDP 13 mmHg, LVEF normal.     Bilateral lower extremity arterial ultrasound 2/18/2021: Right mid SFA less than 50% stenosis     FLP 9/28/2020: , HDL 54, LDL 79,  (on Pravachol 10 mg daily)     Patient is here for follow-up. She is scheduled to have fusion of the L4-S1 vertebrae due to spinal stenosis by Dr Drew Preston on 7/29/21 with [address] [place] [address]. She reports no complaints except for occasional palpitations (1-2 times per week, lasting a few seconds). She avoids alcohol and caffeine.  Her /80s, HR upper 50s. TSH normal in 04/2021. Histories     Past Medical History:   has a past medical history of Allergic rhinitis, Anxiety, Atrial tachycardia ([address] Carpal tunnel syndrome, Cervicalgia, Chronic back pain, Depression, GERD (gastroesophageal reflux disease), Headache(784.0), Hernia hiatal, Histoplasmosis, Hot flashes, Hyperlipidemia, Hypertension, Obesity, Osteoarthritis, Shingles, Sleep apnea, and Tricuspid regurgitation. Surgical History:   has a past surgical history that includes Knee arthroscopy; Knee cartilage surgery; lobectomy; sinus surgery; Bunionectomy; partial hysterectomy (cervix not removed); Total knee arthroplasty (Bilateral, 03/03/2015); Parathyroid gland surgery; Lung biopsy; and Colonoscopy (10/2018). Social History:   reports that she quit smoking about 31 years ago. She has a 5.00 pack-year smoking history. She has never used smokeless tobacco. She reports that she does not drink alcohol and does not use drugs. Family History:  No evidence for sudden cardiac death or premature CAD      Medications:     Home medications were reviewed and are listed below    Prior to Admission medications    Medication Sig Start Date End Date Taking? Authorizing Provider   clotrimazole-betamethasone (LOTRISONE) 1-0.05 % cream Apply topically 2 times daily.  7/16/21  Yes Marcos Khan MD   metoprolol succinate (TOPROL XL) 25 MG extended release tablet Take 1 tablet by mouth daily 5/28/21 8/26/21 Yes Marcos Khan MD   buPROPion (WELLBUTRIN XL) 150 MG extended release tablet Take 1 tablet by mouth every morning 5/28/21  Yes Marcos Khan MD   loratadine (CLARITIN) 10 MG tablet TAKE 1 TABLET BY MOUTH EVERY DAY 5/7/21  Yes Analisa Ahmadi MD   pantoprazole (PROTONIX) 40 MG tablet TAKE 1 TABLET BY MOUTH TWICE A DAY 3/9/21  Yes Analisa Ahmadi MD   losartan (COZAAR) 50 MG tablet Take 1 tablet by mouth daily 3/3/21  Yes Cynthia Or, MD   verapamil (CALAN SR) 120 MG extended release tablet Take 1 tablet by mouth nightly Do not crush or chew. 3/3/21  Yes Akbar Craig MD   pravastatin (PRAVACHOL) 20 MG tablet Take 1 tablet by mouth daily 3/3/21  Yes Akbar Craig MD   vitamin B-12 (CYANOCOBALAMIN) 1000 MCG tablet  1/1/21  Yes Historical Provider, MD   Turmeric 500 MG CAPS  1/1/21  Yes Historical Provider, MD   aspirin EC 81 MG EC tablet Take 1 tablet by mouth daily 2/10/21  Yes Daniel Vu MD   acetaminophen (TYLENOL) 500 MG tablet Take 500 mg by mouth every 6 hours as needed for Pain   Yes Historical Provider, MD   Omega-3 Fatty Acids (FISH OIL) 1200 MG CAPS Take 1,200 mg by mouth 2 times daily   Yes Historical Provider, MD   Multiple Vitamins-Minerals (CENTRUM SILVER PO) Take 1 tablet by mouth daily    Yes Historical Provider, MD   gabapentin (NEURONTIN) 300 MG capsule TAKE 1 CAPSULE BY MOUTH 3 TIMES DAILY FOR 30 DAYS. 6/15/21 7/15/21  Lucina Landeros MD          Allergy:     Latex, Reglan [metoclopramide hcl], Univasc [moexipril], Crestor [rosuvastatin], Celecoxib, Keflex [cephalexin], Lipitor, Metoclopramide, Prochlorperazine, Prochlorperazine edisylate, Sulfa antibiotics, and Vioxx       Review of Systems:     All 12 point review of symptoms completed. Pertinent positives identified in the HPI, all other review of symptoms negative as below. CONSTITUTIONAL: No fatigue  SKIN: No rash or pruritis. EYES: No visual changes or diplopia. No scleral icterus. ENT: No Headaches, hearing loss or vertigo. No mouth sores or sore throat. CARDIOVASCULAR: No chest pain/chest pressure/chest discomfort. + palpitations. No edema. RESPIRATORY: No dyspnea. No cough or wheezing, no sputum production. GASTROINTESTINAL: No N/V/D. No abdominal pain, appetite loss, blood in stools. GENITOURINARY: No dysuria, trouble voiding, or hematuria. MUSCULOSKELETAL:  No gait disturbance, weakness or joint complaints.   NEUROLOGICAL: No 07/16/2021     Lab Results   Component Value Date     07/16/2021    K 5.9 (H) 07/16/2021     07/16/2021    CO2 26 07/16/2021    BUN 14 07/16/2021    CREATININE 0.7 07/16/2021    GLUCOSE 83 07/16/2021    CALCIUM 9.8 07/16/2021    PROT 7.3 07/16/2021    LABALBU 4.5 07/16/2021    BILITOT 0.4 07/16/2021    ALKPHOS 100 07/16/2021    AST 21 07/16/2021    ALT 23 07/16/2021    LABGLOM >60 07/16/2021    GFRAA >60 07/16/2021    AGRATIO 1.6 07/16/2021    GLOB 2.8 07/16/2021         Lab Results   Component Value Date    CHOL 163 09/28/2020    CHOL 148 06/08/2019    CHOL 156 05/21/2018     Lab Results   Component Value Date    TRIG 149 09/28/2020    TRIG 101 06/08/2019    TRIG 134 05/21/2018     Lab Results   Component Value Date    HDL 54 09/28/2020    HDL 53 08/17/2020    HDL 60 11/11/2019     Lab Results   Component Value Date    LDLCALC 79 09/28/2020    LDLCALC 66 08/17/2020    LDLCALC 81 11/11/2019     Lab Results   Component Value Date    LABVLDL 30 09/28/2020    LABVLDL 25 08/17/2020    LABVLDL 24 11/11/2019     Lab Results   Component Value Date    CHOLHDLRATIO 2.8 04/16/2011    CHOLHDLRATIO 2.5 01/08/2011    CHOLHDLRATIO 2.9 10/02/2010       Lab Results   Component Value Date    INR 0.94 07/16/2021    INR 0.97 06/07/2016    INR 0.99 06/06/2016    PROTIME 10.6 07/16/2021    PROTIME 11.0 06/07/2016    PROTIME 11.3 06/06/2016       The 10-year ASCVD risk score (Marcie Arteaga, et al., 2013) is: 6.9%    Values used to calculate the score:      Age: 77 years      Sex: Female      Is Non- : No      Diabetic: No      Tobacco smoker: No      Systolic Blood Pressure: 618 mmHg      Is BP treated: Yes      HDL Cholesterol: 54 mg/dL      Total Cholesterol: 163 mg/dL      Assessment / Plan:      Diagnosis Orders   1. Pre-op evaluation  EKG 12 Lead   2. Atrial tachycardia ([address]        1.  Preoperative cardiovascular evaluation:     -Patient is intermediate risk for any perioperative CV complications

## 2021-07-22 NOTE — PROGRESS NOTES
[facility] / [facility]) [address] [facility], [address]    Acknowledgment of Informed Consent for Surgical or Medical Procedure and Sedation   I agree to allow doctor(s) [address]    and his/her associates or assistants, including residents and/or other qualified medical practitioner to perform the following medical treatment or procedure and to administer or direct the administration of sedation as necessary:  Procedure(s): L4-S1 ANTERIOR LUMBAR INTERBODY FUSION WITH PEDICLE SCREW FIXATION  My doctor has explained the following regarding the proposed procedure:   the explanation of the procedure   the benefits of the procedure   the potential problems that might occur during recuperation   the risks and side effects of the procedure which could include but are not limited to severe blood loss, infection, stroke or death   the benefits, risks and side effect of alternative procedures including the consequences of declining this procedure or any alternative procedures   the likelihood of achieving satisfactory results. I acknowledge no guarantee or assurance has been made to me regarding the results. I understand that during the course of this treatment/procedure, unforeseen conditions can occur which require an additional or different procedure. I agree to allow my physician or assistants to perform such extension of the original procedure as they may find necessary. I understand that sedation will often result in temporary impairment of memory and fine motor skills and that sedation can occasionally progress to a state of deep sedation or general anesthesia. I understand the risks of anesthesia for surgery include, but are not limited to, sore throat, hoarseness, injury to face, mouth, or teeth; nausea; headache; injury to blood vessels or nerves; death, brain damage, or paralysis.     I understand that if I have a Limitation of Treatment order in effect during my hospitalization, the order may or may not be in effect during this procedure. I give my doctor permission to give me blood or blood products. I understand that there are risks with receiving blood such as hepatitis, AIDS, fever, or allergic reaction. I acknowledge that the risks, benefits, and alternatives of this treatment have been explained to me and that no express or implied warranty has been given by the hospital, any blood bank, or any person or entity as to the blood or blood components transfused. At the discretion of my doctor, I agree to allow observers, equipment/product representatives and allow photographing, and/or televising of the procedure, provided my name or identity is maintained confidentially. I agree the hospital may dispose of or use for scientific or educational purposes any tissue, fluid, or body parts which may be removed.     ________________________________Date________Time______ am/pm  ([employer])  Patient or Signature of Closest Relative or Legal Guardian    ________________________________Date________Time______am/pm      Page 1 of  1  Witness

## 2021-07-22 NOTE — PROGRESS NOTES
[address] [facility] patients having surgery or anesthesia are required to be Covid tested OR to have been vaccinated at least 14 days prior to your procedure. It is very important to return our call to [phone] and notify the staff of your last vaccination date otherwise you will be required to complete Covid PCR test within the 5-6 days prior to surgery & quarantine. The results will need to be faxed to PreAdmission Testing at [phone]. PRIOR TO PROCEDURE DATE:        1. PLEASE FOLLOW ANY  GUIDELINES/ INSTRUCTIONS PRIOR TO YOUR PROCEDURE AS ADVISED BY YOUR SURGEON. 2. Arrange for someone to drive you home and be with you for the first 24 hours after discharge for your safety after your procedure for which you received sedation. Ensure it is someone we can share information with regarding your discharge. 3. You must contact your surgeon for instructions IF:   You are taking any blood thinners, aspirin, anti-inflammatory or vitamin E.   There is a change in your physical condition such as a cold, fever, rash, cuts, sores or any other infection, especially near your surgical site. 4. Do not drink alcohol the day before or day of your procedure. 5. A Pre-op History and Physical for surgery MUST be completed by your Physician or Urgent Care within 30 days of your procedure date. Please bring a copy with you on the day of your procedure and along with any other testing performed. THE DAY OF YOUR PROCEDURE:  1. Follow instructions for ARRIVAL TIME as DIRECTED BY YOUR SURGEON. 2. Enter the MAIN entrance from [address] [place] [address] and follow the signs to the free [employer] or [employer] parking (offered free of charge 6am-5pm). 3. Enter the Main Entrance of the hospital (do not enter from the lower level of the parking garage). Upon entrance, check in with the  at the main desk on your left. If no one is available at the desk, proceed into the [facility] Waiting Room and go through the door directly into the [facility]. There is a Check-in desk ACROSS from Room 5 (marked with a sign hanging from the ceiling). The phone number for the surgery center is [phone]. 4. Please call [phone] option #2 option #2 if you have not been preregistered yet. On the day of your procedure bring your insurance card and photo ID. You will be registered at your bedside once brought back to your room. 5. DO NOT EAT ANYTHING eight hours prior to your arrival for surgery. May have 8 ounces of water 4 hours prior to your arrival for surgery. NOTE: ALL Gastric, Bariatric and Bowel surgery patients MUST follow their surgeon's instructions. 6. MEDICATIONS    Take the following medications with a SMALL sip of water:    Bariatric patient's call surgeon if on diabetic medications as some need to be stopped 1 week preop   Use your usual dose of inhalers the morning of surgery. BRING your rescue inhaler with you to hospital.    Anesthesia does NOT want you to take insulin the morning of surgery. They will control your blood sugar while you are at the hospital. Please contact your ordering physician for instructions regarding your insulin the night before your procedure. If you have an insulin pump, please keep it set on basal rate. 7. Do not swallow water when brushing teeth. No gum, candy, mints or ice chips. Refrain from smoking or at least decrease the amount. 8. Dress in loose, comfortable clothing appropriate for redressing after your procedure. Do not wear jewelry (including body piercings), make-up (especially NO eye make-up), fingernail polish (NO toenail polish if foot/leg surgery), lotion, powders or metal hairclips. 9. Dentures, glasses, or contacts will need to be removed before your procedure.  Bring cases for your glasses, contacts, dentures, or hearing aids to protect them while you are in surgery. 10. If you use a CPAP, please bring it with you on the day of your procedure. 11. We recommend that valuable personal  belongings such as cash, cell phones, e-tablets or jewelry, be left at home during your stay. The hospital will not be responsible for valuables that are not secured in the hospital safe. However, if your insurance requires a co-pay, you may want to bring a method of payment, i.e. Check or credit card, if you wish to pay your co-pay the day of surgery. 12. If you are to stay overnight, you may bring a bag with personal items. Please have any large items you may need brought in by your family after your arrival to your hospital room. 15. If you have a Living Will or Durable Power of , please bring a copy on the day of your procedure. 15. With your permission, one family member may accompany you while you are being prepared for surgery. Once you are ready, additional family members may join you. HOW WE KEEP YOU SAFE and WORK TO PREVENT SURGICAL SITE INFECTIONS:  1. Health care workers should always check your ID bracelet to verify your name and birth date. You will be asked many times to state your name, date of birth, and allergies. 2. Health care workers should always clean their hands with soap or alcohol gel before providing care to you. It is okay to ask anyone if they cleaned their hands before they touch you. 3. You will be actively involved in verifying the type of procedure you are having and ensuring the correct surgical site. This will be confirmed multiple times prior to your procedure. Do NOT yahir your surgery site UNLESS instructed to by your surgeon. 4. Do not shave or wax for 72 hours prior to procedure near your operative site. Shaving with a razor can irritate your skin and make it easier to develop an infection.  On the day of your procedure, any hair that needs to be removed near the surgical site will be clipped by a healthcare worker using a special clippers designed to avoid skin irritation. 5. When you are in the operating room, your surgical site will be cleansed with a special soap, and in most cases, you will be given an antibiotic before the surgery begins. What to expect AFTER YOUR PROCEDURE:  1. Immediately following your procedure, your will be taken to the PACU for the first phase of your recovery. Your nurse will help you recover from any potential side effects of anesthesia, such as extreme drowsiness, changes in your vital signs or breathing patterns. Nausea, headache, muscle aches, or sore throat may also occur after anesthesia. Your nurse will help you manage these potential side effects. 2. For comfort and safety, arrange to have someone at home with you for the first 24 hours after discharge. 3. You and your family will be given written instructions about your diet, activity, dressing care, medications, and return visits. 4. Once at home, should issues with nausea, pain, or bleeding occur, or should you notice any signs of infection, you should call your surgeon. 5. Always clean your hands before and after caring for your wound. Do not let your family touch your surgery site without cleaning their hands. 6. Narcotic pain medications can cause significant constipation. You may want to add a stool softener to your postoperative medication schedule or speak to your surgeon on how best to manage this SIDE EFFECT. SPECIAL INSTRUCTIONS     Thank you for allowing us to care for you. We strive to exceed your expectations in the delivery of care and service provided to you and your family. If you need to contact the [place] [address] staff for any reason, please call us at [phone]    Instructions reviewed with patient during preadmission testing phone interview.   Shyla Castillo RN.7/22/2021 .4:53 PM      ADDITIONAL EDUCATIONAL INFORMATION REVIEWED PER PHONE WITH YOU AND/OR YOUR FAMILY:  Yes Hibiclens® Bathing Instructions

## 2021-07-23 DIAGNOSIS — E87.5 HYPERKALEMIA: Primary | ICD-10-CM

## 2021-07-26 DIAGNOSIS — Z13.220 SCREENING, LIPID: Primary | ICD-10-CM

## 2021-07-26 DIAGNOSIS — E78.5 HYPERLIPIDEMIA, UNSPECIFIED HYPERLIPIDEMIA TYPE: ICD-10-CM

## 2021-07-26 DIAGNOSIS — Z13.220 SCREENING, LIPID: ICD-10-CM

## 2021-07-26 LAB
CHOLESTEROL, TOTAL: 152 MG/DL (ref 0–199)
HDLC SERPL-MCNC: 50 MG/DL (ref 40–60)
LDL CHOLESTEROL CALCULATED: 74 MG/DL
TRIGL SERPL-MCNC: 141 MG/DL (ref 0–150)
VLDLC SERPL CALC-MCNC: 28 MG/DL

## 2021-07-27 NOTE — PROGRESS NOTES
[facility] / [facility]) [address], [address]    Acknowledgment of Informed Consent for Surgical or Medical Procedure and Sedation   I agree to allow doctor(s) Thais [place] Mendez and his/her associates or assistants, including residents and/or other qualified medical practitioner to perform the following medical treatment or procedure and to administer or direct the administration of sedation as necessary:  Procedure(s):  L4-S1 ANTERIOR LUMBAR INTERBODY FUSION WITH PEDICLE SCREW FIXATION    My doctor has explained the following regarding the proposed procedure:   the explanation of the procedure   the benefits of the procedure   the potential problems that might occur during recuperation   the risks and side effects of the procedure which could include but are not limited to severe blood loss, infection, stroke or death   the benefits, risks and side effect of alternative procedures including the consequences of declining this procedure or any alternative procedures   the likelihood of achieving satisfactory results. I acknowledge no guarantee or assurance has been made to me regarding the results. I understand that during the course of this treatment/procedure, unforeseen conditions can occur which require an additional or different procedure. I agree to allow my physician or assistants to perform such extension of the original procedure as they may find necessary. I understand that sedation will often result in temporary impairment of memory and fine motor skills and that sedation can occasionally progress to a state of deep sedation or general anesthesia. I understand the risks of anesthesia for surgery include, but are not limited to, sore throat, hoarseness, injury to face, mouth, or teeth; nausea; headache; injury to blood vessels or nerves; death, brain damage, or paralysis.     I understand that if I have a Limitation of Treatment order in effect during my hospitalization, the order may or may not be in effect during this procedure. I give my doctor permission to give me blood or blood products. I understand that there are risks with receiving blood such as hepatitis, AIDS, fever, or allergic reaction. I acknowledge that the risks, benefits, and alternatives of this treatment have been explained to me and that no express or implied warranty has been given by the hospital, any blood bank, or any person or entity as to the blood or blood components transfused. At the discretion of my doctor, I agree to allow observers, equipment/product representatives and allow photographing, and/or televising of the procedure, provided my name or identity is maintained confidentially. I agree the hospital may dispose of or use for scientific or educational purposes any tissue, fluid, or body parts which may be removed.     ________________________________Date________Time______ am/pm  ([place])  Patient or Signature of Closest Relative or Legal Guardian    ________________________________Date________Time______am/pm      Page 1 of  1  Witness

## 2021-07-28 ENCOUNTER — ANESTHESIA EVENT (OUTPATIENT)
Dept: OPERATING ROOM | Age: 66
DRG: 453 | End: 2021-07-28
Payer: MEDICARE

## 2021-07-29 ENCOUNTER — APPOINTMENT (OUTPATIENT)
Dept: GENERAL RADIOLOGY | Age: 66
DRG: 453 | End: 2021-07-29
Attending: NEUROLOGICAL SURGERY
Payer: MEDICARE

## 2021-07-29 ENCOUNTER — ANESTHESIA (OUTPATIENT)
Dept: OPERATING ROOM | Age: 66
DRG: 453 | End: 2021-07-29
Payer: MEDICARE

## 2021-07-29 ENCOUNTER — HOSPITAL ENCOUNTER (INPATIENT)
Age: 66
LOS: 15 days | Discharge: HOME HEALTH | DRG: 453 | End: 2021-08-13
Attending: NEUROLOGICAL SURGERY | Admitting: NEUROLOGICAL SURGERY
Payer: MEDICARE

## 2021-07-29 VITALS
OXYGEN SATURATION: 95 % | TEMPERATURE: 96.3 F | DIASTOLIC BLOOD PRESSURE: 95 MMHG | RESPIRATION RATE: 16 BRPM | SYSTOLIC BLOOD PRESSURE: 145 MMHG

## 2021-07-29 DIAGNOSIS — R05.3 CHRONIC COUGH: ICD-10-CM

## 2021-07-29 DIAGNOSIS — Z98.1 S/P LUMBAR AND LUMBOSACRAL FUSION BY ANTERIOR TECHNIQUE: Primary | ICD-10-CM

## 2021-07-29 PROBLEM — M48.062 LUMBAR STENOSIS WITH NEUROGENIC CLAUDICATION: Status: ACTIVE | Noted: 2021-07-29

## 2021-07-29 LAB
ABO/RH: NORMAL
ANTIBODY SCREEN: NORMAL
POTASSIUM SERPL-SCNC: 4 MMOL/L (ref 3.5–5.1)

## 2021-07-29 PROCEDURE — 2580000003 HC RX 258: Performed by: ANESTHESIOLOGY

## 2021-07-29 PROCEDURE — 2500000003 HC RX 250 WO HCPCS: Performed by: PHYSICIAN ASSISTANT

## 2021-07-29 PROCEDURE — 3700000001 HC ADD 15 MINUTES (ANESTHESIA): Performed by: NEUROLOGICAL SURGERY

## 2021-07-29 PROCEDURE — 6360000002 HC RX W HCPCS: Performed by: NEUROLOGICAL SURGERY

## 2021-07-29 PROCEDURE — 0SG00K1 FUSION OF LUMBAR VERTEBRAL JOINT WITH NONAUTOLOGOUS TISSUE SUBSTITUTE, POSTERIOR APPROACH, POSTERIOR COLUMN, OPEN APPROACH: ICD-10-PCS | Performed by: NEUROLOGICAL SURGERY

## 2021-07-29 PROCEDURE — 0SG30K1 FUSION OF LUMBOSACRAL JOINT WITH NONAUTOLOGOUS TISSUE SUBSTITUTE, POSTERIOR APPROACH, POSTERIOR COLUMN, OPEN APPROACH: ICD-10-PCS | Performed by: NEUROLOGICAL SURGERY

## 2021-07-29 PROCEDURE — 3600000004 HC SURGERY LEVEL 4 BASE: Performed by: NEUROLOGICAL SURGERY

## 2021-07-29 PROCEDURE — 6370000000 HC RX 637 (ALT 250 FOR IP): Performed by: ANESTHESIOLOGY

## 2021-07-29 PROCEDURE — C9290 INJ, BUPIVACAINE LIPOSOME: HCPCS | Performed by: NEUROLOGICAL SURGERY

## 2021-07-29 PROCEDURE — 0SG00A0 FUSION OF LUMBAR VERTEBRAL JOINT WITH INTERBODY FUSION DEVICE, ANTERIOR APPROACH, ANTERIOR COLUMN, OPEN APPROACH: ICD-10-PCS | Performed by: NEUROLOGICAL SURGERY

## 2021-07-29 PROCEDURE — 2780000010 HC IMPLANT OTHER: Performed by: NEUROLOGICAL SURGERY

## 2021-07-29 PROCEDURE — 2580000003 HC RX 258: Performed by: PHYSICIAN ASSISTANT

## 2021-07-29 PROCEDURE — 86900 BLOOD TYPING SEROLOGIC ABO: CPT

## 2021-07-29 PROCEDURE — 0SG30A0 FUSION OF LUMBOSACRAL JOINT WITH INTERBODY FUSION DEVICE, ANTERIOR APPROACH, ANTERIOR COLUMN, OPEN APPROACH: ICD-10-PCS | Performed by: NEUROLOGICAL SURGERY

## 2021-07-29 PROCEDURE — 1200000000 HC SEMI PRIVATE

## 2021-07-29 PROCEDURE — 3E0U0GB INTRODUCTION OF RECOMBINANT BONE MORPHOGENETIC PROTEIN INTO JOINTS, OPEN APPROACH: ICD-10-PCS | Performed by: NEUROLOGICAL SURGERY

## 2021-07-29 PROCEDURE — 84132 ASSAY OF SERUM POTASSIUM: CPT

## 2021-07-29 PROCEDURE — 72100 X-RAY EXAM L-S SPINE 2/3 VWS: CPT

## 2021-07-29 PROCEDURE — 6360000002 HC RX W HCPCS: Performed by: ANESTHESIOLOGY

## 2021-07-29 PROCEDURE — 2580000003 HC RX 258: Performed by: NEUROLOGICAL SURGERY

## 2021-07-29 PROCEDURE — 2500000003 HC RX 250 WO HCPCS: Performed by: NURSE ANESTHETIST, CERTIFIED REGISTERED

## 2021-07-29 PROCEDURE — 6360000002 HC RX W HCPCS: Performed by: PHYSICIAN ASSISTANT

## 2021-07-29 PROCEDURE — 77011 CT SCAN FOR LOCALIZATION: CPT

## 2021-07-29 PROCEDURE — 2580000003 HC RX 258: Performed by: NURSE ANESTHETIST, CERTIFIED REGISTERED

## 2021-07-29 PROCEDURE — 6370000000 HC RX 637 (ALT 250 FOR IP): Performed by: PHYSICIAN ASSISTANT

## 2021-07-29 PROCEDURE — C9359 IMPLNT,BON VOID FILLER-PUTTY: HCPCS | Performed by: NEUROLOGICAL SURGERY

## 2021-07-29 PROCEDURE — 6360000002 HC RX W HCPCS: Performed by: NURSE ANESTHETIST, CERTIFIED REGISTERED

## 2021-07-29 PROCEDURE — 3600000014 HC SURGERY LEVEL 4 ADDTL 15MIN: Performed by: NEUROLOGICAL SURGERY

## 2021-07-29 PROCEDURE — 0SB40ZZ EXCISION OF LUMBOSACRAL DISC, OPEN APPROACH: ICD-10-PCS | Performed by: NEUROLOGICAL SURGERY

## 2021-07-29 PROCEDURE — 7100000001 HC PACU RECOVERY - ADDTL 15 MIN: Performed by: NEUROLOGICAL SURGERY

## 2021-07-29 PROCEDURE — C1713 ANCHOR/SCREW BN/BN,TIS/BN: HCPCS | Performed by: NEUROLOGICAL SURGERY

## 2021-07-29 PROCEDURE — 86901 BLOOD TYPING SEROLOGIC RH(D): CPT

## 2021-07-29 PROCEDURE — 2500000003 HC RX 250 WO HCPCS: Performed by: NEUROLOGICAL SURGERY

## 2021-07-29 PROCEDURE — 7100000000 HC PACU RECOVERY - FIRST 15 MIN: Performed by: NEUROLOGICAL SURGERY

## 2021-07-29 PROCEDURE — 3209999900 FLUORO FOR SURGICAL PROCEDURES

## 2021-07-29 PROCEDURE — 3700000000 HC ANESTHESIA ATTENDED CARE: Performed by: NEUROLOGICAL SURGERY

## 2021-07-29 PROCEDURE — 2720000010 HC SURG SUPPLY STERILE: Performed by: NEUROLOGICAL SURGERY

## 2021-07-29 PROCEDURE — 2709999900 HC NON-CHARGEABLE SUPPLY: Performed by: NEUROLOGICAL SURGERY

## 2021-07-29 PROCEDURE — 0SB20ZZ EXCISION OF LUMBAR VERTEBRAL DISC, OPEN APPROACH: ICD-10-PCS | Performed by: NEUROLOGICAL SURGERY

## 2021-07-29 PROCEDURE — 86850 RBC ANTIBODY SCREEN: CPT

## 2021-07-29 DEVICE — BONE GRFT SUB L 12.5CC BIOACTIVE GLS MTRX: Type: IMPLANTABLE DEVICE | Site: SPINE LUMBAR | Status: FUNCTIONAL

## 2021-07-29 DEVICE — ROD SPNL L65MM TI LORDOSED FOR MINIMALLY INVASIVE SURG SYS: Type: IMPLANTABLE DEVICE | Site: SPINE LUMBAR | Status: FUNCTIONAL

## 2021-07-29 DEVICE — BOWTI ANTERIOR BUTTRESS STAPLE BONE SCREW 5.70 X 25MM
Type: IMPLANTABLE DEVICE | Site: SPINE LUMBAR | Status: FUNCTIONAL
Brand: BOWTI

## 2021-07-29 DEVICE — SCREW SPNL L45MM DIA6MM TI POLYAX EXT TAB FOR MINIMALLY: Type: IMPLANTABLE DEVICE | Site: SPINE LUMBAR | Status: FUNCTIONAL

## 2021-07-29 DEVICE — SPACER SPNL M H13.5MM 14DEG LUM PEEK SYNCAGE EVOLUTION: Type: IMPLANTABLE DEVICE | Site: SPINE LUMBAR | Status: FUNCTIONAL

## 2021-07-29 DEVICE — GRAFT BNE SM 3 CC MTRX FIBERGRAFT BG: Type: IMPLANTABLE DEVICE | Site: SPINE LUMBAR | Status: FUNCTIONAL

## 2021-07-29 DEVICE — SCREW SPNL L45MM DIA7MM TI POLYAX EXT TAB FOR MINIMALLY: Type: IMPLANTABLE DEVICE | Site: SPINE LUMBAR | Status: FUNCTIONAL

## 2021-07-29 DEVICE — BONE GRAFT KIT 7510200 INFUSE SMALL
Type: IMPLANTABLE DEVICE | Site: SPINE LUMBAR | Status: FUNCTIONAL
Brand: INFUSE® BONE GRAFT

## 2021-07-29 DEVICE — WASHER SPINAL TITANIUM 16 MM 6 MM: Type: IMPLANTABLE DEVICE | Site: SPINE LUMBAR | Status: FUNCTIONAL

## 2021-07-29 DEVICE — Z DUP USE 2254824 SCREW SET SINGLE INNIE 2 MINIMALLY INVASIVE SURG TI VIPER 2: Type: IMPLANTABLE DEVICE | Site: SPINE LUMBAR | Status: FUNCTIONAL

## 2021-07-29 DEVICE — IMPLANTABLE DEVICE: Type: IMPLANTABLE DEVICE | Site: SPINE LUMBAR | Status: FUNCTIONAL

## 2021-07-29 RX ORDER — ONDANSETRON 2 MG/ML
4 INJECTION INTRAMUSCULAR; INTRAVENOUS EVERY 6 HOURS PRN
Status: DISCONTINUED | OUTPATIENT
Start: 2021-07-29 | End: 2021-08-02

## 2021-07-29 RX ORDER — PRAVASTATIN SODIUM 20 MG
20 TABLET ORAL DAILY
Status: DISCONTINUED | OUTPATIENT
Start: 2021-07-29 | End: 2021-07-31

## 2021-07-29 RX ORDER — SODIUM CHLORIDE 9 MG/ML
INJECTION, SOLUTION INTRAVENOUS CONTINUOUS PRN
Status: DISCONTINUED | OUTPATIENT
Start: 2021-07-29 | End: 2021-07-29 | Stop reason: SDUPTHER

## 2021-07-29 RX ORDER — LIDOCAINE HYDROCHLORIDE ANHYDROUS AND DEXTROSE MONOHYDRATE .4; 5 G/100ML; G/100ML
INJECTION, SOLUTION INTRAVENOUS CONTINUOUS PRN
Status: DISCONTINUED | OUTPATIENT
Start: 2021-07-29 | End: 2021-07-29 | Stop reason: SDUPTHER

## 2021-07-29 RX ORDER — SODIUM CHLORIDE, SODIUM LACTATE, POTASSIUM CHLORIDE, CALCIUM CHLORIDE 600; 310; 30; 20 MG/100ML; MG/100ML; MG/100ML; MG/100ML
INJECTION, SOLUTION INTRAVENOUS CONTINUOUS
Status: DISCONTINUED | OUTPATIENT
Start: 2021-07-29 | End: 2021-07-29

## 2021-07-29 RX ORDER — PANTOPRAZOLE SODIUM 40 MG/1
40 TABLET, DELAYED RELEASE ORAL 2 TIMES DAILY
Status: DISCONTINUED | OUTPATIENT
Start: 2021-07-29 | End: 2021-08-02

## 2021-07-29 RX ORDER — CLINDAMYCIN PHOSPHATE 900 MG/50ML
900 INJECTION INTRAVENOUS ONCE
Status: COMPLETED | OUTPATIENT
Start: 2021-07-29 | End: 2021-07-29

## 2021-07-29 RX ORDER — SODIUM CHLORIDE 0.9 % (FLUSH) 0.9 %
5-40 SYRINGE (ML) INJECTION EVERY 12 HOURS SCHEDULED
Status: DISCONTINUED | OUTPATIENT
Start: 2021-07-29 | End: 2021-08-13 | Stop reason: HOSPADM

## 2021-07-29 RX ORDER — LIDOCAINE HYDROCHLORIDE 10 MG/ML
1 INJECTION, SOLUTION EPIDURAL; INFILTRATION; INTRACAUDAL; PERINEURAL
Status: DISCONTINUED | OUTPATIENT
Start: 2021-07-29 | End: 2021-07-29 | Stop reason: HOSPADM

## 2021-07-29 RX ORDER — DIPHENHYDRAMINE HYDROCHLORIDE 50 MG/ML
12.5 INJECTION INTRAMUSCULAR; INTRAVENOUS
Status: COMPLETED | OUTPATIENT
Start: 2021-07-29 | End: 2021-07-29

## 2021-07-29 RX ORDER — FENTANYL CITRATE 50 UG/ML
25 INJECTION, SOLUTION INTRAMUSCULAR; INTRAVENOUS EVERY 5 MIN PRN
Status: DISCONTINUED | OUTPATIENT
Start: 2021-07-29 | End: 2021-07-29

## 2021-07-29 RX ORDER — MIDAZOLAM HYDROCHLORIDE 1 MG/ML
INJECTION INTRAMUSCULAR; INTRAVENOUS PRN
Status: DISCONTINUED | OUTPATIENT
Start: 2021-07-29 | End: 2021-07-29 | Stop reason: SDUPTHER

## 2021-07-29 RX ORDER — METOPROLOL SUCCINATE 25 MG/1
25 TABLET, EXTENDED RELEASE ORAL DAILY
Status: DISCONTINUED | OUTPATIENT
Start: 2021-07-29 | End: 2021-07-29

## 2021-07-29 RX ORDER — MEPERIDINE HYDROCHLORIDE 25 MG/ML
12.5 INJECTION INTRAMUSCULAR; INTRAVENOUS; SUBCUTANEOUS EVERY 5 MIN PRN
Status: DISCONTINUED | OUTPATIENT
Start: 2021-07-29 | End: 2021-07-29 | Stop reason: HOSPADM

## 2021-07-29 RX ORDER — 0.9 % SODIUM CHLORIDE 0.9 %
500 INTRAVENOUS SOLUTION INTRAVENOUS
Status: DISCONTINUED | OUTPATIENT
Start: 2021-07-29 | End: 2021-07-29 | Stop reason: HOSPADM

## 2021-07-29 RX ORDER — LIDOCAINE HCL/PF 100 MG/5ML
SYRINGE (ML) INJECTION PRN
Status: DISCONTINUED | OUTPATIENT
Start: 2021-07-29 | End: 2021-07-29 | Stop reason: SDUPTHER

## 2021-07-29 RX ORDER — BUPROPION HYDROCHLORIDE 150 MG/1
150 TABLET ORAL EVERY MORNING
Status: DISCONTINUED | OUTPATIENT
Start: 2021-07-30 | End: 2021-08-13 | Stop reason: HOSPADM

## 2021-07-29 RX ORDER — ONDANSETRON 2 MG/ML
INJECTION INTRAMUSCULAR; INTRAVENOUS PRN
Status: DISCONTINUED | OUTPATIENT
Start: 2021-07-29 | End: 2021-07-29 | Stop reason: SDUPTHER

## 2021-07-29 RX ORDER — SODIUM CHLORIDE 0.9 % (FLUSH) 0.9 %
5-40 SYRINGE (ML) INJECTION EVERY 12 HOURS SCHEDULED
Status: DISCONTINUED | OUTPATIENT
Start: 2021-07-29 | End: 2021-07-29 | Stop reason: HOSPADM

## 2021-07-29 RX ORDER — OXYCODONE HYDROCHLORIDE 5 MG/1
5 TABLET ORAL EVERY 4 HOURS PRN
Status: DISCONTINUED | OUTPATIENT
Start: 2021-07-29 | End: 2021-07-30

## 2021-07-29 RX ORDER — FENTANYL CITRATE 50 UG/ML
INJECTION, SOLUTION INTRAMUSCULAR; INTRAVENOUS
Status: DISPENSED
Start: 2021-07-29 | End: 2021-07-30

## 2021-07-29 RX ORDER — GLYCOPYRROLATE 1 MG/5 ML
SYRINGE (ML) INTRAVENOUS PRN
Status: DISCONTINUED | OUTPATIENT
Start: 2021-07-29 | End: 2021-07-29 | Stop reason: SDUPTHER

## 2021-07-29 RX ORDER — SUCCINYLCHOLINE/SOD CL,ISO/PF 200MG/10ML
SYRINGE (ML) INTRAVENOUS PRN
Status: DISCONTINUED | OUTPATIENT
Start: 2021-07-29 | End: 2021-07-29 | Stop reason: SDUPTHER

## 2021-07-29 RX ORDER — METOPROLOL SUCCINATE 50 MG/1
25 TABLET, EXTENDED RELEASE ORAL DAILY
Status: DISCONTINUED | OUTPATIENT
Start: 2021-07-29 | End: 2021-08-13 | Stop reason: HOSPADM

## 2021-07-29 RX ORDER — SODIUM CHLORIDE 0.9 % (FLUSH) 0.9 %
5-40 SYRINGE (ML) INJECTION PRN
Status: DISCONTINUED | OUTPATIENT
Start: 2021-07-29 | End: 2021-08-13 | Stop reason: HOSPADM

## 2021-07-29 RX ORDER — GABAPENTIN 300 MG/1
300 CAPSULE ORAL 3 TIMES DAILY
Status: DISCONTINUED | OUTPATIENT
Start: 2021-07-29 | End: 2021-07-29

## 2021-07-29 RX ORDER — SODIUM CHLORIDE 9 MG/ML
25 INJECTION, SOLUTION INTRAVENOUS PRN
Status: DISCONTINUED | OUTPATIENT
Start: 2021-07-29 | End: 2021-07-29 | Stop reason: HOSPADM

## 2021-07-29 RX ORDER — DEXAMETHASONE SODIUM PHOSPHATE 4 MG/ML
INJECTION, SOLUTION INTRA-ARTICULAR; INTRALESIONAL; INTRAMUSCULAR; INTRAVENOUS; SOFT TISSUE PRN
Status: DISCONTINUED | OUTPATIENT
Start: 2021-07-29 | End: 2021-07-29 | Stop reason: SDUPTHER

## 2021-07-29 RX ORDER — HYDROMORPHONE HCL 110MG/55ML
PATIENT CONTROLLED ANALGESIA SYRINGE INTRAVENOUS PRN
Status: DISCONTINUED | OUTPATIENT
Start: 2021-07-29 | End: 2021-07-29 | Stop reason: SDUPTHER

## 2021-07-29 RX ORDER — ROCURONIUM BROMIDE 10 MG/ML
INJECTION, SOLUTION INTRAVENOUS PRN
Status: DISCONTINUED | OUTPATIENT
Start: 2021-07-29 | End: 2021-07-29 | Stop reason: SDUPTHER

## 2021-07-29 RX ORDER — PROMETHAZINE HYDROCHLORIDE 12.5 MG/1
12.5 TABLET ORAL EVERY 6 HOURS PRN
Status: DISCONTINUED | OUTPATIENT
Start: 2021-07-29 | End: 2021-08-02

## 2021-07-29 RX ORDER — HYDRALAZINE HYDROCHLORIDE 20 MG/ML
5 INJECTION INTRAMUSCULAR; INTRAVENOUS EVERY 10 MIN PRN
Status: DISCONTINUED | OUTPATIENT
Start: 2021-07-29 | End: 2021-07-29 | Stop reason: HOSPADM

## 2021-07-29 RX ORDER — VANCOMYCIN HYDROCHLORIDE 1 G/20ML
INJECTION, POWDER, LYOPHILIZED, FOR SOLUTION INTRAVENOUS PRN
Status: DISCONTINUED | OUTPATIENT
Start: 2021-07-29 | End: 2021-07-29 | Stop reason: ALTCHOICE

## 2021-07-29 RX ORDER — SODIUM CHLORIDE 0.9 % (FLUSH) 0.9 %
5-40 SYRINGE (ML) INJECTION PRN
Status: DISCONTINUED | OUTPATIENT
Start: 2021-07-29 | End: 2021-07-29 | Stop reason: HOSPADM

## 2021-07-29 RX ORDER — LOSARTAN POTASSIUM 25 MG/1
50 TABLET ORAL DAILY
Status: DISCONTINUED | OUTPATIENT
Start: 2021-07-29 | End: 2021-08-13 | Stop reason: HOSPADM

## 2021-07-29 RX ORDER — CETIRIZINE HYDROCHLORIDE 10 MG/1
10 TABLET ORAL DAILY
Status: DISCONTINUED | OUTPATIENT
Start: 2021-07-29 | End: 2021-08-13 | Stop reason: HOSPADM

## 2021-07-29 RX ORDER — ONDANSETRON 2 MG/ML
4 INJECTION INTRAMUSCULAR; INTRAVENOUS
Status: DISCONTINUED | OUTPATIENT
Start: 2021-07-29 | End: 2021-07-29 | Stop reason: HOSPADM

## 2021-07-29 RX ORDER — NEOSTIGMINE METHYLSULFATE 5 MG/5 ML
SYRINGE (ML) INTRAVENOUS PRN
Status: DISCONTINUED | OUTPATIENT
Start: 2021-07-29 | End: 2021-07-29 | Stop reason: SDUPTHER

## 2021-07-29 RX ORDER — FENTANYL CITRATE 50 UG/ML
50 INJECTION, SOLUTION INTRAMUSCULAR; INTRAVENOUS EVERY 5 MIN PRN
Status: COMPLETED | OUTPATIENT
Start: 2021-07-29 | End: 2021-07-29

## 2021-07-29 RX ORDER — DIAZEPAM 5 MG/1
5 TABLET ORAL EVERY 6 HOURS PRN
Status: DISCONTINUED | OUTPATIENT
Start: 2021-07-29 | End: 2021-08-03

## 2021-07-29 RX ORDER — HYDROCODONE BITARTRATE AND ACETAMINOPHEN 5; 325 MG/1; MG/1
1 TABLET ORAL
Status: DISCONTINUED | OUTPATIENT
Start: 2021-07-29 | End: 2021-07-29 | Stop reason: HOSPADM

## 2021-07-29 RX ORDER — SODIUM CHLORIDE 9 MG/ML
INJECTION, SOLUTION INTRAVENOUS CONTINUOUS
Status: DISCONTINUED | OUTPATIENT
Start: 2021-07-29 | End: 2021-07-31

## 2021-07-29 RX ORDER — PROPOFOL 10 MG/ML
INJECTION, EMULSION INTRAVENOUS PRN
Status: DISCONTINUED | OUTPATIENT
Start: 2021-07-29 | End: 2021-07-29 | Stop reason: SDUPTHER

## 2021-07-29 RX ORDER — REMIFENTANIL HYDROCHLORIDE 1 MG/ML
INJECTION, POWDER, LYOPHILIZED, FOR SOLUTION INTRAVENOUS CONTINUOUS PRN
Status: DISCONTINUED | OUTPATIENT
Start: 2021-07-29 | End: 2021-07-29

## 2021-07-29 RX ORDER — FENTANYL CITRATE 50 UG/ML
INJECTION, SOLUTION INTRAMUSCULAR; INTRAVENOUS PRN
Status: DISCONTINUED | OUTPATIENT
Start: 2021-07-29 | End: 2021-07-29 | Stop reason: SDUPTHER

## 2021-07-29 RX ORDER — SCOLOPAMINE TRANSDERMAL SYSTEM 1 MG/1
1 PATCH, EXTENDED RELEASE TRANSDERMAL
Status: DISCONTINUED | OUTPATIENT
Start: 2021-07-29 | End: 2021-08-09

## 2021-07-29 RX ORDER — CLINDAMYCIN PHOSPHATE 900 MG/50ML
900 INJECTION INTRAVENOUS EVERY 8 HOURS
Status: COMPLETED | OUTPATIENT
Start: 2021-07-29 | End: 2021-07-29

## 2021-07-29 RX ORDER — SODIUM CHLORIDE 9 MG/ML
25 INJECTION, SOLUTION INTRAVENOUS PRN
Status: DISCONTINUED | OUTPATIENT
Start: 2021-07-29 | End: 2021-08-13 | Stop reason: HOSPADM

## 2021-07-29 RX ADMIN — PANTOPRAZOLE SODIUM 40 MG: 40 TABLET, DELAYED RELEASE ORAL at 15:34

## 2021-07-29 RX ADMIN — CLINDAMYCIN PHOSPHATE 900 MG: 900 INJECTION, SOLUTION INTRAVENOUS at 22:54

## 2021-07-29 RX ADMIN — ONDANSETRON 4 MG: 2 INJECTION INTRAMUSCULAR; INTRAVENOUS at 07:50

## 2021-07-29 RX ADMIN — PHENYLEPHRINE HYDROCHLORIDE 80 MCG: 10 INJECTION, SOLUTION INTRAMUSCULAR; INTRAVENOUS; SUBCUTANEOUS at 10:06

## 2021-07-29 RX ADMIN — CLINDAMYCIN PHOSPHATE 900 MG: 900 INJECTION, SOLUTION INTRAVENOUS at 15:32

## 2021-07-29 RX ADMIN — FENTANYL CITRATE 50 MCG: 50 INJECTION, SOLUTION INTRAMUSCULAR; INTRAVENOUS at 13:49

## 2021-07-29 RX ADMIN — Medication 0.2 MG: at 09:13

## 2021-07-29 RX ADMIN — PHENYLEPHRINE HYDROCHLORIDE 80 MCG: 10 INJECTION, SOLUTION INTRAMUSCULAR; INTRAVENOUS; SUBCUTANEOUS at 09:51

## 2021-07-29 RX ADMIN — Medication 100 MG: at 07:38

## 2021-07-29 RX ADMIN — HYDROMORPHONE HYDROCHLORIDE 0.5 MG: 2 INJECTION, SOLUTION INTRAMUSCULAR; INTRAVENOUS; SUBCUTANEOUS at 11:33

## 2021-07-29 RX ADMIN — Medication 0.6 MG: at 11:01

## 2021-07-29 RX ADMIN — PHENYLEPHRINE HYDROCHLORIDE 40 MCG: 10 INJECTION, SOLUTION INTRAMUSCULAR; INTRAVENOUS; SUBCUTANEOUS at 09:06

## 2021-07-29 RX ADMIN — CETIRIZINE HYDROCHLORIDE 10 MG: 10 TABLET, FILM COATED ORAL at 15:34

## 2021-07-29 RX ADMIN — HYDROMORPHONE HYDROCHLORIDE 1 MG: 2 INJECTION, SOLUTION INTRAMUSCULAR; INTRAVENOUS; SUBCUTANEOUS at 09:48

## 2021-07-29 RX ADMIN — FAMOTIDINE 20 MG: 10 INJECTION, SOLUTION INTRAVENOUS at 07:33

## 2021-07-29 RX ADMIN — HYDROMORPHONE HYDROCHLORIDE 0.5 MG: 1 INJECTION, SOLUTION INTRAMUSCULAR; INTRAVENOUS; SUBCUTANEOUS at 12:08

## 2021-07-29 RX ADMIN — PRAVASTATIN SODIUM 20 MG: 20 TABLET ORAL at 15:34

## 2021-07-29 RX ADMIN — Medication 0.2 MG: at 10:05

## 2021-07-29 RX ADMIN — SODIUM CHLORIDE, POTASSIUM CHLORIDE, SODIUM LACTATE AND CALCIUM CHLORIDE: 600; 310; 30; 20 INJECTION, SOLUTION INTRAVENOUS at 06:35

## 2021-07-29 RX ADMIN — DIAZEPAM 5 MG: 5 TABLET ORAL at 19:49

## 2021-07-29 RX ADMIN — HYDROMORPHONE HYDROCHLORIDE 0.5 MG: 1 INJECTION, SOLUTION INTRAMUSCULAR; INTRAVENOUS; SUBCUTANEOUS at 11:45

## 2021-07-29 RX ADMIN — PHENYLEPHRINE HYDROCHLORIDE 40 MCG: 10 INJECTION, SOLUTION INTRAMUSCULAR; INTRAVENOUS; SUBCUTANEOUS at 09:12

## 2021-07-29 RX ADMIN — SODIUM CHLORIDE: 9 INJECTION, SOLUTION INTRAVENOUS at 12:30

## 2021-07-29 RX ADMIN — PHENYLEPHRINE HYDROCHLORIDE 40 MCG: 10 INJECTION, SOLUTION INTRAMUSCULAR; INTRAVENOUS; SUBCUTANEOUS at 10:21

## 2021-07-29 RX ADMIN — OXYCODONE 5 MG: 5 TABLET ORAL at 15:34

## 2021-07-29 RX ADMIN — HYDROMORPHONE HYDROCHLORIDE 0.5 MG: 1 INJECTION, SOLUTION INTRAMUSCULAR; INTRAVENOUS; SUBCUTANEOUS at 16:46

## 2021-07-29 RX ADMIN — FENTANYL CITRATE 100 MCG: 50 INJECTION, SOLUTION INTRAMUSCULAR; INTRAVENOUS at 07:38

## 2021-07-29 RX ADMIN — Medication 120 MG: at 07:42

## 2021-07-29 RX ADMIN — MIDAZOLAM HYDROCHLORIDE 2 MG: 2 INJECTION, SOLUTION INTRAMUSCULAR; INTRAVENOUS at 07:33

## 2021-07-29 RX ADMIN — FENTANYL CITRATE 50 MCG: 50 INJECTION, SOLUTION INTRAMUSCULAR; INTRAVENOUS at 13:34

## 2021-07-29 RX ADMIN — PROPOFOL 200 MG: 10 INJECTION, EMULSION INTRAVENOUS at 07:40

## 2021-07-29 RX ADMIN — VERAPAMIL HYDROCHLORIDE 120 MG: 120 TABLET, FILM COATED, EXTENDED RELEASE ORAL at 19:48

## 2021-07-29 RX ADMIN — PHENYLEPHRINE HYDROCHLORIDE 40 MCG: 10 INJECTION, SOLUTION INTRAMUSCULAR; INTRAVENOUS; SUBCUTANEOUS at 09:21

## 2021-07-29 RX ADMIN — PANTOPRAZOLE SODIUM 40 MG: 40 TABLET, DELAYED RELEASE ORAL at 19:48

## 2021-07-29 RX ADMIN — PHENYLEPHRINE HYDROCHLORIDE 40 MCG: 10 INJECTION, SOLUTION INTRAMUSCULAR; INTRAVENOUS; SUBCUTANEOUS at 09:01

## 2021-07-29 RX ADMIN — SODIUM CHLORIDE, POTASSIUM CHLORIDE, SODIUM LACTATE AND CALCIUM CHLORIDE: 600; 310; 30; 20 INJECTION, SOLUTION INTRAVENOUS at 09:40

## 2021-07-29 RX ADMIN — METHOCARBAMOL 1000 MG: 100 INJECTION, SOLUTION INTRAMUSCULAR; INTRAVENOUS at 10:55

## 2021-07-29 RX ADMIN — PHENYLEPHRINE HYDROCHLORIDE 40 MCG/MIN: 10 INJECTION INTRAVENOUS at 10:21

## 2021-07-29 RX ADMIN — METOPROLOL SUCCINATE 25 MG: 50 TABLET, EXTENDED RELEASE ORAL at 07:15

## 2021-07-29 RX ADMIN — Medication 0.4 MG: at 08:10

## 2021-07-29 RX ADMIN — REMIFENTANIL HYDROCHLORIDE 0.12 MCG/KG/MIN: 1 INJECTION, POWDER, LYOPHILIZED, FOR SOLUTION INTRAVENOUS at 08:00

## 2021-07-29 RX ADMIN — PHENYLEPHRINE HYDROCHLORIDE 40 MCG: 10 INJECTION, SOLUTION INTRAMUSCULAR; INTRAVENOUS; SUBCUTANEOUS at 10:09

## 2021-07-29 RX ADMIN — ROCURONIUM BROMIDE 50 MG: 10 INJECTION INTRAVENOUS at 07:50

## 2021-07-29 RX ADMIN — CLINDAMYCIN PHOSPHATE 900 MG: 900 INJECTION, SOLUTION INTRAVENOUS at 07:50

## 2021-07-29 RX ADMIN — Medication 2 MG: at 11:02

## 2021-07-29 RX ADMIN — METHOCARBAMOL 1000 MG: 100 INJECTION, SOLUTION INTRAMUSCULAR; INTRAVENOUS at 19:14

## 2021-07-29 RX ADMIN — DIPHENHYDRAMINE HYDROCHLORIDE 12.5 MG: 50 INJECTION, SOLUTION INTRAMUSCULAR; INTRAVENOUS at 12:08

## 2021-07-29 RX ADMIN — OXYCODONE 5 MG: 5 TABLET ORAL at 19:49

## 2021-07-29 RX ADMIN — LIDOCAINE HYDROCHLORIDE ANHYDROUS AND DEXTROSE MONOHYDRATE 2 MG/MIN: .4; 5 INJECTION, SOLUTION INTRAVENOUS at 08:00

## 2021-07-29 RX ADMIN — SODIUM CHLORIDE: 9 INJECTION, SOLUTION INTRAVENOUS at 07:55

## 2021-07-29 RX ADMIN — OXYCODONE 5 MG: 5 TABLET ORAL at 23:56

## 2021-07-29 RX ADMIN — DIAZEPAM 5 MG: 5 TABLET ORAL at 13:15

## 2021-07-29 RX ADMIN — Medication 0.2 MG: at 08:48

## 2021-07-29 RX ADMIN — HYDROMORPHONE HYDROCHLORIDE 0.5 MG: 1 INJECTION, SOLUTION INTRAMUSCULAR; INTRAVENOUS; SUBCUTANEOUS at 11:55

## 2021-07-29 RX ADMIN — HYDROMORPHONE HYDROCHLORIDE 0.5 MG: 1 INJECTION, SOLUTION INTRAMUSCULAR; INTRAVENOUS; SUBCUTANEOUS at 12:32

## 2021-07-29 RX ADMIN — HYDROMORPHONE HYDROCHLORIDE 0.5 MG: 1 INJECTION, SOLUTION INTRAMUSCULAR; INTRAVENOUS; SUBCUTANEOUS at 20:52

## 2021-07-29 RX ADMIN — HYDROMORPHONE HYDROCHLORIDE 0.5 MG: 2 INJECTION, SOLUTION INTRAMUSCULAR; INTRAVENOUS; SUBCUTANEOUS at 11:21

## 2021-07-29 RX ADMIN — LOSARTAN POTASSIUM 50 MG: 25 TABLET, FILM COATED ORAL at 15:34

## 2021-07-29 RX ADMIN — DEXAMETHASONE SODIUM PHOSPHATE 10 MG: 4 INJECTION, SOLUTION INTRAMUSCULAR; INTRAVENOUS at 07:50

## 2021-07-29 ASSESSMENT — PULMONARY FUNCTION TESTS
PIF_VALUE: 1
PIF_VALUE: 0
PIF_VALUE: 24
PIF_VALUE: 25
PIF_VALUE: 4
PIF_VALUE: 24
PIF_VALUE: 23
PIF_VALUE: 24
PIF_VALUE: 23
PIF_VALUE: 24
PIF_VALUE: 23
PIF_VALUE: 22
PIF_VALUE: 24
PIF_VALUE: 25
PIF_VALUE: 23
PIF_VALUE: 23
PIF_VALUE: 1
PIF_VALUE: 24
PIF_VALUE: 23
PIF_VALUE: 5
PIF_VALUE: 24
PIF_VALUE: 25
PIF_VALUE: 4
PIF_VALUE: 23
PIF_VALUE: 25
PIF_VALUE: 24
PIF_VALUE: 21
PIF_VALUE: 24
PIF_VALUE: 25
PIF_VALUE: 25
PIF_VALUE: 3
PIF_VALUE: 24
PIF_VALUE: 24
PIF_VALUE: 9
PIF_VALUE: 23
PIF_VALUE: 2
PIF_VALUE: 25
PIF_VALUE: 24
PIF_VALUE: 22
PIF_VALUE: 23
PIF_VALUE: 22
PIF_VALUE: 19
PIF_VALUE: 22
PIF_VALUE: 18
PIF_VALUE: 23
PIF_VALUE: 20
PIF_VALUE: 23
PIF_VALUE: 22
PIF_VALUE: 21
PIF_VALUE: 24
PIF_VALUE: 23
PIF_VALUE: 23
PIF_VALUE: 25
PIF_VALUE: 23
PIF_VALUE: 24
PIF_VALUE: 23
PIF_VALUE: 24
PIF_VALUE: 21
PIF_VALUE: 25
PIF_VALUE: 0
PIF_VALUE: 23
PIF_VALUE: 24
PIF_VALUE: 22
PIF_VALUE: 22
PIF_VALUE: 23
PIF_VALUE: 24
PIF_VALUE: 22
PIF_VALUE: 24
PIF_VALUE: 23
PIF_VALUE: 0
PIF_VALUE: 3
PIF_VALUE: 24
PIF_VALUE: 23
PIF_VALUE: 23
PIF_VALUE: 24
PIF_VALUE: 4
PIF_VALUE: 20
PIF_VALUE: 24
PIF_VALUE: 3
PIF_VALUE: 23
PIF_VALUE: 25
PIF_VALUE: 20
PIF_VALUE: 24
PIF_VALUE: 24
PIF_VALUE: 23
PIF_VALUE: 22
PIF_VALUE: 25
PIF_VALUE: 24
PIF_VALUE: 4
PIF_VALUE: 23
PIF_VALUE: 23
PIF_VALUE: 25
PIF_VALUE: 3
PIF_VALUE: 24
PIF_VALUE: 23
PIF_VALUE: 24
PIF_VALUE: 24
PIF_VALUE: 26
PIF_VALUE: 24
PIF_VALUE: 20
PIF_VALUE: 22
PIF_VALUE: 25
PIF_VALUE: 24
PIF_VALUE: 24
PIF_VALUE: 23
PIF_VALUE: 24
PIF_VALUE: 24
PIF_VALUE: 20
PIF_VALUE: 18
PIF_VALUE: 23
PIF_VALUE: 4
PIF_VALUE: 24
PIF_VALUE: 23
PIF_VALUE: 23
PIF_VALUE: 22
PIF_VALUE: 24
PIF_VALUE: 4
PIF_VALUE: 2
PIF_VALUE: 23
PIF_VALUE: 24
PIF_VALUE: 5
PIF_VALUE: 23
PIF_VALUE: 23
PIF_VALUE: 2
PIF_VALUE: 23
PIF_VALUE: 4
PIF_VALUE: 24
PIF_VALUE: 4
PIF_VALUE: 24
PIF_VALUE: 24
PIF_VALUE: 22
PIF_VALUE: 25
PIF_VALUE: 24
PIF_VALUE: 23
PIF_VALUE: 5
PIF_VALUE: 24
PIF_VALUE: 23
PIF_VALUE: 23
PIF_VALUE: 24
PIF_VALUE: 21
PIF_VALUE: 23
PIF_VALUE: 24
PIF_VALUE: 0
PIF_VALUE: 24
PIF_VALUE: 24
PIF_VALUE: 23
PIF_VALUE: 23
PIF_VALUE: 25
PIF_VALUE: 23
PIF_VALUE: 24
PIF_VALUE: 24
PIF_VALUE: 23
PIF_VALUE: 20
PIF_VALUE: 22
PIF_VALUE: 24
PIF_VALUE: 25
PIF_VALUE: 22
PIF_VALUE: 24
PIF_VALUE: 24
PIF_VALUE: 22
PIF_VALUE: 5
PIF_VALUE: 23
PIF_VALUE: 23
PIF_VALUE: 24
PIF_VALUE: 24
PIF_VALUE: 23
PIF_VALUE: 24
PIF_VALUE: 25
PIF_VALUE: 21
PIF_VALUE: 23
PIF_VALUE: 21
PIF_VALUE: 24
PIF_VALUE: 25
PIF_VALUE: 22
PIF_VALUE: 24
PIF_VALUE: 24
PIF_VALUE: 23
PIF_VALUE: 24
PIF_VALUE: 24
PIF_VALUE: 23
PIF_VALUE: 18
PIF_VALUE: 23
PIF_VALUE: 23
PIF_VALUE: 24
PIF_VALUE: 24
PIF_VALUE: 23
PIF_VALUE: 24
PIF_VALUE: 2
PIF_VALUE: 24
PIF_VALUE: 23
PIF_VALUE: 22
PIF_VALUE: 23
PIF_VALUE: 22
PIF_VALUE: 24
PIF_VALUE: 18
PIF_VALUE: 4
PIF_VALUE: 23
PIF_VALUE: 24
PIF_VALUE: 23
PIF_VALUE: 22
PIF_VALUE: 24
PIF_VALUE: 22
PIF_VALUE: 23
PIF_VALUE: 24
PIF_VALUE: 25
PIF_VALUE: 23
PIF_VALUE: 25
PIF_VALUE: 23
PIF_VALUE: 25
PIF_VALUE: 24

## 2021-07-29 ASSESSMENT — PAIN DESCRIPTION - FREQUENCY
FREQUENCY: CONTINUOUS

## 2021-07-29 ASSESSMENT — PAIN DESCRIPTION - DIRECTION
RADIATING_TOWARDS: LEGS

## 2021-07-29 ASSESSMENT — PAIN DESCRIPTION - PROGRESSION
CLINICAL_PROGRESSION: GRADUALLY WORSENING

## 2021-07-29 ASSESSMENT — PAIN DESCRIPTION - PAIN TYPE
TYPE: SURGICAL PAIN

## 2021-07-29 ASSESSMENT — PAIN SCALES - GENERAL
PAINLEVEL_OUTOF10: 8
PAINLEVEL_OUTOF10: 6
PAINLEVEL_OUTOF10: 10
PAINLEVEL_OUTOF10: 10
PAINLEVEL_OUTOF10: 7
PAINLEVEL_OUTOF10: 9
PAINLEVEL_OUTOF10: 6
PAINLEVEL_OUTOF10: 10
PAINLEVEL_OUTOF10: 10
PAINLEVEL_OUTOF10: 7
PAINLEVEL_OUTOF10: 6
PAINLEVEL_OUTOF10: 10
PAINLEVEL_OUTOF10: 8
PAINLEVEL_OUTOF10: 8
PAINLEVEL_OUTOF10: 6

## 2021-07-29 ASSESSMENT — PAIN DESCRIPTION - LOCATION
LOCATION: BACK

## 2021-07-29 ASSESSMENT — PAIN DESCRIPTION - ORIENTATION
ORIENTATION: MID;LOWER
ORIENTATION: LOWER
ORIENTATION: MID;LOWER
ORIENTATION: MID
ORIENTATION: MID;LOWER

## 2021-07-29 ASSESSMENT — PAIN DESCRIPTION - DESCRIPTORS
DESCRIPTORS: ACHING
DESCRIPTORS: ACHING
DESCRIPTORS: ACHING;BURNING
DESCRIPTORS: ACHING
DESCRIPTORS: ACHING;BURNING
DESCRIPTORS: BURNING
DESCRIPTORS: DISCOMFORT

## 2021-07-29 ASSESSMENT — PAIN - FUNCTIONAL ASSESSMENT
PAIN_FUNCTIONAL_ASSESSMENT: 0-10
PAIN_FUNCTIONAL_ASSESSMENT: PREVENTS OR INTERFERES SOME ACTIVE ACTIVITIES AND ADLS

## 2021-07-29 ASSESSMENT — PAIN DESCRIPTION - ONSET
ONSET: ON-GOING

## 2021-07-29 ASSESSMENT — LIFESTYLE VARIABLES: SMOKING_STATUS: 0

## 2021-07-29 NOTE — PROGRESS NOTES
4 Eyes Admission Assessment     I agree as the admission nurse that 2 RN's have performed a thorough Head to Toe Skin Assessment on the patient. ALL assessment sites listed below have been assessed on admission. Areas assessed by both nurses:   [x]   Head, Face, and Ears   [x]   Shoulders, Back, and Chest  [x]   Arms, Elbows, and Hands   [x]   Coccyx, Sacrum, and Ischium  [x]   Legs, Feet, and Heels        Does the Patient have Skin Breakdown?   No       Minor scattered bruising  Rudy Prevention initiated:  No   Wound Care Orders initiated:  No      [facility] nurse consulted for Pressure Injury (Stage 3,4, Unstageable, DTI, NWPT, and Complex wounds) or Rudy score 18 or lower:  No      Nurse 1 eSignature: Electronically signed by Roselyn Tiwari RN on 7/29/21 at 2:36 PM EDT    **SHARE this note so that the co-signing nurse is able to place an eSignature**    Nurse 2 eSignature: Electronically signed by Otf Marroquin on 7/29/21 at 3:17 PM EDT

## 2021-07-29 NOTE — ANESTHESIA POSTPROCEDURE EVALUATION
Department of Anesthesiology  Postprocedure Note    Patient: Venancio Ferrell  MRN: [id number]  YOB: 1955  Date of evaluation: 7/29/2021  Time:  3:25 PM     Procedure Summary     Date: 07/29/21 Room / Location: [facility]    Anesthesia Start: 1291 Anesthesia Stop: 1674    Procedures:       L4-S1 ANTERIOR LUMBAR INTERBODY FUSION WITH PEDICLE SCREW FIXATION (N/A Spine Lumbar)      . (N/A Spine Lumbar) Diagnosis:       Spondylolisthesis of lumbar region      (Spondylolisthesis of lumbar region [M43.16])    Surgeons: Eulalia Gutierrez. Ricky Valle MD Responsible Provider: Jaime Brownlee DO    Anesthesia Type: general ASA Status: 3          Anesthesia Type: general    Halle Phase I: Halle Score: 9    Halle Phase II:      Last vitals: Reviewed and per EMR flowsheets.        Anesthesia Post Evaluation    Patient location during evaluation: PACU  Patient participation: complete - patient participated  Level of consciousness: awake  Pain score: 2  Airway patency: patent  Nausea & Vomiting: no nausea and no vomiting  Complications: no  Cardiovascular status: hemodynamically stable  Respiratory status: acceptable  Hydration status: euvolemic

## 2021-07-29 NOTE — PROGRESS NOTES
Patient is a/o x4. VSS on room air. Surgical sites - 2 to the back and 1 on the abdomen are clean, dry, and intact - closed with surgical glue. Patient endorsing pain 10/10 being managed with prn and scheduled pain medication per the [facility]. Patient gradually increasing diet and tolerating crackers, jello and soda well. Voiding adequately per lópez. All fall precautions in place.

## 2021-07-29 NOTE — PROGRESS NOTES
Pt complaining that pain has returned to 9/10. All IV dilaudid given. Order obtained for Fentanyl from Dr. Mera Paz.

## 2021-07-29 NOTE — H&P
Shirley Burlington    [id number]    [facility] Same Day Surgery Update H & P  Department of General Surgery   Surgical Service   Pre-operative History and Physical  Last H & P within the last 30 days. DIAGNOSIS:   Spondylolisthesis of lumbar region [M43.16]    Procedure(s):  L4-S1 ANTERIOR LUMBAR INTERBODY FUSION WITH PEDICLE SCREW FIXATION  . HISTORY OF PRESENT ILLNESS:   Patient is a morbidly obese (Body mass index is 45.88 kg/m².), 77 y.o. female with chronic lumbar pain and bilateral foot burning. The symptoms have been recalcitrant to conservative treatment and the patient presents today for the above procedure. Covid 19:  Patient denies fever, chills, cough or known exposure to Covid-19.       Past Medical History:        Diagnosis Date    Allergic rhinitis     Anxiety     Atrial tachycardia ([address]     dr Zuhair Nur ([facility] cors)    Carpal tunnel syndrome     Cervicalgia     Chronic back pain     low    Depression     GERD (gastroesophageal reflux disease)     has schatzki ring    Headache(784.0)     hemiplegic migraines, improved    Hernia hiatal     Histoplasmosis     Hot flashes     takes wellbutrin    Hyperlipidemia     Hypertension     Obesity     Osteoarthritis     multiple joints    PONV (postoperative nausea and vomiting)     Shingles     nerve pain in her low back persists    Sleep apnea     CPAP set of 4    Tricuspid regurgitation      Past Surgical History:        Procedure Laterality Date    BREAST SURGERY Right     Biopsy w/clip    BUNIONECTOMY      right     COLONOSCOPY  10/2018    fu 10 yrs    KNEE ARTHROSCOPY      KNEE CARTILAGE SURGERY      LOBECTOMY      partial right lung lobectomy    LUNG BIOPSY      PARATHYROID GLAND SURGERY      PARTIAL HYSTERECTOMY      ovaries retained    SINUS SURGERY      TOTAL KNEE ARTHROPLASTY Bilateral 03/03/2015     Past Social History:  Social History     Socioeconomic History    Marital status:      Spouse name: None    Number of children: 2    Years of education: None    Highest education level: None   Occupational History    Occupation: , retired/disabled   Tobacco Use    Smoking status: Former Smoker     Packs/day: 1.00     Years: 5.00     Pack years: 5.00     Quit date: 1990     Years since quittin.5    Smokeless tobacco: Never Used   Vaping Use    Vaping Use: Never used   Substance and Sexual Activity    Alcohol use: No    Drug use: No    Sexual activity: None   Other Topics Concern    None   Social History Narrative    2 kids are 39, 39     Lives w      Social Determinants of Health     Financial Resource Strain: Low Risk     Difficulty of Paying Living Expenses: Not hard at all   Food Insecurity: No Food Insecurity    Worried About [address] in the Last Year: Never true    [address] in the Last Year: Never true   Transportation Needs:     Lack of Transportation (Medical):  Lack of Transportation (Non-Medical):    Physical Activity:     Days of Exercise per Week:     Minutes of Exercise per Session:    Stress:     Feeling of Stress :    Social Connections:     Frequency of Communication with Friends and Family:     Frequency of Social Gatherings with Friends and Family:     Attends Anabaptist Services:     Active Member of Clubs or Organizations:     Attends Club or Organization Meetings:     Marital Status:    Intimate Partner Violence:     Fear of Current or Ex-Partner:     Emotionally Abused:     Physically Abused:     Sexually Abused:          Medications Prior to Admission:      Prior to Admission medications    Medication Sig Start Date End Date Taking? Authorizing Provider   metroNIDAZOLE (METROGEL) 0.75 % gel Apply topically 2 times daily. 21  Yes Taylor Potter MD   clotrimazole-betamethasone (LOTRISONE) 1-0.05 % cream Apply topically 2 times daily.  21  Yes Taylor Potter MD   metoprolol succinate (TOPROL XL) 25 MG extended release tablet Take 1 tablet by mouth daily 5/28/21 8/26/21 Yes Jamie Fabian MD   buPROPion (WELLBUTRIN XL) 150 MG extended release tablet Take 1 tablet by mouth every morning 5/28/21  Yes Jamie Fabian MD   loratadine (CLARITIN) 10 MG tablet TAKE 1 TABLET BY MOUTH EVERY DAY 5/7/21  Yes Deja Oliveira MD   pantoprazole (PROTONIX) 40 MG tablet TAKE 1 TABLET BY MOUTH TWICE A DAY 3/9/21  Yes Deja Oliveira MD   losartan (COZAAR) 50 MG tablet Take 1 tablet by mouth daily 3/3/21  Yes Sapna Victor MD   verapamil (CALAN SR) 120 MG extended release tablet Take 1 tablet by mouth nightly Do not crush or chew. 3/3/21  Yes Sapna Victor MD   pravastatin (PRAVACHOL) 20 MG tablet Take 1 tablet by mouth daily 3/3/21  Yes Sapna Victor MD   acetaminophen (TYLENOL) 500 MG tablet Take 500 mg by mouth every 6 hours as needed for Pain   Yes Historical Provider, MD   Multiple Vitamins-Minerals (CENTRUM SILVER PO) Take 1 tablet by mouth daily    Yes Historical Provider, MD   gabapentin (NEURONTIN) 300 MG capsule TAKE 1 CAPSULE BY MOUTH 3 TIMES DAILY FOR 30 DAYS.  6/15/21 7/15/21  Jamie Fabian MD   vitamin B-12 (CYANOCOBALAMIN) 1000 MCG tablet  1/1/21   Historical Provider, MD   Turmeric 500 MG CAPS  1/1/21   Historical Provider, MD   aspirin EC 81 MG EC tablet Take 1 tablet by mouth daily 2/10/21   Deja Oliveira MD   Omega-3 Fatty Acids (FISH OIL) 1200 MG CAPS Take 1,200 mg by mouth 2 times daily    Historical Provider, MD         Allergies:  Latex, Reglan [metoclopramide hcl], Univasc [moexipril], Crestor [rosuvastatin], Celecoxib, Keflex [cephalexin], Lipitor, Metoclopramide, Prochlorperazine, Prochlorperazine edisylate, Sulfa antibiotics, and Vioxx    PHYSICAL EXAM:      BP (!) 154/71   Pulse 64   Temp 97.8 °F (36.6 °C) (Oral)   Resp 16   Ht 5' 3\" (1.6 m)   Wt 259 lb (117.5 kg)   LMP  (LMP Unknown)   SpO2 96%   BMI 45.88 kg/m²      Airway:  Airway patent with no audible stridor    Heart:  Regular rate and rhythm, No murmur noted    Lungs:  No increased work of breathing, good air exchange, clear to auscultation bilaterally, no crackles or wheezing    Abdomen:  Soft, non-distended, non-tender, no masses palpated    ASSESSMENT AND PLAN    Patient is a 77 y.o. female with above specified procedure planned. 1.  The patients history and physical was obtained and signed off by the pre-admission testing department. Patient seen and focused exam done today- no new changes since last physical exam on 7/21/21     2. Access to ancillary services are available per request of the provider.     LISA Brown - GEORGE     7/29/2021

## 2021-07-29 NOTE — PLAN OF CARE
Problem: Safety:  Goal: Free from accidental physical injury  Description: Free from accidental physical injury  Outcome: Ongoing   All fall precautions in place. Bed locked and in lowest position with alarm on. Overbed table and personal belonings within reach. Call light within reach and patient instructed to use call light for assistance. Non-skid socks on. Problem: Pain:  Goal: Patient's pain/discomfort is manageable  Description: Patient's pain/discomfort is manageable  Outcome: Ongoing   Patient educated rating pain on 0-10 scale. Currently rating surgical back pain at a 7 out of 10. Pain being treated with prn pain medication per MAR. Patient states 4 is a tolerable level of pain. Patient using rest, repositioning, emotional support, and ice packs to decrease pain and promote comfort.

## 2021-07-29 NOTE — PROGRESS NOTES
Patient to pacu 16 s/p L4-S1 ANTERIOR LUMBAR INTERBODY FUSION WITH PEDICLE SCREW FIXATION, report received from CRNA, reported hemodynamically stable intra op, patien writhing in pain, pain medication given by CRNA at this time see emar

## 2021-07-29 NOTE — PROGRESS NOTES
Patient up out of bed and ambulated in the room. x1 assist with gb and walker. Patient tolerated fairly well.

## 2021-07-29 NOTE — ANESTHESIA PRE PROCEDURE
Department of Anesthesiology  Preprocedure Note       Name:  Guru Schroeder   Age:  77 y.o.  :  1955                                          MRN:  [id number]         Date:  2021      Surgeon: Suraj Mccray):  Roderick Russell. MD Andriy Romo MD    Procedure: Procedure(s):  L4-S1 ANTERIOR LUMBAR INTERBODY FUSION WITH PEDICLE SCREW FIXATION  . Medications prior to admission:   Prior to Admission medications    Medication Sig Start Date End Date Taking? Authorizing Provider   metroNIDAZOLE (METROGEL) 0.75 % gel Apply topically 2 times daily. 21  Yes Slava Glez MD   clotrimazole-betamethasone (LOTRISONE) 1-0.05 % cream Apply topically 2 times daily. 21  Yes Slava Glez MD   metoprolol succinate (TOPROL XL) 25 MG extended release tablet Take 1 tablet by mouth daily 21 Yes Slava Glez MD   buPROPion (WELLBUTRIN XL) 150 MG extended release tablet Take 1 tablet by mouth every morning 21  Yes Slava Glez MD   loratadine (CLARITIN) 10 MG tablet TAKE 1 TABLET BY MOUTH EVERY DAY 21  Yes Shannon Russell MD   pantoprazole (PROTONIX) 40 MG tablet TAKE 1 TABLET BY MOUTH TWICE A DAY 3/9/21  Yes Shannon Russell MD   losartan (COZAAR) 50 MG tablet Take 1 tablet by mouth daily 3/3/21  Yes Nabor Steward MD   verapamil (CALAN SR) 120 MG extended release tablet Take 1 tablet by mouth nightly Do not crush or chew. 3/3/21  Yes Nabor Steward MD   pravastatin (PRAVACHOL) 20 MG tablet Take 1 tablet by mouth daily 3/3/21  Yes Nabor Steward MD   acetaminophen (TYLENOL) 500 MG tablet Take 500 mg by mouth every 6 hours as needed for Pain   Yes Historical Provider, MD   Multiple Vitamins-Minerals (CENTRUM SILVER PO) Take 1 tablet by mouth daily    Yes Historical Provider, MD   gabapentin (NEURONTIN) 300 MG capsule TAKE 1 CAPSULE BY MOUTH 3 TIMES DAILY FOR 30 DAYS.  6/15/21 7/15/21  Slava Glez MD vitamin B-12 (CYANOCOBALAMIN) 1000 MCG tablet  1/1/21   Historical Provider, MD   Turmeric 500 MG CAPS  1/1/21   Historical Provider, MD   aspirin EC 81 MG EC tablet Take 1 tablet by mouth daily 2/10/21   Sean Cagle MD   Omega-3 Fatty Acids (FISH OIL) 1200 MG CAPS Take 1,200 mg by mouth 2 times daily    Historical Provider, MD       Current medications:    Current Facility-Administered Medications   Medication Dose Route Frequency Provider Last Rate Last Admin    clindamycin (CLEOCIN) 900 mg in dextrose 5 % 50 mL IVPB  900 mg Intravenous Once Diogo Langley MD        lactated ringers infusion   Intravenous Continuous Bashir Oshea  mL/hr at 07/29/21 0635 New Bag at 07/29/21 0635    sodium chloride flush 0.9 % injection 5-40 mL  5-40 mL Intravenous 2 times per day Bashir Oshea MD        sodium chloride flush 0.9 % injection 5-40 mL  5-40 mL Intravenous PRN Bashir Oshea MD        0.9 % sodium chloride infusion  25 mL Intravenous PRN Bashir Oshea MD        lidocaine PF 1 % injection 1 mL  1 mL Intradermal Once PRN Bashir Oshea MD           Allergies:     Allergies   Allergen Reactions    Latex Rash    Reglan [Metoclopramide Hcl] Rash    Univasc [Moexipril] Other (See Comments)     unsure    Crestor [Rosuvastatin]      myalgia    Celecoxib Palpitations    Keflex [Cephalexin] Diarrhea    Lipitor Other (See Comments)     myalgia    Metoclopramide Anxiety    Prochlorperazine Anxiety    Prochlorperazine Edisylate     Sulfa Antibiotics Nausea And Vomiting    Vioxx        Problem List:    Patient Active Problem List   Diagnosis Code    Vitamin D deficiency E55.9    Generalized osteoarthrosis, involving multiple sites M15.9    Chronic low back pain M54.5, G89.29    Hemiplegic migraine G43.409    Allergic rhinitis J30.9    GERD (gastroesophageal reflux disease) K21.9    Degenerative disc disease, lumbar M51.36    Essential hypertension I10    Major depressive disorder with single episode, 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[facility]) F32.4    Mixed hyperlipidemia E78.2    Rosacea L71.9    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    Palpitations R00.2    Atrial tachycardia ([facility]) I47.1    Paresthesia R20.2    PAD (peripheral artery disease) ([facility]) I73.9    Chest pain R07.9    Morbidly obese ([facility]) E66.01    Epigastric discomfort R10.13    Chronic back pain M54.9, G89.29       Past Medical History:        Diagnosis Date    Allergic rhinitis     Anxiety     Atrial tachycardia ([address]     dr Tamara Gonzalez ([facility] cors)    Carpal tunnel syndrome     Cervicalgia     Chronic back pain     low    Depression     GERD (gastroesophageal reflux disease)     has schatzki ring    Headache(784.0)     hemiplegic migraines, improved    Hernia hiatal     Histoplasmosis     Hot flashes     takes wellbutrin    Hyperlipidemia     Hypertension     Obesity     Osteoarthritis     multiple joints    PONV (postoperative nausea and vomiting)     Shingles     nerve pain in her low back persists    Sleep apnea     CPAP set of 4    Tricuspid regurgitation        Past Surgical History:        Procedure Laterality Date    BREAST SURGERY Right     Biopsy w/clip    BUNIONECTOMY      right     COLONOSCOPY  10/2018    fu 10 yrs    KNEE ARTHROSCOPY      KNEE CARTILAGE SURGERY      LOBECTOMY      partial right lung lobectomy    LUNG BIOPSY      PARATHYROID GLAND SURGERY      PARTIAL HYSTERECTOMY      ovaries retained    SINUS SURGERY      TOTAL KNEE ARTHROPLASTY Bilateral 2015       Social History:    Social History     Tobacco Use    Smoking status: Former Smoker     Packs/day: 1.00     Years: 5.00     Pack years: 5.00     Quit date: 1990     Years since quittin.5    Smokeless tobacco: Never Used   Substance Use Topics    Alcohol use:  No                                Counseling given: Not Answered      Vital Signs (Current):   Vitals:    21 1644 21 0602   BP:  (!) 154/71   Pulse:  64   Resp:  16   Temp:  97.8 °F (36.6 °C)   TempSrc:  Oral   SpO2:  96%   Weight: 259 lb (117.5 kg) 259 lb (117.5 kg)   Height: 5' 3\" (1.6 m) 5' 3\" (1.6 m)                                              BP Readings from Last 3 Encounters:   07/29/21 (!) 154/71   07/21/21 122/70   07/16/21 136/78       NPO Status: Time of last liquid consumption: 2100                        Time of last solid consumption: 2100                        Date of last liquid consumption: 07/28/21                        Date of last solid food consumption: 07/28/21    BMI:   Wt Readings from Last 3 Encounters:   07/29/21 259 lb (117.5 kg)   07/21/21 259 lb 12.8 oz (117.8 kg)   07/16/21 257 lb (116.6 kg)     Body mass index is 45.88 kg/m². CBC:   Lab Results   Component Value Date    WBC 8.3 07/16/2021    RBC 4.70 07/16/2021    HGB 14.6 07/16/2021    HCT 42.8 07/16/2021    MCV 91.0 07/16/2021    RDW 13.3 07/16/2021     07/16/2021       CMP:   Lab Results   Component Value Date     07/26/2021    K 4.3 07/26/2021    K 4.7 05/20/2020     07/26/2021    CO2 23 07/26/2021    BUN 6 07/26/2021    CREATININE 0.5 07/26/2021    GFRAA >60 07/26/2021    GFRAA >60 04/20/2013    AGRATIO 1.6 07/16/2021    LABGLOM >60 07/26/2021    LABGLOM 97 04/16/2011    GLUCOSE 102 07/26/2021    GLUCOSE 85 07/23/2011    PROT 7.3 07/16/2021    PROT 6.7 09/22/2012    CALCIUM 9.0 07/26/2021    BILITOT 0.4 07/16/2021    ALKPHOS 100 07/16/2021    AST 21 07/16/2021    ALT 23 07/16/2021       POC Tests: No results for input(s): POCGLU, POCNA, POCK, POCCL, POCBUN, POCHEMO, POCHCT in the last 72 hours.     Coags:   Lab Results   Component Value Date    PROTIME 10.6 07/16/2021    INR 0.94 07/16/2021    APTT 30.4 07/16/2021       HCG (If Applicable): No results found for: PREGTESTUR, PREGSERUM, HCG, HCGQUANT     ABGs: No results found for: PHART, PO2ART, CPG1TGI, LNW5KAA, BEART, L5WHGYXH     Type & Screen (If Applicable):  No results found for: LABABO, LABRH    Drug/Infectious Status (If Applicable):  No results found for: HIV, HEPCAB    COVID-19 Screening (If Applicable): No results found for: COVID19        Anesthesia Evaluation  Patient summary reviewed and Nursing notes reviewed   history of anesthetic complications: PONV. Airway: Mallampati: II  TM distance: >3 FB   Neck ROM: full  Mouth opening: > = 3 FB Dental:    (+) upper dentures  Comment: Lower all crowns      Pulmonary: breath sounds clear to auscultation  (+) sleep apnea: on CPAP,      (-) not a current smoker (quit 1990)                           Cardiovascular:  Exercise tolerance: good (>4 METS),   (+) hypertension: moderate,     (-) past MI    NYHA Classification: II    Rhythm: regular  Rate: normal           Beta Blocker:  Dose within 24 Hrs         Neuro/Psych:               GI/Hepatic/Renal:   (+) GERD: well controlled, morbid obesity (bmi 46 )     (-) hiatal hernia       Endo/Other: Negative Endo/Other ROS                    Abdominal:   (+) obese,           Vascular: negative vascular ROS. Other Findings:             Anesthesia Plan      general     ASA 3       Induction: intravenous. MIPS: Prophylactic antiemetics administered. Anesthetic plan and risks discussed with patient. Plan discussed with CRNA.     Attending anesthesiologist reviewed and agrees with Preprocedure content              Danial Urena DO   7/29/2021

## 2021-07-30 LAB
HCT VFR BLD CALC: 36.4 % (ref 36–48)
HEMOGLOBIN: 12.9 G/DL (ref 12–16)
MCH RBC QN AUTO: 32 PG (ref 26–34)
MCHC RBC AUTO-ENTMCNC: 35.4 G/DL (ref 31–36)
MCV RBC AUTO: 90.5 FL (ref 80–100)
PDW BLD-RTO: 13.3 % (ref 12.4–15.4)
PLATELET # BLD: 230 K/UL (ref 135–450)
PMV BLD AUTO: 7.2 FL (ref 5–10.5)
RBC # BLD: 4.02 M/UL (ref 4–5.2)
WBC # BLD: 10.9 K/UL (ref 4–11)

## 2021-07-30 PROCEDURE — 2580000003 HC RX 258: Performed by: PHYSICIAN ASSISTANT

## 2021-07-30 PROCEDURE — 6370000000 HC RX 637 (ALT 250 FOR IP): Performed by: ANESTHESIOLOGY

## 2021-07-30 PROCEDURE — 85027 COMPLETE CBC AUTOMATED: CPT

## 2021-07-30 PROCEDURE — 6370000000 HC RX 637 (ALT 250 FOR IP): Performed by: PHYSICIAN ASSISTANT

## 2021-07-30 PROCEDURE — 97166 OT EVAL MOD COMPLEX 45 MIN: CPT

## 2021-07-30 PROCEDURE — 1200000000 HC SEMI PRIVATE

## 2021-07-30 PROCEDURE — 6370000000 HC RX 637 (ALT 250 FOR IP): Performed by: NURSE PRACTITIONER

## 2021-07-30 PROCEDURE — 6360000002 HC RX W HCPCS: Performed by: PHYSICIAN ASSISTANT

## 2021-07-30 PROCEDURE — 36415 COLL VENOUS BLD VENIPUNCTURE: CPT

## 2021-07-30 PROCEDURE — 97530 THERAPEUTIC ACTIVITIES: CPT

## 2021-07-30 PROCEDURE — 97535 SELF CARE MNGMENT TRAINING: CPT

## 2021-07-30 PROCEDURE — 97116 GAIT TRAINING THERAPY: CPT

## 2021-07-30 PROCEDURE — 97161 PT EVAL LOW COMPLEX 20 MIN: CPT

## 2021-07-30 RX ORDER — OXYCODONE HYDROCHLORIDE 5 MG/1
10 TABLET ORAL EVERY 4 HOURS PRN
Status: DISCONTINUED | OUTPATIENT
Start: 2021-07-30 | End: 2021-08-12

## 2021-07-30 RX ORDER — LIDOCAINE 4 G/G
1 PATCH TOPICAL DAILY
Status: DISCONTINUED | OUTPATIENT
Start: 2021-07-30 | End: 2021-08-13 | Stop reason: HOSPADM

## 2021-07-30 RX ORDER — SENNA AND DOCUSATE SODIUM 50; 8.6 MG/1; MG/1
2 TABLET, FILM COATED ORAL 2 TIMES DAILY
Status: DISCONTINUED | OUTPATIENT
Start: 2021-07-30 | End: 2021-08-13 | Stop reason: HOSPADM

## 2021-07-30 RX ORDER — POLYETHYLENE GLYCOL 3350 17 G/17G
17 POWDER, FOR SOLUTION ORAL DAILY
Status: DISCONTINUED | OUTPATIENT
Start: 2021-07-30 | End: 2021-08-13 | Stop reason: HOSPADM

## 2021-07-30 RX ORDER — ACETAMINOPHEN 500 MG
1000 TABLET ORAL EVERY 6 HOURS SCHEDULED
Status: DISCONTINUED | OUTPATIENT
Start: 2021-07-30 | End: 2021-08-03

## 2021-07-30 RX ORDER — OXYCODONE HYDROCHLORIDE 5 MG/1
5 TABLET ORAL EVERY 4 HOURS PRN
Status: DISCONTINUED | OUTPATIENT
Start: 2021-07-30 | End: 2021-08-12

## 2021-07-30 RX ORDER — CALCIUM CARBONATE 200(500)MG
500 TABLET,CHEWABLE ORAL 3 TIMES DAILY PRN
Status: DISCONTINUED | OUTPATIENT
Start: 2021-07-30 | End: 2021-08-13 | Stop reason: HOSPADM

## 2021-07-30 RX ADMIN — VERAPAMIL HYDROCHLORIDE 120 MG: 120 TABLET, FILM COATED, EXTENDED RELEASE ORAL at 20:13

## 2021-07-30 RX ADMIN — LOSARTAN POTASSIUM 50 MG: 25 TABLET, FILM COATED ORAL at 20:13

## 2021-07-30 RX ADMIN — PANTOPRAZOLE SODIUM 40 MG: 40 TABLET, DELAYED RELEASE ORAL at 07:55

## 2021-07-30 RX ADMIN — CETIRIZINE HYDROCHLORIDE 10 MG: 10 TABLET, FILM COATED ORAL at 07:54

## 2021-07-30 RX ADMIN — ACETAMINOPHEN 1000 MG: 500 TABLET, COATED ORAL at 08:06

## 2021-07-30 RX ADMIN — PANTOPRAZOLE SODIUM 40 MG: 40 TABLET, DELAYED RELEASE ORAL at 16:40

## 2021-07-30 RX ADMIN — METHOCARBAMOL 1000 MG: 100 INJECTION, SOLUTION INTRAMUSCULAR; INTRAVENOUS at 10:56

## 2021-07-30 RX ADMIN — OXYCODONE 5 MG: 5 TABLET ORAL at 03:39

## 2021-07-30 RX ADMIN — ENOXAPARIN SODIUM 40 MG: 40 INJECTION SUBCUTANEOUS at 07:55

## 2021-07-30 RX ADMIN — DIAZEPAM 5 MG: 5 TABLET ORAL at 05:07

## 2021-07-30 RX ADMIN — ACETAMINOPHEN 1000 MG: 500 TABLET, COATED ORAL at 11:57

## 2021-07-30 RX ADMIN — PRAVASTATIN SODIUM 20 MG: 20 TABLET ORAL at 07:55

## 2021-07-30 RX ADMIN — HYDROMORPHONE HYDROCHLORIDE 0.5 MG: 1 INJECTION, SOLUTION INTRAMUSCULAR; INTRAVENOUS; SUBCUTANEOUS at 00:55

## 2021-07-30 RX ADMIN — DOCUSATE SODIUM 50 MG AND SENNOSIDES 8.6 MG 2 TABLET: 8.6; 5 TABLET, FILM COATED ORAL at 20:13

## 2021-07-30 RX ADMIN — BISACODYL 5 MG: 5 TABLET, COATED ORAL at 07:55

## 2021-07-30 RX ADMIN — OXYCODONE 10 MG: 5 TABLET ORAL at 16:40

## 2021-07-30 RX ADMIN — DIAZEPAM 5 MG: 5 TABLET ORAL at 11:58

## 2021-07-30 RX ADMIN — POLYETHYLENE GLYCOL 3350 17 G: 17 POWDER, FOR SOLUTION ORAL at 07:55

## 2021-07-30 RX ADMIN — DOCUSATE SODIUM 50 MG AND SENNOSIDES 8.6 MG 2 TABLET: 8.6; 5 TABLET, FILM COATED ORAL at 07:54

## 2021-07-30 RX ADMIN — HYDROMORPHONE HYDROCHLORIDE 0.5 MG: 1 INJECTION, SOLUTION INTRAMUSCULAR; INTRAVENOUS; SUBCUTANEOUS at 05:06

## 2021-07-30 RX ADMIN — ANTACID TABLETS 500 MG: 500 TABLET, CHEWABLE ORAL at 11:58

## 2021-07-30 RX ADMIN — BUPROPION HYDROCHLORIDE 150 MG: 150 TABLET, EXTENDED RELEASE ORAL at 07:54

## 2021-07-30 RX ADMIN — HYDROMORPHONE HYDROCHLORIDE 0.5 MG: 1 INJECTION, SOLUTION INTRAMUSCULAR; INTRAVENOUS; SUBCUTANEOUS at 19:10

## 2021-07-30 RX ADMIN — METOPROLOL SUCCINATE 25 MG: 50 TABLET, EXTENDED RELEASE ORAL at 07:54

## 2021-07-30 RX ADMIN — METHOCARBAMOL 1000 MG: 100 INJECTION, SOLUTION INTRAMUSCULAR; INTRAVENOUS at 03:39

## 2021-07-30 RX ADMIN — DIAZEPAM 5 MG: 5 TABLET ORAL at 19:10

## 2021-07-30 RX ADMIN — OXYCODONE 10 MG: 5 TABLET ORAL at 09:51

## 2021-07-30 RX ADMIN — OXYCODONE 10 MG: 5 TABLET ORAL at 22:22

## 2021-07-30 RX ADMIN — ACETAMINOPHEN 1000 MG: 500 TABLET, COATED ORAL at 17:49

## 2021-07-30 RX ADMIN — ANTACID TABLETS 500 MG: 500 TABLET, CHEWABLE ORAL at 16:39

## 2021-07-30 ASSESSMENT — PAIN DESCRIPTION - LOCATION
LOCATION: BACK

## 2021-07-30 ASSESSMENT — PAIN DESCRIPTION - DESCRIPTORS
DESCRIPTORS: ACHING

## 2021-07-30 ASSESSMENT — PAIN DESCRIPTION - ONSET
ONSET: ON-GOING
ONSET: PROGRESSIVE
ONSET: ON-GOING

## 2021-07-30 ASSESSMENT — PAIN DESCRIPTION - ORIENTATION
ORIENTATION: MID;LOWER
ORIENTATION: MID;LOWER
ORIENTATION: MID
ORIENTATION: MID;LOWER
ORIENTATION: LOWER
ORIENTATION: MID
ORIENTATION: MID;LOWER
ORIENTATION: MID;LOWER

## 2021-07-30 ASSESSMENT — PAIN - FUNCTIONAL ASSESSMENT
PAIN_FUNCTIONAL_ASSESSMENT: PREVENTS OR INTERFERES SOME ACTIVE ACTIVITIES AND ADLS

## 2021-07-30 ASSESSMENT — PAIN DESCRIPTION - PAIN TYPE
TYPE: SURGICAL PAIN

## 2021-07-30 ASSESSMENT — PAIN SCALES - GENERAL
PAINLEVEL_OUTOF10: 8
PAINLEVEL_OUTOF10: 7
PAINLEVEL_OUTOF10: 7
PAINLEVEL_OUTOF10: 4
PAINLEVEL_OUTOF10: 0
PAINLEVEL_OUTOF10: 7
PAINLEVEL_OUTOF10: 4
PAINLEVEL_OUTOF10: 6
PAINLEVEL_OUTOF10: 7
PAINLEVEL_OUTOF10: 8
PAINLEVEL_OUTOF10: 0
PAINLEVEL_OUTOF10: 8
PAINLEVEL_OUTOF10: 3

## 2021-07-30 ASSESSMENT — PAIN DESCRIPTION - DIRECTION
RADIATING_TOWARDS: LEGS

## 2021-07-30 ASSESSMENT — PAIN DESCRIPTION - PROGRESSION
CLINICAL_PROGRESSION: GRADUALLY WORSENING
CLINICAL_PROGRESSION: GRADUALLY IMPROVING
CLINICAL_PROGRESSION: GRADUALLY WORSENING

## 2021-07-30 ASSESSMENT — PAIN DESCRIPTION - FREQUENCY
FREQUENCY: CONTINUOUS

## 2021-07-30 NOTE — OP NOTE
Ramirez  catheter was placed. Preoperative localization of disk space was  carried out with fluoroscopy. The abdomen was then prepped and draped  in the usual sterile fashion. A lower midline incision was made below  the umbilicus and dissected through subcutaneous tissues, identified the  left anterior rectus sheath which was incised. The left rectus  abdominis muscle was mobilized from the midline towards the left side. Retroperitoneum was entered in the left lower quadrant. Peritoneal  contents were swept towards the midline. Bookwalter retractor was  placed in the field. Bipolar cautery was used to free up the soft  tissue between the iliac veins. Middle sacral vessels were clipped and  ligated and we had good exposure of the L5-S1 disk space. I then used the bipolar cautery on the lateral aspect of the left common  iliac artery and vein. The left iliolumbar vein was ligated. Segmental  vessels were clipped and ligated, and we had good exposure of the L4-L5  disk space. Disk markers were placed at both levels to confirm the level of the disk  space as well as midline. Once it was completed, diskectomy and cage  placement were carried out by Dr. Claribel Stephen at both levels. I then  carefully inspected the wound for hemostasis. Once hemostasis was  ensured, I reapproximated the anterior rectus sheath with 0 PDS looped  sutures. Subcutaneous tissue was reapproximated in two layers with 3-0  Vicryl and skin was closed with 4-0 Monocryl. Fluoroscopy sweep  confirmed no retained sponges or instruments. I was present for the  entire anterior portion of the procedure and I assisted Dr. Claribel Stephen  with his portion of the procedure which included soft tissue and blood  vessel retraction to facilitate implant placement.         Vida Sen    D: 07/29/2021 23:34:25       T: 07/30/2021 1:27:19     [email or web address]  Job#: [id number]     Doc#: [id number]    CC:

## 2021-07-30 NOTE — PROGRESS NOTES
Ambulated to bathroom with therapy. Was able to void on own since lópez removal this AM. Will continue to monitor.

## 2021-07-30 NOTE — PROGRESS NOTES
NEUROSURGERY POST-OP PROGRESS NOTE    Patient Name: Daniel Anna YOB: 1955   Sex: Female Age: 77 yrs     Medical Record Number: [id number] Laurence Number: [[id number]]   Room Number: 5338/4256-63 Hospital Day: Hospital Day: 2     Interval History:  Post-operative Day# 1 s/p Procedure(s) (LRB):  L4-S1 ANTERIOR LUMBAR INTERBODY FUSION WITH PEDICLE SCREW FIXATION (N/A)  . (N/A)    Subjective: Pt has some incisional pain    Objective:    VITAL SIGNS   /63   Pulse 57   Temp 98.2 °F (36.8 °C) (Oral)   Resp 16   Ht 5' 3\" (1.6 m)   Wt 259 lb (117.5 kg)   LMP  (LMP Unknown)   SpO2 93%   BMI 45.88 kg/m²    Height Height: 5' 3\" (160 cm)   Weight Weight: 259 lb (117.5 kg)        Allergies Allergies   Allergen Reactions    Latex Rash    Reglan [Metoclopramide Hcl] Rash    Univasc [Moexipril] Other (See Comments)     unsure    Crestor [Rosuvastatin]      myalgia    Celecoxib Palpitations    Keflex [Cephalexin] Diarrhea    Lipitor Other (See Comments)     myalgia    Metoclopramide Anxiety    Prochlorperazine Anxiety    Prochlorperazine Edisylate     Sulfa Antibiotics Nausea And Vomiting    Vioxx       NPO Status ADULT DIET; Regular   Isolation No active isolations     LABS   Basic Metabolic Profile No results for input(s): NA, CL, CO2, BUN, CREATININE, GLUCOSE, ALB, PHOS, MG in the last 72 hours. Invalid input(s): POTASSIUM, CA   Complete Blood Count Recent Labs     07/30/21  0530   WBC 10.9   RBC 4.02      Coagulation Studies No results for input(s): PTT, INR in the last 72 hours.     Invalid input(s): PLATELETS, PROA, PT, PTTA     MEDICATIONS   Inpatient Medications     acetaminophen, 1,000 mg, Oral, 4 times per day    lidocaine, 1 patch, Transdermal, Daily    sennosides-docusate sodium, 2 tablet, Oral, BID    polyethylene glycol, 17 g, Oral, Daily    bisacodyl, 5 mg, Oral, QAM    scopolamine, 1 patch, Transdermal, Q72H    metoprolol succinate, 25 mg, Oral, Daily    buPROPion, 150 mg, Oral, QAM    cetirizine, 10 mg, Oral, Daily    losartan, 50 mg, Oral, Daily    pantoprazole, 40 mg, Oral, BID    pravastatin, 20 mg, Oral, Daily    verapamil, 120 mg, Oral, Nightly    sodium chloride flush, 5-40 mL, Intravenous, 2 times per day    methocarbamol IVPB, 1,000 mg, Intravenous, Q8H    enoxaparin, 40 mg, Subcutaneous, Daily   Infusions    sodium chloride 125 mL/hr at 07/29/21 1230    sodium chloride        Antibiotics   Recent Abx Admin                   clindamycin (CLEOCIN) 900 mg in dextrose 5 % 50 mL IVPB (mg) 900 mg New Bag 07/29/21 2254     900 mg New Bag  1532    vancomycin (VANCOCIN) injection (mg) 1,000 mg Given 07/29/21 1104                 Neurologic Exam:  Mental status: awake and alert and oriented x4        Musculoskeletal:   Gait: Not tested   Tone: normal  Sensory: intact to all extremities  Motor strength:    Right  Left    Right  Left    Deltoid  5 5  Hip Flex  5 5   Biceps  5 5  Knee Extensors  5 5   Triceps  5 5  Knee Flexors  5 5   Wrist Ext  5 5  Ankle Dorsiflex. 5 5   Wrist Flex  5 5  Ankle Plantarflex. 5 5   Handgrip  5 5  Ext Bib Longus  5 5   Thumb Ext  5 5         Incision: intact, clean and dry      Respiratory:  Unlabored respiratory pattern    Abdomen:   Soft, ND       Cardiovascular:  Warm, well perfused    Assessment   Patient is a 76 yo F s/p Procedure(s) (LRB):  L4-S1 ANTERIOR LUMBAR INTERBODY FUSION WITH PEDICLE SCREW FIXATION (N/A)  . (N/A) per Dr. Mary Chaney:  1. Neurologic exam frequency:q4  2. Mobility:PT/OT eval  3. DVT Prophylaxis: SCDs and lovenox   4. Bowel Regimen: glycolax and senna  5. Pain control:she and robaxin  6. Incisional Care:open to air  7. Consult  for p[lacement  8.  Dispo Planning: inpt    Patient was seen with Marquita Grimaldo who agrees with above assessment and plan. Electronically signed by:  LISA Bond - CNP, 7/30/2021 9:43 AM   Neurosurgery Nurse Practitioner  [phone]

## 2021-07-30 NOTE — PLAN OF CARE
Problem: Safety:  Goal: Free from accidental physical injury  Description: Free from accidental physical injury  7/30/2021 0334 by Marc Brice RN  Outcome: Ongoing   Patient is at risk for falls. Patient is in bed with bed alarm on, fall risk ID, Nonskid socks, Bed in lowest position. Call light and bedside table are within reach. Patient calls out approprietly. Will continue to monitor. Problem: Pain:  Goal: Patient's pain/discomfort is manageable  Description: Patient's pain/discomfort is manageable  7/30/2021 0334 by Marc Brice RN  Outcome: Ongoing   Patient complains of pain, patient medicated per mar, will continue to monitor.

## 2021-07-30 NOTE — PLAN OF CARE
Problem: Infection:  Goal: Will remain free from infection  Description: Will remain free from infection  Outcome: Ongoing  Note: Surgical sites to lower back are ROULA. No drainage noted. Surrounding skin intact. Abdominal site is ROULA, no drainage noted. Patient remains afebrile. Will continue to monitor. Problem: Safety:  Goal: Free from accidental physical injury  Description: Free from accidental physical injury  7/30/2021 1113 by Mary Garnett RN  Outcome: Ongoing  Note: Patient will remain free from falls throughout shift. Ambulating x1 assist with walker and gait belt. Fall precautions in place. Non-skid socks on. Bed locked in lowest position with alarm on and 2/4 side rails up. Bedside table, belongings, and call light within reach. Patient calling out appropriately when needing assistance. Hourly rounding in anticipation of patient needs. Floor clean and free from clutter. Room door open. Will continue to monitor. Problem: Pain:  Goal: Pain level will decrease  Description: Pain level will decrease  Outcome: Ongoing  Note: Surgical pain to lower back controlled with PRN oxy, dilaudid, and valium. Using numerical pain rating scale appropriately. Educated on medications, side effects, and verbalized understanding. Assisted with repositioning in bed for comfort. Ice packs applied to lower back. Notified when next doses of medications can be administered. Will continue to monitor.

## 2021-07-30 NOTE — PLAN OF CARE
Problem: Infection:  Goal: Will remain free from infection  Description: Will remain free from infection  7/30/2021 1929 by Ivana Schaefer RN  Outcome: Ongoing     Problem: Safety:  Goal: Free from accidental physical injury  Description: Free from accidental physical injury  7/30/2021 1929 by Ivana Schaefer RN  Outcome: Ongoing     Problem: Pain:  Goal: Patient's pain/discomfort is manageable  Description: Patient's pain/discomfort is manageable  Outcome: Ongoing

## 2021-07-30 NOTE — CARE COORDINATION
Case Management Assessment           Initial Evaluation                Date / Time of Evaluation: 7/30/2021 4:32 PM                 Assessment Completed by: Janeth Benitez, RN     I spoke with patient who is from home with her spouse. She had no services prior. They do have a RW, shower seat, railings in the shower and RTS at home. She would like to go to [facility] at d/c. Her niece works there and they are expecting her. I sent the referral and called as well. She will need precert for placement and I sent clinicals to [facility] to review. She will need transport to the facility at d/c. HENS submitted [id number]    Patient Name: Rama Loera     YOB: 1955  Diagnosis: Spondylolisthesis of lumbar region [M43.16]  Lumbar stenosis with neurogenic claudication [M48.062]     Date / Time: 7/29/2021  5:22 AM    Patient Admission Status: Inpatient    If patient is discharged prior to next notation, then this note serves as note for discharge by case management.      Current PCP: Polina Hogan MD  Clinic Patient: No    Chart Reviewed: Yes  Patient/ Family Interviewed: Yes    Initial assessment completed at bedside with: patient and niece    Hospitalization in the last 30 days: No    Emergency Contacts:  Extended Emergency Contact Information  Primary Emergency Contact: Suleiman Mckeon  Address: [address] Phone: [phone]  Mobile Phone: [phone]  Relation: Spouse  Secondary Emergency Contact: [address] [place] [address] Phone: [phone]  Relation: Other    Advance Directives:   Code Status: Full [address]: NA  Agent: NA  Contact Number: NA      Financial  Payor: [employer] MEDICARE / Plan: [address] / Product Type: *No Product type* /     Pre-cert required for SNF: Yes    Pharmacy    [id number], [address] [place] [address]. Ed Rogersgrupo [phone] - F [phone]  Arley Rodriguez  Phone: [phone] Fax: [phone]      Potential assistance Purchasing Medications: Potential Assistance Purchasing Medications: No  Does Patient want to participate in local refill/ meds to beds program?: No    Meds To Beds General Rules:  1. Can ONLY be done Monday- Friday between 8:30am-5pm  2. Prescription(s) must be in pharmacy by 3pm to be filled same day  3. Copy of patient's insurance/ prescription drug card and patient face sheet must be sent along with the prescription(s)  4. Cost of Rx cannot be added to hospital bill. If financial assistance is needed, please contact unit  or ;  or  CANNOT provide pharmacy voucher for patients co-pays  5.  Patients can then  the prescription on their way out of the hospital at discharge, or pharmacy can deliver to the bedside if staff is available. (payment due at time of pick-up or delivery - cash, check, or card accepted)     Able to afford home medications/ co-pay costs: Yes    ADLS  Support Systems: Spouse/Significant Other, Family Members    PT AM-PAC: 15 /24  OT AM-PAC: 17 /24    [place]: home  Steps: unsure    Plans to RETURN to current housing: Yes  Barriers to RETURNING to current housing: none noted    DISCHARGE PLAN:  Disposition: [facility] (SNF): [facility] Phone: [phone] Fax: [phone]    Transportation PLAN for discharge: EMS transportation     Factors facilitating achievement of predicted outcomes: Family support, Cooperative and Pleasant    Barriers to discharge: Pain, precert needed    Additional Case Management Notes: NA    The Plan for Transition of Care is related to the following treatment goals of Spondylolisthesis of lumbar region [M43.16]  Lumbar stenosis with neurogenic claudication [M48.062]    The Patient and/or patient representative Danelle Lee and her family were provided with a choice of provider and agrees

## 2021-07-30 NOTE — PROGRESS NOTES
Patient is AAOx4. VSS. Patient is up x1 with a walker and gaitbelt. Patient complains of pain, patient medicated per mar. Will continue to monitor.

## 2021-07-30 NOTE — PROGRESS NOTES
Occupational Therapy   Occupational Therapy Initial Assessment/Tx Note  Date: 2021   Patient Name: John Walker  MRN: [id number]     : 1955    Date of Service: 2021     Assessment: Pt requires assist for mobility and ADLs s/p L4-S1 fusion. Pt's  recently had surgery as well and pt is concerned about safely managing at home and the caregiver burden this would place on family members. She is interested in SNF at d/c. If pt decides to d/c home, she will benefit from initial 24 hr A of family and home OT. Discharge Recommendations: John Walker scored a 17/24 on the AM-PAC ADL Inpatient form. Current research shows that an AM-PAC score of 17 or less is typically not associated with a discharge to the patient's home setting. Based on the patient's AM-PAC score and their current ADL deficits, it is recommended that the patient have 3-5 sessions per week of Occupational Therapy at d/c to increase the patient's independence. Please see assessment section for further patient specific details. OT Equipment Recommendations  Equipment Needed: No    Assessment   Performance deficits / Impairments: Decreased functional mobility ; Decreased ADL status; Decreased endurance;Decreased high-level IADLs;Decreased balance  Treatment Diagnosis: Decreased activity tolerance, impaired ADLs and mobility  Decision Making: Medium Complexity  REQUIRES OT FOLLOW UP: Yes  Activity Tolerance  Activity Tolerance: Patient Tolerated treatment well  Activity Tolerance: On 2L O2 at rest, spO2 95%. Functional activity performed on RA, spO2 93% in bathroom. Left on RA initially after session, but checked shortly after and spO2 86-88%, improved with cues for deep breathing, but placed back on 2L O2. RN aware. Pt educated on appropriate activity levels and spinal precautions - verb understanding  Safety Devices  Safety Devices in place: Yes  Type of devices: Call light within reach; Chair alarm in place;Nurse notified; Left Device: Rolling Walker  Activity: To/from bathroom (ADLs)  Assist Level: Contact guard assistance  Toilet Transfers  Toilet - Technique: Ambulating  Equipment Used: Standard toilet (with B rails)  Toilet Transfer: Contact guard assistance (min cues)  ADL  Feeding: Independent  Grooming: Independent (standing at sink with SBA to CGA)  LE Dressing: Moderate assistance;Setup (mentioned having reacher at home, will educate on dressing techniques at f/u)  Toileting: Moderate assistance;Setup (assist for some clothing management and pericare; noted after toileting pt brought toileting aide, placed in bathroom for future use)     Bed mobility  Supine to Sit: Moderate assistance (log roll with HOB raised)  Transfers  Sit to stand: Minimal assistance (min cues)  Stand to sit: Contact guard assistance (min cues)     Plan  If pt discharges prior to next tx, this note will serve as d/c summary.  Continue per POC if pt does not d/c.     Plan  Times per week: 5-7x  Times per day: Daily  Current Treatment Recommendations: Balance Training, Functional Mobility Training, Endurance Training, Self-Care / ADL, Safety Education & Training, Equipment Evaluation, Education, & procurement, Patient/Caregiver Education & Training, Pain Management    AM-PAC Score  AM-PAC Inpatient Daily Activity Raw Score: 17 (07/30/21 0912)  AM-PAC Inpatient ADL T-Scale Score : 37.26 (07/30/21 0912)  ADL Inpatient CMS 0-100% Score: 50.11 (07/30/21 0912)  ADL Inpatient CMS G-Code Modifier : CK (07/30/21 0912)    Goals  Short term goals  Time Frame for Short term goals: by D/C  Short term goal 1: Toileting and toilet transfer SBA, AE as needed - Not met  Short term goal 2: Lower body dressing SBA, AE as needed - Not met  Short term goal 3: Functional transfers to/from bed, chairs SBA - Not met  Short term goal 4: Static and dynamic stance during ADLs SBA - Not met       Therapy Time   Individual Concurrent Group Co-treatment   Time In 0830         Time Out 0908 Minutes 38          Timed Code Tx Min: 23  Total Tx Time: 5360 Stanton Delacruz, OT

## 2021-07-30 NOTE — PROGRESS NOTES
Patient is alert and oriented x4. Vital signs stable. Pain to lower back controlled well with tylenol, oxy, and valium. No acute neuro changes throughout shift. Lower back and abdominal incisions remain ROULA, CDI. Tolerating diet well, denies n/v. Voiding without complications. Ambulating x1 assist with walker and gait belt. Fall precautions in place. Bed locked in lowest position with alarm on. Call light within reach and patient using appropriately. Resting in bed with all needs met. Will continue to monitor.

## 2021-07-30 NOTE — PROGRESS NOTES
Physical Therapy    Facility/Department: [facility]  Initial Assessment and Treatment    NAME: Haydee Sumner  : 1955  MRN: [id number]    Date of Service: 2021    Discharge Recommendations:    Haydee Sumner scored a 15/24 on the AM-PAC short mobility form. Current research shows that an AM-PAC score of 17 or less is typically not associated with a discharge to the patient's home setting. Based on the patient's AM-PAC score and their current functional mobility deficits, it is recommended that the patient have 3-5 sessions per week of Physical Therapy at d/c to increase the patient's independence. Please see assessment section for further patient specific details. If patient discharges prior to next session this note will serve as a discharge summary. Please see below for the latest assessment towards goals. PT Equipment Recommendations  Equipment Needed:  (rolling walker if goes home)    Assessment   Body structures, Functions, Activity limitations: Decreased functional mobility ; Decreased endurance;Decreased strength  Assessment: Pt with decreased independent mobility from baseline s/p L4-S1 ANTERIOR LUMBAR INTERBODY FUSION WITH PEDICLE SCREW FIXATION. Pt reports normally independent with mobility without AD. Currently needing assist for bed mobility, transfers and gt with walker. Limited by pain. Should progress well with gradual increase in activity. Pt reports would not have support at home at d/c - is interested in going to facility for further IP PT. Rec cont skilled PT to maximize mobility and independence  Treatment Diagnosis: impaired functional mobility s/p L4-S1 ANTERIOR LUMBAR INTERBODY FUSION WITH PEDICLE SCREW FIXATION  Decision Making: Low Complexity  PT Education: PT Role;Goals;Plan of Care;General Safety;Precautions; Functional Mobility Training  Patient Education: Pt verbalized understanding  REQUIRES PT FOLLOW UP: Yes       Patient Diagnosis(es): There were no encounter diagnoses. has a past medical history of Allergic rhinitis, Anxiety, Atrial tachycardia (HCC), Carpal tunnel syndrome, Cervicalgia, Chronic back pain, Depression, GERD (gastroesophageal reflux disease), Headache(784.0), Hernia hiatal, Histoplasmosis, Hot flashes, Hyperlipidemia, Hypertension, Obesity, Osteoarthritis, PONV (postoperative nausea and vomiting), Shingles, Sleep apnea, and Tricuspid regurgitation. has a past surgical history that includes Knee arthroscopy; Knee cartilage surgery; lobectomy; sinus surgery; Bunionectomy; partial hysterectomy (cervix not removed); Total knee arthroplasty (Bilateral, 03/03/2015); Parathyroid gland surgery; Lung biopsy; Colonoscopy (10/2018); Breast surgery (Right); and lumbar fusion (N/A, 7/29/2021). Restrictions  Position Activity Restriction  Other position/activity restrictions: ambulate, activity as tolerated  Vision/Hearing  Vision: Within Functional Limits  Hearing: Within functional limits     Subjective  General  Chart Reviewed: Yes  Additional Pertinent Hx: Pt is a 77 y.o. female 7/29 with spondylolisthesis of lumbar region. Pt s/p L4-S1 ANTERIOR LUMBAR INTERBODY FUSION WITH PEDICLE SCREW FIXATION on 7/30. PMH:  hyperlipidemia, HTN, OA, anxiety, depression, GERD, sleep apnea, bilat TKR  Referring Practitioner: SMITHA Pinon  Referral Date : 07/29/21  Subjective  Subjective: Pt found supine. Agreeable to PT.   Pain Screening  Patient Currently in Pain: Yes (incisional and sides, not rated, RN aware)  Vital Signs  Patient Currently in Pain: Yes (incisional and sides, not rated, RN aware)       Orientation  Orientation  Overall Orientation Status: Within Functional Limits  Social/Functional History  Social/Functional History  Lives With: Spouse  Type of Home: House  Home Layout: Two level, Able to Live on Main level with bedroom/bathroom  Home Access: Stairs to enter with rails  Entrance Stairs - Number of Steps: 1 + 1 side door with rail  Bathroom Shower/Tub: Walk-in shower  Bathroom Toilet: Standard  Bathroom Equipment: Grab bars in shower, Shower chair, Tub transfer bench, Toilet raiser  Home Equipment: [employer]  ADL Assistance: Independent  Homemaking Assistance: Independent  Ambulation Assistance: Independent (without AD)  Transfer Assistance: Independent  Active : Yes  Occupation: Retired  Type of occupation:  at the [address]: working around [employer] and the yard, watching TV and playing games on her tablet  Additional Comments:  undergoing tx for CA, had surgery 2 weeks ago for ruptured quad, at home with family assisting prn. Pt had a mechanical fall in April.   Cognition        Objective          AROM RLE (degrees)  RLE AROM: WFL  AROM LLE (degrees)  LLE AROM : WFL  Strength RLE  Strength RLE: WFL  Strength LLE  Strength LLE: WFL        Bed mobility  Supine to Sit: Moderate assistance (log roll with HOB raised)  Transfers  Sit to Stand: Dependent/Total (mod assist x 1 and min assist x1 from bed, CGA from commode)  Stand to sit: Minimal Assistance  Ambulation  Ambulation?: Yes  Ambulation 1  Device: Rolling Walker  Assistance: Contact guard assistance  Quality of Gait: heavy UE support on walker, decreased laurent, decreased bilat step length/length  Distance: 10', 35'       Treatment included: bed mobility, transfers, gt, pt education          Plan   Plan  Times per week: 5-7  Current Treatment Recommendations: Functional Mobility Training, Gait Training, Stair training, Safety Education & Training, Patient/Caregiver Education & Training  Safety Devices  Type of devices: Call light within reach, Chair alarm in place, Nurse notified, Left in chair    G-Code       OutComes Score                                                  AM-PAC Score  AM-PAC Inpatient Mobility Raw Score : 15 (07/30/21 1049)  AM-PAC Inpatient T-Scale Score : 39.45 (07/30/21 1049)  Mobility Inpatient CMS 0-100% Score: 57.7 (07/30/21 1049)  Mobility Inpatient CMS G-Code Modifier : CK (07/30/21 1049)          Goals  Short term goals  Time Frame for Short term goals: By discharge  Short term goal 1: Sup to sit SBA  Short term goal 2: Pt will transfer sit to stand SBA  Short term goal 3: Pt will amb >100' with LRAD SBA  Short term goal 4: Pt will up/down 1 step with LRAD SBA       Therapy Time   Individual Concurrent Group Co-treatment   Time In 0827         Time Out 0905         Minutes 38              Timed Code Treatment Minutes:23       Total Treatment Minutes:  [facility], PT

## 2021-07-31 ENCOUNTER — APPOINTMENT (OUTPATIENT)
Dept: GENERAL RADIOLOGY | Age: 66
DRG: 453 | End: 2021-07-31
Attending: NEUROLOGICAL SURGERY
Payer: MEDICARE

## 2021-07-31 LAB
AMORPHOUS: NORMAL /HPF
BILIRUBIN URINE: NEGATIVE
BLOOD, URINE: ABNORMAL
CLARITY: CLEAR
COLOR: YELLOW
EPITHELIAL CELLS, UA: NORMAL /HPF (ref 0–5)
GLUCOSE URINE: NEGATIVE MG/DL
KETONES, URINE: NEGATIVE MG/DL
LEUKOCYTE ESTERASE, URINE: NEGATIVE
MICROSCOPIC EXAMINATION: YES
NITRITE, URINE: NEGATIVE
PH UA: 6 (ref 5–8)
PROTEIN UA: NEGATIVE MG/DL
RBC UA: NORMAL /HPF (ref 0–4)
SPECIFIC GRAVITY UA: 1.01 (ref 1–1.03)
URINE REFLEX TO CULTURE: ABNORMAL
URINE TYPE: ABNORMAL
UROBILINOGEN, URINE: 0.2 E.U./DL
WBC UA: NORMAL /HPF (ref 0–5)

## 2021-07-31 PROCEDURE — 94761 N-INVAS EAR/PLS OXIMETRY MLT: CPT

## 2021-07-31 PROCEDURE — 74018 RADEX ABDOMEN 1 VIEW: CPT

## 2021-07-31 PROCEDURE — 51701 INSERT BLADDER CATHETER: CPT

## 2021-07-31 PROCEDURE — 6360000002 HC RX W HCPCS: Performed by: INTERNAL MEDICINE

## 2021-07-31 PROCEDURE — 6370000000 HC RX 637 (ALT 250 FOR IP): Performed by: NURSE PRACTITIONER

## 2021-07-31 PROCEDURE — 2700000000 HC OXYGEN THERAPY PER DAY

## 2021-07-31 PROCEDURE — 51702 INSERT TEMP BLADDER CATH: CPT

## 2021-07-31 PROCEDURE — 6370000000 HC RX 637 (ALT 250 FOR IP): Performed by: INTERNAL MEDICINE

## 2021-07-31 PROCEDURE — 6370000000 HC RX 637 (ALT 250 FOR IP): Performed by: PHYSICIAN ASSISTANT

## 2021-07-31 PROCEDURE — 6360000002 HC RX W HCPCS: Performed by: PHYSICIAN ASSISTANT

## 2021-07-31 PROCEDURE — 97530 THERAPEUTIC ACTIVITIES: CPT

## 2021-07-31 PROCEDURE — 2580000003 HC RX 258: Performed by: PHYSICIAN ASSISTANT

## 2021-07-31 PROCEDURE — 81001 URINALYSIS AUTO W/SCOPE: CPT

## 2021-07-31 PROCEDURE — 51798 US URINE CAPACITY MEASURE: CPT

## 2021-07-31 PROCEDURE — 6370000000 HC RX 637 (ALT 250 FOR IP): Performed by: ANESTHESIOLOGY

## 2021-07-31 PROCEDURE — 1200000000 HC SEMI PRIVATE

## 2021-07-31 PROCEDURE — 71045 X-RAY EXAM CHEST 1 VIEW: CPT

## 2021-07-31 RX ORDER — PRAVASTATIN SODIUM 20 MG
20 TABLET ORAL NIGHTLY
Status: DISCONTINUED | OUTPATIENT
Start: 2021-08-01 | End: 2021-08-13 | Stop reason: HOSPADM

## 2021-07-31 RX ORDER — FUROSEMIDE 10 MG/ML
20 INJECTION INTRAMUSCULAR; INTRAVENOUS ONCE
Status: COMPLETED | OUTPATIENT
Start: 2021-07-31 | End: 2021-07-31

## 2021-07-31 RX ORDER — TAMSULOSIN HYDROCHLORIDE 0.4 MG/1
0.4 CAPSULE ORAL DAILY
Status: DISCONTINUED | OUTPATIENT
Start: 2021-07-31 | End: 2021-08-13 | Stop reason: HOSPADM

## 2021-07-31 RX ORDER — BETHANECHOL CHLORIDE 10 MG/1
10 TABLET ORAL 3 TIMES DAILY
Status: DISCONTINUED | OUTPATIENT
Start: 2021-07-31 | End: 2021-08-01

## 2021-07-31 RX ADMIN — PANTOPRAZOLE SODIUM 40 MG: 40 TABLET, DELAYED RELEASE ORAL at 20:31

## 2021-07-31 RX ADMIN — PRAVASTATIN SODIUM 20 MG: 20 TABLET ORAL at 08:32

## 2021-07-31 RX ADMIN — Medication 10 ML: at 20:10

## 2021-07-31 RX ADMIN — ONDANSETRON 4 MG: 2 INJECTION INTRAMUSCULAR; INTRAVENOUS at 10:35

## 2021-07-31 RX ADMIN — ACETAMINOPHEN 1000 MG: 500 TABLET, COATED ORAL at 18:16

## 2021-07-31 RX ADMIN — TAMSULOSIN HYDROCHLORIDE 0.4 MG: 0.4 CAPSULE ORAL at 14:34

## 2021-07-31 RX ADMIN — OXYCODONE 10 MG: 5 TABLET ORAL at 06:28

## 2021-07-31 RX ADMIN — CETIRIZINE HYDROCHLORIDE 10 MG: 10 TABLET, FILM COATED ORAL at 08:30

## 2021-07-31 RX ADMIN — HYDROMORPHONE HYDROCHLORIDE 0.5 MG: 1 INJECTION, SOLUTION INTRAMUSCULAR; INTRAVENOUS; SUBCUTANEOUS at 05:06

## 2021-07-31 RX ADMIN — ENOXAPARIN SODIUM 40 MG: 40 INJECTION SUBCUTANEOUS at 08:29

## 2021-07-31 RX ADMIN — Medication 10 ML: at 08:38

## 2021-07-31 RX ADMIN — HYDROMORPHONE HYDROCHLORIDE 0.5 MG: 1 INJECTION, SOLUTION INTRAMUSCULAR; INTRAVENOUS; SUBCUTANEOUS at 11:28

## 2021-07-31 RX ADMIN — OXYCODONE 10 MG: 5 TABLET ORAL at 02:23

## 2021-07-31 RX ADMIN — DIAZEPAM 5 MG: 5 TABLET ORAL at 08:31

## 2021-07-31 RX ADMIN — DIAZEPAM 5 MG: 5 TABLET ORAL at 17:50

## 2021-07-31 RX ADMIN — FUROSEMIDE 20 MG: 10 INJECTION, SOLUTION INTRAMUSCULAR; INTRAVENOUS at 12:41

## 2021-07-31 RX ADMIN — OXYCODONE 10 MG: 5 TABLET ORAL at 18:15

## 2021-07-31 RX ADMIN — BETHANECHOL CHLORIDE 10 MG: 10 TABLET ORAL at 20:31

## 2021-07-31 RX ADMIN — ACETAMINOPHEN 1000 MG: 500 TABLET, COATED ORAL at 05:06

## 2021-07-31 RX ADMIN — OXYCODONE 10 MG: 5 TABLET ORAL at 22:46

## 2021-07-31 RX ADMIN — BETHANECHOL CHLORIDE 10 MG: 10 TABLET ORAL at 14:25

## 2021-07-31 RX ADMIN — BISACODYL 5 MG: 5 TABLET, COATED ORAL at 08:30

## 2021-07-31 RX ADMIN — BUPROPION HYDROCHLORIDE 150 MG: 150 TABLET, EXTENDED RELEASE ORAL at 08:30

## 2021-07-31 RX ADMIN — POLYETHYLENE GLYCOL 3350 17 G: 17 POWDER, FOR SOLUTION ORAL at 08:29

## 2021-07-31 RX ADMIN — OXYCODONE 10 MG: 5 TABLET ORAL at 10:07

## 2021-07-31 RX ADMIN — ACETAMINOPHEN 1000 MG: 500 TABLET, COATED ORAL at 22:47

## 2021-07-31 RX ADMIN — HYDROMORPHONE HYDROCHLORIDE 0.5 MG: 1 INJECTION, SOLUTION INTRAMUSCULAR; INTRAVENOUS; SUBCUTANEOUS at 01:47

## 2021-07-31 RX ADMIN — DIAZEPAM 5 MG: 5 TABLET ORAL at 01:54

## 2021-07-31 RX ADMIN — METOPROLOL SUCCINATE 25 MG: 50 TABLET, EXTENDED RELEASE ORAL at 08:31

## 2021-07-31 RX ADMIN — ACETAMINOPHEN 1000 MG: 500 TABLET, COATED ORAL at 11:28

## 2021-07-31 RX ADMIN — PANTOPRAZOLE SODIUM 40 MG: 40 TABLET, DELAYED RELEASE ORAL at 08:30

## 2021-07-31 RX ADMIN — DOCUSATE SODIUM 50 MG AND SENNOSIDES 8.6 MG 2 TABLET: 8.6; 5 TABLET, FILM COATED ORAL at 08:32

## 2021-07-31 RX ADMIN — MINERAL OIL 330 ML: 1000 LIQUID ORAL at 13:19

## 2021-07-31 RX ADMIN — OXYCODONE 10 MG: 5 TABLET ORAL at 14:25

## 2021-07-31 ASSESSMENT — PAIN DESCRIPTION - ONSET
ONSET: GRADUAL
ONSET: ON-GOING

## 2021-07-31 ASSESSMENT — PAIN SCALES - GENERAL
PAINLEVEL_OUTOF10: 6
PAINLEVEL_OUTOF10: 7
PAINLEVEL_OUTOF10: 7
PAINLEVEL_OUTOF10: 9
PAINLEVEL_OUTOF10: 7
PAINLEVEL_OUTOF10: 9
PAINLEVEL_OUTOF10: 7
PAINLEVEL_OUTOF10: 6
PAINLEVEL_OUTOF10: 8
PAINLEVEL_OUTOF10: 8

## 2021-07-31 ASSESSMENT — PAIN DESCRIPTION - DESCRIPTORS
DESCRIPTORS: ACHING

## 2021-07-31 ASSESSMENT — PAIN DESCRIPTION - LOCATION
LOCATION: ABDOMEN;BACK
LOCATION: ABDOMEN

## 2021-07-31 ASSESSMENT — PAIN DESCRIPTION - ORIENTATION
ORIENTATION: ANTERIOR;POSTERIOR

## 2021-07-31 ASSESSMENT — PAIN DESCRIPTION - PROGRESSION
CLINICAL_PROGRESSION: GRADUALLY WORSENING

## 2021-07-31 ASSESSMENT — PAIN - FUNCTIONAL ASSESSMENT
PAIN_FUNCTIONAL_ASSESSMENT: PREVENTS OR INTERFERES SOME ACTIVE ACTIVITIES AND ADLS

## 2021-07-31 ASSESSMENT — PAIN DESCRIPTION - FREQUENCY
FREQUENCY: CONTINUOUS

## 2021-07-31 ASSESSMENT — PAIN DESCRIPTION - PAIN TYPE
TYPE: SURGICAL PAIN

## 2021-07-31 NOTE — PROGRESS NOTES
Secure message sent to Dr. Mariano Barrera she voided 50 mls and I bladder scanned her got 750 out by straight cath. Unable to get off oxygen as well. Used sterile technique when doing straight cath. Urine sent lab.

## 2021-07-31 NOTE — PROGRESS NOTES
Secure message sent to Dr. Ketan Lewis that patient said earlier she coughed and heard a pop. She is now having 10 out of 10 abdominal pain and not able to ambulate to bathroom.

## 2021-07-31 NOTE — PROGRESS NOTES
Physical Therapy  Daily Treatment Note    Discharge Recommendations: Yoni Kellogg scored a 15/24 on the AM-PAC short mobility form. Current research shows that an AM-PAC score of 17 or less is typically not associated with a discharge to the patient's home setting. Based on the patient's AM-PAC score and their current functional mobility deficits, it is recommended that the patient have 3-5 sessions per week of Physical Therapy at d/c to increase the patient's independence. Please see assessment section for further patient specific details. Assessment:  Pt with decreased activity tolerance today. Pt with increased discomfort and c/o nausea when standing or attempting to walk. Still agreeable to getting OOB to chair despite not feeling well. Pt currently needing assist x 1 for safe mobility at this time. Would benefit from continued IP PT at D/C prior to going home. Plan is for SNF. Equipment Needs: Defer to next level of care (Will need wheeled walker if D/Joshua home)    Chart Reviewed: Yes     Other position/activity restrictions: ambulate, activity as tolerated   Additional Pertinent Hx: Pt is a 77 y.o. female 7/29 with spondylolisthesis of lumbar region. Pt s/p L4-S1 ANTERIOR LUMBAR INTERBODY FUSION WITH PEDICLE SCREW FIXATION on 7/30. PMH:  hyperlipidemia, HTN, OA, anxiety, depression, GERD, sleep apnea, bilat TKR       Diagnosis: Spondylolisthesis of lumbar region  Treatment Diagnosis: impaired functional mobility s/p L4-S1 ANTERIOR LUMBAR INTERBODY FUSION WITH PEDICLE SCREW FIXATION    Subjective: Pt in bed initially. Sister present. Reports increased pain today. Agreeable to getting up to chair for lunch and short walk in room. \"I just can't do much today. I'm sorry. \"  C/o nausea when standing/taking steps. Pain: 7/10 abdominal/surgical discomfort. RN aware and medicated at start of session.      Objective:    Bed mobility  Supine to sit: Min assist x 1, HOB up partially with use of rail  Scooting: CGA to EOB    Transfers  Sit to stand: Min assist x 1 from bed (x 3 times)  Stand to sit: Min assist x 1 onto bed (twice) and into chair (once)  Bed > chair: Min assist x 1 via stand-step    Ambulation  Assistance Level: CGA to Min assist  Assistive device: Wheeled walker  Distance: 5 ft, 2 ft. Seated rest breaks between. Declined further ambulation due to nausea when up. Quality of gait: Moderate reliance on walker for support; effortful, weak    Balance  Sat EOB x 5+ minutes with SBA  Static stance with walker CGA  Taking steps with walker min assist    Other  Pt on 2L oxygen during session. Patient Education  Reviewed log roll with bed mobility. Demonstrated understanding. Importance of mobility and getting OOB. Expressed understanding. Calling for assist with needs. Expressed understanding. Safety Devices  Pt left with needs in reach. In chair (reclined) with chair alarm on. RN updated. Sister present. AM-PAC score  AM-PAC Inpatient Mobility Raw Score : 15  AM-PAC Inpatient T-Scale Score : 39.45  Mobility Inpatient CMS 0-100% Score: 57.7  Mobility Inpatient CMS G-Code Modifier : CK    Goals: (as determined and assessed by primary PT)  Time Frame for Short term goals: By discharge  Short term goal 1: Sup to sit SBA   Short term goal 2: Pt will transfer sit to stand SBA   Short term goal 3: Pt will amb >100' with LRAD SBA  Short term goal 4: Pt will up/down 1 step with LRAD SBA     Plan:  Times per week: 5-7;    Current Treatment Recommendations: Functional Mobility Training, Gait Training, Stair training, Safety Education & Training, Patient/Caregiver Education & Training    Therapy Time    Individual  Concurrent  Group  Co-treatment    Time In  1124            Time Out  1148            Minutes  24              Timed Code Treatment Minutes: 24  Total Treatment Minutes: 24    Will continue per plan of care.    If patient is discharged prior to next treatment, this note will serve as the discharge summary.     [place], [place] #[id number]

## 2021-07-31 NOTE — PROGRESS NOTES
Cleansed incisions x 3 with soap and water then painted with CHG swab. Used bath wipes for bath but refused shampoo cap a this time. Stated she already brushed teeth this morning. New gown was applied.

## 2021-07-31 NOTE — PROGRESS NOTES
Inserted 16 Prydeinig lópez catheter using sterile technique per order. Bladder scan after urinating 250 was 515. Clear yellow urine obtained. Secure message sent to Dr. Anibal Hodge to make her aware. Also gave enema per order and multiple small round bms brown in color returned.

## 2021-07-31 NOTE — CONSULTS
Hospital Medicine  Consult History & Physical        Chief Complaint: Chronic lumbar pain and bilateral foot burning    Date of Service: Pt seen/examined in consultation on 07/31/2021    History Of Present Illness:      77 y.o. female with past medical history of hypertension, hyperlipidemia, sleep apnea, depression, obesity presented with chronic back pain and bilateral foot burning. Patient underwent L4-S1 ANTERIOR LUMBAR INTERBODY FUSION WITH PEDICLE SCREW FIXATION on 07/30/2021. Patient states that she was able to void postoperatively but then this morning started having problems. She was unable to void. Was complaining of abdominal pain. Abdominal x-ray was obtained and showed moderate amount of stool. Bladder scan showed 7:50 AM no. Patient was straight cathed. UA was negative for infection. Patient sats dropped to 86% and was placed on 2 L of oxygen. We are asked to see/evaluate by Roderick Russell. Sudha Cooper MD for medical management of chronic conditions, urinary retention and hypoxia. Patient complaining of inability to void. States that she is passing gas but has not had a bowel movement. Denies any trouble breathing, chest pain, nausea vomiting, abdominal pain or weakness. Does not smoke, drink or use illicit drugs. Reviewed medication and family history with the patient. Discussed management plan.     Past Medical History:        Diagnosis Date    Allergic rhinitis     Anxiety     Atrial tachycardia ([address]     dr Kimberlee Carey ([facility] cors)    Carpal tunnel syndrome     Cervicalgia     Chronic back pain     low    Depression     GERD (gastroesophageal reflux disease)     has schatzki ring    Headache(784.0)     hemiplegic migraines, improved    Hernia hiatal     Histoplasmosis     Hot flashes     takes wellbutrin    Hyperlipidemia     Hypertension     Obesity     Osteoarthritis     multiple joints    PONV (postoperative nausea and vomiting)     Shingles     nerve pain in her low back persists    Sleep apnea     CPAP set of 4    Tricuspid regurgitation        Past Surgical History:        Procedure Laterality Date    BREAST SURGERY Right     Biopsy w/clip    BUNIONECTOMY      right     COLONOSCOPY  10/2018    fu 10 yrs    KNEE ARTHROSCOPY      KNEE CARTILAGE SURGERY      LOBECTOMY      partial right lung lobectomy    LUMBAR FUSION N/A 7/29/2021    L4-S1 ANTERIOR LUMBAR INTERBODY FUSION WITH PEDICLE SCREW FIXATION performed by Tim Rodriguez. Reba Salguero MD at [facility] PARATHYROID GLAND SURGERY      PARTIAL HYSTERECTOMY      ovaries retained    SINUS SURGERY      TOTAL KNEE ARTHROPLASTY Bilateral 03/03/2015       Medications Prior to Admission:    Prior to Admission medications    Medication Sig Start Date End Date Taking? Authorizing Provider   metroNIDAZOLE (METROGEL) 0.75 % gel Apply topically 2 times daily. 7/21/21  Yes Barbra Jenkins MD   clotrimazole-betamethasone (LOTRISONE) 1-0.05 % cream Apply topically 2 times daily. 7/16/21  Yes Barbra Jenkins MD   metoprolol succinate (TOPROL XL) 25 MG extended release tablet Take 1 tablet by mouth daily 5/28/21 8/26/21 Yes Barbra Jenkins MD   buPROPion (WELLBUTRIN XL) 150 MG extended release tablet Take 1 tablet by mouth every morning 5/28/21  Yes Barbra Jenkins MD   loratadine (CLARITIN) 10 MG tablet TAKE 1 TABLET BY MOUTH EVERY DAY 5/7/21  Yes Corina Brewer MD   pantoprazole (PROTONIX) 40 MG tablet TAKE 1 TABLET BY MOUTH TWICE A DAY 3/9/21  Yes Corina Brewer MD   losartan (COZAAR) 50 MG tablet Take 1 tablet by mouth daily 3/3/21  Yes Thea Medina MD   verapamil (CALAN SR) 120 MG extended release tablet Take 1 tablet by mouth nightly Do not crush or chew.  3/3/21  Yes Thea Medina MD   pravastatin (PRAVACHOL) 20 MG tablet Take 1 tablet by mouth daily 3/3/21  Yes Thea Medina MD   acetaminophen (TYLENOL) 500 MG tablet Take 500 mg by mouth every 6 hours as needed for Pain   Yes Historical Provider, MD   Multiple Vitamins-Minerals (CENTRUM SILVER PO) Take 1 tablet by mouth daily    Yes Historical Provider, MD   gabapentin (NEURONTIN) 300 MG capsule TAKE 1 CAPSULE BY MOUTH 3 TIMES DAILY FOR 30 DAYS. 6/15/21 7/15/21  Jamie Fabian MD   vitamin B-12 (CYANOCOBALAMIN) 1000 MCG tablet  1/1/21   Historical Provider, MD   Turmeric 500 MG CAPS  1/1/21   Historical Provider, MD   aspirin EC 81 MG EC tablet Take 1 tablet by mouth daily 2/10/21   Deja Oliveira MD   Omega-3 Fatty Acids (FISH OIL) 1200 MG CAPS Take 1,200 mg by mouth 2 times daily    Historical Provider, MD       Allergies:  Latex, Reglan [metoclopramide hcl], Univasc [moexipril], Crestor [rosuvastatin], Celecoxib, Keflex [cephalexin], Lipitor, Metoclopramide, Prochlorperazine, Prochlorperazine edisylate, Sulfa antibiotics, and Vioxx    Social History:      The patient currently lives at home    TOBACCO:   reports that she quit smoking about 31 years ago. She has a 5.00 pack-year smoking history. She has never used smokeless tobacco.  ETOH:   reports no history of alcohol use. Family History:   Positive as follows:        Problem Relation Age of Onset    High Blood Pressure Mother     Heart Disease Mother     Cancer Mother         vaginal    High Blood Pressure Father     Heart Disease Father     Heart Disease Sister     Colon Cancer Sister     Heart Disease Brother     High Blood Pressure Brother     Cancer Brother         oral    Breast Cancer Sister     COPD Sister     Heart Disease Brother     Hearing Loss Brother         chf and transplant    High Blood Pressure Brother        REVIEW OF SYSTEMS:   Pertinent positives as noted in the HPI. All other systems reviewed and negative.     PHYSICAL EXAM PERFORMED:  BP (!) 105/54   Pulse 60   Temp 98.1 °F (36.7 °C) (Oral)   Resp 16   Ht 5' 3\" (1.6 m)   Wt 259 lb (117.5 kg)   LMP  (LMP Unknown)   SpO2 94%   BMI 45.88 kg/m²   General appearance: No apparent distress, appears stated age and cooperative. HEENT: Normal cephalic, atraumatic without obvious deformity. Pupils equal, round, and reactive to light. Extra ocular muscles intact. Conjunctivae/corneas clear. Neck: Supple, with full range of motion. No jugular venous distention. Trachea midline. Respiratory:  Normal respiratory effort. Left basilar crackles noted  Cardiovascular: Regular rate and rhythm with normal S1/S2 without murmurs, rubs or gallops. Abdomen: Soft, non-tender, non-distended with normal bowel sounds. Musculoskeletal: No clubbing, cyanosis or edema bilaterally. Skin: Skin color, texture, turgor normal.  No rashes or lesions. Neurologic:  Neurovascularly intact without any focal sensory/motor deficits. Cranial nerves: II-XII intact, grossly non-focal.  Psychiatric: Alert and oriented, thought content appropriate, normal insight  Capillary Refill: Brisk,< 3 seconds   Peripheral Pulses: +2 palpable, equal bilaterally     Labs:     Recent Labs     07/30/21  0530   WBC 10.9   HGB 12.9   HCT 36.4        Recent Labs     07/29/21  0641   K 4.0     No results for input(s): AST, ALT, BILIDIR, BILITOT, ALKPHOS in the last 72 hours. No results for input(s): INR in the last 72 hours. No results for input(s): Connee Matar in the last 72 hours. Urinalysis:    Lab Results   Component Value Date    NITRU Negative 07/31/2021    WBCUA None seen 07/31/2021    RBCUA 0-2 07/31/2021    BLOODU TRACE-LYSED 07/31/2021    SPECGRAV 1.010 07/31/2021    GLUCOSEU Negative 07/31/2021       Radiology: I have reviewed the radiology reports with the following interpretation:     XR ABDOMEN (KUB) (SINGLE AP VIEW)   Final Result      No bowel obstruction. Moderate retained stool in the colon. XR LUMBAR SPINE (2-3 VIEWS)   Final Result      3 coned-down frontal and lateral intraoperative views demonstrate postoperative changes at L4-5 and L5-S1.       No retained surgical instrument is seen. XR LUMBAR SPINE (2-3 VIEWS)   Final Result         Coned-down intraoperative views of the lumbar spine assumes 5 lumbar type vertebral bodies. There is anterior interbody fusion at L4-5 and L5-S1 with radiolucent interbody devices. Fluoroscopy time 1 minute      FLUORO FOR SURGICAL PROCEDURES   Final Result         Coned-down intraoperative views of the lumbar spine assumes 5 lumbar type vertebral bodies. There is anterior interbody fusion at L4-5 and L5-S1 with radiolucent interbody devices. Fluoroscopy time 1 minute      CT GUIDED STEREOTACTIC LOCALIZATION   Final Result      CT GUIDED STEREOTACTIC LOCALIZATION   Final Result            ASSESSMENT:    Active Hospital Problems    Diagnosis Date Noted    Lumbar stenosis with neurogenic claudication [M48.062] 07/29/2021       PLAN:    Chronic back pain with bilateral feet burning:  Status post L4-S1 ANTERIOR LUMBAR INTERBODY FUSION WITH PEDICLE SCREW FIXATION   Continue as needed pain medication  Management per neurosurgery    Hypoxia:  Likely secondary to receiving fluids pre and postoperatively  Left basilar crackles noted on examination  IV fluids discontinued  Chest x-ray showed no acute disease  Lasix 20 mg IV x1 ordered  Encourage incentive spirometry  Monitor sats, wean oxygen as tolerated to keep sats more than 90%    Abdominal pain/constipation:  Abdominal x-ray showed no bowel obstruction with moderate retained stool in the colon  Continue bowel regimen  Enema ordered    Urinary retention:  Patient was able to void yesterday.   Started having difficulties today  Constipation might be contributing towards it as well  Bethanechol 10 mg 3 times daily ordered    Hypertension:  Monitor blood pressure, currently on the lower side  Holding losartan, verapamil on metoprolol    Hyperlipidemia:  Continue statin    Depression:  Continue Wellbutrin    Sleep apnea:  Continue CPAP    DVT Prophylaxis: Lovenox  Diet: ADULT DIET;  Regular  Code Status: Full Code    PT/OT Eval Status: Active and ongoing    Dispo -pending transition off of oxygen, ability to void and resolution of constipation    Thank you for the consultation, will follow up as needed    Jayden Villanueva MD MD

## 2021-07-31 NOTE — PROGRESS NOTES
Neurosurgery Progress Note    2021 7:52 AM                               Jael Mckeon                      LOS: 2 days             Post-operative Day# 2 s/p Procedure(s) (LRB):  L4-S1 ANTERIOR LUMBAR INTERBODY FUSION WITH PEDICLE SCREW FIXATION (N/A)  Subjective:  No acute events overnight. Pt reports incisional pain but LE radicular pain is gone                                              Physical Exam:    Temp (24hrs), Av.2 °F (36.8 °C), Min:97.6 °F (36.4 °C), Max:99.1 °F (37.3 °C)      Neuro Exam  The patient is well-appearing and is in no acute distress. Alert and oriented ×4, appropriate, conversant with normal mood and affect. DTR 2+/4 symmetric. Vilma sign neg BUE, Babinski sign is down-going BLE  Capillary refill is less than 2 s in all 4 extremities, pulses are strong and symmetric. Sensation is i    RUE: Deltoids: 5/5, Triceps: 5/5, Biceps: 5/5, WE/WF: 5/5, Finger abd: 5/5, : 5/5   LUE: Deltoids: 5/5, Triceps: 5/5, Biceps: 5/5, WE/WF: 5/5, Finger abd: 5/5, : 5/5  LLE: HE: 5/5, HF: 5/5, KE:5/5, KF: 5/5, PF: 5/5, DF: 5/5  RLE: HE: 5/5, HF: 5/5, KE:5/5, KF: 5/5, PF: 5/5, DF: 5/5  Incision  Clean and dry  Labs:  Recent Labs     21  0530   WBC 10.9   HGB 12.9   HCT 36.4          Recent Labs     21  0641   K 4.0       No results for input(s): PROTIME, INR, APTT in the last 72 hours. Assessment and Plan:  Patient is a 76 yo F s/p Procedure  L4-S1 ANTERIOR LUMBAR INTERBODY FUSION WITH PEDICLE SCREW FIXATION (N/A)   per Dr. Raymond Scales:  1. Neurologic exam frequency:q4  2. Mobility:PT/OT eval  3. DVT Prophylaxis: SCDs and lovenox   4. Bowel Regimen: glycolax and senna  5. Pain control:she and robaxin  6. Incisional Care:open to air  Dispo Planning: inpt. Pt will go to ECF at time of discharge.        Fanny Bautista PA-C

## 2021-08-01 ENCOUNTER — ANESTHESIA EVENT (OUTPATIENT)
Dept: OPERATING ROOM | Age: 66
DRG: 453 | End: 2021-08-01
Payer: MEDICARE

## 2021-08-01 ENCOUNTER — ANESTHESIA (OUTPATIENT)
Dept: OPERATING ROOM | Age: 66
DRG: 453 | End: 2021-08-01
Payer: MEDICARE

## 2021-08-01 ENCOUNTER — APPOINTMENT (OUTPATIENT)
Dept: CT IMAGING | Age: 66
DRG: 453 | End: 2021-08-01
Attending: NEUROLOGICAL SURGERY
Payer: MEDICARE

## 2021-08-01 VITALS — SYSTOLIC BLOOD PRESSURE: 214 MMHG | DIASTOLIC BLOOD PRESSURE: 126 MMHG | OXYGEN SATURATION: 99 % | TEMPERATURE: 99.9 F

## 2021-08-01 LAB
ANION GAP SERPL CALCULATED.3IONS-SCNC: 10 MMOL/L (ref 3–16)
BUN BLDV-MCNC: 6 MG/DL (ref 7–20)
CALCIUM SERPL-MCNC: 8.5 MG/DL (ref 8.3–10.6)
CHLORIDE BLD-SCNC: 101 MMOL/L (ref 99–110)
CO2: 24 MMOL/L (ref 21–32)
CREAT SERPL-MCNC: <0.5 MG/DL (ref 0.6–1.2)
GFR AFRICAN AMERICAN: >60
GFR NON-AFRICAN AMERICAN: >60
GLUCOSE BLD-MCNC: 108 MG/DL (ref 70–99)
HCT VFR BLD CALC: 33 % (ref 36–48)
HEMOGLOBIN: 11.5 G/DL (ref 12–16)
MCH RBC QN AUTO: 31.9 PG (ref 26–34)
MCHC RBC AUTO-ENTMCNC: 34.7 G/DL (ref 31–36)
MCV RBC AUTO: 91.8 FL (ref 80–100)
PDW BLD-RTO: 13.3 % (ref 12.4–15.4)
PLATELET # BLD: 187 K/UL (ref 135–450)
PMV BLD AUTO: 7.3 FL (ref 5–10.5)
POTASSIUM REFLEX MAGNESIUM: 3.6 MMOL/L (ref 3.5–5.1)
RBC # BLD: 3.59 M/UL (ref 4–5.2)
SODIUM BLD-SCNC: 135 MMOL/L (ref 136–145)
WBC # BLD: 7 K/UL (ref 4–11)

## 2021-08-01 PROCEDURE — 6360000002 HC RX W HCPCS: Performed by: INTERNAL MEDICINE

## 2021-08-01 PROCEDURE — 6360000002 HC RX W HCPCS: Performed by: ANESTHESIOLOGY

## 2021-08-01 PROCEDURE — 3600000004 HC SURGERY LEVEL 4 BASE: Performed by: SURGERY

## 2021-08-01 PROCEDURE — 6360000002 HC RX W HCPCS: Performed by: NEUROLOGICAL SURGERY

## 2021-08-01 PROCEDURE — 2580000003 HC RX 258: Performed by: ANESTHESIOLOGY

## 2021-08-01 PROCEDURE — 7100000001 HC PACU RECOVERY - ADDTL 15 MIN: Performed by: SURGERY

## 2021-08-01 PROCEDURE — 6360000004 HC RX CONTRAST MEDICATION: Performed by: NEUROLOGICAL SURGERY

## 2021-08-01 PROCEDURE — 80048 BASIC METABOLIC PNL TOTAL CA: CPT

## 2021-08-01 PROCEDURE — 1200000000 HC SEMI PRIVATE

## 2021-08-01 PROCEDURE — 2720000010 HC SURG SUPPLY STERILE: Performed by: SURGERY

## 2021-08-01 PROCEDURE — 6370000000 HC RX 637 (ALT 250 FOR IP): Performed by: NURSE PRACTITIONER

## 2021-08-01 PROCEDURE — 2580000003 HC RX 258: Performed by: PHYSICIAN ASSISTANT

## 2021-08-01 PROCEDURE — 2580000003 HC RX 258: Performed by: SURGERY

## 2021-08-01 PROCEDURE — 7100000000 HC PACU RECOVERY - FIRST 15 MIN: Performed by: SURGERY

## 2021-08-01 PROCEDURE — 6370000000 HC RX 637 (ALT 250 FOR IP): Performed by: PHYSICIAN ASSISTANT

## 2021-08-01 PROCEDURE — 2709999900 HC NON-CHARGEABLE SUPPLY: Performed by: SURGERY

## 2021-08-01 PROCEDURE — 3600000014 HC SURGERY LEVEL 4 ADDTL 15MIN: Performed by: SURGERY

## 2021-08-01 PROCEDURE — 0JQ80ZZ REPAIR ABDOMEN SUBCUTANEOUS TISSUE AND FASCIA, OPEN APPROACH: ICD-10-PCS | Performed by: SURGERY

## 2021-08-01 PROCEDURE — 74177 CT ABD & PELVIS W/CONTRAST: CPT

## 2021-08-01 PROCEDURE — 6370000000 HC RX 637 (ALT 250 FOR IP): Performed by: INTERNAL MEDICINE

## 2021-08-01 PROCEDURE — 99222 1ST HOSP IP/OBS MODERATE 55: CPT | Performed by: SURGERY

## 2021-08-01 PROCEDURE — 85027 COMPLETE CBC AUTOMATED: CPT

## 2021-08-01 PROCEDURE — 3700000001 HC ADD 15 MINUTES (ANESTHESIA): Performed by: SURGERY

## 2021-08-01 PROCEDURE — 2500000003 HC RX 250 WO HCPCS: Performed by: ANESTHESIOLOGY

## 2021-08-01 PROCEDURE — 6360000002 HC RX W HCPCS: Performed by: PHYSICIAN ASSISTANT

## 2021-08-01 PROCEDURE — 2500000003 HC RX 250 WO HCPCS

## 2021-08-01 PROCEDURE — 3700000000 HC ANESTHESIA ATTENDED CARE: Performed by: SURGERY

## 2021-08-01 PROCEDURE — 6370000000 HC RX 637 (ALT 250 FOR IP): Performed by: ANESTHESIOLOGY

## 2021-08-01 PROCEDURE — 94640 AIRWAY INHALATION TREATMENT: CPT

## 2021-08-01 PROCEDURE — 36415 COLL VENOUS BLD VENIPUNCTURE: CPT

## 2021-08-01 RX ORDER — LABETALOL HYDROCHLORIDE 5 MG/ML
5 INJECTION, SOLUTION INTRAVENOUS EVERY 5 MIN PRN
Status: COMPLETED | OUTPATIENT
Start: 2021-08-01 | End: 2021-08-02

## 2021-08-01 RX ORDER — SODIUM CHLORIDE 9 MG/ML
INJECTION, SOLUTION INTRAVENOUS CONTINUOUS PRN
Status: DISCONTINUED | OUTPATIENT
Start: 2021-08-01 | End: 2021-08-01 | Stop reason: SDUPTHER

## 2021-08-01 RX ORDER — PROPOFOL 10 MG/ML
INJECTION, EMULSION INTRAVENOUS PRN
Status: DISCONTINUED | OUTPATIENT
Start: 2021-08-01 | End: 2021-08-01 | Stop reason: SDUPTHER

## 2021-08-01 RX ORDER — HYDRALAZINE HYDROCHLORIDE 20 MG/ML
INJECTION INTRAMUSCULAR; INTRAVENOUS PRN
Status: DISCONTINUED | OUTPATIENT
Start: 2021-08-01 | End: 2021-08-01 | Stop reason: SDUPTHER

## 2021-08-01 RX ORDER — MEPERIDINE HYDROCHLORIDE 25 MG/ML
12.5 INJECTION INTRAMUSCULAR; INTRAVENOUS; SUBCUTANEOUS EVERY 5 MIN PRN
Status: DISCONTINUED | OUTPATIENT
Start: 2021-08-01 | End: 2021-08-01 | Stop reason: HOSPADM

## 2021-08-01 RX ORDER — SODIUM CHLORIDE, SODIUM LACTATE, POTASSIUM CHLORIDE, CALCIUM CHLORIDE 600; 310; 30; 20 MG/100ML; MG/100ML; MG/100ML; MG/100ML
INJECTION, SOLUTION INTRAVENOUS CONTINUOUS
Status: DISCONTINUED | OUTPATIENT
Start: 2021-08-01 | End: 2021-08-13 | Stop reason: HOSPADM

## 2021-08-01 RX ORDER — FUROSEMIDE 10 MG/ML
20 INJECTION INTRAMUSCULAR; INTRAVENOUS ONCE
Status: COMPLETED | OUTPATIENT
Start: 2021-08-01 | End: 2021-08-01

## 2021-08-01 RX ORDER — LABETALOL HYDROCHLORIDE 5 MG/ML
INJECTION, SOLUTION INTRAVENOUS
Status: COMPLETED
Start: 2021-08-01 | End: 2021-08-01

## 2021-08-01 RX ORDER — LEVALBUTEROL INHALATION SOLUTION 0.63 MG/3ML
0.63 SOLUTION RESPIRATORY (INHALATION) EVERY 8 HOURS PRN
Status: DISCONTINUED | OUTPATIENT
Start: 2021-08-01 | End: 2021-08-02

## 2021-08-01 RX ORDER — ONDANSETRON 2 MG/ML
4 INJECTION INTRAMUSCULAR; INTRAVENOUS
Status: COMPLETED | OUTPATIENT
Start: 2021-08-01 | End: 2021-08-01

## 2021-08-01 RX ORDER — MAGNESIUM HYDROXIDE 1200 MG/15ML
LIQUID ORAL CONTINUOUS PRN
Status: COMPLETED | OUTPATIENT
Start: 2021-08-01 | End: 2021-08-01

## 2021-08-01 RX ORDER — 0.9 % SODIUM CHLORIDE 0.9 %
500 INTRAVENOUS SOLUTION INTRAVENOUS
Status: DISCONTINUED | OUTPATIENT
Start: 2021-08-01 | End: 2021-08-01 | Stop reason: HOSPADM

## 2021-08-01 RX ORDER — ROCURONIUM BROMIDE 10 MG/ML
INJECTION, SOLUTION INTRAVENOUS PRN
Status: DISCONTINUED | OUTPATIENT
Start: 2021-08-01 | End: 2021-08-01 | Stop reason: SDUPTHER

## 2021-08-01 RX ORDER — CLINDAMYCIN PHOSPHATE 150 MG/ML
INJECTION, SOLUTION INTRAVENOUS PRN
Status: DISCONTINUED | OUTPATIENT
Start: 2021-08-01 | End: 2021-08-01 | Stop reason: SDUPTHER

## 2021-08-01 RX ORDER — FENTANYL CITRATE 50 UG/ML
INJECTION, SOLUTION INTRAMUSCULAR; INTRAVENOUS PRN
Status: DISCONTINUED | OUTPATIENT
Start: 2021-08-01 | End: 2021-08-01 | Stop reason: SDUPTHER

## 2021-08-01 RX ORDER — LIDOCAINE HCL/PF 100 MG/5ML
SYRINGE (ML) INJECTION PRN
Status: DISCONTINUED | OUTPATIENT
Start: 2021-08-01 | End: 2021-08-01 | Stop reason: SDUPTHER

## 2021-08-01 RX ORDER — ONDANSETRON 2 MG/ML
INJECTION INTRAMUSCULAR; INTRAVENOUS PRN
Status: DISCONTINUED | OUTPATIENT
Start: 2021-08-01 | End: 2021-08-01 | Stop reason: SDUPTHER

## 2021-08-01 RX ORDER — SUCCINYLCHOLINE CHLORIDE 20 MG/ML
INJECTION INTRAMUSCULAR; INTRAVENOUS PRN
Status: DISCONTINUED | OUTPATIENT
Start: 2021-08-01 | End: 2021-08-01 | Stop reason: SDUPTHER

## 2021-08-01 RX ORDER — DIPHENHYDRAMINE HYDROCHLORIDE 50 MG/ML
12.5 INJECTION INTRAMUSCULAR; INTRAVENOUS
Status: DISCONTINUED | OUTPATIENT
Start: 2021-08-01 | End: 2021-08-01 | Stop reason: HOSPADM

## 2021-08-01 RX ORDER — HYDRALAZINE HYDROCHLORIDE 20 MG/ML
5 INJECTION INTRAMUSCULAR; INTRAVENOUS EVERY 10 MIN PRN
Status: DISCONTINUED | OUTPATIENT
Start: 2021-08-01 | End: 2021-08-01 | Stop reason: HOSPADM

## 2021-08-01 RX ORDER — HYDROCODONE BITARTRATE AND ACETAMINOPHEN 5; 325 MG/1; MG/1
1 TABLET ORAL
Status: DISCONTINUED | OUTPATIENT
Start: 2021-08-01 | End: 2021-08-01 | Stop reason: HOSPADM

## 2021-08-01 RX ADMIN — ROCURONIUM BROMIDE 10 MG: 10 INJECTION INTRAVENOUS at 17:38

## 2021-08-01 RX ADMIN — LABETALOL HYDROCHLORIDE 5 MG: 5 INJECTION, SOLUTION INTRAVENOUS at 19:51

## 2021-08-01 RX ADMIN — BUPROPION HYDROCHLORIDE 150 MG: 150 TABLET, EXTENDED RELEASE ORAL at 09:24

## 2021-08-01 RX ADMIN — ROCURONIUM BROMIDE 30 MG: 10 INJECTION INTRAVENOUS at 17:47

## 2021-08-01 RX ADMIN — SODIUM CHLORIDE: 9 INJECTION, SOLUTION INTRAVENOUS at 17:32

## 2021-08-01 RX ADMIN — DOCUSATE SODIUM 50 MG AND SENNOSIDES 8.6 MG 2 TABLET: 8.6; 5 TABLET, FILM COATED ORAL at 09:24

## 2021-08-01 RX ADMIN — ACETAMINOPHEN 1000 MG: 500 TABLET, COATED ORAL at 05:39

## 2021-08-01 RX ADMIN — DIAZEPAM 5 MG: 5 TABLET ORAL at 04:35

## 2021-08-01 RX ADMIN — PANTOPRAZOLE SODIUM 40 MG: 40 TABLET, DELAYED RELEASE ORAL at 21:24

## 2021-08-01 RX ADMIN — PROMETHAZINE HYDROCHLORIDE 12.5 MG: 12.5 TABLET ORAL at 16:03

## 2021-08-01 RX ADMIN — FENTANYL CITRATE 100 MCG: 50 INJECTION, SOLUTION INTRAMUSCULAR; INTRAVENOUS at 17:34

## 2021-08-01 RX ADMIN — OXYCODONE 10 MG: 5 TABLET ORAL at 21:23

## 2021-08-01 RX ADMIN — HYDROMORPHONE HYDROCHLORIDE 0.5 MG: 1 INJECTION, SOLUTION INTRAMUSCULAR; INTRAVENOUS; SUBCUTANEOUS at 14:21

## 2021-08-01 RX ADMIN — DOCUSATE SODIUM 50 MG AND SENNOSIDES 8.6 MG 2 TABLET: 8.6; 5 TABLET, FILM COATED ORAL at 21:24

## 2021-08-01 RX ADMIN — HYDROMORPHONE HYDROCHLORIDE 0.5 MG: 1 INJECTION, SOLUTION INTRAMUSCULAR; INTRAVENOUS; SUBCUTANEOUS at 19:15

## 2021-08-01 RX ADMIN — OXYCODONE 10 MG: 5 TABLET ORAL at 04:35

## 2021-08-01 RX ADMIN — SUCCINYLCHOLINE CHLORIDE 160 MG: 20 INJECTION, SOLUTION INTRAMUSCULAR; INTRAVENOUS; PARENTERAL at 17:38

## 2021-08-01 RX ADMIN — ONDANSETRON 4 MG: 2 INJECTION INTRAMUSCULAR; INTRAVENOUS at 19:15

## 2021-08-01 RX ADMIN — LEVALBUTEROL HYDROCHLORIDE 0.63 MG: 0.63 SOLUTION RESPIRATORY (INHALATION) at 19:33

## 2021-08-01 RX ADMIN — HYDROMORPHONE HYDROCHLORIDE 0.5 MG: 1 INJECTION, SOLUTION INTRAMUSCULAR; INTRAVENOUS; SUBCUTANEOUS at 20:00

## 2021-08-01 RX ADMIN — HYDROMORPHONE HYDROCHLORIDE 0.5 MG: 1 INJECTION, SOLUTION INTRAMUSCULAR; INTRAVENOUS; SUBCUTANEOUS at 19:26

## 2021-08-01 RX ADMIN — ENOXAPARIN SODIUM 40 MG: 40 INJECTION SUBCUTANEOUS at 09:47

## 2021-08-01 RX ADMIN — CLINDAMYCIN PHOSPHATE 900 MG: 150 INJECTION, SOLUTION INTRAMUSCULAR; INTRAVENOUS at 17:47

## 2021-08-01 RX ADMIN — FUROSEMIDE 20 MG: 10 INJECTION, SOLUTION INTRAMUSCULAR; INTRAVENOUS at 10:58

## 2021-08-01 RX ADMIN — FAMOTIDINE 20 MG: 10 INJECTION, SOLUTION INTRAVENOUS at 18:07

## 2021-08-01 RX ADMIN — ROCURONIUM BROMIDE 10 MG: 10 INJECTION INTRAVENOUS at 18:17

## 2021-08-01 RX ADMIN — SODIUM CHLORIDE, POTASSIUM CHLORIDE, SODIUM LACTATE AND CALCIUM CHLORIDE: 600; 310; 30; 20 INJECTION, SOLUTION INTRAVENOUS at 16:54

## 2021-08-01 RX ADMIN — PROPOFOL 200 MG: 10 INJECTION, EMULSION INTRAVENOUS at 17:38

## 2021-08-01 RX ADMIN — BETHANECHOL CHLORIDE 10 MG: 10 TABLET ORAL at 13:16

## 2021-08-01 RX ADMIN — DIAZEPAM 5 MG: 5 TABLET ORAL at 10:57

## 2021-08-01 RX ADMIN — Medication 60 MG: at 17:38

## 2021-08-01 RX ADMIN — FENTANYL CITRATE 100 MCG: 50 INJECTION, SOLUTION INTRAMUSCULAR; INTRAVENOUS at 17:58

## 2021-08-01 RX ADMIN — Medication 10 ML: at 21:24

## 2021-08-01 RX ADMIN — Medication 10 ML: at 09:24

## 2021-08-01 RX ADMIN — IOPAMIDOL 80 ML: 755 INJECTION, SOLUTION INTRAVENOUS at 13:01

## 2021-08-01 RX ADMIN — OXYCODONE 10 MG: 5 TABLET ORAL at 08:42

## 2021-08-01 RX ADMIN — ONDANSETRON 4 MG: 2 INJECTION INTRAMUSCULAR; INTRAVENOUS at 12:11

## 2021-08-01 RX ADMIN — POLYETHYLENE GLYCOL 3350 17 G: 17 POWDER, FOR SOLUTION ORAL at 09:24

## 2021-08-01 RX ADMIN — SODIUM CHLORIDE, POTASSIUM CHLORIDE, SODIUM LACTATE AND CALCIUM CHLORIDE: 600; 310; 30; 20 INJECTION, SOLUTION INTRAVENOUS at 20:00

## 2021-08-01 RX ADMIN — BETHANECHOL CHLORIDE 10 MG: 10 TABLET ORAL at 09:24

## 2021-08-01 RX ADMIN — ONDANSETRON 4 MG: 2 INJECTION INTRAMUSCULAR; INTRAVENOUS at 18:07

## 2021-08-01 RX ADMIN — HYDRALAZINE HYDROCHLORIDE 5 MG: 20 INJECTION INTRAMUSCULAR; INTRAVENOUS at 18:10

## 2021-08-01 RX ADMIN — PRAVASTATIN SODIUM 20 MG: 20 TABLET ORAL at 21:24

## 2021-08-01 RX ADMIN — CETIRIZINE HYDROCHLORIDE 10 MG: 10 TABLET, FILM COATED ORAL at 09:24

## 2021-08-01 RX ADMIN — ONDANSETRON 4 MG: 2 INJECTION INTRAMUSCULAR; INTRAVENOUS at 04:35

## 2021-08-01 RX ADMIN — PANTOPRAZOLE SODIUM 40 MG: 40 TABLET, DELAYED RELEASE ORAL at 09:23

## 2021-08-01 RX ADMIN — OXYCODONE 10 MG: 5 TABLET ORAL at 13:16

## 2021-08-01 RX ADMIN — TAMSULOSIN HYDROCHLORIDE 0.4 MG: 0.4 CAPSULE ORAL at 09:23

## 2021-08-01 ASSESSMENT — PULMONARY FUNCTION TESTS
PIF_VALUE: 23
PIF_VALUE: 1
PIF_VALUE: 25
PIF_VALUE: 23
PIF_VALUE: 23
PIF_VALUE: 2
PIF_VALUE: 23
PIF_VALUE: 23
PIF_VALUE: 19
PIF_VALUE: 8
PIF_VALUE: 23
PIF_VALUE: 6
PIF_VALUE: 24
PIF_VALUE: 24
PIF_VALUE: 23
PIF_VALUE: 1
PIF_VALUE: 23
PIF_VALUE: 8
PIF_VALUE: 24
PIF_VALUE: 23
PIF_VALUE: 23
PIF_VALUE: 22
PIF_VALUE: 23
PIF_VALUE: 24
PIF_VALUE: 23
PIF_VALUE: 22
PIF_VALUE: 24
PIF_VALUE: 24
PIF_VALUE: 21
PIF_VALUE: 22
PIF_VALUE: 19
PIF_VALUE: 1
PIF_VALUE: 22
PIF_VALUE: 24
PIF_VALUE: 23
PIF_VALUE: 23
PIF_VALUE: 1
PIF_VALUE: 8
PIF_VALUE: 23
PIF_VALUE: 8
PIF_VALUE: 1
PIF_VALUE: 22
PIF_VALUE: 8
PIF_VALUE: 19
PIF_VALUE: 1
PIF_VALUE: 24
PIF_VALUE: 20
PIF_VALUE: 19
PIF_VALUE: 24
PIF_VALUE: 24
PIF_VALUE: 23
PIF_VALUE: 23
PIF_VALUE: 2
PIF_VALUE: 24
PIF_VALUE: 23
PIF_VALUE: 9
PIF_VALUE: 21
PIF_VALUE: 15
PIF_VALUE: 25
PIF_VALUE: 13
PIF_VALUE: 19
PIF_VALUE: 5
PIF_VALUE: 23
PIF_VALUE: 8
PIF_VALUE: 24
PIF_VALUE: 24
PIF_VALUE: 20
PIF_VALUE: 1
PIF_VALUE: 23
PIF_VALUE: 25
PIF_VALUE: 21
PIF_VALUE: 5
PIF_VALUE: 24
PIF_VALUE: 1
PIF_VALUE: 24
PIF_VALUE: 23
PIF_VALUE: 3
PIF_VALUE: 23
PIF_VALUE: 19
PIF_VALUE: 1
PIF_VALUE: 24
PIF_VALUE: 8
PIF_VALUE: 23
PIF_VALUE: 25
PIF_VALUE: 23

## 2021-08-01 ASSESSMENT — PAIN DESCRIPTION - PAIN TYPE
TYPE: SURGICAL PAIN
TYPE: ACUTE PAIN;SURGICAL PAIN
TYPE: SURGICAL PAIN
TYPE: SURGICAL PAIN;ACUTE PAIN
TYPE: ACUTE PAIN;SURGICAL PAIN

## 2021-08-01 ASSESSMENT — PAIN DESCRIPTION - ORIENTATION
ORIENTATION: MID;LOWER
ORIENTATION: LOWER;MID
ORIENTATION: ANTERIOR;POSTERIOR

## 2021-08-01 ASSESSMENT — PAIN SCALES - GENERAL
PAINLEVEL_OUTOF10: 8
PAINLEVEL_OUTOF10: 10
PAINLEVEL_OUTOF10: 10
PAINLEVEL_OUTOF10: 6
PAINLEVEL_OUTOF10: 7
PAINLEVEL_OUTOF10: 8
PAINLEVEL_OUTOF10: 6
PAINLEVEL_OUTOF10: 9
PAINLEVEL_OUTOF10: 5
PAINLEVEL_OUTOF10: 8
PAINLEVEL_OUTOF10: 10
PAINLEVEL_OUTOF10: 8
PAINLEVEL_OUTOF10: 10

## 2021-08-01 ASSESSMENT — LIFESTYLE VARIABLES: SMOKING_STATUS: 0

## 2021-08-01 ASSESSMENT — PAIN DESCRIPTION - DESCRIPTORS
DESCRIPTORS: SHARP
DESCRIPTORS: SHARP
DESCRIPTORS: ACHING;DISCOMFORT
DESCRIPTORS: ACHING;CONSTANT;DISCOMFORT

## 2021-08-01 ASSESSMENT — PAIN DESCRIPTION - ONSET
ONSET: ON-GOING
ONSET: AWAKENED FROM SLEEP
ONSET: AWAKENED FROM SLEEP
ONSET: GRADUAL

## 2021-08-01 ASSESSMENT — PAIN DESCRIPTION - FREQUENCY
FREQUENCY: CONTINUOUS

## 2021-08-01 ASSESSMENT — PAIN DESCRIPTION - LOCATION
LOCATION: ABDOMEN;BACK
LOCATION: BACK;LEG
LOCATION: ABDOMEN
LOCATION: ABDOMEN

## 2021-08-01 ASSESSMENT — PAIN DESCRIPTION - PROGRESSION
CLINICAL_PROGRESSION: RAPIDLY WORSENING
CLINICAL_PROGRESSION: RAPIDLY WORSENING
CLINICAL_PROGRESSION: NOT CHANGED
CLINICAL_PROGRESSION: GRADUALLY WORSENING

## 2021-08-01 ASSESSMENT — PAIN - FUNCTIONAL ASSESSMENT
PAIN_FUNCTIONAL_ASSESSMENT: PREVENTS OR INTERFERES SOME ACTIVE ACTIVITIES AND ADLS
PAIN_FUNCTIONAL_ASSESSMENT: PREVENTS OR INTERFERES SOME ACTIVE ACTIVITIES AND ADLS

## 2021-08-01 NOTE — PLAN OF CARE
Problem: Safety:  Goal: Free from accidental physical injury  Description: Free from accidental physical injury  Outcome: Ongoing  Goal: Free from intentional harm  Description: Free from intentional harm  Outcome: Ongoing     Problem: Falls - Risk of:  Goal: Will remain free from falls  Description: Will remain free from falls  7/31/2021 0753 by Owen Cheema RN  Outcome: Ongoing

## 2021-08-01 NOTE — OP NOTE
Operative Report    PATIENT NAME: Annemarie Manzo OF BIRTH: 1955  MEDICAL RECORD# [id number]  SURGERY DATE: 7/29/2021  SURGEON:  Cosme Zapata MD, PhD  ASSISTANT:  Fanny Bautista PA-C. No qualified resident or fellow was available for this case. She provided assistance with opening/closure, placement of implants and protection of critical anatomy during the posterior portion of the operation. DICTATED BY:  Cosme Zapata MD, PhD      PREOPERATIVE DIAGNOSIS:  1. Spondylolisthesis L4-5 and L5-S1 associated with subarticular stenosis and radiculopathy     POSTOPERATIVE DIAGNOSIS:  1.   Same. PROCEDURES PERFORMED:  1. Anterior diskectomy at L4-5, retroperitoneal approach performed by Dr. Beth Pepper with interbody fusion   2. Placement of Synthes SynCage ALIF cage with a medium footprint packed with Infuse/Fibergraft into the L4-5 interspace  3. Anterior diskectomy at L5-S1, retroperitoneal approach performed by Dr. Beth Pepper with interbody fusion   4. Placement of Synthes SynCage ALIF cage with a medium footprint packed with Infuse/Fibergraft into the L5-S1 interspace  5. Placement of percutaneous Viper Prime pedicle screws and rods into the pedicles of L4-S1 bilaterally  6. Posterolateral facet arthrodesis at L4-S1  7. Intraoperative monitoring for motor and sensory potentials, stimulation of lumbosacral plexus, pedicle screw stimulation  8. Intraoperative fluoroscopy and stereotactic CT using [employer] AIRO/Curve      ANESTHESIA:  General     IMPLANTS:  1. Synthes SynCage ALIF grafts at L4-5 and L5-S1  a. L4-5: 13.5 mm, medium, 10 degree  b. L5-S1: 12 mm, medium, 14 degree  2. Synthes cancellous retaining screws and washer into the body of L4 and L5  3. Viper Prime pedicle screws  a. L4: 45 mm, 6-0  b. L5: 45 mm, 6-0  c. S1: 45 mm, 6-0    INDICATIONS FOR SURGERY:  The patient is a 77 y.o. with intractable back and leg pain. Their symptoms failed to respond to conservative intervention.  An MRI scan was performed and this demonstrated evidence of spondylolisthesis associated with radiculopathy. Having failed conservative management and experiencing persistent symptoms, the patient elected to proceed ahead with the surgical option of L4-S1 ALIF indirect decompression, as well as pedicle screw fixation and fusion. DETAILS OF PROCEDURE: Under universal protocol and informed consent, the patient was brought to the operating room and general endotracheal anesthesia was induced. Intravenous antibiotics, 2 g Ancef, were administered. They were placed in a supine position on the operating table. The anterior abdominal region was prepped and draped in the routine fashion. Dr. Chinyere Garcia will describe to opening and closure in a separate operative note. Good position was obtained at L4-5 in AP and lateral fluoro images. I entered with the anterior spine exposed at L5-S1 with x-ray confirmation. An incision was made in the annulus. The discectomy was carried out in standard fashion using the periosteal elevator, the various shaped curettes, removing the disc down to the posterior longitudinal ligament. The various distractors were placed and I eventually chose a 12 mm, 14 degree lordosed cage with a medium footprint. The cage was packed with infuse/Fibergraft mixed with autologous BMA. The cage was advanced into the disc space in good position re-establishing good lordosis. The 24 mm SynFix cancellous screw was advanced into L5 with a washer to prevent graft migration. AP and lateral x-rays showed satisfactory placement of the device and screws. The anterior spine was exposed at L4-5 with x-ray confirmation. An incision was made in the annulus. The discectomy was carried out in standard fashion using the periosteal elevator, the various shaped curettes, removing the disc down to the posterior longitudinal ligament.   The various distractors were placed and I eventually chose a  13.5 mm, 10 degree lordosed cage with a medium footprint. The cage was packed with Infuse/Fibergraft mixed with autologous BMA. The cage was advanced into the disc space in good position re-establishing good lordosis. The 24 mm SynFix cancellous screw was advanced into L4 with a washer to prevent graft migration. AP and lateral x-rays showed satisfactory placement of the device and screws. 20cc Exparel was mixed with 20cc 1/2% Marcaine and was infiltrated into the anterior abdominal wall muscle and subcutaneous tissue. Dr. Aly Borja then performed a multi-layer closure of the abdomen, this will be described in a separate operative note. She was then placed prone on a Dmitriy table using the [employer]. All bony prominences were inspected and padded prior to sterile draping. The lumbosacral area was then prepped and draped in the usual sterile fashion. A small stab incision was then made over the PSIS, two stay pins were affixed to the bone and the stereotactic array was secured. The wound was covered with sterile draps and the Aero intraoperative CT was brought into the field to acquire imaging. The images were loaded into the [employer] guidance system and used for pedicle screw placement. Using a #15 blade knife, the skin was incised 3.5 cm off the midline and centered at the L4-S1 interspaces in a mirror image fashion bilaterally. I used the navigable Jamshidi needle to cannulate the pedicle of L4 on the right, a K wire was then placed in the pedicle and over the K wires was placed a Viper pedicle screw. This was all performed under real image guidance. The procedure was repeated at the L5 and S1 pedicle on the right, and the L4-S1 pedicles on the left. Excellent purchase was obtained. A posterolateral facet arthrodesis was performed at L4-S1 by decorticating the lateral facet with the high speed drill and packing the facet articulation with allograft bone.   Rods were then introduced and a reduction maneuver was performed to

## 2021-08-01 NOTE — ANESTHESIA POSTPROCEDURE EVALUATION
Department of Anesthesiology  Postprocedure Note    Patient: Haydee Sumner  MRN: [id number]  [place]: 1955  Date of evaluation: 8/1/2021  Time:  7:13 PM     Procedure Summary     Date: 08/01/21 Room / Location: [address] [place] [address] [facility] / [place] [facility]    Anesthesia Start: 1732 Anesthesia Stop: 1908    Procedure: REPAIR OF WOUND DEHISCENCE (N/A Abdomen) Diagnosis: (fascial dehiscence)    Surgeons: Eitan Gallardo MD Responsible Provider: Kaylie Martinez DO    Anesthesia Type: General ASA Status: 3 - Emergent          Anesthesia Type: General    Halle Phase I: Halle Score: 9    Halle Phase II:      Last vitals: Reviewed and per EMR flowsheets.        Anesthesia Post Evaluation    Patient location during evaluation: PACU  Patient participation: complete - patient participated  Level of consciousness: awake and alert  Pain score: 4  Airway patency: patent  Nausea & Vomiting: no nausea and no vomiting  Complications: no  Cardiovascular status: hemodynamically stable  Respiratory status: acceptable  Hydration status: stable

## 2021-08-01 NOTE — PROGRESS NOTES
Neurosurgery Progress Note    2021 11:40 AM                               Yelitza Mckeon                      LOS: 3 days             Post-operative Day# 2 s/p Procedure(s) (LRB):  L4-S1 ANTERIOR LUMBAR INTERBODY FUSION WITH PEDICLE SCREW FIXATION (N/A)    Subjective:  No acute events overnight. Physical Exam:    Temp (24hrs), Av.1 °F (36.7 °C), Min:97.2 °F (36.2 °C), Max:98.6 °F (37 °C)      Neuro Exam  The patient is well-appearing and is in no acute distress. Alert and oriented ×4, appropriate, conversant with normal mood and affect. DTR 2+/4 symmetric. Vilma sign neg BUE, Babinski sign is down-going BLE  Capillary refill is less than 2 s in all 4 extremities, pulses are strong and symmetric. Sensation is i    RUE: Deltoids: 5/5, Triceps: 5/5, Biceps: 5/5, WE/WF: 5/5, Finger abd: 5/5, : 5/5   LUE: Deltoids: 5/5, Triceps: 5/5, Biceps: 5/5, WE/WF: 5/5, Finger abd: 5/5, : 5/5  LLE: HE: 5/5, HF: 5/5, KE:5/5, KF: 5/5, PF: 5/5, DF: 5/5  RLE: HE: 5/5, HF: 5/5, KE:5/5, KF: 5/5, PF: 5/5, DF: 5/5  Incision  Clean and dry  Labs:  Recent Labs     21  0530   WBC 10.9   HGB 12.9   HCT 36.4          No results for input(s): NA, K, CL, CO2, BUN, CREATININE, GLUCOSE, CALCIUM, PHOS, MG in the last 72 hours. No results for input(s): PROTIME, INR, APTT in the last 72 hours. Assessment and Plan:  Patient is a 76 yo F s/p Procedure  L4-S1 ANTERIOR LUMBAR INTERBODY FUSION WITH PEDICLE SCREW FIXATION (N/A)   per Dr. Vergara Sees    Patient having new dysesthetic pain in left leg and felt a \"pop\" yesterday just before it started when she coughed. Will check CT abd and lumbar to ensure no displacement of surgical implants or retroperitoneal hemorrhage      Plan:  1. Neurologic exam frequency:q4  2. Mobility:PT/OT eval  3. DVT Prophylaxis: SCDs and lovenox   4. Bowel Regimen: glycolax and senna  5. Pain control:she and robaxin  6.  Incisional Care:open to air  Dispo Planning: inpt. Pt will go to [facility] at time of discharge.        Cosme Zapata MD, PhD  Nora Gonzalez  Director, [address], [facility] [address] Tyron Rodriguez (c), [phone] (o)

## 2021-08-01 NOTE — CONSULTS
Urology Attending Consult Note      Reason for Consultation: urinary retention    History: 77 yr old female with postoperative urinary retention following lumbar fusion . López removed postop and pt voided incompletely and lópez replaced. Patient denies voiding issues prior to admission. Patient started on flomax. Family History, Social History, Review of Systems:  Reviewed and agreed to as per chart    Vitals:  /66   Pulse 72   Temp 98.6 °F (37 °C) (Oral)   Resp 18   Ht 5' 3\" (1.6 m)   Wt 259 lb (117.5 kg)   LMP  (LMP Unknown)   SpO2 93%   BMI 45.88 kg/m²   Temp  Av.1 °F (36.7 °C)  Min: 97.2 °F (36.2 °C)  Max: 98.6 °F (37 °C)    Intake/Output Summary (Last 24 hours) at 2021 1025  Last data filed at 2021 0543  Gross per 24 hour   Intake 620 ml   Output 3150 ml   Net -2530 ml         Physical:   Well developed, well nourished in no acute distress   Mood indicates no abnormalities. Pt doesnt appear depressed   Orientated to time and place   Neck is supple, trachea is midline   Respiratory effort is normal   Cardiovascular show no extremity swelling   Abdomen no masses or hernias are palpated, there is no tenderness. Liver and Spleen appear normal.   Skin show no abnormal lesions   Lymph nodes are not palpated in the inguinal, neck, or axillary area.    López to drainage - urine clear    Labs:  WBC:    Lab Results   Component Value Date    WBC 10.9 2021     Hemoglobin/Hematocrit:    Lab Results   Component Value Date    HGB 12.9 2021    HCT 36.4 2021     BMP:    Lab Results   Component Value Date     2021    K 4.0 2021    K 4.7 2020     2021    CO2 23 2021    BUN 6 2021    LABALBU 4.5 2021    CREATININE 0.5 2021    CALCIUM 9.0 2021    GFRAA >60 2021    GFRAA >60 2013    LABGLOM >60 2021    LABGLOM 97 2011     PT/INR:    Lab Results   Component Value Date PROTIME 10.6 07/16/2021    INR 0.94 07/16/2021     PTT:    Lab Results   Component Value Date    APTT 30.4 07/16/2021   [APTT    Urinalysis:     Urine Culture:     Blood Culture:     Antibiotic Therapy:      Imaging:     Impression/Plan: Urinary retention  S/p lumbar fusion surgery  Continue flomax and consider voiding trial later this week    Yokasta Coleman MD

## 2021-08-01 NOTE — OP NOTE
Patient: Tara Demarco  YOB: 1955  MRN: [id number]  Date of Procedure: 8/1/2021      PRE PROCEDURE DIAGNOSIS: Abdominal wound dehiscence    Morbid obesity BMI 46    POST PROCEDURE DIAGNOSIS: Same    Procedure Performed:     1. Primary repair and closure of abdominal wall fascia     VASCULAR SURGEON : Damian Dhaliwal MD    ANESTHESIA: GENERAL    ESTIMATED BLOOD LOSS: 2ml    INDICATION:  The patient is a 77 y.o. female who underwent ALIF on Wednesday this week. Developed abdominal pain and CT shows fascial dehiscence without evisceration. It was felt patient wound benefit for going back to OR to attempt primary closure first and if unable due to her morbid obesity would need closure with mesh by general surgery. I discussed with the patient my portion of the surgery as well as the risks, benefits and alternatives. Patient understood the risks included but not limited to significant arterial or venous bleeding that could require transfusion or even be fatal, arterial or venous thrombosis requiring further vascular surgery, thrombectomy, bypass or stenting, injury to bowel, ureter, bladder, lymphatics that could lead to fluid collections or leg swelling, temporary or permanent nerve injury or damage, retrograde ejaculation in men, pain or numbness in the left testicle or thigh as well as the testicles hanging lower and unable to contract up, seroma, lymph leak, hernia, wound infection, hematoma, need for further surgery. Patient understood these risks. All questions were answered and consent was obtained. DETAILS OF THE PROCEDURE:  The patient was brought to the operating room and placed supine. General anesthesia induced. Ramirez catheter was placed. The abdomen was prepped and draped in sterile fashion. Timeout was then performed. The previous incision was opened. After opening the skin and subcutaneous fat I saw omentum and small bowel.   The entire fascia had dehisced and all the interrupted

## 2021-08-01 NOTE — PROGRESS NOTES
Received call from OR that patient is going to surgery at 1715. Charge RN attempted to place send IV but was unsuccessful. Obtained consent. Dr. Logan Bolton also talked with patient and sister on phone. Upper denture removed. Glasses was removed and put in a slipper in closet. No other personal belongings on patient. No jewelry or clothing. Belongings in room. Was taken down to surgery with transport on 1 liter oxygen.  aware of surgery by patient and sister. OR aware that sister is waiting in waiting room and second IV was not able to be placed. They was also aware of last time eating was 1300 a few grapes and a few sips of pop and water. Also had a small amount of broth at 1200.

## 2021-08-01 NOTE — PROGRESS NOTES
Surgical residents came to see patient. Possible surgery this afternoon or tomorrow. Order for NPO and patient aware. Mouth swabs given to patient. They wanted Dr. Marlon Cabot updated so a perfect serve was sent to him.

## 2021-08-01 NOTE — ANESTHESIA PRE PROCEDURE
Department of Anesthesiology  Preprocedure Note       Name:  Diego Guzman   Age:  77 y.o.  :  1955                                          MRN:  [id number]         Date:  2021      Surgeon: Marie Jeronimo):  Burton Mora MD    Procedure: Procedure(s):  REPAIR OF WOUND DEHISCENCE    Medications prior to admission:   Prior to Admission medications    Medication Sig Start Date End Date Taking? Authorizing Provider   metroNIDAZOLE (METROGEL) 0.75 % gel Apply topically 2 times daily. 21  Yes Shin Dong MD   clotrimazole-betamethasone (LOTRISONE) 1-0.05 % cream Apply topically 2 times daily. 21  Yes Shin Dong MD   metoprolol succinate (TOPROL XL) 25 MG extended release tablet Take 1 tablet by mouth daily 21 Yes Shin Dong MD   buPROPion (WELLBUTRIN XL) 150 MG extended release tablet Take 1 tablet by mouth every morning 21  Yes Shin Dong MD   loratadine (CLARITIN) 10 MG tablet TAKE 1 TABLET BY MOUTH EVERY DAY 21  Yes Cynthia Fisher MD   pantoprazole (PROTONIX) 40 MG tablet TAKE 1 TABLET BY MOUTH TWICE A DAY 3/9/21  Yes Cynthia Fisher MD   losartan (COZAAR) 50 MG tablet Take 1 tablet by mouth daily 3/3/21  Yes Theresa Wahl MD   verapamil (CALAN SR) 120 MG extended release tablet Take 1 tablet by mouth nightly Do not crush or chew. 3/3/21  Yes Theresa Wahl MD   pravastatin (PRAVACHOL) 20 MG tablet Take 1 tablet by mouth daily 3/3/21  Yes Theresa Wahl MD   acetaminophen (TYLENOL) 500 MG tablet Take 500 mg by mouth every 6 hours as needed for Pain   Yes Historical Provider, MD   Multiple Vitamins-Minerals (CENTRUM SILVER PO) Take 1 tablet by mouth daily    Yes Historical Provider, MD   gabapentin (NEURONTIN) 300 MG capsule TAKE 1 CAPSULE BY MOUTH 3 TIMES DAILY FOR 30 DAYS.  6/15/21 7/15/21  Shin Dong MD   vitamin B-12 (CYANOCOBALAMIN) 1000 MCG tablet  21   Historical Provider, MD Turmeric 500 MG CAPS  1/1/21   Historical Provider, MD   aspirin EC 81 MG EC tablet Take 1 tablet by mouth daily 2/10/21   Miranda Kaur MD   Omega-3 Fatty Acids (FISH OIL) 1200 MG CAPS Take 1,200 mg by mouth 2 times daily    Historical Provider, MD       Current medications:    Current Facility-Administered Medications   Medication Dose Route Frequency Provider Last Rate Last Admin    SMOG Enema  330 mL Rectal Once Lynda Jacques MD        HYDROmorphone (DILAUDID) injection 0.5 mg  0.5 mg Intravenous Q12H PRN Diogo Preston MD   0.5 mg at 08/01/21 1421    lactated ringers infusion   Intravenous Continuous Erlene Nurse,  mL/hr at 08/01/21 1654 New Bag at 08/01/21 1654    meperidine (DEMEROL) injection 12.5 mg  12.5 mg Intravenous Q5 Min PRN Erlene Nurse, DO        HYDROmorphone (DILAUDID) injection 0.25 mg  0.25 mg Intravenous Q5 Min PRN Erlene Nurse, DO        HYDROmorphone (DILAUDID) injection 0.5 mg  0.5 mg Intravenous Q5 Min PRN Erlene Nurse, DO        HYDROcodone-acetaminophen [facility]) 5-325 MG per tablet 1 tablet  1 tablet Oral Once PRN Erlene Nurse, DO        ondansetron [facility]) injection 4 mg  4 mg Intravenous Once PRN Erlene Nurse, DO        0.9 % sodium chloride bolus  500 mL Intravenous Once PRN Erlene Nurse, DO        diphenhydrAMINE (BENADRYL) injection 12.5 mg  12.5 mg Intravenous Once PRN Erlene Nurse, DO        hydrALAZINE (APRESOLINE) injection 5 mg  5 mg Intravenous Q10 Min PRN Erlene Nurse, DO        sodium chloride 0.9 % irrigation    Continuous PRN Nimesh Salinas MD   1,000 mL at 08/01/21 1758    pravastatin (PRAVACHOL) tablet 20 mg  20 mg Oral Nightly Lenora Olivera        tamsulosin [facility]) capsule 0.4 mg  0.4 mg Oral Daily Lynda Jacques MD   0.4 mg at 08/01/21 5013    acetaminophen (TYLENOL) tablet 1,000 mg  1,000 mg Oral 4 times per day LISA Rowell CNP   1,000 mg at 08/01/21 0539    lidocaine 4 % external patch 1 patch  1 patch Transdermal Daily Sarah Prince APRN - CNP   1 patch at 08/01/21 5621    sennosides-docusate sodium (SENOKOT-S) 8.6-50 MG tablet 2 tablet  2 tablet Oral BID Sarah Prince APRN - CNP   2 tablet at 08/01/21 3659    polyethylene glycol (GLYCOLAX) packet 17 g  17 g Oral Daily Sarah Prince APRN - CNP   17 g at 08/01/21 2396    oxyCODONE (ROXICODONE) immediate release tablet 5 mg  5 mg Oral Q4H PRN Sarah Suresh APRN - CNP        Or    oxyCODONE (ROXICODONE) immediate release tablet 10 mg  10 mg Oral Q4H PRN Sarah Suresh, APRN - CNP   10 mg at 08/01/21 1316    calcium carbonate (TUMS) chewable tablet 500 mg  500 mg Oral TID PRN Ari Da Silva APRN - CNP   500 mg at 07/30/21 1639    benzocaine-menthol (CEPACOL SORE THROAT) lozenge 1 lozenge  1 lozenge Oral Q2H PRN Diogo Preston MD        scopolamine (TRANSDERM-SCOP) transdermal patch 1 patch  1 patch Transdermal Q72H Anant Santacruz DO   1 patch at 08/01/21 5322    [Held by provider] metoprolol succinate (TOPROL XL) extended release tablet 25 mg  25 mg Oral Daily Anatn Satnacruz DO   25 mg at 07/31/21 0831    buPROPion (WELLBUTRIN XL) extended release tablet 150 mg  150 mg Oral QAM SMITHA Duarte   150 mg at 08/01/21 0924    cetirizine (ZYRTEC) tablet 10 mg  10 mg Oral Daily Jeana Johnston [address]   10 mg at 08/01/21 0924    [Held by provider] losartan (COZAAR) tablet 50 mg  50 mg Oral Daily SMITHA Durate   50 mg at 07/30/21 2013    pantoprazole (PROTONIX) tablet 40 mg  40 mg Oral BID SMITHA Duarte   40 mg at 08/01/21 0738    [Held by provider] verapamil (CALAN SR) extended release tablet 120 mg  120 mg Oral Nightly SMITHA Duarte   120 mg at 07/30/21 2013    sodium chloride flush 0.9 % injection 5-40 mL  5-40 mL Intravenous 2 times per day SMITHA Duarte   10 mL at 08/01/21 0924    sodium chloride flush 0.9 % injection 5-40 mL  5-40 mL Intravenous PRN SMITHA Duarte  0.9 % sodium chloride infusion  25 mL Intravenous PRN Neal Deck, PA        promethazine (PHENERGAN) tablet 12.5 mg  12.5 mg Oral Q6H PRN Neal Deck, PA   12.5 mg at 08/01/21 1603    Or    ondansetron (ZOFRAN) injection 4 mg  4 mg Intravenous Q6H PRN Neal Deck, PA   4 mg at 08/01/21 1211    enoxaparin (LOVENOX) injection 40 mg  40 mg Subcutaneous Daily Neal Deck, PA   40 mg at 08/01/21 0947    diazePAM (VALIUM) tablet 5 mg  5 mg Oral Q6H PRN Neal Deck, PA   5 mg at 08/01/21 1057     Facility-Administered Medications Ordered in Other Encounters   Medication Dose Route Frequency Provider Last Rate Last Admin    clindamycin (CLEOCIN) injection   Intravenous PRN June Picket, DO   900 mg at 08/01/21 1747    propofol injection   Intravenous PRN June Picket, DO   200 mg at 08/01/21 1738    fentaNYL (SUBLIMAZE) injection   Intravenous PRN June Picket, DO   100 mcg at 08/01/21 1758    lidocaine injection   Intravenous PRN June Picket, DO   60 mg at 08/01/21 1738    rocuronium (ZEMURON) injection   Intravenous PRN June Picket, DO   10 mg at 08/01/21 1738    succinylcholine (ANECTINE) injection   Intravenous PRN June Picket, DO   160 mg at 08/01/21 1738    famotidine (PEPCID) injection   Intravenous PRN June Picket, DO   20 mg at 08/01/21 1807    ondansetron (ZOFRAN) injection   Intravenous PRN June Picket, DO   4 mg at 08/01/21 1807    hydrALAZINE (APRESOLINE) injection   Intravenous PRN June Picket, DO   5 mg at 08/01/21 1810       Allergies:     Allergies   Allergen Reactions    Latex Rash    Reglan [Metoclopramide Hcl] Rash    Univasc [Moexipril] Other (See Comments)     unsure    Crestor [Rosuvastatin]      myalgia    Celecoxib Palpitations    Keflex [Cephalexin] Diarrhea    Lipitor Other (See Comments)     myalgia    Metoclopramide Anxiety    Prochlorperazine Anxiety    Prochlorperazine Edisylate     Sulfa Antibiotics Nausea And Vomiting    Vioxx        Problem List:    Patient Active Problem List   Diagnosis Code    Vitamin D deficiency E55.9    Generalized osteoarthrosis, involving multiple sites M15.9    Chronic low back pain M54.5, G89.29    Hemiplegic migraine G43.409    Allergic rhinitis J30.9    GERD (gastroesophageal reflux disease) K21.9    Degenerative disc disease, lumbar M51.36    Essential hypertension I10    Major depressive disorder with single episode, 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[facility] F32.4    Mixed hyperlipidemia E78.2    Rosacea L71.9    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    Palpitations R00.2    Atrial tachycardia ([facility]) I47.1    Paresthesia R20.2    PAD (peripheral artery disease) ([facility]) I73.9    Chest pain R07.9    Morbidly obese ([facility]) E66.01    Epigastric discomfort R10.13    Chronic back pain M54.9, G89.29    Lumbar stenosis with neurogenic claudication M48.062       Past Medical History:        Diagnosis Date    Allergic rhinitis     Anxiety     Atrial tachycardia ([facility]     dr Frances Garcia ([facility])    Carpal tunnel syndrome     Cervicalgia     Chronic back pain     low    Depression     GERD (gastroesophageal reflux disease)     has schatzki ring    Headache(784.0)     hemiplegic migraines, improved    Hernia hiatal     Histoplasmosis     Hot flashes     takes wellbutrin    Hyperlipidemia     Hypertension     Obesity     Osteoarthritis     multiple joints    PONV (postoperative nausea and vomiting)     Shingles     nerve pain in her low back persists    Sleep apnea     CPAP set of 4    Tricuspid regurgitation        Past Surgical History:        Procedure Laterality Date    BREAST SURGERY Right     Biopsy w/clip    BUNIONECTOMY      right     COLONOSCOPY  10/2018    fu 10 yrs    KNEE ARTHROSCOPY      KNEE CARTILAGE SURGERY      LOBECTOMY      partial right lung lobectomy    LUMBAR FUSION N/A 7/29/2021    L4-S1 ANTERIOR LUMBAR INTERBODY FUSION WITH PEDICLE SCREW FIXATION LABGLOM 97 04/16/2011    GLUCOSE 108 08/01/2021    GLUCOSE 85 07/23/2011    PROT 7.3 07/16/2021    PROT 6.7 09/22/2012    CALCIUM 8.5 08/01/2021    BILITOT 0.4 07/16/2021    ALKPHOS 100 07/16/2021    AST 21 07/16/2021    ALT 23 07/16/2021       POC Tests: No results for input(s): POCGLU, POCNA, POCK, POCCL, POCBUN, POCHEMO, POCHCT in the last 72 hours. Coags:   Lab Results   Component Value Date    PROTIME 10.6 07/16/2021    INR 0.94 07/16/2021    APTT 30.4 07/16/2021       HCG (If Applicable): No results found for: PREGTESTUR, PREGSERUM, HCG, HCGQUANT     ABGs: No results found for: PHART, PO2ART, PHZ4NVR, MJB9LCT, BEART, F9JKDOKT     Type & Screen (If Applicable):  No results found for: LABABO, LABRH    Drug/Infectious Status (If Applicable):  No results found for: HIV, HEPCAB    COVID-19 Screening (If Applicable): No results found for: COVID19        Anesthesia Evaluation  Patient summary reviewed and Nursing notes reviewed   history of anesthetic complications: PONV. Airway: Mallampati: II  TM distance: >3 FB   Neck ROM: full  Mouth opening: > = 3 FB Dental:    (+) upper dentures and caps      Pulmonary: breath sounds clear to auscultation  (+) sleep apnea: on CPAP,      (-) not a current smoker (remote hx  )                           Cardiovascular:  Exercise tolerance: good (>4 METS),   (+) hypertension: severe,       NYHA Classification: II  ECG reviewed  Rhythm: regular  Rate: normal           Beta Blocker:  Dose within 24 Hrs         Neuro/Psych:               GI/Hepatic/Renal:   (+) GERD: well controlled, morbid obesity (bmi 45)          Endo/Other:                     Abdominal:   (+) obese,           Vascular: negative vascular ROS. Other Findings:             Anesthesia Plan      general     ASA 3 - emergent       Induction: intravenous. MIPS: Postoperative opioids intended and Prophylactic antiemetics administered. Anesthetic plan and risks discussed with patient.         Attending anesthesiologist reviewed and agrees with Preprocedure content              Cory Bagley DO   8/1/2021

## 2021-08-01 NOTE — PROGRESS NOTES
Net -2410 ml       Physical Exam Performed:    /66   Pulse 72   Temp 98.6 °F (37 °C) (Oral)   Resp 18   Ht 5' 3\" (1.6 m)   Wt 259 lb (117.5 kg)   LMP  (LMP Unknown)   SpO2 93%   BMI 45.88 kg/m²     General appearance: No apparent distress, appears stated age and cooperative. Morbidly obese  HEENT: Normal cephalic, atraumatic without obvious deformity. Pupils equal, round, and reactive to light. Extra ocular muscles intact. Conjunctivae/corneas clear. Neck: Supple, with full range of motion. No jugular venous distention. Trachea midline. Respiratory:  Normal respiratory effort. Left basilar crackles noted  Cardiovascular: Regular rate and rhythm with normal S1/S2 without murmurs, rubs or gallops. Abdomen: Soft, non-tender, non-distended with normal bowel sounds. Musculoskeletal: No clubbing, cyanosis or edema bilaterally. Skin: Skin color, texture, turgor normal.  No rashes or lesions. Neurologic:  Neurovascularly intact without any focal sensory/motor deficits. Cranial nerves: II-XII intact, grossly non-focal.  Psychiatric: Alert and oriented, thought content appropriate, normal insight  Capillary Refill: Brisk,< 3 seconds   Peripheral Pulses: +2 palpable, equal bilaterally       Labs:   Recent Labs     07/30/21  0530   WBC 10.9   HGB 12.9   HCT 36.4        No results for input(s): NA, K, CL, CO2, BUN, CREATININE, CALCIUM, PHOS in the last 72 hours. Invalid input(s): MAGNES  No results for input(s): AST, ALT, BILIDIR, BILITOT, ALKPHOS in the last 72 hours. No results for input(s): INR in the last 72 hours. No results for input(s): Luis Clap in the last 72 hours.     Urinalysis:      Lab Results   Component Value Date    NITRU Negative 07/31/2021    WBCUA None seen 07/31/2021    RBCUA 0-2 07/31/2021    BLOODU TRACE-LYSED 07/31/2021    SPECGRAV 1.010 07/31/2021    GLUCOSEU Negative 07/31/2021       Radiology:  XR CHEST PORTABLE   Final Result      No acute disease         XR ABDOMEN (KUB) (SINGLE AP VIEW)   Final Result      No bowel obstruction. Moderate retained stool in the colon. XR LUMBAR SPINE (2-3 VIEWS)   Final Result      3 coned-down frontal and lateral intraoperative views demonstrate postoperative changes at L4-5 and L5-S1. No retained surgical instrument is seen. XR LUMBAR SPINE (2-3 VIEWS)   Final Result         Coned-down intraoperative views of the lumbar spine assumes 5 lumbar type vertebral bodies. There is anterior interbody fusion at L4-5 and L5-S1 with radiolucent interbody devices. Fluoroscopy time 1 minute      FLUORO FOR SURGICAL PROCEDURES   Final Result         Coned-down intraoperative views of the lumbar spine assumes 5 lumbar type vertebral bodies. There is anterior interbody fusion at L4-5 and L5-S1 with radiolucent interbody devices.       Fluoroscopy time 1 minute      CT GUIDED STEREOTACTIC LOCALIZATION   Final Result      CT GUIDED STEREOTACTIC LOCALIZATION   Final Result              Assessment/Plan:    Chronic back pain with bilateral feet burning:  Status post L4-S1 ANTERIOR LUMBAR INTERBODY FUSION WITH PEDICLE SCREW FIXATION   Continue as needed pain medication  Management per neurosurgery     Hypoxia:  Likely secondary to receiving fluids pre and postoperatively  Left basilar crackles noted on examination  IV fluids discontinued  Chest x-ray showed no acute disease  Lasix 20 mg IV x1 ordered again  Encourage incentive spirometry  Monitor sats, wean oxygen as tolerated to keep sats more than 90%     Abdominal pain/constipation:  Abdominal x-ray showed no bowel obstruction with moderate retained stool in the colon  Status post enema  Continue bowel regimen    Incisional hernia:  CT abdomen pelvis done today showed incisional hernia with bowel and omental fat  General surgery consulted per neurosurgery     Urinary retention:  Constipation might be contributing towards it as well  Continue Flomax  Voiding trial later this coming week per urology     Hypertension:  Monitor blood pressure, currently on the lower side  Holding losartan, verapamil on metoprolol     Hyperlipidemia:  Continue statin     Depression:  Continue Wellbutrin     Sleep apnea:  Continue CPAP    DVT Prophylaxis: Lovenox  Diet: ADULT DIET;  Regular  Code Status: Full Code    PT/OT Eval Status: Per neurosurgery    Dispo -pending hypoxia resolution, urology/general surgery/neurosurgery Junie Roblero MD

## 2021-08-01 NOTE — CONSULTS
General Surgery   Resident Consult Note    Reason for Consult: Abnormal CT scan    History of Present Illness:   Sydney Son is a 77 y.o. female with Hx of spondylolisthesis of L4-5, L5-S1 status post anterior lumbar interbody fusion surgery with anterior approach done by Dr. Jaun Severe postop day 3. The patient had cough yesterday and feel a pop in her abdomen. Since then she has been complaining of abdominal pain that radiating to her left leg. General surgery was consulted for abnormal CT scan which shows dehiscence of her fascial closure. Past Medical History:        Diagnosis Date    Allergic rhinitis     Anxiety     Atrial tachycardia ([address]     dr Carol Murphy ([facility] cors)    Carpal tunnel syndrome     Cervicalgia     Chronic back pain     low    Depression     GERD (gastroesophageal reflux disease)     has schatzki ring    Headache(784.0)     hemiplegic migraines, improved    Hernia hiatal     Histoplasmosis     Hot flashes     takes wellbutrin    Hyperlipidemia     Hypertension     Obesity     Osteoarthritis     multiple joints    PONV (postoperative nausea and vomiting)     Shingles     nerve pain in her low back persists    Sleep apnea     CPAP set of 4    Tricuspid regurgitation        Past Surgical History:           Procedure Laterality Date    BREAST SURGERY Right     Biopsy w/clip    BUNIONECTOMY      right     COLONOSCOPY  10/2018    fu 10 yrs    KNEE ARTHROSCOPY      KNEE CARTILAGE SURGERY      LOBECTOMY      partial right lung lobectomy    LUMBAR FUSION N/A 7/29/2021    L4-S1 ANTERIOR LUMBAR INTERBODY FUSION WITH PEDICLE SCREW FIXATION performed by Chhaya Dang.  Drew Preston MD at [facility] PARATHYROID GLAND SURGERY      PARTIAL HYSTERECTOMY      ovaries retained    SINUS SURGERY      TOTAL KNEE ARTHROPLASTY Bilateral 03/03/2015       Allergies:  Latex, Reglan [metoclopramide hcl], Univasc [moexipril], Crestor [rosuvastatin], Celecoxib, Keflex [cephalexin], Lipitor, Metoclopramide, Prochlorperazine, Prochlorperazine edisylate, Sulfa antibiotics, and Vioxx    Medications:   Home Meds  No current facility-administered medications on file prior to encounter. Current Outpatient Medications on File Prior to Encounter   Medication Sig Dispense Refill    metoprolol succinate (TOPROL XL) 25 MG extended release tablet Take 1 tablet by mouth daily 90 tablet 0    buPROPion (WELLBUTRIN XL) 150 MG extended release tablet Take 1 tablet by mouth every morning 30 tablet 3    loratadine (CLARITIN) 10 MG tablet TAKE 1 TABLET BY MOUTH EVERY DAY 30 tablet 2    pantoprazole (PROTONIX) 40 MG tablet TAKE 1 TABLET BY MOUTH TWICE A  tablet 2    losartan (COZAAR) 50 MG tablet Take 1 tablet by mouth daily 90 tablet 3    verapamil (CALAN SR) 120 MG extended release tablet Take 1 tablet by mouth nightly Do not crush or chew. 90 tablet 3    pravastatin (PRAVACHOL) 20 MG tablet Take 1 tablet by mouth daily 90 tablet 3    acetaminophen (TYLENOL) 500 MG tablet Take 500 mg by mouth every 6 hours as needed for Pain      Multiple Vitamins-Minerals (CENTRUM SILVER PO) Take 1 tablet by mouth daily       gabapentin (NEURONTIN) 300 MG capsule TAKE 1 CAPSULE BY MOUTH 3 TIMES DAILY FOR 30 DAYS.  90 capsule 0    vitamin B-12 (CYANOCOBALAMIN) 1000 MCG tablet       Turmeric 500 MG CAPS       aspirin EC 81 MG EC tablet Take 1 tablet by mouth daily 90 tablet 1    Omega-3 Fatty Acids (FISH OIL) 1200 MG CAPS Take 1,200 mg by mouth 2 times daily         Current Meds  SMOG Enema, Once  HYDROmorphone (DILAUDID) injection 0.5 mg, Q12H PRN  lactated ringers infusion, Continuous  meperidine (DEMEROL) injection 12.5 mg, Q5 Min PRN  HYDROmorphone (DILAUDID) injection 0.25 mg, Q5 Min PRN  HYDROmorphone (DILAUDID) injection 0.5 mg, Q5 Min PRN  HYDROcodone-acetaminophen (NORCO) 5-325 MG per tablet 1 tablet, Once PRN  ondansetron (ZOFRAN) injection 4 mg, Once PRN  0.9 % sodium chloride bolus, Once of Onset    High Blood Pressure Mother     Heart Disease Mother     Cancer Mother         vaginal    High Blood Pressure Father     Heart Disease Father     Heart Disease Sister     Colon Cancer Sister     Heart Disease Brother     High Blood Pressure Brother     Cancer Brother         oral    Breast Cancer Sister     COPD Sister     Heart Disease Brother     Hearing Loss Brother         chf and transplant    High Blood Pressure Brother        Social History:   TOBACCO:   reports that she quit smoking about 31 years ago. She has a 5.00 pack-year smoking history. She has never used smokeless tobacco.  ETOH:   reports no history of alcohol use. DRUGS:   reports no history of drug use. ROS: A 14 point review of systems was conducted, significant findings as noted in HPI. All other systems negative. Physical exam:    Vitals:    08/01/21 0300 08/01/21 0640 08/01/21 1057 08/01/21 1420   BP: 118/62 109/66 (!) 140/68 122/70   Pulse: 69 72 75 77   Resp: 18 18 16 16   Temp: 97.2 °F (36.2 °C) 98.6 °F (37 °C) 98.4 °F (36.9 °C) 97.9 °F (36.6 °C)   TempSrc: Oral Oral Oral Oral   SpO2: 98% 93% 92% 93%   Weight:       Height:           General appearance: alert, no acute distress, grooming appropriate  Eyes: PERRLA, no scleral icterus  Neck: trachea midline, no JVD, no lymphadenopathy  Chest/Lungs: On room air, normal effort  Cardiovascular: RRR  Abdomen: soft, tender to palpation in the infraumbilical region, mildly distended, no guarding/rigidity, incision is clean dry and intact with skin glue.   : Deferred  Skin: warm and dry, no rashes  Extremities: no edema, no cyanosis  Neuro: A&Ox3, no focal deficits, sensation intact    Labs:    CBC:   Recent Labs     07/30/21  0530 08/01/21  1107   WBC 10.9 7.0   HGB 12.9 11.5*   HCT 36.4 33.0*   MCV 90.5 91.8    187     BMP:   Recent Labs     08/01/21  1109   *   K 3.6      CO2 24   BUN 6*   CREATININE <0.5*     PT/INR: No results for input(s): PROTIME, INR in the last 72 hours. APTT: No results for input(s): APTT in the last 72 hours. Liver Profile:  Lab Results   Component Value Date    AST 21 07/16/2021    ALT 23 07/16/2021    BILIDIR <0.2 03/24/2021    BILITOT 0.4 07/16/2021    ALKPHOS 100 07/16/2021     Lab Results   Component Value Date    CHOL 152 07/26/2021    HDL 50 07/26/2021    HDL 69 05/22/2012    TRIG 141 07/26/2021     UA:   Lab Results   Component Value Date    NITRITE neg 06/16/2017    COLORU Yellow 07/31/2021    PHUR 6.0 07/31/2021    WBCUA None seen 07/31/2021    RBCUA 0-2 07/31/2021    CLARITYU Clear 07/31/2021    SPECGRAV 1.010 07/31/2021    LEUKOCYTESUR Negative 07/31/2021    UROBILINOGEN 0.2 07/31/2021    BILIRUBINUR Negative 07/31/2021    BILIRUBINUR neg 06/16/2017    BLOODU TRACE-LYSED 07/31/2021    GLUCOSEU Negative 07/31/2021    AMORPHOUS 1+ 07/31/2021       Imaging:   CT ABDOMEN PELVIS W IV CONTRAST Additional Contrast? None   Final Result      Interval development of bibasilar atelectatic changes. There is a midline anterior abdominal wall hernia present with herniation of bowel loops and large amount of omental fat. This suggests incisional hernia. Foci of extraluminal air or gas are seen in the left lower quadrant with associated stranding. There is also free fluid noted. This may be due to recent postsurgical changes and should be correlated with patient's history. Alternatively this may    represent infectious process or possibly ruptured viscus. Small amounts of extraluminal air are also seen within the soft tissues of the anterior abdomen and in the region of the hernia sac which may  be related to recent surgery but the possibility of infectious process or free foci of air within the    peritoneal cavity lying within the hernia are considerations. XR CHEST PORTABLE   Final Result      No acute disease         XR ABDOMEN (KUB) (SINGLE AP VIEW)   Final Result      No bowel obstruction.  Moderate retained stool in the colon. XR LUMBAR SPINE (2-3 VIEWS)   Final Result      3 coned-down frontal and lateral intraoperative views demonstrate postoperative changes at L4-5 and L5-S1. No retained surgical instrument is seen. XR LUMBAR SPINE (2-3 VIEWS)   Final Result         Coned-down intraoperative views of the lumbar spine assumes 5 lumbar type vertebral bodies. There is anterior interbody fusion at L4-5 and L5-S1 with radiolucent interbody devices. Fluoroscopy time 1 minute      FLUORO FOR SURGICAL PROCEDURES   Final Result         Coned-down intraoperative views of the lumbar spine assumes 5 lumbar type vertebral bodies. There is anterior interbody fusion at L4-5 and L5-S1 with radiolucent interbody devices. Fluoroscopy time 1 minute      CT GUIDED STEREOTACTIC LOCALIZATION   Final Result      CT GUIDED STEREOTACTIC LOCALIZATION   Final Result             Assessment/Plan: This is a 77 y.o. female with Hx of ALIF with abdominal pain and CT scan finding consistent with fascial dehiscence. This patient will need to take back surgery to close this fascial defect. This case was discussed with Dr. Felicita Casper and her original surgeon who will take the patient for closure. General surgery to be available if patient need complex fascial closure possible mesh placement.       Pao Leger MD  General Surgery Resident  08/01/21  6:33 PM  [phone]

## 2021-08-01 NOTE — PROGRESS NOTES
Patient now resting more comfortably 91-93% on 5L NC. HR remains 120bpm. Dr. Ayesha Scanlon notified, per [facility] all patients BP meds where held last night and she did not receive any does of BP meds today. Verbal order received for 5mg IV labetolol and to repeat x 1 if needed.

## 2021-08-02 ENCOUNTER — APPOINTMENT (OUTPATIENT)
Dept: GENERAL RADIOLOGY | Age: 66
DRG: 453 | End: 2021-08-02
Attending: NEUROLOGICAL SURGERY
Payer: MEDICARE

## 2021-08-02 PROCEDURE — 6370000000 HC RX 637 (ALT 250 FOR IP): Performed by: NURSE PRACTITIONER

## 2021-08-02 PROCEDURE — 97110 THERAPEUTIC EXERCISES: CPT

## 2021-08-02 PROCEDURE — 97535 SELF CARE MNGMENT TRAINING: CPT

## 2021-08-02 PROCEDURE — 99231 SBSQ HOSP IP/OBS SF/LOW 25: CPT | Performed by: SURGERY

## 2021-08-02 PROCEDURE — 97530 THERAPEUTIC ACTIVITIES: CPT

## 2021-08-02 PROCEDURE — 6360000002 HC RX W HCPCS: Performed by: INTERNAL MEDICINE

## 2021-08-02 PROCEDURE — 6370000000 HC RX 637 (ALT 250 FOR IP): Performed by: NEUROLOGICAL SURGERY

## 2021-08-02 PROCEDURE — 6370000000 HC RX 637 (ALT 250 FOR IP): Performed by: INTERNAL MEDICINE

## 2021-08-02 PROCEDURE — 2580000003 HC RX 258: Performed by: ANESTHESIOLOGY

## 2021-08-02 PROCEDURE — 97116 GAIT TRAINING THERAPY: CPT

## 2021-08-02 PROCEDURE — 2500000003 HC RX 250 WO HCPCS: Performed by: ANESTHESIOLOGY

## 2021-08-02 PROCEDURE — 6360000002 HC RX W HCPCS: Performed by: NEUROLOGICAL SURGERY

## 2021-08-02 PROCEDURE — 6360000002 HC RX W HCPCS: Performed by: PHYSICIAN ASSISTANT

## 2021-08-02 PROCEDURE — 6370000000 HC RX 637 (ALT 250 FOR IP): Performed by: PHYSICIAN ASSISTANT

## 2021-08-02 PROCEDURE — 74018 RADEX ABDOMEN 1 VIEW: CPT

## 2021-08-02 PROCEDURE — 1200000000 HC SEMI PRIVATE

## 2021-08-02 RX ORDER — ONDANSETRON 2 MG/ML
4 INJECTION INTRAMUSCULAR; INTRAVENOUS EVERY 4 HOURS PRN
Status: DISCONTINUED | OUTPATIENT
Start: 2021-08-02 | End: 2021-08-13 | Stop reason: HOSPADM

## 2021-08-02 RX ORDER — FLUTICASONE PROPIONATE 50 MCG
1 SPRAY, SUSPENSION (ML) NASAL DAILY
Status: DISCONTINUED | OUTPATIENT
Start: 2021-08-02 | End: 2021-08-13 | Stop reason: HOSPADM

## 2021-08-02 RX ORDER — PROMETHAZINE HYDROCHLORIDE 25 MG/ML
25 INJECTION, SOLUTION INTRAMUSCULAR; INTRAVENOUS EVERY 4 HOURS PRN
Status: DISCONTINUED | OUTPATIENT
Start: 2021-08-02 | End: 2021-08-03

## 2021-08-02 RX ORDER — PANTOPRAZOLE SODIUM 40 MG/1
40 TABLET, DELAYED RELEASE ORAL
Status: DISCONTINUED | OUTPATIENT
Start: 2021-08-02 | End: 2021-08-03

## 2021-08-02 RX ORDER — GUAIFENESIN/DEXTROMETHORPHAN 100-10MG/5
5 SYRUP ORAL EVERY 4 HOURS PRN
Status: DISCONTINUED | OUTPATIENT
Start: 2021-08-02 | End: 2021-08-13 | Stop reason: HOSPADM

## 2021-08-02 RX ADMIN — TAMSULOSIN HYDROCHLORIDE 0.4 MG: 0.4 CAPSULE ORAL at 08:48

## 2021-08-02 RX ADMIN — HYDROMORPHONE HYDROCHLORIDE 0.5 MG: 1 INJECTION, SOLUTION INTRAMUSCULAR; INTRAVENOUS; SUBCUTANEOUS at 02:27

## 2021-08-02 RX ADMIN — ACETAMINOPHEN 1000 MG: 500 TABLET, COATED ORAL at 01:15

## 2021-08-02 RX ADMIN — DIAZEPAM 5 MG: 5 TABLET ORAL at 05:32

## 2021-08-02 RX ADMIN — PROMETHAZINE HYDROCHLORIDE 12.5 MG: 12.5 TABLET ORAL at 18:59

## 2021-08-02 RX ADMIN — LABETALOL HYDROCHLORIDE 5 MG: 5 INJECTION, SOLUTION INTRAVENOUS at 21:51

## 2021-08-02 RX ADMIN — BENZOCAINE AND MENTHOL 1 LOZENGE: 15; 3.6 LOZENGE ORAL at 15:57

## 2021-08-02 RX ADMIN — OXYCODONE 10 MG: 5 TABLET ORAL at 05:32

## 2021-08-02 RX ADMIN — SODIUM CHLORIDE, POTASSIUM CHLORIDE, SODIUM LACTATE AND CALCIUM CHLORIDE: 600; 310; 30; 20 INJECTION, SOLUTION INTRAVENOUS at 18:08

## 2021-08-02 RX ADMIN — OXYCODONE 10 MG: 5 TABLET ORAL at 01:14

## 2021-08-02 RX ADMIN — CETIRIZINE HYDROCHLORIDE 10 MG: 10 TABLET, FILM COATED ORAL at 08:45

## 2021-08-02 RX ADMIN — ANTACID TABLETS 500 MG: 500 TABLET, CHEWABLE ORAL at 18:06

## 2021-08-02 RX ADMIN — ACETAMINOPHEN 1000 MG: 500 TABLET, COATED ORAL at 08:43

## 2021-08-02 RX ADMIN — ONDANSETRON 4 MG: 2 INJECTION INTRAMUSCULAR; INTRAVENOUS at 15:55

## 2021-08-02 RX ADMIN — BUPROPION HYDROCHLORIDE 150 MG: 150 TABLET, EXTENDED RELEASE ORAL at 08:44

## 2021-08-02 RX ADMIN — FLUTICASONE PROPIONATE 1 SPRAY: 50 SPRAY, METERED NASAL at 12:07

## 2021-08-02 RX ADMIN — OXYCODONE 10 MG: 5 TABLET ORAL at 11:25

## 2021-08-02 RX ADMIN — PANTOPRAZOLE SODIUM 40 MG: 40 TABLET, DELAYED RELEASE ORAL at 17:45

## 2021-08-02 RX ADMIN — PROMETHAZINE HYDROCHLORIDE 12.5 MG: 12.5 TABLET ORAL at 09:05

## 2021-08-02 RX ADMIN — ONDANSETRON 4 MG: 2 INJECTION INTRAMUSCULAR; INTRAVENOUS at 23:36

## 2021-08-02 RX ADMIN — POLYETHYLENE GLYCOL 3350 17 G: 17 POWDER, FOR SOLUTION ORAL at 08:47

## 2021-08-02 RX ADMIN — PANTOPRAZOLE SODIUM 40 MG: 40 TABLET, DELAYED RELEASE ORAL at 08:45

## 2021-08-02 RX ADMIN — DIAZEPAM 5 MG: 5 TABLET ORAL at 17:13

## 2021-08-02 RX ADMIN — ACETAMINOPHEN 1000 MG: 500 TABLET, COATED ORAL at 13:05

## 2021-08-02 RX ADMIN — ANTACID TABLETS 500 MG: 500 TABLET, CHEWABLE ORAL at 11:25

## 2021-08-02 RX ADMIN — HYDROMORPHONE HYDROCHLORIDE 0.5 MG: 1 INJECTION, SOLUTION INTRAMUSCULAR; INTRAVENOUS; SUBCUTANEOUS at 20:43

## 2021-08-02 RX ADMIN — DOCUSATE SODIUM 50 MG AND SENNOSIDES 8.6 MG 2 TABLET: 8.6; 5 TABLET, FILM COATED ORAL at 08:43

## 2021-08-02 RX ADMIN — ENOXAPARIN SODIUM 40 MG: 40 INJECTION SUBCUTANEOUS at 08:45

## 2021-08-02 ASSESSMENT — PAIN SCALES - GENERAL
PAINLEVEL_OUTOF10: 7
PAINLEVEL_OUTOF10: 7
PAINLEVEL_OUTOF10: 6
PAINLEVEL_OUTOF10: 8
PAINLEVEL_OUTOF10: 0
PAINLEVEL_OUTOF10: 9
PAINLEVEL_OUTOF10: 8
PAINLEVEL_OUTOF10: 7
PAINLEVEL_OUTOF10: 9
PAINLEVEL_OUTOF10: 6
PAINLEVEL_OUTOF10: 6

## 2021-08-02 ASSESSMENT — PAIN DESCRIPTION - PAIN TYPE
TYPE: SURGICAL PAIN

## 2021-08-02 ASSESSMENT — PAIN DESCRIPTION - PROGRESSION
CLINICAL_PROGRESSION: NOT CHANGED

## 2021-08-02 ASSESSMENT — PAIN DESCRIPTION - DESCRIPTORS
DESCRIPTORS: ACHING
DESCRIPTORS: ACHING;SORE

## 2021-08-02 ASSESSMENT — PAIN DESCRIPTION - ONSET
ONSET: ON-GOING

## 2021-08-02 ASSESSMENT — PAIN DESCRIPTION - ORIENTATION
ORIENTATION: MID

## 2021-08-02 ASSESSMENT — PAIN DESCRIPTION - DIRECTION
RADIATING_TOWARDS: LEGS
RADIATING_TOWARDS: LEGS

## 2021-08-02 ASSESSMENT — PAIN DESCRIPTION - LOCATION
LOCATION: ABDOMEN;BACK
LOCATION: ABDOMEN;BACK
LOCATION: ABDOMEN
LOCATION: ABDOMEN
LOCATION: ABDOMEN;BACK

## 2021-08-02 ASSESSMENT — PAIN - FUNCTIONAL ASSESSMENT
PAIN_FUNCTIONAL_ASSESSMENT: ACTIVITIES ARE NOT PREVENTED
PAIN_FUNCTIONAL_ASSESSMENT: PREVENTS OR INTERFERES SOME ACTIVE ACTIVITIES AND ADLS
PAIN_FUNCTIONAL_ASSESSMENT: PREVENTS OR INTERFERES SOME ACTIVE ACTIVITIES AND ADLS
PAIN_FUNCTIONAL_ASSESSMENT: ACTIVITIES ARE NOT PREVENTED
PAIN_FUNCTIONAL_ASSESSMENT: PREVENTS OR INTERFERES SOME ACTIVE ACTIVITIES AND ADLS

## 2021-08-02 ASSESSMENT — PAIN DESCRIPTION - FREQUENCY
FREQUENCY: CONTINUOUS

## 2021-08-02 NOTE — PROGRESS NOTES
Admitted back to 5 tower. VSS. A/ox4, drowsy. abd binder in place. Vitals:    08/01/21 2030   BP: (!) 144/73   Pulse: 105   Resp: 16   Temp: 98.5 °F (36.9 °C)   SpO2: 91%   sister bedside for 1 hour post op. Bed alarm set. Call light in reach. Will continue to monitor.

## 2021-08-02 NOTE — PROGRESS NOTES
Surgery Daily Progress Note      CC: anterior body fascial dehiscence s/p repair    SUBJECTIVE:  Patient rested well overnight. Patient went to OR yesterday for fascial closure with vascular surgery. ROS:   A 14 point review of systems was conducted, significant findings as noted above. All other systems negative. OBJECTIVE:    PHYSICAL EXAM:  Vitals:    08/01/21 2006 08/01/21 2030 08/02/21 0030 08/02/21 0359   BP: 123/77 (!) 144/73 (!) 145/79 127/72   Pulse: 101 105 89 77   Resp: 18 16 16 16   Temp: 99.1 °F (37.3 °C) 98.5 °F (36.9 °C) 98.5 °F (36.9 °C) 98.1 °F (36.7 °C)   TempSrc: Temporal Oral Oral Oral   SpO2: 92% 91% 94%    Weight:       Height:           General appearance: alert, no acute distress, grooming appropriate  Eyes: PERRLA, no scleral icterus  Neck: trachea midline, no JVD, no lymphadenopathy  Chest/Lungs: On room air, normal effort  Cardiovascular: RRR  Abdomen: soft, tender to palpation in the infraumbilical region, mildly distended, no guarding/rigidity, incision is clean dry and intact with prineo  : Deferred  Skin: warm and dry, no rashes  Extremities: no edema, no cyanosis  Neuro: A&Ox3, no focal deficits, sensation intact    ASSESSMENT & PLAN:   Kelby Anguiano is a 77 y.o. female This is a 77 y.o. female with Hx of ALIF with abdominal pain and CT scan finding consistent with fascial dehiscence.  Patient was taken to OR for repair of anterior body fascial wound dehiscence on 8/1.    - incision is well approximated; general surgery was not needed for complex closure  - medical management per primary team.    Vinnie Tony DO, PGY-1  08/02/21  6:29 AM  [phone]

## 2021-08-02 NOTE — PROGRESS NOTES
NEUROSURGERY POST-OP PROGRESS NOTE    Patient Name: Nedra Johnston YOB: 1955   Sex: Female Age: 77 yrs     Medical Record Number: [id number] Laurence Number: [de-identified]   Room Number: 8397/0336-08 Hospital Day: Hospital Day: 5     Interval History:  Post-operative Day# 1 s/p Procedure(s) (LRB):  REPAIR OF WOUND DEHISCENCE (N/A) POD #3 s/p Procedure(s) (LRB):  L4-S1 ANTERIOR LUMBAR INTERBODY FUSION WITH PEDICLE SCREW FIXATION (N/A)    Subjective: Pt c/o of incisional belly pain. Objective:    VITAL SIGNS   /77   Pulse 75   Temp 98.2 °F (36.8 °C) (Oral)   Resp 17   Ht 5' 3\" (1.6 m)   Wt 259 lb (117.5 kg)   LMP  (LMP Unknown)   SpO2 93%   BMI 45.88 kg/m²    Height Height: 5' 3\" (160 cm)   Weight Weight: 259 lb (117.5 kg)        Allergies Allergies   Allergen Reactions    Latex Rash    Reglan [Metoclopramide Hcl] Rash    Univasc [Moexipril] Other (See Comments)     unsure    Crestor [Rosuvastatin]      myalgia    Celecoxib Palpitations    Keflex [Cephalexin] Diarrhea    Lipitor Other (See Comments)     myalgia    Metoclopramide Anxiety    Prochlorperazine Anxiety    Prochlorperazine Edisylate     Sulfa Antibiotics Nausea And Vomiting    Vioxx       NPO Status Diet NPO   Isolation No active isolations     LABS   Basic Metabolic Profile Recent Labs     08/01/21  1109   *      CO2 24   BUN 6*   CREATININE <0.5*   GLUCOSE 108*      Complete Blood Count Recent Labs     08/01/21  1107   WBC 7.0   RBC 3.59*      Coagulation Studies No results for input(s): PTT, INR in the last 72 hours.     Invalid input(s): PLATELETS, PROA, PT, PTTA     MEDICATIONS   Inpatient Medications     SMOG Enema, 330 mL, Rectal, Once    pravastatin, 20 mg, Oral, Nightly    tamsulosin, 0.4 mg, Oral, Daily    acetaminophen, 1,000 mg, Oral, 4 times per day    lidocaine, 1 patch, Transdermal, Daily    sennosides-docusate sodium, 2 tablet, Oral, BID    polyethylene glycol, 17 g, Oral, Daily    scopolamine, 1 patch, Transdermal, Q72H    [Held by provider] metoprolol succinate, 25 mg, Oral, Daily    buPROPion, 150 mg, Oral, QAM    cetirizine, 10 mg, Oral, Daily    [Held by provider] losartan, 50 mg, Oral, Daily    pantoprazole, 40 mg, Oral, BID    [Held by provider] verapamil, 120 mg, Oral, Nightly    sodium chloride flush, 5-40 mL, Intravenous, 2 times per day    enoxaparin, 40 mg, Subcutaneous, Daily   Infusions    lactated ringers 100 mL/hr at 08/01/21 2000    sodium chloride        Antibiotics   Recent Abx Admin                   clindamycin (CLEOCIN) IM injection 900 mg/6mL (mg) 900 mg Given 08/01/21 0117                 Neurologic Exam:  Mental status: awake and alert and oriented x4        Musculoskeletal:   Gait: Not tested   Tone: normal  Sensory: intact to all extremities  Motor strength:    Right  Left    Right  Left    Deltoid  5 5  Hip Flex  5 5   Biceps  5 5  Knee Extensors  5 5   Triceps  5 5  Knee Flexors  5 5   Wrist Ext  5 5  Ankle Dorsiflex. 5 5   Wrist Flex  5 5  Ankle Plantarflex. 5 5   Handgrip  5 5  Ext Bib Longus  5 5   Thumb Ext  5 5         Incision: intact, clean and dry  Drain:    Respiratory:  Unlabored respiratory pattern    Abdomen:   Soft, ND       Cardiovascular:  Warm, well perfused    Assessment   Patient is a 76 yo F s/pL4-S1 ANTERIOR LUMBAR INTERBODY FUSION WITH PEDICLE SCREW FIXATION (N/A)   per Dr. Catrina Garza  Procedure(s) (LRB):  REPAIR OF WOUND DEHISCENCE (N/A) per     Plan:  1. Neurologic exam frequency:q4  2. Mobility:PT/OT eval  3. DVT Prophylaxis: SCDs and lovenox   4. Bowel Regimen: glycolax and senna  5. Pain control:she and robaxin  6. Incisional Care:open to air  7. Abdominal binder at all times  Dispo Planning: inpt.  Pt will go to ECF at time of discharge    Patient was seen with  [facility] who agrees with above assessment and plan. Electronically signed by:  LISA Mckinnon CNP, 8/2/2021 7:56 AM   Neurosurgery Nurse Practitioner  [phone]

## 2021-08-02 NOTE — PROGRESS NOTES
4 Eyes Admission Assessment     I agree as the admission nurse that 2 RN's have performed a thorough Head to Toe Skin Assessment on the patient. ALL assessment sites listed below have been assessed on admission. Areas assessed by both nurses:   [x]   Head, Face, and Ears   [x]   Shoulders, Back, and Chest  [x]   Arms, Elbows, and Hands   [x]   Coccyx, Sacrum, and Ischium  [x]   Legs, Feet, and Heels        Does the Patient have Skin Breakdown? No new breakdown. Exception sx sites.  Scattered bruising noted        Rudy Prevention initiated:  No   Wound Care Orders initiated:  No      C nurse consulted for Pressure Injury (Stage 3,4, Unstageable, DTI, NWPT, and Complex wounds) or Rudy score 18 or lower:  No      Nurse 1 eSignature: Electronically signed by Cece Ca RN on 8/2/21 at 1:19 AM EDT    **SHARE this note so that the co-signing nurse is able to place an eSignature**    Nurse 2 eSignature: Electronically signed by Narda Shaffer RN on 8/2/21 at 4:54 AM EDT

## 2021-08-02 NOTE — PROGRESS NOTES
Hospitalist Progress Note      PCP: Raghu Osborn MD    Date of Admission: 7/29/2021    Chief Complaint: Chronic lumbar pain and bilateral foot burning    Hospital Course: S/p L4-S1 ANTERIOR LUMBAR INTERBODY FUSION WITH PEDICLE SCREW FIXATION on 07/30/2021. Patient was unable to void. Was complaining of abdominal pain. Abdominal x-ray was obtained and showed moderate amount of stool. Bladder scan showed 750 ml. Patient was straight cathed. UA was negative for infection. Patient sats dropped to 86% and was placed on 2 L of oxygen. We are asked to see/evaluate by Soumya Su. Arnol Barakat MD for medical management of chronic conditions, urinary retention and hypoxia    Subjective:      S/p emergent surgery yesterday for fascial dehiscence after coughing speel. Pt reports generalized weakness today and has post nasal drainage from her chronic seasonal allergies. Usually takes claritin at home. Denied any fever, chills, N/V.        Medications:  Reviewed    Infusion Medications    lactated ringers 100 mL/hr at 08/01/21 2000    sodium chloride       Scheduled Medications    SMOG Enema  330 mL Rectal Once    pravastatin  20 mg Oral Nightly    tamsulosin  0.4 mg Oral Daily    acetaminophen  1,000 mg Oral 4 times per day    lidocaine  1 patch Transdermal Daily    sennosides-docusate sodium  2 tablet Oral BID    polyethylene glycol  17 g Oral Daily    scopolamine  1 patch Transdermal Q72H    [Held by provider] metoprolol succinate  25 mg Oral Daily    buPROPion  150 mg Oral QAM    cetirizine  10 mg Oral Daily    [Held by provider] losartan  50 mg Oral Daily    pantoprazole  40 mg Oral BID    [Held by provider] verapamil  120 mg Oral Nightly    sodium chloride flush  5-40 mL Intravenous 2 times per day    enoxaparin  40 mg Subcutaneous Daily     PRN Meds: guaiFENesin-dextromethorphan, HYDROmorphone, labetalol, oxyCODONE **OR** oxyCODONE, calcium carbonate, benzocaine-menthol, sodium chloride flush, sodium chloride, promethazine **OR** ondansetron, diazePAM      Intake/Output Summary (Last 24 hours) at 8/2/2021 1020  Last data filed at 8/2/2021 0122  Gross per 24 hour   Intake 1628 ml   Output 3725 ml   Net -2097 ml       Physical Exam Performed:    /77   Pulse 75   Temp 98.2 °F (36.8 °C) (Oral)   Resp 17   Ht 5' 3\" (1.6 m)   Wt 259 lb (117.5 kg)   LMP  (LMP Unknown)   SpO2 93%   BMI 45.88 kg/m²     General appearance: No apparent distress, appears stated age and cooperative. Morbidly obese  HEENT: Normal cephalic, atraumatic without obvious deformity. Pupils equal, round, and reactive to light. Extra ocular muscles intact. Conjunctivae/corneas clear. Neck: Supple, with full range of motion. No jugular venous distention. Trachea midline. Respiratory:  Normal respiratory effort. Bibasilar Rales cardiovascular: Regular rate and rhythm with normal S1/S2 without murmurs, rubs or gallops. Abdomen: Soft, non-tender, non-distended with normal bowel sounds. Suprapubic incision site is clean with no overt discharge. Abdominal binder in place  Musculoskeletal: No clubbing, cyanosis or edema bilaterally. Skin: Warm and dry  Neurologic:  Alert, speech clear with no overt  Psychiatric: Alert and oriented, thought content appropriate, normal insight  Capillary Refill: Brisk,< 3 seconds   Peripheral Pulses: +2 palpable, equal bilaterally       Labs:   Recent Labs     08/01/21  1107   WBC 7.0   HGB 11.5*   HCT 33.0*        Recent Labs     08/01/21  1109   *   K 3.6      CO2 24   BUN 6*   CREATININE <0.5*   CALCIUM 8.5     No results for input(s): AST, ALT, BILIDIR, BILITOT, ALKPHOS in the last 72 hours. No results for input(s): INR in the last 72 hours. No results for input(s): [place] in the last 72 hours.     Urinalysis:      Lab Results   Component Value Date    NITRU Negative 07/31/2021    WBCUA None seen 07/31/2021    RBCUA 0-2 07/31/2021    BLOODU TRACE-LYSED 07/31/2021    SPECGRAV 1.010 07/31/2021    GLUCOSEU Negative 07/31/2021       Radiology:  CT ABDOMEN PELVIS W IV CONTRAST Additional Contrast? None   Final Result      Interval development of bibasilar atelectatic changes. There is a midline anterior abdominal wall hernia present with herniation of bowel loops and large amount of omental fat. This suggests incisional hernia. Foci of extraluminal air or gas are seen in the left lower quadrant with associated stranding. There is also free fluid noted. This may be due to recent postsurgical changes and should be correlated with patient's history. Alternatively this may    represent infectious process or possibly ruptured viscus. Small amounts of extraluminal air are also seen within the soft tissues of the anterior abdomen and in the region of the hernia sac which may  be related to recent surgery but the possibility of infectious process or free foci of air within the    peritoneal cavity lying within the hernia are considerations. XR CHEST PORTABLE   Final Result      No acute disease         XR ABDOMEN (KUB) (SINGLE AP VIEW)   Final Result      No bowel obstruction. Moderate retained stool in the colon. XR LUMBAR SPINE (2-3 VIEWS)   Final Result      3 coned-down frontal and lateral intraoperative views demonstrate postoperative changes at L4-5 and L5-S1. No retained surgical instrument is seen. XR LUMBAR SPINE (2-3 VIEWS)   Final Result         Coned-down intraoperative views of the lumbar spine assumes 5 lumbar type vertebral bodies. There is anterior interbody fusion at L4-5 and L5-S1 with radiolucent interbody devices. Fluoroscopy time 1 minute      FLUORO FOR SURGICAL PROCEDURES   Final Result         Coned-down intraoperative views of the lumbar spine assumes 5 lumbar type vertebral bodies. There is anterior interbody fusion at L4-5 and L5-S1 with radiolucent interbody devices.       Fluoroscopy time 1 minute CT GUIDED STEREOTACTIC LOCALIZATION   Final Result      CT GUIDED STEREOTACTIC LOCALIZATION   Final Result              Assessment/Plan:    Chronic back pain with bilateral feet burning:  Status post L4-S1 ANTERIOR LUMBAR INTERBODY FUSION WITH PEDICLE SCREW FIXATION   Continue as needed pain medication  Management per neurosurgery     Hypoxia:Likely secondary to receiving fluids pre and postoperatively. CXR non acute. S/p IV lasix with improvement. Encourage incentive spirometry  Monitor sats, wean oxygen as tolerated to keep sats more than 90%.    Abdominal pain/constipation with incisional hernia: dev fascial dehiscence after coughing spell on 8/1. CT abd/pelvis showed incisional hernia with bowel and omental fat. Underwent primary repair and closure of abdominal wall fascial wound on 1/8. Continue bowel regimen, mgt per surgical team    Urinary retention:Constipation might be contributing towards it as well. Continue Flomax. Voiding trial likely this week when more ambulatory  per urology     Hypertension: Bp was borderline low, meds held.  Can be resumed if she becomes hypertensive        Hyperlipidemia:  Continue statin     Depression:  Continue Wellbutrin     Sleep apnea:  Continue CPAP    DVT Prophylaxis: Lovenox  Diet: Diet NPO  Code Status: Full Code    PT/OT Eval Status: Per neurosurgery    Dispo -pending clinical course, possibly in 2-3 days    Lyssa Verduzco MD

## 2021-08-02 NOTE — PROGRESS NOTES
Bedside report done with Jin Chapa and Valorie Martin. Pt in the chair. Pt dry heaving and bringing up bile and mucus. Pt anxious and cooperative. Pt to the bedside commode and had a soft brown BM. Pt in bed. SHEYLA elevated d/t dry heaving. Will recheck. Bed alarm on, wheels locked, bed in lowest position, side rails up 2/4, nonskid socks on, call light and bedside table in reach. Will continue to monitor.

## 2021-08-02 NOTE — PLAN OF CARE
Problem: Infection:  Goal: Will remain free from infection  Description: Will remain free from infection  Outcome: Ongoing    Problem: Pain:  Goal: Patient's pain/discomfort is manageable  Description: Patient's pain/discomfort is manageable  Outcome: Ongoing    Problem: Falls - Risk of:  Goal: Will remain free from falls  Description: Will remain free from falls  8/2/2021 0820 by Brennon Adams RN  Outcome: Ongoing   Fall risk precaution in place. Bed is locked and in lowest position. 2/4 side rails are up. Call light with in reach. Fall risk bracelet in place, non slip socks on.frequent check on patient. free from falls at this time. will continue to monitor.

## 2021-08-02 NOTE — PROGRESS NOTES
Pt c/o of nausea. md notify. Medication given per [facility]. Will continue to monitor.

## 2021-08-02 NOTE — PLAN OF CARE
Problem: Pain:  Goal: Control of acute pain  Description: Control of acute pain  Outcome: Ongoing   Pain 9/10 prior to interventions per orders. 6-7/10 after interventions per orders. PO and iv utilized. Problem: Falls - Risk of:  Goal: Will remain free from falls  Description: Will remain free from falls  Outcome: Met This Shift   Pt free from injury this shift and free of falls. 3/4 rails up on bed and bed is in the lowest position. Wheels locked and bed alarm set. Socks on pt and ID bands on pt. Call light in reach of pt and pt educated to call out to get up x2 walker GB. Will continue to monitor for safety.

## 2021-08-02 NOTE — PROGRESS NOTES
Occupational Therapy  Facility/Department: [facility] 5T ORTHO/NEURO  Daily Treatment Note  NAME: Barbra Mcclellan  : 1955  MRN: [id number]    Date of Service: 2021    Discharge Recommendations:    Barbra Mcclellan scored a 17/24 on the AM-PAC ADL Inpatient form. Current research shows that an AM-PAC score of 17 or less is typically not associated with a discharge to the patient's home setting. Based on the patient's AM-PAC score and their current ADL deficits, it is recommended that the patient have 3-5 sessions per week of Occupational Therapy at d/c to increase the patient's independence. Please see assessment section for further patient specific details. If patient discharges prior to next session this note will serve as a discharge summary. Please see below for the latest assessment towards goals. OT Equipment Recommendations  Equipment Needed: No    Assessment   Performance deficits / Impairments: Decreased functional mobility ; Decreased ADL status; Decreased endurance;Decreased high-level IADLs;Decreased balance  Assessment: Patient continues to be limited by significant pain during session. Demonstrate good progress and ability to complete LB dressing with use of AE with min-mod v/c for technique and increased time to complete. Would benefit from continued IP OT services after d/c, planning to d/c to SNF. Treatment Diagnosis: Decreased activity tolerance, impaired ADLs and mobility  Prognosis: Good  OT Education: OT Role;Plan of Care;ADL Adaptive Strategies;Transfer Training  Patient Education: verb understanding  REQUIRES OT FOLLOW UP: Yes  Activity Tolerance  Activity Tolerance: Patient Tolerated treatment well  Safety Devices  Safety Devices in place: Yes  Type of devices: Call light within reach; Chair alarm in place;Nurse notified; Left in chair         Patient Diagnosis(es): There were no encounter diagnoses.       has a past medical history of Allergic rhinitis, Anxiety, Atrial tachycardia ([facility] [address] Carpal tunnel syndrome, Cervicalgia, Chronic back pain, Depression, GERD (gastroesophageal reflux disease), Headache(784.0), Hernia hiatal, Histoplasmosis, Hot flashes, Hyperlipidemia, Hypertension, Obesity, Osteoarthritis, PONV (postoperative nausea and vomiting), Shingles, Sleep apnea, and Tricuspid regurgitation. has a past surgical history that includes Knee arthroscopy; Knee cartilage surgery; lobectomy; sinus surgery; Bunionectomy; partial hysterectomy (cervix not removed); Total knee arthroplasty (Bilateral, 03/03/2015); Parathyroid gland surgery; Lung biopsy; Colonoscopy (10/2018); Breast surgery (Right); lumbar fusion (N/A, 7/29/2021); and Abdomen surgery (N/A, 8/1/2021). Restrictions  Position Activity Restriction  Other position/activity restrictions: ambulate, activity as tolerated, abdominal binder  Subjective   General  Chart Reviewed: Yes  Additional Pertinent Hx: 77 y.o. F admitted 7/29 for L4-S1 Anterior lumbar interbody fusion. Return to OR 8/1 for repair of wound dehiscence. PMHx includes HTN, HLD, depression, shingles, lobectomy, hysterectomy, B TKA,  Family / Caregiver Present: No  Referring Practitioner: Lakesha Sanders  Subjective  Subjective: Patient seated in chair upon arrival, agreeable to OT services. Vital Signs  Patient Currently in Pain: Yes (8/10 pain, RN provided pain meds during session)   Orientation     Objective    ADL  LE Dressing: Verbal cueing;Setup; Increased time to complete;Contact guard assistance;Stand by assistance (v/c for technique with use of AE, able to don/doff socks and thread pants. Assist to don compression socks, declined to pull up over hips, anticipate CGA for completion of clothing management over hips. Assist provided with compression socks)  Additional Comments: Ramirez unclamped during completion of LB dressing, reclamped and RN notified. Assist provided to doff soiled clothing.   Transfers  Sit to stand: Contact guard assistance (v/c for safe hand placement)  Stand to sit: Contact guard assistance   Cognition  Overall Cognitive Status: [facility]  Cognition Comment: decreased arousal at end of session as pain meds effect became more noticeable     Plan   Plan  Times per week: 5-7x  Times per day: Daily  Current Treatment Recommendations: Balance Training, Functional Mobility Training, Endurance Training, Self-Care / ADL, Safety Education & Training, Equipment Evaluation, Education, & procurement, Patient/Caregiver Education & Training, Pain Management    AM-PAC Score        AM-PAC Inpatient Daily Activity Raw Score: 17 (08/02/21 1210)  AM-PAC Inpatient ADL T-Scale Score : 37.26 (08/02/21 1210)  ADL Inpatient CMS 0-100% Score: 50.11 (08/02/21 1210)  ADL Inpatient CMS G-Code Modifier : CK (08/02/21 1210)    Goals  Short term goals  Time Frame for Short term goals: by D/C  Short term goal 1: Toileting and toilet transfer SBA, AE as needed - Not met  Short term goal 2: Lower body dressing SBA, AE as needed - Not met  Short term goal 3: Functional transfers to/from bed, chairs SBA - Not met  Short term goal 4: Static and dynamic stance during ADLs SBA - Not met       Therapy Time   Individual Concurrent Group Co-treatment   Time In 1116         Time Out 1203         Minutes 63 Nai Mac OTR/L #[id number]

## 2021-08-02 NOTE — PROGRESS NOTES
Physical Therapy  Facility/Department: [facility] 5T ORTHO/NEURO  Daily Treatment Note  NAME: Sydney Hendrickson  : 1955  MRN: [id number]    Date of Service: 2021    Discharge Recommendations:  Sydney Hendrickson scored a 15/24 on the AM-PAC short mobility form. Current research shows that an AM-PAC score of 17 or less is typically not associated with a discharge to the patient's home setting. Based on the patient's AM-PAC score and their current functional mobility deficits, it is recommended that the patient have 3-5 sessions per week of Physical Therapy at d/c to increase the patient's independence. Please see assessment section for further patient specific details. PT Equipment Recommendations  Equipment Needed:  (defer)    Assessment   Assessment: Pt was limited by abdominal and LLE pain requiring extra time to perform all therapeutic activities. Presented with poor trunk and LE control during bed mobility; Requiring assistance and sequencing cues throughout. Transfers and gait training were mildly unsteady requiring sequencing cues throughout; no LOB noted. Gait distance limited by fatigue and pain. Treatment Diagnosis: impaired functional mobility s/p L4-S1 ANTERIOR LUMBAR INTERBODY FUSION WITH PEDICLE SCREW FIXATION  Decision Making: Low Complexity  PT Education: PT Role;Goals;Plan of Care;General Safety;Precautions; Functional Mobility Training  Patient Education: Pt verbalized understanding  REQUIRES PT FOLLOW UP: Yes     Patient Diagnosis(es): There were no encounter diagnoses. has a past medical history of Allergic rhinitis, Anxiety, Atrial tachycardia (HCC), Carpal tunnel syndrome, Cervicalgia, Chronic back pain, Depression, GERD (gastroesophageal reflux disease), Headache(784.0), Hernia hiatal, Histoplasmosis, Hot flashes, Hyperlipidemia, Hypertension, Obesity, Osteoarthritis, PONV (postoperative nausea and vomiting), Shingles, Sleep apnea, and Tricuspid regurgitation.    has a past surgical history that includes Knee arthroscopy; Knee cartilage surgery; lobectomy; sinus surgery; Bunionectomy; partial hysterectomy (cervix not removed); Total knee arthroplasty (Bilateral, 03/03/2015); Parathyroid gland surgery; Lung biopsy; Colonoscopy (10/2018); Breast surgery (Right); lumbar fusion (N/A, 7/29/2021); and Abdomen surgery (N/A, 8/1/2021). Restrictions  Position Activity Restriction  Other position/activity restrictions: ambulate, activity as tolerated, abdominal binder, 1L O2  Subjective   General  Additional Pertinent Hx: Pt is a 77 y.o. female 7/29 with spondylolisthesis of lumbar region. Pt s/p L4-S1 ANTERIOR LUMBAR INTERBODY FUSION WITH PEDICLE SCREW FIXATION on 7/30. PMH:  hyperlipidemia, HTN, OA, anxiety, depression, GERD, sleep apnea, bilat TKR  Referring Practitioner: SMITHA Saxena  Subjective  Subjective: Pt found supine. Agreeable to PT. C/o moderate pain in abdominals and LLE with movement. Orientation  Orientation  Overall Orientation Status: Within Functional Limits  Objective   Bed mobility  Supine to Sit: Moderate assistance  Transfers  Sit to Stand: Contact guard assistance  Stand to sit: Contact guard assistance  Ambulation  Ambulation?: Yes  Ambulation 1  Device: Rolling Walker  Assistance: Contact guard assistance  Quality of Gait: heavy UE support on walker, decreased laurent, decreased bilat step length/length  Distance: 5ft     Balance  Sitting - Static: Good  Sitting - Dynamic: Fair  Standing - Static: Fair  Standing - Dynamic: Fair;-  Exercises  Heelslides: x10 RLE unable to perform with LLE due to pain  Gluteal Sets: 15x5 sec B  Ankle Pumps: x20 B     AM-PAC Score  AM-PAC Inpatient Mobility Raw Score : 15 (08/02/21 1056)  AM-PAC Inpatient T-Scale Score : 39.45 (08/02/21 1056)  Mobility Inpatient CMS 0-100% Score: 57.7 (08/02/21 1056)  Mobility Inpatient CMS G-Code Modifier : CK (08/02/21 1056)  Goals  Short term goals  Time Frame for Short term goals:  By discharge  Short term goal 1: Sup to sit SBA  Short term goal 2: Pt will transfer sit to stand SBA  ONGOING  Short term goal 3: Pt will amb >100' with LRAD SBA  ONGOING  Short term goal 4: Pt will up/down 1 step with LRAD SBA  ONGOING    Plan    Plan  Times per week: 5-7  Current Treatment Recommendations: Functional Mobility Training, Gait Training, Stair training, Safety Education & Training, Patient/Caregiver Education & Training  Safety Devices  Type of devices: Call light within reach, Chair alarm in place, Nurse notified, Left in chair     Therapy Time   Individual Concurrent Group Co-treatment   Time In 1011         Time Out 1043         Minutes 32         Timed Code Treatment Minutes: [facility], [address] [facility] PT [id number]

## 2021-08-03 ENCOUNTER — APPOINTMENT (OUTPATIENT)
Dept: GENERAL RADIOLOGY | Age: 66
DRG: 453 | End: 2021-08-03
Attending: NEUROLOGICAL SURGERY
Payer: MEDICARE

## 2021-08-03 DIAGNOSIS — R05.3 CHRONIC COUGH: ICD-10-CM

## 2021-08-03 PROCEDURE — 6360000002 HC RX W HCPCS: Performed by: INTERNAL MEDICINE

## 2021-08-03 PROCEDURE — 74019 RADEX ABDOMEN 2 VIEWS: CPT

## 2021-08-03 PROCEDURE — 6370000000 HC RX 637 (ALT 250 FOR IP): Performed by: INTERNAL MEDICINE

## 2021-08-03 PROCEDURE — 6360000002 HC RX W HCPCS: Performed by: SURGERY

## 2021-08-03 PROCEDURE — 6370000000 HC RX 637 (ALT 250 FOR IP): Performed by: NURSE PRACTITIONER

## 2021-08-03 PROCEDURE — 6360000002 HC RX W HCPCS: Performed by: NURSE PRACTITIONER

## 2021-08-03 PROCEDURE — 6360000002 HC RX W HCPCS: Performed by: PHYSICIAN ASSISTANT

## 2021-08-03 PROCEDURE — 99231 SBSQ HOSP IP/OBS SF/LOW 25: CPT | Performed by: SURGERY

## 2021-08-03 PROCEDURE — 6370000000 HC RX 637 (ALT 250 FOR IP): Performed by: PHYSICIAN ASSISTANT

## 2021-08-03 PROCEDURE — C9113 INJ PANTOPRAZOLE SODIUM, VIA: HCPCS | Performed by: SURGERY

## 2021-08-03 PROCEDURE — 1200000000 HC SEMI PRIVATE

## 2021-08-03 PROCEDURE — 2580000003 HC RX 258: Performed by: ANESTHESIOLOGY

## 2021-08-03 PROCEDURE — 6360000002 HC RX W HCPCS: Performed by: NEUROLOGICAL SURGERY

## 2021-08-03 PROCEDURE — 6370000000 HC RX 637 (ALT 250 FOR IP): Performed by: SURGERY

## 2021-08-03 RX ORDER — LORATADINE 10 MG/1
TABLET ORAL
Qty: 90 TABLET | Refills: 1 | Status: SHIPPED | OUTPATIENT
Start: 2021-08-03 | End: 2021-09-03

## 2021-08-03 RX ORDER — LORAZEPAM 2 MG/ML
0.5 INJECTION INTRAMUSCULAR EVERY 6 HOURS PRN
Status: DISCONTINUED | OUTPATIENT
Start: 2021-08-03 | End: 2021-08-13 | Stop reason: HOSPADM

## 2021-08-03 RX ORDER — PROMETHAZINE HYDROCHLORIDE 25 MG/ML
25 INJECTION, SOLUTION INTRAMUSCULAR; INTRAVENOUS EVERY 4 HOURS PRN
Status: DISCONTINUED | OUTPATIENT
Start: 2021-08-03 | End: 2021-08-13 | Stop reason: HOSPADM

## 2021-08-03 RX ORDER — HYDRALAZINE HYDROCHLORIDE 20 MG/ML
10 INJECTION INTRAMUSCULAR; INTRAVENOUS EVERY 6 HOURS PRN
Status: DISCONTINUED | OUTPATIENT
Start: 2021-08-03 | End: 2021-08-13 | Stop reason: HOSPADM

## 2021-08-03 RX ORDER — SUCRALFATE 1 G/1
1 TABLET ORAL EVERY 6 HOURS SCHEDULED
Status: DISCONTINUED | OUTPATIENT
Start: 2021-08-03 | End: 2021-08-13 | Stop reason: HOSPADM

## 2021-08-03 RX ORDER — POTASSIUM CHLORIDE 7.45 MG/ML
10 INJECTION INTRAVENOUS
Status: COMPLETED | OUTPATIENT
Start: 2021-08-03 | End: 2021-08-03

## 2021-08-03 RX ORDER — PANTOPRAZOLE SODIUM 40 MG/10ML
40 INJECTION, POWDER, LYOPHILIZED, FOR SOLUTION INTRAVENOUS DAILY
Status: DISCONTINUED | OUTPATIENT
Start: 2021-08-03 | End: 2021-08-06

## 2021-08-03 RX ORDER — DIAZEPAM 5 MG/1
5 TABLET ORAL EVERY 6 HOURS PRN
Status: DISCONTINUED | OUTPATIENT
Start: 2021-08-03 | End: 2021-08-13 | Stop reason: HOSPADM

## 2021-08-03 RX ADMIN — HYDROMORPHONE HYDROCHLORIDE 0.5 MG: 1 INJECTION, SOLUTION INTRAMUSCULAR; INTRAVENOUS; SUBCUTANEOUS at 09:05

## 2021-08-03 RX ADMIN — HYDRALAZINE HYDROCHLORIDE 10 MG: 20 INJECTION, SOLUTION INTRAMUSCULAR; INTRAVENOUS at 23:42

## 2021-08-03 RX ADMIN — POTASSIUM CHLORIDE 10 MEQ: 7.46 INJECTION, SOLUTION INTRAVENOUS at 16:24

## 2021-08-03 RX ADMIN — POTASSIUM CHLORIDE 10 MEQ: 7.46 INJECTION, SOLUTION INTRAVENOUS at 13:09

## 2021-08-03 RX ADMIN — DIAZEPAM 5 MG: 5 TABLET ORAL at 00:07

## 2021-08-03 RX ADMIN — POTASSIUM CHLORIDE 10 MEQ: 7.46 INJECTION, SOLUTION INTRAVENOUS at 14:50

## 2021-08-03 RX ADMIN — PROMETHAZINE HYDROCHLORIDE 25 MG: 25 INJECTION INTRAMUSCULAR; INTRAVENOUS at 08:30

## 2021-08-03 RX ADMIN — HYDRALAZINE HYDROCHLORIDE 10 MG: 20 INJECTION, SOLUTION INTRAMUSCULAR; INTRAVENOUS at 13:52

## 2021-08-03 RX ADMIN — PROMETHAZINE HYDROCHLORIDE 25 MG: 25 INJECTION INTRAMUSCULAR; INTRAVENOUS at 14:56

## 2021-08-03 RX ADMIN — SODIUM CHLORIDE, POTASSIUM CHLORIDE, SODIUM LACTATE AND CALCIUM CHLORIDE: 600; 310; 30; 20 INJECTION, SOLUTION INTRAVENOUS at 18:33

## 2021-08-03 RX ADMIN — LORAZEPAM 0.5 MG: 2 INJECTION INTRAMUSCULAR; INTRAVENOUS at 15:17

## 2021-08-03 RX ADMIN — POTASSIUM CHLORIDE 10 MEQ: 7.46 INJECTION, SOLUTION INTRAVENOUS at 12:04

## 2021-08-03 RX ADMIN — PANTOPRAZOLE SODIUM 40 MG: 40 INJECTION, POWDER, FOR SOLUTION INTRAVENOUS at 11:08

## 2021-08-03 RX ADMIN — OXYCODONE 10 MG: 5 TABLET ORAL at 00:07

## 2021-08-03 RX ADMIN — HYDROMORPHONE HYDROCHLORIDE 0.5 MG: 1 INJECTION, SOLUTION INTRAMUSCULAR; INTRAVENOUS; SUBCUTANEOUS at 20:16

## 2021-08-03 RX ADMIN — ONDANSETRON 4 MG: 2 INJECTION INTRAMUSCULAR; INTRAVENOUS at 18:33

## 2021-08-03 RX ADMIN — ONDANSETRON 4 MG: 2 INJECTION INTRAMUSCULAR; INTRAVENOUS at 13:57

## 2021-08-03 RX ADMIN — PROMETHAZINE HYDROCHLORIDE 25 MG: 25 INJECTION INTRAMUSCULAR; INTRAVENOUS at 03:25

## 2021-08-03 RX ADMIN — BENZOCAINE, BUTAMBEN, AND TETRACAINE HYDROCHLORIDE 1 SPRAY: .028; .004; .004 AEROSOL, SPRAY TOPICAL at 11:31

## 2021-08-03 RX ADMIN — SUCRALFATE 1 G: 1 TABLET ORAL at 00:43

## 2021-08-03 RX ADMIN — LORAZEPAM 0.5 MG: 2 INJECTION INTRAMUSCULAR; INTRAVENOUS at 22:17

## 2021-08-03 RX ADMIN — ENOXAPARIN SODIUM 40 MG: 40 INJECTION SUBCUTANEOUS at 08:30

## 2021-08-03 RX ADMIN — PROMETHAZINE HYDROCHLORIDE 25 MG: 25 INJECTION INTRAMUSCULAR; INTRAVENOUS at 20:16

## 2021-08-03 RX ADMIN — SUCRALFATE 1 G: 1 TABLET ORAL at 06:03

## 2021-08-03 ASSESSMENT — PAIN DESCRIPTION - PAIN TYPE
TYPE: SURGICAL PAIN

## 2021-08-03 ASSESSMENT — PAIN DESCRIPTION - ORIENTATION
ORIENTATION: MID

## 2021-08-03 ASSESSMENT — PAIN SCALES - GENERAL
PAINLEVEL_OUTOF10: 9
PAINLEVEL_OUTOF10: 7
PAINLEVEL_OUTOF10: 0
PAINLEVEL_OUTOF10: 0
PAINLEVEL_OUTOF10: 8

## 2021-08-03 ASSESSMENT — PAIN DESCRIPTION - PROGRESSION
CLINICAL_PROGRESSION: NOT CHANGED

## 2021-08-03 ASSESSMENT — PAIN DESCRIPTION - LOCATION
LOCATION: ABDOMEN

## 2021-08-03 ASSESSMENT — PAIN - FUNCTIONAL ASSESSMENT
PAIN_FUNCTIONAL_ASSESSMENT: PREVENTS OR INTERFERES SOME ACTIVE ACTIVITIES AND ADLS

## 2021-08-03 ASSESSMENT — PAIN DESCRIPTION - DESCRIPTORS
DESCRIPTORS: ACHING

## 2021-08-03 ASSESSMENT — PAIN DESCRIPTION - FREQUENCY
FREQUENCY: CONTINUOUS

## 2021-08-03 ASSESSMENT — PAIN DESCRIPTION - ONSET
ONSET: ON-GOING

## 2021-08-03 ASSESSMENT — PAIN DESCRIPTION - DIRECTION: RADIATING_TOWARDS: LEGS

## 2021-08-03 NOTE — PROGRESS NOTES
Occupational Therapy  Elio Thornton      Attempted therapy on hold this a.m. Patient having NG tube placed. Will re-attempt therapy later this date. Continue with POC.   Shonna December, [facility]

## 2021-08-03 NOTE — PROGRESS NOTES
Physical Therapy  Kelby Anguiano  Attempted therapy on hold this a.m. Patient having NG tube placed. Will re-attempt therapy later this date.   Candice Brown, [id number]

## 2021-08-03 NOTE — CONSULTS
Resident Consult Note  General Surgery     Reason for Consult: Nausea and vomiting     History of Present Illness:   Elio Thornton is a 77 y.o. female who underwent primary repair and closure of abdominal wall fascia on 8/1/2021 s/p wound dehiscence of an ALIF incision from 7/28/2021. The patient reports that she has experienced nausea and abdominal discomfort since her procedure on 8/1/2021. She reports that she has been unable to tolerate PO intake since her surgery noting that she has experienced multiple episodes of bilious emesis today. The patient does report passing a small BM this AM, though she states her abdominal distention has continued to worsen. Past Medical History:        Diagnosis Date    Allergic rhinitis     Anxiety     Atrial tachycardia ([address]     dr Jin Ackerman ([facility])    Carpal tunnel syndrome     Cervicalgia     Chronic back pain     low    Depression     GERD (gastroesophageal reflux disease)     has schatzki ring    Headache(784.0)     hemiplegic migraines, improved    Hernia hiatal     Histoplasmosis     Hot flashes     takes wellbutrin    Hyperlipidemia     Hypertension     Obesity     Osteoarthritis     multiple joints    PONV (postoperative nausea and vomiting)     Shingles     nerve pain in her low back persists    Sleep apnea     CPAP set of 4    Tricuspid regurgitation      Past Surgical History:        Procedure Laterality Date    ABDOMEN SURGERY N/A 8/1/2021    REPAIR OF WOUND DEHISCENCE performed by Unique Chandra MD at [address] Right     Biopsy w/clip    BUNIONECTOMY      right     COLONOSCOPY  10/2018    fu 10 yrs    KNEE ARTHROSCOPY      KNEE CARTILAGE SURGERY      LOBECTOMY      partial right lung lobectomy    LUMBAR FUSION N/A 7/29/2021    L4-S1 ANTERIOR LUMBAR INTERBODY FUSION WITH PEDICLE SCREW FIXATION performed by Jose Preston MD at [facility]      PARTIAL HYSTERECTOMY ovaries retained    SINUS SURGERY      TOTAL KNEE ARTHROPLASTY Bilateral 03/03/2015     Allergies:  Latex, Reglan [metoclopramide hcl], Univasc [moexipril], Crestor [rosuvastatin], Celecoxib, Keflex [cephalexin], Lipitor, Metoclopramide, Prochlorperazine, Prochlorperazine edisylate, Sulfa antibiotics, and Vioxx    Medications:   Home Meds  No current facility-administered medications on file prior to encounter. Current Outpatient Medications on File Prior to Encounter   Medication Sig Dispense Refill    metoprolol succinate (TOPROL XL) 25 MG extended release tablet Take 1 tablet by mouth daily 90 tablet 0    buPROPion (WELLBUTRIN XL) 150 MG extended release tablet Take 1 tablet by mouth every morning 30 tablet 3    pantoprazole (PROTONIX) 40 MG tablet TAKE 1 TABLET BY MOUTH TWICE A  tablet 2    losartan (COZAAR) 50 MG tablet Take 1 tablet by mouth daily 90 tablet 3    verapamil (CALAN SR) 120 MG extended release tablet Take 1 tablet by mouth nightly Do not crush or chew. 90 tablet 3    pravastatin (PRAVACHOL) 20 MG tablet Take 1 tablet by mouth daily 90 tablet 3    acetaminophen (TYLENOL) 500 MG tablet Take 500 mg by mouth every 6 hours as needed for Pain      Multiple Vitamins-Minerals (CENTRUM SILVER PO) Take 1 tablet by mouth daily       gabapentin (NEURONTIN) 300 MG capsule TAKE 1 CAPSULE BY MOUTH 3 TIMES DAILY FOR 30 DAYS.  90 capsule 0    vitamin B-12 (CYANOCOBALAMIN) 1000 MCG tablet       Turmeric 500 MG CAPS       aspirin EC 81 MG EC tablet Take 1 tablet by mouth daily 90 tablet 1    Omega-3 Fatty Acids (FISH OIL) 1200 MG CAPS Take 1,200 mg by mouth 2 times daily       Current Meds  [Held by provider] sucralfate (CARAFATE) tablet 1 g, 4 times per day  pantoprazole (PROTONIX) injection 40 mg, Daily  HYDROmorphone (DILAUDID) injection 0.5 mg, Q4H PRN  potassium chloride 10 mEq/100 mL IVPB (Peripheral Line), Q1H  hydrALAZINE (APRESOLINE) injection 10 mg, Q6H PRN  guaiFENesin-dextromethorphan (ROBITUSSIN DM) 100-10 MG/5ML syrup 5 mL, Q4H PRN  fluticasone (FLONASE) 50 MCG/ACT nasal spray 1 spray, Daily  promethazine (PHENERGAN) injection 25 mg, Q4H PRN  ondansetron (ZOFRAN) injection 4 mg, Q4H PRN  SMOG Enema, Once  HYDROmorphone (DILAUDID) injection 0.5 mg, Q12H PRN  lactated ringers infusion, Continuous  [Held by provider] pravastatin (PRAVACHOL) tablet 20 mg, Nightly  [Held by provider] tamsulosin (FLOMAX) capsule 0.4 mg, Daily  lidocaine 4 % external patch 1 patch, Daily  [Held by provider] sennosides-docusate sodium (SENOKOT-S) 8.6-50 MG tablet 2 tablet, BID  [Held by provider] polyethylene glycol (GLYCOLAX) packet 17 g, Daily  [Held by provider] oxyCODONE (ROXICODONE) immediate release tablet 5 mg, Q4H PRN   Or  [Held by provider] oxyCODONE (ROXICODONE) immediate release tablet 10 mg, Q4H PRN  [Held by provider] calcium carbonate (TUMS) chewable tablet 500 mg, TID PRN  benzocaine-menthol (CEPACOL SORE THROAT) lozenge 1 lozenge, Q2H PRN  scopolamine (TRANSDERM-SCOP) transdermal patch 1 patch, Q72H  [Held by provider] metoprolol succinate (TOPROL XL) extended release tablet 25 mg, Daily  [Held by provider] buPROPion (WELLBUTRIN XL) extended release tablet 150 mg, QAM  [Held by provider] cetirizine (ZYRTEC) tablet 10 mg, Daily  [Held by provider] losartan (COZAAR) tablet 50 mg, Daily  [Held by provider] verapamil (CALAN SR) extended release tablet 120 mg, Nightly  sodium chloride flush 0.9 % injection 5-40 mL, 2 times per day  sodium chloride flush 0.9 % injection 5-40 mL, PRN  0.9 % sodium chloride infusion, PRN  enoxaparin (LOVENOX) injection 40 mg, Daily  diazePAM (VALIUM) tablet 5 mg, Q6H PRN      Family History:   Family History   Problem Relation Age of Onset    High Blood Pressure Mother     Heart Disease Mother     Cancer Mother         vaginal    High Blood Pressure Father     Heart Disease Father     Heart Disease Sister     Colon Cancer Sister     Heart Disease Brother     High Blood Pressure Brother     Cancer Brother         oral    Breast Cancer Sister     COPD Sister     Heart Disease Brother     Hearing Loss Brother         chf and transplant    High Blood Pressure Brother      Social History:   TOBACCO:   reports that she quit smoking about 31 years ago. She has a 5.00 pack-year smoking history. She has never used smokeless tobacco.  ETOH:   reports no history of alcohol use. DRUGS:   reports no history of drug use. Review of Systems:   A 14 point review of systems was conducted, significant findings as noted in HPI. All other systems negative. Physical Exam:    Vitals:    08/02/21 2115 08/02/21 2227 08/03/21 0223 08/03/21 0700   BP: (!) 180/107 (!) 148/78 (!) 175/83 (!) 158/103   Pulse:  80 85 93   Resp:  16 16 16   Temp:  99.4 °F (37.4 °C) 98.1 °F (36.7 °C) 98.5 °F (36.9 °C)   TempSrc:  Oral Oral Oral   SpO2:  92% 92% 92%   Weight:       Height:         General Appearance: alert; appears uncomfortable   HEENT: Normocephalic/atraumatic; trachea midline, airway patent   Chest/Lungs: Normal effort, breathing 1L supplemental oxygen via nasal cannula, symmetric chest rise  Cardiovascular: Regular rate and rhythm; hypertensive   Abdomen: Distended, soft, tender in the epigastrium and lower abdomen, tympanic   Skin: warm and dry; no exanthems  Extremities: no edema; no cyanosis  Neuro: A&Ox3; no focal deficits; sensation intact    Laboratory Results:    CBC:   Recent Labs     08/01/21  1107   WBC 7.0   HGB 11.5*   HCT 33.0*   MCV 91.8        BMP:   Recent Labs     08/01/21  1109   *   K 3.6      CO2 24   BUN 6*   CREATININE <0.5*     PT/INR: No results for input(s): PROTIME, INR in the last 72 hours. APTT: No results for input(s): APTT in the last 72 hours.   Liver Profile:  Lab Results   Component Value Date    AST 21 07/16/2021    ALT 23 07/16/2021    BILIDIR <0.2 03/24/2021    BILITOT 0.4 07/16/2021    ALKPHOS 100 07/16/2021     Lab Results   Component Value Date    CHOL 152 07/26/2021    HDL 50 07/26/2021    HDL 69 05/22/2012    TRIG 141 07/26/2021     UA:   Lab Results   Component Value Date    NITRITE neg 06/16/2017    COLORU Yellow 07/31/2021    PHUR 6.0 07/31/2021    WBCUA None seen 07/31/2021    RBCUA 0-2 07/31/2021    CLARITYU Clear 07/31/2021    SPECGRAV 1.010 07/31/2021    LEUKOCYTESUR Negative 07/31/2021    UROBILINOGEN 0.2 07/31/2021    BILIRUBINUR Negative 07/31/2021    BILIRUBINUR neg 06/16/2017    BLOODU TRACE-LYSED 07/31/2021    GLUCOSEU Negative 07/31/2021    AMORPHOUS 1+ 07/31/2021     Imaging:   XR ABDOMEN (2 VIEWS)   Final Result      Supine and erect views demonstrate mild gaseous distention of small bowel loops in the right midabdomen with air-fluid levels. The findings are progressive since 8/2/2021 and suggest worsening ileus or low-grade obstruction. No free intraperitoneal air. Lumbosacral metallic fixation noted. XR ABDOMEN (KUB) (SINGLE AP VIEW)   Final Result   Impression:    A few nondilated small bowel loops with air-fluid levels, unchanged in appearance. No evidence of high-grade obstruction. CT ABDOMEN PELVIS W IV CONTRAST Additional Contrast? None   Final Result      Interval development of bibasilar atelectatic changes. There is a midline anterior abdominal wall hernia present with herniation of bowel loops and large amount of omental fat. This suggests incisional hernia. Foci of extraluminal air or gas are seen in the left lower quadrant with associated stranding. There is also free fluid noted. This may be due to recent postsurgical changes and should be correlated with patient's history. Alternatively this may    represent infectious process or possibly ruptured viscus.       Small amounts of extraluminal air are also seen within the soft tissues of the anterior abdomen and in the region of the hernia sac which may  be related to recent surgery but the possibility of infectious process or free foci of air within the    peritoneal cavity lying within the hernia are considerations. XR CHEST PORTABLE   Final Result      No acute disease         XR ABDOMEN (KUB) (SINGLE AP VIEW)   Final Result      No bowel obstruction. Moderate retained stool in the colon. XR LUMBAR SPINE (2-3 VIEWS)   Final Result      3 coned-down frontal and lateral intraoperative views demonstrate postoperative changes at L4-5 and L5-S1. No retained surgical instrument is seen. XR LUMBAR SPINE (2-3 VIEWS)   Final Result         Coned-down intraoperative views of the lumbar spine assumes 5 lumbar type vertebral bodies. There is anterior interbody fusion at L4-5 and L5-S1 with radiolucent interbody devices. Fluoroscopy time 1 minute      FLUORO FOR SURGICAL PROCEDURES   Final Result         Coned-down intraoperative views of the lumbar spine assumes 5 lumbar type vertebral bodies. There is anterior interbody fusion at L4-5 and L5-S1 with radiolucent interbody devices. Fluoroscopy time 1 minute      CT GUIDED STEREOTACTIC LOCALIZATION   Final Result      CT GUIDED STEREOTACTIC LOCALIZATION   Final Result        Assessment/Plan: This is a 77 y.o. female who recently underwent primary repair and closure of abdominal wall fascia on 8/1/2021 s/p wound dehiscence of an ALIF incision from 7/28/2021. The patient reports that she has experienced nausea, abdominal discomfort, and PO intolerance since her procedure on 8/1/2021. Abdominal x-ray yesterday revealed small bowel dilation consistent with ileus. The patient subsequently experienced bilious emesis this AM, after which General Surgery was consulted for further evaluation and treatment. Repeat AXR reveals progressive worsening of small bowel distention, consistent with post-operative ileus.     - The patient was offered placement of an NG given persistent nausea and bilious vomiting in the setting of radiographic worsening of small bowel distention.     - The patient chooses to pursue bedside placement of an NG for decompression after discussion regarding the risks, benefits, and alternative of the procedure.      - NG placed at the bedside with no complications-- left to CLWS  - Patient made NPO and PO medications transitioned to IV when able   - Administering IV potassium to achieve goal of 4.0  - Magnesium added on to AM labs  - Optimize electrolytes to: K+ > 4, Mg > 2, and Phos > 3  - Avoid opioid analgesics and anticholinergics   - The patient was discussed with Dr. Nolan Archer MD, MPH  PGY-3 General Surgery  08/03/21  1:35 PM

## 2021-08-03 NOTE — PROGRESS NOTES
Bedside report done with cathi. Pt in bed. Ramirez in place with yellow urine in the collection bag. NG intact with brown/tan output in the suction container. Pt denies nausea and pain. Bed alarm on, wheels locked, bed in lowest position, side rails up 2/4, nonskid socks on, call light and bedside table in reach. Will continue to monitor.

## 2021-08-03 NOTE — PROGRESS NOTES
NG tube placed to 68cm. Pt tolerated fairly well. Securement device attached to NG. NG hooked up to continuous suction.

## 2021-08-03 NOTE — PROGRESS NOTES
Physical Therapy  Nedra Johnston  2nd attempt at therapy refused by patient. Pt cited nausea and discomfort from recent NG tube placement. Will re-attempt as schedule allows.   Kaiser Krause, [id number]

## 2021-08-03 NOTE — PROGRESS NOTES
Hospitalist Progress Note      PCP: Burak Beebe MD    Date of Admission: 7/29/2021    Chief Complaint: Chronic lumbar pain and bilateral foot burning    Hospital Course: S/p L4-S1 ANTERIOR LUMBAR INTERBODY FUSION WITH PEDICLE SCREW FIXATION on 07/30/2021. Patient was unable to void. Was complaining of abdominal pain. Abdominal x-ray was obtained and showed moderate amount of stool. Bladder scan showed 750 ml. Patient was straight cathed. UA was negative for infection. Patient sats dropped to 86% and was placed on 2 L of oxygen. We are asked to see/evaluate by Chhaya Dang. Claribel Stephen MD for medical management of chronic conditions, urinary retention and hypoxia    Subjective:      Pt with persistent N/V since yesterday. Xray showed distension of small bowel loops with air fluid levels suggestive of ileus/ low grade obstruction which had worsened on repeat imaging this am. Gen surg consulted and NG tube placed. Reports N/V improved wince NG tube placed with minimal abd pain.  Had small BM this am       Medications:  Reviewed    Infusion Medications    lactated ringers 100 mL/hr at 08/02/21 1808    sodium chloride       Scheduled Medications    [Held by provider] sucralfate  1 g Oral 4 times per day    pantoprazole  40 mg Intravenous Daily    potassium chloride  10 mEq Intravenous Q1H    butamben-tetracaine-benzocaine  1 spray Topical Once    fluticasone  1 spray Each Nostril Daily    SMOG Enema  330 mL Rectal Once    [Held by provider] pravastatin  20 mg Oral Nightly    [Held by provider] tamsulosin  0.4 mg Oral Daily    lidocaine  1 patch Transdermal Daily    [Held by provider] sennosides-docusate sodium  2 tablet Oral BID    [Held by provider] polyethylene glycol  17 g Oral Daily    scopolamine  1 patch Transdermal Q72H    [Held by provider] metoprolol succinate  25 mg Oral Daily    [Held by provider] buPROPion  150 mg Oral QAM    [Held by provider] cetirizine  10 mg Oral Daily    [Held by provider] losartan  50 mg Oral Daily    [Held by provider] verapamil  120 mg Oral Nightly    sodium chloride flush  5-40 mL Intravenous 2 times per day    enoxaparin  40 mg Subcutaneous Daily     PRN Meds: HYDROmorphone, guaiFENesin-dextromethorphan, promethazine, ondansetron, HYDROmorphone, [Held by provider] oxyCODONE **OR** [Held by provider] oxyCODONE, [Held by provider] calcium carbonate, benzocaine-menthol, sodium chloride flush, sodium chloride, diazePAM      Intake/Output Summary (Last 24 hours) at 8/3/2021 1131  Last data filed at 8/3/2021 0640  Gross per 24 hour   Intake 1224.11 ml   Output 1150 ml   Net 74.11 ml       Physical Exam Performed:    BP (!) 158/103   Pulse 93   Temp 98.5 °F (36.9 °C) (Oral)   Resp 16   Ht 5' 3\" (1.6 m)   Wt 259 lb (117.5 kg)   LMP  (LMP Unknown)   SpO2 92%   BMI 45.88 kg/m²     General appearance: No apparent distress, appears stated age and cooperative. Morbidly obese  HEENT: Normal cephalic, atraumatic without obvious deformity. Pupils equal, round, and reactive to light. Extra ocular muscles intact. Conjunctivae/corneas clear. Neck: Supple, with full range of motion. No jugular venous distention. Trachea midline. Respiratory:  Normal respiratory effort. Bibasilar Rales cardiovascular: Regular rate and rhythm with normal S1/S2 without murmurs, rubs or gallops. Abdomen: Soft, non-tender, non-distended with normal bowel sounds. Suprapubic incision site is clean with no overt discharge. Abdominal binder in place  Musculoskeletal: No clubbing, cyanosis or edema bilaterally.   Skin: Warm and dry  Neurologic:  Alert, speech clear with no overt  Psychiatric: Alert and oriented, thought content appropriate, normal insight  Capillary Refill: Brisk,< 3 seconds   Peripheral Pulses: +2 palpable, equal bilaterally       Labs:   Recent Labs     08/01/21  1107   WBC 7.0   HGB 11.5*   HCT 33.0*        Recent Labs     08/01/21  1109   *   K 3.6    CO2 24   BUN 6*   CREATININE <0.5*   CALCIUM 8.5     No results for input(s): AST, ALT, BILIDIR, BILITOT, ALKPHOS in the last 72 hours. No results for input(s): INR in the last 72 hours. No results for input(s): Tj Favre in the last 72 hours. Urinalysis:      Lab Results   Component Value Date    NITRU Negative 07/31/2021    WBCUA None seen 07/31/2021    RBCUA 0-2 07/31/2021    BLOODU TRACE-LYSED 07/31/2021    SPECGRAV 1.010 07/31/2021    GLUCOSEU Negative 07/31/2021       Radiology:  XR ABDOMEN (2 VIEWS)   Final Result      Supine and erect views demonstrate mild gaseous distention of small bowel loops in the right midabdomen with air-fluid levels. The findings are progressive since 8/2/2021 and suggest worsening ileus or low-grade obstruction. No free intraperitoneal air. Lumbosacral metallic fixation noted. XR ABDOMEN (KUB) (SINGLE AP VIEW)   Final Result   Impression:    A few nondilated small bowel loops with air-fluid levels, unchanged in appearance. No evidence of high-grade obstruction. CT ABDOMEN PELVIS W IV CONTRAST Additional Contrast? None   Final Result      Interval development of bibasilar atelectatic changes. There is a midline anterior abdominal wall hernia present with herniation of bowel loops and large amount of omental fat. This suggests incisional hernia. Foci of extraluminal air or gas are seen in the left lower quadrant with associated stranding. There is also free fluid noted. This may be due to recent postsurgical changes and should be correlated with patient's history. Alternatively this may    represent infectious process or possibly ruptured viscus.       Small amounts of extraluminal air are also seen within the soft tissues of the anterior abdomen and in the region of the hernia sac which may  be related to recent surgery but the possibility of infectious process or free foci of air within the    peritoneal cavity lying within the hernia are considerations. XR CHEST PORTABLE   Final Result      No acute disease         XR ABDOMEN (KUB) (SINGLE AP VIEW)   Final Result      No bowel obstruction. Moderate retained stool in the colon. XR LUMBAR SPINE (2-3 VIEWS)   Final Result      3 coned-down frontal and lateral intraoperative views demonstrate postoperative changes at L4-5 and L5-S1. No retained surgical instrument is seen. XR LUMBAR SPINE (2-3 VIEWS)   Final Result         Coned-down intraoperative views of the lumbar spine assumes 5 lumbar type vertebral bodies. There is anterior interbody fusion at L4-5 and L5-S1 with radiolucent interbody devices. Fluoroscopy time 1 minute      FLUORO FOR SURGICAL PROCEDURES   Final Result         Coned-down intraoperative views of the lumbar spine assumes 5 lumbar type vertebral bodies. There is anterior interbody fusion at L4-5 and L5-S1 with radiolucent interbody devices. Fluoroscopy time 1 minute      CT GUIDED STEREOTACTIC LOCALIZATION   Final Result      CT GUIDED STEREOTACTIC LOCALIZATION   Final Result              Assessment/Plan:    Chronic back pain with bilateral feet burning:  Status post L4-S1 ANTERIOR LUMBAR INTERBODY FUSION WITH PEDICLE SCREW FIXATION   Continue as needed pain medication  Management per neurosurgery     Hypoxia:Likely secondary to receiving fluids pre and postoperatively. CXR non acute. S/p IV lasix with improvement. On 1 L O2. Encourage incentive spirometry. Monitor sats, wean oxygen as tolerated to keep sats more than 90%.    Abdominal pain/constipation with incisional hernia: dev fascial dehiscence after coughing spell on 8/1. CT abd/pelvis showed incisional hernia with bowel and omental fat. Underwent primary repair and closure of abdominal wall fascial wound on 1/8. Patient was having nausea with vomiting on 2/8. Ileus versus SBO noted on x-ray. NG tube placed, patient n.p.o.  Management per general surgery.         Urinary retention: improved after lópez. Continue Flomax. Voiding trial likely this week when more ambulatory  per urology     Hypertension: Not controlled. BP meds were held when BP was borderline low but now hypertensive today. Patient NPO.   Will start IV hydralazine as needed for SBP more than 150 and consider resuming p.o. meds when no longer n.p.o.       Hyperlipidemia:   statin on hold as n.p.o.     Depression:  Continue Wellbutrin when no longer n.p.o.     Sleep apnea:  Continue CPAP    DVT Prophylaxis: Lovenox  Diet: Diet NPO  Code Status: Full Code    PT/OT Eval Status: Per neurosurgery    Dispo -per neurosurgery    Alexandre Kong MD

## 2021-08-03 NOTE — PROGRESS NOTES
Pt is A&O X4. Pt  c/o nausea most of the day, medicated per MAR. Pt is NPO, bedside NG tube inserted this morning by resident surgery. Pt c/o pain to her back and ABD, IV pain medicine given per MAR. Ramirez care done. All fall precaution is in place.

## 2021-08-03 NOTE — PROGRESS NOTES
Pt nauseous most of the night. Pt dry heaving and spitting up yellow/grey mucous. Carafate added to [facility] and PO phenergan changed to IV. Pt doing better since receiving IV phenergan and Carafate. ABD incision CDI. Pt taking PO and IV pain medication for ABD pain. Ramirez draining mike urine. Ramirez care done this shift. Bed alarm on, wheels locked, bed in lowest position, side rails up 2/4, nonskid socks on, call light and bedside table in reach. Will continue to monitor.

## 2021-08-03 NOTE — PROGRESS NOTES
NEUROSURGERY POST-OP PROGRESS NOTE    Patient Name: Madan Mitchell YOB: 1955   Sex: Female Age: 77 yrs     Medical Record Number: [id number] Laurence Number: [de-identified]   Room Number: 9037/8926-82 Hospital Day: Hospital Day: 6     Interval History:  Post-operative Day# 2  1 s/p Procedure(s) (LRB):  REPAIR OF WOUND DEHISCENCE (N/A) POD #4 s/p Procedure(s) (LRB):  L4-S1 ANTERIOR LUMBAR INTERBODY FUSION WITH PEDICLE SCREW FIXATION (N/A)     Subjective: Pt has abdominal pain and has been nauseated and had some emesis of bilious material overnight. She had BM last night and this am.  Objective:    VITAL SIGNS   BP (!) 158/103   Pulse 93   Temp 98.5 °F (36.9 °C) (Oral)   Resp 16   Ht 5' 3\" (1.6 m)   Wt 259 lb (117.5 kg)   LMP  (LMP Unknown)   SpO2 92%   BMI 45.88 kg/m²    Height Height: 5' 3\" (160 cm)   Weight Weight: 259 lb (117.5 kg)        Allergies Allergies   Allergen Reactions    Latex Rash    Reglan [Metoclopramide Hcl] Rash    Univasc [Moexipril] Other (See Comments)     unsure    Crestor [Rosuvastatin]      myalgia    Celecoxib Palpitations    Keflex [Cephalexin] Diarrhea    Lipitor Other (See Comments)     myalgia    Metoclopramide Anxiety    Prochlorperazine Anxiety    Prochlorperazine Edisylate     Sulfa Antibiotics Nausea And Vomiting    Vioxx       NPO Status ADULT DIET; Regular   Isolation No active isolations     LABS   Basic Metabolic Profile Recent Labs     08/01/21  1109   *      CO2 24   BUN 6*   CREATININE <0.5*   GLUCOSE 108*      Complete Blood Count Recent Labs     08/01/21  1107   WBC 7.0   RBC 3.59*      Coagulation Studies No results for input(s): PTT, INR in the last 72 hours.     Invalid input(s): PLATELETS, PROA, PT, PTTA     MEDICATIONS   Inpatient Medications     sucralfate, 1 g, Oral, 4 times per day    fluticasone, 1 spray, Each Nostril, Daily    pantoprazole, 40 mg, Oral, BID AC    SMOG Enema, 330 mL, Rectal, Once    pravastatin, 20 mg, Oral, Nightly    tamsulosin, 0.4 mg, Oral, Daily    acetaminophen, 1,000 mg, Oral, 4 times per day    lidocaine, 1 patch, Transdermal, Daily    sennosides-docusate sodium, 2 tablet, Oral, BID    polyethylene glycol, 17 g, Oral, Daily    scopolamine, 1 patch, Transdermal, Q72H    [Held by provider] metoprolol succinate, 25 mg, Oral, Daily    buPROPion, 150 mg, Oral, QAM    cetirizine, 10 mg, Oral, Daily    [Held by provider] losartan, 50 mg, Oral, Daily    [Held by provider] verapamil, 120 mg, Oral, Nightly    sodium chloride flush, 5-40 mL, Intravenous, 2 times per day    enoxaparin, 40 mg, Subcutaneous, Daily   Infusions    lactated ringers 100 mL/hr at 08/02/21 1808    sodium chloride        Antibiotics   Recent Abx Admin      No antibiotic orders with administrations found. Neurologic Exam:  Mental status: awake and alert and oriented x4, appears to be in pain and appears ill    Musculoskeletal:   Gait: Not tested   Tone: normal  Sensory: intact to all extremities  Motor strength:    Right  Left    Right  Left    Deltoid  5 5  Hip Flex  5 5   Biceps  5 5  Knee Extensors  5 5   Triceps  5 5  Knee Flexors  5 5   Wrist Ext  5 5  Ankle Dorsiflex. 5 5   Wrist Flex  5 5  Ankle Plantarflex. 5 5   Handgrip  5 5  Ext Bib Longus  5 5   Thumb Ext  5 5         Incision: intact, clean and dry      Respiratory:  Unlabored respiratory pattern    Abdomen:   Soft, and distended  Last BM this am    Cardiovascular:  Warm, well perfused    Assessment   Patient is a ** yo * s/p Procedure(s) (LRB):  REPAIR OF WOUND DEHISCENCE (N/A) per  and L4-S1 ANTERIOR LUMBAR INTERBODY FUSION WITH PEDICLE SCREW FIXATION per Shivam. Plan:  1. Neurologic exam frequency:q4  2. Mobility:PT/OT as tolerated  3.  DVT Prophylaxis: SCDs and lovenox   4. Bowel Regimen: glycolax and senna  5. Pain control:she and robaxin  6. Incisional Care:open to air  7. Abdominal binder at all times  8. Abdominal xray to check for ileus/obstruction  9. Consult general surgery  10. Dispo: inpt  Patient was seen with Dr. Arnol Barakat who agrees with above assessment and plan. Electronically signed by:  LISA Qiu CNP, 8/3/2021 8:50 AM   Neurosurgery Nurse Practitioner  [phone]

## 2021-08-03 NOTE — PLAN OF CARE
Problem: Pain:  Goal: Patient's pain/discomfort is manageable  Description: Patient's pain/discomfort is manageable  Outcome: Ongoing  Note: Pt taking PO and IV pain medication for pain. Will continue to rate pain with the 0-10 pain rating scale. Problem: Falls - Risk of:  Goal: Will remain free from falls  Description: Will remain free from falls  Outcome: Ongoing  Note: Pt will remain free of falls for the duration of the shift. Pt is A/O x4  and uses the call light appropriately for needs. Pt is SBA with RW and GB. Bed alarm on, wheels locked, bed in lowest position, side rails up 2/4, nonskid socks on, call light and bedside table in reach. Will continue to monitor. Problem: Nausea/Vomiting:  Goal: Absence of nausea/vomiting  Description: Absence of nausea/vomiting  Outcome: Ongoing  Note: Pt having N/V this shift. Pt attributes it to GURD. Carafate added to [facility]

## 2021-08-03 NOTE — PLAN OF CARE
Problem: Pain:  Goal: Patient's pain/discomfort is manageable  Description: Patient's pain/discomfort is manageable  8/3/2021 0755 by Pasha Freed RN  Outcome: Ongoing    Problem: Falls - Risk of:  Goal: Will remain free from falls  Description: Will remain free from falls  8/3/2021 0755 by Pasha Freed RN  Outcome: Ongoing   Fall risk precaution in place. Bed is locked and in lowest position. 2/4 side rails are up. Call light with in reach. Fall risk bracelet in place, non slip socks on.frequent check on patient. free from falls at this time. will continue to monitor.      Problem: Nausea/Vomiting:  Goal: Absence of nausea/vomiting  Description: Absence of nausea/vomiting  8/3/2021 0755 by Pasha Freed RN  Outcome: Ongoing

## 2021-08-03 NOTE — CARE COORDINATION
Case Management Daily Note                    Date: 8/3/2021     Patient Name: Kelby Anguiano    Date of Admission: 7/29/2021  5:22 AM  YOB: 1955    Length of Stay: 5  GMLOS: 3.8         Patient Admission Status: Inpatient  Diagnosis:Spondylolisthesis of lumbar region [M43.16]  Lumbar stenosis with neurogenic claudication [M48.062]     ________________________________________________________________________________________  Discharge Plan: SNF: [facility]: [id number]    Insurance: Payor: Tristan Gallo / Plan: Ale Linda PLUS HMO / Product Type: *No Product type* /   Is pre-cert/notification needed: yes, initiated 8/2     Tentative discharge date: 8/5    Current barriers: Medical Clearance     Referrals completed: SNF: [facility]     Resources/ information provided: [facility] List   ________________________________________________________________________________________  PT AM-PAC: 15 / 24 per last evaluation on: 8/2    OT AM-PAC: 17 / 24 per last evaluation on: 8/2     DME Needs for discharge: defer  ________________________________________________________________________________________  Notes/Plan of Care:   SW rounded on this date. Potential discharge Thursday per team. SW received voice mal from Ondina Lai in admissions. SW faxed updated clinicals as requested at 5:15 pm. SW to follow up with Ondina Lai in the [phone]. Pre-cert pending at this time. Jearline Fleischer and/or her family were provided with choice of provider; she and/or her family are in agreement with the discharge plan at this time.     Care Transition Patient: JOYA Stanley  [facility]   Case Management Department  Ph: [phone]

## 2021-08-04 ENCOUNTER — APPOINTMENT (OUTPATIENT)
Dept: GENERAL RADIOLOGY | Age: 66
DRG: 453 | End: 2021-08-04
Attending: NEUROLOGICAL SURGERY
Payer: MEDICARE

## 2021-08-04 LAB
ALBUMIN SERPL-MCNC: 2.9 G/DL (ref 3.4–5)
ANION GAP SERPL CALCULATED.3IONS-SCNC: 10 MMOL/L (ref 3–16)
BASOPHILS ABSOLUTE: 0 K/UL (ref 0–0.2)
BASOPHILS RELATIVE PERCENT: 0.6 %
BUN BLDV-MCNC: 8 MG/DL (ref 7–20)
CALCIUM SERPL-MCNC: 8.5 MG/DL (ref 8.3–10.6)
CHLORIDE BLD-SCNC: 103 MMOL/L (ref 99–110)
CO2: 25 MMOL/L (ref 21–32)
CREAT SERPL-MCNC: <0.5 MG/DL (ref 0.6–1.2)
EOSINOPHILS ABSOLUTE: 0.1 K/UL (ref 0–0.6)
EOSINOPHILS RELATIVE PERCENT: 2.4 %
GFR AFRICAN AMERICAN: >60
GFR NON-AFRICAN AMERICAN: >60
GLUCOSE BLD-MCNC: 95 MG/DL (ref 70–99)
HCT VFR BLD CALC: 32.5 % (ref 36–48)
HEMOGLOBIN: 11.3 G/DL (ref 12–16)
LYMPHOCYTES ABSOLUTE: 1.3 K/UL (ref 1–5.1)
LYMPHOCYTES RELATIVE PERCENT: 20.5 %
MAGNESIUM: 1.9 MG/DL (ref 1.8–2.4)
MAGNESIUM: 2.5 MG/DL (ref 1.8–2.4)
MCH RBC QN AUTO: 31.9 PG (ref 26–34)
MCHC RBC AUTO-ENTMCNC: 34.6 G/DL (ref 31–36)
MCV RBC AUTO: 92.3 FL (ref 80–100)
MONOCYTES ABSOLUTE: 0.7 K/UL (ref 0–1.3)
MONOCYTES RELATIVE PERCENT: 11.4 %
NEUTROPHILS ABSOLUTE: 4 K/UL (ref 1.7–7.7)
NEUTROPHILS RELATIVE PERCENT: 65.1 %
PDW BLD-RTO: 13.5 % (ref 12.4–15.4)
PHOSPHORUS: 2.7 MG/DL (ref 2.5–4.9)
PLATELET # BLD: 279 K/UL (ref 135–450)
PMV BLD AUTO: 6.7 FL (ref 5–10.5)
POTASSIUM SERPL-SCNC: 3.7 MMOL/L (ref 3.5–5.1)
RBC # BLD: 3.53 M/UL (ref 4–5.2)
SODIUM BLD-SCNC: 138 MMOL/L (ref 136–145)
WBC # BLD: 6.1 K/UL (ref 4–11)

## 2021-08-04 PROCEDURE — 80069 RENAL FUNCTION PANEL: CPT

## 2021-08-04 PROCEDURE — 2500000003 HC RX 250 WO HCPCS: Performed by: INTERNAL MEDICINE

## 2021-08-04 PROCEDURE — 6370000000 HC RX 637 (ALT 250 FOR IP): Performed by: NURSE PRACTITIONER

## 2021-08-04 PROCEDURE — 6360000002 HC RX W HCPCS: Performed by: NURSE PRACTITIONER

## 2021-08-04 PROCEDURE — 6370000000 HC RX 637 (ALT 250 FOR IP): Performed by: ANESTHESIOLOGY

## 2021-08-04 PROCEDURE — 6360000002 HC RX W HCPCS: Performed by: STUDENT IN AN ORGANIZED HEALTH CARE EDUCATION/TRAINING PROGRAM

## 2021-08-04 PROCEDURE — 99231 SBSQ HOSP IP/OBS SF/LOW 25: CPT | Performed by: SURGERY

## 2021-08-04 PROCEDURE — 2500000003 HC RX 250 WO HCPCS: Performed by: STUDENT IN AN ORGANIZED HEALTH CARE EDUCATION/TRAINING PROGRAM

## 2021-08-04 PROCEDURE — 36415 COLL VENOUS BLD VENIPUNCTURE: CPT

## 2021-08-04 PROCEDURE — C9113 INJ PANTOPRAZOLE SODIUM, VIA: HCPCS | Performed by: SURGERY

## 2021-08-04 PROCEDURE — 2580000003 HC RX 258: Performed by: PHYSICIAN ASSISTANT

## 2021-08-04 PROCEDURE — 6360000002 HC RX W HCPCS: Performed by: PHYSICIAN ASSISTANT

## 2021-08-04 PROCEDURE — 85025 COMPLETE CBC W/AUTO DIFF WBC: CPT

## 2021-08-04 PROCEDURE — 74018 RADEX ABDOMEN 1 VIEW: CPT

## 2021-08-04 PROCEDURE — 2580000003 HC RX 258: Performed by: STUDENT IN AN ORGANIZED HEALTH CARE EDUCATION/TRAINING PROGRAM

## 2021-08-04 PROCEDURE — 2580000003 HC RX 258: Performed by: ANESTHESIOLOGY

## 2021-08-04 PROCEDURE — 1200000000 HC SEMI PRIVATE

## 2021-08-04 PROCEDURE — 83735 ASSAY OF MAGNESIUM: CPT

## 2021-08-04 PROCEDURE — 6360000002 HC RX W HCPCS: Performed by: SURGERY

## 2021-08-04 PROCEDURE — 97535 SELF CARE MNGMENT TRAINING: CPT

## 2021-08-04 PROCEDURE — 6360000002 HC RX W HCPCS: Performed by: INTERNAL MEDICINE

## 2021-08-04 RX ORDER — METOPROLOL TARTRATE 5 MG/5ML
5 INJECTION INTRAVENOUS EVERY 6 HOURS
Status: DISCONTINUED | OUTPATIENT
Start: 2021-08-04 | End: 2021-08-06

## 2021-08-04 RX ORDER — LABETALOL HYDROCHLORIDE 5 MG/ML
5 INJECTION, SOLUTION INTRAVENOUS ONCE
Status: COMPLETED | OUTPATIENT
Start: 2021-08-04 | End: 2021-08-04

## 2021-08-04 RX ORDER — MAGNESIUM SULFATE IN WATER 40 MG/ML
2000 INJECTION, SOLUTION INTRAVENOUS ONCE
Status: COMPLETED | OUTPATIENT
Start: 2021-08-04 | End: 2021-08-04

## 2021-08-04 RX ORDER — POTASSIUM CHLORIDE 7.45 MG/ML
10 INJECTION INTRAVENOUS
Status: COMPLETED | OUTPATIENT
Start: 2021-08-04 | End: 2021-08-04

## 2021-08-04 RX ADMIN — SODIUM PHOSPHATE, MONOBASIC, MONOHYDRATE 10 MMOL: 276; 142 INJECTION, SOLUTION INTRAVENOUS at 11:58

## 2021-08-04 RX ADMIN — METOPROLOL TARTRATE 5 MG: 5 INJECTION INTRAVENOUS at 23:32

## 2021-08-04 RX ADMIN — SODIUM CHLORIDE, POTASSIUM CHLORIDE, SODIUM LACTATE AND CALCIUM CHLORIDE: 600; 310; 30; 20 INJECTION, SOLUTION INTRAVENOUS at 06:34

## 2021-08-04 RX ADMIN — POTASSIUM CHLORIDE 10 MEQ: 10 INJECTION, SOLUTION INTRAVENOUS at 17:25

## 2021-08-04 RX ADMIN — PANTOPRAZOLE SODIUM 40 MG: 40 INJECTION, POWDER, FOR SOLUTION INTRAVENOUS at 09:43

## 2021-08-04 RX ADMIN — METOPROLOL TARTRATE 5 MG: 5 INJECTION INTRAVENOUS at 06:27

## 2021-08-04 RX ADMIN — LABETALOL HYDROCHLORIDE 5 MG: 5 INJECTION, SOLUTION INTRAVENOUS at 07:32

## 2021-08-04 RX ADMIN — Medication 10 ML: at 09:22

## 2021-08-04 RX ADMIN — ENOXAPARIN SODIUM 40 MG: 40 INJECTION SUBCUTANEOUS at 09:23

## 2021-08-04 RX ADMIN — METOPROLOL TARTRATE 5 MG: 5 INJECTION INTRAVENOUS at 16:52

## 2021-08-04 RX ADMIN — LORAZEPAM 0.5 MG: 2 INJECTION INTRAMUSCULAR; INTRAVENOUS at 06:36

## 2021-08-04 RX ADMIN — POTASSIUM CHLORIDE 10 MEQ: 10 INJECTION, SOLUTION INTRAVENOUS at 16:30

## 2021-08-04 RX ADMIN — MAGNESIUM SULFATE HEPTAHYDRATE 2000 MG: 2 INJECTION, SOLUTION INTRAVENOUS at 16:30

## 2021-08-04 RX ADMIN — METOPROLOL TARTRATE 5 MG: 5 INJECTION INTRAVENOUS at 10:50

## 2021-08-04 RX ADMIN — FLUTICASONE PROPIONATE 1 SPRAY: 50 SPRAY, METERED NASAL at 10:43

## 2021-08-04 RX ADMIN — POTASSIUM CHLORIDE 10 MEQ: 10 INJECTION, SOLUTION INTRAVENOUS at 18:30

## 2021-08-04 RX ADMIN — HYDROMORPHONE HYDROCHLORIDE 0.5 MG: 1 INJECTION, SOLUTION INTRAMUSCULAR; INTRAVENOUS; SUBCUTANEOUS at 00:45

## 2021-08-04 RX ADMIN — ONDANSETRON 4 MG: 2 INJECTION INTRAMUSCULAR; INTRAVENOUS at 06:28

## 2021-08-04 RX ADMIN — Medication 10 ML: at 21:28

## 2021-08-04 ASSESSMENT — PAIN DESCRIPTION - ONSET: ONSET: ON-GOING

## 2021-08-04 ASSESSMENT — PAIN SCALES - GENERAL
PAINLEVEL_OUTOF10: 8
PAINLEVEL_OUTOF10: 0
PAINLEVEL_OUTOF10: 0

## 2021-08-04 ASSESSMENT — PAIN DESCRIPTION - FREQUENCY: FREQUENCY: CONTINUOUS

## 2021-08-04 ASSESSMENT — PAIN DESCRIPTION - PROGRESSION: CLINICAL_PROGRESSION: NOT CHANGED

## 2021-08-04 ASSESSMENT — PAIN - FUNCTIONAL ASSESSMENT: PAIN_FUNCTIONAL_ASSESSMENT: PREVENTS OR INTERFERES SOME ACTIVE ACTIVITIES AND ADLS

## 2021-08-04 ASSESSMENT — PAIN DESCRIPTION - DESCRIPTORS: DESCRIPTORS: ACHING

## 2021-08-04 ASSESSMENT — PAIN DESCRIPTION - LOCATION: LOCATION: ABDOMEN

## 2021-08-04 ASSESSMENT — PAIN DESCRIPTION - ORIENTATION: ORIENTATION: MID

## 2021-08-04 ASSESSMENT — PAIN DESCRIPTION - PAIN TYPE: TYPE: SURGICAL PAIN

## 2021-08-04 NOTE — PROGRESS NOTES
Occupational Therapy  Facility/Department: [facility] 5T ORTHO/NEURO  Daily Treatment Note  NAME: Anibal Montalvo  : 1955  MRN: [id number]    Date of Service: 2021    Discharge Recommendations:  Anibal Montalvo scored a 17/24 on the AM-PAC ADL Inpatient form. Current research shows that an AM-PAC score of 17 or less is typically not associated with a discharge to the patient's home setting. Based on the patient's AM-PAC score and their current ADL deficits, it is recommended that the patient have 3-5 sessions per week of Occupational Therapy at d/c to increase the patient's independence. Please see assessment section for further patient specific details. If patient discharges prior to next session this note will serve as a discharge summary. Please see below for the latest assessment towards goals. OT Equipment Recommendations  Equipment Needed: No    Assessment   Performance deficits / Impairments: Decreased functional mobility ; Decreased ADL status; Decreased endurance;Decreased high-level IADLs;Decreased balance  Assessment: Pt reports no pain during therapy session and able to tolerate OOB activity stand step to [facility] and Dependent for rear hygiene care. Pt groomed seated with assist to brush back of hair and pt's sister assisting with dentures. Pt would benefit from inpt therapy at d/c to maximize functional independence and safety. Continue with POC. Treatment Diagnosis: Decreased activity tolerance, impaired ADLs and mobility  Prognosis: Good  OT Education: OT Role;Plan of Care;Transfer Training  Patient Education: verb understanding  REQUIRES OT FOLLOW UP: Yes  Activity Tolerance  Activity Tolerance: Patient Tolerated treatment well  Safety Devices  Safety Devices in place: Yes  Type of devices: Call light within reach; Chair alarm in place;Nurse notified; Left in chair; All fall risk precautions in place         Patient Diagnosis(es): There were no encounter diagnoses.       has a past medical Signs  Patient Currently in Pain: Denies   Orientation  Orientation  Overall Orientation Status:  (not formally assessed but oriented in conversation)  Objective    ADL  Grooming: Minimal assistance (to brush back of hair)  Toileting: Dependent/Total (rear hygiene care, not brief)        Balance  Sitting Balance: Stand by assistance  Standing Balance: Contact guard assistance  Standing Balance  Time: ~1 min total  Activity: BSC transfer with rear hygiene care  Toilet Transfers  Toilet - Technique: Stand step  Equipment Used: Standard bedside commode  Toilet Transfer: Contact guard assistance  Bed mobility  Supine to Sit: Contact guard assistance  Transfers  Sit to stand: Contact guard assistance  Stand to sit: Contact guard assistance                       Cognition  Overall Cognitive Status: [facility]                                         Plan   Plan  Times per week: 5-7x  Times per day: Daily  Current Treatment Recommendations: Balance Training, Functional Mobility Training, Endurance Training, Self-Care / ADL, Safety Education & Training, Equipment Evaluation, Education, & procurement, Patient/Caregiver Education & Training, Pain Management  G-Code     OutComes Score                                                  AM-PAC Score        AM-PAC Inpatient Daily Activity Raw Score: 17 (08/04/21 1521)  AM-PAC Inpatient ADL T-Scale Score : 37.26 (08/04/21 1521)  ADL Inpatient CMS 0-100% Score: 50.11 (08/04/21 1521)  ADL Inpatient CMS G-Code Modifier : CK (08/04/21 1521)    Goals  Short term goals  Time Frame for Short term goals: by D/C  Short term goal 1: Toileting and toilet transfer SBA, AE as needed - Not met [facility] 8/4  Short term goal 2: Lower body dressing SBA, AE as needed - Not met, not addressed 8/4  Short term goal 3: Functional transfers to/from bed, chairs SBA - Not met, [facility] 8/4  Short term goal 4: Static and dynamic stance during ADLs SBA - Not met, [facility] 8/4 hygiene care       Therapy Time   Individual Concurrent Group Co-treatment   Time In 2022         Time Out 1403         Minutes 23         Timed Code Treatment Minutes: [address] [facility], [address] [facility]

## 2021-08-04 NOTE — PROGRESS NOTES
Physical Therapy  Attempted to treat pt this am; RN requesting PT to re attempt at a later time.    Lizy Broussard PTA

## 2021-08-04 NOTE — PLAN OF CARE
Problem: Pain:  Goal: Patient's pain/discomfort is manageable  Description: Patient's pain/discomfort is manageable  8/3/2021 2155 by Sheila Vazquez RN  Outcome: Ongoing  Note: Pt taking IV pain medication (pt NPO) Pt also taking IV Ativan for muscle spasms. Will continue to rate pain with the 0-10 pain rating scale. Problem: Skin Integrity:  Goal: Skin integrity will stabilize  Description: Skin integrity will stabilize  Outcome: Ongoing  Note: No skin breakdown at this time. Skin intact w/ exception to sc sites. Problem: Nausea/Vomiting:  Goal: Absence of nausea/vomiting  Description: Absence of nausea/vomiting  8/3/2021 2155 by Sheila Vazquez RN  Outcome: Ongoing     Problem: Urinary Elimination:  Goal: Complications related to the disease process, condition or treatment will be avoided or minimized  Description: Complications related to the disease process, condition or treatment will be avoided or minimized  Outcome: Ongoing  Note: Pt has a lópez catheter per urology for urinary retention. Plan for a voiding trial in the future. López care done to prevent infection.

## 2021-08-04 NOTE — PROGRESS NOTES
MD at bedside. NG clamped at this time. KUB ordered for 1045. Per MD, OK to hook back to low wall suction if pt has emesis.

## 2021-08-04 NOTE — PROGRESS NOTES
Hospitalist Progress Note      PCP: Karen Melendez MD    Date of Admission: 7/29/2021    Chief Complaint: Chronic lumbar pain and bilateral foot burning    Hospital Course: S/p L4-S1 ANTERIOR LUMBAR INTERBODY FUSION WITH PEDICLE SCREW FIXATION on 07/30/2021. Patient was unable to void. Was complaining of abdominal pain. Abdominal x-ray was obtained and showed moderate amount of stool. Bladder scan showed 750 ml. Patient was straight cathed. UA was negative for infection. Patient sats dropped to 86% and was placed on 2 L of oxygen. We were asked to see/evaluate by Marianne Connor. Drew Preston MD for medical management of chronic conditions, urinary retention and hypoxia    Subjective:      Pt had N/V earlier,  having diarrhea today. Remains  NPO  with NG tube in place to LIWS.   Family at bedside      Medications:  Reviewed    Infusion Medications    lactated ringers 100 mL/hr at 08/04/21 4260    sodium chloride       Scheduled Medications    metoprolol  5 mg Intravenous Q6H    [Held by provider] sucralfate  1 g Oral 4 times per day    pantoprazole  40 mg Intravenous Daily    fluticasone  1 spray Each Nostril Daily    SMOG Enema  330 mL Rectal Once    [Held by provider] pravastatin  20 mg Oral Nightly    [Held by provider] tamsulosin  0.4 mg Oral Daily    lidocaine  1 patch Transdermal Daily    [Held by provider] sennosides-docusate sodium  2 tablet Oral BID    [Held by provider] polyethylene glycol  17 g Oral Daily    scopolamine  1 patch Transdermal Q72H    [Held by provider] metoprolol succinate  25 mg Oral Daily    [Held by provider] buPROPion  150 mg Oral QAM    [Held by provider] cetirizine  10 mg Oral Daily    [Held by provider] losartan  50 mg Oral Daily    [Held by provider] verapamil  120 mg Oral Nightly    sodium chloride flush  5-40 mL Intravenous 2 times per day    enoxaparin  40 mg Subcutaneous Daily     PRN Meds: HYDROmorphone, hydrALAZINE, LORazepam, diazePAM, promethazine, guaiFENesin-dextromethorphan, ondansetron, [Held by provider] oxyCODONE **OR** [Held by provider] oxyCODONE, [Held by provider] calcium carbonate, benzocaine-menthol, sodium chloride flush, sodium chloride      Intake/Output Summary (Last 24 hours) at 8/4/2021 1037  Last data filed at 8/4/2021 6218  Gross per 24 hour   Intake 0 ml   Output 2000 ml   Net -2000 ml       Physical Exam Performed:    BP (!) 166/82   Pulse 79   Temp 99.6 °F (37.6 °C) (Oral)   Resp 18   Ht 5' 3\" (1.6 m)   Wt 259 lb (117.5 kg)   LMP  (LMP Unknown)   SpO2 94%   BMI 45.88 kg/m²     General appearance: No apparent distress, appears stated age and cooperative. Morbidly obese  HEENT: Normal cephalic, atraumatic without obvious deformity. Pupils equal, round, and reactive to light. Extra ocular muscles intact. Conjunctivae/corneas clear. Neck: Supple, with full range of motion. No jugular venous distention. Trachea midline. Respiratory:  Normal respiratory effort. Bibasilar Rales cardiovascular: Regular rate and rhythm with normal S1/S2 without murmurs, rubs or gallops. Abdomen: Soft, non-tender, non-distended with normal bowel sounds. Suprapubic incision site is clean with no overt discharge. Abdominal binder in place  Musculoskeletal: No clubbing, cyanosis or edema bilaterally. Skin: Warm and dry  Neurologic:  Alert, speech clear with no overt facial droop  Psychiatric: Awake        Labs:   Recent Labs     08/01/21  1107 08/04/21  0436   WBC 7.0 6.1   HGB 11.5* 11.3*   HCT 33.0* 32.5*    279     Recent Labs     08/01/21  1109 08/04/21  0436   * 138   K 3.6 3.7    103   CO2 24 25   BUN 6* 8   CREATININE <0.5* <0.5*   CALCIUM 8.5 8.5   PHOS  --  2.7     No results for input(s): AST, ALT, BILIDIR, BILITOT, ALKPHOS in the last 72 hours. No results for input(s): INR in the last 72 hours. No results for input(s): Kerry Belts in the last 72 hours.     Urinalysis:      Lab Results   Component Value Date    NITRU Negative 07/31/2021    WBCUA None seen 07/31/2021    RBCUA 0-2 07/31/2021    BLOODU TRACE-LYSED 07/31/2021    SPECGRAV 1.010 07/31/2021    GLUCOSEU Negative 07/31/2021       Radiology:  XR ABDOMEN (2 VIEWS)   Final Result      Supine and erect views demonstrate mild gaseous distention of small bowel loops in the right midabdomen with air-fluid levels. The findings are progressive since 8/2/2021 and suggest worsening ileus or low-grade obstruction. No free intraperitoneal air. Lumbosacral metallic fixation noted. XR ABDOMEN (KUB) (SINGLE AP VIEW)   Final Result   Impression:    A few nondilated small bowel loops with air-fluid levels, unchanged in appearance. No evidence of high-grade obstruction. CT ABDOMEN PELVIS W IV CONTRAST Additional Contrast? None   Final Result      Interval development of bibasilar atelectatic changes. There is a midline anterior abdominal wall hernia present with herniation of bowel loops and large amount of omental fat. This suggests incisional hernia. Foci of extraluminal air or gas are seen in the left lower quadrant with associated stranding. There is also free fluid noted. This may be due to recent postsurgical changes and should be correlated with patient's history. Alternatively this may    represent infectious process or possibly ruptured viscus. Small amounts of extraluminal air are also seen within the soft tissues of the anterior abdomen and in the region of the hernia sac which may  be related to recent surgery but the possibility of infectious process or free foci of air within the    peritoneal cavity lying within the hernia are considerations. XR CHEST PORTABLE   Final Result      No acute disease         XR ABDOMEN (KUB) (SINGLE AP VIEW)   Final Result      No bowel obstruction. Moderate retained stool in the colon.       XR LUMBAR SPINE (2-3 VIEWS)   Final Result      3 coned-down frontal and lateral intraoperative views demonstrate postoperative changes at L4-5 and L5-S1. No retained surgical instrument is seen. XR LUMBAR SPINE (2-3 VIEWS)   Final Result         Coned-down intraoperative views of the lumbar spine assumes 5 lumbar type vertebral bodies. There is anterior interbody fusion at L4-5 and L5-S1 with radiolucent interbody devices. Fluoroscopy time 1 minute      FLUORO FOR SURGICAL PROCEDURES   Final Result         Coned-down intraoperative views of the lumbar spine assumes 5 lumbar type vertebral bodies. There is anterior interbody fusion at L4-5 and L5-S1 with radiolucent interbody devices. Fluoroscopy time 1 minute      CT GUIDED STEREOTACTIC LOCALIZATION   Final Result      CT GUIDED STEREOTACTIC LOCALIZATION   Final Result              Assessment/Plan:    Chronic back pain with bilateral feet burning:  Status post L4-S1 ANTERIOR LUMBAR INTERBODY FUSION WITH PEDICLE SCREW FIXATION   Continue as needed pain medication  Management per neurosurgery     Hypoxia:Likely secondary to receiving fluids pre and postoperatively. CXR was non acute. Received IV Lasix. On 2L O2 currently. Encourage incentive spirometry. Monitor sats, wean oxygen as tolerated to keep sats more than 90%.    Abdominal pain/constipation with incisional hernia: dev fascial dehiscence after coughing spell on 8/1. CT abd/pelvis showed incisional hernia with bowel and omental fat. Underwent primary repair and closure of abdominal wall fascial wound on 1/8. Patient was having nausea with vomiting on 2/8. Ileus versus SBO noted on x-ray. Gen surg managing- NPO, NG tube to LIWS. AXR  pending      Urinary retention: improved after lópez. Continue Flomax. Consider voiding trial lwhen more ambulatory  per urology     Hypertension: Fairly controlled. Continue IV hydralazine as needed per parameters placed.   Resume home p.o. meds when no longer NPO.         Hyperlipidemia:   statin on hold as n.p.o.     Depression:  Continue Wellbutrin when no longer n.p.o.     Sleep apnea:  Continue CPAP    DVT Prophylaxis: Lovenox  Diet: Diet NPO  Code Status: Full Code    PT/OT Eval Status: Per neurosurgery    Dispo -per neurosurgery    Jacquelyn Friedman MD

## 2021-08-04 NOTE — PROGRESS NOTES
Surgery Daily Progress Note      CC: Nausea and vomiting    SUBJECTIVE:  Patient denies N/V. Denies abdominal pain. Passing gas no BMs, State she feels better since NGT.    ROS:   A 14 point review of systems was conducted, significant findings as noted above. All other systems negative. OBJECTIVE:    PHYSICAL EXAM:  Vitals:    08/03/21 1914 08/03/21 2326 08/04/21 0030 08/04/21 0220   BP: (!) 163/84 (!) 186/89 (!) 179/79 (!) 173/88   Pulse: 90 89  87   Resp: 17 18  18   Temp: 98.6 °F (37 °C) 99.5 °F (37.5 °C)  99.5 °F (37.5 °C)   TempSrc: Oral Oral  Oral   SpO2: 94% 96%  92%   Weight:       Height:           General appearance: alert, no acute distress, grooming appropriate  Neuro: A&Ox3, no focal deficits, sensation intact  HEENT: NG tube to CLWS  Chest/Lungs: normal effort, no accessory muscle use, on RA  Cardiovascular: RRR  Abdomen: soft, non-tender, non-distended, no guarding/rigidity  : grossly normal  Extremities: no edema, no cyanosis      ASSESSMENT & PLAN:   This is a 77 y.o. female who recently underwent primary repair and closure of abdominal wall fascia on 8/1/2021 s/p wound dehiscence of an ALIF incision from 7/28/2021. The patient reports that she has experienced nausea, abdominal discomfort, and PO intolerance since her procedure on 8/1/2021.  Abdominal x-ray yesterday revealed small bowel dilation consistent with ileus.    - Continue NGT CLWS  - Gastrografin this AM  - KUB in 4 hours  - Minimize narcotics    Kermit Schwab Jorski, DO  08/04/21  6:11 AM  [phone]

## 2021-08-04 NOTE — PROGRESS NOTES
Pt having frequent liquid BMs. Full bed change done but pt continued to have constant diarrhea. While getting cleaned up, pt became nauseous and started to vomit. As of now pt was cleaned and placed on bedpan. Contacted surgical resident and instructed to hook pt back up to NGT. Will continue to monitor.

## 2021-08-04 NOTE — PLAN OF CARE
Problem: Pain:  Goal: Pain level will decrease  Description: Pain level will decrease  Outcome: Ongoing  Note: No c/o of pain from pt during this shift. Repositioned pt as needed. Problem: Falls - Risk of:  Goal: Will remain free from falls  Description: Will remain free from falls  Outcome: Ongoing  Note: Pt has remained free of falls this shift. Fall precautions in place, bed in lowest position, and call light in reach of pt.

## 2021-08-04 NOTE — PROGRESS NOTES
NEUROSURGERY POST-OP PROGRESS NOTE    Patient Name: Jannette Novoa YOB: 1955   Sex: Female Age: 77 yrs     Medical Record Number: [id number] Laurence Number: [de-identified]   Room Number: 9722/9256-67 Hospital Day: Hospital Day: 7     Interval History:  Post-operative Day# 3 s/p Procedure(s) (LRB):  REPAIR OF WOUND DEHISCENCE (N/A)    Subjective: Pt had just had liquid BM and had vomited. NG hooked back up to suction. Objective:    VITAL SIGNS   BP (!) 173/88 Comment: MD notified  Pulse 87   Temp 99.5 °F (37.5 °C) (Oral)   Resp 18   Ht 5' 3\" (1.6 m)   Wt 259 lb (117.5 kg)   LMP  (LMP Unknown)   SpO2 92%   BMI 45.88 kg/m²    Height Height: 5' 3\" (160 cm)   Weight Weight: 259 lb (117.5 kg)        Allergies Allergies   Allergen Reactions    Latex Rash    Reglan [Metoclopramide Hcl] Rash    Univasc [Moexipril] Other (See Comments)     unsure    Crestor [Rosuvastatin]      myalgia    Celecoxib Palpitations    Keflex [Cephalexin] Diarrhea    Lipitor Other (See Comments)     myalgia    Metoclopramide Anxiety    Prochlorperazine Anxiety    Prochlorperazine Edisylate     Sulfa Antibiotics Nausea And Vomiting    Vioxx       NPO Status Diet NPO   Isolation No active isolations     LABS   Basic Metabolic Profile Recent Labs     08/01/21  1109 08/04/21  0436   * 138    103   CO2 24 25   BUN 6* 8   CREATININE <0.5* <0.5*   GLUCOSE 108* 95   PHOS  --  2.7   MG  --  2.50*  1.90      Complete Blood Count Recent Labs     08/01/21  1107 08/04/21  0436   WBC 7.0 6.1   RBC 3.59* 3.53*      Coagulation Studies No results for input(s): PTT, INR in the last 72 hours.     Invalid input(s): PLATELETS, PROA, PT, PTTA     MEDICATIONS   Inpatient Medications     labetalol, 5 mg, Intravenous, Once    metoprolol, 5 mg, Intravenous, Q6H    [Held by provider] sucralfate, 1 g, Oral, 4 times per day    pantoprazole, 40 mg, Intravenous, Daily    fluticasone, 1 spray, Each Nostril, Daily    SMOG Enema, 330 mL, Rectal, Once    [Held by provider] pravastatin, 20 mg, Oral, Nightly    [Held by provider] tamsulosin, 0.4 mg, Oral, Daily    lidocaine, 1 patch, Transdermal, Daily    [Held by provider] sennosides-docusate sodium, 2 tablet, Oral, BID    [Held by provider] polyethylene glycol, 17 g, Oral, Daily    scopolamine, 1 patch, Transdermal, Q72H    [Held by provider] metoprolol succinate, 25 mg, Oral, Daily    [Held by provider] buPROPion, 150 mg, Oral, QAM    [Held by provider] cetirizine, 10 mg, Oral, Daily    [Held by provider] losartan, 50 mg, Oral, Daily    [Held by provider] verapamil, 120 mg, Oral, Nightly    sodium chloride flush, 5-40 mL, Intravenous, 2 times per day    enoxaparin, 40 mg, Subcutaneous, Daily   Infusions    lactated ringers 100 mL/hr at 08/04/21 0634    sodium chloride        Antibiotics   Recent Abx Admin      No antibiotic orders with administrations found. Neurologic Exam:  Mental status: awake and alert and oriented x4    Musculoskeletal:   Gait: Not tested   Tone: normal  Sensory: intact to all extremities  Motor strength:    Right  Left    Right  Left    Deltoid  5 5  Hip Flex  5 5   Biceps  5 5  Knee Extensors  5 5   Triceps  5 5  Knee Flexors  5 5   Wrist Ext  5 5  Ankle Dorsiflex. 5 5   Wrist Flex  5 5  Ankle Plantarflex. 5 5   Handgrip  5 5  Ext Bib Longus  5 5   Thumb Ext  5 5         Incision: intact, clean and dry  NGT:600    Respiratory:  Unlabored respiratory pattern    Abdomen:   Soft, ND   Last BM today frequent liquid BM's    Cardiovascular:  Warm, well perfused    Assessment   Patient is a 78 yo F s/p Procedure(s) (LRB):  REPAIR OF WOUND DEHISCENCE (N/A) per  and L4-S1 ANTERIOR LUMBAR INTERBODY FUSION WITH PEDICLE SCREW FIXATION per Shivam. Plan:  1.  Neurologic exam frequency:q4  2. Mobility:PT/OT as tolerated  3. DVT Prophylaxis: SCDs and lovenox   4. Bowel Regimen: glycolax and senna  5. Pain control:she and robaxin  6. Incisional Care:open to air  7. Abdominal binder at all times  8. ileus treatment and recommendations per general surgery      Dispo: inpt  Patient was seen with Dr. Grace Zimmer who agrees with above assessment and plan. Electronically signed by:  LISA Navarro CNP, 8/4/2021 7:15 AM   Neurosurgery Nurse Practitioner  [phone]

## 2021-08-05 ENCOUNTER — APPOINTMENT (OUTPATIENT)
Dept: GENERAL RADIOLOGY | Age: 66
DRG: 453 | End: 2021-08-05
Attending: NEUROLOGICAL SURGERY
Payer: MEDICARE

## 2021-08-05 LAB
ALBUMIN SERPL-MCNC: 3.2 G/DL (ref 3.4–5)
ANION GAP SERPL CALCULATED.3IONS-SCNC: 12 MMOL/L (ref 3–16)
BASOPHILS ABSOLUTE: 0 K/UL (ref 0–0.2)
BASOPHILS RELATIVE PERCENT: 0.5 %
BUN BLDV-MCNC: 9 MG/DL (ref 7–20)
CALCIUM SERPL-MCNC: 8.7 MG/DL (ref 8.3–10.6)
CHLORIDE BLD-SCNC: 101 MMOL/L (ref 99–110)
CO2: 26 MMOL/L (ref 21–32)
CREAT SERPL-MCNC: <0.5 MG/DL (ref 0.6–1.2)
EOSINOPHILS ABSOLUTE: 0.2 K/UL (ref 0–0.6)
EOSINOPHILS RELATIVE PERCENT: 2.2 %
GFR AFRICAN AMERICAN: >60
GFR NON-AFRICAN AMERICAN: >60
GLUCOSE BLD-MCNC: 89 MG/DL (ref 70–99)
HCT VFR BLD CALC: 32.2 % (ref 36–48)
HEMOGLOBIN: 11.3 G/DL (ref 12–16)
LYMPHOCYTES ABSOLUTE: 1.2 K/UL (ref 1–5.1)
LYMPHOCYTES RELATIVE PERCENT: 17.3 %
MAGNESIUM: 2.5 MG/DL (ref 1.8–2.4)
MCH RBC QN AUTO: 32.2 PG (ref 26–34)
MCHC RBC AUTO-ENTMCNC: 35.1 G/DL (ref 31–36)
MCV RBC AUTO: 91.8 FL (ref 80–100)
MONOCYTES ABSOLUTE: 0.8 K/UL (ref 0–1.3)
MONOCYTES RELATIVE PERCENT: 11 %
NEUTROPHILS ABSOLUTE: 4.8 K/UL (ref 1.7–7.7)
NEUTROPHILS RELATIVE PERCENT: 69 %
PDW BLD-RTO: 13.3 % (ref 12.4–15.4)
PHOSPHORUS: 3 MG/DL (ref 2.5–4.9)
PLATELET # BLD: 322 K/UL (ref 135–450)
PMV BLD AUTO: 6.5 FL (ref 5–10.5)
POTASSIUM SERPL-SCNC: 3.6 MMOL/L (ref 3.5–5.1)
RBC # BLD: 3.51 M/UL (ref 4–5.2)
SODIUM BLD-SCNC: 139 MMOL/L (ref 136–145)
WBC # BLD: 6.9 K/UL (ref 4–11)

## 2021-08-05 PROCEDURE — 6370000000 HC RX 637 (ALT 250 FOR IP): Performed by: INTERNAL MEDICINE

## 2021-08-05 PROCEDURE — 2580000003 HC RX 258: Performed by: PHYSICIAN ASSISTANT

## 2021-08-05 PROCEDURE — 71045 X-RAY EXAM CHEST 1 VIEW: CPT

## 2021-08-05 PROCEDURE — 6370000000 HC RX 637 (ALT 250 FOR IP): Performed by: NURSE PRACTITIONER

## 2021-08-05 PROCEDURE — 6360000002 HC RX W HCPCS: Performed by: STUDENT IN AN ORGANIZED HEALTH CARE EDUCATION/TRAINING PROGRAM

## 2021-08-05 PROCEDURE — 2500000003 HC RX 250 WO HCPCS: Performed by: STUDENT IN AN ORGANIZED HEALTH CARE EDUCATION/TRAINING PROGRAM

## 2021-08-05 PROCEDURE — 1200000000 HC SEMI PRIVATE

## 2021-08-05 PROCEDURE — 6360000002 HC RX W HCPCS: Performed by: PHYSICIAN ASSISTANT

## 2021-08-05 PROCEDURE — 83735 ASSAY OF MAGNESIUM: CPT

## 2021-08-05 PROCEDURE — 99232 SBSQ HOSP IP/OBS MODERATE 35: CPT | Performed by: SURGERY

## 2021-08-05 PROCEDURE — C9113 INJ PANTOPRAZOLE SODIUM, VIA: HCPCS | Performed by: SURGERY

## 2021-08-05 PROCEDURE — 36415 COLL VENOUS BLD VENIPUNCTURE: CPT

## 2021-08-05 PROCEDURE — 6360000002 HC RX W HCPCS: Performed by: INTERNAL MEDICINE

## 2021-08-05 PROCEDURE — 85025 COMPLETE CBC W/AUTO DIFF WBC: CPT

## 2021-08-05 PROCEDURE — 80069 RENAL FUNCTION PANEL: CPT

## 2021-08-05 PROCEDURE — 6360000002 HC RX W HCPCS: Performed by: SURGERY

## 2021-08-05 PROCEDURE — 74018 RADEX ABDOMEN 1 VIEW: CPT

## 2021-08-05 PROCEDURE — 97530 THERAPEUTIC ACTIVITIES: CPT

## 2021-08-05 PROCEDURE — 97116 GAIT TRAINING THERAPY: CPT

## 2021-08-05 RX ORDER — ACETAMINOPHEN 325 MG/1
650 TABLET ORAL EVERY 4 HOURS PRN
Status: DISCONTINUED | OUTPATIENT
Start: 2021-08-05 | End: 2021-08-13 | Stop reason: HOSPADM

## 2021-08-05 RX ORDER — POTASSIUM CHLORIDE 7.45 MG/ML
10 INJECTION INTRAVENOUS
Status: COMPLETED | OUTPATIENT
Start: 2021-08-05 | End: 2021-08-05

## 2021-08-05 RX ADMIN — POTASSIUM CHLORIDE 10 MEQ: 7.46 INJECTION, SOLUTION INTRAVENOUS at 10:27

## 2021-08-05 RX ADMIN — DIAZEPAM 5 MG: 5 TABLET ORAL at 19:42

## 2021-08-05 RX ADMIN — Medication 10 ML: at 11:17

## 2021-08-05 RX ADMIN — HYDROMORPHONE HYDROCHLORIDE 0.5 MG: 1 INJECTION, SOLUTION INTRAMUSCULAR; INTRAVENOUS; SUBCUTANEOUS at 05:49

## 2021-08-05 RX ADMIN — METOPROLOL TARTRATE 5 MG: 5 INJECTION INTRAVENOUS at 18:56

## 2021-08-05 RX ADMIN — POTASSIUM CHLORIDE 10 MEQ: 7.46 INJECTION, SOLUTION INTRAVENOUS at 11:33

## 2021-08-05 RX ADMIN — METOPROLOL TARTRATE 5 MG: 5 INJECTION INTRAVENOUS at 23:31

## 2021-08-05 RX ADMIN — METOPROLOL TARTRATE 5 MG: 5 INJECTION INTRAVENOUS at 11:16

## 2021-08-05 RX ADMIN — FLUTICASONE PROPIONATE 1 SPRAY: 50 SPRAY, METERED NASAL at 11:33

## 2021-08-05 RX ADMIN — METOPROLOL TARTRATE 5 MG: 5 INJECTION INTRAVENOUS at 05:48

## 2021-08-05 RX ADMIN — PANTOPRAZOLE SODIUM 40 MG: 40 INJECTION, POWDER, FOR SOLUTION INTRAVENOUS at 11:16

## 2021-08-05 RX ADMIN — ACETAMINOPHEN 650 MG: 325 TABLET ORAL at 21:48

## 2021-08-05 RX ADMIN — ENOXAPARIN SODIUM 40 MG: 40 INJECTION SUBCUTANEOUS at 11:16

## 2021-08-05 RX ADMIN — POTASSIUM CHLORIDE 10 MEQ: 7.46 INJECTION, SOLUTION INTRAVENOUS at 12:46

## 2021-08-05 RX ADMIN — ONDANSETRON 4 MG: 2 INJECTION INTRAMUSCULAR; INTRAVENOUS at 16:34

## 2021-08-05 RX ADMIN — POTASSIUM CHLORIDE 10 MEQ: 7.46 INJECTION, SOLUTION INTRAVENOUS at 09:01

## 2021-08-05 ASSESSMENT — PAIN DESCRIPTION - PROGRESSION
CLINICAL_PROGRESSION: NOT CHANGED
CLINICAL_PROGRESSION: NOT CHANGED

## 2021-08-05 ASSESSMENT — PAIN DESCRIPTION - PAIN TYPE
TYPE: SURGICAL PAIN
TYPE: SURGICAL PAIN

## 2021-08-05 ASSESSMENT — PAIN - FUNCTIONAL ASSESSMENT
PAIN_FUNCTIONAL_ASSESSMENT: PREVENTS OR INTERFERES SOME ACTIVE ACTIVITIES AND ADLS
PAIN_FUNCTIONAL_ASSESSMENT: ACTIVITIES ARE NOT PREVENTED

## 2021-08-05 ASSESSMENT — PAIN DESCRIPTION - ORIENTATION
ORIENTATION: MID
ORIENTATION: MID

## 2021-08-05 ASSESSMENT — PAIN DESCRIPTION - FREQUENCY
FREQUENCY: INTERMITTENT
FREQUENCY: CONTINUOUS

## 2021-08-05 ASSESSMENT — PAIN SCALES - GENERAL
PAINLEVEL_OUTOF10: 7
PAINLEVEL_OUTOF10: 3

## 2021-08-05 ASSESSMENT — PAIN DESCRIPTION - LOCATION
LOCATION: ABDOMEN;BACK
LOCATION: ABDOMEN

## 2021-08-05 ASSESSMENT — PAIN DESCRIPTION - DESCRIPTORS
DESCRIPTORS: ACHING
DESCRIPTORS: ACHING

## 2021-08-05 ASSESSMENT — PAIN DESCRIPTION - ONSET
ONSET: ON-GOING
ONSET: ON-GOING

## 2021-08-05 NOTE — PROGRESS NOTES
Surgery Daily Progress Note      CC: Nausea and vomiting    SUBJECTIVE:  Having some nausea but no vomiting. Passing gas and having BM, denies abdominal pain     ROS:   A 14 point review of systems was conducted, significant findings as noted above. All other systems negative. OBJECTIVE:    PHYSICAL EXAM:  Vitals:    08/04/21 2306 08/05/21 0000 08/05/21 0251 08/05/21 0307   BP: (!) 166/78  (!) 143/61 (!) 153/69   Pulse: 79  76 76   Resp: 15  16 16   Temp: 100.1 °F (37.8 °C) 98.9 °F (37.2 °C) 98.3 °F (36.8 °C) 98.4 °F (36.9 °C)   TempSrc: Oral  Oral Oral   SpO2: 92%  95% 95%   Weight:       Height:           General appearance: alert, no acute distress, grooming appropriate  Neuro: A&Ox3, no focal deficits, sensation intact  HEENT: NG tube to CLWS  Chest/Lungs: normal effort, no accessory muscle use, on RA  Cardiovascular: RRR  Abdomen: soft, non-tender, non-distended, no guarding/rigidity, midline incision with prineo in place  : grossly normal  Extremities: no edema, no cyanosis      ASSESSMENT & PLAN:   This is a 77 y.o. female who recently underwent primary repair and closure of abdominal wall fascia on 8/1/2021 s/p wound dehiscence of an ALIF incision from 7/28/2021. The patient reports that she has experienced nausea, abdominal discomfort, and PO intolerance since her procedure on 8/1/2021. Abdominal x-ray revealed small bowel dilation consistent with ileus.     - clamp NGT this am, check residuals in 4 hours, if low and no nausea will remove NGT   - Minimize narcotics    Ivet Damian MD, PGY-1  08/05/21 7:05 AM  Pager: [phone]

## 2021-08-05 NOTE — PLAN OF CARE
Problem: Pain:  Goal: Pain level will decrease  Description: Pain level will decrease  Outcome: Ongoing  Pt c/o minimal pain at this time, rating it 3/10. Denies wanting any pain medication at this time. Instructed pt to call for them if needed. Problem: Falls - Risk of:  Goal: Will remain free from falls  Description: Will remain free from falls  Outcome: Ongoing  Fall precautions in place. Bed is in lowest position, wheels locked, bed alarm on, non skid socks on. Call light and bedside table within reach. Pt calls out appropriately. Pt is up x1 with gb and walker. Will continue to assess and monitor. Problem: Nausea/Vomiting:  Goal: Absence of nausea/vomiting  Description: Absence of nausea/vomiting  Outcome: Ongoing  Pt denies n/v. NG pulled earlier today and pt now tolerating clear liquids.

## 2021-08-05 NOTE — PROGRESS NOTES
Pt is A&O, VSS. NG pulled per surgery team and pt now attempting clear liquids - tolerating well and denying n/v at this time. C/o some discomfort in back and abd, but denying any pain medications at this time. Up x1 with gb and walker. Surgical sites are all CD&I. Abd binder in place. All fall precautions in place. Call light within reach.

## 2021-08-05 NOTE — CARE COORDINATION
Case Management Daily Note                    Date: 8/5/2021     Patient Name: Nedra Johnston    Date of Admission: 7/29/2021  5:22 AM  YOB: 1955    Length of Stay: 7  GMLOS: 3.8         Patient Admission Status: Inpatient  Diagnosis:Spondylolisthesis of lumbar region [M43.16]  Lumbar stenosis with neurogenic claudication [M48.062]     ________________________________________________________________________________________  Discharge Plan: SNF: [facility]: [id number]    Insurance: Payor: Marianne Daveer / Plan: Savilla Gloria PLUS HMO / Product Type: *No Product type* /   Is pre-cert/notification needed: yes, initiated 8/2     Tentative discharge date: 8/8    Current barriers: Medical Clearance     Referrals completed: SNF: [facility]     Resources/ information provided: [facility] List   ________________________________________________________________________________________  PT AM-PAC: 15 / 24 per last evaluation on: 8/2    OT AM-PAC: 17 / 24 per last evaluation on: 8/2     DME Needs for discharge: defer  ________________________________________________________________________________________  Notes/Plan of Care:   SW rounded on this date. Cert was received with initial date of 8/4 and last date of 8/6. If patient unable to DC on Friday will need to re-start pre-cert. SW spoke with patient at bedside. She is agreeable to [facility]. Unknown if she will be medically ready tomorrow. SW to follow. Amairani Coreas and/or her family were provided with choice of provider; she and/or her family are in agreement with the discharge plan at this time.     Care Transition Patient: JOYA Crowell  [facility]   Case Management Department  Ph: [phone]

## 2021-08-05 NOTE — PROGRESS NOTES
NEUROSURGERY POST-OP PROGRESS NOTE    Patient Name: Sharyle Condon YOB: 1955   Sex: Female Age: 77 yrs     Medical Record Number: [id number] Laurence Number: [[phone]]   Room Number: 3143/4867-78 Hospital Day: Hospital Day: 8     Interval History:  Post-operative Day# 4 s/p Procedure(s) (LRB):  REPAIR OF WOUND DEHISCENCE (N/A) POD #6 s/p Procedure(s) (LRB):  L4-S1 ANTERIOR LUMBAR INTERBODY FUSION WITH PEDICLE SCREW FIXATION (N/A)    Subjective: Having some nausea no vomiting, passing stool  Objective:    VITAL SIGNS   BP (!) 162/70   Pulse 73   Temp 98.6 °F (37 °C) (Axillary)   Resp 16   Ht 5' 3\" (1.6 m)   Wt 259 lb (117.5 kg)   LMP  (LMP Unknown)   SpO2 93%   BMI 45.88 kg/m²    Height Height: 5' 3\" (160 cm)   Weight Weight: 259 lb (117.5 kg)        Allergies Allergies   Allergen Reactions    Latex Rash    Reglan [Metoclopramide Hcl] Rash    Univasc [Moexipril] Other (See Comments)     unsure    Crestor [Rosuvastatin]      myalgia    Celecoxib Palpitations    Keflex [Cephalexin] Diarrhea    Lipitor Other (See Comments)     myalgia    Metoclopramide Anxiety    Prochlorperazine Anxiety    Prochlorperazine Edisylate     Sulfa Antibiotics Nausea And Vomiting    Vioxx       NPO Status Diet NPO   Isolation No active isolations     LABS   Basic Metabolic Profile Recent Labs     08/04/21  0436 08/05/21  0533    139    101   CO2 25 26   BUN 8 9   CREATININE <0.5* <0.5*   GLUCOSE 95 89   PHOS 2.7 3.0   MG 2.50*  1.90 2.50*      Complete Blood Count Recent Labs     08/04/21  0436 08/05/21  0533   WBC 6.1 6.9   RBC 3.53* 3.51*      Coagulation Studies No results for input(s): PTT, INR in the last 72 hours.     Invalid input(s): PLATELETS, PROA, PT, PTTA     MEDICATIONS   Inpatient Medications     potassium chloride, 10 mEq, Intravenous, Q1H    metoprolol, 5 mg, Intravenous, Q6H    [Held by provider] sucralfate, 1 g, Oral, 4 times per day    pantoprazole, 40 mg, Intravenous, Daily    fluticasone, 1 spray, Each Nostril, Daily    SMOG Enema, 330 mL, Rectal, Once    [Held by provider] pravastatin, 20 mg, Oral, Nightly    [Held by provider] tamsulosin, 0.4 mg, Oral, Daily    lidocaine, 1 patch, Transdermal, Daily    [Held by provider] sennosides-docusate sodium, 2 tablet, Oral, BID    [Held by provider] polyethylene glycol, 17 g, Oral, Daily    scopolamine, 1 patch, Transdermal, Q72H    [Held by provider] metoprolol succinate, 25 mg, Oral, Daily    [Held by provider] buPROPion, 150 mg, Oral, QAM    [Held by provider] cetirizine, 10 mg, Oral, Daily    [Held by provider] losartan, 50 mg, Oral, Daily    [Held by provider] verapamil, 120 mg, Oral, Nightly    sodium chloride flush, 5-40 mL, Intravenous, 2 times per day    enoxaparin, 40 mg, Subcutaneous, Daily   Infusions    lactated ringers 100 mL/hr at 21 0634    sodium chloride        Antibiotics   Recent Abx Admin      No antibiotic orders with administrations found. Neurologic Exam:  Mental status: awake and alert and oriented x4    Musculoskeletal:   Gait: Not tested   Tone: normal  Sensory: intact to all extremities  Motor strength:    Right  Left    Right  Left    Deltoid  5 5  Hip Flex  5 5   Biceps  5 5  Knee Extensors  5 5   Triceps  5 5  Knee Flexors  5 5   Wrist Ext  5 5  Ankle Dorsiflex. 5 5   Wrist Flex  5 5  Ankle Plantarflex.   5 5   Handgrip  5 5  Ext Bib Longus  5 5   Thumb Ext  5 5         Incision: intact, clean and dry  N    Respiratory:  Unlabored respiratory pattern    Abdomen:   Soft, ND   Last BM     Cardiovascular:  Warm, well perfused    Assessment   Patient is a 76 yo F s/p:  REPAIR OF WOUND DEHISCENCE (N/A)  s/p Procedure(s) (LRB):  L4-S1 ANTERIOR LUMBAR INTERBODY FUSION WITH PEDICLE SCREW FIXATION (N/A)    Plan:  1. Neurologic exam frequency:q4  2. Mobility:PT/OT as tolerated  3. DVT Prophylaxis: SCDs and lovenox   4. Bowel Regimen: glycolax and senna  5. Pain control:she and robaxin  6. Incisional Care:open to air  7. Abdominal binder at all times  8. ileus treatment and recommendations per general surgery  Patient was seen with Dr. Sandee Roger who agrees with above assessment and plan. Electronically signed by:  LISA Evasn CNP, 8/5/2021 8:13 AM   Neurosurgery Nurse Practitioner  [phone]

## 2021-08-05 NOTE — PROGRESS NOTES
NG clamped for four hours. Residuals checked.   75 cc of gastric watery content returned  NG d/c  Clear liquids ordered

## 2021-08-05 NOTE — PROGRESS NOTES
Hospitalist Progress Note      PCP: Karen Melendez MD    Date of Admission: 7/29/2021    Chief Complaint: Chronic lumbar pain and bilateral foot burning    Hospital Course: S/p L4-S1 ANTERIOR LUMBAR INTERBODY FUSION WITH PEDICLE SCREW FIXATION on 07/30/2021. Patient was unable to void. Was complaining of abdominal pain. Abdominal x-ray was obtained and showed moderate amount of stool. Bladder scan showed 750 ml. Patient was straight cathed. UA was negative for infection. Patient sats dropped to 86% and was placed on 2 L of oxygen. We were asked to see/evaluate by Marianne Connor. Heather Duke MD for medical management of chronic conditions, urinary retention and hypoxia    Subjective:    Pt denied any SOB, N/V. Abd pain controlled .  On 2L 02        Medications:  Reviewed    Infusion Medications    lactated ringers 100 mL/hr at 08/04/21 8050    sodium chloride       Scheduled Medications    potassium chloride  10 mEq Intravenous Q1H    metoprolol  5 mg Intravenous Q6H    [Held by provider] sucralfate  1 g Oral 4 times per day    pantoprazole  40 mg Intravenous Daily    fluticasone  1 spray Each Nostril Daily    SMOG Enema  330 mL Rectal Once    [Held by provider] pravastatin  20 mg Oral Nightly    [Held by provider] tamsulosin  0.4 mg Oral Daily    lidocaine  1 patch Transdermal Daily    [Held by provider] sennosides-docusate sodium  2 tablet Oral BID    [Held by provider] polyethylene glycol  17 g Oral Daily    scopolamine  1 patch Transdermal Q72H    [Held by provider] metoprolol succinate  25 mg Oral Daily    [Held by provider] buPROPion  150 mg Oral QAM    [Held by provider] cetirizine  10 mg Oral Daily    [Held by provider] losartan  50 mg Oral Daily    [Held by provider] verapamil  120 mg Oral Nightly    sodium chloride flush  5-40 mL Intravenous 2 times per day    enoxaparin  40 mg Subcutaneous Daily     PRN Meds: HYDROmorphone, hydrALAZINE, LORazepam, diazePAM, promethazine, guaiFENesin-dextromethorphan, ondansetron, [Held by provider] oxyCODONE **OR** [Held by provider] oxyCODONE, [Held by provider] calcium carbonate, benzocaine-menthol, sodium chloride flush, sodium chloride      Intake/Output Summary (Last 24 hours) at 8/5/2021 1020  Last data filed at 8/5/2021 0912  Gross per 24 hour   Intake 0 ml   Output 1000 ml   Net -1000 ml       Physical Exam Performed:    BP (!) 162/70   Pulse 73   Temp 98.6 °F (37 °C) (Axillary)   Resp 16   Ht 5' 3\" (1.6 m)   Wt 259 lb (117.5 kg)   LMP  (LMP Unknown)   SpO2 93%   BMI 45.88 kg/m²     General appearance: No apparent distress, appears stated age and cooperative. Morbidly obese  HEENT: Normal cephalic, atraumatic without obvious deformity. Pupils equal, round,   E. Conjunctivae/corneas clear. Neck: Supple, with full range of motion. No jugular venous distention. Trachea midline. Respiratory:  Normal respiratory effort. Bibasilar Rales cardiovascular: Regular rate and rhythm with normal S1/S2 without murmurs, rubs or gallops. Abdomen: Soft, non-tender, non-distended with normal bowel sounds. Suprapubic incision site is clean with no overt discharge. Abdominal binder in place  Musculoskeletal: No clubbing, cyanosis or edema bilaterally. Skin: Warm and dry  Neurologic:  Alert, speech clear with no overt facial droop  Psychiatric: Awake        Labs:   Recent Labs     08/04/21  0436 08/05/21  0533   WBC 6.1 6.9   HGB 11.3* 11.3*   HCT 32.5* 32.2*    322     Recent Labs     08/04/21  0436 08/05/21  0533    139   K 3.7 3.6    101   CO2 25 26   BUN 8 9   CREATININE <0.5* <0.5*   CALCIUM 8.5 8.7   PHOS 2.7 3.0     No results for input(s): AST, ALT, BILIDIR, BILITOT, ALKPHOS in the last 72 hours. No results for input(s): INR in the last 72 hours. No results for input(s): Cooper Dylan in the last 72 hours.     Urinalysis:      Lab Results   Component Value Date    NITRU Negative 07/31/2021    WBCUA None seen 07/31/2021    RBCUA 0-2 07/31/2021    BLOODU TRACE-LYSED 07/31/2021    SPECGRAV 1.010 07/31/2021    GLUCOSEU Negative 07/31/2021       Radiology:  XR ABDOMEN (KUB) (SINGLE AP VIEW)   Final Result   Impression:          1. Decreased small bowel dilation since the prior study. 2. Nasogastric tube can be retracted 3 cm for more optimal positioning. XR ABDOMEN (KUB) (SINGLE AP VIEW)   Final Result   Impression:   Enteric tube tip in the distal esophagus near gastroesophageal junction, recommend advancing. Decreased mild small bowel dilatation which may represent ileus. XR ABDOMEN (2 VIEWS)   Final Result      Supine and erect views demonstrate mild gaseous distention of small bowel loops in the right midabdomen with air-fluid levels. The findings are progressive since 8/2/2021 and suggest worsening ileus or low-grade obstruction. No free intraperitoneal air. Lumbosacral metallic fixation noted. XR ABDOMEN (KUB) (SINGLE AP VIEW)   Final Result   Impression:    A few nondilated small bowel loops with air-fluid levels, unchanged in appearance. No evidence of high-grade obstruction. CT ABDOMEN PELVIS W IV CONTRAST Additional Contrast? None   Final Result      Interval development of bibasilar atelectatic changes. There is a midline anterior abdominal wall hernia present with herniation of bowel loops and large amount of omental fat. This suggests incisional hernia. Foci of extraluminal air or gas are seen in the left lower quadrant with associated stranding. There is also free fluid noted. This may be due to recent postsurgical changes and should be correlated with patient's history. Alternatively this may    represent infectious process or possibly ruptured viscus.       Small amounts of extraluminal air are also seen within the soft tissues of the anterior abdomen and in the region of the hernia sac which may  be related to recent surgery but the possibility of infectious process or free foci of air within the    peritoneal cavity lying within the hernia are considerations. XR CHEST PORTABLE   Final Result      No acute disease         XR ABDOMEN (KUB) (SINGLE AP VIEW)   Final Result      No bowel obstruction. Moderate retained stool in the colon. XR LUMBAR SPINE (2-3 VIEWS)   Final Result      3 coned-down frontal and lateral intraoperative views demonstrate postoperative changes at L4-5 and L5-S1. No retained surgical instrument is seen. XR LUMBAR SPINE (2-3 VIEWS)   Final Result         Coned-down intraoperative views of the lumbar spine assumes 5 lumbar type vertebral bodies. There is anterior interbody fusion at L4-5 and L5-S1 with radiolucent interbody devices. Fluoroscopy time 1 minute      FLUORO FOR SURGICAL PROCEDURES   Final Result         Coned-down intraoperative views of the lumbar spine assumes 5 lumbar type vertebral bodies. There is anterior interbody fusion at L4-5 and L5-S1 with radiolucent interbody devices. Fluoroscopy time 1 minute      CT GUIDED STEREOTACTIC LOCALIZATION   Final Result      CT GUIDED STEREOTACTIC LOCALIZATION   Final Result              Assessment/Plan:    Chronic back pain with bilateral feet burning:  Status post L4-S1 ANTERIOR LUMBAR INTERBODY FUSION WITH PEDICLE SCREW FIXATION   Continue as needed pain medication  Management per neurosurgery     Hypoxia:Likely secondary to receiving fluids pre and postoperatively. CXR was non acute. Received IV Lasix. O2 requirement unchanged from yesterday, on 2 L. Check CXR today. Encourage incentive spirometry. Monitor sats, wean oxygen as tolerated to keep sats more than 90%.    Abdominal pain/constipation with incisional hernia: dev fascial dehiscence after coughing spell on 8/1. CT abd/pelvis showed incisional hernia with bowel and omental fat. Underwent primary repair and closure of abdominal wall fascial wound on 1/8.   Patient was having nausea with vomiting on 2/8. Ileus versus SBO noted on x-ray. Gen surg managing- NPO, NG tube clamping planned for today. Urinary retention: improved after lópez. Continue Flomax. Consider voiding trial lwhen more ambulatory  per urology     Hypertension: Fairly controlled. Continue IV hydralazine as needed per parameters placed.   Resume home p.o. meds when no longer NPO.         Hyperlipidemia:   statin on hold as n.p.o.     Depression:  Continue Wellbutrin when no longer n.p.o.     Sleep apnea:  Continue CPAP    DVT Prophylaxis: Lovenox  Diet: Diet NPO  Code Status: Full Code    PT/OT Eval Status: Per neurosurgery    Dispo -per neurosurgery    Alexandrea Esquivel MD

## 2021-08-05 NOTE — Clinical Note
Patient was alert and oriented x4. VSS. Patient is NPO for procedure today. Tolerates fluids and diet well. Bed in lowest position and call light within reach. Will continue to monitor.

## 2021-08-05 NOTE — PROGRESS NOTES
Physical Therapy  Facility/Department: [facility] 5T ORTHO/NEURO  Daily Treatment Note  NAME: Diamond Peralta  : 1955  MRN: [id number]    Date of Service: 2021    Discharge Recommendations: Diamond Peralta scored a 15/24 on the AM-PAC short mobility form. Current research shows that an AM-PAC score of 17 or less is typically not associated with a discharge to the patient's home setting. Based on the patient's AM-PAC score and their current functional mobility deficits, it is recommended that the patient have 3-5 sessions per week of Physical Therapy at d/c to increase the patient's independence. Please see assessment section for further patient specific details. PT Equipment Recommendations  Equipment Needed:  (defer)    Assessment   Assessment:  Pt was limited by abdominal and LLE pain requiring extra time to perform all therapeutic activities. Presented with fair trunk and LE control during bed mobility; demonstrating proper log rolling technique without cues. Transfers and gait training were mildly unsteady requiring sequencing cues throughout; no LOB noted. Gait distance limited by fatigue and pain. Pt was left in the chair with needs in reach. Treatment Diagnosis: impaired functional mobility s/p L4-S1 ANTERIOR LUMBAR INTERBODY FUSION WITH PEDICLE SCREW FIXATION  Decision Making: Low Complexity  REQUIRES PT FOLLOW UP: Yes     Patient Diagnosis(es): There were no encounter diagnoses. has a past medical history of Allergic rhinitis, Anxiety, Atrial tachycardia (HCC), Carpal tunnel syndrome, Cervicalgia, Chronic back pain, Depression, GERD (gastroesophageal reflux disease), Headache(784.0), Hernia hiatal, Histoplasmosis, Hot flashes, Hyperlipidemia, Hypertension, Obesity, Osteoarthritis, PONV (postoperative nausea and vomiting), Shingles, Sleep apnea, and Tricuspid regurgitation.    has a past surgical history that includes Knee arthroscopy; Knee cartilage surgery; lobectomy; sinus surgery; Bunionectomy; partial hysterectomy (cervix not removed); Total knee arthroplasty (Bilateral, 03/03/2015); Parathyroid gland surgery; Lung biopsy; Colonoscopy (10/2018); Breast surgery (Right); lumbar fusion (N/A, 7/29/2021); and Abdomen surgery (N/A, 8/1/2021). Restrictions  Position Activity Restriction  Other position/activity restrictions: ambulate, activity as tolerated, abdominal binder  Subjective   General  Additional Pertinent Hx: Pt is a 77 y.o. female 7/29 with spondylolisthesis of lumbar region. Pt s/p L4-S1 ANTERIOR LUMBAR INTERBODY FUSION WITH PEDICLE SCREW FIXATION on 7/30. PMH:  hyperlipidemia, HTN, OA, anxiety, depression, GERD, sleep apnea, bilat TKR  Subjective  Subjective: Pt found supine. Agreeable to PT. C/o moderate pain in abdominals.    Orientation  Orientation  Overall Orientation Status: Within Functional Limits  Objective   Bed mobility  Supine to Sit: Contact guard assistance  Transfers  Sit to Stand: Contact guard assistance  Stand to sit: Contact guard assistance  Ambulation  Ambulation?: Yes  Ambulation 1  Device: Rolling Walker  Assistance: SBA  Quality of Gait: heavy UE support on walker, decreased laurent, decreased bilat step length/length  Distance: 25ft + 100ft   Balance  Sitting - Static: Good  Sitting - Dynamic: Fair  Standing - Static: Fair  Standing - Dynamic: Fair;-  Exercises  Comments: Weight shifting in all planes in order to perform pericare sitting and standing x2 mins     AM-PAC Score  AM-PAC Inpatient Mobility Raw Score : 15 (08/02/21 1056)  AM-PAC Inpatient T-Scale Score : 39.45 (08/02/21 1056)  Mobility Inpatient CMS 0-100% Score: 57.7 (08/02/21 1056)  Mobility Inpatient CMS G-Code Modifier : CK (08/02/21 1056)    Goals  Short term goals  Time Frame for Short term goals: By discharge  Short term goal 1: Sup to sit SBA  ongoing  Short term goal 2: Pt will transfer sit to stand SBA  ongoing  Short term goal 3: Pt will amb >100' with LRAD SBA  met 8/5  new goal: ambulate 200 ft with LRAD mod I  Short term goal 4: Pt will up/down 1 step with LRAD SBA  ongoing    Plan    Plan  Times per week: 5-7  Current Treatment Recommendations: Functional Mobility Training, Gait Training, Stair training, Safety Education & Training, Patient/Caregiver Education & Training  Safety Devices  Type of devices: Call light within reach, Chair alarm in place, Nurse notified, Left in chair     Therapy Time   Individual Concurrent Group Co-treatment   Time In 1455         Time Out 1520         Minutes 25         Timed Code Treatment Minutes: [address] [facility],[address], PTA    Addendum: 1 goal met. Continue per POC. This note will serve as a discharge summary if patient is discharged from hospital before next treatment session.    Espinoza Thompson, PT

## 2021-08-06 PROBLEM — Z98.1 S/P LUMBAR AND LUMBOSACRAL FUSION BY ANTERIOR TECHNIQUE: Status: ACTIVE | Noted: 2021-08-06

## 2021-08-06 LAB
ALBUMIN SERPL-MCNC: 2.7 G/DL (ref 3.4–5)
ANION GAP SERPL CALCULATED.3IONS-SCNC: 11 MMOL/L (ref 3–16)
BACTERIA: ABNORMAL /HPF
BASOPHILS ABSOLUTE: 0.1 K/UL (ref 0–0.2)
BASOPHILS RELATIVE PERCENT: 0.8 %
BILIRUBIN URINE: NEGATIVE
BLOOD, URINE: ABNORMAL
BUN BLDV-MCNC: 9 MG/DL (ref 7–20)
CALCIUM SERPL-MCNC: 8.8 MG/DL (ref 8.3–10.6)
CHLORIDE BLD-SCNC: 102 MMOL/L (ref 99–110)
CLARITY: CLEAR
CO2: 22 MMOL/L (ref 21–32)
COLOR: YELLOW
CREAT SERPL-MCNC: <0.5 MG/DL (ref 0.6–1.2)
EOSINOPHILS ABSOLUTE: 0.3 K/UL (ref 0–0.6)
EOSINOPHILS RELATIVE PERCENT: 4.2 %
EPITHELIAL CELLS, UA: ABNORMAL /HPF (ref 0–5)
GFR AFRICAN AMERICAN: >60
GFR NON-AFRICAN AMERICAN: >60
GLUCOSE BLD-MCNC: 94 MG/DL (ref 70–99)
GLUCOSE URINE: NEGATIVE MG/DL
HCT VFR BLD CALC: 32.2 % (ref 36–48)
HEMOGLOBIN: 11 G/DL (ref 12–16)
KETONES, URINE: NEGATIVE MG/DL
LEUKOCYTE ESTERASE, URINE: ABNORMAL
LYMPHOCYTES ABSOLUTE: 1.3 K/UL (ref 1–5.1)
LYMPHOCYTES RELATIVE PERCENT: 18 %
MAGNESIUM: 2 MG/DL (ref 1.8–2.4)
MCH RBC QN AUTO: 32 PG (ref 26–34)
MCHC RBC AUTO-ENTMCNC: 34 G/DL (ref 31–36)
MCV RBC AUTO: 94.1 FL (ref 80–100)
MICROSCOPIC EXAMINATION: YES
MONOCYTES ABSOLUTE: 0.7 K/UL (ref 0–1.3)
MONOCYTES RELATIVE PERCENT: 9.7 %
NEUTROPHILS ABSOLUTE: 4.7 K/UL (ref 1.7–7.7)
NEUTROPHILS RELATIVE PERCENT: 67.3 %
NITRITE, URINE: POSITIVE
PDW BLD-RTO: 13.5 % (ref 12.4–15.4)
PH UA: 8 (ref 5–8)
PHOSPHORUS: 3.5 MG/DL (ref 2.5–4.9)
PLATELET # BLD: 207 K/UL (ref 135–450)
PMV BLD AUTO: 7.1 FL (ref 5–10.5)
POTASSIUM SERPL-SCNC: 3.8 MMOL/L (ref 3.5–5.1)
PROTEIN UA: 30 MG/DL
RBC # BLD: 3.43 M/UL (ref 4–5.2)
RBC UA: ABNORMAL /HPF (ref 0–4)
SODIUM BLD-SCNC: 135 MMOL/L (ref 136–145)
SPECIFIC GRAVITY UA: 1.01 (ref 1–1.03)
URINE TYPE: ABNORMAL
UROBILINOGEN, URINE: 4 E.U./DL
WBC # BLD: 7 K/UL (ref 4–11)
WBC UA: ABNORMAL /HPF (ref 0–5)

## 2021-08-06 PROCEDURE — 99231 SBSQ HOSP IP/OBS SF/LOW 25: CPT | Performed by: SURGERY

## 2021-08-06 PROCEDURE — 87077 CULTURE AEROBIC IDENTIFY: CPT

## 2021-08-06 PROCEDURE — 80069 RENAL FUNCTION PANEL: CPT

## 2021-08-06 PROCEDURE — 87186 SC STD MICRODIL/AGAR DIL: CPT

## 2021-08-06 PROCEDURE — 6370000000 HC RX 637 (ALT 250 FOR IP): Performed by: INTERNAL MEDICINE

## 2021-08-06 PROCEDURE — 2500000003 HC RX 250 WO HCPCS: Performed by: STUDENT IN AN ORGANIZED HEALTH CARE EDUCATION/TRAINING PROGRAM

## 2021-08-06 PROCEDURE — 2580000003 HC RX 258: Performed by: PHYSICIAN ASSISTANT

## 2021-08-06 PROCEDURE — 36415 COLL VENOUS BLD VENIPUNCTURE: CPT

## 2021-08-06 PROCEDURE — 85025 COMPLETE CBC W/AUTO DIFF WBC: CPT

## 2021-08-06 PROCEDURE — 6370000000 HC RX 637 (ALT 250 FOR IP)

## 2021-08-06 PROCEDURE — 6370000000 HC RX 637 (ALT 250 FOR IP): Performed by: PHYSICIAN ASSISTANT

## 2021-08-06 PROCEDURE — 81001 URINALYSIS AUTO W/SCOPE: CPT

## 2021-08-06 PROCEDURE — 6360000002 HC RX W HCPCS: Performed by: NURSE PRACTITIONER

## 2021-08-06 PROCEDURE — 2700000000 HC OXYGEN THERAPY PER DAY

## 2021-08-06 PROCEDURE — 6360000002 HC RX W HCPCS: Performed by: INTERNAL MEDICINE

## 2021-08-06 PROCEDURE — 6370000000 HC RX 637 (ALT 250 FOR IP): Performed by: NURSE PRACTITIONER

## 2021-08-06 PROCEDURE — 94761 N-INVAS EAR/PLS OXIMETRY MLT: CPT

## 2021-08-06 PROCEDURE — 6370000000 HC RX 637 (ALT 250 FOR IP): Performed by: ANESTHESIOLOGY

## 2021-08-06 PROCEDURE — 83735 ASSAY OF MAGNESIUM: CPT

## 2021-08-06 PROCEDURE — 6360000002 HC RX W HCPCS: Performed by: SURGERY

## 2021-08-06 PROCEDURE — 97535 SELF CARE MNGMENT TRAINING: CPT

## 2021-08-06 PROCEDURE — 87086 URINE CULTURE/COLONY COUNT: CPT

## 2021-08-06 PROCEDURE — 1200000000 HC SEMI PRIVATE

## 2021-08-06 PROCEDURE — 6370000000 HC RX 637 (ALT 250 FOR IP): Performed by: SURGERY

## 2021-08-06 PROCEDURE — 6360000002 HC RX W HCPCS: Performed by: PHYSICIAN ASSISTANT

## 2021-08-06 PROCEDURE — 94660 CPAP INITIATION&MGMT: CPT

## 2021-08-06 PROCEDURE — 97530 THERAPEUTIC ACTIVITIES: CPT

## 2021-08-06 RX ORDER — POTASSIUM CHLORIDE 750 MG/1
10 TABLET, EXTENDED RELEASE ORAL ONCE
Status: COMPLETED | OUTPATIENT
Start: 2021-08-06 | End: 2021-08-06

## 2021-08-06 RX ORDER — PANTOPRAZOLE SODIUM 40 MG/1
40 TABLET, DELAYED RELEASE ORAL
Status: DISCONTINUED | OUTPATIENT
Start: 2021-08-06 | End: 2021-08-13 | Stop reason: HOSPADM

## 2021-08-06 RX ADMIN — METOPROLOL SUCCINATE 25 MG: 50 TABLET, EXTENDED RELEASE ORAL at 12:47

## 2021-08-06 RX ADMIN — PROMETHAZINE HYDROCHLORIDE 25 MG: 25 INJECTION INTRAMUSCULAR; INTRAVENOUS at 02:17

## 2021-08-06 RX ADMIN — PROMETHAZINE HYDROCHLORIDE 25 MG: 25 INJECTION INTRAMUSCULAR; INTRAVENOUS at 09:33

## 2021-08-06 RX ADMIN — BUPROPION HYDROCHLORIDE 150 MG: 150 TABLET, EXTENDED RELEASE ORAL at 12:47

## 2021-08-06 RX ADMIN — HYDROMORPHONE HYDROCHLORIDE 0.5 MG: 1 INJECTION, SOLUTION INTRAMUSCULAR; INTRAVENOUS; SUBCUTANEOUS at 02:43

## 2021-08-06 RX ADMIN — ENOXAPARIN SODIUM 40 MG: 40 INJECTION SUBCUTANEOUS at 10:41

## 2021-08-06 RX ADMIN — VERAPAMIL HYDROCHLORIDE 120 MG: 120 TABLET, FILM COATED, EXTENDED RELEASE ORAL at 19:27

## 2021-08-06 RX ADMIN — PROMETHAZINE HYDROCHLORIDE 25 MG: 25 INJECTION INTRAMUSCULAR; INTRAVENOUS at 14:01

## 2021-08-06 RX ADMIN — PROMETHAZINE HYDROCHLORIDE 25 MG: 25 INJECTION INTRAMUSCULAR; INTRAVENOUS at 19:26

## 2021-08-06 RX ADMIN — SUCRALFATE 1 G: 1 TABLET ORAL at 18:18

## 2021-08-06 RX ADMIN — Medication 10 ML: at 10:41

## 2021-08-06 RX ADMIN — HYDRALAZINE HYDROCHLORIDE 10 MG: 20 INJECTION, SOLUTION INTRAMUSCULAR; INTRAVENOUS at 10:41

## 2021-08-06 RX ADMIN — SUCRALFATE 1 G: 1 TABLET ORAL at 12:47

## 2021-08-06 RX ADMIN — ONDANSETRON 4 MG: 2 INJECTION INTRAMUSCULAR; INTRAVENOUS at 22:57

## 2021-08-06 RX ADMIN — TAMSULOSIN HYDROCHLORIDE 0.4 MG: 0.4 CAPSULE ORAL at 12:47

## 2021-08-06 RX ADMIN — HYDROMORPHONE HYDROCHLORIDE 0.5 MG: 1 INJECTION, SOLUTION INTRAMUSCULAR; INTRAVENOUS; SUBCUTANEOUS at 21:11

## 2021-08-06 RX ADMIN — ACETAMINOPHEN 650 MG: 325 TABLET ORAL at 02:13

## 2021-08-06 RX ADMIN — HYDRALAZINE HYDROCHLORIDE 10 MG: 20 INJECTION, SOLUTION INTRAMUSCULAR; INTRAVENOUS at 23:20

## 2021-08-06 RX ADMIN — METOPROLOL TARTRATE 5 MG: 5 INJECTION INTRAVENOUS at 05:02

## 2021-08-06 RX ADMIN — PRAVASTATIN SODIUM 20 MG: 20 TABLET ORAL at 19:27

## 2021-08-06 RX ADMIN — ONDANSETRON 4 MG: 2 INJECTION INTRAMUSCULAR; INTRAVENOUS at 17:09

## 2021-08-06 RX ADMIN — DIAZEPAM 5 MG: 5 TABLET ORAL at 22:57

## 2021-08-06 RX ADMIN — FLUTICASONE PROPIONATE 1 SPRAY: 50 SPRAY, METERED NASAL at 10:41

## 2021-08-06 RX ADMIN — HYDROMORPHONE HYDROCHLORIDE 0.5 MG: 1 INJECTION, SOLUTION INTRAMUSCULAR; INTRAVENOUS; SUBCUTANEOUS at 12:48

## 2021-08-06 RX ADMIN — SUCRALFATE 1 G: 1 TABLET ORAL at 22:57

## 2021-08-06 RX ADMIN — PANTOPRAZOLE SODIUM 40 MG: 40 TABLET, DELAYED RELEASE ORAL at 06:48

## 2021-08-06 RX ADMIN — POTASSIUM CHLORIDE 10 MEQ: 750 TABLET, EXTENDED RELEASE ORAL at 12:52

## 2021-08-06 ASSESSMENT — PAIN DESCRIPTION - PROGRESSION
CLINICAL_PROGRESSION: NOT CHANGED
CLINICAL_PROGRESSION: NOT CHANGED
CLINICAL_PROGRESSION: GRADUALLY WORSENING

## 2021-08-06 ASSESSMENT — PAIN SCALES - GENERAL
PAINLEVEL_OUTOF10: 0
PAINLEVEL_OUTOF10: 5
PAINLEVEL_OUTOF10: 8
PAINLEVEL_OUTOF10: 4
PAINLEVEL_OUTOF10: 9
PAINLEVEL_OUTOF10: 0
PAINLEVEL_OUTOF10: 5
PAINLEVEL_OUTOF10: 3

## 2021-08-06 ASSESSMENT — PAIN DESCRIPTION - DIRECTION
RADIATING_TOWARDS: LEGS
RADIATING_TOWARDS: LEGS
RADIATING_TOWARDS: BACK
RADIATING_TOWARDS: LEGS
RADIATING_TOWARDS: HEADACHE

## 2021-08-06 ASSESSMENT — PAIN DESCRIPTION - DESCRIPTORS
DESCRIPTORS: ACHING

## 2021-08-06 ASSESSMENT — PAIN DESCRIPTION - LOCATION
LOCATION: ABDOMEN
LOCATION: HEAD
LOCATION: ABDOMEN

## 2021-08-06 ASSESSMENT — PAIN DESCRIPTION - ORIENTATION
ORIENTATION: MID
ORIENTATION: ANTERIOR
ORIENTATION: MID

## 2021-08-06 ASSESSMENT — PAIN DESCRIPTION - PAIN TYPE
TYPE: SURGICAL PAIN
TYPE: ACUTE PAIN

## 2021-08-06 ASSESSMENT — PAIN DESCRIPTION - ONSET
ONSET: ON-GOING

## 2021-08-06 ASSESSMENT — PAIN DESCRIPTION - FREQUENCY
FREQUENCY: INTERMITTENT
FREQUENCY: INTERMITTENT
FREQUENCY: CONTINUOUS

## 2021-08-06 NOTE — PROGRESS NOTES
carbonate, benzocaine-menthol, sodium chloride flush, sodium chloride      Intake/Output Summary (Last 24 hours) at 8/6/2021 1032  Last data filed at 8/6/2021 0838  Gross per 24 hour   Intake 300 ml   Output 1000 ml   Net -700 ml       Physical Exam Performed:    BP (!) 148/81   Pulse 69   Temp 98.6 °F (37 °C) (Oral)   Resp 16   Ht 5' 3\" (1.6 m)   Wt 259 lb (117.5 kg)   LMP  (LMP Unknown)   SpO2 93%   BMI 45.88 kg/m²     General appearance: No apparent distress, appears stated age and cooperative. Morbidly obese  HEENT: Normal cephalic, atraumatic without obvious deformity. Pupils equal, round,   E. Conjunctivae/corneas clear. Neck: Supple, with full range of motion. No jugular venous distention. Trachea midline. Respiratory:  Normal respiratory effort. Bibasilar Rales cardiovascular: Regular rate and rhythm with normal S1/S2 without murmurs, rubs or gallops. Abdomen: Soft, non-tender, non-distended with normal bowel sounds. Suprapubic incision site with mild erythema, no overt discharge. Abdominal binder in place  Musculoskeletal: No clubbing, cyanosis or edema bilaterally. Skin: Warm and dry  Neurologic:  Alert, speech clear with no overt facial droop  Psychiatric: Awake        Labs:   Recent Labs     08/04/21  0436 08/05/21  0533 08/06/21  0557   WBC 6.1 6.9 7.0   HGB 11.3* 11.3* 11.0*   HCT 32.5* 32.2* 32.2*    322 207     Recent Labs     08/04/21  0436 08/05/21  0533 08/06/21  0557    139 135*   K 3.7 3.6 3.8    101 102   CO2 25 26 22   BUN 8 9 9   CREATININE <0.5* <0.5* <0.5*   CALCIUM 8.5 8.7 8.8   PHOS 2.7 3.0 3.5     No results for input(s): AST, ALT, BILIDIR, BILITOT, ALKPHOS in the last 72 hours. No results for input(s): INR in the last 72 hours. No results for input(s): Becky Comment in the last 72 hours.     Urinalysis:      Lab Results   Component Value Date    NITRU Negative 07/31/2021    WBCUA None seen 07/31/2021    RBCUA 0-2 07/31/2021    BLOODU TRACE-LYSED 07/31/2021    SPECGRAV 1.010 07/31/2021    GLUCOSEU Negative 07/31/2021       Radiology:  XR CHEST PORTABLE   Final Result   1. Nasogastric stomach   2. Stable cardiomegaly   3. New streaky atelectasis in the lung field   3. Resolution of oblique band of atelectasis in the left perihilar            XR ABDOMEN (KUB) (SINGLE AP VIEW)   Final Result   Impression:          1. Decreased small bowel dilation since the prior study. 2. Nasogastric tube can be retracted 3 cm for more optimal positioning. XR ABDOMEN (KUB) (SINGLE AP VIEW)   Final Result   Impression:   Enteric tube tip in the distal esophagus near gastroesophageal junction, recommend advancing. Decreased mild small bowel dilatation which may represent ileus. XR ABDOMEN (2 VIEWS)   Final Result      Supine and erect views demonstrate mild gaseous distention of small bowel loops in the right midabdomen with air-fluid levels. The findings are progressive since 8/2/2021 and suggest worsening ileus or low-grade obstruction. No free intraperitoneal air. Lumbosacral metallic fixation noted. XR ABDOMEN (KUB) (SINGLE AP VIEW)   Final Result   Impression:    A few nondilated small bowel loops with air-fluid levels, unchanged in appearance. No evidence of high-grade obstruction. CT ABDOMEN PELVIS W IV CONTRAST Additional Contrast? None   Final Result      Interval development of bibasilar atelectatic changes. There is a midline anterior abdominal wall hernia present with herniation of bowel loops and large amount of omental fat. This suggests incisional hernia. Foci of extraluminal air or gas are seen in the left lower quadrant with associated stranding. There is also free fluid noted. This may be due to recent postsurgical changes and should be correlated with patient's history. Alternatively this may    represent infectious process or possibly ruptured viscus.       Small amounts of extraluminal air are also seen within the soft tissues of the anterior abdomen and in the region of the hernia sac which may  be related to recent surgery but the possibility of infectious process or free foci of air within the    peritoneal cavity lying within the hernia are considerations. XR CHEST PORTABLE   Final Result      No acute disease         XR ABDOMEN (KUB) (SINGLE AP VIEW)   Final Result      No bowel obstruction. Moderate retained stool in the colon. XR LUMBAR SPINE (2-3 VIEWS)   Final Result      3 coned-down frontal and lateral intraoperative views demonstrate postoperative changes at L4-5 and L5-S1. No retained surgical instrument is seen. XR LUMBAR SPINE (2-3 VIEWS)   Final Result         Coned-down intraoperative views of the lumbar spine assumes 5 lumbar type vertebral bodies. There is anterior interbody fusion at L4-5 and L5-S1 with radiolucent interbody devices. Fluoroscopy time 1 minute      FLUORO FOR SURGICAL PROCEDURES   Final Result         Coned-down intraoperative views of the lumbar spine assumes 5 lumbar type vertebral bodies. There is anterior interbody fusion at L4-5 and L5-S1 with radiolucent interbody devices. Fluoroscopy time 1 minute      CT GUIDED STEREOTACTIC LOCALIZATION   Final Result      CT GUIDED STEREOTACTIC LOCALIZATION   Final Result              Assessment/Plan:    Chronic back pain with bilateral feet burning:  Status post L4-S1 ANTERIOR LUMBAR INTERBODY FUSION WITH PEDICLE SCREW FIXATION   Continue as needed pain medication  Management per neurosurgery     Hypoxia: likely due to atelectasis, noted on CXR. Improved. 02 weaned off. Continue incentive spirometry.      Abdominal pain/constipation with incisional hernia: dev fascial dehiscence after coughing spell on 8/1. CT abd/pelvis showed incisional hernia with bowel and omental fat. Underwent primary repair and closure of abdominal wall fascial wound on 1/8. Patient was having nausea with vomiting on 2/8.   Ileus versus SBO noted on x-ray. Gen surg managing conservatively. NG tube has been removed and diet started. Urinary retention: improved after lópez. Continue Flomax. Having dysuria today. Check UA. Can remove lópez for voiding trial today.          Hypertension: Fairly controlled. Home PO meds have been resumed. Continue IV hydralazine PRN      Hyperlipidemia: continue statin        Depression: Continue Wellbutrin.     Sleep apnea:  Continue CPAP    DVT Prophylaxis: Lovenox  Diet: ADULT DIET;  Regular; Low Fiber  Code Status: Full Code    PT/OT Eval Status: Per neurosurgery    Dispo -per neurosurgery    Blane Pederson MD

## 2021-08-06 NOTE — PLAN OF CARE
Problem: Pain:  Goal: Patient's pain/discomfort is manageable  Description: Patient's pain/discomfort is manageable  Outcome: Ongoing     Problem: Falls - Risk of:  Goal: Will remain free from falls  Description: Will remain free from falls  8/6/2021 0013 by Marc Brice RN  Outcome: Ongoing

## 2021-08-06 NOTE — PROGRESS NOTES
Ramirez removed at 1410 per orders. Pt tolerated well. Yet to void - needs UA and culture. Bladder scan at 2210 if unable to void.

## 2021-08-06 NOTE — PROGRESS NOTES
Patient complaining of slight nausea and 9/10 pain. Patient medicated per mar. Will continue to monitor.

## 2021-08-06 NOTE — PROGRESS NOTES
NEUROSURGERY POST-OP PROGRESS NOTE    Patient Name: Jp Denton YOB: 1955   Sex: Female Age: 77 yrs     Medical Record Number: [id number] Laurence Number: [[id number]]   Room Number: 8048/6240-97 Hospital Day: Hospital Day: 9     Interval History:  Post-operative Day# 5 s/p Procedure(s) (LRB):  REPAIR OF WOUND DEHISCENCE (N/A)    Subjective: Patient having some nausea after taking a few bites of breakfast. Pt also c/o of burning in periarea. Objective:    VITAL SIGNS   BP (!) 157/106 Comment: francisco javier notified  Pulse 71   Temp 98.4 °F (36.9 °C) (Oral)   Resp 16   Ht 5' 3\" (1.6 m)   Wt 259 lb (117.5 kg)   LMP  (LMP Unknown)   SpO2 94%   BMI 45.88 kg/m²    Height Height: 5' 3\" (160 cm)   Weight Weight: 259 lb (117.5 kg)        Allergies Allergies   Allergen Reactions    Latex Rash    Reglan [Metoclopramide Hcl] Rash    Univasc [Moexipril] Other (See Comments)     unsure    Crestor [Rosuvastatin]      myalgia    Celecoxib Palpitations    Keflex [Cephalexin] Diarrhea    Lipitor Other (See Comments)     myalgia    Metoclopramide Anxiety    Prochlorperazine Anxiety    Prochlorperazine Edisylate     Sulfa Antibiotics Nausea And Vomiting    Vioxx       NPO Status ADULT DIET; Regular; Low Fiber   Isolation No active isolations     LABS   Basic Metabolic Profile Recent Labs     08/04/21 0436 08/05/21  0533 08/06/21  0557    139 135*    101 102   CO2 25 26 22   BUN 8 9 9   CREATININE <0.5* <0.5* <0.5*   GLUCOSE 95 89 94   PHOS 2.7 3.0 3.5   MG 2.50*  1.90 2.50* 2.00      Complete Blood Count Recent Labs     08/04/21 0436 08/05/21  0533 08/06/21  0557   WBC 6.1 6.9 7.0   RBC 3.53* 3.51* 3.43*      Coagulation Studies No results for input(s): PTT, INR in the last 72 hours.     Invalid input(s): PLATELETS, PROA, PT, PTTA MEDICATIONS   Inpatient Medications     pantoprazole, 40 mg, Oral, QAM AC    potassium chloride, 10 mEq, Oral, Once    sucralfate, 1 g, Oral, 4 times per day    fluticasone, 1 spray, Each Nostril, Daily    SMOG Enema, 330 mL, Rectal, Once    pravastatin, 20 mg, Oral, Nightly    tamsulosin, 0.4 mg, Oral, Daily    lidocaine, 1 patch, Transdermal, Daily    sennosides-docusate sodium, 2 tablet, Oral, BID    polyethylene glycol, 17 g, Oral, Daily    scopolamine, 1 patch, Transdermal, Q72H    metoprolol succinate, 25 mg, Oral, Daily    buPROPion, 150 mg, Oral, QAM    cetirizine, 10 mg, Oral, Daily    losartan, 50 mg, Oral, Daily    verapamil, 120 mg, Oral, Nightly    sodium chloride flush, 5-40 mL, Intravenous, 2 times per day    enoxaparin, 40 mg, Subcutaneous, Daily   Infusions    lactated ringers 100 mL/hr at 08/04/21 0634    sodium chloride        Antibiotics   Recent Abx Admin      No antibiotic orders with administrations found. Neurologic Exam:  Mental status: awake and alert and oriented x4    Musculoskeletal:   Gait: Not tested   Tone: normal  Sensory: intact to all extremities  Motor strength:    Right  Left    Right  Left    Deltoid  5 5  Hip Flex  5 5   Biceps  5 5  Knee Extensors  5 5   Triceps  5 5  Knee Flexors  5 5   Wrist Ext  5 5  Ankle Dorsiflex. 5 5   Wrist Flex  5 5  Ankle Plantarflex. 5 5   Handgrip  5 5  Ext Bib Longus  5 5   Thumb Ext  5 5         Incision: intact, clean and dry    Respiratory:  Unlabored respiratory pattern    Abdomen:   Soft, but distended  Last BM 8/6  Cardiovascular:  Warm, well perfused    Assessment   Patient is a 76 yo F s/p Procedure(s) (LRB):  REPAIR OF WOUND DEHISCENCE (N/A) per Dr. Jordan  and L4-S1 ANTERIOR LUMBAR INTERBODY FUSION WITH PEDICLE SCREW FIXATION per [facility]    Plan:  1. Neurologic exam frequency:q4  2. Mobility:PT/OT as tolerated  3. DVT Prophylaxis: SCDs and lovenox   4. Bowel Regimen: glycolax and senna  5.  Pain control:she and robaxin and dilaudid  6. Incisional Care:open to air  7. Abdominal binder at all times  8. ileus treatment and recommendations per general surgery  9. Urine culture sent  10. Patient was seen with Dr. Mamadou Denise who agrees with above assessment and plan. Electronically signed by:  LISA Qiu CNP, 8/6/2021 11:43 AM   Neurosurgery Nurse Practitioner  [phone]

## 2021-08-06 NOTE — PROGRESS NOTES
Occupational Therapy  Facility/Department: [facility] 5T ORTHO/NEURO  Daily Treatment Note  NAME: Guru Schroeder  : 1955  MRN: [id number]    Date of Service: 2021    Discharge Recommendations:Laly Huerta scored a 16/24 on the AM-PAC ADL Inpatient form. Current research shows that an AM-PAC score of 17 or less is typically not associated with a discharge to the patient's home setting. Based on the patient's AM-PAC score and their current ADL deficits, it is recommended that the patient have 3-5 sessions per week of Occupational Therapy at d/c to increase the patient's independence. Please see assessment section for further patient specific details. If patient discharges prior to next session this note will serve as a discharge summary. Please see below for the latest assessment towards goals. OT Equipment Recommendations  Other: defer to next level of care    Assessment   Performance deficits / Impairments: Decreased functional mobility ; Decreased ADL status; Decreased strength  Assessment: Pt is 73y F who was IND prior to hospitalization. Pt presently tolerating OOB BADL tasks w/ CGA/SBA using RW in fxl mobility. Pt may benefit from cont'd skilled OT, prior to retn home, to maximize safety and IND w/ ADL and fxl mobility. Cont per POC  Treatment Diagnosis: Decreased activity tolerance, impaired ADLs and mobility  Prognosis: Good  OT Education: OT Role;Plan of Care;Transfer Training  Patient Education: verb understanding  REQUIRES OT FOLLOW UP: Yes  Activity Tolerance  Activity Tolerance: Patient Tolerated treatment well  Activity Tolerance: RN removed O2 prior to ambuation to bathroom - pt  w/ no sign/symptom/complaint of SOB in session         Patient Diagnosis(es): There were no encounter diagnoses.       has a past medical history of Allergic rhinitis, Anxiety, Atrial tachycardia ([facility] [address] Carpal tunnel syndrome, Cervicalgia, Chronic back pain, Depression, GERD (gastroesophageal reflux disease), strategies in managing toilet aide.)        Balance  Sitting Balance: Stand by assistance  Standing Balance: Contact guard assistance  Standing Balance  Time: ~5min  Activity: fxl mobiliyt, grooming.   Functional Mobility  Functional - Mobility Device: Rolling Walker  Activity: To/from bathroom  Assist Level: Contact guard assistance  Toilet Transfers  Toilet - Technique: Ambulating  Equipment Used: Standard toilet (grab bars, RW)  Toilet Transfer: Contact guard assistance  Bed mobility  Supine to Sit: Stand by assistance (HOB raised, using bedrail)  Transfers  Sit to stand: Contact guard assistance  Stand to sit: Contact guard assistance                                                                 Plan   Plan  Times per week: 5-7x  Times per day: Daily  Current Treatment Recommendations: Balance Training, Functional Mobility Training, Endurance Training, Self-Care / ADL, Safety Education & Training, Equipment Evaluation, Education, & procurement, Patient/Caregiver Education & Training, Pain Management    AM-[facility] Score        AM-[facility] Inpatient Daily Activity Raw Score: 16 (08/06/21 1241)  AM-PAC Inpatient ADL T-Scale Score : 35.96 (08/06/21 1241)  ADL Inpatient CMS 0-100% Score: 53.32 (08/06/21 1241)  ADL Inpatient CMS G-Code Modifier : CK (08/06/21 1241)    Goals  Short term goals  Time Frame for Short term goals: by D/C  Short term goal 1: Toileting and toilet transfer SBA, AE as needed - Not met CGA 8/6  Short term goal 2: Lower body dressing SBA, AE as needed - Not met, not addressed 8/6  Short term goal 3: Functional transfers to/from bed, chairs SBA - Not met, CGA 8/6  Short term goal 4: Static and dynamic stance during ADLs SBA - Not met, CGA 8/6       Therapy Time   Individual Concurrent Group Co-treatment   Time In 4680         Time Out 1000         Minutes 53         Timed Code Treatment Minutes: [address], MOT-OTR/L [id number]

## 2021-08-06 NOTE — PROGRESS NOTES
Pt is A&O, VSS. C/o nausea, but no emesis episodes. PRN Xofran and phenergan given with little relief - MD aware. C/o pain in abd - medicated per [facility] with IV pain medication. NV checks remain unchanged. Abd binder in place. All fall precautions in place. Call light within reach.

## 2021-08-06 NOTE — CARE COORDINATION
Case Management Daily Note                    Date: 8/6/2021     Patient Name: Pedro Stanford    Date of Admission: 7/29/2021  5:22 AM  YOB: 1955    Length of Stay: 8  GMLOS: 3.8         Patient Admission Status: Inpatient  Diagnosis:Spondylolisthesis of lumbar region [M43.16]  Lumbar stenosis with neurogenic claudication [M48.062]     ________________________________________________________________________________________  Discharge Plan: SNF: [facility]: [id number]    Insurance: Payor: Ally Celaya / Plan: [employer] PLUS HMO / Product Type: *No Product type* /   Is pre-cert/notification needed: yes, initiated 8/2     Tentative discharge date: 8/9    Current barriers: Medical Clearance     Referrals completed: [facility]: [facility] [place]     Resources/ information provided: [facility]   ________________________________________________________________________________________  PT AM-PAC: 15 / 24 per last evaluation on: 8/2    OT AM-PAC: 16 / 24 per last evaluation on: 8/2     DME Needs for discharge: defer  ________________________________________________________________________________________  Notes/Plan of Care:   SW spoke with Sandrita Britton at [facility]. Plan to resubmit pre-cert Monday for auth. SW to follow. Patient remains in agreement with plan.      Care Transition Patient: No    JOYA Rivera  [facility]   Case Management Department  Ph: [phone]

## 2021-08-06 NOTE — PROGRESS NOTES
Surgery Daily Progress Note      CC: Nausea and vomiting    SUBJECTIVE:  NGT removed yesterday. Patient is tolerating full liquid diet. Having some nausea overnight controlled with medications but no vomiting. Patient having gas and had multiple BMs overnight. Afebrile and HDS.     ROS:   A 14 point review of systems was conducted, significant findings as noted above. All other systems negative. OBJECTIVE:    PHYSICAL EXAM:  Vitals:    08/05/21 1906 08/05/21 2300 08/06/21 0001 08/06/21 0416   BP: (!) 162/79 (!) 146/75  (!) 160/75   Pulse: 73 78  76   Resp: 16 16 16 16   Temp: 99 °F (37.2 °C) 99.8 °F (37.7 °C)  98.7 °F (37.1 °C)   TempSrc: Oral Oral  Oral   SpO2: 93% 90%  91%   Weight:       Height:           General appearance: alert, no acute distress, grooming appropriate  Neuro: A&Ox3, no focal deficits, sensation intact  Chest/Lungs: normal effort, no accessory muscle use, on RA  Cardiovascular: RRR  Abdomen: soft, non-tender, non-distended, no guarding/rigidity, midline incision with prineo in place  : grossly normal  Extremities: no edema, no cyanosis      ASSESSMENT & PLAN:   This is a 77 y.o. female who recently underwent primary repair and closure of abdominal wall fascia on 8/1/2021 s/p wound dehiscence of an ALIF incision from 7/28/2021. The patient reports that she has experienced nausea, abdominal discomfort, and PO intolerance since her procedure on 8/1/2021. Abdominal x-ray revealed small bowel dilation consistent with ileus.     - will advance to SFD today  - will restart home meds  - Minimize narcotics  - cont Zofran PRN for nausea control    Demario Arellano DO  PGY-1, General Surgery  08/06/21  6:39 AM  [phone]

## 2021-08-06 NOTE — PLAN OF CARE
Problem: Pain:  Goal: Patient's pain/discomfort is manageable  Description: Patient's pain/discomfort is manageable  Outcome: Ongoing  Pt c/o pain in abd - medicated per [facility] with IV pain medication. Pt states relief and resting in bed. Ice in place. Problem: Falls - Risk of:  Goal: Will remain free from falls  Description: Will remain free from falls  Outcome: Ongoing  Fall precautions in place. Bed is in lowest position, wheels locked, bed alarm on, non skid socks on. Call light and bedside table within reach. Pt calls out appropriately. Pt is up x1 with gb and walker. Will continue to assess and monitor. Problem: Nausea/Vomiting:  Goal: Absence of nausea/vomiting  Description: Absence of nausea/vomiting  Outcome: Ongoing  Absence of emesis, but pt c/o nausea. PRN Zofran and phenergan given with little relief. Discussed with surgical MD. Will continue to monitor.

## 2021-08-07 LAB
ALBUMIN SERPL-MCNC: 3 G/DL (ref 3.4–5)
ANION GAP SERPL CALCULATED.3IONS-SCNC: 11 MMOL/L (ref 3–16)
BASOPHILS ABSOLUTE: 0 K/UL (ref 0–0.2)
BASOPHILS RELATIVE PERCENT: 0.6 %
BUN BLDV-MCNC: 8 MG/DL (ref 7–20)
CALCIUM SERPL-MCNC: 8.8 MG/DL (ref 8.3–10.6)
CHLORIDE BLD-SCNC: 98 MMOL/L (ref 99–110)
CO2: 25 MMOL/L (ref 21–32)
CREAT SERPL-MCNC: <0.5 MG/DL (ref 0.6–1.2)
EOSINOPHILS ABSOLUTE: 0.2 K/UL (ref 0–0.6)
EOSINOPHILS RELATIVE PERCENT: 2.3 %
GFR AFRICAN AMERICAN: >60
GFR NON-AFRICAN AMERICAN: >60
GLUCOSE BLD-MCNC: 111 MG/DL (ref 70–99)
HCT VFR BLD CALC: 32.4 % (ref 36–48)
HEMOGLOBIN: 11.1 G/DL (ref 12–16)
LYMPHOCYTES ABSOLUTE: 1.5 K/UL (ref 1–5.1)
LYMPHOCYTES RELATIVE PERCENT: 19.5 %
MAGNESIUM: 1.9 MG/DL (ref 1.8–2.4)
MCH RBC QN AUTO: 31.3 PG (ref 26–34)
MCHC RBC AUTO-ENTMCNC: 34.3 G/DL (ref 31–36)
MCV RBC AUTO: 91.3 FL (ref 80–100)
MONOCYTES ABSOLUTE: 0.8 K/UL (ref 0–1.3)
MONOCYTES RELATIVE PERCENT: 10.4 %
NEUTROPHILS ABSOLUTE: 5.3 K/UL (ref 1.7–7.7)
NEUTROPHILS RELATIVE PERCENT: 67.2 %
PDW BLD-RTO: 13.4 % (ref 12.4–15.4)
PHOSPHORUS: 3.8 MG/DL (ref 2.5–4.9)
PLATELET # BLD: 361 K/UL (ref 135–450)
PMV BLD AUTO: 6.7 FL (ref 5–10.5)
POTASSIUM SERPL-SCNC: 3.5 MMOL/L (ref 3.5–5.1)
RBC # BLD: 3.55 M/UL (ref 4–5.2)
SODIUM BLD-SCNC: 134 MMOL/L (ref 136–145)
WBC # BLD: 7.9 K/UL (ref 4–11)

## 2021-08-07 PROCEDURE — 6370000000 HC RX 637 (ALT 250 FOR IP): Performed by: NURSE PRACTITIONER

## 2021-08-07 PROCEDURE — 99231 SBSQ HOSP IP/OBS SF/LOW 25: CPT | Performed by: SURGERY

## 2021-08-07 PROCEDURE — 2580000003 HC RX 258: Performed by: SURGERY

## 2021-08-07 PROCEDURE — 36415 COLL VENOUS BLD VENIPUNCTURE: CPT

## 2021-08-07 PROCEDURE — 6360000002 HC RX W HCPCS: Performed by: PHYSICIAN ASSISTANT

## 2021-08-07 PROCEDURE — 6360000002 HC RX W HCPCS: Performed by: NURSE PRACTITIONER

## 2021-08-07 PROCEDURE — 6360000002 HC RX W HCPCS: Performed by: SURGERY

## 2021-08-07 PROCEDURE — 6370000000 HC RX 637 (ALT 250 FOR IP): Performed by: PHYSICIAN ASSISTANT

## 2021-08-07 PROCEDURE — 1200000000 HC SEMI PRIVATE

## 2021-08-07 PROCEDURE — 6370000000 HC RX 637 (ALT 250 FOR IP): Performed by: INTERNAL MEDICINE

## 2021-08-07 PROCEDURE — 6370000000 HC RX 637 (ALT 250 FOR IP): Performed by: ANESTHESIOLOGY

## 2021-08-07 PROCEDURE — 6370000000 HC RX 637 (ALT 250 FOR IP): Performed by: SURGERY

## 2021-08-07 PROCEDURE — 97116 GAIT TRAINING THERAPY: CPT

## 2021-08-07 PROCEDURE — 85025 COMPLETE CBC W/AUTO DIFF WBC: CPT

## 2021-08-07 PROCEDURE — 83735 ASSAY OF MAGNESIUM: CPT

## 2021-08-07 PROCEDURE — 6370000000 HC RX 637 (ALT 250 FOR IP): Performed by: NEUROLOGICAL SURGERY

## 2021-08-07 PROCEDURE — 97530 THERAPEUTIC ACTIVITIES: CPT

## 2021-08-07 PROCEDURE — 80069 RENAL FUNCTION PANEL: CPT

## 2021-08-07 PROCEDURE — 2580000003 HC RX 258: Performed by: PHYSICIAN ASSISTANT

## 2021-08-07 RX ORDER — OXYCODONE HYDROCHLORIDE 5 MG/1
5 TABLET ORAL
Status: DISCONTINUED | OUTPATIENT
Start: 2021-08-07 | End: 2021-08-13 | Stop reason: HOSPADM

## 2021-08-07 RX ADMIN — SUCRALFATE 1 G: 1 TABLET ORAL at 12:30

## 2021-08-07 RX ADMIN — PANTOPRAZOLE SODIUM 40 MG: 40 TABLET, DELAYED RELEASE ORAL at 05:19

## 2021-08-07 RX ADMIN — HYDROMORPHONE HYDROCHLORIDE 0.5 MG: 1 INJECTION, SOLUTION INTRAMUSCULAR; INTRAVENOUS; SUBCUTANEOUS at 18:36

## 2021-08-07 RX ADMIN — DOCUSATE SODIUM 50 MG AND SENNOSIDES 8.6 MG 2 TABLET: 8.6; 5 TABLET, FILM COATED ORAL at 19:54

## 2021-08-07 RX ADMIN — VERAPAMIL HYDROCHLORIDE 120 MG: 120 TABLET, FILM COATED, EXTENDED RELEASE ORAL at 22:10

## 2021-08-07 RX ADMIN — FLUTICASONE PROPIONATE 1 SPRAY: 50 SPRAY, METERED NASAL at 12:31

## 2021-08-07 RX ADMIN — METOPROLOL SUCCINATE 25 MG: 50 TABLET, EXTENDED RELEASE ORAL at 09:54

## 2021-08-07 RX ADMIN — LOSARTAN POTASSIUM 50 MG: 25 TABLET, FILM COATED ORAL at 19:54

## 2021-08-07 RX ADMIN — Medication 10 ML: at 19:54

## 2021-08-07 RX ADMIN — PRAVASTATIN SODIUM 20 MG: 20 TABLET ORAL at 19:54

## 2021-08-07 RX ADMIN — CETIRIZINE HYDROCHLORIDE 10 MG: 10 TABLET, FILM COATED ORAL at 09:54

## 2021-08-07 RX ADMIN — SUCRALFATE 1 G: 1 TABLET ORAL at 05:19

## 2021-08-07 RX ADMIN — SUCRALFATE 1 G: 1 TABLET ORAL at 18:11

## 2021-08-07 RX ADMIN — PROMETHAZINE HYDROCHLORIDE 25 MG: 25 INJECTION INTRAMUSCULAR; INTRAVENOUS at 02:10

## 2021-08-07 RX ADMIN — HYDROMORPHONE HYDROCHLORIDE 0.5 MG: 1 INJECTION, SOLUTION INTRAMUSCULAR; INTRAVENOUS; SUBCUTANEOUS at 10:16

## 2021-08-07 RX ADMIN — PROMETHAZINE HYDROCHLORIDE 25 MG: 25 INJECTION INTRAMUSCULAR; INTRAVENOUS at 22:15

## 2021-08-07 RX ADMIN — ENOXAPARIN SODIUM 40 MG: 40 INJECTION SUBCUTANEOUS at 09:55

## 2021-08-07 RX ADMIN — HYDROMORPHONE HYDROCHLORIDE 0.5 MG: 1 INJECTION, SOLUTION INTRAMUSCULAR; INTRAVENOUS; SUBCUTANEOUS at 22:09

## 2021-08-07 RX ADMIN — OXYCODONE 5 MG: 5 TABLET ORAL at 19:53

## 2021-08-07 RX ADMIN — CEFTRIAXONE 1000 MG: 1 INJECTION, POWDER, FOR SOLUTION INTRAMUSCULAR; INTRAVENOUS at 06:23

## 2021-08-07 RX ADMIN — BUPROPION HYDROCHLORIDE 150 MG: 150 TABLET, EXTENDED RELEASE ORAL at 09:54

## 2021-08-07 RX ADMIN — PROMETHAZINE HYDROCHLORIDE 25 MG: 25 INJECTION INTRAMUSCULAR; INTRAVENOUS at 10:16

## 2021-08-07 RX ADMIN — HYDROMORPHONE HYDROCHLORIDE 0.5 MG: 1 INJECTION, SOLUTION INTRAMUSCULAR; INTRAVENOUS; SUBCUTANEOUS at 02:10

## 2021-08-07 RX ADMIN — TAMSULOSIN HYDROCHLORIDE 0.4 MG: 0.4 CAPSULE ORAL at 09:54

## 2021-08-07 RX ADMIN — PROMETHAZINE HYDROCHLORIDE 25 MG: 25 INJECTION INTRAMUSCULAR; INTRAVENOUS at 18:12

## 2021-08-07 ASSESSMENT — PAIN SCALES - GENERAL
PAINLEVEL_OUTOF10: 7
PAINLEVEL_OUTOF10: 5
PAINLEVEL_OUTOF10: 6
PAINLEVEL_OUTOF10: 6
PAINLEVEL_OUTOF10: 5
PAINLEVEL_OUTOF10: 7
PAINLEVEL_OUTOF10: 5
PAINLEVEL_OUTOF10: 0

## 2021-08-07 ASSESSMENT — PAIN DESCRIPTION - LOCATION: LOCATION: ABDOMEN

## 2021-08-07 ASSESSMENT — PAIN DESCRIPTION - ONSET: ONSET: ON-GOING

## 2021-08-07 ASSESSMENT — PAIN - FUNCTIONAL ASSESSMENT: PAIN_FUNCTIONAL_ASSESSMENT: PREVENTS OR INTERFERES SOME ACTIVE ACTIVITIES AND ADLS

## 2021-08-07 ASSESSMENT — PAIN DESCRIPTION - DIRECTION: RADIATING_TOWARDS: LEGS

## 2021-08-07 ASSESSMENT — PAIN DESCRIPTION - ORIENTATION: ORIENTATION: MID

## 2021-08-07 ASSESSMENT — PAIN DESCRIPTION - PAIN TYPE: TYPE: SURGICAL PAIN

## 2021-08-07 ASSESSMENT — PAIN DESCRIPTION - PROGRESSION: CLINICAL_PROGRESSION: GRADUALLY WORSENING

## 2021-08-07 ASSESSMENT — PAIN DESCRIPTION - FREQUENCY: FREQUENCY: CONTINUOUS

## 2021-08-07 ASSESSMENT — PAIN DESCRIPTION - DESCRIPTORS: DESCRIPTORS: ACHING

## 2021-08-07 NOTE — PROGRESS NOTES
Hospitalist Progress Note      PCP: Myra Jean MD    Date of Admission: 7/29/2021    Chief Complaint: Chronic lumbar pain and bilateral foot burning    Hospital Course: S/p L4-S1 ANTERIOR LUMBAR INTERBODY FUSION WITH PEDICLE SCREW FIXATION on 07/30/2021. Patient was unable to void. Was complaining of abdominal pain. Abdominal x-ray was obtained and showed moderate amount of stool. Bladder scan showed 750 ml. Patient was straight cathed. UA was negative for infection. Patient sats dropped to 86% and was placed on 2 L of oxygen. We were asked to see/evaluate by Miguel Barker. Janeth Menjivar MD for medical management of chronic conditions, urinary retention and hypoxia    Subjective: Ramirez removed yesterday and patient voiding well. Patient reports dysuria improved. Denies any fever, chills.   Satting well on room air            Medications:  Reviewed    Infusion Medications    lactated ringers 100 mL/hr at 08/04/21 8063    sodium chloride       Scheduled Medications    cefTRIAXone (ROCEPHIN) IV  1,000 mg Intravenous Q24H    pantoprazole  40 mg Oral QAM AC    sucralfate  1 g Oral 4 times per day    fluticasone  1 spray Each Nostril Daily    SMOG Enema  330 mL Rectal Once    pravastatin  20 mg Oral Nightly    tamsulosin  0.4 mg Oral Daily    lidocaine  1 patch Transdermal Daily    sennosides-docusate sodium  2 tablet Oral BID    polyethylene glycol  17 g Oral Daily    scopolamine  1 patch Transdermal Q72H    metoprolol succinate  25 mg Oral Daily    buPROPion  150 mg Oral QAM    cetirizine  10 mg Oral Daily    losartan  50 mg Oral Daily    verapamil  120 mg Oral Nightly    sodium chloride flush  5-40 mL Intravenous 2 times per day    enoxaparin  40 mg Subcutaneous Daily     PRN Meds: acetaminophen, HYDROmorphone, hydrALAZINE, LORazepam, diazePAM, promethazine, guaiFENesin-dextromethorphan, ondansetron, [Held by provider] oxyCODONE **OR** [Held by provider] oxyCODONE, [Held RBCUA 0-2 08/06/2021    BLOODU SMALL 08/06/2021    SPECGRAV 1.015 08/06/2021    GLUCOSEU Negative 08/06/2021       Radiology:  XR CHEST PORTABLE   Final Result   1. Nasogastric stomach   2. Stable cardiomegaly   3. New streaky atelectasis in the lung field   3. Resolution of oblique band of atelectasis in the left perihilar            XR ABDOMEN (KUB) (SINGLE AP VIEW)   Final Result   Impression:          1. Decreased small bowel dilation since the prior study. 2. Nasogastric tube can be retracted 3 cm for more optimal positioning. XR ABDOMEN (KUB) (SINGLE AP VIEW)   Final Result   Impression:   Enteric tube tip in the distal esophagus near gastroesophageal junction, recommend advancing. Decreased mild small bowel dilatation which may represent ileus. XR ABDOMEN (2 VIEWS)   Final Result      Supine and erect views demonstrate mild gaseous distention of small bowel loops in the right midabdomen with air-fluid levels. The findings are progressive since 8/2/2021 and suggest worsening ileus or low-grade obstruction. No free intraperitoneal air. Lumbosacral metallic fixation noted. XR ABDOMEN (KUB) (SINGLE AP VIEW)   Final Result   Impression:    A few nondilated small bowel loops with air-fluid levels, unchanged in appearance. No evidence of high-grade obstruction. CT ABDOMEN PELVIS W IV CONTRAST Additional Contrast? None   Final Result      Interval development of bibasilar atelectatic changes. There is a midline anterior abdominal wall hernia present with herniation of bowel loops and large amount of omental fat. This suggests incisional hernia. Foci of extraluminal air or gas are seen in the left lower quadrant with associated stranding. There is also free fluid noted. This may be due to recent postsurgical changes and should be correlated with patient's history. Alternatively this may    represent infectious process or possibly ruptured viscus.       Small amounts of extraluminal air are also seen within the soft tissues of the anterior abdomen and in the region of the hernia sac which may  be related to recent surgery but the possibility of infectious process or free foci of air within the    peritoneal cavity lying within the hernia are considerations. XR CHEST PORTABLE   Final Result      No acute disease         XR ABDOMEN (KUB) (SINGLE AP VIEW)   Final Result      No bowel obstruction. Moderate retained stool in the colon. XR LUMBAR SPINE (2-3 VIEWS)   Final Result      3 coned-down frontal and lateral intraoperative views demonstrate postoperative changes at L4-5 and L5-S1. No retained surgical instrument is seen. XR LUMBAR SPINE (2-3 VIEWS)   Final Result         Coned-down intraoperative views of the lumbar spine assumes 5 lumbar type vertebral bodies. There is anterior interbody fusion at L4-5 and L5-S1 with radiolucent interbody devices. Fluoroscopy time 1 minute      FLUORO FOR SURGICAL PROCEDURES   Final Result         Coned-down intraoperative views of the lumbar spine assumes 5 lumbar type vertebral bodies. There is anterior interbody fusion at L4-5 and L5-S1 with radiolucent interbody devices. Fluoroscopy time 1 minute      CT GUIDED STEREOTACTIC LOCALIZATION   Final Result      CT GUIDED STEREOTACTIC LOCALIZATION   Final Result              Assessment/Plan:    Chronic back pain with bilateral feet burning:  Status post L4-S1 ANTERIOR LUMBAR INTERBODY FUSION WITH PEDICLE SCREW FIXATION   Continue as needed pain medication  Management per neurosurgery     Hypoxia: likely due to atelectasis, noted on CXR. Improved. 02 weaned off. Continue incentive spirometry.      Abdominal pain/constipation with incisional hernia: dev fascial dehiscence after coughing spell on 8/1. CT abd/pelvis showed incisional hernia with bowel and omental fat. Underwent primary repair and closure of abdominal wall fascial wound on 1/8.   Patient was having nausea with vomiting on 2/8. Ileus versus SBO noted on x-ray. Gen surg managing conservatively. NG tube has been removed and diet started. Urinary retention: improved after lópez. Urology assisted. López removed yesterday and voiding ok. UTI: had dysuria. UA + . Was started on IV ceftriaxone. Follow urine culture     Hypertension: Fairly controlled. Continue meds     Hyperlipidemia: continue statin        Depression: Continue Wellbutrin.     Sleep apnea:  Continue CPAP    DVT Prophylaxis: Lovenox  Diet: ADULT DIET;  Regular; Low Fiber  Code Status: Full Code    PT/OT Eval Status: Per neurosurgery    Dispo -per neurosurgery    Felipe Coburn MD

## 2021-08-07 NOTE — PLAN OF CARE
Problem: Daily Care:  Goal: Daily care needs are met  Description: Daily care needs are met  Outcome: Ongoing     Problem: Pain:  Goal: Patient's pain/discomfort is manageable  Description: Patient's pain/discomfort is manageable  8/7/2021 0802 by Irving Habermann, RN  Outcome: Ongoing     Problem: Skin Integrity:  Goal: Skin integrity will stabilize  Description: Skin integrity will stabilize  Outcome: Ongoing

## 2021-08-07 NOTE — PROGRESS NOTES
Surgery Daily Progress Note      CC: Nausea and vomiting    SUBJECTIVE:  Patient is tolerating CLD. Denies nausea. Continues to have BM and flatus. States she is hungry. No issues     ROS:   A 14 point review of systems was conducted, significant findings as noted above. All other systems negative. OBJECTIVE:    PHYSICAL EXAM:  Vitals:    08/06/21 1440 08/06/21 1921 08/06/21 2316 08/07/21 0300   BP: (!) 145/69 113/83 (!) 168/73 119/73   Pulse: 76 74  60   Resp: 16 16 16 16   Temp: 98.3 °F (36.8 °C) 98.1 °F (36.7 °C) 98.1 °F (36.7 °C) 98.3 °F (36.8 °C)   TempSrc: Axillary Axillary Axillary Oral   SpO2: 92% 92% 91% 92%   Weight:       Height:           General appearance: alert, no acute distress, grooming appropriate  Neuro: A&Ox3, no focal deficits, sensation intact  Chest/Lungs: normal effort, no accessory muscle use, on RA  Cardiovascular: RRR  Abdomen: soft, non-tender, non-distended, no guarding/rigidity, midline incision with prineo in place  : grossly normal  Extremities: no edema, no cyanosis      ASSESSMENT & PLAN:   This is a 77 y.o. female who recently underwent primary repair and closure of abdominal wall fascia on 8/1/2021 s/p wound dehiscence of an ALIF incision from 7/28/2021. The patient reports that she has experienced nausea, abdominal discomfort, and PO intolerance since her procedure on 8/1/2021. Abdominal x-ray revealed small bowel dilation consistent with ileus.     - continue SFD  - Minimize narcotics  - Added rocephin for UTI  - cont Zofran PRN for nausea control    Lexy Hussein MD, PGY-1  08/07/21 6:14 AM  Pager: [phone]

## 2021-08-07 NOTE — PLAN OF CARE
Problem: Safety:  Goal: Free from accidental physical injury  Description: Free from accidental physical injury  Outcome: Ongoing     Problem: Pain:  Goal: Patient's pain/discomfort is manageable  Description: Patient's pain/discomfort is manageable  8/6/2021 2215 by Alex Randle RN  Outcome: Ongoing

## 2021-08-07 NOTE — PROGRESS NOTES
This RN responded to a call after Pt stated that after wiping in the bathroom she noticed there was blood. After further examination, blood seemed to be coming from Pt abdominal incision. Pt abdominal pad was saturated with medium size amount of reddish/pink tinged drainage. New abdominal pad was placed, wound was cleaned with chlorhexidine. Abdominal binder in place. Pt WBC is 7.9 this AM. Message sent to Dr. Oliver Leggett.

## 2021-08-07 NOTE — PROGRESS NOTES
Patients abdominal incision slightly bleeding when up to bathroom, contacted surgical residents. Resident to bedside to assess. New IV placed this shift. Patient able to eat a popsicle. Patient is resting in bed with all fall precautions in place. Will continue to monitor.

## 2021-08-07 NOTE — PROGRESS NOTES
disease), Headache(784.0), Hernia hiatal, Histoplasmosis, Hot flashes, Hyperlipidemia, Hypertension, Obesity, Osteoarthritis, PONV (postoperative nausea and vomiting), Shingles, Sleep apnea, and Tricuspid regurgitation. has a past surgical history that includes Knee arthroscopy; Knee cartilage surgery; lobectomy; sinus surgery; Bunionectomy; partial hysterectomy (cervix not removed); Total knee arthroplasty (Bilateral, 03/03/2015); Parathyroid gland surgery; Lung biopsy; Colonoscopy (10/2018); Breast surgery (Right); lumbar fusion (N/A, 7/29/2021); and Abdomen surgery (N/A, 8/1/2021). Restrictions  Position Activity Restriction  Other position/activity restrictions: ambulate, activity as tolerated, abdominal binder, pt may shower     Subjective   General  Chart Reviewed: Yes  Additional Pertinent Hx: Pt is a 77 y.o. female 7/29 with spondylolisthesis of lumbar region. Pt s/p L4-S1 ANTERIOR LUMBAR INTERBODY FUSION WITH PEDICLE SCREW FIXATION on 7/30. PMH:  hyperlipidemia, HTN, OA, anxiety, depression, GERD, sleep apnea, bilat TKR  Referring Practitioner: SMITHA Dawson  Subjective  Subjective: Pt supine in bed and agreeable to PT. Pt equests to use bathroom.   Pain Screening  Patient Currently in Pain: Yes, not rated    Orientation  Orientation  Overall Orientation Status: Within Functional Limits     Objective   Bed mobility  Supine to Sit: Minimal assistance (Using log roll)  Scooting: Contact guard assistance     Transfers  Sit to Stand: Minimal Assistance  Stand to sit: Contact guard assistance     Ambulation 1  Device: Rolling Walker  Assistance: Contact guard assistance  Quality of Gait: heavy UE support on walker, decreased laurent, decreased bilat step length/length  Gait Deviations: Slow Laurent;Decreased step length;Decreased step height  Distance: 15 feet x 3     Balance  Sitting - Static: Good  Standing - Static: Fair (With walker)  Standing - Dynamic: Fair;- (With walker)     Comment: Pt toileted with SBA. Pt used the bottom wiper device for reuben cleaning. Pt noticed some bloody drainage from her incision. Nursing notifed and to room for dressing change.     Goals  Short term goals  Time Frame for Short term goals: By discharge  Short term goal 1: Sup to sit SBA   ongoing  Short term goal 2: Pt will transfer sit to stand SBA  ongoing  Short term goal 3: Pt will amb >100' with LRAD SBA  met 8/5   new goal: ambulate 200 ft with LRAD mod I  Short term goal 4: Pt will up/down 1 step with LRAD SBA  ongoing    Plan    Plan  Times per week: 5-7  Current Treatment Recommendations: Functional Mobility Training, Gait Training, Stair training, Safety Education & Training, Patient/Caregiver Education & Training  Safety Devices  Type of devices: Call light within reach, Chair alarm in place, Nurse notified, Left in chair     Therapy Time   Individual Concurrent Group Co-treatment   Time In 0900         Time Out 0932         Minutes 32           Timed Code Treatment Minutes:  32 Minutes    Total Treatment Minutes:  [address] [facility], PT

## 2021-08-08 LAB
BASOPHILS ABSOLUTE: 0 K/UL (ref 0–0.2)
BASOPHILS RELATIVE PERCENT: 0.6 %
EOSINOPHILS ABSOLUTE: 0.2 K/UL (ref 0–0.6)
EOSINOPHILS RELATIVE PERCENT: 3 %
HCT VFR BLD CALC: 31.6 % (ref 36–48)
HEMOGLOBIN: 10.9 G/DL (ref 12–16)
LYMPHOCYTES ABSOLUTE: 1.8 K/UL (ref 1–5.1)
LYMPHOCYTES RELATIVE PERCENT: 24 %
MCH RBC QN AUTO: 31.6 PG (ref 26–34)
MCHC RBC AUTO-ENTMCNC: 34.4 G/DL (ref 31–36)
MCV RBC AUTO: 91.9 FL (ref 80–100)
MONOCYTES ABSOLUTE: 0.8 K/UL (ref 0–1.3)
MONOCYTES RELATIVE PERCENT: 9.9 %
NEUTROPHILS ABSOLUTE: 4.8 K/UL (ref 1.7–7.7)
NEUTROPHILS RELATIVE PERCENT: 62.5 %
ORGANISM: ABNORMAL
PDW BLD-RTO: 13.2 % (ref 12.4–15.4)
PLATELET # BLD: 347 K/UL (ref 135–450)
PMV BLD AUTO: 6.7 FL (ref 5–10.5)
RBC # BLD: 3.44 M/UL (ref 4–5.2)
URINE CULTURE, ROUTINE: ABNORMAL
URINE CULTURE, ROUTINE: ABNORMAL
WBC # BLD: 7.7 K/UL (ref 4–11)

## 2021-08-08 PROCEDURE — 2580000003 HC RX 258: Performed by: SURGERY

## 2021-08-08 PROCEDURE — 1200000000 HC SEMI PRIVATE

## 2021-08-08 PROCEDURE — 6370000000 HC RX 637 (ALT 250 FOR IP): Performed by: NURSE PRACTITIONER

## 2021-08-08 PROCEDURE — 6370000000 HC RX 637 (ALT 250 FOR IP): Performed by: INTERNAL MEDICINE

## 2021-08-08 PROCEDURE — 6360000002 HC RX W HCPCS: Performed by: SURGERY

## 2021-08-08 PROCEDURE — 6360000002 HC RX W HCPCS: Performed by: PHYSICIAN ASSISTANT

## 2021-08-08 PROCEDURE — 6370000000 HC RX 637 (ALT 250 FOR IP): Performed by: SURGERY

## 2021-08-08 PROCEDURE — 97535 SELF CARE MNGMENT TRAINING: CPT

## 2021-08-08 PROCEDURE — 36415 COLL VENOUS BLD VENIPUNCTURE: CPT

## 2021-08-08 PROCEDURE — 6370000000 HC RX 637 (ALT 250 FOR IP): Performed by: NEUROLOGICAL SURGERY

## 2021-08-08 PROCEDURE — 6370000000 HC RX 637 (ALT 250 FOR IP): Performed by: PHYSICIAN ASSISTANT

## 2021-08-08 PROCEDURE — 6370000000 HC RX 637 (ALT 250 FOR IP): Performed by: ANESTHESIOLOGY

## 2021-08-08 PROCEDURE — 6360000002 HC RX W HCPCS: Performed by: NURSE PRACTITIONER

## 2021-08-08 PROCEDURE — 97530 THERAPEUTIC ACTIVITIES: CPT

## 2021-08-08 PROCEDURE — 85025 COMPLETE CBC W/AUTO DIFF WBC: CPT

## 2021-08-08 PROCEDURE — 2580000003 HC RX 258: Performed by: PHYSICIAN ASSISTANT

## 2021-08-08 PROCEDURE — 97116 GAIT TRAINING THERAPY: CPT

## 2021-08-08 RX ORDER — CIPROFLOXACIN 500 MG/1
500 TABLET, FILM COATED ORAL EVERY 12 HOURS SCHEDULED
Status: DISCONTINUED | OUTPATIENT
Start: 2021-08-08 | End: 2021-08-11

## 2021-08-08 RX ORDER — SIMETHICONE 80 MG
80 TABLET,CHEWABLE ORAL EVERY 6 HOURS PRN
Status: DISCONTINUED | OUTPATIENT
Start: 2021-08-08 | End: 2021-08-08

## 2021-08-08 RX ADMIN — SUCRALFATE 1 G: 1 TABLET ORAL at 12:56

## 2021-08-08 RX ADMIN — METOPROLOL SUCCINATE 25 MG: 50 TABLET, EXTENDED RELEASE ORAL at 09:12

## 2021-08-08 RX ADMIN — PROMETHAZINE HYDROCHLORIDE 25 MG: 25 INJECTION INTRAMUSCULAR; INTRAVENOUS at 14:40

## 2021-08-08 RX ADMIN — CEFTRIAXONE 1000 MG: 1 INJECTION, POWDER, FOR SOLUTION INTRAMUSCULAR; INTRAVENOUS at 06:35

## 2021-08-08 RX ADMIN — PANTOPRAZOLE SODIUM 40 MG: 40 TABLET, DELAYED RELEASE ORAL at 06:39

## 2021-08-08 RX ADMIN — FLUTICASONE PROPIONATE 1 SPRAY: 50 SPRAY, METERED NASAL at 09:13

## 2021-08-08 RX ADMIN — SUCRALFATE 1 G: 1 TABLET ORAL at 00:14

## 2021-08-08 RX ADMIN — DOCUSATE SODIUM 50 MG AND SENNOSIDES 8.6 MG 2 TABLET: 8.6; 5 TABLET, FILM COATED ORAL at 09:11

## 2021-08-08 RX ADMIN — CIPROFLOXACIN 500 MG: 500 TABLET, FILM COATED ORAL at 12:56

## 2021-08-08 RX ADMIN — OXYCODONE 5 MG: 5 TABLET ORAL at 06:39

## 2021-08-08 RX ADMIN — BUPROPION HYDROCHLORIDE 150 MG: 150 TABLET, EXTENDED RELEASE ORAL at 09:12

## 2021-08-08 RX ADMIN — VERAPAMIL HYDROCHLORIDE 120 MG: 120 TABLET, FILM COATED, EXTENDED RELEASE ORAL at 22:57

## 2021-08-08 RX ADMIN — SUCRALFATE 1 G: 1 TABLET ORAL at 23:14

## 2021-08-08 RX ADMIN — OXYCODONE 5 MG: 5 TABLET ORAL at 00:14

## 2021-08-08 RX ADMIN — PROMETHAZINE HYDROCHLORIDE 25 MG: 25 INJECTION INTRAMUSCULAR; INTRAVENOUS at 22:14

## 2021-08-08 RX ADMIN — CETIRIZINE HYDROCHLORIDE 10 MG: 10 TABLET, FILM COATED ORAL at 09:12

## 2021-08-08 RX ADMIN — PRAVASTATIN SODIUM 20 MG: 20 TABLET ORAL at 20:28

## 2021-08-08 RX ADMIN — SUCRALFATE 1 G: 1 TABLET ORAL at 06:39

## 2021-08-08 RX ADMIN — DIAZEPAM 5 MG: 5 TABLET ORAL at 07:02

## 2021-08-08 RX ADMIN — OXYCODONE 5 MG: 5 TABLET ORAL at 17:22

## 2021-08-08 RX ADMIN — ENOXAPARIN SODIUM 40 MG: 40 INJECTION SUBCUTANEOUS at 09:11

## 2021-08-08 RX ADMIN — LOSARTAN POTASSIUM 50 MG: 25 TABLET, FILM COATED ORAL at 20:28

## 2021-08-08 RX ADMIN — DOCUSATE SODIUM 50 MG AND SENNOSIDES 8.6 MG 2 TABLET: 8.6; 5 TABLET, FILM COATED ORAL at 20:28

## 2021-08-08 RX ADMIN — TAMSULOSIN HYDROCHLORIDE 0.4 MG: 0.4 CAPSULE ORAL at 09:11

## 2021-08-08 RX ADMIN — CIPROFLOXACIN 500 MG: 500 TABLET, FILM COATED ORAL at 22:57

## 2021-08-08 RX ADMIN — DIAZEPAM 5 MG: 5 TABLET ORAL at 17:22

## 2021-08-08 RX ADMIN — OXYCODONE 5 MG: 5 TABLET ORAL at 20:28

## 2021-08-08 RX ADMIN — SUCRALFATE 1 G: 1 TABLET ORAL at 17:16

## 2021-08-08 RX ADMIN — HYDROMORPHONE HYDROCHLORIDE 0.5 MG: 1 INJECTION, SOLUTION INTRAMUSCULAR; INTRAVENOUS; SUBCUTANEOUS at 22:15

## 2021-08-08 RX ADMIN — Medication 10 ML: at 22:57

## 2021-08-08 RX ADMIN — HYDROMORPHONE HYDROCHLORIDE 0.5 MG: 1 INJECTION, SOLUTION INTRAMUSCULAR; INTRAVENOUS; SUBCUTANEOUS at 03:23

## 2021-08-08 ASSESSMENT — PAIN SCALES - GENERAL
PAINLEVEL_OUTOF10: 8
PAINLEVEL_OUTOF10: 5
PAINLEVEL_OUTOF10: 7
PAINLEVEL_OUTOF10: 5
PAINLEVEL_OUTOF10: 5
PAINLEVEL_OUTOF10: 7
PAINLEVEL_OUTOF10: 5
PAINLEVEL_OUTOF10: 7
PAINLEVEL_OUTOF10: 7
PAINLEVEL_OUTOF10: 6

## 2021-08-08 NOTE — PROGRESS NOTES
Hospitalist Progress Note      PCP: Deann Maharaj MD    Date of Admission: 7/29/2021    Chief Complaint: Chronic lumbar pain and bilateral foot burning    Hospital Course: S/p L4-S1 ANTERIOR LUMBAR INTERBODY FUSION WITH PEDICLE SCREW FIXATION on 07/30/2021. Patient was unable to void. Was complaining of abdominal pain. Abdominal x-ray was obtained and showed moderate amount of stool. Bladder scan showed 750 ml. Patient was straight cathed. UA was negative for infection. Patient sats dropped to 86% and was placed on 2 L of oxygen. We were asked to see/evaluate by Berto Bautista. Julio C Goff MD for medical management of chronic conditions, urinary retention and hypoxia    Subjective:    Patient reports right calf pain when she was ambulating with PT today. Denies any fever or chills.   Remains with good sats on room air              Medications:  Reviewed    Infusion Medications    lactated ringers 100 mL/hr at 08/04/21 9199    sodium chloride       Scheduled Medications    cefTRIAXone (ROCEPHIN) IV  1,000 mg Intravenous Q24H    pantoprazole  40 mg Oral QAM AC    sucralfate  1 g Oral 4 times per day    fluticasone  1 spray Each Nostril Daily    SMOG Enema  330 mL Rectal Once    pravastatin  20 mg Oral Nightly    tamsulosin  0.4 mg Oral Daily    lidocaine  1 patch Transdermal Daily    sennosides-docusate sodium  2 tablet Oral BID    polyethylene glycol  17 g Oral Daily    scopolamine  1 patch Transdermal Q72H    metoprolol succinate  25 mg Oral Daily    buPROPion  150 mg Oral QAM    cetirizine  10 mg Oral Daily    losartan  50 mg Oral Daily    verapamil  120 mg Oral Nightly    sodium chloride flush  5-40 mL Intravenous 2 times per day    enoxaparin  40 mg Subcutaneous Daily     PRN Meds: oxyCODONE, acetaminophen, HYDROmorphone, hydrALAZINE, LORazepam, diazePAM, promethazine, guaiFENesin-dextromethorphan, ondansetron, [Held by provider] oxyCODONE **OR** [Held by provider] 08/06/2021    RBCUA 0-2 08/06/2021    BLOODU SMALL 08/06/2021    SPECGRAV 1.015 08/06/2021    GLUCOSEU Negative 08/06/2021       Radiology:  XR CHEST PORTABLE   Final Result   1. Nasogastric stomach   2. Stable cardiomegaly   3. New streaky atelectasis in the lung field   3. Resolution of oblique band of atelectasis in the left perihilar            XR ABDOMEN (KUB) (SINGLE AP VIEW)   Final Result   Impression:          1. Decreased small bowel dilation since the prior study. 2. Nasogastric tube can be retracted 3 cm for more optimal positioning. XR ABDOMEN (KUB) (SINGLE AP VIEW)   Final Result   Impression:   Enteric tube tip in the distal esophagus near gastroesophageal junction, recommend advancing. Decreased mild small bowel dilatation which may represent ileus. XR ABDOMEN (2 VIEWS)   Final Result      Supine and erect views demonstrate mild gaseous distention of small bowel loops in the right midabdomen with air-fluid levels. The findings are progressive since 8/2/2021 and suggest worsening ileus or low-grade obstruction. No free intraperitoneal air. Lumbosacral metallic fixation noted. XR ABDOMEN (KUB) (SINGLE AP VIEW)   Final Result   Impression:    A few nondilated small bowel loops with air-fluid levels, unchanged in appearance. No evidence of high-grade obstruction. CT ABDOMEN PELVIS W IV CONTRAST Additional Contrast? None   Final Result      Interval development of bibasilar atelectatic changes. There is a midline anterior abdominal wall hernia present with herniation of bowel loops and large amount of omental fat. This suggests incisional hernia. Foci of extraluminal air or gas are seen in the left lower quadrant with associated stranding. There is also free fluid noted. This may be due to recent postsurgical changes and should be correlated with patient's history. Alternatively this may    represent infectious process or possibly ruptured viscus.       Small amounts of extraluminal air are also seen within the soft tissues of the anterior abdomen and in the region of the hernia sac which may  be related to recent surgery but the possibility of infectious process or free foci of air within the    peritoneal cavity lying within the hernia are considerations. XR CHEST PORTABLE   Final Result      No acute disease         XR ABDOMEN (KUB) (SINGLE AP VIEW)   Final Result      No bowel obstruction. Moderate retained stool in the colon. XR LUMBAR SPINE (2-3 VIEWS)   Final Result      3 coned-down frontal and lateral intraoperative views demonstrate postoperative changes at L4-5 and L5-S1. No retained surgical instrument is seen. XR LUMBAR SPINE (2-3 VIEWS)   Final Result         Coned-down intraoperative views of the lumbar spine assumes 5 lumbar type vertebral bodies. There is anterior interbody fusion at L4-5 and L5-S1 with radiolucent interbody devices. Fluoroscopy time 1 minute      FLUORO FOR SURGICAL PROCEDURES   Final Result         Coned-down intraoperative views of the lumbar spine assumes 5 lumbar type vertebral bodies. There is anterior interbody fusion at L4-5 and L5-S1 with radiolucent interbody devices. Fluoroscopy time 1 minute      CT GUIDED STEREOTACTIC LOCALIZATION   Final Result      CT GUIDED STEREOTACTIC LOCALIZATION   Final Result      VL Extremity Venous Bilateral    (Results Pending)           Assessment/Plan:    Chronic back pain with bilateral feet burning:  Status post L4-S1 ANTERIOR LUMBAR INTERBODY FUSION WITH PEDICLE SCREW FIXATION   Continue as needed pain medication  Management per neurosurgery     Hypoxia: likely due to atelectasis, noted on CXR. Improved. 02 weaned off. Continue incentive spirometry.      Abdominal pain/constipation with incisional hernia: dev fascial dehiscence after coughing spell on 8/1. CT abd/pelvis showed incisional hernia with bowel and omental fat.  Underwent primary repair and closure of abdominal wall fascial wound on 1/8. Patient was having nausea with vomiting on 2/8. Ileus versus SBO noted on x-ray. Gen surg managing conservatively. NG tube has been removed and diet started. Urinary retention: improved after lópez. Urology assisted. López later removed and voiding fine. UTI: UC + E. coli. Switch IV ceftriaxone to p.o. Cipro based on sensitivities. .      Hypertension: Fairly controlled. Continue meds     Hyperlipidemia: continue statin        Depression: Continue Wellbutrin.     Sleep apnea:  Continue CPAP      Bilateral lower extremity edema with rt calf tenderness: Doppler ultrasound to rule out acute DVT. Continue compression stockings. Anemia: Due to expected postsurgical blood loss. Hgb stable    DVT Prophylaxis: Lovenox  Diet: ADULT DIET;  Regular; Low Fiber  Code Status: Full Code    PT/OT Eval Status: Per neurosurgery    Dispo -per neurosurgery    Felipe Coburn MD

## 2021-08-08 NOTE — PROGRESS NOTES
Interval Progress    Patient reports that she is feeling much better this evening. She states that she is tolerating advancement of her diet well with no post-prandial nausea, no vomiting, and no increase in abdominal pain.  She reports that she is passing flatus as well as BMs.     - No dietary restrictions from a General Surgery standpoint   - Continue optimizing electrolytes and limiting opioid use   - Please call with questions or concerns     Marley Saavedra MD, MPH  PGY-3 General Surgery  08/07/21  10:17 PM

## 2021-08-08 NOTE — PROGRESS NOTES
VSS/. Pain controlled with PO and IV per orders. Abdominal dressing changed this shift. Moderate serosanguinous drainage noted to incision. Incision cleansed with NS and redressed with non adherent gauze and abd pad. NV remains in tact. Nausea is intermittent for this pt and resolveed with antiemetic. Pt tolerates PO. Pt. See I/O flow sheets. Voids WNL. ABD binder remains in place. Pt has been up a couple tomes overnight x1 terri GB. Bed alarm set. Call light in reach. Will continue to monitor. Lovenox daily for DVT prophylaxis.

## 2021-08-08 NOTE — PROGRESS NOTES
Pt is up X1 with walker. Today, Pt has been complaining of calf pain to BLE. Pt states her pain gets worse when she walks. Upon assessment Pt calves are warm to touch, tender but no redness noted. MD was notified and BLE Dopplers were ordered. Pt is currently in bed with legs elevated. Pt denies N/V at this time. Pt was given Oxycodone and Valium for Pain. Pt tolerating diet well. Fall precautions in place.

## 2021-08-08 NOTE — PROGRESS NOTES
attempt figure 4 tech -- recommend trial AE prn)  Toileting: Moderate assistance;Maximum assistance (pt using toilet aid to assist with doffing over hips. Pt needing Max A over feet in sitting)  Additional Comments: ,        Balance  Sitting Balance: Contact guard assistance (pt using BUE to sitting balance -- pt unable to sit with/out support > 30 sec w/o BUE. Weak core)  Standing Balance: Minimal assistance (to CGA with walker)  Standing Balance  Time: 4 mins x 2 and 5 mins x  1  Activity: functional transfers /stance at sink/ functional mobility in room/ bathroom  Functional Mobility  Functional - Mobility Device: Rolling Walker  Activity: To/from bathroom; Other  Assist Level: Minimal assistance (to CGA)  Toilet Transfers  Toilet - Technique: Ambulating  Equipment Used: Raised toilet seat with rails  Toilet Transfer: Contact guard assistance  Toilet Transfers Comments: Pt needing two hand holds for leverage  Bed mobility  Supine to Sit: Contact guard assistance (via log roll with HOB up and use of rail; slow and effortful)  Scooting: Stand by assistance  Transfers  Sit to stand: Minimal assistance;Contact guard assistance  Stand to sit: Contact guard assistance  Transfer Comments: v.cues for hand placement     Cognition  Overall Cognitive Status: WFL    Hand Dominance  Hand Dominance: Right     Plan   Plan  Times per week: 5-7x  Times per day: Daily  Current Treatment Recommendations: Balance Training, Functional Mobility Training, Endurance Training, Self-Care / ADL, Safety Education & Training, Equipment Evaluation, Education, & procurement, Patient/Caregiver Education & Training, Pain Management  AM-PAC Score  AM-PAC Inpatient Daily Activity Raw Score: 15 (08/08/21 0956)  AM-PAC Inpatient ADL T-Scale Score : 34.69 (08/08/21 0956)  ADL Inpatient CMS 0-100% Score: 56.46 (08/08/21 0956)  ADL Inpatient CMS G-Code Modifier : CK (08/08/21 0956)    Goals  Short term goals  Time Frame for Short term goals: by D/C  Short term goal 1: Toileting and toilet transfer SBA, AE as needed - Not met  Short term goal 2: Lower body dressing SBA, AE as needed - Not met  Short term goal 3: Functional transfers to/from bed, chairs SBA - Not met  Short term goal 4: Static and dynamic stance during ADLs SBA - Not met     Therapy Time   Individual Concurrent Group Co-treatment   Time In 0850         Time Out 0929         Minutes 39              Timed Code Treatment Minutes:  39 mins     Total Treatment Minutes:  39 mins       James Loser, OT No

## 2021-08-08 NOTE — PROGRESS NOTES
Physical Therapy  Facility/Department: [facility] 5T ORTHO/NEURO  Daily Treatment Note  NAME: Shane Echavarria  : 1955  MRN: [id number]    Date of Service: 2021    Discharge Recommendations:Laly Govea scored a 17/24 on the AM-PAC short mobility form. Current research shows that an AM-PAC score of 17 or less is typically not associated with a discharge to the patient's home setting. Based on the patient's AM-PAC score and their current functional mobility deficits, it is recommended that the patient have 3-5 sessions per week of Physical Therapy at d/c to increase the patient's independence. Please see assessment section for further patient specific details. If patient discharges prior to next session this note will serve as a discharge summary. Please see below for the latest assessment towards goals. PT Equipment Recommendations  Equipment Needed: No    Assessment   Assessment: Pt demo mobility well below her reported baseline of independent at home. Pt demo poor activity tolerance, limited by pain and complicated post op course. Safety concerns for pt to return home. Pt plans to go to [facility] at discharge. Pt would benefit from continued skilled IP PT to maximize her functional independence prior to d/c home. Pt c/o B calf pain this am (R greater than L) and RN aware. Treatment Diagnosis: Impaired functional mobility s/p L4-S1 ANTERIOR LUMBAR INTERBODY FUSION WITH PEDICLE SCREW FIXATION  Decision Making: High Complexity  Patient Education: Pt educated on PT role, importance of OOB activity, no bending/lifting/twisting, log roll OOB and she verbalized understanding. REQUIRES PT FOLLOW UP: Yes  Activity Tolerance  Activity Tolerance: Patient Tolerated treatment well;Patient limited by endurance; Patient limited by pain     Patient Diagnosis(es): There were no encounter diagnoses.      has a past medical history of Allergic rhinitis, Anxiety, Atrial tachycardia ([address] Carpal tunnel syndrome, Cervicalgia, Chronic back pain, Depression, GERD (gastroesophageal reflux disease), Headache(784.0), Hernia hiatal, Histoplasmosis, Hot flashes, Hyperlipidemia, Hypertension, Obesity, Osteoarthritis, PONV (postoperative nausea and vomiting), Shingles, Sleep apnea, and Tricuspid regurgitation. has a past surgical history that includes Knee arthroscopy; Knee cartilage surgery; lobectomy; sinus surgery; Bunionectomy; partial hysterectomy (cervix not removed); Total knee arthroplasty (Bilateral, 03/03/2015); Parathyroid gland surgery; Lung biopsy; Colonoscopy (10/2018); Breast surgery (Right); lumbar fusion (N/A, 7/29/2021); and Abdomen surgery (N/A, 8/1/2021). Restrictions  Position Activity Restriction  Other position/activity restrictions: ambulate, activity as tolerated, abdominal binder, pt may shower     Subjective   General  Chart Reviewed: Yes  Additional Pertinent Hx: Pt is a 77 y.o. female 7/29 with spondylolisthesis of lumbar region. Pt s/p L4-S1 ANTERIOR LUMBAR INTERBODY FUSION WITH PEDICLE SCREW FIXATION on 7/30. PMH:  hyperlipidemia, HTN, OA, anxiety, depression, GERD, sleep apnea, bilat TKR  Family / Caregiver Present: No  Subjective  Subjective: Pt found supine in bed and agreeable to PT. \" My legs are sore.  \"  Pain Screening  Patient Currently in Pain: Yes (c/o pain in B LEs (calves); did not rate; RN aware)         Orientation  Orientation  Overall Orientation Status: Within Functional Limits       Objective   Bed mobility  Supine to Sit: Contact guard assistance (via log roll with HOB up and use of rail; slow and effortful)  Scooting: Stand by assistance     Transfers  Sit to Stand: Minimal Assistance (with vc for hand placement; effortful; x3 trials)  Stand to sit: Minimal Assistance (with vc for hand placement; x3 trials)     Ambulation 1  Device: Rolling Walker  Assistance: Contact guard assistance  Quality of Gait: heavy UE support on walker, decreased laurent, decreased bilat step length/length; effortful; pt fatigues quickly and WONG  Distance: 15 ft, 3 ft, 25 ft     Balance  Sitting - Static: Good  Sitting - Dynamic: Fair  Standing - Static: Fair  Standing - Dynamic: Fair; - (with walker)     Exercises  Comments: B LE AP x5 in chair               AM-PAC Score  AM-PAC Inpatient Mobility Raw Score : 17 (08/08/21 0934)  AM-PAC Inpatient T-Scale Score : 42.13 (08/08/21 0934)  Mobility Inpatient CMS 0-100% Score: 50.57 (08/08/21 0934)  Mobility Inpatient CMS G-Code Modifier : CK (08/08/21 0934)          Goals  Short term goals  Time Frame for Short term goals: By discharge  Short term goal 1: Sup to sit SBA   ongoing  Short term goal 2: Pt will transfer sit to stand SBA  ongoing  Short term goal 3: Pt will amb >100' with LRAD SBA  met 8/5   new goal: ambulate 200 ft with LRAD mod I   ongoing  Short term goal 4: Pt will up/down 1 step with LRAD SBA  ongoing  Patient Goals   Patient goals : eventual return home    Plan    Plan  Times per week: 5-7  Current Treatment Recommendations: Functional Mobility Training, Gait Training, Stair training, Safety Education & Training, Patient/Caregiver Education & Training  Safety Devices  Type of devices: Call light within reach, Chair alarm in place, Nurse notified, Left in chair     Therapy Time   Individual Concurrent Group Co-treatment   Time In 0850         Time Out 0930         Minutes 40           Timed Code Treatment Minutes:  40    Total Treatment Minutes:  [facility], PT

## 2021-08-08 NOTE — PROGRESS NOTES
Physician Progress Note      PATIENTConway Old  CSN #:                  [id number]  :                       1955  ADMIT DATE:       2021 5:22 AM  DISCH DATE:  RESPONDING  PROVIDER #:        Kristen Holley MD          QUERY TEXT:    Pt admitted  with Spondylolisthesis, s/p fusion , developed Abdominal   wound dehiscence, s/p closure of abdominal wall fascia on . Noted to   desaturate in PACU on  to 86% on 4L requiring NRB and remains on O2-2L. Atelectasis noted on Abd CT  & CXR . If possible, please document in   the progress notes and discharge summary if you are evaluating and/or treating   any of the following: The medical record reflects the following:  Risk Factors: OR  & , Atelectasis postop, known sleep apnea on home   CPAP, Morbid obesity  Clinical Indicators: Per PACU RN  \"O2 86% on 4L, placed on non-rebreather   at 10; Crackles noted throughout lungs\";  CT Abd: development of bibasilar   atelectatic changes. CXR : New streaky atelectasis in the lung field. Per   Attending pn's \"Hypoxia:Likely secondary to receiving fluids pre and   postoperatively\"  Treatment: NRB in PACU  with Xopenex treatment, weaned to 3-2L (currently),   IS, HS CPAP, 20 mg IVP Lasix on  &   Options provided:  -- Acute pulmonary insufficiency following surgery  -- Acute respiratory failure due to atelectasis, and unrelated to surgery  -- Acute respiratory failure due to the surgery  -- Other - I will add my own diagnosis  -- Disagree - Not applicable / Not valid  -- Disagree - Clinically unable to determine / Unknown  -- Refer to Clinical Documentation Reviewer    PROVIDER RESPONSE TEXT:    This patient is in acute respiratory failure due to atelectasis, and unrelated   to surgery. Query created by:  Evaristo Prieto on 2021 2:30 PM      Electronically signed by:  Kristen Holley MD 2021 8:14 AM

## 2021-08-09 ENCOUNTER — APPOINTMENT (OUTPATIENT)
Dept: VASCULAR LAB | Age: 66
DRG: 453 | End: 2021-08-09
Attending: NEUROLOGICAL SURGERY
Payer: MEDICARE

## 2021-08-09 PROCEDURE — 6360000002 HC RX W HCPCS: Performed by: INTERNAL MEDICINE

## 2021-08-09 PROCEDURE — 93970 EXTREMITY STUDY: CPT

## 2021-08-09 PROCEDURE — 6370000000 HC RX 637 (ALT 250 FOR IP): Performed by: ANESTHESIOLOGY

## 2021-08-09 PROCEDURE — 6370000000 HC RX 637 (ALT 250 FOR IP): Performed by: SURGERY

## 2021-08-09 PROCEDURE — 6370000000 HC RX 637 (ALT 250 FOR IP): Performed by: NEUROLOGICAL SURGERY

## 2021-08-09 PROCEDURE — 1200000000 HC SEMI PRIVATE

## 2021-08-09 PROCEDURE — 2580000003 HC RX 258: Performed by: PHYSICIAN ASSISTANT

## 2021-08-09 PROCEDURE — 6370000000 HC RX 637 (ALT 250 FOR IP): Performed by: INTERNAL MEDICINE

## 2021-08-09 PROCEDURE — 2580000003 HC RX 258: Performed by: SURGERY

## 2021-08-09 PROCEDURE — 6370000000 HC RX 637 (ALT 250 FOR IP): Performed by: PHYSICIAN ASSISTANT

## 2021-08-09 PROCEDURE — 6360000002 HC RX W HCPCS: Performed by: PHYSICIAN ASSISTANT

## 2021-08-09 PROCEDURE — 6370000000 HC RX 637 (ALT 250 FOR IP): Performed by: NURSE PRACTITIONER

## 2021-08-09 PROCEDURE — 6360000002 HC RX W HCPCS: Performed by: SURGERY

## 2021-08-09 RX ORDER — WARFARIN SODIUM 5 MG/1
5 TABLET ORAL DAILY
Status: DISCONTINUED | OUTPATIENT
Start: 2021-08-09 | End: 2021-08-12

## 2021-08-09 RX ADMIN — WARFARIN SODIUM 5 MG: 5 TABLET ORAL at 17:41

## 2021-08-09 RX ADMIN — Medication 10 ML: at 22:16

## 2021-08-09 RX ADMIN — SUCRALFATE 1 G: 1 TABLET ORAL at 11:43

## 2021-08-09 RX ADMIN — TAMSULOSIN HYDROCHLORIDE 0.4 MG: 0.4 CAPSULE ORAL at 09:43

## 2021-08-09 RX ADMIN — Medication 10 ML: at 09:43

## 2021-08-09 RX ADMIN — SUCRALFATE 1 G: 1 TABLET ORAL at 06:50

## 2021-08-09 RX ADMIN — SUCRALFATE 1 G: 1 TABLET ORAL at 23:44

## 2021-08-09 RX ADMIN — PANTOPRAZOLE SODIUM 40 MG: 40 TABLET, DELAYED RELEASE ORAL at 06:50

## 2021-08-09 RX ADMIN — ENOXAPARIN SODIUM 80 MG: 80 INJECTION SUBCUTANEOUS at 11:39

## 2021-08-09 RX ADMIN — OXYCODONE 5 MG: 5 TABLET ORAL at 06:50

## 2021-08-09 RX ADMIN — BUPROPION HYDROCHLORIDE 150 MG: 150 TABLET, EXTENDED RELEASE ORAL at 09:44

## 2021-08-09 RX ADMIN — DOCUSATE SODIUM 50 MG AND SENNOSIDES 8.6 MG 2 TABLET: 8.6; 5 TABLET, FILM COATED ORAL at 22:13

## 2021-08-09 RX ADMIN — CETIRIZINE HYDROCHLORIDE 10 MG: 10 TABLET, FILM COATED ORAL at 09:43

## 2021-08-09 RX ADMIN — OXYCODONE 5 MG: 5 TABLET ORAL at 22:21

## 2021-08-09 RX ADMIN — ONDANSETRON 4 MG: 2 INJECTION INTRAMUSCULAR; INTRAVENOUS at 18:49

## 2021-08-09 RX ADMIN — ENOXAPARIN SODIUM 120 MG: 120 INJECTION SUBCUTANEOUS at 22:14

## 2021-08-09 RX ADMIN — CIPROFLOXACIN 500 MG: 500 TABLET, FILM COATED ORAL at 22:13

## 2021-08-09 RX ADMIN — OXYCODONE 5 MG: 5 TABLET ORAL at 12:55

## 2021-08-09 RX ADMIN — SUCRALFATE 1 G: 1 TABLET ORAL at 17:41

## 2021-08-09 RX ADMIN — OXYCODONE 5 MG: 5 TABLET ORAL at 18:49

## 2021-08-09 RX ADMIN — METOPROLOL SUCCINATE 25 MG: 50 TABLET, EXTENDED RELEASE ORAL at 09:43

## 2021-08-09 RX ADMIN — CIPROFLOXACIN 500 MG: 500 TABLET, FILM COATED ORAL at 09:43

## 2021-08-09 RX ADMIN — ENOXAPARIN SODIUM 40 MG: 40 INJECTION SUBCUTANEOUS at 09:42

## 2021-08-09 RX ADMIN — DOCUSATE SODIUM 50 MG AND SENNOSIDES 8.6 MG 2 TABLET: 8.6; 5 TABLET, FILM COATED ORAL at 09:43

## 2021-08-09 RX ADMIN — FLUTICASONE PROPIONATE 1 SPRAY: 50 SPRAY, METERED NASAL at 09:44

## 2021-08-09 RX ADMIN — PRAVASTATIN SODIUM 20 MG: 20 TABLET ORAL at 22:13

## 2021-08-09 RX ADMIN — CEFTRIAXONE 1000 MG: 1 INJECTION, POWDER, FOR SOLUTION INTRAMUSCULAR; INTRAVENOUS at 06:42

## 2021-08-09 RX ADMIN — LOSARTAN POTASSIUM 50 MG: 25 TABLET, FILM COATED ORAL at 22:13

## 2021-08-09 RX ADMIN — VERAPAMIL HYDROCHLORIDE 120 MG: 120 TABLET, FILM COATED, EXTENDED RELEASE ORAL at 23:40

## 2021-08-09 ASSESSMENT — PAIN DESCRIPTION - PROGRESSION: CLINICAL_PROGRESSION: NOT CHANGED

## 2021-08-09 ASSESSMENT — PAIN DESCRIPTION - DIRECTION: RADIATING_TOWARDS: LEGS

## 2021-08-09 ASSESSMENT — PAIN SCALES - GENERAL
PAINLEVEL_OUTOF10: 8
PAINLEVEL_OUTOF10: 4
PAINLEVEL_OUTOF10: 7
PAINLEVEL_OUTOF10: 7
PAINLEVEL_OUTOF10: 6

## 2021-08-09 ASSESSMENT — PAIN DESCRIPTION - LOCATION: LOCATION: BACK

## 2021-08-09 ASSESSMENT — PAIN DESCRIPTION - FREQUENCY: FREQUENCY: CONTINUOUS

## 2021-08-09 ASSESSMENT — PAIN DESCRIPTION - DESCRIPTORS: DESCRIPTORS: DISCOMFORT

## 2021-08-09 ASSESSMENT — PAIN DESCRIPTION - ONSET: ONSET: ON-GOING

## 2021-08-09 ASSESSMENT — PAIN DESCRIPTION - PAIN TYPE: TYPE: SURGICAL PAIN

## 2021-08-09 NOTE — PROGRESS NOTES
Hospitalist Progress Note      PCP: Suki Banks MD    Date of Admission: 7/29/2021    Chief Complaint: Chronic lumbar pain and bilateral foot burning    Hospital Course: S/p L4-S1 ANTERIOR LUMBAR INTERBODY FUSION WITH PEDICLE SCREW FIXATION on 07/30/2021. Patient was unable to void. Was complaining of abdominal pain. Abdominal x-ray was obtained and showed moderate amount of stool. Bladder scan showed 750 ml. Patient was straight cathed. UA was negative for infection. Patient sats dropped to 86% and was placed on 2 L of oxygen. We were asked to see/evaluate by Yessy Forbes. Nancy Traore MD for medical management of chronic conditions, urinary retention and hypoxia    Subjective:    Denies any cp, sob  Denies any nausea ,emesis  C/o right leg pain  Afebrile  Passing gas. Appetite is poor.              Medications:  Reviewed    Infusion Medications    lactated ringers 100 mL/hr at 08/04/21 6652    sodium chloride       Scheduled Medications    enoxaparin  1 mg/kg Subcutaneous BID    ciprofloxacin  500 mg Oral 2 times per day    pantoprazole  40 mg Oral QAM AC    sucralfate  1 g Oral 4 times per day    fluticasone  1 spray Each Nostril Daily    SMOG Enema  330 mL Rectal Once    pravastatin  20 mg Oral Nightly    tamsulosin  0.4 mg Oral Daily    lidocaine  1 patch Transdermal Daily    sennosides-docusate sodium  2 tablet Oral BID    polyethylene glycol  17 g Oral Daily    metoprolol succinate  25 mg Oral Daily    buPROPion  150 mg Oral QAM    cetirizine  10 mg Oral Daily    losartan  50 mg Oral Daily    verapamil  120 mg Oral Nightly    sodium chloride flush  5-40 mL Intravenous 2 times per day     PRN Meds: oxyCODONE, acetaminophen, HYDROmorphone, hydrALAZINE, LORazepam, diazePAM, promethazine, guaiFENesin-dextromethorphan, ondansetron, [Held by provider] oxyCODONE **OR** [Held by provider] oxyCODONE, [Held by provider] calcium carbonate, benzocaine-menthol, sodium chloride flush, sodium chloride      Intake/Output Summary (Last 24 hours) at 8/9/2021 1023  Last data filed at 8/9/2021 0439  Gross per 24 hour   Intake 75 ml   Output 700 ml   Net -625 ml       Physical Exam Performed:    /74   Pulse 72   Temp 98.4 °F (36.9 °C) (Oral)   Resp 16   Ht 5' 3\" (1.6 m)   Wt 259 lb (117.5 kg)   LMP  (LMP Unknown)   SpO2 93%   BMI 45.88 kg/m²     General appearance: No apparent distress, appears stated age and cooperative. Morbidly obese  HEENT: Normal cephalic, atraumatic without obvious deformity. Pupils equal, round,   E. Conjunctivae/corneas clear. Neck: Supple, with full range of motion. No jugular venous distention. Trachea midline. Respiratory:  Normal respiratory effort. Bibasilar Rales cardiovascular: Regular rate and rhythm with normal S1/S2 without murmurs, rubs or gallops. Abdomen: Soft, non-tender, non-distended with normal bowel sounds. Suprapubic incision site with mild erythema with no overt purulent discharge. Abdominal binder in place   Musculoskeletal: No clubbing, cyanosis. Bilateral lower extremity edema. Has compression stockings. + rt calf tenderness   Skin: Warm and dry  Neurologic:  Alert, speech clear with no overt facial droop  Psychiatric: Awake        Labs:   Recent Labs     08/07/21  0517 08/08/21  1626   WBC 7.9 7.7   HGB 11.1* 10.9*   HCT 32.4* 31.6*    347     Recent Labs     08/07/21  0517   *   K 3.5   CL 98*   CO2 25   BUN 8   CREATININE <0.5*   CALCIUM 8.8   PHOS 3.8     No results for input(s): AST, ALT, BILIDIR, BILITOT, ALKPHOS in the last 72 hours. No results for input(s): INR in the last 72 hours. No results for input(s): Nancy Filiberto in the last 72 hours.     Urinalysis:      Lab Results   Component Value Date    NITRU POSITIVE 08/06/2021    WBCUA  08/06/2021    BACTERIA 3+ 08/06/2021    RBCUA 0-2 08/06/2021    BLOODU SMALL 08/06/2021    SPECGRAV 1.015 08/06/2021    GLUCOSEU Negative 08/06/2021 Radiology:  VL Extremity Venous Bilateral   Final Result      XR CHEST PORTABLE   Final Result   1. Nasogastric stomach   2. Stable cardiomegaly   3. New streaky atelectasis in the lung field   3. Resolution of oblique band of atelectasis in the left perihilar            XR ABDOMEN (KUB) (SINGLE AP VIEW)   Final Result   Impression:          1. Decreased small bowel dilation since the prior study. 2. Nasogastric tube can be retracted 3 cm for more optimal positioning. XR ABDOMEN (KUB) (SINGLE AP VIEW)   Final Result   Impression:   Enteric tube tip in the distal esophagus near gastroesophageal junction, recommend advancing. Decreased mild small bowel dilatation which may represent ileus. XR ABDOMEN (2 VIEWS)   Final Result      Supine and erect views demonstrate mild gaseous distention of small bowel loops in the right midabdomen with air-fluid levels. The findings are progressive since 8/2/2021 and suggest worsening ileus or low-grade obstruction. No free intraperitoneal air. Lumbosacral metallic fixation noted. XR ABDOMEN (KUB) (SINGLE AP VIEW)   Final Result   Impression:    A few nondilated small bowel loops with air-fluid levels, unchanged in appearance. No evidence of high-grade obstruction. CT ABDOMEN PELVIS W IV CONTRAST Additional Contrast? None   Final Result      Interval development of bibasilar atelectatic changes. There is a midline anterior abdominal wall hernia present with herniation of bowel loops and large amount of omental fat. This suggests incisional hernia. Foci of extraluminal air or gas are seen in the left lower quadrant with associated stranding. There is also free fluid noted. This may be due to recent postsurgical changes and should be correlated with patient's history. Alternatively this may    represent infectious process or possibly ruptured viscus.       Small amounts of extraluminal air are also seen within the soft tissues of the anterior abdomen and in the region of the hernia sac which may  be related to recent surgery but the possibility of infectious process or free foci of air within the    peritoneal cavity lying within the hernia are considerations. XR CHEST PORTABLE   Final Result      No acute disease         XR ABDOMEN (KUB) (SINGLE AP VIEW)   Final Result      No bowel obstruction. Moderate retained stool in the colon. XR LUMBAR SPINE (2-3 VIEWS)   Final Result      3 coned-down frontal and lateral intraoperative views demonstrate postoperative changes at L4-5 and L5-S1. No retained surgical instrument is seen. XR LUMBAR SPINE (2-3 VIEWS)   Final Result         Coned-down intraoperative views of the lumbar spine assumes 5 lumbar type vertebral bodies. There is anterior interbody fusion at L4-5 and L5-S1 with radiolucent interbody devices. Fluoroscopy time 1 minute      FLUORO FOR SURGICAL PROCEDURES   Final Result         Coned-down intraoperative views of the lumbar spine assumes 5 lumbar type vertebral bodies. There is anterior interbody fusion at L4-5 and L5-S1 with radiolucent interbody devices. Fluoroscopy time 1 minute      CT GUIDED STEREOTACTIC LOCALIZATION   Final Result      CT GUIDED STEREOTACTIC LOCALIZATION   Final Result              Assessment/Plan:    Chronic back pain with bilateral feet burning:  Status post L4-S1 ANTERIOR LUMBAR INTERBODY FUSION WITH PEDICLE SCREW FIXATION   Continue as needed pain medication  Management per neurosurgery    Acute DVT BL LE provoked  Start lovenox and warfarin bridge. Discussed with general surgery and neurosurgery , both are ok with therapeutic anticoagulation  Monitor 24 hours on therapeutic anticoagulation.        Hypoxia: resolved. likely due to atelectasis, noted on CXR. Improved. 02 weaned off. Continue incentive spirometry.      Abdominal pain/constipation with incisional hernia: dev fascial dehiscence after coughing spell on 8/1.  CT abd/pelvis showed incisional hernia with bowel and omental fat. Underwent primary repair and closure of abdominal wall fascial wound on 1/8. Patient was having nausea with vomiting on 2/8. Ileus versus SBO noted on x-ray. Gen surg managing conservatively. NG tube has been removed and diet started. Tolerating diet. Urinary retention: improved after lópez. Urology assisted. López later removed and voiding fine. UTI: UC + E. coli. Switch IV ceftriaxone to p.o. Cipro based on sensitivities. .      Hypertension: Fairly controlled. Continue meds     Hyperlipidemia: continue statin        Depression: Continue Wellbutrin.     Sleep apnea:  Continue CPAP      Anemia: Due to expected postsurgical blood loss. Hgb stable    DVT Prophylaxis: Lovenox  Diet: ADULT DIET;  Regular; Low Fiber  Code Status: Full Code    PT/OT Eval Status: Per neurosurgery    Dispo -per neurosurgery     Mayela White MD

## 2021-08-09 NOTE — CARE COORDINATION
Case Management Daily Note                    Date: 8/9/2021     Patient Name: Shane Echavarria    Date of Admission: 7/29/2021  5:22 AM  YOB: 1955    Length of Stay: 11  GMLOS: 7.5         Patient Admission Status: Inpatient  Diagnosis:Spondylolisthesis of lumbar region [M43.16]  Lumbar stenosis with neurogenic claudication [M48.062]     ________________________________________________________________________________________  Discharge Plan: SNF: [facility]: [phone]    Insurance: Payor: Brigette Medellin / Plan: Kelin Ching PLUS HMO / Product Type: *No Product type* /   Is pre-cert/notification needed: yes, initiated 8/2 and again 8/9     Tentative discharge date: 8/10     Current barriers: Medical Clearance, cert     Referrals completed: [facility]: [facility] [place] [facility]     Resources/ information provided: [facility] List   ________________________________________________________________________________________  PT AM-PAC: 17 / 24 per last evaluation on: ongoing     OT AM-PAC: 15 / 24 per last evaluation on: ongoging     DME Needs for discharge: defer  ________________________________________________________________________________________  Notes/Plan of Care:   SW spoke with Yousuf Pennington at [facility]. [phone] and LVM regarding placement status. Cert stated today. New Auth submitted by  staff.      Care Transition Patient: JOYA Peña  [facility]   Case Management Department  Ph: [phone]

## 2021-08-09 NOTE — PROGRESS NOTES
NEUROSURGERY POST-OP PROGRESS NOTE    Patient Name: Diego Guzman YOB: 1955   Sex: Female Age: 77 yrs     Medical Record Number: [id number] Sandrine Cooley Number: [de-identified]   Room Number: 7727/5537-48 Hospital Day: Hospital Day: 12     Interval History:  Post-operative Procedure(s) (LRB):  REPAIR OF WOUND DEHISCENCE (N/A) and L4-S1 ANTERIOR LUMBAR INTERBODY FUSION WITH PEDICLE SCREW FIXATION per DiNinoi    Subjective:  Patient has decreased abdominal pain, increased pain in bilateral calves. She is tolerating PO slowly. Objective:    VITAL SIGNS   /74   Pulse 72   Temp 98.4 °F (36.9 °C) (Oral)   Resp 16   Ht 5' 3\" (1.6 m)   Wt 259 lb (117.5 kg)   LMP  (LMP Unknown)   SpO2 93%   BMI 45.88 kg/m²    Height Height: 5' 3\" (160 cm)   Weight Weight: 259 lb (117.5 kg)        Allergies Allergies   Allergen Reactions    Latex Rash    Reglan [Metoclopramide Hcl] Rash    Univasc [Moexipril] Other (See Comments)     unsure    Crestor [Rosuvastatin]      myalgia    Celecoxib Palpitations    Keflex [Cephalexin] Diarrhea    Lipitor Other (See Comments)     myalgia    Metoclopramide Anxiety    Prochlorperazine Anxiety    Prochlorperazine Edisylate     Sulfa Antibiotics Nausea And Vomiting    Vioxx       NPO Status ADULT DIET; Regular; Low Fiber   Isolation No active isolations     LABS   Basic Metabolic Profile Recent Labs     08/07/21  0517   *   CL 98*   CO2 25   BUN 8   CREATININE <0.5*   GLUCOSE 111*   PHOS 3.8   MG 1.90      Complete Blood Count Recent Labs     08/07/21  0517 08/08/21  1626   WBC 7.9 7.7   RBC 3.55* 3.44*      Coagulation Studies No results for input(s): PTT, INR in the last 72 hours.     Invalid input(s): PLATELETS, PROA, PT, PTTA     MEDICATIONS   Inpatient Medications     ciprofloxacin, 500 mg, Oral, 2 times per day    pantoprazole, 40 mg, Oral, QAM AC    sucralfate, 1 g,

## 2021-08-09 NOTE — PROGRESS NOTES
Occupational Therapy  Hold    Consulted RN. Pt currently on hold due to bilateral DVT. Will check back as schedule allows. Continue OT per POC.

## 2021-08-09 NOTE — PLAN OF CARE
Problem: Safety:  Goal: Free from accidental physical injury  Description: Free from accidental physical injury  Outcome: Met This Shift   Pt free from injury this shift and free of falls. 2/4 rails up on bed and bed is in the lowest position. Wheels locked and bed alarm set. Socks on pt and ID bands on pt. Call light in reach of pt and pt educated to call out to get up x1 walker Gb. Will continue to monitor for safety. Problem: Pain:  Goal: Control of acute pain  Description: Control of acute pain  Outcome: Ongoing   7-8/10 prior to interventions and 5/10 after. PO and IV utilized.

## 2021-08-09 NOTE — PROGRESS NOTES
Pt reports RLE is more painful than LLE calf. Dopplers to be completed later today. Pain controlled with PO and IV. VSS. CPAP overnight. Incisions CDI. ABD dressing CDI as well. ABD binder on at all times. Pt ambulates x1 walker GB overnight and tolerated fairly well. NV intact with exceptin to pain noted. Bed alarm set. Call light in reach. Will continue to monitor.

## 2021-08-09 NOTE — PROGRESS NOTES
Called this AM and informed that the pt has bilateral DVTs to both BLE. Hospitalist notified. New orders placed. Will continue to monitor.

## 2021-08-09 NOTE — PROGRESS NOTES
Dressing to abdomen removed. Site cleaned and new dressing placed overtop. New abd binder in place. Pt tolerated well. Will continue to monitor.

## 2021-08-09 NOTE — PROGRESS NOTES
Clinical Pharmacy Consult Note    Admit date: 7/29/2021    Subjective/Objective:  77 yof with PMHx of chronic lumbar pain and bilateral foot burning recalcitrant to conservative treatment who presented for L4-S1 anterior lumbar fusion (7/29) with screw fixation. Patient's stay has been complicated by fascial dehiscence (s/p fascial closure 8/1), ileus (NG 8/3-8/5), and UTI (s/p 3 days of ceftriaxone, now on PO ciprofloxacin). On 8/8, patient reported BLE pain/warmth. Dopplers revealed bilateral DVT and patient was initiated on warfarin with enoxaparin bridge. Pharmacy is consulted to dose warfarin per Dr. Darylene Friedlander anticoagulation regimen:  New start      Recent Labs     08/07/21  0517   *   K 3.5   CL 98*   CO2 25   BUN 8   CREATININE <0.5*   GLUCOSE 111*       CrCl cannot be calculated (This lab value cannot be used to calculate CrCl because it is not a number: <0.5). Lab Results   Component Value Date    WBC 7.7 08/08/2021    HGB 10.9 (L) 08/08/2021    HCT 31.6 (L) 08/08/2021    MCV 91.9 08/08/2021     08/08/2021       Lab Results   Component Value Date    PROTIME 10.6 07/16/2021    INR 0.94 07/16/2021       Height:  5' 3\" (160 cm)  Weight:  259 lb (117.5 kg)        Assessment/Plan:  1. Anticoagulation: Bilateral DVT (INR goal 2-3)  · Patient is a new start warfarin. Will initiate 5 mg daily. · Medication profile reviewed for potential drug interactions. Patient is on ciprofloxacin which has the ability to enhance the anticoagulant effects of warfarin (increase INR). Patient may require lower warfarin requirement while taking concomitantly. · Recommend continuing enoxaparin 120 mg BID until INR is therapeutic and stable. · Daily INR will be monitored and dose adjustments made as needed. Date INR Warfarin Dose   8/9 -- 5 mg ordered                      Please call with any questions.   Deya TseD, BCPS  Wireless: [phone]  Main pharmacy: [id number]  8/9/2021 10:33 AM

## 2021-08-10 LAB
INR BLD: 1.14 (ref 0.88–1.12)
PROTHROMBIN TIME: 12.9 SEC (ref 9.9–12.7)

## 2021-08-10 PROCEDURE — 6370000000 HC RX 637 (ALT 250 FOR IP): Performed by: INTERNAL MEDICINE

## 2021-08-10 PROCEDURE — 6370000000 HC RX 637 (ALT 250 FOR IP): Performed by: PHYSICIAN ASSISTANT

## 2021-08-10 PROCEDURE — 2580000003 HC RX 258: Performed by: PHYSICIAN ASSISTANT

## 2021-08-10 PROCEDURE — 6370000000 HC RX 637 (ALT 250 FOR IP): Performed by: ANESTHESIOLOGY

## 2021-08-10 PROCEDURE — 36415 COLL VENOUS BLD VENIPUNCTURE: CPT

## 2021-08-10 PROCEDURE — 85610 PROTHROMBIN TIME: CPT

## 2021-08-10 PROCEDURE — 6370000000 HC RX 637 (ALT 250 FOR IP): Performed by: NURSE PRACTITIONER

## 2021-08-10 PROCEDURE — 6370000000 HC RX 637 (ALT 250 FOR IP): Performed by: NEUROLOGICAL SURGERY

## 2021-08-10 PROCEDURE — 97116 GAIT TRAINING THERAPY: CPT

## 2021-08-10 PROCEDURE — 97110 THERAPEUTIC EXERCISES: CPT

## 2021-08-10 PROCEDURE — 6360000002 HC RX W HCPCS: Performed by: NURSE PRACTITIONER

## 2021-08-10 PROCEDURE — 6370000000 HC RX 637 (ALT 250 FOR IP): Performed by: SURGERY

## 2021-08-10 PROCEDURE — 1200000000 HC SEMI PRIVATE

## 2021-08-10 PROCEDURE — 6360000002 HC RX W HCPCS: Performed by: INTERNAL MEDICINE

## 2021-08-10 RX ADMIN — PROMETHAZINE HYDROCHLORIDE 25 MG: 25 INJECTION INTRAMUSCULAR; INTRAVENOUS at 17:40

## 2021-08-10 RX ADMIN — DOCUSATE SODIUM 50 MG AND SENNOSIDES 8.6 MG 2 TABLET: 8.6; 5 TABLET, FILM COATED ORAL at 09:23

## 2021-08-10 RX ADMIN — ENOXAPARIN SODIUM 120 MG: 120 INJECTION SUBCUTANEOUS at 09:22

## 2021-08-10 RX ADMIN — SUCRALFATE 1 G: 1 TABLET ORAL at 12:01

## 2021-08-10 RX ADMIN — CETIRIZINE HYDROCHLORIDE 10 MG: 10 TABLET, FILM COATED ORAL at 09:04

## 2021-08-10 RX ADMIN — OXYCODONE 5 MG: 5 TABLET ORAL at 17:54

## 2021-08-10 RX ADMIN — CIPROFLOXACIN 500 MG: 500 TABLET, FILM COATED ORAL at 22:00

## 2021-08-10 RX ADMIN — LOSARTAN POTASSIUM 50 MG: 25 TABLET, FILM COATED ORAL at 22:02

## 2021-08-10 RX ADMIN — DOCUSATE SODIUM 50 MG AND SENNOSIDES 8.6 MG 2 TABLET: 8.6; 5 TABLET, FILM COATED ORAL at 22:01

## 2021-08-10 RX ADMIN — CIPROFLOXACIN 500 MG: 500 TABLET, FILM COATED ORAL at 09:04

## 2021-08-10 RX ADMIN — Medication 10 ML: at 22:02

## 2021-08-10 RX ADMIN — SUCRALFATE 1 G: 1 TABLET ORAL at 06:01

## 2021-08-10 RX ADMIN — ENOXAPARIN SODIUM 120 MG: 120 INJECTION SUBCUTANEOUS at 22:19

## 2021-08-10 RX ADMIN — SUCRALFATE 1 G: 1 TABLET ORAL at 23:10

## 2021-08-10 RX ADMIN — TAMSULOSIN HYDROCHLORIDE 0.4 MG: 0.4 CAPSULE ORAL at 09:04

## 2021-08-10 RX ADMIN — METOPROLOL SUCCINATE 25 MG: 50 TABLET, EXTENDED RELEASE ORAL at 09:04

## 2021-08-10 RX ADMIN — WARFARIN SODIUM 5 MG: 5 TABLET ORAL at 17:34

## 2021-08-10 RX ADMIN — VERAPAMIL HYDROCHLORIDE 120 MG: 120 TABLET, FILM COATED, EXTENDED RELEASE ORAL at 22:03

## 2021-08-10 RX ADMIN — PANTOPRAZOLE SODIUM 40 MG: 40 TABLET, DELAYED RELEASE ORAL at 06:01

## 2021-08-10 RX ADMIN — FLUTICASONE PROPIONATE 1 SPRAY: 50 SPRAY, METERED NASAL at 09:17

## 2021-08-10 RX ADMIN — SUCRALFATE 1 G: 1 TABLET ORAL at 17:34

## 2021-08-10 RX ADMIN — OXYCODONE 5 MG: 5 TABLET ORAL at 21:59

## 2021-08-10 RX ADMIN — PRAVASTATIN SODIUM 20 MG: 20 TABLET ORAL at 22:00

## 2021-08-10 RX ADMIN — OXYCODONE 5 MG: 5 TABLET ORAL at 03:31

## 2021-08-10 RX ADMIN — BUPROPION HYDROCHLORIDE 150 MG: 150 TABLET, EXTENDED RELEASE ORAL at 09:23

## 2021-08-10 ASSESSMENT — PAIN SCALES - GENERAL
PAINLEVEL_OUTOF10: 6
PAINLEVEL_OUTOF10: 0
PAINLEVEL_OUTOF10: 0
PAINLEVEL_OUTOF10: 7
PAINLEVEL_OUTOF10: 8

## 2021-08-10 ASSESSMENT — PAIN DESCRIPTION - FREQUENCY: FREQUENCY: INTERMITTENT

## 2021-08-10 ASSESSMENT — PAIN DESCRIPTION - PAIN TYPE: TYPE: SURGICAL PAIN

## 2021-08-10 ASSESSMENT — PAIN DESCRIPTION - ONSET: ONSET: ON-GOING

## 2021-08-10 ASSESSMENT — PAIN DESCRIPTION - LOCATION: LOCATION: ABDOMEN

## 2021-08-10 ASSESSMENT — PAIN DESCRIPTION - DESCRIPTORS: DESCRIPTORS: CRAMPING

## 2021-08-10 ASSESSMENT — PAIN DESCRIPTION - PROGRESSION: CLINICAL_PROGRESSION: NOT CHANGED

## 2021-08-10 NOTE — PROGRESS NOTES
Clarified with MD that Pt does not need to be on bedrest due to her DVTs. MD replied back stating Pt is allowed to get up and ambulate.

## 2021-08-10 NOTE — PROGRESS NOTES
Patient is alert and oriented. Vital signs are stable. Patient continues to have pain in her legs bilaterally. PRN pain medication administered per MAR. Abdominal dressing has small amount of drainage but is intact. Abdominal binder remains in place. Patient voiding urine without complication. Bed is in the lowest position. Bed alarm is activated. Call light is within reach. Will continue to monitor and reassess.

## 2021-08-10 NOTE — PROGRESS NOTES
Hospitalist Progress Note      PCP: Wendy Del Castillo MD    Date of Admission: 7/29/2021    Chief Complaint: Chronic lumbar pain and bilateral foot burning    Hospital Course: S/p L4-S1 ANTERIOR LUMBAR INTERBODY FUSION WITH PEDICLE SCREW FIXATION on 07/30/2021. Patient was unable to void. Was complaining of abdominal pain. Abdominal x-ray was obtained and showed moderate amount of stool. Bladder scan showed 750 ml. Patient was straight cathed. UA was negative for infection. Patient sats dropped to 86% and was placed on 2 L of oxygen. We were asked to see/evaluate by Fabricio Jacobson.  Marlon Cabot, MD for medical management of chronic conditions, urinary retention and hypoxia    Subjective:    Denies any cp, sob  Denies any nausea ,emesis  Complaining of leg pain  In good spirits today              Medications:  Reviewed    Infusion Medications    lactated ringers 100 mL/hr at 08/04/21 3862    sodium chloride       Scheduled Medications    enoxaparin  1 mg/kg Subcutaneous BID    warfarin  5 mg Oral Daily    ciprofloxacin  500 mg Oral 2 times per day    pantoprazole  40 mg Oral QAM AC    sucralfate  1 g Oral 4 times per day    fluticasone  1 spray Each Nostril Daily    SMOG Enema  330 mL Rectal Once    pravastatin  20 mg Oral Nightly    tamsulosin  0.4 mg Oral Daily    lidocaine  1 patch Transdermal Daily    sennosides-docusate sodium  2 tablet Oral BID    polyethylene glycol  17 g Oral Daily    metoprolol succinate  25 mg Oral Daily    buPROPion  150 mg Oral QAM    cetirizine  10 mg Oral Daily    losartan  50 mg Oral Daily    verapamil  120 mg Oral Nightly    sodium chloride flush  5-40 mL Intravenous 2 times per day     PRN Meds: oxyCODONE, acetaminophen, hydrALAZINE, LORazepam, diazePAM, promethazine, guaiFENesin-dextromethorphan, ondansetron, [Held by provider] oxyCODONE **OR** [Held by provider] oxyCODONE, [Held by provider] calcium carbonate, benzocaine-menthol, sodium chloride flush, sodium chloride      Intake/Output Summary (Last 24 hours) at 8/10/2021 1115  Last data filed at 8/10/2021 0758  Gross per 24 hour   Intake 960 ml   Output 800 ml   Net 160 ml       Physical Exam Performed:    /75   Pulse 64   Temp 98.3 °F (36.8 °C) (Oral)   Resp 16   Ht 5' 3\" (1.6 m)   Wt 259 lb (117.5 kg)   LMP  (LMP Unknown)   SpO2 92%   BMI 45.88 kg/m²     General appearance: No apparent distress, appears stated age and cooperative. Morbidly obese  HEENT: Normal cephalic, atraumatic without obvious deformity. Pupils equal, round,   E. Conjunctivae/corneas clear. Neck: Supple, with full range of motion. No jugular venous distention. Trachea midline. Respiratory:  Normal respiratory effort. Bibasilar Rales   cardiovascular: Regular rate and rhythm with normal S1/S2 without murmurs, rubs or gallops. Abdomen: Soft, non-tender, non-distended with normal bowel sounds. Suprapubic incision site with mild erythema with no overt purulent discharge. Abdominal binder in place   Musculoskeletal: No clubbing, cyanosis. Bilateral lower extremity edema. Skin: Warm and dry  Neurologic:  Alert, speech clear with no overt facial droop  Psychiatric: Awake        Labs:   Recent Labs     08/08/21  1626   WBC 7.7   HGB 10.9*   HCT 31.6*        No results for input(s): NA, K, CL, CO2, BUN, CREATININE, CALCIUM, PHOS in the last 72 hours. Invalid input(s): MAGNES  No results for input(s): AST, ALT, BILIDIR, BILITOT, ALKPHOS in the last 72 hours. Recent Labs     08/10/21  0614   INR 1.14*     No results for input(s): Jadene Moh in the last 72 hours.     Urinalysis:      Lab Results   Component Value Date    NITRU POSITIVE 08/06/2021    WBCUA  08/06/2021    BACTERIA 3+ 08/06/2021    RBCUA 0-2 08/06/2021    BLOODU SMALL 08/06/2021    SPECGRAV 1.015 08/06/2021    GLUCOSEU Negative 08/06/2021       Radiology:  VL Extremity Venous Bilateral   Final Result      XR CHEST PORTABLE   Final Result 1. Nasogastric stomach   2. Stable cardiomegaly   3. New streaky atelectasis in the lung field   3. Resolution of oblique band of atelectasis in the left perihilar            XR ABDOMEN (KUB) (SINGLE AP VIEW)   Final Result   Impression:          1. Decreased small bowel dilation since the prior study. 2. Nasogastric tube can be retracted 3 cm for more optimal positioning. XR ABDOMEN (KUB) (SINGLE AP VIEW)   Final Result   Impression:   Enteric tube tip in the distal esophagus near gastroesophageal junction, recommend advancing. Decreased mild small bowel dilatation which may represent ileus. XR ABDOMEN (2 VIEWS)   Final Result      Supine and erect views demonstrate mild gaseous distention of small bowel loops in the right midabdomen with air-fluid levels. The findings are progressive since 8/2/2021 and suggest worsening ileus or low-grade obstruction. No free intraperitoneal air. Lumbosacral metallic fixation noted. XR ABDOMEN (KUB) (SINGLE AP VIEW)   Final Result   Impression:    A few nondilated small bowel loops with air-fluid levels, unchanged in appearance. No evidence of high-grade obstruction. CT ABDOMEN PELVIS W IV CONTRAST Additional Contrast? None   Final Result      Interval development of bibasilar atelectatic changes. There is a midline anterior abdominal wall hernia present with herniation of bowel loops and large amount of omental fat. This suggests incisional hernia. Foci of extraluminal air or gas are seen in the left lower quadrant with associated stranding. There is also free fluid noted. This may be due to recent postsurgical changes and should be correlated with patient's history. Alternatively this may    represent infectious process or possibly ruptured viscus.       Small amounts of extraluminal air are also seen within the soft tissues of the anterior abdomen and in the region of the hernia sac which may  be related to recent surgery but the abdominal wall fascial wound on 1/8. Patient was having nausea with vomiting on 2/8. Ileus versus SBO noted on x-ray. Gen surg managing conservatively. NG tube has been removed and diet started. Tolerating diet. Moving bowel. Passing gas/     Urinary retention: improved after lópez. Urology assisted. López later removed and voiding fine. UTI: UC + E. coli. Switch IV ceftriaxone to p.o. Cipro based on sensitivities. Dc tomorrow      Hypertension: Fairly controlled. Continue meds     Hyperlipidemia: continue statin        Depression: Continue Wellbutrin.     Sleep apnea:  Continue CPAP      Anemia: Due to expected postsurgical blood loss. Hgb stable    DVT Prophylaxis: Lovenox  Diet: ADULT DIET;  Regular; Low Fiber  Adult Oral Nutrition Supplement; Standard High Calorie/High Protein Oral Supplement  Code Status: Full Code    PT/OT Eval Status: Per neurosurgery    Dispo -per neurosurgery     Rhoda Severance, MD

## 2021-08-10 NOTE — PROGRESS NOTES
NEUROSURGERY POST-OP PROGRESS NOTE    Patient Name: Ulises Singer YOB: 1955   Sex: Female Age: 77 yrs     Medical Record Number: [id number] Laurence Number: [de-identified]   Room Number: 8873/8088-02 Hospital Day: Hospital Day: 13     Interval History:  Post-operative day 12 s/p Procedure(s) (LRB):  REPAIR OF WOUND DEHISCENCE (N/A) and L4-S1 ANTERIOR LUMBAR INTERBODY FUSION WITH PEDICLE SCREW FIXATION per DiNinoi    Subjective:  Continues to feel better every day, she had another BM yesterday. Objective:    VITAL SIGNS   /67   Pulse 70   Temp 98.1 °F (36.7 °C) (Oral)   Resp 16   Ht 5' 3\" (1.6 m)   Wt 259 lb (117.5 kg)   LMP  (LMP Unknown)   SpO2 93%   BMI 45.88 kg/m²    Height Height: 5' 3\" (160 cm)   Weight Weight: 259 lb (117.5 kg)        Allergies Allergies   Allergen Reactions    Latex Rash    Reglan [Metoclopramide Hcl] Rash    Univasc [Moexipril] Other (See Comments)     unsure    Crestor [Rosuvastatin]      myalgia    Celecoxib Palpitations    Keflex [Cephalexin] Diarrhea    Lipitor Other (See Comments)     myalgia    Metoclopramide Anxiety    Prochlorperazine Anxiety    Prochlorperazine Edisylate     Sulfa Antibiotics Nausea And Vomiting    Vioxx       NPO Status ADULT DIET; Regular; Low Fiber  Adult Oral Nutrition Supplement; Standard High Calorie/High Protein Oral Supplement   Isolation No active isolations     LABS   Basic Metabolic Profile No results for input(s): NA, CL, CO2, BUN, CREATININE, GLUCOSE, ALB, PHOS, MG in the last 72 hours.     Invalid input(s): POTASSIUM, CA   Complete Blood Count Recent Labs     08/08/21  1626   WBC 7.7   RBC 3.44*      Coagulation Studies Recent Labs     08/10/21  0614   INR 1.14*        MEDICATIONS   Inpatient Medications     enoxaparin, 1 mg/kg, Subcutaneous, BID    warfarin, 5 mg, Oral, Daily    ciprofloxacin, 500 mg, Oral, 2 times per day    pantoprazole, 40 mg, Oral, QAM AC    sucralfate, 1 g, Oral, 4 times per day    fluticasone, 1 spray, Each Nostril, Daily    SMOG Enema, 330 mL, Rectal, Once    pravastatin, 20 mg, Oral, Nightly    tamsulosin, 0.4 mg, Oral, Daily    lidocaine, 1 patch, Transdermal, Daily    sennosides-docusate sodium, 2 tablet, Oral, BID    polyethylene glycol, 17 g, Oral, Daily    metoprolol succinate, 25 mg, Oral, Daily    buPROPion, 150 mg, Oral, QAM    cetirizine, 10 mg, Oral, Daily    losartan, 50 mg, Oral, Daily    verapamil, 120 mg, Oral, Nightly    sodium chloride flush, 5-40 mL, Intravenous, 2 times per day   Infusions    lactated ringers 100 mL/hr at 08/04/21 8882    sodium chloride        Antibiotics   Recent Abx Admin                   ciprofloxacin (CIPRO) tablet 500 mg (mg) 500 mg Given 08/09/21 2213     500 mg Given  0943                 Neurologic Exam:  Mental status: awake and alert and oriented x4    Musculoskeletal:   Gait: Not tested   Tone: normal  Sensory: intact to all extremities  Motor strength:    Right  Left    Right  Left    Deltoid  5 5  Hip Flex  5 5   Biceps  5 5  Knee Extensors  5 5   Triceps  5 5  Knee Flexors  5 5   Wrist Ext  5 5  Ankle Dorsiflex. 5 5   Wrist Flex  5 5  Ankle Plantarflex. 5 5   Handgrip  5 5  Ext Bib Longus  5 5   Thumb Ext  5 5         Incision: back incisions intact, clean and dry  abdominal incision w/ scant amount of drainage- dressing in place     Respiratory:  Unlabored respiratory pattern    Abdomen:   Soft, rounded   Last BM  8/9    Cardiovascular:  Warm, well perfused    Assessment   Patient is a 78 yo F s/p Procedure(s) (LRB):  REPAIR OF WOUND DEHISCENCE (N/A) per Dr. Cabello Para and POD 12 s/p L4-S1 ANTERIOR LUMBAR INTERBODY FUSION WITH PEDICLE SCREW FIXATION per 2020 [facility]:  1. Neurologic exam frequency: q 4  2. Mobility: PT/OT as tolerated  3. Bowel Regimen: glycolax and senna  4. Pain control: she and robaxin    5.  Incisional Care: open to air, cleanse daily with soap/water, pat dry with clean towel and paint with CHG swab  6. Abdominal binder at all times  7. + UTI, antibiotics per medical team   8. BLE dopplers confirmed BLE DVT-coumadin with lovenox bridge     Dispo- will dc to [facility] when precert obtained    Patient was seen and examined with Dr. Ranjit Dent who agrees with above assessment and plan. Electronically signed by:  LISA Tidwell NP, 8/10/2021 7:04 AM   Neurosurgery Nurse Practitioner  [phone]

## 2021-08-10 NOTE — PLAN OF CARE
Problem: Pain:  Goal: Patient's pain/discomfort is manageable  Description: Patient's pain/discomfort is manageable  Outcome: Ongoing   RN assesses pain using 0-10 scale. Patient understands how to rate pain using 0-10 scale. Pain is controled with medication per MAR. RN encourages patient to call out for breakthrough pain. Will continue to monitor and reassess. Problem: Falls - Risk of:  Goal: Will remain free from falls  Description: Will remain free from falls  Outcome: Ongoing   Patient remains free from falls during this shift. Bed is in the lowest position and the bed alarm is activated. Anti-slip socks are on. Call light is within reach. Will continue to monitor and reassess. Problem: Nausea/Vomiting:  Goal: Absence of nausea/vomiting  Description: Absence of nausea/vomiting  Outcome: Ongoing   Patient denies any nausea or vomiting. Will continue to monitor and reassess.

## 2021-08-10 NOTE — PROGRESS NOTES
Neurosx MD notified on status of Pt abdominal incision. Abdominal pad was saturated with pink and green discharge. Underneath pad it looked like Pt bottom half of wound may be open and possible pus coming out. New abdominal dressing was applied, incision was cleaned with chlorhexidine and saline. Pt medicated for Pain and nausea at this time.

## 2021-08-10 NOTE — PROGRESS NOTES
Physical Therapy  Facility/Department: [facility] 5T ORTHO/NEURO  Daily Treatment Note  NAME: Shaen Echavarria  : 1955  MRN: [id number]    Date of Service: 8/10/2021    Discharge Recommendations: hSane Echavarria scored a 17/24 on the AM-PAC short mobility form. Current research shows that an AM-PAC score of 17 or less is typically not associated with a discharge to the patient's home setting. Based on the patient's AM-PAC score and their current functional mobility deficits, it is recommended that the patient have 3-5 sessions per week of Physical Therapy at d/c to increase the patient's independence. Please see assessment section for further patient specific details. PT Equipment Recommendations  Equipment Needed: No  Assessment   Assessment: Pt has progressed but limited by RLE DVT pain during gait training. Presented with fair trunk and LE control during bed mobility with the HOB elevated and use of the handrails to get EOB. Transfers and gait training were mildly unsteady requiring sequencing cues throughout; no LOB noted. Treatment Diagnosis: Impaired functional mobility s/p L4-S1 ANTERIOR LUMBAR INTERBODY FUSION WITH PEDICLE SCREW FIXATION  Prognosis: Good  Decision Making: High Complexity  Patient Education: Pt educated on PT role, importance of OOB activity, no bending/lifting/twisting, log roll OOB and she verbalized understanding. REQUIRES PT FOLLOW UP: Yes   Patient Diagnosis(es): There were no encounter diagnoses. has a past medical history of Allergic rhinitis, Anxiety, Atrial tachycardia (HCC), Carpal tunnel syndrome, Cervicalgia, Chronic back pain, Depression, GERD (gastroesophageal reflux disease), Headache(784.0), Hernia hiatal, Histoplasmosis, Hot flashes, Hyperlipidemia, Hypertension, Obesity, Osteoarthritis, PONV (postoperative nausea and vomiting), Shingles, Sleep apnea, and Tricuspid regurgitation.    has a past surgical history that includes Knee arthroscopy; Knee cartilage surgery; lobectomy; sinus surgery; Bunionectomy; partial hysterectomy (cervix not removed); Total knee arthroplasty (Bilateral, 03/03/2015); Parathyroid gland surgery; Lung biopsy; Colonoscopy (10/2018); Breast surgery (Right); lumbar fusion (N/A, 7/29/2021); and Abdomen surgery (N/A, 8/1/2021). Restrictions  Position Activity Restriction  Other position/activity restrictions: ambulate, activity as tolerated, abdominal binder, pt may shower  Subjective   General  Additional Pertinent Hx: Pt is a 77 y.o. female 7/29 with spondylolisthesis of lumbar region. Pt s/p L4-S1 ANTERIOR LUMBAR INTERBODY FUSION WITH PEDICLE SCREW FIXATION on 7/30.   PMH:  hyperlipidemia, HTN, OA, anxiety, depression, GERD, sleep apnea, bilat TKR  Referring Practitioner: SMITHA Green  Subjective  Subjective: Pt was in bed willing to participate; c/o mild pain in RLE at rest. Severe pain with activity    Orientation  Orientation  Overall Orientation Status: Within Functional Limits  Objective   Bed mobility  Supine to Sit: SBA  Scooting: Stand by assistance  Transfers  Sit to Stand: SBA  Stand to sit: SBA  Ambulation  Ambulation?: Yes  Ambulation 1  Device: Rolling Walker  Assistance: Contact guard assistance  Quality of Gait: heavy UE support on walker, decreased laurent, decreased bilat step length/length; effortful; pt fatigues quickly and WONG  Distance: x20ft +25ft     Balance  Sitting - Static: Good  Sitting - Dynamic: Fair  Standing - Static: Fair  Standing - Dynamic: Fair;-  Exercises  Quad Sets: 15x5 sec B  Heelslides: x10 B  Gluteal Sets: 15x5 sec B  Static standing to perform pericare x1min   AM-PAC Score  AM-PAC Inpatient Mobility Raw Score : 17 (08/10/21 1153)  AM-PAC Inpatient T-Scale Score : 42.13 (08/10/21 1153)  Mobility Inpatient CMS 0-100% Score: 50.57 (08/10/21 1153)  Mobility Inpatient CMS G-Code Modifier : CK (08/10/21 1153)  Goals  Short term goals  Time Frame for Short term goals: By discharge  Short term goal 1: Sup to sit SBA   ongoing  Short term goal 2: Pt will transfer sit to stand SBA  ongoing  Short term goal 3: Pt will amb >100' with LRAD SBA  met 8/5   new goal: ambulate 200 ft with LRAD mod I   ongoing  Short term goal 4: Pt will up/down 1 step with LRAD SBA  ongoing  Patient Goals   Patient goals : eventual return home    Plan    Plan  Times per week: 5-7  Current Treatment Recommendations: Functional Mobility Training, Gait Training, Stair training, Safety Education & Training, Patient/Caregiver Education & Training  Safety Devices  Type of devices: Call light within reach, Chair alarm in place, Nurse notified, Left in chair     Therapy Time   Individual Concurrent Group Co-treatment   Time In 1120         Time Out 1145         Minutes 25         Timed Code Treatment Minutes: [address], PTA    Met goals 1 and 5950 [facility], [address] [place] [address]

## 2021-08-10 NOTE — PROGRESS NOTES
Clinical Pharmacy Consult Note    Admit date: 7/29/2021    Subjective/Objective:  77 yof with PMHx of chronic lumbar pain and bilateral foot burning recalcitrant to conservative treatment who presented for L4-S1 anterior lumbar fusion (7/29) with screw fixation. Patient's stay has been complicated by fascial dehiscence (s/p fascial closure 8/1), ileus (NG 8/3-8/5), and UTI (s/p 3 days of ceftriaxone, now on PO ciprofloxacin). On 8/8, patient reported BLE pain/warmth. Dopplers revealed bilateral DVT and patient was initiated on warfarin with enoxaparin bridge. Interval update:  Continues on enoxaparin bridge to warfarin. Per Neurosurg, okay to discharge to SNF once able. Pharmacy is consulted to dose warfarin per Dr. Brenna Arias anticoagulation regimen:  New start      Lab Results   Component Value Date    WBC 7.7 08/08/2021    HGB 10.9 (L) 08/08/2021    HCT 31.6 (L) 08/08/2021    MCV 91.9 08/08/2021     08/08/2021       Lab Results   Component Value Date    PROTIME 12.9 (H) 08/10/2021    INR 1.14 (H) 08/10/2021       Height:  5' 3\" (160 cm)  Weight:  259 lb (117.5 kg)        Assessment/Plan:  1. Anticoagulation: Bilateral DVT (INR goal 2-3)  · Patient is a new start warfarin. Will initiate 5 mg daily. Expect INR to become therapeutic 5-7 days after warfarin initiated. · Medication profile reviewed for potential drug interactions. Patient is on ciprofloxacin which has the ability to enhance the anticoagulant effects of warfarin (increase INR). Patient may require lower warfarin dosage while taking concomitantly. · Recommend continuing enoxaparin 120 mg BID until INR is therapeutic and stable. · Daily INR will be monitored and dose adjustments made as needed. Date INR Warfarin Dose   8/9 -- 5 mg   8/10 1.14 5mg ordered                 Please call with any questions.   Juany Tatum PharmD., [facility]   8/10/2021 12:40 PM  Wireless: [phone]

## 2021-08-11 LAB
ANION GAP SERPL CALCULATED.3IONS-SCNC: 9 MMOL/L (ref 3–16)
BASOPHILS ABSOLUTE: 0.1 K/UL (ref 0–0.2)
BASOPHILS RELATIVE PERCENT: 1 %
BUN BLDV-MCNC: 6 MG/DL (ref 7–20)
CALCIUM SERPL-MCNC: 9.2 MG/DL (ref 8.3–10.6)
CHLORIDE BLD-SCNC: 102 MMOL/L (ref 99–110)
CO2: 26 MMOL/L (ref 21–32)
CREAT SERPL-MCNC: <0.5 MG/DL (ref 0.6–1.2)
EOSINOPHILS ABSOLUTE: 0.4 K/UL (ref 0–0.6)
EOSINOPHILS RELATIVE PERCENT: 5 %
GFR AFRICAN AMERICAN: >60
GFR NON-AFRICAN AMERICAN: >60
GLUCOSE BLD-MCNC: 110 MG/DL (ref 70–99)
HCT VFR BLD CALC: 30 % (ref 36–48)
HEMOGLOBIN: 10.5 G/DL (ref 12–16)
LYMPHOCYTES ABSOLUTE: 2 K/UL (ref 1–5.1)
LYMPHOCYTES RELATIVE PERCENT: 27 %
MACROCYTES: ABNORMAL
MCH RBC QN AUTO: 31.6 PG (ref 26–34)
MCHC RBC AUTO-ENTMCNC: 35 G/DL (ref 31–36)
MCV RBC AUTO: 90.2 FL (ref 80–100)
MONOCYTES ABSOLUTE: 0.3 K/UL (ref 0–1.3)
MONOCYTES RELATIVE PERCENT: 4 %
NEUTROPHILS ABSOLUTE: 4.7 K/UL (ref 1.7–7.7)
NEUTROPHILS RELATIVE PERCENT: 63 %
PDW BLD-RTO: 13.3 % (ref 12.4–15.4)
PLATELET # BLD: 377 K/UL (ref 135–450)
PMV BLD AUTO: 6.8 FL (ref 5–10.5)
POLYCHROMASIA: ABNORMAL
POTASSIUM SERPL-SCNC: 3.6 MMOL/L (ref 3.5–5.1)
RBC # BLD: 3.32 M/UL (ref 4–5.2)
SODIUM BLD-SCNC: 137 MMOL/L (ref 136–145)
WBC # BLD: 7.5 K/UL (ref 4–11)

## 2021-08-11 PROCEDURE — 94660 CPAP INITIATION&MGMT: CPT

## 2021-08-11 PROCEDURE — 97116 GAIT TRAINING THERAPY: CPT

## 2021-08-11 PROCEDURE — 2580000003 HC RX 258: Performed by: PHYSICIAN ASSISTANT

## 2021-08-11 PROCEDURE — 6360000002 HC RX W HCPCS: Performed by: NURSE PRACTITIONER

## 2021-08-11 PROCEDURE — 6370000000 HC RX 637 (ALT 250 FOR IP): Performed by: INTERNAL MEDICINE

## 2021-08-11 PROCEDURE — 6370000000 HC RX 637 (ALT 250 FOR IP): Performed by: NEUROLOGICAL SURGERY

## 2021-08-11 PROCEDURE — 6360000002 HC RX W HCPCS: Performed by: INTERNAL MEDICINE

## 2021-08-11 PROCEDURE — 6370000000 HC RX 637 (ALT 250 FOR IP): Performed by: ANESTHESIOLOGY

## 2021-08-11 PROCEDURE — 97535 SELF CARE MNGMENT TRAINING: CPT

## 2021-08-11 PROCEDURE — 80048 BASIC METABOLIC PNL TOTAL CA: CPT

## 2021-08-11 PROCEDURE — 85025 COMPLETE CBC W/AUTO DIFF WBC: CPT

## 2021-08-11 PROCEDURE — 97530 THERAPEUTIC ACTIVITIES: CPT

## 2021-08-11 PROCEDURE — 6370000000 HC RX 637 (ALT 250 FOR IP): Performed by: PHYSICIAN ASSISTANT

## 2021-08-11 PROCEDURE — 1200000000 HC SEMI PRIVATE

## 2021-08-11 PROCEDURE — 6370000000 HC RX 637 (ALT 250 FOR IP): Performed by: NURSE PRACTITIONER

## 2021-08-11 PROCEDURE — 6370000000 HC RX 637 (ALT 250 FOR IP): Performed by: SURGERY

## 2021-08-11 PROCEDURE — 36415 COLL VENOUS BLD VENIPUNCTURE: CPT

## 2021-08-11 RX ADMIN — SUCRALFATE 1 G: 1 TABLET ORAL at 16:47

## 2021-08-11 RX ADMIN — OXYCODONE 5 MG: 5 TABLET ORAL at 18:05

## 2021-08-11 RX ADMIN — ENOXAPARIN SODIUM 120 MG: 120 INJECTION SUBCUTANEOUS at 09:32

## 2021-08-11 RX ADMIN — OXYCODONE 5 MG: 5 TABLET ORAL at 22:16

## 2021-08-11 RX ADMIN — CIPROFLOXACIN 500 MG: 500 TABLET, FILM COATED ORAL at 09:22

## 2021-08-11 RX ADMIN — CETIRIZINE HYDROCHLORIDE 10 MG: 10 TABLET, FILM COATED ORAL at 09:22

## 2021-08-11 RX ADMIN — METOPROLOL SUCCINATE 25 MG: 50 TABLET, EXTENDED RELEASE ORAL at 09:21

## 2021-08-11 RX ADMIN — SUCRALFATE 1 G: 1 TABLET ORAL at 06:55

## 2021-08-11 RX ADMIN — PROMETHAZINE HYDROCHLORIDE 25 MG: 25 INJECTION INTRAMUSCULAR; INTRAVENOUS at 08:03

## 2021-08-11 RX ADMIN — SUCRALFATE 1 G: 1 TABLET ORAL at 22:17

## 2021-08-11 RX ADMIN — POLYETHYLENE GLYCOL 3350 17 G: 17 POWDER, FOR SOLUTION ORAL at 09:21

## 2021-08-11 RX ADMIN — WARFARIN SODIUM 5 MG: 5 TABLET ORAL at 18:05

## 2021-08-11 RX ADMIN — VERAPAMIL HYDROCHLORIDE 120 MG: 120 TABLET, FILM COATED, EXTENDED RELEASE ORAL at 22:16

## 2021-08-11 RX ADMIN — BUPROPION HYDROCHLORIDE 150 MG: 150 TABLET, EXTENDED RELEASE ORAL at 09:22

## 2021-08-11 RX ADMIN — PANTOPRAZOLE SODIUM 40 MG: 40 TABLET, DELAYED RELEASE ORAL at 06:55

## 2021-08-11 RX ADMIN — PRAVASTATIN SODIUM 20 MG: 20 TABLET ORAL at 22:17

## 2021-08-11 RX ADMIN — DOCUSATE SODIUM 50 MG AND SENNOSIDES 8.6 MG 2 TABLET: 8.6; 5 TABLET, FILM COATED ORAL at 09:22

## 2021-08-11 RX ADMIN — TAMSULOSIN HYDROCHLORIDE 0.4 MG: 0.4 CAPSULE ORAL at 09:21

## 2021-08-11 RX ADMIN — LOSARTAN POTASSIUM 50 MG: 25 TABLET, FILM COATED ORAL at 22:17

## 2021-08-11 RX ADMIN — Medication 10 ML: at 09:23

## 2021-08-11 RX ADMIN — OXYCODONE 5 MG: 5 TABLET ORAL at 09:22

## 2021-08-11 RX ADMIN — ENOXAPARIN SODIUM 120 MG: 120 INJECTION SUBCUTANEOUS at 22:18

## 2021-08-11 RX ADMIN — Medication 10 ML: at 22:19

## 2021-08-11 RX ADMIN — FLUTICASONE PROPIONATE 1 SPRAY: 50 SPRAY, METERED NASAL at 09:24

## 2021-08-11 ASSESSMENT — PAIN DESCRIPTION - DESCRIPTORS
DESCRIPTORS: ACHING

## 2021-08-11 ASSESSMENT — PAIN SCALES - GENERAL
PAINLEVEL_OUTOF10: 4
PAINLEVEL_OUTOF10: 6
PAINLEVEL_OUTOF10: 8
PAINLEVEL_OUTOF10: 6
PAINLEVEL_OUTOF10: 0

## 2021-08-11 ASSESSMENT — PAIN DESCRIPTION - FREQUENCY
FREQUENCY: INTERMITTENT
FREQUENCY: CONTINUOUS
FREQUENCY: CONTINUOUS

## 2021-08-11 ASSESSMENT — PAIN DESCRIPTION - PAIN TYPE
TYPE: ACUTE PAIN

## 2021-08-11 ASSESSMENT — PAIN DESCRIPTION - PROGRESSION
CLINICAL_PROGRESSION: NOT CHANGED
CLINICAL_PROGRESSION: NOT CHANGED

## 2021-08-11 ASSESSMENT — PAIN DESCRIPTION - ORIENTATION
ORIENTATION: LEFT;RIGHT

## 2021-08-11 ASSESSMENT — PAIN DESCRIPTION - LOCATION
LOCATION: LEG

## 2021-08-11 ASSESSMENT — PAIN DESCRIPTION - ONSET
ONSET: ON-GOING

## 2021-08-11 ASSESSMENT — PAIN - FUNCTIONAL ASSESSMENT
PAIN_FUNCTIONAL_ASSESSMENT: ACTIVITIES ARE NOT PREVENTED

## 2021-08-11 NOTE — CARE COORDINATION
Case Management Daily Note                    Date: 8/11/2021     Patient Name: Anibal Montalvo    Date of Admission: 7/29/2021  5:22 AM  YOB: 1955    Length of Stay: 15  GMLOS: 7.5         Patient Admission Status: Inpatient  Diagnosis:Spondylolisthesis of lumbar region [M43.16]  Lumbar stenosis with neurogenic claudication [M48.062]     ________________________________________________________________________________________  Discharge Plan: SNF: [facility]: [id number]    Insurance: Payor: Juan Carlos [place] / Plan: Roger Din PLUS HMO / Product Type: *No Product type* /   Is pre-cert/notification needed: yes, initiated 8/2 and again 8/9     Tentative discharge date: 8/12    Current barriers: cert and clearance by Neurosurg     Referrals completed: SNF: [facility]     Resources/ information provided: [facility] List   ________________________________________________________________________________________  PT AM-PAC: 17 / 24 per last evaluation on: ongoing     OT AM-PAC: 17 / 24 per last evaluation on: ongoging     DME Needs for discharge: defer  ________________________________________________________________________________________  Notes/Plan of Care:   SW spoke with  Elva Powers at [facility]. She is point of contact now. [phone]. They had trouble seeing cert in [facility] site. SW and Team resubmitted cert again today (was submitted and confirmed on Monday as well). Cert pending. Per patient. Dr. Lesley Pedraza informed her of possible discharge Thursday.      DEANNE following     Care Transition Patient: Ame Oliveira, MSW  [facility]   Case Management Department  Ph: [phone]

## 2021-08-11 NOTE — PROGRESS NOTES
Comprehensive Nutrition Assessment    Type and Reason for Visit:  Reassess    NUTRITION RECOMMENDATIONS:   1. PO Diet: Continue regular; low fiber diet. 2. ONS: Modify Ensure Enlive to TID (strawberry only). 3. Please obtain updated actual weight as able. NUTRITION ASSESSMENT:  Nutritional summary & status: Pt reports poor po intakes. She voices that she \"doesn't have a taste\" for any foods other than applesauce which she has been consuming regularly. Pt endorses consuming 100% of each Ensure supplement as well. Pt reports some nausea after eating, no episodes of emesis. CBW is stated, actual weight needed to better assess nutrition status. RD encouraged small, frequent meals and bland foods as needed for nausea. RD will continue to monitor per [facility]. Patient admitted d/t spondylolisthesis of lumbar region; s/p L4-S1 lumbar fusion 7/29/21; underwent primary repair and closure of abdominal wall fascia on 8/1/2021 s/p wound dehiscence    MALNUTRITION ASSESSMENT  Context of Malnutrition: Acute Illness   Malnutrition Status: Insufficient data  Findings of the 6 clinical characteristics of malnutrition (Minimum of 2 out of 6 clinical characteristics is required to make the diagnosis of moderate or severe Protein Calorie Malnutrition based on AND/ASPEN Guidelines):  Energy Intake: Less than/equal to 75% of estimated energy requirements    Energy Intake Time: Greater than or equal to 7 days    Weight Loss %: Unable to assess    Weight loss Time: Unable to assess   Body Fat Loss: No significant loss    Body Fat Location: No Significant   Body Muscle Loss: No significant loss    Body Muscle Loss Location: No significant    Fluid Accumulation: No significant    Fluid Accumulation Location: No significant     Strength: Not Performed;  Not Measured     OBJECTIVE DATA: Significant to nutrition assessment  · Nutrition-Focused Physical Findings: Nutrition Related Findings: lbm 8/11   · Labs: Reviewed  · Meds: Reviewed; glycolax  · Wounds: Wound Type: Surgical Incision     CURRENT NUTRITION THERAPIES  ADULT DIET;  Regular; Low Fiber  Adult Oral Nutrition Supplement; Standard High Calorie/High Protein Oral Supplement     PO Intake: 1-25%, 26-50% and 51-75%  PO Supplement Intake: %  IVF: 100 ml/hr LR     ANTHROPOMETRICS  Current Height: 5' 3\" (160 cm)  Current Weight: 259 lb (117.5 kg)    Admission weight: 259 lb (117.5 kg)  Ideal Body Weight (lbs) (Calculated): 115 lbs (Ideal Body Weight (Kg) (Calculated): 52 kg)  Usual Bodyweight ~251-261 lbs per EMR   Weight Changes DAMIAN - CBW stated       BMI BMI (Calculated): 46    Wt Readings from Last 50 Encounters:   07/29/21 259 lb (117.5 kg)   07/21/21 259 lb 12.8 oz (117.8 kg)   07/16/21 257 lb (116.6 kg)   05/28/21 261 lb (118.4 kg)   04/06/21 258 lb (117 kg)   03/03/21 257 lb 6.4 oz (116.8 kg)   02/25/21 253 lb (114.8 kg)   12/31/20 253 lb (114.8 kg)   09/24/20 251 lb (113.9 kg)       Nutrition Diagnosis:   · Inadequate oral intake related to altered GI function as evidenced by NPO or clear liquid status due to medical condition, intake 0-25%      Nutrition Interventions:   Food and/or Nutrient Delivery:  Continue Current Diet, Continue Oral Nutrition Supplement  Nutrition Education/Counseling:  No recommendation at this time   Coordination of Nutrition Care:  Continue to monitor while inpatient    Goals:  pt will tolerate PO diet to consume greater than 75% of meals and supplements offered through admission        Nutrition Monitoring and Evaluation:   Behavioral-Environmental Outcomes:  None Identified   Food/Nutrient Intake Outcomes:  Food and Nutrient Intake, Diet Advancement/Tolerance  Physical Signs/Symptoms Outcomes:  GI Status, [address]   [address], [address] [place] [address] Clink: [phone]  Office:  [phone]

## 2021-08-11 NOTE — CARE COORDINATION
Case Management Daily Note                    Date: 8/11/2021     Patient Name: Pippa Sidhu    Date of Admission: 7/29/2021  5:22 AM  YOB: 1955    Length of Stay: 15  GMLOS: 7.5         Patient Admission Status: Inpatient  Diagnosis:Spondylolisthesis of lumbar region [M43.16]  Lumbar stenosis with neurogenic claudication [M48.062]     ________________________________________________________________________________________  Discharge Plan: SNF: [facility]: [id number]    Insurance: Payor: Fabiana Hill / Plan: Herman Chauncey PLUS HMO / Product Type: *No Product type* /   Is pre-cert/notification needed: yes, initiated 8/2 and again 8/9     Tentative discharge date: 8/12    Current barriers: cert and clearance by Neurosurg     Referrals completed: SNF: [facility]     Resources/ information provided: [facility]   ________________________________________________________________________________________  PT AM-PAC: 17 / 24 per last evaluation on: ongoing     OT AM-PAC: 15 / 24 per last evaluation on: ongoging     DME Needs for discharge: defer  ________________________________________________________________________________________  Notes/Plan of Care:   DEANNE spoke with  Lion Disla at [facility]. She is point of contact now. [phone]. Cert pending. Per patient. Dr. Grace Zimemr informed her of possible discharge Thursday.      SW following     Care Transition Patient: JOYA Rowley  [facility]   Case Management Department  Ph: [phone]

## 2021-08-11 NOTE — PROGRESS NOTES
Clinical Pharmacy Consult Note    Admit date: 7/29/2021    Subjective/Objective:  77 yof with PMHx of chronic lumbar pain and bilateral foot burning recalcitrant to conservative treatment who presented for L4-S1 anterior lumbar fusion (7/29) with screw fixation. Patient's stay has been complicated by fascial dehiscence (s/p fascial closure 8/1), ileus (NG 8/3-8/5), and UTI (s/p 3 days of ceftriaxone, now on PO ciprofloxacin). On 8/8, patient reported BLE pain/warmth. Dopplers revealed bilateral DVT and patient was initiated on warfarin with enoxaparin bridge. Interval update:  Continues on enoxaparin bridge to warfarin. Cipro course for UTI completed. Pharmacy is consulted to dose warfarin per Dr. Eduardo Lawton anticoagulation regimen:  New start      Lab Results   Component Value Date    WBC 7.5 08/11/2021    HGB 10.5 (L) 08/11/2021    HCT 30.0 (L) 08/11/2021    MCV 90.2 08/11/2021     08/11/2021       Lab Results   Component Value Date    PROTIME 12.9 (H) 08/10/2021    INR 1.14 (H) 08/10/2021       Height:  5' 3\" (160 cm)  Weight:  259 lb (117.5 kg)        Assessment/Plan:  1. Anticoagulation: Bilateral DVT (INR goal 2-3)  · Patient is a new start warfarin. Will initiate 5 mg daily. Expect INR to become therapeutic 5-7 days after warfarin initiated. · Medication profile reviewed for potential drug interactions. Patient just completed ciprofloxacin which has the ability to enhance the anticoagulant effects of warfarin (increase INR). Will monitor for residual effects. · Recommend continuing enoxaparin 120 mg BID until INR is therapeutic and stable. · Daily INR will be monitored and dose adjustments made as needed. Date INR Warfarin Dose   8/9 -- 5mg   8/10 1.14 5mg   8/11 Not drawn             Please call with any questions.   Bethany Belle PharmD., BCPS   8/11/2021 3:10 PM  Wireless: [phone]

## 2021-08-11 NOTE — PROGRESS NOTES
Physician Progress Note      [facility]  [facility]                  [id number]  :                       1955  ADMIT DATE:       2021 5:22 AM  DISCH DATE:  RESPONDING  PROVIDER #:        Lisa Mayer MD          QUERY TEXT:    Pt admitted  with  Spondylolisthesis, s/p fusion. Pt noted to have E. Coli UTI on  after having lópez catheter from -  for postop urinary   retention. López removed . If possible, please document in the progress   notes and discharge summary if you are evaluating and/or treating any of the   following: The medical record reflects the following:  Risk Factors: F/C for urinary retention  Clinical Indicators: Per Medicine c/s  \"Urinary retention, Started having   difficulties today\"; Per Urology c/s  \"postoperative urinary retention   following lumbar fusion . López removed postop and pt voided incompletely   and lópez replaced\", Per Medicine  \"UTI: UC + E. coli. Switch IV   ceftriaxone to p.o. Cipro\". Treatment: Rocephin, Cipro, Flomax, F/C discontinued   Options provided:  -- UTI due to indwelling urinary catheter  -- UTI not due to indwelling urinary catheter  -- Other - I will add my own diagnosis  -- Disagree - Not applicable / Not valid  -- Disagree - Clinically unable to determine / Unknown  -- Refer to Clinical Documentation Reviewer    PROVIDER RESPONSE TEXT:    UTI is due to the indwelling urinary catheter. Query created by:  Eric Westbrook on 2021 10:46 AM      Electronically signed by:  Lisa Mayer MD 2021 4:05 PM

## 2021-08-11 NOTE — PLAN OF CARE
Problem: Infection:  Goal: Will remain free from infection  Description: Will remain free from infection  Outcome: Ongoing     Problem: Safety:  Goal: Free from accidental physical injury  Description: Free from accidental physical injury  8/11/2021 0036 by Revonda Kawasaki, RN  Outcome: Ongoing  8/10/2021 1449 by Bharat Manley RN  Outcome: Ongoing

## 2021-08-11 NOTE — PROGRESS NOTES
NEUROSURGERY POST-OP PROGRESS NOTE    Patient Name: Igor Larsen YOB: 1955   Sex: Female Age: 77 yrs     Medical Record Number: [id number] Laurence Number: [[id number]]   Room Number: 8961/9669-67 Hospital Day: Hospital Day: 14     Interval History:  Post-operative day 13 s/p Procedure(s) (LRB):  REPAIR OF WOUND DEHISCENCE (N/A) and L4-S1 ANTERIOR LUMBAR INTERBODY FUSION WITH PEDICLE SCREW FIXATION per [facility]    Subjective: Pain well controlled, she did have some drainage from her abdominal incision overnight but reports the pain has not changed. Objective:    VITAL SIGNS   /70   Pulse 74   Temp 98.5 °F (36.9 °C) (Oral)   Resp 16   Ht 5' 3\" (1.6 m)   Wt 259 lb (117.5 kg)   LMP  (LMP Unknown)   SpO2 96%   BMI 45.88 kg/m²    Height Height: 5' 3\" (160 cm)   Weight Weight: 259 lb (117.5 kg)        Allergies Allergies   Allergen Reactions    Latex Rash    Reglan [Metoclopramide Hcl] Rash    Univasc [Moexipril] Other (See Comments)     unsure    Crestor [Rosuvastatin]      myalgia    Celecoxib Palpitations    Keflex [Cephalexin] Diarrhea    Lipitor Other (See Comments)     myalgia    Metoclopramide Anxiety    Prochlorperazine Anxiety    Prochlorperazine Edisylate     Sulfa Antibiotics Nausea And Vomiting    Vioxx       NPO Status ADULT DIET;  Regular; Low Fiber  Adult Oral Nutrition Supplement; Standard High Calorie/High Protein Oral Supplement   Isolation No active isolations     LABS   Basic Metabolic Profile Recent Labs     08/11/21  0629         CO2 26   BUN 6*   CREATININE <0.5*   GLUCOSE 110*      Complete Blood Count Recent Labs     08/08/21  1626 08/11/21  0629   WBC 7.7 7.5   RBC 3.44* 3.32*      Coagulation Studies Recent Labs     08/10/21  0614   INR 1.14*        MEDICATIONS   Inpatient Medications     enoxaparin, 1 mg/kg, Subcutaneous, BID    warfarin, 5 mg, Oral, Regimen: glycolax and senna  4. Pain control: she and robaxin    5. Incisional Care: open to air, cleanse daily with soap/water, pat dry with clean towel and paint with CHG swab  6. + UTI, antibiotics per medical team   7. BLE dopplers confirmed BLE DVT-coumadin with lovenox bridge   8. Wound drainage from abdominal wound- Dr. Nico Chávez aware, will assess tonight     Dispo- awaiting precert to [facility]/medical clearance     Patient was discussed with Dr. Irena Zapata who agrees with above assessment and plan. Electronically signed by:  LISA Kessler NP, 8/11/2021 4:11 PM   Neurosurgery Nurse Practitioner  [phone]

## 2021-08-11 NOTE — PROGRESS NOTES
Physical Therapy  Facility/Department: [facility] 5T ORTHO/NEURO  Daily Treatment Note  NAME: John Walker  : 1955  MRN: [id number]     Date of Service: 2021     Discharge Recommendations: John Walker scored a 17/24 on the AM-PAC short mobility form. Current research shows that an AM-PAC score of 17 or less is typically not associated with a discharge to the patient's home setting. Based on the patient's AM-PAC score and their current functional mobility deficits, it is recommended that the patient have 3-5 sessions per week of Physical Therapy at d/c to increase the patient's independence. Please see assessment section for further patient specific details. PT Equipment Recommendations  Equipment Needed: No  Assessment   Assessment: Pt limited by WONG due to generalized deconditioning. Presented with fair trunk and LE control during bed mobility with the HOB elevated and use of the handrails to get EOB. Transfers and gait training were mildly unsteady requiring sequencing cues throughout; no LOB noted. Treatment Diagnosis: Impaired functional mobility s/p L4-S1 ANTERIOR LUMBAR INTERBODY FUSION WITH PEDICLE SCREW FIXATION   Prognosis: Good  Decision Making: High Complexity  Patient Education: Pt educated on PT role, importance of OOB activity, no bending/lifting/twisting, log roll OOB and she verbalized understanding. REQUIRES PT FOLLOW UP: Yes   Patient Diagnosis(es): There were no encounter diagnoses. has a past medical history of Allergic rhinitis, Anxiety, Atrial tachycardia (HCC), Carpal tunnel syndrome, Cervicalgia, Chronic back pain, Depression, GERD (gastroesophageal reflux disease), Headache(784.0), Hernia hiatal, Histoplasmosis, Hot flashes, Hyperlipidemia, Hypertension, Obesity, Osteoarthritis, PONV (postoperative nausea and vomiting), Shingles, Sleep apnea, and Tricuspid regurgitation.    has a past surgical history that includes Knee arthroscopy; Knee cartilage surgery; lobectomy; sinus surgery; Bunionectomy; partial hysterectomy (cervix not removed); Total knee arthroplasty (Bilateral, 03/03/2015); Parathyroid gland surgery; Lung biopsy; Colonoscopy (10/2018); Breast surgery (Right); lumbar fusion (N/A, 7/29/2021); and Abdomen surgery (N/A, 8/1/2021). Restrictions  Position Activity Restriction  Other position/activity restrictions: ambulate, activity as tolerated, abdominal binder, pt may shower  Subjective   General  Additional Pertinent Hx: Pt is a 77 y.o. female 7/29 with spondylolisthesis of lumbar region. Pt s/p L4-S1 ANTERIOR LUMBAR INTERBODY FUSION WITH PEDICLE SCREW FIXATION on 7/30.   PMH:  hyperlipidemia, HTN, OA, anxiety, depression, GERD, sleep apnea, bilat TKR  Referring Practitioner: SMITHA Keller  Subjective  Subjective: Pt was in bed willing to participate; c/o mild pain in RLE at rest. Severe pain with activity    Orientation  Orientation  Overall Orientation Status: Within Functional Limits  Objective   Bed mobility  Supine to Sit: SBA  Scooting: Stand by assistance  Transfers  Sit to Stand: SBA  Stand to sit: SBA  Ambulation  Ambulation?: Yes  Ambulation 1  Device: Rolling Walker  Assistance: Contact guard assistance  Quality of Gait:  decreased laurent, decreased bilat step length/length; effortful; pt fatigues quickly and WONG  Distance: x10ft + 100ft   Balance  Sitting - Static: Good  Sitting - Dynamic: Fair  Standing - Static: Fair  Standing - Dynamic: Fair;-  Exercises  Quad Sets: 15x5 sec B  Heelslides: x10 B  Gluteal Sets: 15x5 sec B  Static standing to perform pericare x2min   AM-PAC Score  AM-PAC Inpatient Mobility Raw Score : 17 (08/10/21 1153)  AM-PAC Inpatient T-Scale Score : 42.13 (08/10/21 1153)  Mobility Inpatient CMS 0-100% Score: 50.57 (08/10/21 1153)  Mobility Inpatient CMS G-Code Modifier : CK (08/10/21 1153)  Goals  Short term goals  Time Frame for Short term goals: By discharge  Short term goal 1: Sup to sit SBA  MET 8/11  Short term goal 2: Pt will transfer sit to stand SBA  MET 8/11  Short term goal 3: Pt will amb >100' with LRAD SBA  met 8/5   new goal: ambulate 200 ft with LRAD mod I   ongoing  Short term goal 4: Pt will up/down 1 step with LRAD SBA  ongoing  Patient Goals   Patient goals : eventual return home     Plan    Plan  Times per week: 5-7  Current Treatment Recommendations: Functional Mobility Training, Gait Training, Stair training, Safety Education & Training, Patient/Caregiver Education & Training  Safety Devices  Type of devices: Call light within reach, Chair alarm in place, Nurse notified, Left in chair      Therapy Time    Individual Concurrent Group Co-treatment   Time In 1330      Time Out 1353      Minutes 23      Timed Code Treatment Minutes: 121 Radha Rodriguez, PTA

## 2021-08-11 NOTE — PROGRESS NOTES
Patient is a/o x4. VSS on room air. Surgical site to the back is CDI. Abdominal site has some pink and greenish drainage, covered with ABD pad. MD notified. Wound care performed by neurosx this shift. Endorsing pain to the BLE being treated with prn pain medication per the [facility]. Tolerating ambulation fairly well x1 with walker and gb. C/o nausea this shift - prn phenergan given with relief. Tolerating diet and fluids well. Voiding adequately per BRP. Large BM this shift. All fall precautions in place.

## 2021-08-11 NOTE — PLAN OF CARE
Problem: Nutrition  Goal: Optimal nutrition therapy  Note: Nutrition Problem #1: Inadequate oral intake  Intervention: Food and/or Nutrient Delivery: Continue Current Diet, Continue Oral Nutrition Supplement  Nutritional Goals: pt will tolerate PO diet to consume greater than 75% of meals and supplements offered through admission

## 2021-08-11 NOTE — PLAN OF CARE
Problem: Safety:  Goal: Free from accidental physical injury  Description: Free from accidental physical injury  8/11/2021 0942 by Dulce Riedel, RN  Outcome: Ongoing   All fall precautions in place. Bed locked and in lowest position with alarm on. Overbed table and personal belonings within reach. Call light within reach and patient instructed to use call light for assistance. Non-skid socks on. Problem: Pain:  Goal: Patient's pain/discomfort is manageable  Description: Patient's pain/discomfort is manageable  8/11/2021 0942 by Dulce Riedel, RN  Outcome: Ongoing   Patient endorsing pain to the BLE currently being rated at an 8. Pain increases with activity. Pain being managed with prn pain medication per the [facility]. Patient using rest, repositioning, and distraction as non-pharm measures to decrease pain and promote comfort.

## 2021-08-11 NOTE — PROGRESS NOTES
Hospitalist Progress Note      PCP: Myra Jean MD    Date of Admission: 7/29/2021    Chief Complaint: Chronic lumbar pain and bilateral foot burning    Hospital Course: S/p L4-S1 ANTERIOR LUMBAR INTERBODY FUSION WITH PEDICLE SCREW FIXATION on 07/30/2021. Patient was unable to void. Was complaining of abdominal pain. Abdominal x-ray was obtained and showed moderate amount of stool. Bladder scan showed 750 ml. Patient was straight cathed. UA was negative for infection. Patient sats dropped to 86% and was placed on 2 L of oxygen. We were asked to see/evaluate by Miguel Barker.  Janeth Menjivar MD for medical management of chronic conditions, urinary retention and hypoxia    Subjective:    No chest pain or shortness of breath  Complaining of nausea this morning  Discussed with the nurse, no other overnight issues              Medications:  Reviewed    Infusion Medications    lactated ringers 100 mL/hr at 08/04/21 6404    sodium chloride       Scheduled Medications    enoxaparin  1 mg/kg Subcutaneous BID    warfarin  5 mg Oral Daily    ciprofloxacin  500 mg Oral 2 times per day    pantoprazole  40 mg Oral QAM AC    sucralfate  1 g Oral 4 times per day    fluticasone  1 spray Each Nostril Daily    SMOG Enema  330 mL Rectal Once    pravastatin  20 mg Oral Nightly    tamsulosin  0.4 mg Oral Daily    lidocaine  1 patch Transdermal Daily    sennosides-docusate sodium  2 tablet Oral BID    polyethylene glycol  17 g Oral Daily    metoprolol succinate  25 mg Oral Daily    buPROPion  150 mg Oral QAM    cetirizine  10 mg Oral Daily    losartan  50 mg Oral Daily    verapamil  120 mg Oral Nightly    sodium chloride flush  5-40 mL Intravenous 2 times per day     PRN Meds: oxyCODONE, acetaminophen, hydrALAZINE, LORazepam, diazePAM, promethazine, guaiFENesin-dextromethorphan, ondansetron, [Held by provider] oxyCODONE **OR** [Held by provider] oxyCODONE, [Held by provider] calcium carbonate, benzocaine-menthol, sodium chloride flush, sodium chloride      Intake/Output Summary (Last 24 hours) at 8/11/2021 1245  Last data filed at 8/11/2021 0830  Gross per 24 hour   Intake 240 ml   Output --   Net 240 ml       Physical Exam Performed:    /71   Pulse 65   Temp 98.4 °F (36.9 °C) (Oral)   Resp 16   Ht 5' 3\" (1.6 m)   Wt 259 lb (117.5 kg)   LMP  (LMP Unknown)   SpO2 90%   BMI 45.88 kg/m²     General appearance: No apparent distress, appears stated age and cooperative. Morbidly obese  HEENT: Normal cephalic, atraumatic without obvious deformity. Pupils equal, round,   E. Conjunctivae/corneas clear. Neck: Supple, with full range of motion. No jugular venous distention. Trachea midline. Respiratory:  Normal respiratory effort. Bibasilar Rales   cardiovascular: Regular rate and rhythm with normal S1/S2 without murmurs, rubs or gallops. Abdomen: Soft, non-tender, non-distended with normal bowel sounds. Suprapubic incision site with mild erythema with no overt purulent discharge. Abdominal binder in place   Musculoskeletal: No clubbing, cyanosis. Bilateral lower extremity edema. Skin: Warm and dry  Neurologic:  Alert, speech clear with no overt facial droop  Psychiatric: Awake    Appears unchanged  Labs:   Recent Labs     08/08/21  1626 08/11/21  0629   WBC 7.7 7.5   HGB 10.9* 10.5*   HCT 31.6* 30.0*    377     Recent Labs     08/11/21  0629      K 3.6      CO2 26   BUN 6*   CREATININE <0.5*   CALCIUM 9.2     No results for input(s): AST, ALT, BILIDIR, BILITOT, ALKPHOS in the last 72 hours. Recent Labs     08/10/21  0614   INR 1.14*     No results for input(s): [place] in the last 72 hours.     Urinalysis:      Lab Results   Component Value Date    NITRU POSITIVE 08/06/2021    WBCUA  08/06/2021    BACTERIA 3+ 08/06/2021    RBCUA 0-2 08/06/2021    BLOODU SMALL 08/06/2021    SPECGRAV 1.015 08/06/2021    GLUCOSEU Negative 08/06/2021       Radiology:  VL Extremity Venous Bilateral   Final Result      XR CHEST PORTABLE   Final Result   1. Nasogastric stomach   2. Stable cardiomegaly   3. New streaky atelectasis in the lung field   3. Resolution of oblique band of atelectasis in the left perihilar            XR ABDOMEN (KUB) (SINGLE AP VIEW)   Final Result   Impression:          1. Decreased small bowel dilation since the prior study. 2. Nasogastric tube can be retracted 3 cm for more optimal positioning. XR ABDOMEN (KUB) (SINGLE AP VIEW)   Final Result   Impression:   Enteric tube tip in the distal esophagus near gastroesophageal junction, recommend advancing. Decreased mild small bowel dilatation which may represent ileus. XR ABDOMEN (2 VIEWS)   Final Result      Supine and erect views demonstrate mild gaseous distention of small bowel loops in the right midabdomen with air-fluid levels. The findings are progressive since 8/2/2021 and suggest worsening ileus or low-grade obstruction. No free intraperitoneal air. Lumbosacral metallic fixation noted. XR ABDOMEN (KUB) (SINGLE AP VIEW)   Final Result   Impression:    A few nondilated small bowel loops with air-fluid levels, unchanged in appearance. No evidence of high-grade obstruction. CT ABDOMEN PELVIS W IV CONTRAST Additional Contrast? None   Final Result      Interval development of bibasilar atelectatic changes. There is a midline anterior abdominal wall hernia present with herniation of bowel loops and large amount of omental fat. This suggests incisional hernia. Foci of extraluminal air or gas are seen in the left lower quadrant with associated stranding. There is also free fluid noted. This may be due to recent postsurgical changes and should be correlated with patient's history. Alternatively this may    represent infectious process or possibly ruptured viscus.       Small amounts of extraluminal air are also seen within the soft tissues of the anterior abdomen and in the region of the hernia sac which may  be related to recent surgery but the possibility of infectious process or free foci of air within the    peritoneal cavity lying within the hernia are considerations. XR CHEST PORTABLE   Final Result      No acute disease         XR ABDOMEN (KUB) (SINGLE AP VIEW)   Final Result      No bowel obstruction. Moderate retained stool in the colon. XR LUMBAR SPINE (2-3 VIEWS)   Final Result      3 coned-down frontal and lateral intraoperative views demonstrate postoperative changes at L4-5 and L5-S1. No retained surgical instrument is seen. XR LUMBAR SPINE (2-3 VIEWS)   Final Result         Coned-down intraoperative views of the lumbar spine assumes 5 lumbar type vertebral bodies. There is anterior interbody fusion at L4-5 and L5-S1 with radiolucent interbody devices. Fluoroscopy time 1 minute      FLUORO FOR SURGICAL PROCEDURES   Final Result         Coned-down intraoperative views of the lumbar spine assumes 5 lumbar type vertebral bodies. There is anterior interbody fusion at L4-5 and L5-S1 with radiolucent interbody devices. Fluoroscopy time 1 minute      CT GUIDED STEREOTACTIC LOCALIZATION   Final Result      CT GUIDED STEREOTACTIC LOCALIZATION   Final Result              Assessment/Plan:    Chronic back pain with bilateral feet burning:  Status post L4-S1 ANTERIOR LUMBAR INTERBODY FUSION WITH PEDICLE SCREW FIXATION   Continue as needed pain medication  Management per neurosurgery    Acute DVT BL LE provoked  Start lovenox and warfarin bridge. Discussed with general surgery and neurosurgery , both are ok with therapeutic anticoagulation         Hypoxia: resolved. likely due to atelectasis, noted on CXR. Improved. 02 weaned off. Continue incentive spirometry.      Abdominal pain/constipation with incisional hernia: dev fascial dehiscence after coughing spell on 8/1. CT abd/pelvis showed incisional hernia with bowel and omental fat.  Underwent primary repair and closure of abdominal wall fascial wound on 1/8. Patient was having nausea with vomiting on 2/8. Ileus versus SBO noted on x-ray. Gen surg managing conservatively. NG tube has been removed and diet started. Tolerating diet. Moving bowel. Passing gas/     Urinary retention: improved after lópez. Urology assisted. López later removed and voiding fine. UTI: UC + E. coli. Completed antibiotic course. Will DC ciprofloxacin  Hypertension: Fairly controlled. Continue meds     Hyperlipidemia: continue statin        Depression: Continue Wellbutrin.     Sleep apnea:  Continue CPAP      Anemia: Due to expected postsurgical blood loss. Hgb stable    DVT Prophylaxis: Lovenox  Diet: ADULT DIET;  Regular; Low Fiber  Adult Oral Nutrition Supplement; Standard High Calorie/High Protein Oral Supplement  Code Status: Full Code    PT/OT Eval Status: Per neurosurgery    Dispo -per neurosurgery     Ruy Cat MD

## 2021-08-12 LAB
INR BLD: 1.14 (ref 0.88–1.12)
PROTHROMBIN TIME: 12.9 SEC (ref 9.9–12.7)

## 2021-08-12 PROCEDURE — 6370000000 HC RX 637 (ALT 250 FOR IP): Performed by: NURSE PRACTITIONER

## 2021-08-12 PROCEDURE — 36415 COLL VENOUS BLD VENIPUNCTURE: CPT

## 2021-08-12 PROCEDURE — 94660 CPAP INITIATION&MGMT: CPT

## 2021-08-12 PROCEDURE — 97535 SELF CARE MNGMENT TRAINING: CPT

## 2021-08-12 PROCEDURE — 85610 PROTHROMBIN TIME: CPT

## 2021-08-12 PROCEDURE — 1200000000 HC SEMI PRIVATE

## 2021-08-12 PROCEDURE — 6370000000 HC RX 637 (ALT 250 FOR IP): Performed by: ANESTHESIOLOGY

## 2021-08-12 PROCEDURE — 2580000003 HC RX 258: Performed by: PHYSICIAN ASSISTANT

## 2021-08-12 PROCEDURE — 97530 THERAPEUTIC ACTIVITIES: CPT

## 2021-08-12 PROCEDURE — 6370000000 HC RX 637 (ALT 250 FOR IP): Performed by: SURGERY

## 2021-08-12 PROCEDURE — 6370000000 HC RX 637 (ALT 250 FOR IP): Performed by: INTERNAL MEDICINE

## 2021-08-12 PROCEDURE — 6360000002 HC RX W HCPCS: Performed by: INTERNAL MEDICINE

## 2021-08-12 PROCEDURE — 6370000000 HC RX 637 (ALT 250 FOR IP): Performed by: NEUROLOGICAL SURGERY

## 2021-08-12 PROCEDURE — 6370000000 HC RX 637 (ALT 250 FOR IP): Performed by: PHYSICIAN ASSISTANT

## 2021-08-12 PROCEDURE — 97116 GAIT TRAINING THERAPY: CPT

## 2021-08-12 RX ORDER — SACCHAROMYCES BOULARDII 250 MG
250 CAPSULE ORAL 2 TIMES DAILY
Status: DISCONTINUED | OUTPATIENT
Start: 2021-08-12 | End: 2021-08-13 | Stop reason: HOSPADM

## 2021-08-12 RX ORDER — WARFARIN SODIUM 5 MG/1
5 TABLET ORAL DAILY
Status: DISCONTINUED | OUTPATIENT
Start: 2021-08-13 | End: 2021-08-13

## 2021-08-12 RX ORDER — CEPHALEXIN 250 MG/1
500 CAPSULE ORAL 4 TIMES DAILY
Status: DISCONTINUED | OUTPATIENT
Start: 2021-08-12 | End: 2021-08-13 | Stop reason: HOSPADM

## 2021-08-12 RX ORDER — WARFARIN SODIUM 5 MG/1
7.5 TABLET ORAL
Status: DISCONTINUED | OUTPATIENT
Start: 2021-08-12 | End: 2021-08-13

## 2021-08-12 RX ADMIN — CEPHALEXIN 500 MG: 250 CAPSULE ORAL at 14:08

## 2021-08-12 RX ADMIN — FLUTICASONE PROPIONATE 1 SPRAY: 50 SPRAY, METERED NASAL at 09:06

## 2021-08-12 RX ADMIN — ENOXAPARIN SODIUM 120 MG: 120 INJECTION SUBCUTANEOUS at 10:22

## 2021-08-12 RX ADMIN — SUCRALFATE 1 G: 1 TABLET ORAL at 06:16

## 2021-08-12 RX ADMIN — OXYCODONE 5 MG: 5 TABLET ORAL at 18:03

## 2021-08-12 RX ADMIN — LOSARTAN POTASSIUM 50 MG: 25 TABLET, FILM COATED ORAL at 21:16

## 2021-08-12 RX ADMIN — ONDANSETRON 4 MG: 2 INJECTION INTRAMUSCULAR; INTRAVENOUS at 07:38

## 2021-08-12 RX ADMIN — BUPROPION HYDROCHLORIDE 150 MG: 150 TABLET, EXTENDED RELEASE ORAL at 10:17

## 2021-08-12 RX ADMIN — VERAPAMIL HYDROCHLORIDE 120 MG: 120 TABLET, FILM COATED, EXTENDED RELEASE ORAL at 23:11

## 2021-08-12 RX ADMIN — DOCUSATE SODIUM 50 MG AND SENNOSIDES 8.6 MG 2 TABLET: 8.6; 5 TABLET, FILM COATED ORAL at 21:15

## 2021-08-12 RX ADMIN — METOPROLOL SUCCINATE 25 MG: 50 TABLET, EXTENDED RELEASE ORAL at 10:29

## 2021-08-12 RX ADMIN — PRAVASTATIN SODIUM 20 MG: 20 TABLET ORAL at 21:16

## 2021-08-12 RX ADMIN — OXYCODONE 5 MG: 5 TABLET ORAL at 21:21

## 2021-08-12 RX ADMIN — CETIRIZINE HYDROCHLORIDE 10 MG: 10 TABLET, FILM COATED ORAL at 10:16

## 2021-08-12 RX ADMIN — PANTOPRAZOLE SODIUM 40 MG: 40 TABLET, DELAYED RELEASE ORAL at 06:16

## 2021-08-12 RX ADMIN — Medication 10 ML: at 10:18

## 2021-08-12 RX ADMIN — ENOXAPARIN SODIUM 120 MG: 120 INJECTION SUBCUTANEOUS at 21:15

## 2021-08-12 RX ADMIN — SUCRALFATE 1 G: 1 TABLET ORAL at 15:00

## 2021-08-12 RX ADMIN — TAMSULOSIN HYDROCHLORIDE 0.4 MG: 0.4 CAPSULE ORAL at 10:16

## 2021-08-12 RX ADMIN — CEPHALEXIN 500 MG: 250 CAPSULE ORAL at 21:16

## 2021-08-12 RX ADMIN — Medication 250 MG: at 14:09

## 2021-08-12 RX ADMIN — ONDANSETRON 4 MG: 2 INJECTION INTRAMUSCULAR; INTRAVENOUS at 20:44

## 2021-08-12 RX ADMIN — DIAZEPAM 5 MG: 5 TABLET ORAL at 23:11

## 2021-08-12 RX ADMIN — Medication 250 MG: at 21:16

## 2021-08-12 RX ADMIN — CEPHALEXIN 500 MG: 250 CAPSULE ORAL at 18:02

## 2021-08-12 RX ADMIN — DIAZEPAM 5 MG: 5 TABLET ORAL at 10:16

## 2021-08-12 ASSESSMENT — PAIN DESCRIPTION - PROGRESSION
CLINICAL_PROGRESSION: GRADUALLY WORSENING
CLINICAL_PROGRESSION: NOT CHANGED
CLINICAL_PROGRESSION: NOT CHANGED

## 2021-08-12 ASSESSMENT — PAIN - FUNCTIONAL ASSESSMENT
PAIN_FUNCTIONAL_ASSESSMENT: ACTIVITIES ARE NOT PREVENTED
PAIN_FUNCTIONAL_ASSESSMENT: PREVENTS OR INTERFERES SOME ACTIVE ACTIVITIES AND ADLS

## 2021-08-12 ASSESSMENT — PAIN DESCRIPTION - DESCRIPTORS
DESCRIPTORS: ACHING

## 2021-08-12 ASSESSMENT — PAIN DESCRIPTION - PAIN TYPE
TYPE: SURGICAL PAIN
TYPE: ACUTE PAIN
TYPE: ACUTE PAIN

## 2021-08-12 ASSESSMENT — PAIN DESCRIPTION - FREQUENCY
FREQUENCY: CONTINUOUS

## 2021-08-12 ASSESSMENT — PAIN SCALES - GENERAL
PAINLEVEL_OUTOF10: 6
PAINLEVEL_OUTOF10: 5
PAINLEVEL_OUTOF10: 6
PAINLEVEL_OUTOF10: 2
PAINLEVEL_OUTOF10: 4

## 2021-08-12 ASSESSMENT — PAIN DESCRIPTION - DIRECTION: RADIATING_TOWARDS: LEGS

## 2021-08-12 ASSESSMENT — PAIN DESCRIPTION - ORIENTATION
ORIENTATION: LEFT;RIGHT
ORIENTATION: RIGHT;LEFT

## 2021-08-12 ASSESSMENT — PAIN DESCRIPTION - LOCATION
LOCATION: BACK;LEG
LOCATION: BACK
LOCATION: BACK

## 2021-08-12 ASSESSMENT — PAIN DESCRIPTION - ONSET: ONSET: ON-GOING

## 2021-08-12 NOTE — PROGRESS NOTES
Up in chair for meals ,  provenia in place back incisions isa plan to hold warfarin today , give Lovenox today ,  to start Apixaban tomorrow

## 2021-08-12 NOTE — CARE COORDINATION
Case Management Daily Note                    Date: 8/12/2021     Patient Name: Chhaya Bee    Date of Admission: 7/29/2021  5:22 AM  YOB: 1955    Length of Stay: 15  GMLOS: 7.5         Patient Admission Status: Inpatient  Diagnosis:Spondylolisthesis of lumbar region [M43.16]  Lumbar stenosis with neurogenic claudication [M48.062]     ________________________________________________________________________________________  Discharge Plan: SNF: [facility]: [id number]    Insurance: Payor: Latasha Rivas / Plan: Philipp Pitch PLUS HMO / Product Type: *No Product type* /   Is pre-cert/notification needed: yes, initiated 8/2 and again 8/9     Tentative discharge date: 8/13    Current barriers: Pending Denial     Referrals completed: SNF: \[facility]\""     Resources/ information provided: [facility] List   ________________________________________________________________________________________  PT AM-PAC: 17 / 24 per last evaluation on: ongoing     OT AM-PAC: 17 / 24 per last evaluation on: ongoging     DME Needs for discharge: defer  ________________________________________________________________________________________  Notes/Plan of Care:   9:21 AM  SW received a phone call from [facility] this AM. They did not feel patient would need SNF, SW noted outside of PT/OT that patient had new Bilateral DVT's and is now bridging over. SW also noted adding prevena vac for wound drainage. Rep stated they would send this to the Medical Director for review and they may offer a [id number]. [facility]. [phone]. Candy Cordova [phone]. UPDATE 2:18 pm.   [facility] VIVEK and offered cyla-[id number] to be completed by 9:30 am on 8/13 or they will deny placement . [phone] and push Option 5 and asked to speak to the Medical Director. DEANNE updated [facility] with Neurosurgery       UPDATE: 4:53 pm. Patient was denied by insurance.  [facility] reported that if vac is applied and patient does worse with therapy to resubmit therapy scores by 10:30 am tomorrow ([phone]). SW updated Floor Manger. Vac was applied. However as Prevena vac, unlikely to affect therapy scores. SW spoke with patient at bedside. If denied, patient would go home, but she has concerns with care support. SW to follow.       Care Transition Patient: Ame Oliveira, JOYA  [facility]   Case Management Department  Ph: [phone]

## 2021-08-12 NOTE — PROGRESS NOTES
Hospitalist Progress Note      PCP: Selvin Becerril MD    Date of Admission: 7/29/2021    Chief Complaint: Chronic lumbar pain and bilateral foot burning    Hospital Course: S/p L4-S1 ANTERIOR LUMBAR INTERBODY FUSION WITH PEDICLE SCREW FIXATION on 07/30/2021. Patient was unable to void. Was complaining of abdominal pain. Abdominal x-ray was obtained and showed moderate amount of stool. Bladder scan showed 750 ml. Patient was straight cathed. UA was negative for infection. Patient sats dropped to 86% and was placed on 2 L of oxygen. We were asked to see/evaluate by Kacy Yun.  Chin Davalos MD for medical management of chronic conditions, urinary retention and hypoxia    Subjective:    No chest pain or shortness of breath  No nausea or vomiting  No chest pain  Afebrile              Medications:  Reviewed    Infusion Medications    lactated ringers 100 mL/hr at 08/04/21 5801    sodium chloride       Scheduled Medications    warfarin  7.5 mg Oral Once    [START ON 8/13/2021] warfarin  5 mg Oral Daily    cephALEXin  500 mg Oral 4x Daily    saccharomyces boulardii  250 mg Oral BID    enoxaparin  1 mg/kg Subcutaneous BID    pantoprazole  40 mg Oral QAM AC    sucralfate  1 g Oral 4 times per day    fluticasone  1 spray Each Nostril Daily    SMOG Enema  330 mL Rectal Once    pravastatin  20 mg Oral Nightly    tamsulosin  0.4 mg Oral Daily    lidocaine  1 patch Transdermal Daily    sennosides-docusate sodium  2 tablet Oral BID    polyethylene glycol  17 g Oral Daily    metoprolol succinate  25 mg Oral Daily    buPROPion  150 mg Oral QAM    cetirizine  10 mg Oral Daily    losartan  50 mg Oral Daily    verapamil  120 mg Oral Nightly    sodium chloride flush  5-40 mL Intravenous 2 times per day     PRN Meds: oxyCODONE, acetaminophen, hydrALAZINE, LORazepam, diazePAM, promethazine, guaiFENesin-dextromethorphan, ondansetron, [Held by provider] calcium carbonate, benzocaine-menthol, sodium chloride flush, sodium chloride      Intake/Output Summary (Last 24 hours) at 8/12/2021 8883  Last data filed at 8/11/2021 1730  Gross per 24 hour   Intake 240 ml   Output --   Net 240 ml       Physical Exam Performed:    /81   Pulse 75   Temp 97.5 °F (36.4 °C) (Oral)   Resp 16   Ht 5' 3\" (1.6 m)   Wt 259 lb (117.5 kg)   LMP  (LMP Unknown)   SpO2 95%   BMI 45.88 kg/m²     General appearance: No apparent distress, appears stated age and cooperative. Morbidly obese  HEENT: Normal cephalic, atraumatic without obvious deformity. Pupils equal, round,   E. Conjunctivae/corneas clear. Neck: Supple, with full range of motion. No jugular venous distention. Trachea midline. Respiratory:  Normal respiratory effort. Bibasilar Rales   cardiovascular: Regular rate and rhythm with normal S1/S2 without murmurs, rubs or gallops. Abdomen: Soft, non-tender, non-distended with normal bowel sounds. Suprapubic incision site with mild erythema with no overt purulent discharge. Abdominal binder in place   Musculoskeletal: No clubbing, cyanosis. Bilateral lower extremity edema. Skin: Warm and dry  Neurologic:  Alert, speech clear with no overt facial droop  Psychiatric: Awake    Appears unchanged  Labs:   Recent Labs     08/11/21  0629   WBC 7.5   HGB 10.5*   HCT 30.0*        Recent Labs     08/11/21  0629      K 3.6      CO2 26   BUN 6*   CREATININE <0.5*   CALCIUM 9.2     No results for input(s): AST, ALT, BILIDIR, BILITOT, ALKPHOS in the last 72 hours. Recent Labs     08/10/21  0614 08/12/21  0622   INR 1.14* 1.14*     No results for input(s): CKTOTAL, TROPONINI in the last 72 hours.     Urinalysis:      Lab Results   Component Value Date    NITRU POSITIVE 08/06/2021    WBCUA  08/06/2021    BACTERIA 3+ 08/06/2021    RBCUA 0-2 08/06/2021    BLOODU SMALL 08/06/2021    SPECGRAV 1.015 08/06/2021    GLUCOSEU Negative 08/06/2021       Radiology:  VL Extremity Venous Bilateral   Final Result      XR CHEST PORTABLE   Final Result   1. Nasogastric stomach   2. Stable cardiomegaly   3. New streaky atelectasis in the lung field   3. Resolution of oblique band of atelectasis in the left perihilar            XR ABDOMEN (KUB) (SINGLE AP VIEW)   Final Result   Impression:          1. Decreased small bowel dilation since the prior study. 2. Nasogastric tube can be retracted 3 cm for more optimal positioning. XR ABDOMEN (KUB) (SINGLE AP VIEW)   Final Result   Impression:   Enteric tube tip in the distal esophagus near gastroesophageal junction, recommend advancing. Decreased mild small bowel dilatation which may represent ileus. XR ABDOMEN (2 VIEWS)   Final Result      Supine and erect views demonstrate mild gaseous distention of small bowel loops in the right midabdomen with air-fluid levels. The findings are progressive since 8/2/2021 and suggest worsening ileus or low-grade obstruction. No free intraperitoneal air. Lumbosacral metallic fixation noted. XR ABDOMEN (KUB) (SINGLE AP VIEW)   Final Result   Impression:    A few nondilated small bowel loops with air-fluid levels, unchanged in appearance. No evidence of high-grade obstruction. CT ABDOMEN PELVIS W IV CONTRAST Additional Contrast? None   Final Result      Interval development of bibasilar atelectatic changes. There is a midline anterior abdominal wall hernia present with herniation of bowel loops and large amount of omental fat. This suggests incisional hernia. Foci of extraluminal air or gas are seen in the left lower quadrant with associated stranding. There is also free fluid noted. This may be due to recent postsurgical changes and should be correlated with patient's history. Alternatively this may    represent infectious process or possibly ruptured viscus.       Small amounts of extraluminal air are also seen within the soft tissues of the anterior abdomen and in the region of the hernia sac which may  be related to recent surgery but the possibility of infectious process or free foci of air within the    peritoneal cavity lying within the hernia are considerations. XR CHEST PORTABLE   Final Result      No acute disease         XR ABDOMEN (KUB) (SINGLE AP VIEW)   Final Result      No bowel obstruction. Moderate retained stool in the colon. XR LUMBAR SPINE (2-3 VIEWS)   Final Result      3 coned-down frontal and lateral intraoperative views demonstrate postoperative changes at L4-5 and L5-S1. No retained surgical instrument is seen. XR LUMBAR SPINE (2-3 VIEWS)   Final Result         Coned-down intraoperative views of the lumbar spine assumes 5 lumbar type vertebral bodies. There is anterior interbody fusion at L4-5 and L5-S1 with radiolucent interbody devices. Fluoroscopy time 1 minute      FLUORO FOR SURGICAL PROCEDURES   Final Result         Coned-down intraoperative views of the lumbar spine assumes 5 lumbar type vertebral bodies. There is anterior interbody fusion at L4-5 and L5-S1 with radiolucent interbody devices. Fluoroscopy time 1 minute      CT GUIDED STEREOTACTIC LOCALIZATION   Final Result      CT GUIDED STEREOTACTIC LOCALIZATION   Final Result              Assessment/Plan:    Chronic back pain with bilateral feet burning:  Status post L4-S1 ANTERIOR LUMBAR INTERBODY FUSION WITH PEDICLE SCREW FIXATION   Continue as needed pain medication  Management per neurosurgery    Acute DVT BL LE provoked  Start lovenox and warfarin bridge. Discussed with general surgery and neurosurgery , both are ok with therapeutic anticoagulation  Neurosurgery planning to start direct oral anticoagulant agent       Hypoxia: resolved. likely due to atelectasis, noted on CXR. Improved. 02 weaned off. Continue incentive spirometry.      Abdominal pain/constipation with incisional hernia: dev fascial dehiscence after coughing spell on 8/1.  CT abd/pelvis showed incisional hernia with bowel and omental fat. Underwent primary repair and closure of abdominal wall fascial wound on 1/8. Patient was having nausea with vomiting on 2/8. Ileus versus SBO noted on x-ray. Gen surg managing conservatively. NG tube has been removed and diet started. Tolerating diet. Moving bowel. Passing gas/     Urinary retention: improved after lópez. Urology assisted. López later removed and voiding fine. UTI: UC + E. coli. Completed antibiotic course. Hypertension: Fairly controlled. Continue meds     Hyperlipidemia: continue statin        Depression: Continue Wellbutrin.     Sleep apnea:  Continue CPAP      Anemia: Due to expected postsurgical blood loss. Hgb stable    DVT Prophylaxis: Lovenox  Diet: ADULT DIET;  Regular; Low Fiber  Adult Oral Nutrition Supplement; Standard High Calorie/High Protein Oral Supplement  Code Status: Full Code    PT/OT Eval Status: Per neurosurgery    Dispo -per neurosurgery     Ruy Cat MD

## 2021-08-12 NOTE — PROGRESS NOTES
Clinical Pharmacy Consult Note    Admit date: 7/29/2021    Subjective/Objective:  77 yof with PMHx of chronic lumbar pain and bilateral foot burning recalcitrant to conservative treatment who presented for L4-S1 anterior lumbar fusion (7/29) with screw fixation. Patient's stay has been complicated by fascial dehiscence (s/p fascial closure 8/1), ileus (NG 8/3-8/5), and UTI (s/p 3 days of ceftriaxone, now on PO ciprofloxacin). On 8/8, patient reported BLE pain/warmth. Dopplers revealed bilateral DVT and patient was initiated on warfarin with enoxaparin bridge. Interval update:  Per Dr Celina Serna, abdominal wound will require wound vac and oral antibiotic course. Planning DC to SNF. Pharmacy is consulted to dose warfarin per Dr. Pattie Carcamo anticoagulation regimen:  New start      Lab Results   Component Value Date    WBC 7.5 08/11/2021    HGB 10.5 (L) 08/11/2021    HCT 30.0 (L) 08/11/2021    MCV 90.2 08/11/2021     08/11/2021       Lab Results   Component Value Date    PROTIME 12.9 (H) 08/12/2021    INR 1.14 (H) 08/12/2021       Height:  5' 3\" (160 cm)  Weight:  259 lb (117.5 kg)        Assessment/Plan:  1. Anticoagulation: Bilateral DVT (INR goal 2-3)  · Patient is a new start warfarin. Currently on 5 mg daily; patient has received three doses with no change in INR. · Will order 7.5mg x1 today, and then continue 5mg daily tomorrow. Expect INR to become therapeutic 5-7 days after warfarin initiated. · Medication profile reviewed for potential drug interactions. Patient just completed ciprofloxacin which has the ability to enhance the anticoagulant effects of warfarin (increase INR). Will monitor for residual effects. · Recommend continuing enoxaparin 120 mg BID until INR is therapeutic and stable. · Daily INR will be monitored and dose adjustments made as needed.       Date INR Warfarin Dose   8/9 -- 5mg   8/10 1.14 5mg   8/11 Not drawn 5mg   8/12 1.14 Ordered 7.5mg       Please call with any questions.   Daphney Phillips PharmD., BCPS   8/12/2021 7:46 AM  Wireless: [phone]

## 2021-08-12 NOTE — PROGRESS NOTES
Occupational Therapy  Facility/Department: [facility] 5T ORTHO/NEURO  Daily Treatment Note  NAME: Diego Guzman  : 1955  MRN: [id number]    Date of Service: 2021    Discharge Recommendations:  Diego Guzman scored a 20/24 on the AM-PAC ADL Inpatient form. Current research shows that an AM-PAC score of 18 or greater is typically associated with a discharge to the patient's home setting. Based on the patient's AM-PAC score, and their current ADL deficits, it is recommended that the patient have 2-3 sessions per week of Occupational Therapy at d/c to increase the patient's independence. At this time, this patient demonstrates the endurance and safety to discharge home with 24 hr assist and HHOT and a follow up treatment frequency of 2-3x/wk. Please see assessment section for further patient specific details. HOME HEALTH CARE: LEVEL 1 STANDARD    - Initial home health evaluation to occur within 24-48 hours, in patient home   - Therapy to evaluate with goal of regaining prior level of functioning   - Therapy to evaluate if patient has [address] needs for personal care  If patient discharges prior to next session this note will serve as a discharge summary. Please see below for the latest assessment towards goals. OT Equipment Recommendations  Equipment Needed: Yes  Mobility Devices: ADL Assistive Devices  ADL Assistive Devices: Long-handled Shoe Horn;Long-handled Sponge;Reacher;Sock-Aid Hard    Assessment   Performance deficits / Impairments: Decreased functional mobility ; Decreased ADL status; Decreased endurance  Assessment: Pt progressing with therapy session, SBA for all functional mobility and transfers  and completing 2 stairs up/down at CGA this date. Pt SBA for LB dressing. Pt reports she can have 24 hr assist at home, recommend with HHOT Level 1. Continue with POC.   Treatment Diagnosis: Decreased activity tolerance, impaired ADLs and mobility 2/2 Lumbar sx and revision of abx Evaluation, Education, & procurement, Patient/Caregiver Education & Training, Pain Management  G-Code     OutComes Score                                                  AM-PAC Score        AM-PAC Inpatient Daily Activity Raw Score: 20 (08/12/21 1050)  AM-PAC Inpatient ADL T-Scale Score : 42.03 (08/12/21 1050)  ADL Inpatient CMS 0-100% Score: 38.32 (08/12/21 1050)  ADL Inpatient CMS G-Code Modifier : CJ (08/12/21 1050)    Goals  Short term goals  Time Frame for Short term goals: by D/C  Short term goal 1: Toileting and toilet transfer SBA, AE as needed - goal met 8/11    New Goal  Commode transfers with Mod independence - pt declined 8/12  Short term goal 2: Lower body dressing SBA, AE as needed - goal met brief/socks 8/11    New goal LE dressing wtih Mod independence with AE prn - SBA goal not met 8/12  Short term goal 3: Functional transfers to/from bed, chairs SBA - goal met 8/11  Short term goal 4: Static and dynamic stance during ADLs SBA - goal met 8/11   New goal   Stance x 8 mins with supervision for ADLs /IADLs - pt declined 8/12       Therapy Time   Individual Concurrent Group Co-treatment   Time In 1002         Time Out 1016         Minutes 14         Timed Code Treatment Minutes: [address] [facility] [facility] [address]

## 2021-08-12 NOTE — PLAN OF CARE
Problem: Infection:  Goal: Will remain free from infection  Description: Will remain free from infection  Outcome: Ongoing   The wound is well healed without signs of infection,    Problem: Falls - Risk of:  Goal: Will remain free from falls  Description: Will remain free from falls  Outcome: Ongoing    tenderness or discharge. All fall precautions in place, bed in lowest position, frequent rounding to assist with toileting. Pt absent of fall this shift.

## 2021-08-12 NOTE — PROGRESS NOTES
Patient resting in bed. VS stable. Dr. Flores Mclain came to assess patient. He recommended Provena Vac 13 cm. This RN told charge nurse. Charge nurse placed order for Vac in nursing communications. Later pain medicine was administered for back pain. We continue to monitor.

## 2021-08-12 NOTE — PROGRESS NOTES
Tara Demarco is a 77 y.o. female patient. Current Facility-Administered Medications   Medication Dose Route Frequency Provider Last Rate Last Admin    enoxaparin (LOVENOX) injection 120 mg  1 mg/kg Subcutaneous BID Zoë Blunt MD   120 mg at 08/11/21 0932    warfarin (COUMADIN) tablet 5 mg  5 mg Oral Daily Zoë Blunt MD   5 mg at 08/11/21 1805    oxyCODONE (ROXICODONE) immediate release tablet 5 mg  5 mg Oral Q3H PRN Diogo Preston MD   5 mg at 08/11/21 1805    pantoprazole (PROTONIX) tablet 40 mg  40 mg Oral QAM AC Neal Wasserman MD   40 mg at 08/11/21 0655    acetaminophen (TYLENOL) tablet 650 mg  650 mg Oral Q4H PRN Rj Lora MD   650 mg at 08/06/21 0213    sucralfate (CARAFATE) tablet 1 g  1 g Oral 4 times per day Rudi Phillips MD   1 g at 08/11/21 1647    hydrALAZINE (APRESOLINE) injection 10 mg  10 mg Intravenous Q6H PRN Kenna Pino MD   10 mg at 08/06/21 2320    LORazepam (ATIVAN) injection 0.5 mg  0.5 mg Intravenous Q6H PRN Sindhu Da Silva APRN - CNP   0.5 mg at 08/04/21 0636    diazePAM (VALIUM) tablet 5 mg  5 mg Oral Q6H PRN Sindhu Da Silva APRN - CNP   5 mg at 08/08/21 1722    promethazine (PHENERGAN) injection 25 mg  25 mg Intramuscular Q4H PRN Sindhu Da Silva APRN - CNP   25 mg at 08/11/21 0803    guaiFENesin-dextromethorphan (ROBITUSSIN DM) 100-10 MG/5ML syrup 5 mL  5 mL Oral Q4H PRN Hannah Perez APRN - CNP        fluticasone (FLONASE) 50 MCG/ACT nasal spray 1 spray  1 spray Each Nostril Daily Kenna Pino MD   1 spray at 08/11/21 0924    ondansetron (ZOFRAN) injection 4 mg  4 mg Intravenous Q4H PRN Rudi Phillips MD   4 mg at 08/09/21 1849    SMOG Enema  330 mL Rectal Once Edgardo Aguiar MD        lactated ringers infusion   Intravenous Continuous Bob Beltran  mL/hr at 08/04/21 0634 New Bag at 08/04/21 0634    pravastatin (PRAVACHOL) tablet 20 mg  20 mg Oral Nightly SMITHA Hurd   20 mg at 08/10/21 2200  tamsulosin (FLOMAX) capsule 0.4 mg  0.4 mg Oral Daily Karmen Yates MD   0.4 mg at 08/11/21 0921    lidocaine 4 % external patch 1 patch  1 patch Transdermal Daily LISA Moreno CNP   1 patch at 08/10/21 5070    sennosides-docusate sodium (SENOKOT-S) 8.6-50 MG tablet 2 tablet  2 tablet Oral BID LISA Moreno CNP   2 tablet at 08/11/21 4734    polyethylene glycol (GLYCOLAX) packet 17 g  17 g Oral Daily LISA Moreno CNP   17 g at 08/11/21 6254    [Held by provider] oxyCODONE (ROXICODONE) immediate release tablet 5 mg  5 mg Oral Q4H PRN LISA Moreno CNP        Or   Scarlet Dates by provider] oxyCODONE (ROXICODONE) immediate release tablet 10 mg  10 mg Oral Q4H PRN LISA Moreno CNP   10 mg at 08/03/21 0007    [Held by provider] calcium carbonate (TUMS) chewable tablet 500 mg  500 mg Oral TID PRN LISA Villatoro CNP   500 mg at 08/02/21 1806    benzocaine-menthol (CEPACOL SORE THROAT) lozenge 1 lozenge  1 lozenge Oral Q2H PRN Diogo Preston MD   1 lozenge at 08/02/21 1557    metoprolol succinate (TOPROL XL) extended release tablet 25 mg  25 mg Oral Daily Samy Phillips DO   25 mg at 08/11/21 6496    buPROPion (WELLBUTRIN XL) extended release tablet 150 mg  150 mg Oral QAM SMITHA Haley   150 mg at 08/11/21 5027    cetirizine (ZYRTEC) tablet 10 mg  10 mg Oral Daily Mateusz Woodward [address]   10 mg at 08/11/21 0357    losartan (COZAAR) tablet 50 mg  50 mg Oral Daily SMITHA Haley   50 mg at 08/10/21 2202    verapamil (CALAN SR) extended release tablet 120 mg  120 mg Oral Nightly SMITHA Haley   120 mg at 08/10/21 2203    sodium chloride flush 0.9 % injection 5-40 mL  5-40 mL Intravenous 2 times per day SMITHA Haley   10 mL at 08/11/21 0923    sodium chloride flush 0.9 % injection 5-40 mL  5-40 mL Intravenous PRN SMITHA Haley        0.9 % sodium chloride infusion  25 mL Intravenous PRN SMITHA Haley Allergies   Allergen Reactions    Latex Rash    Reglan [Metoclopramide Hcl] Rash    Univasc [Moexipril] Other (See Comments)     unsure    Crestor [Rosuvastatin]      myalgia    Celecoxib Palpitations    Keflex [Cephalexin] Diarrhea    Lipitor Other (See Comments)     myalgia    Metoclopramide Anxiety    Prochlorperazine Anxiety    Prochlorperazine Edisylate     Sulfa Antibiotics Nausea And Vomiting    Vioxx      Active Problems:    Lumbar stenosis with neurogenic claudication    Dehiscence of fascia    Ileus (HCC)    S/P lumbar and lumbosacral fusion by anterior technique  Resolved Problems:    * No resolved hospital problems. *    Blood pressure 133/82, pulse 74, temperature 98.7 °F (37.1 °C), temperature source Oral, resp. rate 18, height 5' 3\" (1.6 m), weight 259 lb (117.5 kg), SpO2 93 %, not currently breastfeeding.     Abdominal wound with minimal serous drainage  No evidence of obvious infection  Wound not open  Rec: Provena vac to wound    Sandra Mcclain MD  8/11/2021

## 2021-08-12 NOTE — PROGRESS NOTES
Physical Therapy  Daily Treatment Note    Discharge Recommendations: Matthew Heaton scored a 18/24 on the AM-PAC short mobility form. Current research shows that an AM-PAC score of 18 or greater is typically associated with a discharge to the patient's home setting. Based on the patient's AM-PAC score and their current functional mobility deficits, it is recommended that the patient have 2-3 sessions per week of Physical Therapy at d/c to increase the patient's independence. At this time, this patient demonstrates the endurance and safety to discharge home with home PT and a follow up treatment frequency of 2-3x/wk. Please see assessment section for further patient specific details. Assessment:  Needing lengthy seated break before walking in halls (after returning from bathroom) due to fatigue. Gait steady, but weak and effortful with walker. Pt with prolonged hospital stay due to complications. Lives at home with . Pt hopeful for SNF stay prior to going home. If pt goes home would benefit from 24 hour assist, home PT and a wheeled walker. HOME HEALTH CARE: LEVEL 1 STANDARD (If D/Joshua home)  - Initial home health evaluation to occur within 24-48 hours, in patient home   - Therapy to evaluate with goal of regaining prior level of functioning   - Therapy to evaluate if patient has [address] needs for personal care    Equipment Needs: Defer to next level of care (will need a wheeled walker if D/Joshua home)    Chart Reviewed: Yes     Other position/activity restrictions: ambulate, activity as tolerated, abdominal binder, pt may shower   Additional Pertinent Hx: Pt is a 77 y.o. female 7/29 with spondylolisthesis of lumbar region. Pt s/p L4-S1 ANTERIOR LUMBAR INTERBODY FUSION WITH PEDICLE SCREW FIXATION on 7/30.   PMH:  hyperlipidemia, HTN, OA, anxiety, depression, GERD, sleep apnea, bilat TKR      Diagnosis: Spondylolisthesis of lumbar region   Treatment Diagnosis: Impaired functional mobility s/p L4-S1 ANTERIOR LUMBAR INTERBODY FUSION WITH PEDICLE SCREW FIXATION    Subjective: Pt in bathroom initially with PCA present. Agreeable to working with PT. C/o fatigue. \"I need to sit a minute. \" (after walking back from bathroom)    Pain: 5/10 \"everywhere\". RN informed. Objective:    Transfers  Sit to stand: SBA from bed  Stand to sit: SBA onto bed    Ambulation  Assistance Level: CGA to SBA  Assistive device: Wheeled walker  Distance: 10 ft (return from bathroom). ~100 ft in deleon. Lengthy seated rest between walks. Quality of gait: Moderate reliance on walker for support; step-through pattern; effortful; decreased pace    Balance  Sat EOB with Supervision  Static stance with walker SBA  Ambulation with wheeled walker CGA to SBA    Patient Education  Role of PT. Calling for assist with needs. Expressed understanding. Safety Devices  Pt left with needs in reach. In chair (reclined) with chair alarm on. RN updated. Goals: (as determined and assessed by primary PT)  Time Frame for Short term goals: By discharge  Short term goal 1: Sup to sit SBA. Met 8/10. Revised goal: sup to sit independent - ongoing  Short term goal 2: Pt will transfer sit to stand SBA  MET 8/10. Revised goal: Pt will transfer sit to stand supervision - ongoing  Short term goal 3: Pt will amb >100' with LRAD SBA  met 8/5   new goal: ambulate 200 ft with LRAD mod I   ongoing  Short term goal 4: Pt will up/down 1 step with LRAD SBA  ongoing     Plan:  Times per week: 5-7;    Current Treatment Recommendations: Functional Mobility Training, Gait Training, Stair training, Safety Education & Training, Patient/Caregiver Education & Training    Therapy Time    Individual  Concurrent  Group  Co-treatment    Time In  926            Time Out  950            Minutes  24              Timed Code Treatment Minutes: 24  Total Treatment Minutes: 24    Will continue per plan of care.    If patient is discharged prior to next treatment, this note will serve as the discharge summary. [place], [place] [address]  All goals ongoing today.   Akanksha Mckay, [address]

## 2021-08-12 NOTE — PROGRESS NOTES
NEUROSURGERY POST-OP PROGRESS NOTE    Patient Name: Tara Demarco YOB: 1955   Sex: Female Age: 77 yrs     Medical Record Number: [id number] Laurence Number: [de-identified]   Room Number: 3498/1362-62 Hospital Day: Hospital Day: 15     Interval History:  Post-operative day 14 s/p Procedure(s) (LRB):  REPAIR OF WOUND DEHISCENCE (N/A) and L4-S1 ANTERIOR LUMBAR INTERBODY FUSION WITH PEDICLE SCREW FIXATION per DiNinoi    Subjective: Patient had mild nausea overnight, no emesis. Had 2 BMs overnight. No change in pain, well controlled. Objective:    VITAL SIGNS   /61   Pulse 65   Temp 98.4 °F (36.9 °C) (Oral)   Resp 16   Ht 5' 3\" (1.6 m)   Wt 259 lb (117.5 kg)   LMP  (LMP Unknown)   SpO2 92%   BMI 45.88 kg/m²    Height Height: 5' 3\" (160 cm)   Weight Weight: 259 lb (117.5 kg)        Allergies Allergies   Allergen Reactions    Latex Rash    Reglan [Metoclopramide Hcl] Rash    Univasc [Moexipril] Other (See Comments)     unsure    Crestor [Rosuvastatin]      myalgia    Celecoxib Palpitations    Keflex [Cephalexin] Diarrhea    Lipitor Other (See Comments)     myalgia    Metoclopramide Anxiety    Prochlorperazine Anxiety    Prochlorperazine Edisylate     Sulfa Antibiotics Nausea And Vomiting    Vioxx       NPO Status ADULT DIET;  Regular; Low Fiber  Adult Oral Nutrition Supplement; Standard High Calorie/High Protein Oral Supplement   Isolation No active isolations     LABS   Basic Metabolic Profile Recent Labs     08/11/21  0629         CO2 26   BUN 6*   CREATININE <0.5*   GLUCOSE 110*      Complete Blood Count Recent Labs     08/11/21  0629   WBC 7.5   RBC 3.32*      Coagulation Studies Recent Labs     08/10/21  0614   INR 1.14*        MEDICATIONS   Inpatient Medications     enoxaparin, 1 mg/kg, Subcutaneous, BID    warfarin, 5 mg, Oral, Daily    pantoprazole, 40 mg, Oral, QAM AC   sucralfate, 1 g, Oral, 4 times per day    fluticasone, 1 spray, Each Nostril, Daily    SMOG Enema, 330 mL, Rectal, Once    pravastatin, 20 mg, Oral, Nightly    tamsulosin, 0.4 mg, Oral, Daily    lidocaine, 1 patch, Transdermal, Daily    sennosides-docusate sodium, 2 tablet, Oral, BID    polyethylene glycol, 17 g, Oral, Daily    metoprolol succinate, 25 mg, Oral, Daily    buPROPion, 150 mg, Oral, QAM    cetirizine, 10 mg, Oral, Daily    losartan, 50 mg, Oral, Daily    verapamil, 120 mg, Oral, Nightly    sodium chloride flush, 5-40 mL, Intravenous, 2 times per day   Infusions    lactated ringers 100 mL/hr at 08/04/21 3249    sodium chloride        Antibiotics   Recent Abx Admin                   ciprofloxacin (CIPRO) tablet 500 mg (mg) 500 mg Given 08/11/21 6000                 Neurologic Exam:  Mental status: awake and alert and oriented x4    Musculoskeletal:   Gait: Not tested   Tone: normal  Sensory: intact to all extremities  Motor strength:    Right  Left    Right  Left    Deltoid  5 5  Hip Flex  5 5   Biceps  5 5  Knee Extensors  5 5   Triceps  5 5  Knee Flexors  5 5   Wrist Ext  5 5  Ankle Dorsiflex. 5 5   Wrist Flex  5 5  Ankle Plantarflex. 5 5   Handgrip  5 5  Ext Bib Longus  5 5   Thumb Ext  5 5         Incision: back incisions intact, clean and dry    Respiratory:  Unlabored respiratory pattern    Abdomen:   Soft, rounded   Last BM  8/11    Cardiovascular:  Warm, well perfused    Assessment   Patient is a 78 yo F s/p Procedure(s) (LRB):  REPAIR OF WOUND DEHISCENCE (N/A) per Dr. Mae Fu and POD 14 s/p L4-S1 ANTERIOR LUMBAR INTERBODY FUSION WITH PEDICLE SCREW FIXATION per [facility]    Plan:  1. Neurologic exam frequency: q 4  2. Mobility: PT/OT as tolerated  3. Bowel Regimen: glycolax and senna  4. Pain control: she and robaxin    5. Incisional Care: open to air, cleanse daily with soap/water, pat dry with clean towel and paint with CHG swab  6.  BLE dopplers confirmed BLE DVT-coumadin with lovenox bridge per medicine   7. Wound drainage from abdominal wound- Dr. Kylie Villalba assessed yesterday evening- recommend Prevena wound vac (patient will discharge with this in place for 7 days)- add prophylactic Keflex for 7 days    Dispo- awaiting precert to [facility]     Patient was seen and examined with Dr. Marleny Young who agrees with above assessment and plan. Electronically signed by:  LISA Last NP, 8/12/2021 7:03 AM   Neurosurgery Nurse Practitioner  [phone]

## 2021-08-13 VITALS
SYSTOLIC BLOOD PRESSURE: 103 MMHG | BODY MASS INDEX: 45.89 KG/M2 | OXYGEN SATURATION: 90 % | RESPIRATION RATE: 16 BRPM | TEMPERATURE: 97.8 F | WEIGHT: 259 LBS | HEIGHT: 63 IN | DIASTOLIC BLOOD PRESSURE: 70 MMHG | HEART RATE: 64 BPM

## 2021-08-13 LAB
INR BLD: 1.07 (ref 0.88–1.12)
PROTHROMBIN TIME: 12.1 SEC (ref 9.9–12.7)

## 2021-08-13 PROCEDURE — 97116 GAIT TRAINING THERAPY: CPT

## 2021-08-13 PROCEDURE — 85610 PROTHROMBIN TIME: CPT

## 2021-08-13 PROCEDURE — 6370000000 HC RX 637 (ALT 250 FOR IP): Performed by: INTERNAL MEDICINE

## 2021-08-13 PROCEDURE — 6370000000 HC RX 637 (ALT 250 FOR IP): Performed by: ANESTHESIOLOGY

## 2021-08-13 PROCEDURE — 97530 THERAPEUTIC ACTIVITIES: CPT

## 2021-08-13 PROCEDURE — 6370000000 HC RX 637 (ALT 250 FOR IP): Performed by: NURSE PRACTITIONER

## 2021-08-13 PROCEDURE — 97535 SELF CARE MNGMENT TRAINING: CPT

## 2021-08-13 PROCEDURE — 6370000000 HC RX 637 (ALT 250 FOR IP): Performed by: SURGERY

## 2021-08-13 PROCEDURE — 6370000000 HC RX 637 (ALT 250 FOR IP): Performed by: PHYSICIAN ASSISTANT

## 2021-08-13 PROCEDURE — 36415 COLL VENOUS BLD VENIPUNCTURE: CPT

## 2021-08-13 PROCEDURE — 6370000000 HC RX 637 (ALT 250 FOR IP): Performed by: NEUROLOGICAL SURGERY

## 2021-08-13 RX ORDER — SACCHAROMYCES BOULARDII 250 MG
250 CAPSULE ORAL 2 TIMES DAILY
Qty: 14 CAPSULE | Refills: 0 | Status: SHIPPED | OUTPATIENT
Start: 2021-08-13 | End: 2021-08-20

## 2021-08-13 RX ORDER — POLYETHYLENE GLYCOL 3350 17 G/17G
17 POWDER, FOR SOLUTION ORAL DAILY
Qty: 10 EACH | Refills: 0 | Status: SHIPPED | OUTPATIENT
Start: 2021-08-14 | End: 2021-08-24

## 2021-08-13 RX ORDER — OXYCODONE HYDROCHLORIDE 5 MG/1
5 TABLET ORAL EVERY 6 HOURS PRN
Qty: 28 TABLET | Refills: 0 | Status: SHIPPED | OUTPATIENT
Start: 2021-08-13 | End: 2021-08-20

## 2021-08-13 RX ORDER — LIDOCAINE 4 G/G
1 PATCH TOPICAL DAILY
Qty: 7 PATCH | Refills: 0 | Status: SHIPPED | OUTPATIENT
Start: 2021-08-14 | End: 2021-09-03

## 2021-08-13 RX ORDER — TAMSULOSIN HYDROCHLORIDE 0.4 MG/1
0.4 CAPSULE ORAL DAILY
Qty: 7 CAPSULE | Refills: 0 | Status: CANCELLED | OUTPATIENT
Start: 2021-08-13 | End: 2021-08-20

## 2021-08-13 RX ORDER — DIAZEPAM 5 MG/1
5 TABLET ORAL EVERY 6 HOURS PRN
Qty: 28 TABLET | Refills: 0 | Status: SHIPPED | OUTPATIENT
Start: 2021-08-13 | End: 2021-08-20

## 2021-08-13 RX ORDER — TAMSULOSIN HYDROCHLORIDE 0.4 MG/1
0.4 CAPSULE ORAL DAILY
Qty: 7 CAPSULE | Refills: 0 | Status: SHIPPED | OUTPATIENT
Start: 2021-08-14 | End: 2021-09-03

## 2021-08-13 RX ORDER — ONDANSETRON 4 MG/1
4 TABLET, ORALLY DISINTEGRATING ORAL EVERY 8 HOURS PRN
Qty: 21 TABLET | Refills: 0 | Status: SHIPPED | OUTPATIENT
Start: 2021-08-13 | End: 2021-08-20

## 2021-08-13 RX ORDER — DIAZEPAM 5 MG/1
5 TABLET ORAL EVERY 6 HOURS PRN
Qty: 28 TABLET | Refills: 0 | Status: CANCELLED | OUTPATIENT
Start: 2021-08-13 | End: 2021-08-20

## 2021-08-13 RX ORDER — SENNA AND DOCUSATE SODIUM 50; 8.6 MG/1; MG/1
2 TABLET, FILM COATED ORAL 2 TIMES DAILY
Qty: 40 TABLET | Refills: 0 | Status: SHIPPED | OUTPATIENT
Start: 2021-08-13 | End: 2021-08-23

## 2021-08-13 RX ORDER — OXYCODONE HYDROCHLORIDE 5 MG/1
5 TABLET ORAL EVERY 6 HOURS PRN
Qty: 28 TABLET | Refills: 0 | Status: CANCELLED | OUTPATIENT
Start: 2021-08-13 | End: 2021-08-20

## 2021-08-13 RX ORDER — CEPHALEXIN 500 MG/1
500 CAPSULE ORAL 4 TIMES DAILY
Qty: 24 CAPSULE | Refills: 0 | Status: SHIPPED | OUTPATIENT
Start: 2021-08-13 | End: 2021-08-19

## 2021-08-13 RX ORDER — ONDANSETRON 4 MG/1
4 TABLET, ORALLY DISINTEGRATING ORAL ONCE
Status: COMPLETED | OUTPATIENT
Start: 2021-08-13 | End: 2021-08-13

## 2021-08-13 RX ADMIN — CEPHALEXIN 500 MG: 250 CAPSULE ORAL at 09:42

## 2021-08-13 RX ADMIN — FLUTICASONE PROPIONATE 1 SPRAY: 50 SPRAY, METERED NASAL at 09:43

## 2021-08-13 RX ADMIN — PANTOPRAZOLE SODIUM 40 MG: 40 TABLET, DELAYED RELEASE ORAL at 05:52

## 2021-08-13 RX ADMIN — SUCRALFATE 1 G: 1 TABLET ORAL at 05:52

## 2021-08-13 RX ADMIN — TAMSULOSIN HYDROCHLORIDE 0.4 MG: 0.4 CAPSULE ORAL at 09:36

## 2021-08-13 RX ADMIN — OXYCODONE 5 MG: 5 TABLET ORAL at 13:37

## 2021-08-13 RX ADMIN — OXYCODONE 5 MG: 5 TABLET ORAL at 05:57

## 2021-08-13 RX ADMIN — ONDANSETRON 4 MG: 4 TABLET, ORALLY DISINTEGRATING ORAL at 14:27

## 2021-08-13 RX ADMIN — METOPROLOL SUCCINATE 25 MG: 50 TABLET, EXTENDED RELEASE ORAL at 09:37

## 2021-08-13 RX ADMIN — APIXABAN 10 MG: 5 TABLET, FILM COATED ORAL at 09:39

## 2021-08-13 RX ADMIN — Medication 250 MG: at 09:37

## 2021-08-13 RX ADMIN — CETIRIZINE HYDROCHLORIDE 10 MG: 10 TABLET, FILM COATED ORAL at 09:37

## 2021-08-13 RX ADMIN — CEPHALEXIN 500 MG: 250 CAPSULE ORAL at 13:36

## 2021-08-13 RX ADMIN — BUPROPION HYDROCHLORIDE 150 MG: 150 TABLET, EXTENDED RELEASE ORAL at 09:37

## 2021-08-13 ASSESSMENT — PAIN SCALES - GENERAL
PAINLEVEL_OUTOF10: 5
PAINLEVEL_OUTOF10: 8
PAINLEVEL_OUTOF10: 6

## 2021-08-13 ASSESSMENT — PAIN DESCRIPTION - FREQUENCY: FREQUENCY: CONTINUOUS

## 2021-08-13 ASSESSMENT — PAIN DESCRIPTION - DESCRIPTORS: DESCRIPTORS: ACHING

## 2021-08-13 ASSESSMENT — PAIN DESCRIPTION - PAIN TYPE: TYPE: SURGICAL PAIN

## 2021-08-13 ASSESSMENT — PAIN DESCRIPTION - PROGRESSION: CLINICAL_PROGRESSION: GRADUALLY WORSENING

## 2021-08-13 ASSESSMENT — PAIN DESCRIPTION - LOCATION: LOCATION: BACK

## 2021-08-13 NOTE — PROGRESS NOTES
Hospitalist Progress Note      PCP: Selvin Becerril MD    Date of Admission: 7/29/2021    Chief Complaint: Chronic lumbar pain and bilateral foot burning    Hospital Course: S/p L4-S1 ANTERIOR LUMBAR INTERBODY FUSION WITH PEDICLE SCREW FIXATION on 07/30/2021. Patient was unable to void. Was complaining of abdominal pain. Abdominal x-ray was obtained and showed moderate amount of stool. Bladder scan showed 750 ml. Patient was straight cathed. UA was negative for infection. Patient sats dropped to 86% and was placed on 2 L of oxygen. We were asked to see/evaluate by Kacy Yun.  Drew Preston MD for medical management of chronic conditions, urinary retention and hypoxia    Subjective:    No chest pain or shortness of breath  No nausea or vomiting  No chest pain  Afebrile              Medications:  Reviewed    Infusion Medications    lactated ringers 100 mL/hr at 08/04/21 5381    sodium chloride       Scheduled Medications    apixaban  10 mg Oral BID    cephALEXin  500 mg Oral 4x Daily    saccharomyces boulardii  250 mg Oral BID    pantoprazole  40 mg Oral QAM AC    sucralfate  1 g Oral 4 times per day    fluticasone  1 spray Each Nostril Daily    SMOG Enema  330 mL Rectal Once    pravastatin  20 mg Oral Nightly    tamsulosin  0.4 mg Oral Daily    lidocaine  1 patch Transdermal Daily    sennosides-docusate sodium  2 tablet Oral BID    polyethylene glycol  17 g Oral Daily    metoprolol succinate  25 mg Oral Daily    buPROPion  150 mg Oral QAM    cetirizine  10 mg Oral Daily    losartan  50 mg Oral Daily    verapamil  120 mg Oral Nightly    sodium chloride flush  5-40 mL Intravenous 2 times per day     PRN Meds: oxyCODONE, acetaminophen, hydrALAZINE, LORazepam, diazePAM, promethazine, guaiFENesin-dextromethorphan, ondansetron, [Held by provider] calcium carbonate, benzocaine-menthol, sodium chloride flush, sodium chloride    No intake or output data in the 24 hours ending 08/13/21 1237    Physical Exam Performed:    /70   Pulse 64   Temp 97.8 °F (36.6 °C) (Axillary)   Resp 16   Ht 5' 3\" (1.6 m)   Wt 259 lb (117.5 kg)   LMP  (LMP Unknown)   SpO2 90%   BMI 45.88 kg/m²     General appearance: No apparent distress, appears stated age and cooperative. Morbidly obese  HEENT: Normal cephalic, atraumatic without obvious deformity. Pupils equal, round,   E. Conjunctivae/corneas clear. Neck: Supple, with full range of motion. No jugular venous distention. Trachea midline. Respiratory:  Normal respiratory effort. Bibasilar Rales   cardiovascular: Regular rate and rhythm with normal S1/S2 without murmurs, rubs or gallops. Abdomen: Soft, non-tender, non-distended with normal bowel sounds. Suprapubic incision site with mild erythema with no overt purulent discharge. Abdominal binder in place   Musculoskeletal: No clubbing, cyanosis. Bilateral lower extremity edema. Skin: Warm and dry  Neurologic:  Alert, speech clear with no overt facial droop  Psychiatric: Awake    Appears unchanged  Labs:   Recent Labs     08/11/21  0629   WBC 7.5   HGB 10.5*   HCT 30.0*        Recent Labs     08/11/21  0629      K 3.6      CO2 26   BUN 6*   CREATININE <0.5*   CALCIUM 9.2     No results for input(s): AST, ALT, BILIDIR, BILITOT, ALKPHOS in the last 72 hours. Recent Labs     08/12/21  0622 08/13/21  0648   INR 1.14* 1.07     No results for input(s): Tito Alvarenga in the last 72 hours. Urinalysis:      Lab Results   Component Value Date    NITRU POSITIVE 08/06/2021    WBCUA  08/06/2021    BACTERIA 3+ 08/06/2021    RBCUA 0-2 08/06/2021    BLOODU SMALL 08/06/2021    SPECGRAV 1.015 08/06/2021    GLUCOSEU Negative 08/06/2021       Radiology:  VL Extremity Venous Bilateral   Final Result      XR CHEST PORTABLE   Final Result   1. Nasogastric stomach   2. Stable cardiomegaly   3. New streaky atelectasis in the lung field   3.  Resolution of oblique band of atelectasis in the left perihilar            XR ABDOMEN (KUB) (SINGLE AP VIEW)   Final Result   Impression:          1. Decreased small bowel dilation since the prior study. 2. Nasogastric tube can be retracted 3 cm for more optimal positioning. XR ABDOMEN (KUB) (SINGLE AP VIEW)   Final Result   Impression:   Enteric tube tip in the distal esophagus near gastroesophageal junction, recommend advancing. Decreased mild small bowel dilatation which may represent ileus. XR ABDOMEN (2 VIEWS)   Final Result      Supine and erect views demonstrate mild gaseous distention of small bowel loops in the right midabdomen with air-fluid levels. The findings are progressive since 8/2/2021 and suggest worsening ileus or low-grade obstruction. No free intraperitoneal air. Lumbosacral metallic fixation noted. XR ABDOMEN (KUB) (SINGLE AP VIEW)   Final Result   Impression:    A few nondilated small bowel loops with air-fluid levels, unchanged in appearance. No evidence of high-grade obstruction. CT ABDOMEN PELVIS W IV CONTRAST Additional Contrast? None   Final Result      Interval development of bibasilar atelectatic changes. There is a midline anterior abdominal wall hernia present with herniation of bowel loops and large amount of omental fat. This suggests incisional hernia. Foci of extraluminal air or gas are seen in the left lower quadrant with associated stranding. There is also free fluid noted. This may be due to recent postsurgical changes and should be correlated with patient's history. Alternatively this may    represent infectious process or possibly ruptured viscus. Small amounts of extraluminal air are also seen within the soft tissues of the anterior abdomen and in the region of the hernia sac which may  be related to recent surgery but the possibility of infectious process or free foci of air within the    peritoneal cavity lying within the hernia are considerations.       XR CHEST PORTABLE   Final Result      No acute disease         XR ABDOMEN (KUB) (SINGLE AP VIEW)   Final Result      No bowel obstruction. Moderate retained stool in the colon. XR LUMBAR SPINE (2-3 VIEWS)   Final Result      3 coned-down frontal and lateral intraoperative views demonstrate postoperative changes at L4-5 and L5-S1. No retained surgical instrument is seen. XR LUMBAR SPINE (2-3 VIEWS)   Final Result         Coned-down intraoperative views of the lumbar spine assumes 5 lumbar type vertebral bodies. There is anterior interbody fusion at L4-5 and L5-S1 with radiolucent interbody devices. Fluoroscopy time 1 minute      FLUORO FOR SURGICAL PROCEDURES   Final Result         Coned-down intraoperative views of the lumbar spine assumes 5 lumbar type vertebral bodies. There is anterior interbody fusion at L4-5 and L5-S1 with radiolucent interbody devices. Fluoroscopy time 1 minute      CT GUIDED STEREOTACTIC LOCALIZATION   Final Result      CT GUIDED STEREOTACTIC LOCALIZATION   Final Result              Assessment/Plan:    Chronic back pain with bilateral feet burning:  Status post L4-S1 ANTERIOR LUMBAR INTERBODY FUSION WITH PEDICLE SCREW FIXATION   Continue as needed pain medication  Management per neurosurgery    Acute DVT BL LE provoked  Neurosurgery prefers direct oral anticoagulant  Start Eliquis  Discussed with the patient extensively regarding the bleeding risk and what to monitor  She verbalized understanding, she verbalized risk, she wants to proceed with Eliquis therapy       Hypoxia: resolved. likely due to atelectasis, noted on CXR. Improved. 02 weaned off. Continue incentive spirometry.      Abdominal pain/constipation with incisional hernia: dev fascial dehiscence after coughing spell on 8/1. CT abd/pelvis showed incisional hernia with bowel and omental fat. Underwent primary repair and closure of abdominal wall fascial wound on 1/8.   Patient was having nausea with vomiting on 2/8.  Ileus versus SBO noted on x-ray. Gen surg managing conservatively. NG tube has been removed and diet started. Tolerating diet. Moving bowel. Passing gas/     Urinary retention: improved after lópez. Urology assisted. López later removed and voiding fine. UTI: UC + E. coli. Completed antibiotic course. Hypertension: Fairly controlled. Continue meds     Hyperlipidemia: continue statin        Depression: Continue Wellbutrin.     Sleep apnea:  Continue CPAP      Anemia: Due to expected postsurgical blood loss. Hgb stable    DVT Prophylaxis apixaban   Diet: ADULT DIET;  Regular; Low Fiber  Adult Oral Nutrition Supplement; Standard High Calorie/High Protein Oral Supplement  Code Status: Full Code    PT/OT Eval Status: Per neurosurgery    Dispo -per neurosurgery     Zoë Blunt MD

## 2021-08-13 NOTE — DISCHARGE SUMMARY
Discharge Summary    Date of Admission: 7/29/2021  5:22 AM  Date of Discharge: 8/13/2021   Admission Diagnosis: Spondylolisthesis of lumbar region  Discharge Diagnosis: Same   Condition on Discharge: good  Attending for Admission: Heidi Shock. Drew Preston MD  Procedures: Procedure(s) (LRB):  REPAIR OF WOUND DEHISCENCE (N/A)   L4-S1 ANTERIOR LUMBAR INTERBODY FUSION WITH PEDICLE SCREW FIXATION  Consults: IP CONSULT TO SOCIAL WORK  IP CONSULT TO SOCIAL WORK  IP CONSULT TO HOSPITALIST  IP CONSULT TO UROLOGY  IP CONSULT TO GENERAL SURGERY  IP CONSULT TO GENERAL SURGERY  PHARMACY TO DOSE WARFARIN  IP CONSULT TO HOME CARE NEEDS    Reason for Admission:  Matthew Heaton is a 77 y.o. female patient who was admitted to the hospital for complaints of intractable back and leg pain. She underwent L4-S1 ANTERIOR LUMBAR INTERBODY FUSION WITH PEDICLE SCREW FIXATION on 7/29/21 and REPAIR OF WOUND DEHISCENCE ON 8/1. Hospital Course:  After surgery, Her pre-operative leg pain was Absent . She complained of back/surgical incision pain. The pain was well-controlled on oral medications. The posterior back incisions were clean, dry and intact. Her post operative course included the following events. She had fascial dehiscence at abdominal incision site after coughing spell on 8/1- she underwent primary repair and closure on 8/1 with Dr. Ish Cochran and had an abdominal binder in place thereafter. Patient then developed ausea with vomiting on 8/2, and was found to have SBO. General surgery was consulted who managed conservatively with NG tube- this was ultimately removed, she was tolerating PO and having regular bowel movements at time of discharge. + UTI with Ecoli on 8/6, that was treated with full antibiotic course. Patient had acute onset of calf pain on 8/9 , BLE dopplers revealed acute BLE DVT, she had been on prophylactic Lovenox. She was then started on therapeutic Lovenox with Apixaban for discharge.  Patient had superficial serous drainage from abdominal wound on 8/11, the wound was examined by Dr. Rach Mendez who ordered a Prevena wound vac for 7 days. This was placed on 8/12. At time of discharge she was ambulating with steady gait, eating and drinking, having bowel movements and voiding adequately. She was set up with home health care to assist with PT/OT and her Prevena wound vac that was placed. Discharge Vitals/Labs:  /70   Pulse 64   Temp 97.8 °F (36.6 °C) (Axillary)   Resp 16   Ht 5' 3\" (1.6 m)   Wt 259 lb (117.5 kg)   LMP  (LMP Unknown)   SpO2 90%   BMI 45.88 kg/m²   CBC with Differential:    Lab Results   Component Value Date    WBC 7.5 08/11/2021    RBC 3.32 08/11/2021    HGB 10.5 08/11/2021    HCT 30.0 08/11/2021     08/11/2021    MCV 90.2 08/11/2021    MCH 31.6 08/11/2021    MCHC 35.0 08/11/2021    RDW 13.3 08/11/2021    SEGSPCT 60.7 07/30/2012    LYMPHOPCT 27.0 08/11/2021    MONOPCT 4.0 08/11/2021    EOSPCT 2.7 01/16/2012    BASOPCT 1.0 08/11/2021    MONOSABS 0.3 08/11/2021    LYMPHSABS 2.0 08/11/2021    EOSABS 0.4 08/11/2021    BASOSABS 0.1 08/11/2021    DIFFTYPE Auto-K 07/30/2012     CMP:    Lab Results   Component Value Date     08/11/2021    K 3.6 08/11/2021    K 3.6 08/01/2021     08/11/2021    CO2 26 08/11/2021    BUN 6 08/11/2021    CREATININE <0.5 08/11/2021    GFRAA >60 08/11/2021    GFRAA >60 04/20/2013    AGRATIO 1.6 07/16/2021    LABGLOM >60 08/11/2021    LABGLOM 97 04/16/2011    GLUCOSE 110 08/11/2021    GLUCOSE 85 07/23/2011    PROT 7.3 07/16/2021    PROT 6.7 09/22/2012    LABALBU 3.0 08/07/2021    CALCIUM 9.2 08/11/2021    BILITOT 0.4 07/16/2021    ALKPHOS 100 07/16/2021    AST 21 07/16/2021    ALT 23 07/16/2021       Discharge Medications: The patient suffers from a major neurological surgery that pain cannot be managed within an average of 30 MED per day.  Severe acute postoperative pain is the reason for exceeding the 30 MED average, and the prescription reflects the same Lotrisone  Apply topically 2 times daily. Fish Oil 1200 MG Caps     gabapentin 300 MG capsule  Commonly known as: NEURONTIN  TAKE 1 CAPSULE BY MOUTH 3 TIMES DAILY FOR 30 DAYS.     loratadine 10 MG tablet  Commonly known as: CLARITIN  TAKE 1 TABLET BY MOUTH EVERY DAY     losartan 50 MG tablet  Commonly known as: COZAAR  Take 1 tablet by mouth daily     metoprolol succinate 25 MG extended release tablet  Commonly known as: TOPROL XL  Take 1 tablet by mouth daily     metroNIDAZOLE 0.75 % gel  Commonly known as: METROGEL  Apply topically 2 times daily. pantoprazole 40 MG tablet  Commonly known as: PROTONIX  TAKE 1 TABLET BY MOUTH TWICE A DAY     pravastatin 20 MG tablet  Commonly known as: PRAVACHOL  Take 1 tablet by mouth daily     Turmeric 500 MG Caps     verapamil 120 MG extended release tablet  Commonly known as: CALAN SR  Take 1 tablet by mouth nightly Do not crush or chew.     vitamin B-12 1000 MCG tablet  Commonly known as: CYANOCOBALAMIN        STOP taking these medications    aspirin EC 81 MG EC tablet           Where to Get Your Medications      These medications were sent to [facility] #[id number]- [facility]. Darren Gerardo [address] - F [phone]  [facility], [address] [place] [address]    Phone: [phone]   · loratadine 10 MG tablet     These medications were sent to [facility], [address] Jermain Avila [phone] - f [phone] [address] [facility] [address]    Phone: [phone]   · apixaban starter pack 5 MG Tbpk tablet     You can get these medications from any pharmacy    Bring a paper prescription for each of these medications  · cephALEXin 500 MG capsule  · diazePAM 5 MG tablet  · lidocaine 4 % external patch  · ondansetron 4 MG disintegrating tablet  · oxyCODONE 5 MG immediate release tablet  · polyethylene glycol 17 g packet  · saccharomyces boulardii 250 MG capsule  · sennosides-docusate sodium 8.6-50 MG tablet  · tamsulosin 0.4 MG capsule         Discharge Destination:  The patient was discharged to Home. Follow-up:  The patient is to follow-up with Elina England. Shirin Mitchell MD in the office in 2 weeks      Discharge Instructions:   Verbal and written discharge instructions were given to the patient at the time of discharge. Electronically signed by:  LISA Juarez NP, 8/13/2021 8:23 AM  [phone]

## 2021-08-13 NOTE — PROGRESS NOTES
NEUROSURGERY POST-OP PROGRESS NOTE    Patient Name: John Walker YOB: 1955   Sex: Female Age: 77 yrs     Medical Record Number: [id number] Laurence Number: [de-identified]   Room Number: 7244/4544-81 Hospital Day: Hospital Day: 16     Interval History:  Post-operative day 15 s/p Procedure(s) (LRB):  REPAIR OF WOUND DEHISCENCE (N/A) and L4-S1 ANTERIOR LUMBAR INTERBODY FUSION WITH PEDICLE SCREW FIXATION per Lee    Subjective: Patient resting comfortably in bed. Dr. Iman Saldaña at bedside, discussed that she was denied SNF placement. Patient expresses she is comfortable going home and has adequate care set up to do so. Objective:    VITAL SIGNS   /70   Pulse 64   Temp 97.8 °F (36.6 °C) (Axillary)   Resp 16   Ht 5' 3\" (1.6 m)   Wt 259 lb (117.5 kg)   LMP  (LMP Unknown)   SpO2 90%   BMI 45.88 kg/m²    Height Height: 5' 3\" (160 cm)   Weight Weight: 259 lb (117.5 kg)        Allergies Allergies   Allergen Reactions    Latex Rash    Reglan [Metoclopramide Hcl] Rash    Univasc [Moexipril] Other (See Comments)     unsure    Crestor [Rosuvastatin]      myalgia    Celecoxib Palpitations    Keflex [Cephalexin] Diarrhea    Lipitor Other (See Comments)     myalgia    Metoclopramide Anxiety    Prochlorperazine Anxiety    Prochlorperazine Edisylate     Sulfa Antibiotics Nausea And Vomiting    Vioxx       NPO Status ADULT DIET;  Regular; Low Fiber  Adult Oral Nutrition Supplement; Standard High Calorie/High Protein Oral Supplement   Isolation No active isolations     LABS   Basic Metabolic Profile Recent Labs     08/11/21  0629         CO2 26   BUN 6*   CREATININE <0.5*   GLUCOSE 110*      Complete Blood Count Recent Labs     08/11/21  0629   WBC 7.5   RBC 3.32*      Coagulation Studies Recent Labs     08/12/21  0622 08/13/21  0648   INR 1.14* 1.07        MEDICATIONS   Inpatient Medications    warfarin, 5 mg, Oral, Daily    cephALEXin, 500 mg, Oral, 4x Daily    saccharomyces boulardii, 250 mg, Oral, BID    enoxaparin, 1 mg/kg, Subcutaneous, BID    pantoprazole, 40 mg, Oral, QAM AC    sucralfate, 1 g, Oral, 4 times per day    fluticasone, 1 spray, Each Nostril, Daily    SMOG Enema, 330 mL, Rectal, Once    pravastatin, 20 mg, Oral, Nightly    tamsulosin, 0.4 mg, Oral, Daily    lidocaine, 1 patch, Transdermal, Daily    sennosides-docusate sodium, 2 tablet, Oral, BID    polyethylene glycol, 17 g, Oral, Daily    metoprolol succinate, 25 mg, Oral, Daily    buPROPion, 150 mg, Oral, QAM    cetirizine, 10 mg, Oral, Daily    losartan, 50 mg, Oral, Daily    verapamil, 120 mg, Oral, Nightly    sodium chloride flush, 5-40 mL, Intravenous, 2 times per day   Infusions    lactated ringers 100 mL/hr at 08/04/21 0064    sodium chloride        Antibiotics   Recent Abx Admin                   cephALEXin (KEFLEX) capsule 500 mg (mg) 500 mg Given 08/12/21 2116     500 mg Given  1802     500 mg Given  1408                 Neurologic Exam:  Mental status: awake and alert and oriented x4    Musculoskeletal:   Gait: Not tested   Tone: normal  Sensory: intact to all extremities  Motor strength:    Right  Left    Right  Left    Deltoid  5 5  Hip Flex  5 5   Biceps  5 5  Knee Extensors  5 5   Triceps  5 5  Knee Flexors  5 5   Wrist Ext  5 5  Ankle Dorsiflex. 5 5   Wrist Flex  5 5  Ankle Plantarflex. 5 5   Handgrip  5 5  Ext Bib Longus  5 5   Thumb Ext  5 5         Incision: back incisions intact, clean and dry    Respiratory:  Unlabored respiratory pattern    Abdomen:   Soft, rounded   Last BM  8/12    Cardiovascular:  Warm, well perfused    Assessment   Patient is a 78 yo F s/p Procedure(s) (LRB):  REPAIR OF WOUND DEHISCENCE (N/A) per Dr. Mackenzie Simon and POD 15 s/p L4-S1 ANTERIOR LUMBAR INTERBODY FUSION WITH PEDICLE SCREW FIXATION per [employer]    Plan:  1. Neurologic exam frequency: q 4  2.  Mobility: PT/OT as tolerated  3. Bowel Regimen: glycolax and senna  4. Pain control: she and robaxin    5. Incisional Care: open to air, cleanse daily with soap/water, pat dry with clean towel and paint with CHG swab  6. BLE dopplers confirmed BLE DVT-apixaban per medicine   7. Wound drainage from abdominal wound- Prevena wound vac in place (patient will discharge with this in place for 7 days)- add prophylactic Keflex for 7 days    Dispo- dc home this afternoon with Selvin      Patient was seen and examined with Dr. Stef Scruggs who agrees with above assessment and plan. Electronically signed by:  LISA Huerta NP, 8/13/2021 8:21 AM   Neurosurgery Nurse Practitioner  [phone]

## 2021-08-13 NOTE — PROGRESS NOTES
Occupational Therapy  Facility/Department: [facility] 5T ORTHO/NEURO  Daily Treatment Note  NAME: Umberto Hampton  : 1955  MRN: [id number]    Date of Service: 2021    Discharge Recommendations:    Umberto Hampton scored a 21/24 on the AM-PAC ADL Inpatient form. Current research shows that an AM-PAC score of 18 or greater is typically associated with a discharge to the patient's home setting. Based on the patient's AM-PAC score, and their current ADL deficits, it is recommended that the patient have 2-3 sessions per week of Occupational Therapy at d/c to increase the patient's independence. At this time, this patient demonstrates the endurance and safety to discharge home with 24hr assist and HHOT and a follow up treatment frequency of 2-3x/wk. Please see assessment section for further patient specific details. If patient discharges prior to next session this note will serve as a discharge summary. Please see below for the latest assessment towards goals. HOME HEALTH CARE: LEVEL 1 STANDARD    - Initial home health evaluation to occur within 24-48 hours, in patient home   - Therapy to evaluate with goal of regaining prior level of functioning   - Therapy to evaluate if patient has [address] needs for personal care      Assessment   Performance deficits / Impairments: Decreased functional mobility ; Decreased ADL status; Decreased endurance      Assessment: Pt continues to progress in therapy demonstrating ADL grooming tasks in stance at sink with supervision. LE dresssing completed with AE and CGA. Functional mobility to and from bathroom with RW and SBA. Pt would benefit from 24hr assist as needed and HHOT upon discharge. Pt has all DME needs in place at home. Continue OT per POC. Treatment Diagnosis: Decreased activity tolerance, impaired ADLs and mobility 2/2 Lumbar sx and revision of abx wound  Prognosis: Good  OT Education: OT Role;Plan of Care;Precautions; ADL Adaptive Strategies;Transfer Training;Energy Conservation  Patient Education: AE for LE dressing, use of toilet aid - pt verb/ demo understanding  Activity Tolerance  Activity Tolerance: Patient Tolerated treatment well;Patient limited by fatigue       Restrictions  Position Activity Restriction  Other position/activity restrictions: ambulate, activity as tolerated, abdominal binder, pt may shower       Diagnosis: Spondylolisthesis of lumabr region s/p L4-S1 Anterior Lumabr interbody fusion on 7/29 . Treatment Diagnosis: Decreased activity tolerance, impaired ADLs and mobility 2/2 Lumbar sx and revision of abx wound    Subjective:   Pt met supine in bed and agreeable to OT treatment. Pt stating \"I am glad I am going home\". Pain:   Increased pain with movement. Pt repositioned to comfort. RN aware. Objective:    Cognition/Orientation:  WFL      Bed mobility   Rolling: SBA with good use of log roll tech  Supine to sit: SBA  Scooting: SBA lateral scooting on EOB and scooting back in chair    Functional Mobility   Sit to Stand: SBA from EOB with VCS for hand placment  Stand to Sit: SBA   Bed to Chair Transfer: SBA from EOB to toilet to recliner chair  Commode Transfer: SBA with VCS for hand placement  Other: Functional mobility to and from bathroom with RW and CGA. ADLs   Grooming: Supervision for completion of oral care and washing hands and face in stance at sink  Bathing: Supervision for UE washing in stance at sink  UB dressing: Set up donning bra and T shirt  LB dressing: CGA with use of AE donning underwear and pants  Toileting: CGA pt completing buttocks care in stance with use of toilet aid.        Safety Devices in Place:  Pt left seated in recliner chair with alarm on and call light in reach                Plan  If pt discharges prior to next treatment, this note will serve as discharge summary  Plan  Times per week: 5-7x  Times per day: Daily  Current Treatment Recommendations: Balance Training, Functional Mobility Training, Endurance Training, Self-Care / ADL, Safety Education & Training, Equipment Evaluation, Education, & procurement, Patient/Caregiver Education & Training, Pain Management           AM-PAC Score        AM-PAC Inpatient Daily Activity Raw Score: 21 (08/13/21 0846)  AM-PAC Inpatient ADL T-Scale Score : 44.27 (08/13/21 0846)  ADL Inpatient CMS 0-100% Score: 32.79 (08/13/21 0846)  ADL Inpatient CMS G-Code Modifier : Arlyn Borja (08/13/21 0846)    Goals (as determined and assessed by primary OT)  Short term goals  Time Frame for Short term goals: by D/C  Short term goal 1: Toileting and toilet transfer SBA, AE as needed - goal met 8/11    New Goal  Commode transfers with Mod independence - ongoing  Short term goal 2: Lower body dressing SBA, AE as needed - goal met brief/socks 8/11    New goal LE dressing wtih Mod independence with AE prn - ongoing  Short term goal 3: Functional transfers to/from bed, chairs SBA - ongoing  Short term goal 4: Static and dynamic stance during ADLs SBA - goal met 8/11   New goal   Stance x 8 mins with supervision for ADLs /IADLs - goal met 8/13       Therapy Time   Individual Concurrent Group Co-treatment   Time In 0800         Time Out 0839         Minutes 39         Timed Code Treatment Minutes: 130 Alejandro Castellon, Amber bullard

## 2021-08-13 NOTE — PROGRESS NOTES
Physical Therapy  Daily Treatment Note    Discharge Recommendations: Diego Guzman scored a 18/24 on the AM-PAC short mobility form. Current research shows that an AM-PAC score of 18 or greater is typically associated with a discharge to the patient's home setting. Based on the patient's AM-PAC score and their current functional mobility deficits, it is recommended that the patient have 2-3 sessions per week of Physical Therapy at d/c to increase the patient's independence. At this time, this patient demonstrates the endurance and safety to discharge home with home PT and a follow up treatment frequency of 2-3x/wk. Please see assessment section for further patient specific details. Assessment:  Pt with improved activity tolerance past 2 days. Needing SBA overall for mobility, including stairs. Good understanding of log roll for bed mobility. Safe transfer technique with use of walker. Pt feels ready to go home with family. Plan is for initial 24 hour assist. Would benefit from continued home PT and a wheeled walker. HOME HEALTH CARE: LEVEL 1 STANDARD  - Initial home health evaluation to occur within 24-48 hours, in patient home   - Therapy to evaluate with goal of regaining prior level of functioning   - Therapy to evaluate if patient has [address] needs for personal care    Equipment Needs: Wheeled walker (discussed with SW)    Chart Reviewed: Yes     Other position/activity restrictions: ambulate, activity as tolerated, abdominal binder, pt may shower   Additional Pertinent Hx: Pt is a 77 y.o. female 7/29 with spondylolisthesis of lumbar region. Pt s/p L4-S1 ANTERIOR LUMBAR INTERBODY FUSION WITH PEDICLE SCREW FIXATION on 7/30.   PMH:  hyperlipidemia, HTN, OA, anxiety, depression, GERD, sleep apnea, bilat TKR      Diagnosis: Spondylolisthesis of lumbar region   Treatment Diagnosis: Impaired functional mobility s/p L4-S1 ANTERIOR LUMBAR INTERBODY FUSION WITH PEDICLE SCREW FIXATION    Subjective: Pt in chair initially. Agreeable to working with PT. Feels ready to go home. States  also has health problems, but \"we'll take care of each other. \" Reports she has multiple family members that will be helping. States she has 1 + 2 steps into house with railing. Pain: \"Just the regular aches and pains. \" Not rated. Tolerable. Objective:    Bed mobility  Supine to sit: SBA, HOB flat without railing  Sit to Supine: SBA, HOB flat without railing. Effortful for LEs. Other: Via log roll    Transfers  Sit to stand: SBA from chair (twice); SBA from bed  Stand to sit: SBA onto bed; SBA into chair (twice)    Ambulation  Assistance Level: SBA  Assistive device: Wheeled walker  Distance: 120 ft x 2 in deleon (seated rest break between), 15 ft in room  Quality of gait: Moderate reliance on walker for support; step-through pattern; decreased pace; no LOB    Stairs  Up/down 2 steps with B rails SBA  Up/down 2 steps with B hands on one railing SBA    Patient Education  Reviewed log roll for bed mobility. Demonstrated good understanding. Stair negotiation with 1 or 2 railings. Demonstrated safely. Calling for assist with needs. Expressed understanding. Safety Devices  Pt left with needs in reach. In chair with chair alarm on. RN aware. AM-PAC score  AM-PAC Inpatient Mobility Raw Score : 18  AM-PAC Inpatient T-Scale Score : 43.63  Mobility Inpatient CMS 0-100% Score: 46.58  Mobility Inpatient CMS G-Code Modifier : CK    Goals: (as determined and assessed by primary PT)  Time Frame for Short term goals: By discharge  Short term goal 1: Sup to sit SBA. Met 8/10. Revised goal: sup to sit independent   Short term goal 2: Pt will transfer sit to stand SBA  MET 8/10.   Revised goal: Pt will transfer sit to stand supervision   Short term goal 3: Pt will amb >100' with LRAD SBA  met 8/5   new goal: ambulate 200 ft with LRAD mod I   ongoing  Short term goal 4: Pt will up/down 1 step with LRAD SBA  - MET 8/13 Plan:  Times per week: 5-7;    Current Treatment Recommendations: Functional Mobility Training, Gait Training, Stair training, Safety Education & Training, Patient/Caregiver Education & Training    Therapy Time    Individual  Concurrent  Group  Co-treatment    Time In  832            Time Out  900            Minutes  28              Timed Code Treatment Minutes: 28  Total Treatment Minutes: 28    Tentative D/C home with family today. Will continue per plan of care if not D/Joshua. If patient is discharged prior to next treatment, this note will serve as the discharge summary. ClaireChandler, [place] #[id number]  1 goal met today.   Miriam Ndiaye, [facility]

## 2021-08-13 NOTE — PROGRESS NOTES
Patient currently resting in bed with eyes closed. Alert and oriented x 4. Given oxycodone for pain with effective results. Wound vac in place on mid-abdominal surgical incision. Wound vac dressing is dry, clean, and intact. Nurse will continue to monitor/reassess. Call light within reach.

## 2021-08-13 NOTE — CARE COORDINATION
Case Management            Discharge Note                    Date / Time of Note: 8/13/2021 11:19 AM                  Discharge Note Completed by: JOYA Alford    Patient Name: Matthew Heaton   YOB: 1955  Diagnosis: Spondylolisthesis of lumbar region [M43.16]  Lumbar stenosis with neurogenic claudication [M48.062]   Date / Time: 7/29/2021  5:22 AM    Current PCP: Xiao Nunes MD  Clinic patient: No    Hospitalization in the last 30 days: No    Advance Directives:  Code Status: Full Code  [place] DNR form completed and on chart: Not Indicated    Financial:  Payor: Lisa Hudson / Plan: [facility] HMO / Product Type: *No Product type* /      Pharmacy:    [facility]/pharmacy #[id number]- [address] [phone] - F [phone]  [address] [place] [address]  Phone: [phone] Fax: [phone]      Assistance purchasing medications?: Potential Assistance Purchasing Medications: No  Assistance provided by Case Management: None at this time    Does patient want to participate in local refill/ meds to beds program?: No    Meds To Beds General Rules:  1. Can ONLY be done Monday- Friday between 8:30am-5pm  2. Prescription(s) must be in pharmacy by 3pm to be filled same day  3. Copy of patient's insurance/ prescription drug card and patient face sheet must be sent along with the prescription(s)  4. Cost of Rx cannot be added to hospital bill. If financial assistance is needed, please contact unit  or ;  or  CANNOT provide pharmacy voucher for patients co-pays  5.  Patients can then  the prescription on their way out of the hospital at discharge, or pharmacy can deliver to the bedside if staff is available. (payment due at time of pick-up or delivery - cash, check, or card accepted)     Able to afford home medications/ co-pay costs: Yes    ADLS:  Current PT AM-PAC Score: 18 /24  Current OT AM-PAC Score: 21 /24      Discharge Disposition: Home [address]: [employer]      LOC at discharge: Skilled  YOUSUF Completed: Yes    Notification completed in HENS/PAS?:  Not Applicable    IMM Completed:   Not Indicated    Transportation:  Transportation Plan for discharge: daughters    Mode of Transport: 1:00 pm     Home Care:  Home Care ordered at discharge: Yes  [address]: [facility]  Phone: [phone]  Fax: [phone]  Orders faxed: Yes    Durable Medical Equipment:  DME Provider: Angelica Nix obtained during hospitalization: Rolling Walker     Referrals made at [facility] for outpatient continued care:  Not Applicable    Additional CM Notes:   Patient worked with therapy again this AM. Patient felt comfortable for discharge home. Agreeable to [facility] who is also providing home care for her . Orders placed. Rolling Walker provided to patient's room by 12:00 pm. RN notified at 11:00 pm of discharge at 1:00 pm. No other SW needs. Daughter to transport. The Plan for Transition of Care is related to the following treatment goals Spondylolisthesis of lumbar region [M43.16]  Lumbar stenosis with neurogenic claudication [M48.062]      The Patient and/or patient representative  was provided with a choice of provider and agrees with the discharge plan Yes    Freedom of choice list was provided with basic dialogue that supports the patient's individualized plan of care/goals and shares the quality data associated with the providers.  Yes    Care Transitions patient: No    JOYA Hightower  [facility]   Case Management Department  Ph: [phone]

## 2021-08-13 NOTE — PROGRESS NOTES
Clinical Pharmacy Consult Note    Pharmacy was consulted by Dr. Riki Paez to dose warfarin for bilateral DVTs. We will sign off of the case, as warfarin has since been discontinued. Please consider consulting Pharmacy again if warfarin is re-started. Thank you for allowing us to participate in the care of this patient.     Deya AgustinD, BCPS  8/13/2021  Wireless: [phone]

## 2021-08-16 ENCOUNTER — CARE COORDINATION (OUTPATIENT)
Dept: CASE MANAGEMENT | Age: 66
End: 2021-08-16

## 2021-08-16 NOTE — CARE COORDINATION
[facility] Transitions Initial Follow Up Call    Call within 2 business days of discharge: Yes    Patient: Pippa Sidhu Patient : 1955   MRN: [id number]   Reason for Admission:  Wound dehiscence   Discharge Date: 21 RARS: Readmission Risk Score: 15      Last Discharge [facility]       Complaint Diagnosis Description Type Department Provider    21  S/P lumbar and lumbosacral fusion by anterior technique . .. [facility] [place], [place]           Spoke with: [address]: [facility] [employer]      Summary  CTN spoke to the Pt who states she is doing ok. Pt states she is with [facility]. nurse and requested a call back at a later time. CTN will attempt to reach Pt at a later time as requested.     Follow Up  Future Appointments   Date Time Provider Department Center   9/3/2021  8:00 AM Breanna Krishnamurthy MD [facility] [place] - D[facility]D   2022  9:15 AM MD Chrissy Yanez RN

## 2021-08-17 ENCOUNTER — CARE COORDINATION (OUTPATIENT)
Dept: CASE MANAGEMENT | Age: 66
End: 2021-08-17

## 2021-08-18 ENCOUNTER — CARE COORDINATION (OUTPATIENT)
Dept: CASE MANAGEMENT | Age: 66
End: 2021-08-18

## 2021-08-18 NOTE — CARE COORDINATION
[facility] Transitions Initial Follow Up Call    21     Patient: Anibal Montalvo Patient : 1955   MRN: [id number]   Reason for Admission: wound dehiscence / back  Discharge Date: 21 RARS: Readmission Risk Score: 15      Last Discharge [address] [address]       Complaint Diagnosis Description Type Department Provider    21  S/P lumbar and lumbosacral fusion by anterior technique . .. Admission (Discharged) [address] Vincent A. [place], [place]           Spoke with: [address]: [facility]      Non-face-to-face services provided:  Obtained and reviewed discharge summary and/or continuity of care documents  Education of patient/family/caregiver/guardian to support self-management-   Assessment and support for treatment adherence and medication management-      Care Transitions 24 Hour Call    Do you have any ongoing symptoms?: No  Patient-reported symptoms: Other (Comment: wound vac )  Do you have a copy of your discharge instructions?: Yes  Do you have all of your prescriptions and are they filled?: Yes  Have you been contacted by a [address]?: No  Have you scheduled your follow up appointment?: Yes  Were you discharged with any Home Care or Post Acute Services: Yes  Post Acute Services: Home Health (Comment: [facility])  Do you feel like you have everything you need to keep you well at home?: Yes  Care Transitions Interventions       SUMMARY  CTN spoke to the Pt who denies s/s of infection, SOB, chest pain, and surgical site pain. Pt reports she is unable to see surgical wound and confirms that wound vac is in place. Pt confirms she has seen surgeon since d/c home and that [facility]. has been started. Pt reports HC is to return and remove the wound vac. Pt denies issues with appetite, urination, and bowel habits. Pt confirms they have all meds and taking as prescribed. From CDC: Are you at higher risk for severe illness?  Wash your hands often.    Avoid close contact (6 feet, which is about two arm lengths) with people who are sick.  Put distance between yourself and other people if COVID-19 is spreading in your community.  Clean and disinfect frequently touched surfaces.  Avoid all cruise travel and non-essential air travel.  Call your healthcare professional if you have concerns about COVID-19 and your underlying condition or if you are sick. Pt will take all meds as prescribed and schedule / keep doctors appt. CTC provided education on s/s that require medical attention and when to seek medical attention. [facility] advised Pt of use urgent care or physicians 24 hr access line if assistance is needed after hours or on the weekend. Pt denies any needs or concerns and is agreeable with additional calls.     Follow Up  Future Appointments   Date Time Provider Department Center   9/3/2021  8:00 AM Janel El MD [facility] [place] - D[facility]   1/26/2022  9:15 AM MD Chrissy Gamble RN

## 2021-08-25 ENCOUNTER — CARE COORDINATION (OUTPATIENT)
Dept: CASE MANAGEMENT | Age: 66
End: 2021-08-25

## 2021-08-25 NOTE — CARE COORDINATION
[facility] Transitions Initial Follow Up Call    21    Patient:  Liz LOJA  Patient :  1955  MRN:  [id number]   Reason for Admission:   Dehiscence wound  Discharge Date:  21  RARS: 15    CTC attempt to reach Pt regarding recent hospital discharge. CTC left voice recording with call back number requesting a call back. Follow up appointments:    Future Appointments   Date Time Provider Rigoberto Lester   9/3/2021  8:00 AM Theresa Adame MD [facility] [place] - [facility]   2022  9:15 AM Kayla Sal MD [facility].  Daphane Brittle, BSN, RN  Care Transition Coordinator  Contact Number:  [phone]

## 2021-09-01 ENCOUNTER — CARE COORDINATION (OUTPATIENT)
Dept: CASE MANAGEMENT | Age: 66
End: 2021-09-01

## 2021-09-01 NOTE — CARE COORDINATION
[facility] Transitions Follow Up Call    2021    Patient: Anibal Montalvo  Patient : 1955   MRN: [id number]   Reason for Admission:  Dehiscence of wound   Discharge Date: 21 RARS: Readmission Risk Score: 13         Spoke with: [address] [place] [address] S Transitions Subsequent and Final Call    Subsequent and Final Calls  Do you have any ongoing symptoms?: Yes  Patient-reported symptoms: Pain  Interventions for patient-reported symptoms: Other  Have your medications changed?: No  Do you have any questions related to your medications?: No  Do you currently have any active services?: Yes  Are you currently active with any services?: Home Health  Do you have any needs or concerns that I can assist you with?: No  Identified Barriers: None  Care Transitions Interventions  Other Interventions:         SUMMARY  CTN spoke to the Pt who reports back pain at surgical site she rates 5/10. Pt states she just took pain med before call. Pt reports she had one episode of diarrhea today and will hold her SS. Pt reports that wound vac was removed last week and [facility]. nurse is seeing her daily for dressing change. Pt confirms they have all meds and taking as prescribed. From CDC: Are you at higher risk for severe illness?  Wash your hands often.  Avoid close contact (6 feet, which is about two arm lengths) with people who are sick.  Put distance between yourself and other people if COVID-19 is spreading in your community.  Clean and disinfect frequently touched surfaces.  Avoid all cruise travel and non-essential air travel.  Call your healthcare professional if you have concerns about COVID-19 and your underlying condition or if you are sick. Pt will take all meds as prescribed and schedule / keep doctors appt. CTC provided education on s/s that require medical attention and when to seek medical attention.   CTC advised Pt of use urgent care or physicians 24 hr access line if assistance is needed after hours or on the weekend. Pt denies any needs or concerns and is agreeable with additional calls.     Follow Up  Future Appointments   Date Time Provider Department Center   9/3/2021  8:00 AM Taylor Potter MD [facility] [place] - [facility]   1/26/2022  9:15 AM MD Chrissy Iniguez RN

## 2021-09-03 ENCOUNTER — OFFICE VISIT (OUTPATIENT)
Dept: INTERNAL MEDICINE CLINIC | Age: 66
End: 2021-09-03
Payer: MEDICARE

## 2021-09-03 VITALS
BODY MASS INDEX: 43.58 KG/M2 | TEMPERATURE: 98.4 F | RESPIRATION RATE: 16 BRPM | DIASTOLIC BLOOD PRESSURE: 70 MMHG | WEIGHT: 246 LBS | SYSTOLIC BLOOD PRESSURE: 122 MMHG | HEART RATE: 72 BPM | OXYGEN SATURATION: 98 %

## 2021-09-03 DIAGNOSIS — M54.50 LOW BACK PAIN, UNSPECIFIED BACK PAIN LATERALITY, UNSPECIFIED CHRONICITY, UNSPECIFIED WHETHER SCIATICA PRESENT: ICD-10-CM

## 2021-09-03 DIAGNOSIS — I10 ESSENTIAL HYPERTENSION: ICD-10-CM

## 2021-09-03 DIAGNOSIS — E78.5 HYPERLIPIDEMIA, UNSPECIFIED HYPERLIPIDEMIA TYPE: Primary | ICD-10-CM

## 2021-09-03 DIAGNOSIS — R10.9 ABDOMINAL PAIN, UNSPECIFIED ABDOMINAL LOCATION: ICD-10-CM

## 2021-09-03 DIAGNOSIS — F32.4 MAJOR DEPRESSIVE DISORDER WITH SINGLE EPISODE, IN PARTIAL REMISSION (HCC): ICD-10-CM

## 2021-09-03 PROCEDURE — 1090F PRES/ABSN URINE INCON ASSESS: CPT | Performed by: INTERNAL MEDICINE

## 2021-09-03 PROCEDURE — G8399 PT W/DXA RESULTS DOCUMENT: HCPCS | Performed by: INTERNAL MEDICINE

## 2021-09-03 PROCEDURE — G0008 ADMIN INFLUENZA VIRUS VAC: HCPCS | Performed by: INTERNAL MEDICINE

## 2021-09-03 PROCEDURE — 4040F PNEUMOC VAC/ADMIN/RCVD: CPT | Performed by: INTERNAL MEDICINE

## 2021-09-03 PROCEDURE — 3017F COLORECTAL CA SCREEN DOC REV: CPT | Performed by: INTERNAL MEDICINE

## 2021-09-03 PROCEDURE — G8417 CALC BMI ABV UP PARAM F/U: HCPCS | Performed by: INTERNAL MEDICINE

## 2021-09-03 PROCEDURE — 99215 OFFICE O/P EST HI 40 MIN: CPT | Performed by: INTERNAL MEDICINE

## 2021-09-03 PROCEDURE — 1036F TOBACCO NON-USER: CPT | Performed by: INTERNAL MEDICINE

## 2021-09-03 PROCEDURE — 1123F ACP DISCUSS/DSCN MKR DOCD: CPT | Performed by: INTERNAL MEDICINE

## 2021-09-03 PROCEDURE — G8427 DOCREV CUR MEDS BY ELIG CLIN: HCPCS | Performed by: INTERNAL MEDICINE

## 2021-09-03 PROCEDURE — 90694 VACC AIIV4 NO PRSRV 0.5ML IM: CPT | Performed by: INTERNAL MEDICINE

## 2021-09-03 PROCEDURE — 1111F DSCHRG MED/CURRENT MED MERGE: CPT | Performed by: INTERNAL MEDICINE

## 2021-09-03 RX ORDER — CETIRIZINE HYDROCHLORIDE 10 MG/1
10 TABLET ORAL DAILY
COMMUNITY
Start: 2021-09-03 | End: 2022-04-14

## 2021-09-03 RX ORDER — METHOCARBAMOL 750 MG/1
750 TABLET, FILM COATED ORAL EVERY 8 HOURS PRN
COMMUNITY
Start: 2021-08-23 | End: 2022-06-23

## 2021-09-03 RX ORDER — OXYCODONE HYDROCHLORIDE 5 MG/1
5 TABLET ORAL EVERY 8 HOURS PRN
COMMUNITY
Start: 2021-08-31 | End: 2021-09-29

## 2021-09-03 RX ORDER — ONDANSETRON 4 MG/1
4 TABLET, FILM COATED ORAL 2 TIMES DAILY PRN
COMMUNITY
Start: 2021-08-31 | End: 2022-03-02

## 2021-09-03 NOTE — PROGRESS NOTES
Tara Demarco (:  1955) is a 77 y.o. female, here for evaluation of the following chief complaint(s):    Follow-up (Recent spine and back surgery 2021. Bowel obstruction two days later.)      ASSESSMENT/PLAN:  1. Hyperlipidemia, unspecified hyperlipidemia type  Stable on pravastatin  2. Abdominal pain, unspecified abdominal location  -     Comprehensive Metabolic Panel, Fasting; Future  -     CBC; Future  We will consider CT scan of abdomen if symptoms persist or worsen  Major depressive disorder with single episode, 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[facility] [address]  -  Has been through a lot medically. Fair control on buPROPion. Will consider addition of Zoloft  Essential hypertension. Controlled on losartan, metoprolol, and verapamil     Low back pain sp surgery  On oxycodone, acetaminophen, and methocarbamol    Epigastric pain - Was supposed to have Egd? Remains on pantoprazole    Post surgical DVT- On Apixaban, likely for 3 months after 21. Keep  visit       SUBJECTIVE/OBJECTIVE:  HPI   Patient is here after her lumbar fusion which went well but then she developed bowel obstruction and wound dehiscence related to the anterior approach that was used. She also had an E. coli infection and DVT. She was hospitalized from  to  and then went home where her mother-in-law and sister help. Today she is using a walker. She notes reduced appetite. She denies constipation but states it hurts her lower abdomen to have a bowel movement. Colace seems to help. She has not been able to do physical therapy due to the abdomen wound. 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 She has been under a lot of stress related to her own health concerns and that discovery that her son is addicted to methamphetamine and is now in custodial. Also her  has been having health problems with a knee infection.       Past Medical History:   Diagnosis Date    Allergic rhinitis     Anxiety     Atrial tachycardia ([facility] [address]     dr Onetha Klinefelter ([facility])    Carpal tunnel syndrome     Cervicalgia     Chronic back pain     low    Depression     GERD (gastroesophageal reflux disease)     has schatzki ring    Headache(784.0)     hemiplegic migraines, improved    Hernia hiatal     Histoplasmosis     Hot flashes     takes wellbutrin    Hyperlipidemia     Hypertension     Obesity     Osteoarthritis     multiple joints    PONV (postoperative nausea and vomiting)     Shingles     nerve pain in her low back persists    Sleep apnea     CPAP set of 4    Tricuspid regurgitation        Current Outpatient Medications   Medication Sig Dispense Refill    cetirizine (ZYRTEC ALLERGY) 10 MG tablet Take 1 tablet by mouth daily      metroNIDAZOLE (METROGEL) 0.75 % gel Apply topically 2 times daily. 45 g 0    clotrimazole-betamethasone (LOTRISONE) 1-0.05 % cream Apply topically 2 times daily. 45 g 0    metoprolol succinate (TOPROL XL) 25 MG extended release tablet Take 1 tablet by mouth daily 90 tablet 0    buPROPion (WELLBUTRIN XL) 150 MG extended release tablet Take 1 tablet by mouth every morning 30 tablet 3    pantoprazole (PROTONIX) 40 MG tablet TAKE 1 TABLET BY MOUTH TWICE A  tablet 2    losartan (COZAAR) 50 MG tablet Take 1 tablet by mouth daily 90 tablet 3    verapamil (CALAN SR) 120 MG extended release tablet Take 1 tablet by mouth nightly Do not crush or chew.  90 tablet 3    pravastatin (PRAVACHOL) 20 MG tablet Take 1 tablet by mouth daily 90 tablet 3    vitamin B-12 (CYANOCOBALAMIN) 1000 MCG tablet       acetaminophen (TYLENOL) 500 MG tablet Take 500 mg by mouth every 6 hours as needed for Pain      Omega-3 Fatty Acids (FISH OIL) 1200 MG CAPS Take 1,200 mg by mouth 2 times daily      Multiple Vitamins-Minerals (CENTRUM SILVER PO) Take 1 tablet by mouth daily       apixaban (ELIQUIS) 5 MG TABS tablet Take 1 tablet by mouth 2 times daily 60 tablet 1    sertraline (ZOLOFT) 25 MG tablet Take 1 tablet by mouth daily 30 tablet 2    methocarbamol (ROBAXIN) 750 MG tablet Take 750 mg by mouth every 8 hours as needed      oxyCODONE (ROXICODONE) 5 MG immediate release tablet Take 5 mg by mouth every 8 hours as needed.  ondansetron (ZOFRAN) 4 MG tablet Take 4 mg by mouth 2 times daily as needed       No current facility-administered medications for this visit. Physical Exam  Vitals and nursing note reviewed. Constitutional:       General: She is not in acute distress. Appearance: She is well-developed. HENT:      Head: Normocephalic and atraumatic. Right Ear: External ear normal.      Left Ear: External ear normal.   Eyes:      General: No scleral icterus. Extraocular Movements: Extraocular movements intact. Neck:      Thyroid: No thyromegaly. Cardiovascular:      Rate and Rhythm: Normal rate and regular rhythm. Heart sounds: No murmur heard. Pulmonary:      Effort: No respiratory distress. Breath sounds: Normal breath sounds. No wheezing or rales. Abdominal:      General: There is no distension. Palpations: Abdomen is soft. Tenderness: There is no abdominal tenderness. Comments: Wounds not checked   Musculoskeletal:      Comments: Using walker   Lymphadenopathy:      Cervical: No cervical adenopathy. Skin:     General: Skin is warm and dry. Neurological:      Mental Status: She is alert and oriented to person, place, and time. Cranial Nerves: No cranial nerve deficit. Sensory: No sensory deficit. Coordination: Coordination normal.   Psychiatric:         Behavior: Behavior normal.           On this date  I have spent 40 minutes reviewing previous notes, test results and face to face with the patient discussing the diagnosis and importance of compliance with the treatment plan as well as documenting on the day of the visit. This note was generated completely or in part utilizing Dragon dictation speech recognition software.   Occasionally, words are mistranscribed and despite editing, the text may contain inaccuracies due to incorrect word recognition. If further clarification is needed please contact the office at [phone]          An electronic signature was used to authenticate this note.     --Deann Maharaj MD

## 2021-09-08 ENCOUNTER — CARE COORDINATION (OUTPATIENT)
Dept: CASE MANAGEMENT | Age: 66
End: 2021-09-08

## 2021-09-08 NOTE — CARE COORDINATION
[facility] Transitions Follow Up Call    2021    Patient: Merle Chapa  Patient : 1955   MRN: [id number]   Reason for Admission:  Back wound   Discharge Date: 21 RARS: Readmission Risk Score: 13         Spoke with: [address] [place] [address] S Transitions Subsequent and Final Call    Subsequent and Final Calls  Do you have any ongoing symptoms?: Yes  Patient-reported symptoms: Abdominal Pain  Interventions for patient-reported symptoms: Other  Have your medications changed?: No  Do you have any questions related to your medications?: No  Do you currently have any active services?: Yes  Are you currently active with any services?: Home Health  Do you have any needs or concerns that I can assist you with?: No  Identified Barriers: None  Care Transitions Interventions  Other Interventions:           SUMMARY  CTN spoke to the Pt who reports back pain she rates 5/10. She states she just took pain medication which she finds effective. Pt reports that doctor has requested wound vac be replaced to back wound and once received [facility]. nurse will place per Pt. Pt denies issues with appetite, urination, and bowel habits. Pt confirms they have all meds and taking as prescribed. Pt denies s/s of infection. From CDC: Are you at higher risk for severe illness?  Wash your hands often.  Avoid close contact (6 feet, which is about two arm lengths) with people who are sick.  Put distance between yourself and other people if COVID-19 is spreading in your community.  Clean and disinfect frequently touched surfaces.  Avoid all cruise travel and non-essential air travel.  Call your healthcare professional if you have concerns about COVID-19 and your underlying condition or if you are sick. Pt will take all meds as prescribed and schedule / keep doctors appt. CTC provided education on s/s that require medical attention and when to seek medical attention.   CTC advised Pt of use urgent care or physicians 24 hr access line if assistance is needed after hours or on the weekend. Pt denies any needs or concerns and is agreeable with additional calls.     Follow Up  Future Appointments   Date Time Provider Rigoberto Lester   1/26/2022  9:15 AM MD Chrissy Mcdermott RN

## 2021-09-09 RX ORDER — SERTRALINE HYDROCHLORIDE 25 MG/1
25 TABLET, FILM COATED ORAL DAILY
Qty: 30 TABLET | Refills: 2 | Status: SHIPPED | OUTPATIENT
Start: 2021-09-09 | End: 2021-09-20 | Stop reason: SINTOL

## 2021-09-10 NOTE — TELEPHONE ENCOUNTER
Patient is requesting a refill of ELIQUIS 5MG to be sent to [facility]/PHARMACY  [address] [facility] [phone]    Last appointment: 9/3/2021  Next appointment: 9/29/2021  Last refill: 8/13/21 by hospital    Patient is nearly out of medication. Can we provide samples to get her through until this can be filled.

## 2021-09-13 DIAGNOSIS — G47.30 SLEEP APNEA, UNSPECIFIED TYPE: Primary | ICD-10-CM

## 2021-09-15 ENCOUNTER — CARE COORDINATION (OUTPATIENT)
Dept: CASE MANAGEMENT | Age: 66
End: 2021-09-15

## 2021-09-15 NOTE — CARE COORDINATION
[facility] Transitions Follow Up Call    9/15/2021    Patient: Jannette Novoa  Patient : 1955   MRN: [id number]   Reason for Admission:  Dehiscence of surgical wound   Discharge Date: 21 RARS: Readmission Risk Score: 13         Spoke with: [address] [place] [address] S Transitions Subsequent and Final Call    Subsequent and Final Calls  Do you have any ongoing symptoms?: Yes  Patient-reported symptoms: Pain  Interventions for patient-reported symptoms: Other  Have your medications changed?: No  Do you have any questions related to your medications?: No  Do you currently have any active services?: Yes  Are you currently active with any services?: Home Health  Do you have any needs or concerns that I can assist you with?: No  Identified Barriers: None  Care Transitions Interventions  Other Interventions:         SUMMARY  CTN spoke to the Pt who reports pain at surgical site to her abdomin that she rates 4/10. Pt states she took OTC Tylenol around 4:30 am and narcotic pain med around 10:30 am.  Pt states she is weaning down on use of narcotic. Pt reports HC continues to follow her and will see her three times a week. Wound vac was replaced to abdominal wound yesterday and Pt has f/u with Dr Marlon Cabot end of month. Pt denies issues with appetite, urination, and bowel habits. Pt confirms they have all meds and taking as prescribed. From CDC: Are you at higher risk for severe illness?  Wash your hands often.  Avoid close contact (6 feet, which is about two arm lengths) with people who are sick.  Put distance between yourself and other people if COVID-19 is spreading in your community.  Clean and disinfect frequently touched surfaces.  Avoid all cruise travel and non-essential air travel.  Call your healthcare professional if you have concerns about COVID-19 and your underlying condition or if you are sick. Pt will take all meds as prescribed and schedule / keep doctors appt.  CTC

## 2021-09-16 DIAGNOSIS — F32.4 MAJOR DEPRESSIVE DISORDER WITH SINGLE EPISODE, IN PARTIAL REMISSION (HCC): ICD-10-CM

## 2021-09-16 RX ORDER — BUPROPION HYDROCHLORIDE 150 MG/1
150 TABLET ORAL EVERY MORNING
Qty: 90 TABLET | Refills: 1 | Status: SHIPPED | OUTPATIENT
Start: 2021-09-16 | End: 2021-12-16 | Stop reason: SDUPTHER

## 2021-09-16 NOTE — TELEPHONE ENCOUNTER
Last appointment: 9/3/2021  Next appointment: 9/29/2021  Last refill: 05/28/2021 # 30 with 3 refills

## 2021-09-28 ENCOUNTER — OFFICE VISIT (OUTPATIENT)
Dept: PULMONOLOGY | Age: 66
End: 2021-09-28
Payer: MEDICARE

## 2021-09-28 VITALS
HEIGHT: 63 IN | BODY MASS INDEX: 43.59 KG/M2 | HEART RATE: 73 BPM | RESPIRATION RATE: 16 BRPM | OXYGEN SATURATION: 96 % | DIASTOLIC BLOOD PRESSURE: 74 MMHG | SYSTOLIC BLOOD PRESSURE: 128 MMHG | TEMPERATURE: 97.2 F | WEIGHT: 246 LBS

## 2021-09-28 DIAGNOSIS — Z99.89 OSA ON CPAP: ICD-10-CM

## 2021-09-28 DIAGNOSIS — G47.33 OSA ON CPAP: ICD-10-CM

## 2021-09-28 DIAGNOSIS — R05.3 CHRONIC COUGH: Primary | ICD-10-CM

## 2021-09-28 PROCEDURE — G8399 PT W/DXA RESULTS DOCUMENT: HCPCS | Performed by: INTERNAL MEDICINE

## 2021-09-28 PROCEDURE — G8427 DOCREV CUR MEDS BY ELIG CLIN: HCPCS | Performed by: INTERNAL MEDICINE

## 2021-09-28 PROCEDURE — 1090F PRES/ABSN URINE INCON ASSESS: CPT | Performed by: INTERNAL MEDICINE

## 2021-09-28 PROCEDURE — 1036F TOBACCO NON-USER: CPT | Performed by: INTERNAL MEDICINE

## 2021-09-28 PROCEDURE — G8417 CALC BMI ABV UP PARAM F/U: HCPCS | Performed by: INTERNAL MEDICINE

## 2021-09-28 PROCEDURE — 4040F PNEUMOC VAC/ADMIN/RCVD: CPT | Performed by: INTERNAL MEDICINE

## 2021-09-28 PROCEDURE — 3017F COLORECTAL CA SCREEN DOC REV: CPT | Performed by: INTERNAL MEDICINE

## 2021-09-28 PROCEDURE — 99203 OFFICE O/P NEW LOW 30 MIN: CPT | Performed by: INTERNAL MEDICINE

## 2021-09-28 PROCEDURE — 1123F ACP DISCUSS/DSCN MKR DOCD: CPT | Performed by: INTERNAL MEDICINE

## 2021-09-28 NOTE — PROGRESS NOTES
[address] [place] [address] Pulmonary and Critical Care    Outpatient Initial Note    Subjective:   Referring Physician: Kerrie Britton / HPI:     The patient is 77 y.o. female who presents today for a new patient visit for obstructive sleep apnea. Patient had a SNAP study back in 2013 when she weighed 219 pounds. It showed that she had an apnea-hypopnea index of 24.4, with most of the RDI being hypopneas. She has been on a [employer] dream station since 2013 and found out that it has been recalled because of some degradation in July when she had back surgery. Back surgery recently was complicated by a bowel obstruction and needed additional surgery and then has had to have a couple of wound vacs. She is still healing and has an open wound in her abdomen is wearing abdominal binder. She is ambulating with a cane but says she mostly uses a walker. She also has some complaints of chronic cough associated with sinus drainage. The cough she says improves with Zyrtec and nasal sprays but the sinus drainage persists. She was on Singulair in the past which was stopped. She said it did slightly improve the sinus drainage but nothing made it stop.        Past Medical History:    Past Medical History:   Diagnosis Date    Allergic rhinitis     Anxiety     Atrial tachycardia ([address]     dr Yolanda Manning ([facility])    Carpal tunnel syndrome     Cervicalgia     Chronic back pain     low    Depression     DVT (deep venous thrombosis) ([address] 2021    after surgery    GERD (gastroesophageal reflux disease)     has schatzki ring    Headache(784.0)     hemiplegic migraines, improved    Hernia hiatal     Histoplasmosis     Hot flashes     takes wellbutrin    Hyperlipidemia     Hypertension     Obesity     Osteoarthritis     multiple joints    Other partial intestinal obstruction ([address] 2021    wound dehiscence    PONV (postoperative nausea and vomiting)     Shingles     nerve pain in her low back persists    Sleep apnea     CPAP set of 4    Tricuspid regurgitation        Social History:    Social History     Tobacco Use   Smoking Status Former Smoker    Packs/day: 1.00    Years: 5.00    Pack years: 5.00    Quit date: 1990    Years since quittin.7   Smokeless Tobacco Never Used       Family History:  Family History   Problem Relation Age of Onset    High Blood Pressure Mother     Heart Disease Mother     Cancer Mother         vaginal    High Blood Pressure Father     Heart Disease Father     Heart Disease Sister     Colon Cancer Sister     Heart Disease Brother     High Blood Pressure Brother     Cancer Brother         oral    Breast Cancer Sister     COPD Sister     Heart Disease Brother     Hearing Loss Brother         chf and transplant    High Blood Pressure Brother      Current Medications:  Current Outpatient Medications on File Prior to Visit   Medication Sig Dispense Refill    buPROPion (WELLBUTRIN XL) 150 MG extended release tablet TAKE 1 TABLET BY MOUTH EVERY MORNING 90 tablet 1    apixaban (ELIQUIS) 5 MG TABS tablet Take 1 tablet by mouth 2 times daily 60 tablet 1    methocarbamol (ROBAXIN) 750 MG tablet Take 750 mg by mouth every 8 hours as needed      oxyCODONE (ROXICODONE) 5 MG immediate release tablet Take 5 mg by mouth every 8 hours as needed.  ondansetron (ZOFRAN) 4 MG tablet Take 4 mg by mouth 2 times daily as needed      cetirizine (ZYRTEC ALLERGY) 10 MG tablet Take 1 tablet by mouth daily      metroNIDAZOLE (METROGEL) 0.75 % gel Apply topically 2 times daily. 45 g 0    clotrimazole-betamethasone (LOTRISONE) 1-0.05 % cream Apply topically 2 times daily. 45 g 0    pantoprazole (PROTONIX) 40 MG tablet TAKE 1 TABLET BY MOUTH TWICE A  tablet 2    losartan (COZAAR) 50 MG tablet Take 1 tablet by mouth daily 90 tablet 3    verapamil (CALAN SR) 120 MG extended release tablet Take 1 tablet by mouth nightly Do not crush or chew.  90 tablet 3    pravastatin (PRAVACHOL) 20 MG tablet Take 1 tablet by mouth daily 90 tablet 3    vitamin B-12 (CYANOCOBALAMIN) 1000 MCG tablet       acetaminophen (TYLENOL) 500 MG tablet Take 500 mg by mouth every 6 hours as needed for Pain      Omega-3 Fatty Acids (FISH OIL) 1200 MG CAPS Take 1,200 mg by mouth 2 times daily      Multiple Vitamins-Minerals (CENTRUM SILVER PO) Take 1 tablet by mouth daily       metoprolol succinate (TOPROL XL) 25 MG extended release tablet Take 1 tablet by mouth daily 90 tablet 0     No current facility-administered medications on file prior to visit. Allergies:   Allergies   Allergen Reactions    Latex Rash    Reglan [Metoclopramide Hcl] Rash    Univasc [Moexipril] Other (See Comments)     unsure    Crestor [Rosuvastatin]      myalgia    Zoloft [Sertraline]      nightmares    Celecoxib Palpitations    Keflex [Cephalexin] Diarrhea    Lipitor Other (See Comments)     myalgia    Metoclopramide Anxiety    Prochlorperazine Anxiety    Prochlorperazine Edisylate     Sulfa Antibiotics Nausea And Vomiting    Vioxx        REVIEW OF SYSTEMS:    CONSTITUTIONAL: Negative for fevers and chills  HEENT: Negative for oropharyngeal exudate, post nasal drip, sinus pain / pressure, nasal congestion, ear pain  RESPIRATORY:  See HPI  CARDIOVASCULAR: Negative for chest pain, palpitations, edema  GASTROINTESTINAL: Negative for nausea, vomiting, diarrhea, constipation and abdominal pain  HEMATOLOGICAL: Negative for adenopathy  SKIN: Negative for clubbing, cyanosis, skin lesions  EXTREMITIES: Negative for weakness, decreased ROM  NEUROLOGICAL: Negative for unilateral weakness, speech or gait abnormalities  PSYCH: Negative for anxiety, depression    Objective:   PHYSICAL EXAM:        VITALS:  /74 (Site: Right Lower Arm, Position: Sitting, Cuff Size: Large Adult)   Pulse 73   Temp 97.2 °F (36.2 °C) (Infrared)   Resp 16   Ht 5' 3\" (1.6 m)   Wt 246 lb (111.6 kg)   LMP  (LMP Unknown)   SpO2 96% Breastfeeding No   BMI 43.58 kg/m²     CONSTITUTIONAL:  Awake, alert, cooperative, no apparent distress, and appears stated age  [de-identified]: No oropharyngeal exudate, PERRL, no cervical adenopathy, no tracheal deviation, thyroid size normal  LUNGS:  No increased work of breathing and clear to auscultation, no crackles or wheezing   CARDIOVASCULAR:  normal S1 and S2 and no JVD  ABDOMEN: Abdominal binder in place  EXT: No edema, no calf tenderness. Pulses are present bilaterally. NEUROLOGIC:  Mental Status Exam:  Level of Alertness:   awake  Orientation:   person, place, time. SKIN:  normal skin color, texture, turgor, no redness, warmth, or swelling     DATA:      Radiology Review:  Pertinent images / reports were reviewed as a part of this visit. Nothing pertinent    Last PFTs:7/2021  Comment:   Normal spirometry and diffusion capacity   Increase in residual volume and decrease in expiratory reserve   volume are likely due to obesity.  Clinical correlation is   recommended. Immunizations:   Immunization History   Administered Date(s) Administered    COVID-19, Pfizer, PF, 30mcg/0.3mL 03/31/2021, 04/21/2021    Influenza 11/05/2013    Influenza Virus Vaccine 09/21/2009, 11/11/2014, 11/19/2015    Influenza, High Dose (Fluzone 65 yrs and older) 09/26/2017    Influenza, Mily Childes, IM, (6 mo and older Fluzone, Flulaval, Fluarix and 3 yrs and older Afluria) 11/10/2006, 10/23/2007, 11/11/2008, 09/21/2009, 10/10/2016    Influenza, Mily Childes, IM, PF (6 mo and older Fluzone, Flulaval, Fluarix, and 3 yrs and older Afluria) 11/01/2018    Influenza, Quadv, adjuvanted, 65 yrs +, IM, PF (Fluad) 09/24/2020, 09/03/2021    Influenza, Triv, inactivated, subunit, adjuvanted, IM (Fluad 65 yrs and older) 11/11/2019    Pneumococcal Conjugate 13-valent ([id number]) 09/24/2020    Pneumococcal Polysaccharide ([id number]) 03/04/2015    Tdap (Boostrix, Adacel) 11/05/2013       Assessment:    This is a 77 y.o. female with moderate obstructive sleep apnea and chronic cough    Plan:   -SAL: Moderate based on her 2013 study. Right now her sleep is disrupted by her abdominal discomfort following her surgery but she says she has done well with her device and uses a full facemask. Her previous settings were an auto titrating CPAP. She has had about a 30 pound weight gain since her original study this was likely her requirements are higher. We discussed getting a new study versus ordering a new auto titrating device. Given the issues she is having with her abdomen we opted to just get her a new auto titrating device we could readdress the issue when she has healed, if needed. Hopefully we can get her a new device that will provide us feedback information as to how well her sleep apnea is controlled. She uses [employer]. Chronic cough: The biggest complaint is actually of the sinus drainage as she says Zyrtec and nasal sprays to help control the cough. She also has GERD and takes a PPI twice daily or if she gets bad heartburn. She had pulmonary function test in July and her spirometry is perfect but her residual volume and total lung capacity are both elevated. Unclear if this clinically significant. Methacholine challenge may be helpful to ferret out whether or not she actually has asthma or not. -   No orders of the defined types were placed in this encounter. Diagnosis Orders   1. Chronic cough     2. SAL on CPAP        - Tobacco use: The patient is not currently smoking.       - RTC 4 months w/ MD. Call or RTC sooner if symptoms persist or worsen acutely.

## 2021-09-29 ENCOUNTER — OFFICE VISIT (OUTPATIENT)
Dept: INTERNAL MEDICINE CLINIC | Age: 66
End: 2021-09-29
Payer: MEDICARE

## 2021-09-29 VITALS
TEMPERATURE: 97.7 F | SYSTOLIC BLOOD PRESSURE: 126 MMHG | DIASTOLIC BLOOD PRESSURE: 80 MMHG | RESPIRATION RATE: 16 BRPM | BODY MASS INDEX: 42.51 KG/M2 | HEART RATE: 72 BPM | OXYGEN SATURATION: 97 % | WEIGHT: 240 LBS

## 2021-09-29 DIAGNOSIS — I10 ESSENTIAL HYPERTENSION: ICD-10-CM

## 2021-09-29 DIAGNOSIS — R10.9 ABDOMINAL PAIN, UNSPECIFIED ABDOMINAL LOCATION: Primary | ICD-10-CM

## 2021-09-29 PROCEDURE — 1123F ACP DISCUSS/DSCN MKR DOCD: CPT | Performed by: INTERNAL MEDICINE

## 2021-09-29 PROCEDURE — 99214 OFFICE O/P EST MOD 30 MIN: CPT | Performed by: INTERNAL MEDICINE

## 2021-09-29 PROCEDURE — G0009 ADMIN PNEUMOCOCCAL VACCINE: HCPCS | Performed by: INTERNAL MEDICINE

## 2021-09-29 PROCEDURE — G8417 CALC BMI ABV UP PARAM F/U: HCPCS | Performed by: INTERNAL MEDICINE

## 2021-09-29 PROCEDURE — 90732 PPSV23 VACC 2 YRS+ SUBQ/IM: CPT | Performed by: INTERNAL MEDICINE

## 2021-09-29 PROCEDURE — G8399 PT W/DXA RESULTS DOCUMENT: HCPCS | Performed by: INTERNAL MEDICINE

## 2021-09-29 PROCEDURE — 4040F PNEUMOC VAC/ADMIN/RCVD: CPT | Performed by: INTERNAL MEDICINE

## 2021-09-29 PROCEDURE — 1036F TOBACCO NON-USER: CPT | Performed by: INTERNAL MEDICINE

## 2021-09-29 PROCEDURE — G8427 DOCREV CUR MEDS BY ELIG CLIN: HCPCS | Performed by: INTERNAL MEDICINE

## 2021-09-29 PROCEDURE — 3017F COLORECTAL CA SCREEN DOC REV: CPT | Performed by: INTERNAL MEDICINE

## 2021-09-29 PROCEDURE — 1090F PRES/ABSN URINE INCON ASSESS: CPT | Performed by: INTERNAL MEDICINE

## 2021-09-29 RX ORDER — LOSARTAN POTASSIUM 25 MG/1
25 TABLET ORAL DAILY
Qty: 30 TABLET | Refills: 5
Start: 2021-09-29 | End: 2021-10-01 | Stop reason: SDUPTHER

## 2021-09-29 NOTE — PATIENT INSTRUCTIONS
Shingrix at pharmacy  Covid booster at pharmacy    Pneumovax    mucinex for thickness, consider allergist    Reduce to losartan 25 mg

## 2021-09-29 NOTE — PROGRESS NOTES
Diagnosis Date    Allergic rhinitis     Anxiety     Atrial tachycardia ([facility]     dr Sarah Mcmanus ([facility])    Carpal tunnel syndrome     Cervicalgia     Chronic back pain     low    Depression     DVT (deep venous thrombosis) ([facility] 2021    after surgery    GERD (gastroesophageal reflux disease)     has schatzki ring    Headache(784.0)     hemiplegic migraines, improved    Hernia hiatal     Histoplasmosis     Hot flashes     takes wellbutrin    Hyperlipidemia     Hypertension     Obesity     Osteoarthritis     multiple joints    Other partial intestinal obstruction ([facility] 2021    wound dehiscence    PONV (postoperative nausea and vomiting)     Shingles     nerve pain in her low back persists    Sleep apnea     CPAP set of 4    Tricuspid regurgitation     moderate       Current Outpatient Medications   Medication Sig Dispense Refill    losartan (COZAAR) 25 MG tablet Take 1 tablet by mouth daily 30 tablet 5    buPROPion (WELLBUTRIN XL) 150 MG extended release tablet TAKE 1 TABLET BY MOUTH EVERY MORNING 90 tablet 1    apixaban (ELIQUIS) 5 MG TABS tablet Take 1 tablet by mouth 2 times daily 60 tablet 1    methocarbamol (ROBAXIN) 750 MG tablet Take 750 mg by mouth every 8 hours as needed      ondansetron (ZOFRAN) 4 MG tablet Take 4 mg by mouth 2 times daily as needed      cetirizine (ZYRTEC ALLERGY) 10 MG tablet Take 1 tablet by mouth daily      metroNIDAZOLE (METROGEL) 0.75 % gel Apply topically 2 times daily. 45 g 0    clotrimazole-betamethasone (LOTRISONE) 1-0.05 % cream Apply topically 2 times daily. 45 g 0    metoprolol succinate (TOPROL XL) 25 MG extended release tablet Take 1 tablet by mouth daily 90 tablet 0    pantoprazole (PROTONIX) 40 MG tablet TAKE 1 TABLET BY MOUTH TWICE A  tablet 2    verapamil (CALAN SR) 120 MG extended release tablet Take 1 tablet by mouth nightly Do not crush or chew.  90 tablet 3    pravastatin (PRAVACHOL) 20 MG tablet Take 1 tablet by mouth daily 90 tablet 3    acetaminophen (TYLENOL) 500 MG tablet Take 500 mg by mouth every 6 hours as needed for Pain      Multiple Vitamins-Minerals (CENTRUM SILVER PO) Take 1 tablet by mouth daily       vitamin B-12 (CYANOCOBALAMIN) 1000 MCG tablet        No current facility-administered medications for this visit. Physical Exam  Vitals and nursing note reviewed. Constitutional:       General: She is not in acute distress. Appearance: She is well-developed. HENT:      Head: Normocephalic and atraumatic. Right Ear: External ear normal.      Left Ear: External ear normal.   Eyes:      General: No scleral icterus. Extraocular Movements: Extraocular movements intact. Neck:      Thyroid: No thyromegaly. Cardiovascular:      Rate and Rhythm: Normal rate and regular rhythm. Heart sounds: No murmur heard. Pulmonary:      Effort: No respiratory distress. Breath sounds: Normal breath sounds. No wheezing or rales. Abdominal:      General: Bowel sounds are normal. There is no distension. Palpations: Abdomen is soft. Tenderness: There is no abdominal tenderness. Comments: Incision covered by bandage   Musculoskeletal:      Right lower leg: Edema present. Left lower leg: No edema. Comments: Using walker   Lymphadenopathy:      Cervical: No cervical adenopathy. Skin:     General: Skin is warm and dry. Neurological:      Mental Status: She is alert and oriented to person, place, and time. Cranial Nerves: No cranial nerve deficit. Sensory: No sensory deficit. Coordination: Coordination normal.   Psychiatric:         Behavior: Behavior normal.           On this date 9/29/2021 I have spent 30 minutes reviewing previous notes, test results and face to face with the patient discussing the diagnosis and importance of compliance with the treatment plan as well as documenting on the day of the visit.     This note was generated completely or in part utilizing Dragon dictation speech recognition software. Occasionally, words are mistranscribed and despite editing, the text may contain inaccuracies due to incorrect word recognition. If further clarification is needed please contact the office at [phone]          An electronic signature was used to authenticate this note.     --Tara Armando MD

## 2021-10-01 ENCOUNTER — PATIENT MESSAGE (OUTPATIENT)
Dept: INTERNAL MEDICINE CLINIC | Age: 66
End: 2021-10-01

## 2021-10-01 DIAGNOSIS — I10 ESSENTIAL HYPERTENSION: ICD-10-CM

## 2021-10-01 RX ORDER — LOSARTAN POTASSIUM 25 MG/1
25 TABLET ORAL DAILY
Qty: 30 TABLET | Refills: 5 | Status: SHIPPED | OUTPATIENT
Start: 2021-10-01 | End: 2021-11-01 | Stop reason: SDUPTHER

## 2021-10-01 NOTE — TELEPHONE ENCOUNTER
From: Gin Owens  To: Sage Dolan MD  Sent: 10/1/2021 4:22 PM EDT  Subject: Non-Urgent Medical Question    Hello. [facility] did not get the change refill on [facility].  Thank you

## 2021-10-05 ENCOUNTER — TELEPHONE (OUTPATIENT)
Dept: PULMONOLOGY | Age: 66
End: 2021-10-05

## 2021-10-05 NOTE — TELEPHONE ENCOUNTER
Per Carlos Meter at [facility], the order for CPAP states pressures at 4 min to 25 max cmH2O. The max pressure possible on CPAP is 20 cm H2O. Please see Sleep miley from [facility], which was sent along with order, which gives pressures 4 min to 20 max.   Please correct this on order and re-fax to [address] [place] [address]. [phone]

## 2021-10-06 NOTE — TELEPHONE ENCOUNTER
I didn't realize the CPAP could only go to 20. I've seen higher.   I changed the order and gave it to [facility]

## 2021-10-29 DIAGNOSIS — I10 ESSENTIAL HYPERTENSION: ICD-10-CM

## 2021-10-29 RX ORDER — LOSARTAN POTASSIUM 25 MG/1
TABLET ORAL
Qty: 30 TABLET | Refills: 5 | OUTPATIENT
Start: 2021-10-29

## 2021-11-01 ENCOUNTER — PATIENT MESSAGE (OUTPATIENT)
Dept: INTERNAL MEDICINE CLINIC | Age: 66
End: 2021-11-01

## 2021-11-01 DIAGNOSIS — I10 ESSENTIAL HYPERTENSION: ICD-10-CM

## 2021-11-01 RX ORDER — LOSARTAN POTASSIUM 25 MG/1
25 TABLET ORAL DAILY
Qty: 30 TABLET | Refills: 5 | Status: SHIPPED | OUTPATIENT
Start: 2021-11-01 | End: 2022-03-02

## 2021-11-01 NOTE — TELEPHONE ENCOUNTER
From: Joseph Perla  To: Tina Aguirre MD  Sent: 11/1/2021 12:19 PM EDT  Subject: Prescription Question    Good morning. [facility] is stating that my LOSARTAN 25 mg. Needs refilling . So would you please call them for the refill. Thank you. Eugenio Clement. [phone] [facility] [address]  [place]

## 2021-11-03 RX ORDER — APIXABAN 5 MG/1
TABLET, FILM COATED ORAL
Qty: 60 TABLET | Refills: 1 | Status: SHIPPED | OUTPATIENT
Start: 2021-11-03 | End: 2021-12-13

## 2021-11-03 NOTE — TELEPHONE ENCOUNTER
Medication:   Requested Prescriptions     Pending Prescriptions Disp Refills    ELIQUIS 5 MG TABS tablet [Pharmacy Med Name: ELIQUIS 5 MG TABLET] 60 tablet 1     Sig: TAKE 1 TABLET BY MOUTH TWICE A DAY     Last Filled:  9/10/21    Last appt: 9/29/2021   Next appt: 12/27/2021    Last Lipid:   Lab Results   Component Value Date    CHOL 152 07/26/2021    TRIG 141 07/26/2021    HDL 50 07/26/2021    HDL 69 05/22/2012    LDLCALC 74 07/26/2021

## 2021-11-05 ENCOUNTER — OFFICE VISIT (OUTPATIENT)
Dept: ORTHOPEDIC SURGERY | Age: 66
End: 2021-11-05
Payer: MEDICARE

## 2021-11-05 VITALS — HEIGHT: 63 IN | BODY MASS INDEX: 43.16 KG/M2 | WEIGHT: 243.6 LBS

## 2021-11-05 DIAGNOSIS — M25.561 CHRONIC KNEE PAIN AFTER TOTAL REPLACEMENT OF RIGHT KNEE JOINT: ICD-10-CM

## 2021-11-05 DIAGNOSIS — Z96.652 CHRONIC KNEE PAIN AFTER TOTAL REPLACEMENT OF LEFT KNEE JOINT: Primary | ICD-10-CM

## 2021-11-05 DIAGNOSIS — M25.562 CHRONIC KNEE PAIN AFTER TOTAL REPLACEMENT OF LEFT KNEE JOINT: Primary | ICD-10-CM

## 2021-11-05 DIAGNOSIS — G89.29 CHRONIC KNEE PAIN AFTER TOTAL REPLACEMENT OF RIGHT KNEE JOINT: ICD-10-CM

## 2021-11-05 DIAGNOSIS — Z96.651 CHRONIC KNEE PAIN AFTER TOTAL REPLACEMENT OF RIGHT KNEE JOINT: ICD-10-CM

## 2021-11-05 DIAGNOSIS — G89.29 CHRONIC KNEE PAIN AFTER TOTAL REPLACEMENT OF LEFT KNEE JOINT: Primary | ICD-10-CM

## 2021-11-05 PROCEDURE — G8427 DOCREV CUR MEDS BY ELIG CLIN: HCPCS | Performed by: PHYSICIAN ASSISTANT

## 2021-11-05 PROCEDURE — 99213 OFFICE O/P EST LOW 20 MIN: CPT | Performed by: PHYSICIAN ASSISTANT

## 2021-11-05 PROCEDURE — 1036F TOBACCO NON-USER: CPT | Performed by: PHYSICIAN ASSISTANT

## 2021-11-05 PROCEDURE — G8484 FLU IMMUNIZE NO ADMIN: HCPCS | Performed by: PHYSICIAN ASSISTANT

## 2021-11-05 PROCEDURE — 4040F PNEUMOC VAC/ADMIN/RCVD: CPT | Performed by: PHYSICIAN ASSISTANT

## 2021-11-05 PROCEDURE — G8417 CALC BMI ABV UP PARAM F/U: HCPCS | Performed by: PHYSICIAN ASSISTANT

## 2021-11-05 PROCEDURE — G8399 PT W/DXA RESULTS DOCUMENT: HCPCS | Performed by: PHYSICIAN ASSISTANT

## 2021-11-05 PROCEDURE — 3017F COLORECTAL CA SCREEN DOC REV: CPT | Performed by: PHYSICIAN ASSISTANT

## 2021-11-05 PROCEDURE — 1090F PRES/ABSN URINE INCON ASSESS: CPT | Performed by: PHYSICIAN ASSISTANT

## 2021-11-05 PROCEDURE — 1123F ACP DISCUSS/DSCN MKR DOCD: CPT | Performed by: PHYSICIAN ASSISTANT

## 2021-11-05 NOTE — PROGRESS NOTES
Patient Name: Trent Bojorquez  Medical Record Number: [id number]  YOB: 1955  Date of Encounter: 11/5/2021     Chief Complaint   Patient presents with    Knee Pain     opnp, b/l knee pain, c/o b/l knee giving out on her left> right, has previously had back surgery and is curious if it affected it. History of Present Illness:   Ms. Trent Bojorquez is here in follow up regarding her chronic bilateral knee pain following bilateral total knee arthroplasties. Her left total knee arthroplasty was in March 2015 with Dr. Janet Petersen and right total knee arthroplasty in June 2016 with Dr. Nesha Rudolph. Patient has had chronic pain since surgery. She has seen Dr. Mayito Justice several times. Patient presents today stating her left knee is worse than her right. She states she deals with a lot of of knee stiffness. She frequently has difficulty bearing weight on her legs. She did recently have lumbar spine fusion and developed bilateral lower extremity DVTs. She is anticoagulated on Eliquis. The patient's past medical history, medications, allergies, family history, social history, and review of systems have been reviewed, and dated and are recorded in the chart under the 'MEDIA\" tab. Physical Exam:    Ms. Trent Bojorquez appears well, she is in no apparent distress, she demonstrates appropriate mood & affect. She is alert and oriented to person, place and time. Ht 5' 3\" (1.6 m)   Wt 243 lb 9.6 oz (110.5 kg)   LMP  (LMP Unknown)   BMI 43.15 kg/m²     On examination of patient's knees bilaterally there is no obvious swelling or joint effusion. Surgical incisions are well-healed. Range of motion on the left is 0 to 115 degrees. Range of motion on the right is 0 to 110 degrees. There is no obvious instability. Radiology:  X-rays obtained and reviewed in office:   Views: 3 view bilateral knee including AP, lateral and sunrise views  Impression: Patient is status post bilateral total knee arthroplasties. There is still some lucency under the tibial tray of the right knee however this is stable. There is some concern for polyethylene wear on the right however this could be x-ray projection. Left knee arthroplasty is stable without evidence of loosening or hardware failure. There are no acute fractures. Impression:   Diagnosis Orders   1. Chronic knee pain after total replacement of left knee joint  XR KNEE LEFT (1-2 VIEWS)    XR KNEE BILATERAL STANDING    COMPREHENSIVE METABOLIC PANEL    SEDIMENTATION RATE    C-REACTIVE PROTEIN    CBC WITH AUTO DIFFERENTIAL    VITAMIN D 25 HYDROXY    NM BONE SCAN 3 PHASE   2. Chronic knee pain after total replacement of right knee joint  XR KNEE BILATERAL STANDING    XR KNEE RIGHT (1-2 VIEWS)    COMPREHENSIVE METABOLIC PANEL    SEDIMENTATION RATE    C-REACTIVE PROTEIN    CBC WITH AUTO DIFFERENTIAL    VITAMIN D 25 HYDROXY    NM BONE SCAN 3 PHASE       Treatment Plan:    Patient has had chronic bilateral knee pain since knee arthroplasties in 2015 and 2016. She has had no recent injury. X-rays showed some stable lucency under the right tibial tray and signs of possible polyethylene wear. It is actually her left knee that hurts worse than her right. She complains of bilateral knee stiffness with some instability. We will proceed with bone scan to check microinstability of her knee arthroplasties. We will also check laboratory studies to rule out infectious or inflammatory process. Patient is 4 months status post lumbar fusion with [place] [facility]. She developed bilateral lower extremity DVTs following surgery and is on Eliquis. She goes for repeat ultrasound this week to see if blood clots have resolved. Advised patient I would review her bone scan and laboratory studies after they are completed and determine next steps. Trent Loeragrupo was informed of the results of any imaging. We discussed treatment options and a time was given to answer questions.  A plan was proposed and Zhane Goetz understand and accepts this course of care. Electronically signed by Rge Barrera PA-C on 27/3/1672  Board Certified [facility]    Please note that portions of this note were completed with a voice recognition program.  Efforts were made to edit the dictations but occasionally words are mis-transcribed.

## 2021-11-09 DIAGNOSIS — M25.561 CHRONIC KNEE PAIN AFTER TOTAL REPLACEMENT OF RIGHT KNEE JOINT: ICD-10-CM

## 2021-11-09 DIAGNOSIS — Z96.651 CHRONIC KNEE PAIN AFTER TOTAL REPLACEMENT OF RIGHT KNEE JOINT: ICD-10-CM

## 2021-11-09 DIAGNOSIS — G89.29 CHRONIC KNEE PAIN AFTER TOTAL REPLACEMENT OF RIGHT KNEE JOINT: ICD-10-CM

## 2021-11-09 DIAGNOSIS — Z96.652 CHRONIC KNEE PAIN AFTER TOTAL REPLACEMENT OF LEFT KNEE JOINT: ICD-10-CM

## 2021-11-09 DIAGNOSIS — M25.562 CHRONIC KNEE PAIN AFTER TOTAL REPLACEMENT OF LEFT KNEE JOINT: ICD-10-CM

## 2021-11-09 DIAGNOSIS — G89.29 CHRONIC KNEE PAIN AFTER TOTAL REPLACEMENT OF LEFT KNEE JOINT: ICD-10-CM

## 2021-11-09 LAB
A/G RATIO: 2 (ref 1.1–2.2)
ALBUMIN SERPL-MCNC: 4.3 G/DL (ref 3.4–5)
ALP BLD-CCNC: 98 U/L (ref 40–129)
ALT SERPL-CCNC: 17 U/L (ref 10–40)
ANION GAP SERPL CALCULATED.3IONS-SCNC: 14 MMOL/L (ref 3–16)
AST SERPL-CCNC: 15 U/L (ref 15–37)
BASOPHILS ABSOLUTE: 0 K/UL (ref 0–0.2)
BASOPHILS RELATIVE PERCENT: 0.9 %
BILIRUB SERPL-MCNC: 0.3 MG/DL (ref 0–1)
BUN BLDV-MCNC: 12 MG/DL (ref 7–20)
C-REACTIVE PROTEIN: 5.7 MG/L (ref 0–5.1)
CALCIUM SERPL-MCNC: 9.4 MG/DL (ref 8.3–10.6)
CHLORIDE BLD-SCNC: 103 MMOL/L (ref 99–110)
CO2: 23 MMOL/L (ref 21–32)
CREAT SERPL-MCNC: 0.6 MG/DL (ref 0.6–1.2)
EOSINOPHILS ABSOLUTE: 0.2 K/UL (ref 0–0.6)
EOSINOPHILS RELATIVE PERCENT: 3.1 %
GFR AFRICAN AMERICAN: >60
GFR NON-AFRICAN AMERICAN: >60
GLUCOSE BLD-MCNC: 106 MG/DL (ref 70–99)
HCT VFR BLD CALC: 40.4 % (ref 36–48)
HEMOGLOBIN: 13.4 G/DL (ref 12–16)
LYMPHOCYTES ABSOLUTE: 2.4 K/UL (ref 1–5.1)
LYMPHOCYTES RELATIVE PERCENT: 48.1 %
MCH RBC QN AUTO: 29.2 PG (ref 26–34)
MCHC RBC AUTO-ENTMCNC: 33.1 G/DL (ref 31–36)
MCV RBC AUTO: 88.2 FL (ref 80–100)
MONOCYTES ABSOLUTE: 0.4 K/UL (ref 0–1.3)
MONOCYTES RELATIVE PERCENT: 7.6 %
NEUTROPHILS ABSOLUTE: 2 K/UL (ref 1.7–7.7)
NEUTROPHILS RELATIVE PERCENT: 40.3 %
PDW BLD-RTO: 14.1 % (ref 12.4–15.4)
PLATELET # BLD: 230 K/UL (ref 135–450)
PMV BLD AUTO: 7.4 FL (ref 5–10.5)
POTASSIUM SERPL-SCNC: 4.1 MMOL/L (ref 3.5–5.1)
RBC # BLD: 4.58 M/UL (ref 4–5.2)
SEDIMENTATION RATE, ERYTHROCYTE: 13 MM/HR (ref 0–30)
SODIUM BLD-SCNC: 140 MMOL/L (ref 136–145)
TOTAL PROTEIN: 6.4 G/DL (ref 6.4–8.2)
VITAMIN D 25-HYDROXY: 30.8 NG/ML
WBC # BLD: 5.1 K/UL (ref 4–11)

## 2021-11-10 ENCOUNTER — HOSPITAL ENCOUNTER (OUTPATIENT)
Dept: NUCLEAR MEDICINE | Age: 66
Discharge: HOME | End: 2021-11-10
Payer: MEDICARE

## 2021-11-10 ENCOUNTER — TELEPHONE (OUTPATIENT)
Dept: ORTHOPEDIC SURGERY | Age: 66
End: 2021-11-10

## 2021-11-10 DIAGNOSIS — Z96.651 CHRONIC KNEE PAIN AFTER TOTAL REPLACEMENT OF RIGHT KNEE JOINT: ICD-10-CM

## 2021-11-10 DIAGNOSIS — G89.29 CHRONIC KNEE PAIN AFTER TOTAL REPLACEMENT OF RIGHT KNEE JOINT: ICD-10-CM

## 2021-11-10 DIAGNOSIS — M25.561 CHRONIC KNEE PAIN AFTER TOTAL REPLACEMENT OF RIGHT KNEE JOINT: ICD-10-CM

## 2021-11-10 DIAGNOSIS — G89.29 CHRONIC KNEE PAIN AFTER TOTAL REPLACEMENT OF LEFT KNEE JOINT: ICD-10-CM

## 2021-11-10 DIAGNOSIS — M25.562 CHRONIC KNEE PAIN AFTER TOTAL REPLACEMENT OF LEFT KNEE JOINT: ICD-10-CM

## 2021-11-10 DIAGNOSIS — Z96.652 CHRONIC KNEE PAIN AFTER TOTAL REPLACEMENT OF LEFT KNEE JOINT: ICD-10-CM

## 2021-11-10 PROCEDURE — 78315 BONE IMAGING 3 PHASE: CPT

## 2021-11-10 PROCEDURE — A9503 TC99M MEDRONATE: HCPCS | Performed by: PHYSICIAN ASSISTANT

## 2021-11-10 PROCEDURE — 3430000000 HC RX DIAGNOSTIC RADIOPHARMACEUTICAL: Performed by: PHYSICIAN ASSISTANT

## 2021-11-10 RX ORDER — TC 99M MEDRONATE 20 MG/10ML
26 INJECTION, POWDER, LYOPHILIZED, FOR SOLUTION INTRAVENOUS
Status: COMPLETED | OUTPATIENT
Start: 2021-11-10 | End: 2021-11-10

## 2021-11-10 RX ADMIN — TC 99M MEDRONATE 26 MILLICURIE: 20 INJECTION, POWDER, LYOPHILIZED, FOR SOLUTION INTRAVENOUS at 11:37

## 2021-11-10 NOTE — TELEPHONE ENCOUNTER
Can you help patient schedule an appointment with Dr. Woods Members to discuss bone scan and lab results and treatment options?  Thanks

## 2021-11-11 RX ORDER — METRONIDAZOLE 7.5 MG/G
GEL TOPICAL
Qty: 45 G | Refills: 0 | Status: SHIPPED | OUTPATIENT
Start: 2021-11-11 | End: 2022-04-14

## 2021-11-15 ENCOUNTER — OFFICE VISIT (OUTPATIENT)
Dept: ORTHOPEDIC SURGERY | Age: 66
End: 2021-11-15
Payer: MEDICARE

## 2021-11-15 VITALS — WEIGHT: 243 LBS | RESPIRATION RATE: 18 BRPM | BODY MASS INDEX: 43.05 KG/M2 | HEIGHT: 63 IN

## 2021-11-15 DIAGNOSIS — M25.561 CHRONIC KNEE PAIN AFTER TOTAL REPLACEMENT OF RIGHT KNEE JOINT: ICD-10-CM

## 2021-11-15 DIAGNOSIS — G89.29 CHRONIC KNEE PAIN AFTER TOTAL REPLACEMENT OF RIGHT KNEE JOINT: ICD-10-CM

## 2021-11-15 DIAGNOSIS — Z96.651 CHRONIC KNEE PAIN AFTER TOTAL REPLACEMENT OF RIGHT KNEE JOINT: ICD-10-CM

## 2021-11-15 DIAGNOSIS — M25.562 CHRONIC KNEE PAIN AFTER TOTAL REPLACEMENT OF LEFT KNEE JOINT: ICD-10-CM

## 2021-11-15 DIAGNOSIS — M62.81 QUADRICEPS WEAKNESS: Primary | ICD-10-CM

## 2021-11-15 DIAGNOSIS — Z96.652 CHRONIC KNEE PAIN AFTER TOTAL REPLACEMENT OF LEFT KNEE JOINT: ICD-10-CM

## 2021-11-15 DIAGNOSIS — G89.29 CHRONIC KNEE PAIN AFTER TOTAL REPLACEMENT OF LEFT KNEE JOINT: ICD-10-CM

## 2021-11-15 PROCEDURE — 4040F PNEUMOC VAC/ADMIN/RCVD: CPT | Performed by: ORTHOPAEDIC SURGERY

## 2021-11-15 PROCEDURE — G8399 PT W/DXA RESULTS DOCUMENT: HCPCS | Performed by: ORTHOPAEDIC SURGERY

## 2021-11-15 PROCEDURE — 1036F TOBACCO NON-USER: CPT | Performed by: ORTHOPAEDIC SURGERY

## 2021-11-15 PROCEDURE — 1090F PRES/ABSN URINE INCON ASSESS: CPT | Performed by: ORTHOPAEDIC SURGERY

## 2021-11-15 PROCEDURE — G8427 DOCREV CUR MEDS BY ELIG CLIN: HCPCS | Performed by: ORTHOPAEDIC SURGERY

## 2021-11-15 PROCEDURE — 1123F ACP DISCUSS/DSCN MKR DOCD: CPT | Performed by: ORTHOPAEDIC SURGERY

## 2021-11-15 PROCEDURE — 3017F COLORECTAL CA SCREEN DOC REV: CPT | Performed by: ORTHOPAEDIC SURGERY

## 2021-11-15 PROCEDURE — G8417 CALC BMI ABV UP PARAM F/U: HCPCS | Performed by: ORTHOPAEDIC SURGERY

## 2021-11-15 PROCEDURE — G8484 FLU IMMUNIZE NO ADMIN: HCPCS | Performed by: ORTHOPAEDIC SURGERY

## 2021-11-15 PROCEDURE — 99213 OFFICE O/P EST LOW 20 MIN: CPT | Performed by: ORTHOPAEDIC SURGERY

## 2021-11-15 NOTE — PROGRESS NOTES
[facility]  Office Visit    Chief Complaint: Left knee instability following total knee arthroplasty    HPI:  Wayne Lombardi is a 77 y.o. who is here for evaluation of her bilateral total knee arthroplasties. The right side was done by Dr. Guero Bowie in 2016 and the left side was done in 2015 by Dr. Cathy Justice [facility]). She says she is not currently having any issues with the right knee. Her issues with the left knee, which will get out on her. This has been going on for a number of years. She reports she had some trouble getting her range of motion back in the right knee but had no trouble getting her knee flexion back in the left knee. Her left leg will feel unstable especially on steps and getting out of chair. Her history is significant for recent low back surgery in July 2021. She had bilateral lower extremity DVTs after this and is currently on Eliquis. For this reason, she is unable to take NSAIDs. She also has a history of GI bleed. She is currently in physical therapy at [facility] for her low back. She denies any swelling or other injuries in the left knee. She does endorse mild bilateral hip pain.       Patient Active Problem List   Diagnosis    Vitamin D deficiency    Generalized osteoarthrosis, involving multiple sites    Chronic low back pain    Hemiplegic migraine    Allergic rhinitis    GERD (gastroesophageal reflux disease)    Degenerative disc disease, lumbar    Essential hypertension    Major depressive disorder with single episode, 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[facility]    Mixed hyperlipidemia    Rosacea    Shingles (herpes zoster) polyneuropathy    Palpitations    Atrial tachycardia (HCC)    Paresthesia    PAD (peripheral artery disease) ([facility]    Chest pain    Morbidly obese (HCC)    Epigastric discomfort    Chronic back pain    Lumbar stenosis with neurogenic claudication    Dehiscence of fascia    Ileus ([facility]    S/P lumbar and lumbosacral fusion by anterior technique       ROS:  Constitutional: denies fever, chills, weight loss  MSK: denies pain in other joints, muscle aches  Neurological: denies numbness, tingling, weakness    Exam:  Resp. rate 18, height 5' 3\" (1.6 m), weight 243 lb (110.2 kg)    Appearance: sitting in exam room chair, appears to be in no acute distress, awake and alert  Resp: unlabored breathing on room air  Skin: warm, dry and intact with out erythema or significant increased temperature  Neuro: grossly intact both lower extremities. Intact sensation to light touch. Motor exam 4+ to 5/5 in all major motor groups. Right knee: Incision is well-healed. Range of motion 0 to 120 degrees. The knee is stable to varus and valgus stress and stable to anterior and posterior drawer sign. Sensation is intact light touch. There is brisk capillary refill. There is 5/5 muscle strength in all muscle groups. Left knee: Incision is well-healed. Tender along the medial lateral knee. There is no knee effusion. Range of motion 0 to 130 degrees. The knee is stable to varus and valgus stress and does display mild laxity with anterior drawer and full extension. Sensation is intact light touch. There is brisk capillary refill. There is 5/5 muscle strength in all muscle groups. Imaging:  Prior bilateral knee radiographs and bone scan were reviewed with the patient today. She has bilateral total knee arthroplasty prostheses cemented in the place. These are both of a cruciate retaining design. There are no signs of osteolysis, loosening, fracture, dislocation. Gross alignment appears appropriate. Her bone scan does show mild uptake along the medial tibial plateau of the left knee only. There is no obvious loosening on radiographs. Assessment:  Bilateral total knee arthroplasty with left knee flexion instability    Plan:  We discussed the diagnosis and treatment options. Her biggest issue seems to be flexion instability of the left knee.   We discussed treatment options including physical therapy versus revision total knee arthroplasty. I highly recommended she begin with physical therapy for quadricep strengthening. I recommended that she do this in both legs. I have asked her to do this at least for 8 weeks before coming in for repeat assessment. If she continues to have issues with this long-term, we discussed revision total knee arthroplasty to help balance her flexion gap more appropriately. We will follow up here in about 8 weeks. Total time spent on today's encounter was at least 24 minutes. This time included reviewing prior notes, radiographs, and lab results when available, reviewing history obtained by medical assistant, performing history and physical exam, reviewing tests/radiographs with the patient, counseling the patient, ordering medications or tests, documentation in the electronic health record, and coordination of care. This dictation was done with [employer] dictation and may contain mechanical errors related to translation.

## 2021-11-19 ENCOUNTER — HOSPITAL ENCOUNTER (OUTPATIENT)
Dept: MAMMOGRAPHY | Age: 66
Discharge: HOME | End: 2021-11-19
Payer: MEDICARE

## 2021-11-19 VITALS — WEIGHT: 240 LBS | HEIGHT: 63 IN | BODY MASS INDEX: 42.52 KG/M2

## 2021-11-19 DIAGNOSIS — Z12.31 VISIT FOR SCREENING MAMMOGRAM: ICD-10-CM

## 2021-11-19 PROCEDURE — 77063 BREAST TOMOSYNTHESIS BI: CPT

## 2021-11-23 ENCOUNTER — HOSPITAL ENCOUNTER (OUTPATIENT)
Dept: PHYSICAL THERAPY | Age: 66
Setting detail: THERAPIES SERIES
Discharge: HOME | End: 2021-11-23
Payer: MEDICARE

## 2021-11-23 PROCEDURE — 97162 PT EVAL MOD COMPLEX 30 MIN: CPT

## 2021-11-23 PROCEDURE — 97110 THERAPEUTIC EXERCISES: CPT

## 2021-11-23 NOTE — FLOWSHEET NOTE
[facility] Outpatient Therapy  [address] [place] [address], [address], [address]  Phone: [phone]   Fax: [phone]    Physical Therapy Treatment Note/ Progress Report:     Date:  2021    Patient Name:  Liss Robledo    :  1955  MRN: [id number]    Medical/Treatment Diagnosis Information:  Diagnosis: Quadriceps weakness (M62.81); Chronic knee pain after total replacement of left knee joint (M25.562,G89.29,Z96.652);  Chronic knee pain after total replacement of right knee joint (M25.561,G89.29,Z96.651)  Treatment Diagnosis: B knee pain, weakness  Insurance/Certification information:  PT Insurance Information: SMITHA Byrne required, $40 copay  Physician Information:  Referring Practitioner: Kat West MD  Plan of care signed:    [] Yes  [x] No    Date of Patient follow up with Physician:  2021     Progress Report: []  Yes  [x]  No     Date Range for reporting period:  Beginnin2021  Ending:  NA    Progress report due (10 Rx/or 30 days whichever is less):      Recertification due (POC duration/ or 90 days whichever is less):      Visit # Insurance Allowable Auth Needed    [x]Yes   []No     RESTRICTIONS/PRECAUTIONS: remote B TKR, heart palpitations - atrial tachycardia, tricuspid regurgitation, HTN, Hx falls - foot slid on end of rug with plate in hand on  with no injury (pt wasn't using hands to assist getting up as she should), prone to fall from not picking knees up high enough due to knee pain and not able to scoot side to side, bronchitis, OA, remote skin CA removed, osteopenia but not stated on recent bone scan, partial R lung lobectomy, lumbar fusion 2021/abdomen sx - Hx DVT L calf on Eliquis, increased BMI, anxiety/depression, back restrictions: avoid bending, lifting, twisting   Latex Allergy:  [x]NO      []YES  Preferred Language for Healthcare:   [x]English       []other:  Functional Scale: LEFS Date assessed:2021    Pain level:  3/10 L knee     SUBJECTIVE:  See eval    OBJECTIVE: See eval   Observation:    Test measurements:      Exercises/Interventions: Exercises in bold performed in department today. Items not bolded are carried forward from prior visits for continuity of the record. Exercise/Equipment Resistance/Repetitions Other comments   SAQ 5\" x 10    Seated march X 10 alternating B    Nustep     LAQ     SLR     Ankle DF strength                                                                   Home Exercise Program:Pt. demonstrated good understanding and knowledge of HEP. Written instructions provided. 11/23/2021: SAQ, seated march. Access Code [email or web address]. (already doing supine HS stretch with strap, supine fig 4 piriformis stretch, L hip bridge, hooklying clamshell with band, TrA isometric, bridge in back therapy)    Therapeutic Exercise:   [x] (19875) Provided verbal/tactile cueing for activities related to strengthening, flexibility, endurance, ROM for improvements in LE, proximal hip, and core control with self-care, mobility, lifting, ambulation. NMR:  [] (53036) Provided verbal/tactile cueing for activities related to improving balance, coordination, kinesthetic sense, posture, motor skill, proprioception to assist with LE, proximal hip, and core control in self-care, mobility, lifting, ambulation and eccentric single leg control. Therapeutic Activities:    [] (38303) Provided verbal/tactile cueing for activities related to improving balance, coordination, kinesthetic sense, posture, motor skill, proprioception and motor activation to allow for proper function of core, proximal hip and LE with self-care and ADLs and functional mobility.      Gait Training:    [] (18288) Provided training and instruction to the patient for proper LE, core and proximal hip recruitment and positioning and eccentric body weight control with ambulation re-education including up and down stairs     Manual Treatments:  PROM / STM / Oscillations-Mobs:  G-I, II, III, IV (PA's, Inf., Post.)  [] (00298) Provided manual therapy to mobilize LE, proximal hip and/or LS spine soft tissue/joints for the purpose of modulating pain, promoting relaxation,  increasing ROM, reducing/eliminating soft tissue swelling/inflammation/restriction, improving soft tissue extensibility and allowing for proper ROM for normal function with self-care, mobility, lifting and ambulation. Home Exercise Program:    [x] (29926) Reviewed/Progressed HEP activities related to strengthening, flexibility, endurance, ROM of core, proximal hip and LE for functional self-care, mobility, lifting and ambulation/stair navigation   [] (69066) Reviewed/Progressed HEP activities related to improving balance, coordination, kinesthetic sense, posture, motor skill, proprioception of core, proximal hip and LE for self-care, mobility, lifting, and ambulation/stair navigation      Modalities:    [] Electric Stimulation:   [] Ultrasound:   [] Other:       Charges:  Timed Code Treatment Minutes: TE: 15   Total Treatment Minutes: 48      [] EVAL (LOW) 89486 (typically 20 minutes face-to-face)  [x] EVAL (MOD) 67032 (typically 30 minutes face-to-face)  [] EVAL (HIGH) 46659 (typically 45 minutes face-to-face)  [] RE-EVAL     [x] TI(18271) x 1      [] NMR (21465) x       [] Manual (58251) x       [] TA (93734) x       [] Gait Training [phone]) x       [] ES(attended) (13625)  [] ES (un) ([phone] [phone])   [] DRY NEEDLE 1 OR 2 MUSCLES  [] DRY NEEDLE 3+ MUSCLES  [] Mech Traction (58577)  [] Ultrasound (14875)  [] Other:    GOALS:    Patient stated goal: \"build my quad muscles, strengthen them\"  []? Progressing: []? Met: []? Not Met: []? Adjusted     Therapist goals for Patient:   Short Term Goals: To be achieved in: 3 weeks  1. Independent in initial HEP per patient tolerance, in order to prevent re-injury. []? Progressing: []? Met: []? Not Met: []? Adjusted  2.  Patient will have a decrease in pain to 5/10 at worst to facilitate raising L LE then putting pressure on it.  []? Progressing: []? Met: []? Not Met: []? Adjusted     Long Term Goals: To be achieved in: 6 weeks  1. Disability index score of 30% or less for the LEFS to assist with reaching prior level of function. []? Progressing: []? Met: []? Not Met: []? Adjusted  2. Patient will demonstrate increased AROM B knees 0-110 deg to allow for improved squatting, bending without increased pain. []? Progressing: []? Met: []? Not Met: []? Adjusted  3. Patient will demonstrate an increase in Strength L knee ext and ankle DF 5/5, B hip abd/L hip flexion 4/5 to allow for ambulation with gait speed: 3.28 ft/sec, standing tolerance, stairs as previous without increased pain   []? Progressing: []? Met: []? Not Met: []? Adjusted  4. Patient will have a decrease in pain to 2/10 at worst to allow for laying on R side, sitting tolerance as previous. []? Progressing: []? Met: []? Not Met: []? Adjusted  5. Independent in HEP progressions per patient tolerance, in order to prevent re-injury. []? Progressing: []? Met: []? Not Met: []? Adjusted    ASSESSMENT:  See eval      Treatment/Activity Tolerance:  [x] Patient tolerated treatment well [] Patient limited by fatique  [] Patient limited by pain  [] Patient limited by other medical complications  [] Other:     Overall Progression Towards Functional goals/ Treatment Progress Update:  [] Patient is progressing as expected towards functional goals listed. [] Progression is slowed due to complexities/Impairments listed. [] Progression has been slowed due to co-morbidities.   [x] Plan just implemented, too soon to assess goals progression <30days   [] Goals require adjustment due to lack of progress  [] Patient is not progressing as expected and requires additional follow up with physician  [] Other    Prognosis for POC: [x] Good [] Fair  [] Poor    Patient requires continued skilled intervention: [x] Yes  [] No PLAN: See eval  [] Continue per plan of care [] Alter current plan (see comments)  [x] Plan of care initiated [] Hold pending MD visit [] Discharge    Electronically signed by: Yahaira Joseph, PT, DPT [id number]    Note: If patient does not return for scheduled/recommended follow up visits, this note will serve as a discharge from care along with the most recent update on progress.

## 2021-11-23 NOTE — PROGRESS NOTES
The Lower Extremity Functional Scale    Patient: Vern Luna  : 1955  MRN: [id number]  Date: 2021  Electronically Signed by: Norman Robins PT, DPT [id number]     We are interested in knowing whether you are having any difficulty at all with the activities listed below because of your lower limb problem for which you are currently seeking attention. Please provide an answer for each activity.          Today would you have difficulty at all with:    Activities Extreme Difficulty or Unable to Perform Activity Quite a Bit of Difficulty Moderate Difficulty A Little Bit of Difficulty No Difficulty   1 Any of your usual work, housework, or school activities []0 []1 []2  [x]3 []4   2 Your usual hobbies, recreational, or sporting activities []0 [x]1 []2 []3 []4   3 Getting into or out of the bath []0 []1 []2 [x]3 []4   4 Walking between rooms []0 []1 []2 []3 [x]4   5 Putting on your shoes or socks []0 []1 [x]2 []3 []4   6 Squatting []0 []1 [x]2 []3 []4   7 Lifting an object, like a bag of groceries from the floor []0 []1 [x]2 []3 []4   8 Performing light activities around your home []0 []1 []2 []3 [x]4   9 Performing heavy activities around your home []0 [x]1 []2 []3 []4   10 Getting into or out of a car []0 []1 []2 [x]3 []4   11 Walking 2 blocks []0 []1 [x]2 []3 []4   12 Walking a mile [x]0 []1 []2 []3 []4   13 Going up or down 10 stairs (about 1 flight of stairs) []0 [x]1 []2 []3 []4   14 Standing for 1 hour []0 [x]1 []2 []3 []4   15 Sitting for 1 hour []0 []1 [x]2 []3 []4   16 Running on even ground  [x]0 []1 []2 []3 []4   17 Running on uneven ground  [x]0 []1 []2 []3 []4   18 Making sharp turns while running fast  [x]0 []1 []2 []3 []4   19 Hopping [x]0 []1 []2 []3 []4   20 Rolling over in bed []0 []1 []2 [x]3 []4    Column Totals:          Patient Score from Above: 34/80    (Patient Score / 80) X 100:  42.5%                          Scoring Method for Lower Extremity Functional Scale    The Lower Extremity Functional Scale (LEFS) is an easily administered and scored functional outcome tool. It can be utilized for lower extremity conditions and is sensitive enough for a wide range of functional disability levels. It can and should be used on the initial visit and subsequently on a 2- 3 week basis to measure patient's progress. The tool has a sufficient measure of reliability, variability, and sensitivity to change for determining minimally clinically important score differences, on a test to re-test basis. Scoring:   LEFS Score = (Sum of Responses / 80) X 100    Error + / - 5 points, Minimum Level of Detectable Change (90% Confidence): 9 Points     ISIDRO Rubio, SANTY Carl, NIDIA Denis[employer] The [address] [place] [address], The Lower Extremity Functional Scale: Scale development, measurement properties, and clinical application, Physical Therapy, 1999, 79, [id number], with permission of the American Physical [employer] Association.       G-Code Crosswalk:  LEFS Total Score Disability Index CMS Modifier   80 0% []CH   79-64 1-19% []CI   63-48 20-39% []CJ   47-32 40-59% []CK   31-16 60-79% []CL   15-1 80-99% []CM   0 100% []CN

## 2021-11-23 NOTE — PLAN OF CARE
[facility]  [address] [place] [address], JEANE Torrez [address], [address]  Phone: [phone]   Fax: [phone]                                            Physical Therapy Certification    Dear Referring Practitioner: Poly Vásquez MD,    We had the pleasure of evaluating the following patient for physical therapy services at [facility] ([facility]). A summary of our findings can be found in the initial assessment below. This includes our plan of care. If you have any questions or concerns regarding these findings, please do not hesitate to contact me at the office phone number checked above. Thank you for the referral.       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 (M25.562,G89.29,Z96.652);  Chronic knee pain after total replacement of right knee joint (M25.561,G89.29,Z96.651)   Treatment Diagnosis: B knee pain, weakness                                         Insurance information: PT Insurance Information: [employer], [place] required, $40 copay     Precautions/ Contra-indications: remote B TKR, heart palpitations - atrial tachycardia, tricuspid regurgitation, HTN, Hx falls - foot slid on end of rug with plate in hand on  with no injury (pt wasn't using hands to assist getting up as she should), prone to fall from not picking knees up high enough due to knee pain and not able to scoot side to side, bronchitis, OA, remote skin CA removed, osteopenia but not stated on recent bone scan, partial R lung lobectomy, lumbar fusion 2021/abdomen sx - Hx DVT L calf on Eliquis, increased BMI, anxiety/depression, back restrictions: avoid bending, lifting, twisting      Latex Allergy:  [x]NO      []YES   Preferred Language for Healthcare:   [x]English       []other:  C-SSRS Triggered by Intake questionnaire (Past 2 wk assessment):   [x] No, Questionnaire did not trigger screening.   [] Yes, Patient intake triggered further evaluation      [] C-SSRS Screening completed  [] PCP notified via Plan of Care  [] Emergency services notified    SUBJECTIVE:  History of Present Illness:      Pt presents with c/o L knee giving out/pain and can't put pressure on leg after raising it up for over a year. Also with lateral R knee pain if lays on R side for a few months. R knee hasn't given out on her. Pt has had TKA on each side: 2015 and 2016. Pain       Patient reports pain is 3/10 pain at present L knee, 0/10 R knee and 2-3/10 pain at its worst R knee but 10/10 at worst L knee. Pain increases with: laying on R side, walking, kneeling, bending, getting up from floor, raising L leg and putting pressure down on L LE, stairs, standing, sitting, squatting, social activities         Decreases with: keeping L knee straight   Pain description:  All of a sudden excruciating pain on L, soreness on R  Paresthesias: B LE numbness - back issues and had lumbar fusion surgery July 2021  Pt. reports pain with coughing, sneezing and laughing:   []Yes   []No    [x]NA    Imaging: B knee x-rays per  note 11/05/2021: Patient is status post bilateral total knee arthroplasties. There is still some lucency under the tibial tray of the right knee however this is stable. There is some concern for polyethylene wear on the right however this could be x-ray projection. Left knee arthroplasty is stable without evidence of loosening or hardware failure. There are no acute fractures. Bone scan: L knee  1. No evidence for infection.  Mild increased activity on delayed images involving the medial tibial component which could reflect loosening in the proper clinical setting. 2. Degenerative/arthritic activity in the ankle and midfoot          Current Functional Limitations:   [x]Yes   []No  Functional Complaints:  laying on R side, walking, kneeling, bending, getting up from floor, raising L leg and putting pressure down on L LE, stairs, standing, sitting, squatting, social activities     PLOF:   [x]No functional limitations/pain prior to a year ago   []Pre-exisiting limitations:  Pt's sleep is affected?    [x]Yes   []No         Social support/Environment:    Family/caregiver support:   [x]Yes   []No    Home Environment:   []1 story   [x]>2 story   []REINA   []No rail   []R handrail    []L handrail  Pt avoids going upstairs  4 steps into home with rails    Bathroom Environment:   []Walk in shower   []Tub   []Tub/shower combo     []Grab bars   []Shower seat   []Modified toilet   []Hand held shower head    Equipment:    []Rolling walker   []Standard walker   []Rollator   []Cameron-walker  []Wheelchair   []Quad cane   []Straight cane  []Bedside commode  []Hospital bed  Other:      Relevant Medical History: remote B TKR, heart palpitations - atrial tachycardia, tricuspid regurgitation, HTN, Hx falls - foot slid on end of rug with plate in hand on 50/73 with no injury (pt wasn't using hands to assist getting up as she should), prone to fall from not picking knees up high enough due to knee pain and not able to scoot side to side, bronchitis, OA, remote skin CA removed, osteopenia but not stated on recent bone scan, partial R lung lobectomy, lumbar fusion July 2021/abdomen sx - Hx DVT L calf on Eliquis, increased BMI, anxiety/depression, back restrictions: avoid bending, lifting, twisting     Co-morbidities/Complexities (which will affect course of rehabilitation):   []None           Arthritic conditions   []Rheumatoid arthritis (M05.9)  [x]Osteoarthritis (M19.91)   Cardiovascular conditions   [x]Hypertension (I10)  [x]Hyperlipidemia (E78.5)  []Angina pectoris (I20)  []Atherosclerosis (I70)   Musculoskeletal conditions   []Disc pathology   []Congenital spine pathologies   []Prior surgical intervention  []Osteoporosis (M81.8)  []Osteopenia (M85.8)   Endocrine conditions   []Hypothyroid (E03.9)  []Hyperthyroid Gastrointestinal conditions   []Constipation (G28.57)   Metabolic conditions   []Morbid obesity (E66.01)  []Diabetes type 1(E10.65) or 2 (E11.65)   []Neuropathy (G60.9)     Pulmonary conditions   []Asthma (J45)  []Coughing   []COPD (J44.9)   Psychological Disorders  [x]Anxiety (F41.9)  [x]Depression (F32.9)   []Other:   [x]Other:  remote B TKR, heart palpitations - atrial tachycardia, tricuspid regurgitation, Hx falls - foot slid on end of rug with plate in hand on 58/11 with no injury (pt wasn't using hands to assist getting up as she should), prone to fall from not picking knees up high enough due to knee pain and not able to scoot side to side, bronchitis, remote skin CA removed, osteopenia but not stated on recent bone scan, partial R lung lobectomy, lumbar fusion July 2021/abdomen sx - Hx DVT L calf on Eliquis, increased BMI       Occupation/School: retired    Sports/Hobbies/Recreational Activities: elliptical but hasn't been able to because of L knee, walking    OBJECTIVE:     Functional Scale/Score: LEFS = 34/80 = 57.5% impaired    Gait/Steps     []Gait WNL unless otherwise noted below:                             [x]Deviations on a level linoleum surface include:  Pt ambulates without AD with gait speed: 2.88 ft/sec    []Steps WNL (reciprocal pattern with 0-1 rail) unless otherwise noted below:  NT  []Deviations include:     Range of Motion/Strength Testing-Myotomes    [x]All ROM NT except as marked below   [x]All strength NT except as marked below    [x]All myotomes NT except as marked below      Range Tested MMT/ Resisted PROM AROM Comments   *denotes pain Left Right Left Right Left Right    Hip Flexion  (L1-2) 3+/5 4+/5        Hip Extension Hip Abduction  (L5) 3/5 3/5        Hip Adduction  (L3)          Hip IR          Hip ER          Knee Flexion  (L5,S1) 5/5 5/5   105 100    Knee Extension  (L3,4) 4-/5 5/5   0 0    Ankle Dorsiflex  (L4) 4/5 5/5        Ankle Plantarflex  (S1,2)          Ankle Inversion          Ankle Eversion          Great Toe Ext  (L5)            Dermatomal Sensation [x]All NT except as marked below   []All dermatomes WNL for light touch except as marked below   Abnormal Dermatome Findings Left Right   Anterior groin, 2-3 inches below ASIS (L1-L2)     Middle third anterior thigh (L3)     Patella and med malleolus (L4)     Fibular head and dorsum of foot (L5)     Lateral side and plantar surface of foot (S1)     Medial aspect of posterior thigh (S2)                 Flexibility   [x]All NT except as marked below  Muscle Abnormal Findings   Hip flexors/Suleiman  []WNL   []Decreased R   []Decreased L      Hamstrings  Degrees in 90/90 []WNL   []Decreased R   []Decreased L     Right:              Left:      Gastrocs []WNL   []Decreased R   []Decreased L      Obers/TFL/ITB []WNL   []Decreased R   []Decreased L      Piriformis  []WNL   []Decreased R   []Decreased L      Other:  []WNL   []Decreased R   []Decreased L        Special Tests- knee and ankle  [x]All NT except as marked below  Special Test Abnormal Findings   Anterior Drawer test []Neg   []Pos R   []Pos L      Posterior Drawer test []Neg   []Pos R   []Pos L      Lachman's test []Neg   []Pos R   []Pos L      Varus stress  []Neg   []Pos R   []Pos L      Valgus stress  []Neg   []Pos R   []Pos L      Susan's test   []Neg   []Pos R   []Pos L      Apley's Compression []Neg   []Pos R   []Pos L      Apley's Distraction  []Neg   []Pos R   []Pos L      VMO tone []WNL []Dec R   []Dec L      Grifton Ankle Rules []Neg   []Pos R   []Pos L        Palpation   NT  Patient reported tenderness with palpation:  []Yes   []No   []NA  Location:    PT notes warmth:  []Yes   []No   []NA  Location:   PT notes increased muscle tone:   []Yes   []No   []NA  Location:   PT notes crepitus with palpation:   []Yes   []No   []NA   Location:   Ligament tenderness/provocation:   []Yes   []No   []NA  Location:   PT notes decreased scar mobility:   []Yes   []No   []NA  Location:      Appearance  NT  PT notes swelling:   []Yes   []No   []NA  Location:     PT notes redness:  []Yes   []No   []NA  Location:   PT notes drainage:   []Yes   []No   []NA  Location:      Girth Measurements (cm)   [x]All NT except as marked below     Location Left Right Location Left Right   6 superior to superior patellar pole   3\" inferior to inferior patellar pole     3 superior to superior patellar pole    6\" inferior to inferior patellar pole     Superior patellar pole   Malleoli     Inferior patellar pole   Figure 8        Met heads        Specific Joint Mobility Testing/Accessory Motions    [x]NT  Lumbar:  Hip:  Knee/patella: Ankle:    Deep Tendon Reflexes  [x]NT  Quadriceps (L3,4)     []Pendular x 3 R  []Pendular x 3 L   Achilles (S1,2)      Ankle clonus , # of beats      Babinski's reflex           Bandages/Dressings/Incisions: NA                         [x] Patient history, allergies, meds reviewed. Medical chart reviewed. See intake form. Review Of Systems (ROS):  [x]Performed Review of systems (Integumentary, CardioPulmonary, Neurological) by intake and observation. Intake form has been scanned into medical record. Patient has been instructed to contact their primary care physician regarding ROS issues if not already being addressed at this time.         Barriers to/and or personal factors that will affect rehab potential:              []Age  []Sex    [x]Smoker past              []Motivation/Lack of Motivation                        [x]Co-Morbidities              []Cognitive Function, education/learning barriers              []Environmental, home barriers              []profession/work barriers  []past PT/medical participation in work activities   [x]Reduced participation in social activities   []Reduced participation in sport activities    Classification :    []Signs/symptoms consistent with post-surgical status including decreased ROM, strength and function. []Signs/symptoms consistent with joint sprain/strain   []Signs/symptoms consistent with patella-femoral syndrome   [x]Signs/symptoms consistent with knee pain, weakness, s/p remote B TKA   []Signs/symptoms consistent with internal derangement of knee/Hip   []Signs/symptoms consistent with functional hip weakness/NMR control      []Signs/symptoms consistent with tendinitis/tendinosis    []signs/symptoms consistent with pathology which may benefit from Dry needling      Prognosis/Rehab Potential:      []Excellent   [x]Good    []Fair   []Poor    Tolerance of evaluation/treatment:    []Excellent   [x]Good    []Fair   []Poor     Physical Therapy Evaluation Complexity Justification  [x] A history of present problem with:  [] no personal factors and/or comorbidities that impact the plan of care;  []1-2 personal factors and/or comorbidities that impact the plan of care  [x]3 personal factors and/or comorbidities that impact the plan of care  [x] An examination of body systems using standardized tests and measures addressing any of the following: body structures and functions (impairments), activity limitations, and/or participation restrictions;:  [] a total of 1-2 or more elements   [x] a total of 3 or more elements   [] a total of 4 or more elements   [x] A clinical presentation with:  [] stable and/or uncomplicated characteristics   [x] evolving clinical presentation with changing characteristics  [] unstable and unpredictable characteristics;   [x] Clinical decision making of [] low, [x] moderate, [] high complexity using standardized patient assessment instrument and/or measurable assessment of functional outcome.     [] EVAL (LOW) 17063 (typically 20 minutes face-to-face)  [x] EVAL (MOD) 57364 (typically 30 minutes face-to-face)  [] EVAL (HIGH) 92877 (typically 45 minutes face-to-face)  [] RE-EVAL    PLAN:  Frequency/Duration:  2 days per week for 6 Weeks:  Interventions:  [x]  Therapeutic exercise including: strength training, ROM, for Lower extremity and core   [x]  NMR activation and proprioception for LE, Glutes and Core. [x]  Manual therapy as indicated for LE, Hip and spine to include: Dry Needling/IASTM, STM, PROM, Gr I-IV mobilizations, manipulation. [x] Therapeutic activities for LE and core. [x] Gait Training including gait normalization and reducing fall risk. [x] Modalities as needed that may include: thermal agents, E-stim, Biofeedback, US, iontophoresis as indicated  [x] Patient education on joint protection, postural re-education, activity modification, progression of HEP    HEP instruction: gabriella PETERSEN march. Access Code [email or web address]    GOALS:  Patient stated goal: \"build my quad muscles, strengthen them\"  [] Progressing: [] Met: [] Not Met: [] Adjusted    Therapist goals for Patient:   Short Term Goals: To be achieved in: 3 weeks  1. Independent in initial HEP per patient tolerance, in order to prevent re-injury. [] Progressing: [] Met: [] Not Met: [] Adjusted  2. Patient will have a decrease in pain to 5/10 at worst to facilitate raising L LE then putting pressure on it. [] Progressing: [] Met: [] Not Met: [] Adjusted    Long Term Goals: To be achieved in: 6 weeks  1. Disability index score of 30% or less for the LEFS to assist with reaching prior level of function. [] Progressing: [] Met: [] Not Met: [] Adjusted  2. Patient will demonstrate increased AROM B knees 0-110 deg to allow for improved squatting, bending without increased pain. [] Progressing: [] Met: [] Not Met: [] Adjusted  3.  Patient will demonstrate an increase in Strength L knee ext and ankle DF 5/5, B hip abd/L hip flexion 4/5 to allow for ambulation with gait speed: 3.28 ft/sec, standing tolerance, stairs as previous without increased pain   [] Progressing: [] Met: [] Not Met: [] Adjusted  4. Patient will have a decrease in pain to 2/10 at worst to allow for laying on R side, sitting tolerance as previous. [] Progressing: [] Met: [] Not Met: [] Adjusted  5. Independent in HEP progressions per patient tolerance, in order to prevent re-injury. [] Progressing: [] Met: [] Not Met: [] Adjusted      Electronically signed by:   Norman Robins, [address] [place] [address], DPT [id number]

## 2021-12-02 ENCOUNTER — OFFICE VISIT (OUTPATIENT)
Dept: INTERNAL MEDICINE CLINIC | Age: 66
End: 2021-12-02
Payer: MEDICARE

## 2021-12-02 VITALS
SYSTOLIC BLOOD PRESSURE: 122 MMHG | BODY MASS INDEX: 43.66 KG/M2 | DIASTOLIC BLOOD PRESSURE: 60 MMHG | HEIGHT: 63 IN | OXYGEN SATURATION: 98 % | HEART RATE: 71 BPM | WEIGHT: 246.4 LBS

## 2021-12-02 DIAGNOSIS — Z00.00 ROUTINE GENERAL MEDICAL EXAMINATION AT A HEALTH CARE FACILITY: ICD-10-CM

## 2021-12-02 DIAGNOSIS — E66.01 OBESITY, CLASS III, BMI 40-49.9 (MORBID OBESITY) (HCC): ICD-10-CM

## 2021-12-02 DIAGNOSIS — Z00.00 ENCOUNTER FOR ANNUAL WELLNESS VISIT (AWV) IN MEDICARE PATIENT: ICD-10-CM

## 2021-12-02 DIAGNOSIS — L65.9 ALOPECIA: ICD-10-CM

## 2021-12-02 DIAGNOSIS — H91.90 HEARING LOSS, UNSPECIFIED HEARING LOSS TYPE, UNSPECIFIED LATERALITY: ICD-10-CM

## 2021-12-02 DIAGNOSIS — J32.9 SINUSITIS, UNSPECIFIED CHRONICITY, UNSPECIFIED LOCATION: ICD-10-CM

## 2021-12-02 PROCEDURE — G0438 PPPS, INITIAL VISIT: HCPCS | Performed by: INTERNAL MEDICINE

## 2021-12-02 PROCEDURE — 4040F PNEUMOC VAC/ADMIN/RCVD: CPT | Performed by: INTERNAL MEDICINE

## 2021-12-02 PROCEDURE — G8484 FLU IMMUNIZE NO ADMIN: HCPCS | Performed by: INTERNAL MEDICINE

## 2021-12-02 PROCEDURE — 1123F ACP DISCUSS/DSCN MKR DOCD: CPT | Performed by: INTERNAL MEDICINE

## 2021-12-02 PROCEDURE — 3017F COLORECTAL CA SCREEN DOC REV: CPT | Performed by: INTERNAL MEDICINE

## 2021-12-02 RX ORDER — DOXYCYCLINE HYCLATE 100 MG
100 TABLET ORAL 2 TIMES DAILY
Qty: 20 TABLET | Refills: 0 | Status: SHIPPED | OUTPATIENT
Start: 2021-12-02 | End: 2021-12-12

## 2021-12-02 RX ORDER — AMOXICILLIN AND CLAVULANATE POTASSIUM 875; 125 MG/1; MG/1
1 TABLET, FILM COATED ORAL 2 TIMES DAILY
Qty: 20 TABLET | Refills: 0 | Status: SHIPPED | OUTPATIENT
Start: 2021-12-02 | End: 2021-12-02

## 2021-12-02 ASSESSMENT — LIFESTYLE VARIABLES: HOW OFTEN DO YOU HAVE A DRINK CONTAINING ALCOHOL: 0

## 2021-12-02 ASSESSMENT — PATIENT HEALTH QUESTIONNAIRE - PHQ9
2. FEELING DOWN, DEPRESSED OR HOPELESS: 0
SUM OF ALL RESPONSES TO PHQ9 QUESTIONS 1 & 2: 0
1. LITTLE INTEREST OR PLEASURE IN DOING THINGS: 0
SUM OF ALL RESPONSES TO PHQ QUESTIONS 1-9: 0

## 2021-12-02 NOTE — PATIENT INSTRUCTIONS
Audiology  --  Avoid pulling hair back (traction alopecia)    Good multivitamin daily including zinc and iron    Rogaine for women  --    doxycycline for sinusitis and rosacea  Personalized Preventive Plan for Nate Lo - 12/2/2021  Medicare offers a range of preventive health benefits. Some of the tests and screenings are paid in full while other may be subject to a deductible, co-insurance, and/or copay. Some of these benefits include a comprehensive review of your medical history including lifestyle, illnesses that may run in your family, and various assessments and screenings as appropriate. After reviewing your medical record and screening and assessments performed today your provider may have ordered immunizations, labs, imaging, and/or referrals for you. A list of these orders (if applicable) as well as your Preventive Care list are included within your After Visit Summary for your review. Other Preventive Recommendations:    · A preventive eye exam performed by an eye specialist is recommended every 1-2 years to screen for glaucoma; cataracts, macular degeneration, and other eye disorders. · A preventive dental visit is recommended every 6 months. · Try to get at least 150 minutes of exercise per week or 10,000 steps per day on a pedometer . · Order or download the FREE \"Exercise & Physical Activity: Your Everyday Guide\" from [employer]. Call 0-956.157.6495 or search [employer] online. · You need 7583-0449 mg of calcium and 0665-9301 IU of vitamin D per day. It is possible to meet your calcium requirement with diet alone, but a vitamin D supplement is usually necessary to meet this goal.  · When exposed to the sun, use a sunscreen that protects against both UVA and UVB radiation with an SPF of 30 or greater. Reapply every 2 to 3 hours or after sweating, drying off with a towel, or swimming. · Always wear a seat belt when traveling in a car.  Always wear a helmet when riding a bicycle or motorcycle.

## 2021-12-02 NOTE — PROGRESS NOTES
Medicare Annual Wellness Visit  Name: Abbie Figueroa Date: 2021   MRN: [id number] Sex: Female   Age: 77 y.o. Ethnicity: Non- / Non    : 1955 Race: White (non-)      Liss Robledo is here for Medicare AWV and Sinusitis    Screenings for behavioral, psychosocial and functional/safety risks, and cognitive dysfunction are all negative except as indicated below. These results, as well as other patient data from the [address] form, are documented in Flowsheets linked to this Encounter. Allergies   Allergen Reactions    Latex Rash    Reglan [Metoclopramide Hcl] Rash    Univasc [Moexipril] Other (See Comments)     unsure    Crestor [Rosuvastatin]      myalgia    Zoloft [Sertraline]      nightmares    Celecoxib Palpitations    Keflex [Cephalexin] Diarrhea    Lipitor Other (See Comments)     myalgia    Metoclopramide Anxiety    Prochlorperazine Anxiety    Prochlorperazine Edisylate     Sulfa Antibiotics Nausea And Vomiting    Vioxx          Prior to Visit Medications    Medication Sig Taking?  Authorizing Provider   doxycycline hyclate (VIBRA-TABS) 100 MG tablet Take 1 tablet by mouth 2 times daily for 10 days Yes Fatmata White MD   metroNIDAZOLE (METROGEL) 0.75 % gel APPLY TO AFFECTED AREA TWICE A DAY Yes Tammy Sams MD   ELIQUIS 5 MG TABS tablet TAKE 1 TABLET BY MOUTH TWICE A DAY Yes Fatmata White MD   losartan (COZAAR) 25 MG tablet Take 1 tablet by mouth daily Yes Fatmata White MD   buPROPion (WELLBUTRIN XL) 150 MG extended release tablet TAKE 1 TABLET BY MOUTH EVERY MORNING Yes Tammy John MD   methocarbamol (ROBAXIN) 750 MG tablet Take 750 mg by mouth every 8 hours as needed Yes Historical Provider, MD   ondansetron (ZOFRAN) 4 MG tablet Take 4 mg by mouth 2 times daily as needed Yes Historical Provider, MD   cetirizine (ZYRTEC ALLERGY) 10 MG tablet Take 1 tablet by mouth daily Yes Matt Meyer MD   clotrimazole-betamethasone (LOTRISONE) 1-0.05 % cream Apply topically 2 times daily. Yes Matt Meyer MD   metoprolol succinate (TOPROL XL) 25 MG extended release tablet Take 1 tablet by mouth daily Yes Matt Meyer MD   pantoprazole (PROTONIX) 40 MG tablet TAKE 1 TABLET BY MOUTH TWICE A DAY Yes Kong Reynoso MD   verapamil (CALAN SR) 120 MG extended release tablet Take 1 tablet by mouth nightly Do not crush or chew.  Yes Doyle Leonard MD   pravastatin (PRAVACHOL) 20 MG tablet Take 1 tablet by mouth daily Yes Doyle Leonard MD   acetaminophen (TYLENOL) 500 MG tablet Take 500 mg by mouth every 6 hours as needed for Pain Yes Historical Provider, MD   Multiple Vitamins-Minerals (CENTRUM SILVER PO) Take 1 tablet by mouth daily  Yes Historical Provider, MD   vitamin B-12 (CYANOCOBALAMIN) 1000 MCG tablet   Historical Provider, MD         Past Medical History:   Diagnosis Date    Allergic rhinitis     Anxiety     Atrial tachycardia ([facility] [address]     dr Steve Cheatham ([facility])    Carpal tunnel syndrome     Cervicalgia     Chronic back pain     low    Depression     DVT (deep venous thrombosis) ([address] 2021    after surgery    GERD (gastroesophageal reflux disease)     has schatzki ring    Headache(784.0)     hemiplegic migraines, improved    Hernia hiatal     Histoplasmosis     Hot flashes     takes wellbutrin    Hyperlipidemia     Hypertension     Obesity     Osteoarthritis     multiple joints    Other partial intestinal obstruction ([address] 2021    wound dehiscence    PONV (postoperative nausea and vomiting)     Shingles     nerve pain in her low back persists    Sleep apnea     CPAP set of 4    Tricuspid regurgitation     moderate       Past Surgical History:   Procedure Laterality Date    ABDOMEN SURGERY N/A 8/1/2021    REPAIR OF WOUND DEHISCENCE performed by Mansi Reyna MD at [address] [place] [address] Drive Right 2013    benign    BREAST SURGERY Right Biopsy w/clip    BUNIONECTOMY      right     COLONOSCOPY  10/2018    fu 10 yrs    KNEE ARTHROSCOPY      KNEE CARTILAGE SURGERY      LOBECTOMY      partial right lung lobectomy    LUMBAR FUSION N/A 7/29/2021    L4-S1 ANTERIOR LUMBAR INTERBODY FUSION WITH PEDICLE SCREW FIXATION performed by Sancho Carpenter. Drew Preston MD at [facility] PARATHYROID GLAND SURGERY      PARTIAL HYSTERECTOMY      ovaries retained    SINUS SURGERY      TOTAL KNEE ARTHROPLASTY Bilateral 03/03/2015         Family History   Problem Relation Age of Onset    High Blood Pressure Mother     Heart Disease Mother     Cancer Mother         vaginal    High Blood Pressure Father     Heart Disease Father     Heart Disease Sister     Colon Cancer Sister     Heart Disease Brother     High Blood Pressure Brother     Cancer Brother         oral    Breast Cancer Sister         52+    COPD Sister     Breast Cancer Sister         50+    Heart Disease Brother     Hearing Loss Brother         chf and transplant    High Blood Pressure Brother        CareTeam (Including outside providers/suppliers regularly involved in providing care):   Patient Care Team:  Ani Mcdermott MD as PCP - General (Internal Medicine)  Ain Mcdermott MD as PCP - [facility] Empaneled Provider    Wt Readings from Last 3 Encounters:   12/02/21 246 lb 6.4 oz (111.8 kg)   11/19/21 240 lb (108.9 kg)   11/15/21 243 lb (110.2 kg)     Vitals:    12/02/21 1122   BP: 122/60   Site: Right Upper Arm   Position: Sitting   Cuff Size: Large Adult   Pulse: 71   SpO2: 98%   Weight: 246 lb 6.4 oz (111.8 kg)   Height: 5' 3\" (1.6 m)     Body mass index is 43.65 kg/m². Based upon direct observation of the patient, evaluation of cognition reveals  Recent and remote memory intact     Patient is in no acute distress. She does complain of nasal congestion. She has some sinus tenderness. She has also noted to have some rosacea.   In addition her hair is noted to be thinning. She has noted to be hard of hearing. Lungs are clear to auscultation. Patient's complete Health Risk Assessment and screening values have been reviewed and are found in Flowsheets. The following problems were reviewed today and where indicated follow up appointments were made and/or referrals ordered. Positive Risk Factor Screenings with Interventions:     Fall Risk:  Timed Up and Go Test > 12 seconds? (Complete if either Fall Risk answers are Yes): no  2 or more falls in past year?: (!) yes  Fall with injury in past year?: (!) yes  Fall Risk Interventions:    · Under the care of Orthopedics and Physical therapy          General Health and ACP:  General  In general, how would you say your health is?: Fair  In the past 7 days, have you experienced any of the following? New or Increased Pain, New or Increased Fatigue, Loneliness, Social Isolation, Stress or Anger?: (!) New or Increased Pain, Stress  Do you get the social and emotional support that you need?: Yes  Do you have a Living Will?: (!) No  Advance Directives     Power of  Living Will ACP-Advance Directive ACP-Power of     Not on File Not on File Filed Not on File      General Health Risk Interventions:  · Counseled patient on advanced directives   ·     Health Habits/Nutrition:  Health Habits/Nutrition  Do you exercise for at least 20 minutes 2-3 times per week?: Yes  Have you lost any weight without trying in the past 3 months?: (!) Yes  Do you eat only one meal per day?: No  Have you seen the dentist within the past year?: (!) No (Partial Dentures)  Body mass index: (!) 43.64  Health Habits/Nutrition Interventions:  · Patient on weight loss.   Encouraged her to see dentist.    Hearing/Vision:  No exam data present  Hearing/Vision  Do you or your family notice any trouble with your hearing that hasn't been managed with hearing aids?: (!) Yes  Do you have difficulty driving, watching TV, or doing any of your daily activities because of your eyesight?: No  Have you had an eye exam within the past year?: Yes  Hearing/Vision Interventions:  · Advised audiology    Safety:  Safety  Do you have working smoke detectors?: Yes  Have all throw rugs been removed or fastened?: (!) No  Do you have non-slip mats or surfaces in all bathtubs/showers?: (!) No  Do all of your stairways have a railing or banister?: Yes  Are your doorways, halls and stairs free of clutter?: Yes  Do you always fasten your seatbelt when you are in a car?: Yes  Safety Interventions:  · Provided home safety tips    ADL:  ADLs  In the past 7 days, did you need help from others to perform any of the following everyday activities? Eating, dressing, grooming, bathing, toileting, or walking/balance?: (!) Dressing (socks and shoes)  In the past 7 days, did you need help from others to take care of any of the following?  Laundry, housekeeping, banking/finances, shopping, telephone use, food preparation, transportation, or taking medications?: None  ADL Interventions:  · Has the help she needs at home    Personalized Preventive Plan   Current Health Maintenance Status  Immunization History   Administered Date(s) Administered    COVID-19, Medina Peter, PF, 30mcg/0.3mL 03/31/2021, 04/21/2021    Influenza 11/05/2013    Influenza Virus Vaccine 09/21/2009, 11/11/2014, 11/19/2015    Influenza, High Dose (Fluzone 65 yrs and older) 09/26/2017    Influenza, [place], IM, (6 mo and older Fluzone, Flulaval, Fluarix and 3 yrs and older Afluria) 11/10/2006, 10/23/2007, 11/11/2008, 09/21/2009, 10/10/2016    Influenza, Quadv, IM, PF (6 mo and older Fluzone, Flulaval, Fluarix, and 3 yrs and older Afluria) 11/01/2018    Influenza, Quadv, adjuvanted, 65 yrs +, IM, PF (Fluad) 09/24/2020, 09/03/2021    Influenza, Triv, inactivated, subunit, adjuvanted, IM (Fluad 65 yrs and older) 11/11/2019    Pneumococcal Conjugate 13-valent ([id number]) 09/24/2020    Pneumococcal Polysaccharide ([id number]) 03/04/2015, 09/29/2021    Tdap (Boostrix, Adacel) 11/05/2013        Health Maintenance   Topic Date Due    Shingles Vaccine (1 of 2) Never done   [employer] Visit (AWV)  09/25/2021    COVID-19 Vaccine (3 - Booster for Pfizer series) 10/21/2021    Lipid screen  07/26/2022    Potassium monitoring  11/09/2022    Creatinine monitoring  11/09/2022    Breast cancer screen  11/19/2022    DTaP/Tdap/Td vaccine (2 - Td or Tdap) 11/05/2023    Diabetes screen  09/03/2024    Colon cancer screen colonoscopy  10/09/2028    DEXA (modify frequency per FRAX score)  Completed    Flu vaccine  Completed    Pneumococcal 65+ years Vaccine  Completed    Hepatitis C screen  Completed    Hepatitis A vaccine  Aged Out    Hepatitis B vaccine  Aged Out    Hib vaccine  Aged Out    Meningococcal (ACWY) vaccine  Aged Out     Recommendations for Khush Due: see orders and patient instructions/AVS.  . Recommended screening schedule for the next 5-10 years is provided to the patient in written form: see Patient Instructions/AVS.    Wendy Cohen was seen today for medicare awv and sinusitis. Diagnoses and all orders for this visit:  --    Encounter for AWV    Hearing loss, unspecified hearing loss type, unspecified laterality  -     [address] [place] [address],[address] D., Audiology, [facility]    Obesity, Class III, BMI 40-49.9 (morbid obesity) (HCC)    Sinusitis, unspecified chronicity, unspecified location  -     doxycycline hyclate (VIBRA-TABS) 100 MG tablet; Take 1 tablet by mouth 2 times daily for 10 days    Alopecia  Discussed likely due to stress of surgery. She can try Rogaine for women to see if it helps. Told her to avoid pulling her hair back.   Take a good multivitamin including zinc and iron.    -Plan is to go off eliquis soon

## 2021-12-06 ENCOUNTER — HOSPITAL ENCOUNTER (OUTPATIENT)
Age: 66
Discharge: HOME | End: 2021-12-06
Payer: MEDICARE

## 2021-12-06 ENCOUNTER — HOSPITAL ENCOUNTER (OUTPATIENT)
Dept: GENERAL RADIOLOGY | Age: 66
Discharge: HOME | End: 2021-12-06
Payer: MEDICARE

## 2021-12-06 DIAGNOSIS — Z98.1 ARTHRODESIS STATUS: ICD-10-CM

## 2021-12-06 PROCEDURE — 72110 X-RAY EXAM L-2 SPINE 4/>VWS: CPT

## 2021-12-15 ENCOUNTER — CLINICAL DOCUMENTATION (OUTPATIENT)
Dept: OTHER | Age: 66
End: 2021-12-15

## 2021-12-15 ENCOUNTER — PROCEDURE VISIT (OUTPATIENT)
Dept: AUDIOLOGY | Age: 66
End: 2021-12-15
Payer: MEDICARE

## 2021-12-15 ENCOUNTER — TELEPHONE (OUTPATIENT)
Dept: INTERNAL MEDICINE CLINIC | Age: 66
End: 2021-12-15

## 2021-12-15 DIAGNOSIS — H90.3 SENSORINEURAL HEARING LOSS, BILATERAL: Primary | ICD-10-CM

## 2021-12-15 DIAGNOSIS — H93.13 TINNITUS, BILATERAL: ICD-10-CM

## 2021-12-15 PROCEDURE — 92557 COMPREHENSIVE HEARING TEST: CPT | Performed by: AUDIOLOGIST

## 2021-12-15 PROCEDURE — 92567 TYMPANOMETRY: CPT | Performed by: AUDIOLOGIST

## 2021-12-15 NOTE — PATIENT INSTRUCTIONS
Good Communication Strategies    Communication can be challenging for anyone, but can be especially difficult for those with some degree of hearing loss. While we may not be able to control every factor that may lead to difficulty with communication, there are Good Communication Strategies that we can all use in our day-to-day lives. Communication takes both parties working together for it to be successful. Tips as a Listener:   1. Control your environment. It is important to limit the amount of background noise in the room when possible. You should also consider having a good light source in the room to best see the other person. 2. Ask for clarification. Instead of saying \"What?\", you can use parts of what you heard to make a new question. For example, if you heard the word \"Thursday\" but not the rest of the week, you may ask \"What was that about Thursday? \" or \"What did you want to do Thursday? \". This shows the person talking that you are listening and will help them better explain what they are saying. 3. Be an advocate for yourself. If you are hearing but not understanding, tell the other person \"I can hear you, but I need you to slow down when you speak. \"  Or if someone is facing the other direction, say \"I cannot hear you when you are not looking at me when we talk. \"       Tips as a Talker:   - Sit or stand 3 to 6 feet away to maximize audibility         -- It is unrealistic to believe someone else will fully hear your message if you are speaking from across the room or in a different room in the house   - Stay at eye level to help with visual cues   - Make sure you have the persons attention before speaking   - Use facial expressions and gestures to accentuate your message   - Raise your voice slightly (do not scream)   - Speak slowly and distinctly   - Use short, simple sentences   - Rephrase your words if the person is having a hard time understanding you    - To avoid distortion, dont speak If it goes on all the time, you may have tinnitus. Tinnitus is usually caused by long-term exposure to loud noise. This damages the nerves in the inner ear. It can occur with all types of hearing loss. It may be a symptom of almost any ear problem. Tinnitus may be caused by a buildup of earwax. Or, it may be caused by ear infections or certain medicines (especially antibiotics or large amounts of aspirin). You can also hear noises in your ears because of an injury to the ears, drinking too much alcohol or caffeine, or a medical condition. Other conditions may also contribute to tinnitus, including: head and neck trauma, temporomandibular joint disorder (TMJ), sinus pressure and barometric trauma, traumatic brain injury, metabolic disorders, autoimmune disorders, stress, and high blood pressure. You may need tests to evaluate your hearing and to find causes of long-lasting tinnitus. Your doctor may suggest one or more treatments to help you cope with the tinnitus. You can also do things at home to help reduce symptoms. Follow-up care is a key part of your treatment and safety. Be sure to make and go to all appointments, and call your doctor if you are having problems. It's also a good idea to know your test results and keep a list of the medicines you take. How can you care for yourself at home? · Limit or cut out alcohol, caffeine, and sodium. They can make your symptoms worse. · Do not smoke or use other tobacco products. Nicotine reduces blood flow to the ear and makes tinnitus worse. If you need help quitting, talk to your doctor about stop-smoking programs and medicines. These can increase your chances of quitting for good. · Talk to your doctor about whether to stop taking aspirin and similar products such as ibuprofen or naproxen. · Get exercise often. It can help improve blood flow to the ear. Ways to manage/cope with tinnitus  Some tinnitus may last a long time.  To manage your tinnitus, try to:  · Avoid noises that you think caused your tinnitus. If you can't avoid loud noises, wear earplugs or earmuffs. · Ignore the sound by paying attention to other things. Keeping your brain busy with other tasks or background noise can help your brain not focus on the tinnitus. · Try to not give the tinnitus an emotional reaction. Do your best to ignore the sound and not let it bother you. Relax using biofeedback, meditation, or yoga. Feeling stressed and being tired can make tinnitus worse. · Play music or white noise to help you sleep. Background noise may cover up the noise that you hear in your ears. You can buy a tabletop machine or a device that sits under your pillow to play soothing sounds, like ocean waves. · Smart phones have free apps, such as Whist, Relax Melodies, ReSound Relief, and White Noise Lite. These apps have different types of sounds/noise, some of which you can blend together to find sounds that are most soothing to you. · Hearing aid technology, especially when there is some hearing loss, may help reduce tinnitus symptoms by giving your brain better access to the sounds it is missing. There are some hearing aids with built-in noise generator programs, which may help when amplification alone is not enough. Additional resources may be found through the American Tinnitus Association at www.reina.org    When should you call for help? Call 911 anytime you think you may need emergency care. For example, call if:    · You have symptoms of a stroke. These may include:  ? Sudden numbness, tingling, weakness, or loss of movement in your face, arm, or leg, especially on only one side of your body. ? Sudden vision changes. ? Sudden trouble speaking. ? Sudden confusion or trouble understanding simple statements. ? Sudden problems with walking or balance. ? A sudden, severe headache that is different from past headaches.     Call your doctor now or seek immediate medical care if:    · You develop other symptoms. These may include hearing loss (or worse hearing loss), balance problems, dizziness, nausea, or vomiting. Watch closely for changes in your health, and be sure to contact your doctor if:    · Your tinnitus moves from both ears to one ear. · Your hearing loss gets worse within 1 day after an ear injury. · Your tinnitus or hearing loss does not get better within 1 week after an ear injury. · Your tinnitus bothers you enough that you want to take medicines to help you cope with it. If you notice changes in your tinnitus and/or your hearing, it is recommended that you have your hearing tested by your audiologist and to follow-up with your physician that manages your hearing loss (such as your ENT or Primary Care doctor). Hearing Loss: Care Instructions  Your Care Instructions      Hearing loss is a sudden or slow decrease in how well you hear. It can range from mild to profound. Permanent hearing loss can occur with aging, and it can happen when you are exposed long-term to loud noise. Examples include listening to loud music, riding motorcycles, or being around other loud machines. Hearing loss can affect your work and home life. It can make you feel lonely or depressed. You may feel that you have lost your independence. But hearing aids and other devices can help you hear better and feel connected to others. Follow-up care is a key part of your treatment and safety. Be sure to make and go to all appointments, and call your doctor if you are having problems. It's also a good idea to know your test results and keep a list of the medicines you take. How can you care for yourself at home? · Avoid loud noises whenever possible. This helps keep your hearing from getting worse. Always wear hearing protection around loud noises. · If appropriate, wear hearing aid(s) as directed.   It is recommended that hearing aids are worn during all waking hours to keep your brain active and give it access to the sounds it is missing. · If you are beginning your process with hearing aid(s), schedule a \"Hearing Aid Evaluation\" with an audiologist to discuss your lifestyle, features of hearing aid technology, and styles of hearing aids available. It is recommended that you contact your insurance company to determine if you have a hearing aid benefit, as this may dictate who you can see for these services. · Have hearing tests as your doctor suggests. They can show whether your hearing has changed. Your hearing aid may need to be adjusted. · Use other assistive devices as needed. These may include:  ? Telephone amplifiers and hearing aids that can connect to a television, stereo, radio, or microphone. ? Devices that use lights or vibrations. These alert you to the doorbell, a ringing telephone, or a baby monitor. ? Television closed-captioning. This shows the words at the bottom of the screen. Most new TVs can do this. ? TTY (text telephone). This lets you type messages back and forth on the telephone instead of talking or listening. These devices are also called TDD. When messages are typed on the keyboard, they are sent over the phone line to a receiving TTY. The message is shown on a monitor. · Use pagers, fax machines, text, and email if it is hard for you to communicate by telephone. · Try to learn a listening technique called speech-reading. It is not lip-reading. You pay attention to people's gestures, expressions, posture, and tone of voice. These clues can help you understand what a person is saying. Face the person you are talking to, and have him or her face you. Make sure the lighting is good. You need to see the other person's face clearly. · Think about counseling if you need help to adjust to your hearing loss. When should you call for help?   Watch closely for changes in your health, and be sure to contact your doctor if:    · You think your hearing is getting worse. · You have new symptoms, such as dizziness or nausea. Noise-Induced Hearing Loss  What it is, and what you can do to prevent it    Exposure to loud sounds, in an occupational setting or recreational, can cause permanent hearing loss. Sound is measured in decibels (dB). Noise-induced hearing loss is the ONLY type of preventable hearing loss. Hearing loss related to noise exposure can occur at any age. There are small sensory cells, called inner and outer hair cells, within the inner ear (cochlea). These cells process the loudness (intensity) and pitch (frequency) of sound and send the signal to the brain via our auditory nerve (vestibulocochlear nerve, cranial nerve VIII). When these cells are damaged, they can result in permanent hearing loss and/or tinnitus. The hair cells responsible for high frequency sounds, like birds chirping, are most likely to be damaged due to loud sounds. The high frequency sounds are also very important for our clarity and understanding of speech. OCCUPATIONAL NOISE EXPOSURE RECREATIONAL NOISE EXPOSURE   Some jobs may have exposure to loud sounds in the workplace. These jobs may include but are not limited to:  [employer]   Welding   Landscaping   Hairdressing/hairstyling   [employer]ians  [employer]    ... And more! Many activities outside of work may cause permanent hearing loss. These activities may include but are not limited to:  Lawnmowers, leaf blowers  [employer] (such as pigs squealing)   Chainsaws and other power tools  [employer] musical instruments and/or singing   Listening to music too loudly - at concerts, through stereo, through ear buds or headphones   Attending sporting events   Attending fireworks shows or using fireworks at home  [employer] of firearms   . .. And more!        REDUCE OR PROTECT YOUR EARS FROM NOISE EXPOSURE    To do your best to avoid noise-induced hearing loss, here are some tips:   Limit exposure to loud sounds. 85 dB (decibels) is safe for 8 hours. As sounds are louder, the length of time the sound is safe lessens. These numbers are cumulative across a 24-hour period. (NIOSH and CDC, 2002)  o 85 dB is safe for 8 hours  o 88 dB is safe for 4 hours  o 91 dB is safe for 2 hours  o 94 dB is safe for 1 hour  o 97 dB is safe for 30 minutes  o 100 dB is safe for 15 minutes  o 103 dB is safe for 7.5 minutes  o 106 dB is safe for 3.75 minutes  o 109 dB is safe for LESS THAN 2 minutes  o 112 dB is safe for LESS THAN 1 minute  o 115 dB is safe for ~ 30 seconds  o 130 dB can cause IMMEDIATE hearing loss   If you are unsure if a sound is too loud, consider checking the sound level with a \"sound level meter\". There are apps on smart devices, such as \"Decibel X\", that can measure the loudness of the sound. They are not as accurate as expensive equipment used by scientists, but it will give you a guesstimate of how loud the sound is, and if it may be damaging to your hearing.  If you cannot avoid loud sounds, here are ways to reduce your exposure:  o 1. Wear hearing protection  - Ear plugs and protective ear muffs can be used to reduce the intensity of the sound. The higher the NRR (noise reduction rating), the better reduction of the intensity of the sound   o 2. Turn the volume down  - When listening to music, turn the volume down, especially when wearing ear buds or headphones. A good rule of thumb is to not go beyond the middle setting on your device. If you can't hear someone talking to you from arm's length away, your music may be at a level that it can cause damage. If someone else can hear your music from 3 feet away, it may also be at a level that it can cause damage.   o 3. Walk away from the sound  - If you do not have the ability to wear hearing protection or turn down the volume of the sound, you should do your best to move away from the source of the sound. - Sound decreases in intensity as we move further from the source. The sound will decrease by 6 dB for every doubling of distance from the sound source. TYPES OF HEARING PROTECTION    The most common types of hearing protection are protective ear muffs and ear plugs. Protective ear muffs are commonly found at home improvement or sporting good stores, they can be worn time and time again and are great if you need to take your hearing protection off frequently. Ear plugs are often made of foam or soft silicone. The foam ones are designed for one-time use, while silicone ear plugs may be used multiple times. There are also \"filtered\" ear plugs that help provide even attenuation of the sound across all frequencies. These are great for listening to music or going to concerts, and allow for better understanding of speech in louder environments. They can be purchased at music stores or online retailers (search \"Ety Plugs\" or \"filtered ear plugs\"), or custom earmolds can be made with an audiologist.    There are \"custom\" hearing protection devices that you can further discuss with your audiologist based on your specific needs, if desired. Exposure to these sounds may cause permanent damage to your hearing.   If you suspect your hearing has changed, it is recommended that you have your hearing tested by your audiologist.

## 2021-12-15 NOTE — Clinical Note
Dr. Serenity Talbot,    Thank you for your referral for audiologic testing on this patient. Today's results are consistent with bilateral sensorineural hearing loss with normal middle ear function and excellent word recognition for soft conversational speech bilaterally. Discussed good communication strategies and tinnitus management strategies. If you have any questions, or if there is anything else you need, please let me know.       Best,    [address], [place]  Audiologist  ---  [address]  ENT - Audiology

## 2021-12-15 NOTE — TELEPHONE ENCOUNTER
[facility] called stating that she is on eloquis but just took an 200 mg aleve about an hour ago she says she does not think he was supposed to be taking aleve what if anything should she do.

## 2021-12-15 NOTE — PROGRESS NOTES
Carson Vela   1955, 77 y.o. female   [id number]       Referring Provider: Christal Sousa MD   Referral Type: In an order in [address] [place] [address]    Reason for Visit: Evaluation of suspected change in hearing, tinnitus, or balance. ADULT AUDIOLOGIC EVALUATION      Carson Vela is a 77 y.o. female seen today, 12/15/2021, for an initial audiologic evaluation. AUDIOLOGIC AND OTHER PERTINENT MEDICAL HISTORY:        Carson Vela noted decreased hearing bilaterally, gradual; tinnitus bilaterally, present for many years. Carson Vela denied otalgia, aural fullness, otorrhea, dizziness, imbalance, history of occupational/recreational noise exposure, history of head trauma, and history of ear surgery. IMPRESSIONS:       Today's results are consistent with bilateral sensorineural hearing loss with normal middle ear function and excellent word recognition for soft conversational speech bilaterally. Discussed good communication strategies and tinnitus management strategies. ASSESSMENT AND FINDINGS:       Otoscopy revealed: Clear ear canals bilaterally      RIGHT EAR:  Hearing Sensitivity: Within normal limits through 2000 Hz precipitously sloping to severe sensorineural hearing loss. Speech Recognition Threshold: 15 dBHL  Word Recognition: Excellent (100%), based on NU-6 25-word list at 50 dBHL using recorded speech stimuli. Tympanometry: Normal peak pressure and compliance, Type A tympanogram, consistent with normal middle ear function. LEFT EAR:  Hearing Sensitivity: Within normal limits through 2000 Hz precipitously sloping to severe sensorineural hearing loss. Speech Recognition Threshold: 15 dBHL  Word Recognition: Excellent (100%), based on NU-6 25-word list at 50 dBHL using recorded speech stimuli. Tympanometry: Normal peak pressure and compliance, Type A tympanogram, consistent with normal middle ear function. Reliability: Good  Transducer:  Inserts    See scanned audiogram dated 12/15/2021 for results. PATIENT EDUCATION:       The following items were discussed with the patient:   - Good Communication Strategies  - Hearing Loss and Hearing Aids  - Tinnitus Management Strategies      Educational information was shared in the After Visit Summary. RECOMMENDATIONS:                                                                                                                                                                                                                                                                      The following items are recommended based on patient report and results from today's appointment:  - Continue medical follow-up with Alejandra López MD.  - Retest hearing as medically indicated and/or sooner if a change in hearing is noted. - Briefly discussed future considerations for amplification, including for tinnitus management. She has excellent word recognition for soft conversational speech at this time. If desired, schedule a Hearing Aid Evaluation (HAE) appointment to discuss hearing aid options. - Utilize \"Good Communication Strategies\" as discussed to assist in speech understanding with communication partners. - Maintain a sound enriched environment to assist in the management of tinnitus symptoms.          [facility] [place], [place]  Audiologist      Chart CC'd to: Alejandra López MD       Degree of   Hearing Sensitivity dB Range   Within Normal Limits (WNL) 0 - 20   Mild 20 - 40   Moderate 40 - 55   Moderately-Severe 55 - 70   Severe 70 - 90   Profound 90 +

## 2021-12-16 ENCOUNTER — PATIENT MESSAGE (OUTPATIENT)
Dept: INTERNAL MEDICINE CLINIC | Age: 66
End: 2021-12-16

## 2021-12-16 DIAGNOSIS — F32.4 MAJOR DEPRESSIVE DISORDER WITH SINGLE EPISODE, IN PARTIAL REMISSION (HCC): ICD-10-CM

## 2021-12-16 RX ORDER — BUPROPION HYDROCHLORIDE 150 MG/1
150 TABLET ORAL EVERY MORNING
Qty: 90 TABLET | Refills: 1 | Status: SHIPPED | OUTPATIENT
Start: 2021-12-16 | End: 2022-07-05

## 2021-12-16 NOTE — TELEPHONE ENCOUNTER
From: Liss Robledo  To: Dr. Romano Carls: 12/16/2021 1:20 PM EST  Subject: Med refill    [facility] sent me a message the it's time to refill my Bupropion 150mg. Needs your signature.  Thank you

## 2021-12-22 ENCOUNTER — TELEPHONE (OUTPATIENT)
Dept: INTERNAL MEDICINE CLINIC | Age: 66
End: 2021-12-22

## 2021-12-22 NOTE — TELEPHONE ENCOUNTER
Left detailed message. Unsure why we are calling except to follow up on scripts which were refilled.   Note completed

## 2021-12-27 ENCOUNTER — NURSE ONLY (OUTPATIENT)
Dept: INTERNAL MEDICINE CLINIC | Age: 66
End: 2021-12-27

## 2021-12-27 ENCOUNTER — TELEPHONE (OUTPATIENT)
Dept: INTERNAL MEDICINE CLINIC | Age: 66
End: 2021-12-27

## 2021-12-27 DIAGNOSIS — R05.9 COUGH: Primary | ICD-10-CM

## 2021-12-27 DIAGNOSIS — R05.9 COUGH: ICD-10-CM

## 2021-12-27 LAB
RAPID INFLUENZA  B AGN: NEGATIVE
RAPID INFLUENZA A AGN: NEGATIVE

## 2021-12-27 NOTE — TELEPHONE ENCOUNTER
Patient has been scheduled for COVID/Flu due to Exposure and sxs. Her grandson tested positive last week. Please place orders.

## 2021-12-28 LAB — SARS-COV-2: NOT DETECTED

## 2021-12-30 ENCOUNTER — OFFICE VISIT (OUTPATIENT)
Dept: ORTHOPEDIC SURGERY | Age: 66
End: 2021-12-30
Payer: MEDICARE

## 2021-12-30 VITALS — HEIGHT: 63 IN | WEIGHT: 250 LBS | RESPIRATION RATE: 16 BRPM | BODY MASS INDEX: 44.3 KG/M2

## 2021-12-30 DIAGNOSIS — M25.561 PAIN IN BOTH KNEES, UNSPECIFIED CHRONICITY: Primary | ICD-10-CM

## 2021-12-30 DIAGNOSIS — I47.1 PAT (PAROXYSMAL ATRIAL TACHYCARDIA) (HCC): ICD-10-CM

## 2021-12-30 DIAGNOSIS — R05.3 CHRONIC COUGH: ICD-10-CM

## 2021-12-30 DIAGNOSIS — M25.562 PAIN IN BOTH KNEES, UNSPECIFIED CHRONICITY: Primary | ICD-10-CM

## 2021-12-30 PROCEDURE — 99213 OFFICE O/P EST LOW 20 MIN: CPT | Performed by: PHYSICIAN ASSISTANT

## 2021-12-30 PROCEDURE — G8417 CALC BMI ABV UP PARAM F/U: HCPCS | Performed by: PHYSICIAN ASSISTANT

## 2021-12-30 PROCEDURE — G8399 PT W/DXA RESULTS DOCUMENT: HCPCS | Performed by: PHYSICIAN ASSISTANT

## 2021-12-30 PROCEDURE — 1036F TOBACCO NON-USER: CPT | Performed by: PHYSICIAN ASSISTANT

## 2021-12-30 PROCEDURE — 1123F ACP DISCUSS/DSCN MKR DOCD: CPT | Performed by: PHYSICIAN ASSISTANT

## 2021-12-30 PROCEDURE — 1090F PRES/ABSN URINE INCON ASSESS: CPT | Performed by: PHYSICIAN ASSISTANT

## 2021-12-30 PROCEDURE — 3017F COLORECTAL CA SCREEN DOC REV: CPT | Performed by: PHYSICIAN ASSISTANT

## 2021-12-30 PROCEDURE — G8428 CUR MEDS NOT DOCUMENT: HCPCS | Performed by: PHYSICIAN ASSISTANT

## 2021-12-30 PROCEDURE — G8484 FLU IMMUNIZE NO ADMIN: HCPCS | Performed by: PHYSICIAN ASSISTANT

## 2021-12-30 PROCEDURE — 4040F PNEUMOC VAC/ADMIN/RCVD: CPT | Performed by: PHYSICIAN ASSISTANT

## 2021-12-30 RX ORDER — METHYLPREDNISOLONE 4 MG/1
TABLET ORAL
Qty: 1 KIT | Refills: 0 | Status: SHIPPED | OUTPATIENT
Start: 2021-12-30 | End: 2022-01-05

## 2021-12-30 RX ORDER — CLOTRIMAZOLE AND BETAMETHASONE DIPROPIONATE 10; .64 MG/G; MG/G
CREAM TOPICAL
Qty: 45 G | Refills: 0 | Status: SHIPPED | OUTPATIENT
Start: 2021-12-30 | End: 2022-04-14

## 2021-12-30 RX ORDER — LORATADINE 10 MG/1
TABLET ORAL
Qty: 90 TABLET | Refills: 1 | Status: SHIPPED | OUTPATIENT
Start: 2021-12-30 | End: 2022-03-02 | Stop reason: ALTCHOICE

## 2021-12-30 NOTE — TELEPHONE ENCOUNTER
Requested Prescriptions     Pending Prescriptions Disp Refills    verapamil (CALAN SR) 120 MG extended release tablet [Pharmacy Med Name: VERAPAMIL  MG TABLET] 90 tablet 3     Sig: TAKE 1 TABLET BY MOUTH NIGHTLY DO NOT CRUSH OR CHEW.                   Last Office Visit: 7/21/2021     Next Office Visit:01/26/2022      Last Labs: 12/27/2021

## 2021-12-30 NOTE — PROGRESS NOTES
This dictation was done with Dragon dictation and may contain mechanical errors related to translation. I have today reviewed with Zhane Goetz the clinically relevant, past medical history, medications, allergies, family history, social history, and Review Of Systems form the patients most recent history form & I have documented any details relevant to today's presenting complaints in my history below. Ms. Cb Mckeon's self-reported past medical history, medications, allergies, family history, social history, and Review Of Systems form has been scanned into the chart under the \"Media\" tab. Subjective:  Zhane Goetz is a 77 y.o. who is here in follow-up for her bilateral knee replacements. He is these replacements were done at [facility] back in 2016 and 2015. They had been followed by Dr. Grace Timmons up until recently when Dr. Grace Timmons left [facility] she saw Dr. Jaunita Spatz. He sent her for physical therapy. She says she still has some soreness like an 8 out of 10 pain down her leg when she is stepping down. She has seen her heel at the bone scan results the x-rays and she has been doing some physical therapy and overall she has had some improvement. She had a previous surgery for her back and continues to have some radicular type symptoms based on my examination today. At this visit the X-rays, presenting symptoms, and chief complaint were presented to Alvaro Parks MD.  He then evaluated the patient. He spent several minutes discussing face to face with the patient the clinical diagnosis and medical course options,from conservative to aggressive including risks and benefits.         Patient Active Problem List   Diagnosis    Vitamin D deficiency    Generalized osteoarthrosis, involving multiple sites    Chronic low back pain    Hemiplegic migraine    Allergic rhinitis    GERD (gastroesophageal reflux disease)    Degenerative disc disease, lumbar    Essential hypertension    Major depressive disorder with single episode, 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[facility])    Mixed hyperlipidemia    Rosacea    Shingles (herpes zoster) polyneuropathy    Palpitations    Atrial tachycardia ([facility])    Paresthesia    PAD (peripheral artery disease) ([facility])    Chest pain    Morbidly obese ([facility])    Epigastric discomfort    Chronic back pain    Lumbar stenosis with neurogenic claudication    Dehiscence of fascia    Ileus ([facility])    S/P lumbar and lumbosacral fusion by anterior technique           Current Outpatient Medications on File Prior to Visit   Medication Sig Dispense Refill    buPROPion (WELLBUTRIN XL) 150 MG extended release tablet Take 1 tablet by mouth every morning 90 tablet 1    ELIQUIS 5 MG TABS tablet TAKE 1 TABLET BY MOUTH TWICE A DAY 60 tablet 1    clotrimazole-betamethasone (LOTRISONE) 1-0.05 % cream APPLY TO AFFECTED AREA TWICE A DAY 45 g 0    metroNIDAZOLE (METROGEL) 0.75 % gel APPLY TO AFFECTED AREA TWICE A DAY 45 g 0    losartan (COZAAR) 25 MG tablet Take 1 tablet by mouth daily 30 tablet 5    methocarbamol (ROBAXIN) 750 MG tablet Take 750 mg by mouth every 8 hours as needed      ondansetron (ZOFRAN) 4 MG tablet Take 4 mg by mouth 2 times daily as needed      cetirizine (ZYRTEC ALLERGY) 10 MG tablet Take 1 tablet by mouth daily      metoprolol succinate (TOPROL XL) 25 MG extended release tablet Take 1 tablet by mouth daily 90 tablet 0    pantoprazole (PROTONIX) 40 MG tablet TAKE 1 TABLET BY MOUTH TWICE A  tablet 2    verapamil (CALAN SR) 120 MG extended release tablet Take 1 tablet by mouth nightly Do not crush or chew.  90 tablet 3    pravastatin (PRAVACHOL) 20 MG tablet Take 1 tablet by mouth daily 90 tablet 3    vitamin B-12 (CYANOCOBALAMIN) 1000 MCG tablet  (Patient not taking: Reported on 12/2/2021)      acetaminophen (TYLENOL) 500 MG tablet Take 500 mg by mouth every 6 hours as needed for Pain      Multiple Vitamins-Minerals (CENTRUM SILVER PO) Take 1 tablet by mouth daily No current facility-administered medications on file prior to visit. Objective:   Resp. rate 16, height 5' 3\" (1.6 m), weight 250 lb (113.4 kg), not currently breastfeeding. On examination today this is a pleasant 61-year-old female who is alert and oriented x3 she is a 40 BMI and has had some recent issues with her back surgery earlier this year and subsequent complications from anterior approach because of prolonged hospitalization. Again her knee she has 0 to 120 degrees of motion no varus or valgus laxity decent quad tone minimal to no swelling. She has good distal pulses good dorsiflexion plantarflexion strength but she does still have pain with straight leg raise and stretching the piriformis muscle  Neuro exam grossly intact both lower extremities. Intact sensation to light touch. Motor exam 4+ to 5/5 in all major motor groups. Negative Gutierres's sign. Skin is warm, dry and intact with out erythema or significant increased temperature around the knee joint(s). There are no cutaneous lesions or lymphadenopathy present. X-RAYS:    No x-rays taken    Assessment:  Stable bilateral total knee replacements with some radicular pain from the low back    Plan:  During today's visit, there was approximately 30 minutes of face-to-face discussion in regards to the patient's current condition and treatment options. More than 50 % of the time was counseling and coordination of care as indicated above.   At this point we talked about short long-term expectations and Dr. Irina Doyle does not feel that there is a surgical indication for her knees at this point he also continue doing physical therapy and we talked about doing a Medrol Dosepak for 5 days and have her continue physical therapy      PROCEDURE NOTE:   Medrol Dosepak      They will schedule a follow up in 1 year as needed

## 2022-01-07 ENCOUNTER — HOSPITAL ENCOUNTER (OUTPATIENT)
Dept: CT IMAGING | Age: 67
Discharge: HOME | End: 2022-01-07
Payer: MEDICARE

## 2022-01-07 ENCOUNTER — TELEMEDICINE (OUTPATIENT)
Dept: INTERNAL MEDICINE CLINIC | Age: 67
End: 2022-01-07
Payer: MEDICARE

## 2022-01-07 ENCOUNTER — TELEPHONE (OUTPATIENT)
Dept: INTERNAL MEDICINE CLINIC | Age: 67
End: 2022-01-07

## 2022-01-07 DIAGNOSIS — S09.90XA TRAUMATIC INJURY OF HEAD, INITIAL ENCOUNTER: ICD-10-CM

## 2022-01-07 DIAGNOSIS — S09.90XA TRAUMATIC INJURY OF HEAD, INITIAL ENCOUNTER: Primary | ICD-10-CM

## 2022-01-07 PROCEDURE — G8484 FLU IMMUNIZE NO ADMIN: HCPCS | Performed by: INTERNAL MEDICINE

## 2022-01-07 PROCEDURE — 1123F ACP DISCUSS/DSCN MKR DOCD: CPT | Performed by: INTERNAL MEDICINE

## 2022-01-07 PROCEDURE — G8417 CALC BMI ABV UP PARAM F/U: HCPCS | Performed by: INTERNAL MEDICINE

## 2022-01-07 PROCEDURE — 4040F PNEUMOC VAC/ADMIN/RCVD: CPT | Performed by: INTERNAL MEDICINE

## 2022-01-07 PROCEDURE — 3017F COLORECTAL CA SCREEN DOC REV: CPT | Performed by: INTERNAL MEDICINE

## 2022-01-07 PROCEDURE — 1036F TOBACCO NON-USER: CPT | Performed by: INTERNAL MEDICINE

## 2022-01-07 PROCEDURE — 70450 CT HEAD/BRAIN W/O DYE: CPT

## 2022-01-07 PROCEDURE — 99213 OFFICE O/P EST LOW 20 MIN: CPT | Performed by: INTERNAL MEDICINE

## 2022-01-07 PROCEDURE — G8427 DOCREV CUR MEDS BY ELIG CLIN: HCPCS | Performed by: INTERNAL MEDICINE

## 2022-01-07 PROCEDURE — 1090F PRES/ABSN URINE INCON ASSESS: CPT | Performed by: INTERNAL MEDICINE

## 2022-01-07 PROCEDURE — G8399 PT W/DXA RESULTS DOCUMENT: HCPCS | Performed by: INTERNAL MEDICINE

## 2022-01-07 NOTE — PROGRESS NOTES
Adan Marrero (:  1955) is a 77 y.o. female,Established patient, here for evaluation of the following chief complaint(s): Headache (hit the top right side of her head on window ledge, does have a slight bump and HA. Denies n/v or blurred vision )      ASSESSMENT/PLAN:  1. Traumatic injury of head, initial encounter  -     Stat CT HEAD WO CONTRAST; Future due to headache, complaints of blurred vision, on Eliquis. Return if symptoms worsen or fail to improve. SUBJECTIVE/OBJECTIVE:  HPI   Review of Systems   Eyes: Positive for visual disturbance. Gastrointestinal: Negative for nausea and vomiting. Musculoskeletal: Negative for gait problem. Neurological: Positive for headaches. Negative for dizziness and speech difficulty. HPI  Patient complains she hit her head below the occiput very hard when lay back in bed about 90 minutes ago. She can feel an indentation in her skull. She saw stars when it happened and it was very painful. She has a headache that she rates a 5-6 out of 10. She states her right eye seems to have blurred vision. She is concerned because she is on a blood thinner. No flowsheet data found.       Past Medical History:   Diagnosis Date    Allergic rhinitis     Anxiety     Atrial tachycardia ([address]     dr Kaleb Lockhart ([facility])    Carpal tunnel syndrome     Cervicalgia     Chronic back pain     low    Depression     DVT (deep venous thrombosis) ([address]     after surgery    GERD (gastroesophageal reflux disease)     has schatzki ring    Headache(784.0)     hemiplegic migraines, improved    Hernia hiatal     Histoplasmosis     Hot flashes     takes wellbutrin    Hyperlipidemia     Hypertension     Obesity     Osteoarthritis     multiple joints    Other partial intestinal obstruction ([address]     wound dehiscence    PONV (postoperative nausea and vomiting)     Shingles     nerve pain in her low back persists    Sleep apnea     CPAP set of 4    Tricuspid regurgitation     moderate       Current Outpatient Medications   Medication Sig Dispense Refill    verapamil (CALAN SR) 120 MG extended release tablet TAKE 1 TABLET BY MOUTH NIGHTLY DO NOT CRUSH OR CHEW. 90 tablet 3    clotrimazole-betamethasone (LOTRISONE) 1-0.05 % cream APPLY TO AFFECTED AREA TWICE A DAY 45 g 0    loratadine (CLARITIN) 10 MG tablet TAKE 1 TABLET BY MOUTH EVERY DAY 90 tablet 1    buPROPion (WELLBUTRIN XL) 150 MG extended release tablet Take 1 tablet by mouth every morning 90 tablet 1    ELIQUIS 5 MG TABS tablet TAKE 1 TABLET BY MOUTH TWICE A DAY 60 tablet 1    metroNIDAZOLE (METROGEL) 0.75 % gel APPLY TO AFFECTED AREA TWICE A DAY 45 g 0    losartan (COZAAR) 25 MG tablet Take 1 tablet by mouth daily 30 tablet 5    methocarbamol (ROBAXIN) 750 MG tablet Take 750 mg by mouth every 8 hours as needed      ondansetron (ZOFRAN) 4 MG tablet Take 4 mg by mouth 2 times daily as needed      cetirizine (ZYRTEC ALLERGY) 10 MG tablet Take 1 tablet by mouth daily      pantoprazole (PROTONIX) 40 MG tablet TAKE 1 TABLET BY MOUTH TWICE A  tablet 2    pravastatin (PRAVACHOL) 20 MG tablet Take 1 tablet by mouth daily 90 tablet 3    vitamin B-12 (CYANOCOBALAMIN) 1000 MCG tablet       acetaminophen (TYLENOL) 500 MG tablet Take 500 mg by mouth every 6 hours as needed for Pain      Multiple Vitamins-Minerals (CENTRUM SILVER PO) Take 1 tablet by mouth daily       metoprolol succinate (TOPROL XL) 25 MG extended release tablet Take 1 tablet by mouth daily 90 tablet 0     No current facility-administered medications for this visit.        Constitutional: [x] Appears well-developed and well-nourished [x] No apparent distress        Mental status  [x] Alert and awake  [x] Oriented to person/place/time [x]Able to follow commands      Eyes:  EOM    [x]  Normal   Sclera  [x]  Normal   Swelling noted below right eye    HENT:   [x] pt reports indentation posterior skull   Mouth/Throat: Mucous membranes are moist.     External Ears [x] Normal      Neck: [x] No visualized mass     Pulmonary/Chest: [x] Respiratory effort normal.  [x] No visualized signs of difficulty breathing or respiratory distress           Musculoskeletal:   [x] Normal gait with no signs of ataxia         [x] Normal range of motion of neck          Neurological:        [x] No Facial Asymmetry (Cranial nerve 7 motor function) (limited exam to video visit)      Skin:        [x] No significant exanthematous lesions or discoloration noted on facial skin                  Psychiatric:       [x] Normal Affect         Other pertinent observable physical exam findings-                Anibal Lopez is a 77 y.o. female being evaluated by a Virtual Visit (video visit) encounter to address concerns as mentioned above. A caregiver was present when appropriate. Due to this being a TeleHealth encounter (During EOUKG-06 public health emergency), evaluation of the following organ systems was limited: Vitals/Constitutional/EENT/Resp/CV/GI//MS/Neuro/Skin/Heme-Lymph-Imm. Pursuant to the emergency declaration under the [address] [place] [address] authority and the [employer] and [employer] Act, this Virtual Visit was conducted with patient's (and/or legal guardian's) consent, to reduce the patient's risk of exposure to COVID-19 and provide necessary medical care. The patient (and/or legal guardian) has also been advised to contact this office for worsening conditions or problems, and seek emergency medical treatment and/or call 911 if deemed necessary. Patient identification was verified at the start of the visit: {YES/    Services were provided through a video synchronous discussion virtually to substitute for in-person clinic visit. Patient was located at home and provider was located in office or at home.      This note was generated completely or in part utilizing Dragon dictation speech recognition software. Occasionally, words are mistranscribed and despite editing, the text may contain inaccuracies due to incorrect word recognition. If further clarification is needed please contact the office at [phone]          An electronic signature was used to authenticate this note.     --Elizabeth Crain MD

## 2022-01-07 NOTE — TELEPHONE ENCOUNTER
Since CT was normal she wants to know if she can have something for the sinuses.   The antibiotic you gave her rosacea cleared everything up but once she finished the sinus symptoms returned

## 2022-01-07 NOTE — TELEPHONE ENCOUNTER
Patient states she has been on Eliquis for about 6 months and is concerned because she just hit her head. States she does not have a head board & when she went to lay back in bed she hit the top right side of her head on window ledge. She has a little bump, but nothing big. Has a headache but had that prior to hitting her head. No visual changes.  Please advise if she should be concerned

## 2022-01-07 NOTE — TELEPHONE ENCOUNTER
I don't think the amount of sinus findings would necessitate antibiotics. Please advise Flonase OTC first. If not improving, let me know.

## 2022-01-10 ASSESSMENT — ENCOUNTER SYMPTOMS
VOMITING: 0
NAUSEA: 0

## 2022-01-26 ENCOUNTER — OFFICE VISIT (OUTPATIENT)
Dept: CARDIOLOGY CLINIC | Age: 67
End: 2022-01-26
Payer: MEDICARE

## 2022-01-26 VITALS
DIASTOLIC BLOOD PRESSURE: 76 MMHG | SYSTOLIC BLOOD PRESSURE: 138 MMHG | BODY MASS INDEX: 44.57 KG/M2 | HEART RATE: 64 BPM | WEIGHT: 251.6 LBS

## 2022-01-26 DIAGNOSIS — R00.2 PALPITATIONS: Primary | ICD-10-CM

## 2022-01-26 PROCEDURE — G8399 PT W/DXA RESULTS DOCUMENT: HCPCS | Performed by: INTERNAL MEDICINE

## 2022-01-26 PROCEDURE — 4040F PNEUMOC VAC/ADMIN/RCVD: CPT | Performed by: INTERNAL MEDICINE

## 2022-01-26 PROCEDURE — 99214 OFFICE O/P EST MOD 30 MIN: CPT | Performed by: INTERNAL MEDICINE

## 2022-01-26 PROCEDURE — 1036F TOBACCO NON-USER: CPT | Performed by: INTERNAL MEDICINE

## 2022-01-26 PROCEDURE — 1123F ACP DISCUSS/DSCN MKR DOCD: CPT | Performed by: INTERNAL MEDICINE

## 2022-01-26 PROCEDURE — 1090F PRES/ABSN URINE INCON ASSESS: CPT | Performed by: INTERNAL MEDICINE

## 2022-01-26 PROCEDURE — G8417 CALC BMI ABV UP PARAM F/U: HCPCS | Performed by: INTERNAL MEDICINE

## 2022-01-26 PROCEDURE — G8427 DOCREV CUR MEDS BY ELIG CLIN: HCPCS | Performed by: INTERNAL MEDICINE

## 2022-01-26 PROCEDURE — G8484 FLU IMMUNIZE NO ADMIN: HCPCS | Performed by: INTERNAL MEDICINE

## 2022-01-26 PROCEDURE — 3017F COLORECTAL CA SCREEN DOC REV: CPT | Performed by: INTERNAL MEDICINE

## 2022-01-26 NOTE — PROGRESS NOTES
Cc: HTN, HLP, atypical CP, PAD, PAT    HPI:     Ms Sixto Bautista is a 66 yo overweight woman (BMI 39) with h/o SAL on CPAP, GERD, HTN, HLP, past smoker (quit '89), SVT/PAT, PAD, post-op DVT's on eliquis.      Patient was seen by Dr. Kaushik Grossman and Dr Brenden Morris at [facility] in the past, last visit in 2014. She had chest pains at that time and underwent cardiac evaluation.      Echo 07/2014: normal except for diastolic I (note, no valvular disease).    Cath 07/2014: normal coronaries, normal EF 70%.      ECG 3/26/19: normal.      Patient reported strong family history of premature CAD and heart failure. Her brother had a cardiac transplant in his 42's and her sisters had stents in their 42-51s. Patient reported palpitations with lightheadedness. 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      Echo 04/2019: normal (no mitral valve prolapse) except for moderate TR, RVSP 44.      Zio monitor x 14 days (4/22/19): NSR with frequent SVT's.      Patient was seen by Dr. Jewell Bound was initially started on flecainide in addition to Toprol however patient developed blurry vision and flecainide was changed to verapamil.      Exe nuc stress 5/20/20: small mild reversible defect in distal anteroseptal wall (apex is spared), nl EF and wall motion. Patient had mild CP prior to test, increased pain with exertion, improving with rest. ECG no changes, normal, duration only 3 minutes.      LHC 5/21/2020: Normal coronaries, LVEDP 13 mmHg, LVEF normal.     Bilateral lower extremity arterial ultrasound 2/18/2021: Right mid SFA less than 50% stenosis     FLP 9/28/2020: , HDL 54, LDL 79,  (on Pravachol 10 mg daily)    Patient had an admission at [place] [address] 7/29 - 8/13/21 for elective anterior L4-S1 fusion which was complicated by wound dehiscence s/p repair, SBO, UTI, bilateral (below the knee) DVT's.      Patient is here for follow-up.  She reports rare (2 x per month) and very brief (few seconds) palpitations unrelated to activity. She avoid caffeine and is compliant with her meds and low salt diet. Histories     Past Medical History:   has a past medical history of Allergic rhinitis, Anxiety, Atrial tachycardia ([address] Carpal tunnel syndrome, Cervicalgia, Chronic back pain, Depression, DVT (deep venous thrombosis) ([address] GERD (gastroesophageal reflux disease), Headache(784.0), Hernia hiatal, Histoplasmosis, Hot flashes, Hyperlipidemia, Hypertension, Obesity, Osteoarthritis, Other partial intestinal obstruction ([address] PONV (postoperative nausea and vomiting), Shingles, Sleep apnea, and Tricuspid regurgitation. Surgical History:   has a past surgical history that includes Knee arthroscopy; Knee cartilage surgery; lobectomy; sinus surgery; Bunionectomy; partial hysterectomy (cervix not removed); Total knee arthroplasty (Bilateral, 03/03/2015); Parathyroid gland surgery; Lung biopsy; Colonoscopy (10/2018); Breast surgery (Right); lumbar fusion (N/A, 7/29/2021); Abdomen surgery (N/A, 8/1/2021); and Breast biopsy (Right, 2013). Social History:   reports that she quit smoking about 32 years ago. She has a 5.00 pack-year smoking history. She has never used smokeless tobacco. She reports that she does not drink alcohol and does not use drugs. Family History:  No evidence for sudden cardiac death or premature CAD      Medications:     Home medications were reviewed and are listed below    Prior to Admission medications    Medication Sig Start Date End Date Taking?  Authorizing Provider   verapamil (CALAN SR) 120 MG extended release tablet TAKE 1 TABLET BY MOUTH NIGHTLY DO NOT CRUSH OR CHEW. 12/30/21  Yes LISA Mejia - CNP   clotrimazole-betamethasone (LOTRISONE) 1-0.05 % cream APPLY TO AFFECTED AREA TWICE A DAY 12/30/21  Yes Verna Salazar MD   loratadine (CLARITIN) 10 MG tablet TAKE 1 TABLET BY MOUTH EVERY DAY 12/30/21  Yes Verna Salazar MD   buPROPion (WELLBUTRIN XL) 150 MG extended release tablet Take 1 tablet by mouth every morning 12/16/21  Yes Tammy Ibarra MD   ELIQUIS 5 MG TABS tablet TAKE 1 TABLET BY MOUTH TWICE A DAY 12/13/21  Yes Mike Esparza MD   metroNIDAZOLE (METROGEL) 0.75 % gel APPLY TO AFFECTED AREA TWICE A DAY 11/11/21  Yes Mike Esparza MD   losartan (COZAAR) 25 MG tablet Take 1 tablet by mouth daily 11/1/21  Yes Mike Esparza MD   methocarbamol (ROBAXIN) 750 MG tablet Take 750 mg by mouth every 8 hours as needed 8/23/21  Yes Historical Provider, MD   ondansetron (ZOFRAN) 4 MG tablet Take 4 mg by mouth 2 times daily as needed 8/31/21  Yes Historical Provider, MD   cetirizine (ZYRTEC ALLERGY) 10 MG tablet Take 1 tablet by mouth daily 9/3/21  Yes Mike Esparza MD   pantoprazole (PROTONIX) 40 MG tablet TAKE 1 TABLET BY MOUTH TWICE A DAY 3/9/21  Yes Sasha Banegas MD   pravastatin (PRAVACHOL) 20 MG tablet Take 1 tablet by mouth daily 3/3/21  Yes Edwin Ramirez MD   vitamin B-12 (CYANOCOBALAMIN) 1000 MCG tablet  1/1/21  Yes Historical Provider, MD   acetaminophen (TYLENOL) 500 MG tablet Take 500 mg by mouth every 6 hours as needed for Pain   Yes Historical Provider, MD   Multiple Vitamins-Minerals (CENTRUM SILVER PO) Take 1 tablet by mouth daily    Yes Historical Provider, MD   metoprolol succinate (TOPROL XL) 25 MG extended release tablet Take 1 tablet by mouth daily 5/28/21 12/2/21  Mike Esparza MD          Allergy:     Latex, Reglan [metoclopramide hcl], Univasc [moexipril], Crestor [rosuvastatin], Zoloft [sertraline], Celecoxib, Keflex [cephalexin], Lipitor, Metoclopramide, Prochlorperazine, Prochlorperazine edisylate, Sulfa antibiotics, and Vioxx       Review of Systems:     All 12 point review of symptoms completed. Pertinent positives identified in the HPI, all other review of symptoms negative as below.     CONSTITUTIONAL: No fatigue  SKIN: No rash or pruritis. EYES: No visual changes or diplopia. No scleral icterus. ENT: No Headaches, hearing loss or vertigo. No mouth sores or sore throat. CARDIOVASCULAR: No chest pain/chest pressure/chest discomfort. No palpitations. No edema. RESPIRATORY: No dyspnea. No cough or wheezing, no sputum production. GASTROINTESTINAL: No N/V/D. No abdominal pain, appetite loss, blood in stools. GENITOURINARY: No dysuria, trouble voiding, or hematuria. MUSCULOSKELETAL:  No gait disturbance, weakness or joint complaints. NEUROLOGICAL: No headache, diplopia, change in muscle strength, numbness or tingling. No change in gait, balance, coordination, mood, affect, memory, mentation, behavior. PSHYCH: No anxiety, loss of interest, change in sexual behavior, feelings of self-harm, or confusion. ENDOCRINE: No excessive thirst, fluid intake, or urination. No tremor. HEMATOLOGIC: No abnormal bruising or bleeding. ALLERGY: No nasal congestion or hives.       Physical Examination:     Vitals:    01/26/22 1259   BP: 138/76   Pulse: 64   Weight: 251 lb 9.6 oz (114.1 kg)       Wt Readings from Last 3 Encounters:   01/26/22 251 lb 9.6 oz (114.1 kg)   12/30/21 250 lb (113.4 kg)   12/02/21 246 lb 6.4 oz (111.8 kg)         General Appearance:  Alert, cooperative, no distress, appears stated age Appropriate weight   Head:  Normocephalic, without obvious abnormality, atraumatic   Eyes:  PERRL, conjunctiva/corneas clear EOM intact  Ears normal   Throat no lesions       Nose: Nares normal, no drainage or sinus tenderness   Throat: Lips, mucosa, and tongue normal   Neck: Supple, symmetrical, trachea midline, no adenopathy, thyroid: not enlarged, symmetric, no tenderness/mass/nodules, no carotid bruit       Lungs:   Clear to auscultation bilaterally, respirations unlabored   Chest Wall:  No tenderness or deformity   Heart:  Regular rhythm, rate is controlled, S1, S2 normal, there is no murmur, there is no rub or gallop, cannot assess jvd, no bilateral lower extremity edema   Abdomen:   Soft, non-tender, bowel sounds active all four quadrants,  no masses, no organomegaly       Extremities: Extremities normal, atraumatic, no cyanosis   Pulses: 2+ and symmetric   Skin: Skin color, texture, turgor normal, no rashes or lesions   Pysch: Normal mood and affect   Neurologic: Normal gross motor and sensory exam.  Cranial nerves intact        Labs:     Lab Results   Component Value Date    WBC 5.1 11/09/2021    HGB 13.4 11/09/2021    HCT 40.4 11/09/2021    MCV 88.2 11/09/2021     11/09/2021     Lab Results   Component Value Date     11/09/2021    K 4.1 11/09/2021     11/09/2021    CO2 23 11/09/2021    BUN 12 11/09/2021    CREATININE 0.6 11/09/2021    GLUCOSE 106 (H) 11/09/2021    CALCIUM 9.4 11/09/2021    PROT 6.4 11/09/2021    LABALBU 4.3 11/09/2021    BILITOT 0.3 11/09/2021    ALKPHOS 98 11/09/2021    AST 15 11/09/2021    ALT 17 11/09/2021    LABGLOM >60 11/09/2021    GFRAA >60 11/09/2021    AGRATIO 2.0 11/09/2021    GLOB 2.3 09/03/2021         Lab Results   Component Value Date    CHOL 152 07/26/2021    CHOL 163 09/28/2020    CHOL 148 06/08/2019     Lab Results   Component Value Date    TRIG 141 07/26/2021    TRIG 149 09/28/2020    TRIG 101 06/08/2019     Lab Results   Component Value Date    HDL 50 07/26/2021    HDL 54 09/28/2020    HDL 53 08/17/2020     Lab Results   Component Value Date    LDLCALC 74 07/26/2021    LDLCALC 79 09/28/2020    LDLCALC 66 08/17/2020     Lab Results   Component Value Date    LABVLDL 28 07/26/2021    LABVLDL 30 09/28/2020    LABVLDL 25 08/17/2020     Lab Results   Component Value Date    CHOLHDLRATIO 2.8 04/16/2011    CHOLHDLRATIO 2.5 01/08/2011    CHOLHDLRATIO 2.9 10/02/2010       Lab Results   Component Value Date    INR 1.07 08/13/2021    INR 1.14 (H) 08/12/2021    INR 1.14 (H) 08/10/2021    PROTIME 12.1 08/13/2021    PROTIME 12.9 (H) 08/12/2021    PROTIME 12.9 (H) 08/10/2021       The 10-year ASCVD risk score (Hermila Christensen et al., 2013) is: 8.7%    Values used to calculate the score:      Age: 77 years      Sex: Female      Is Non- : No      Diabetic: No      Tobacco smoker: No      Systolic Blood Pressure: 984 mmHg      Is BP treated: Yes      HDL Cholesterol: 50 mg/dL      Total Cholesterol: 152 mg/dL      Assessment / Plan:      Diagnosis Orders   1. Palpitations          1.  PSVT:  Patient has rare and very brief episodes.      -We will continue her home meds, Toprol 25 daily and verapamil extended release 120 mg daily.      2.  Hypertension:  Patient's BP is well controlled with current medications.     - Continue with metoprolol, verapamil, losartan 50 mg daily.     3.  Hyperlipidemia:  Patient was having myalgias (increasing lower back pain) on intermediate dose Crestor.     -Cw Pravachol at 20 mg p.o. daily.     4.  PAD:  Patient has evidence of mild PAD in the right lower extremity.     -Continue with low-dose aspirin 81 mg daily and statin        Return in about 6 months (around 7/26/2022). I have spent 35 minutes of face to face time with the patient with more than 50% spent counseling and coordinating care. I have personally reviewed the reports and images of labs, radiological studies, cardiac studies including ECG's and telemetry, current and old medical records. The note was completed using EMR and Dragon dictation system. Every effort was made to ensure accuracy; however, inadvertent computerized transcription errors may be present. All questions and concerns were addressed to the patient/family. Alternatives to my treatment were discussed. I would like to thank you for providing me the opportunity to participate in the care of your patient. If you have any questions, please do not hesitate to contact me.      Jesse Colin MD, [facility] [place] [address] [address] [place] [address]  Main Office Phone: [phone]  Heart Failure Hotline: [phone]  Fax: [phone]

## 2022-01-27 DIAGNOSIS — I10 ESSENTIAL HYPERTENSION: ICD-10-CM

## 2022-01-27 RX ORDER — LOSARTAN POTASSIUM 25 MG/1
25 TABLET ORAL DAILY
Qty: 30 TABLET | Refills: 5 | OUTPATIENT
Start: 2022-01-27

## 2022-01-27 RX ORDER — LOSARTAN POTASSIUM 25 MG/1
TABLET ORAL
Qty: 90 TABLET | OUTPATIENT
Start: 2022-01-27

## 2022-01-27 NOTE — TELEPHONE ENCOUNTER
Patient is requesting a refill of losartan (COZAAR) 25 MG tablet [facility] [phone] [place] [phone]    Last appointment: 1/7/2022  Next appointment: 3/2/2022  Last refill: 11/1/21

## 2022-02-14 ENCOUNTER — OFFICE VISIT (OUTPATIENT)
Dept: PULMONOLOGY | Age: 67
End: 2022-02-14
Payer: MEDICARE

## 2022-02-14 VITALS
SYSTOLIC BLOOD PRESSURE: 144 MMHG | BODY MASS INDEX: 45.18 KG/M2 | TEMPERATURE: 96.6 F | DIASTOLIC BLOOD PRESSURE: 71 MMHG | OXYGEN SATURATION: 98 % | HEIGHT: 63 IN | HEART RATE: 57 BPM | WEIGHT: 255 LBS

## 2022-02-14 DIAGNOSIS — Z99.89 OSA ON CPAP: ICD-10-CM

## 2022-02-14 DIAGNOSIS — R05.3 CHRONIC COUGH: Primary | ICD-10-CM

## 2022-02-14 DIAGNOSIS — G47.33 OSA ON CPAP: ICD-10-CM

## 2022-02-14 PROCEDURE — 1123F ACP DISCUSS/DSCN MKR DOCD: CPT | Performed by: INTERNAL MEDICINE

## 2022-02-14 PROCEDURE — G8427 DOCREV CUR MEDS BY ELIG CLIN: HCPCS | Performed by: INTERNAL MEDICINE

## 2022-02-14 PROCEDURE — 1036F TOBACCO NON-USER: CPT | Performed by: INTERNAL MEDICINE

## 2022-02-14 PROCEDURE — 3017F COLORECTAL CA SCREEN DOC REV: CPT | Performed by: INTERNAL MEDICINE

## 2022-02-14 PROCEDURE — 1090F PRES/ABSN URINE INCON ASSESS: CPT | Performed by: INTERNAL MEDICINE

## 2022-02-14 PROCEDURE — G8399 PT W/DXA RESULTS DOCUMENT: HCPCS | Performed by: INTERNAL MEDICINE

## 2022-02-14 PROCEDURE — 4040F PNEUMOC VAC/ADMIN/RCVD: CPT | Performed by: INTERNAL MEDICINE

## 2022-02-14 PROCEDURE — G8484 FLU IMMUNIZE NO ADMIN: HCPCS | Performed by: INTERNAL MEDICINE

## 2022-02-14 PROCEDURE — G8417 CALC BMI ABV UP PARAM F/U: HCPCS | Performed by: INTERNAL MEDICINE

## 2022-02-14 PROCEDURE — 99213 OFFICE O/P EST LOW 20 MIN: CPT | Performed by: INTERNAL MEDICINE

## 2022-02-14 NOTE — PROGRESS NOTES
 Chronic back pain     low    Depression     DVT (deep venous thrombosis) [address] [address]     after surgery    GERD (gastroesophageal reflux disease)     has schatzki ring    Headache(784.0)     hemiplegic migraines, improved    Hernia hiatal     Histoplasmosis     Hot flashes     takes wellbutrin    Hyperlipidemia     Hypertension     Obesity     Osteoarthritis     multiple joints    Other partial intestinal obstruction ([address]     wound dehiscence    PONV (postoperative nausea and vomiting)     Shingles     nerve pain in her low back persists    Sleep apnea     CPAP set of 4    Tricuspid regurgitation     moderate       Social History:    Social History     Tobacco Use   Smoking Status Former Smoker    Packs/day: 1.00    Years: 5.00    Pack years: 5.00    Quit date: 1990    Years since quittin.1   Smokeless Tobacco Never Used       Family History:  Family History   Problem Relation Age of Onset    High Blood Pressure Mother     Heart Disease Mother     Cancer Mother         vaginal    High Blood Pressure Father     Heart Disease Father     Heart Disease Sister     Colon Cancer Sister     Heart Disease Brother     High Blood Pressure Brother     Cancer Brother         oral    Breast Cancer Sister         50+    COPD Sister     Breast Cancer Sister         50+    Heart Disease Brother     Hearing Loss Brother         chf and transplant    High Blood Pressure Brother      Current Medications:  Current Outpatient Medications on File Prior to Visit   Medication Sig Dispense Refill    verapamil (CALAN SR) 120 MG extended release tablet TAKE 1 TABLET BY MOUTH NIGHTLY DO NOT CRUSH OR CHEW. 90 tablet 3    clotrimazole-betamethasone (LOTRISONE) 1-0.05 % cream APPLY TO AFFECTED AREA TWICE A DAY 45 g 0    loratadine (CLARITIN) 10 MG tablet TAKE 1 TABLET BY MOUTH EVERY DAY 90 tablet 1    buPROPion (WELLBUTRIN XL) 150 MG extended release tablet Take 1 tablet by mouth every morning 90 tablet 1    ELIQUIS 5 MG TABS tablet TAKE 1 TABLET BY MOUTH TWICE A DAY 60 tablet 1    metroNIDAZOLE (METROGEL) 0.75 % gel APPLY TO AFFECTED AREA TWICE A DAY 45 g 0    losartan (COZAAR) 25 MG tablet Take 1 tablet by mouth daily 30 tablet 5    methocarbamol (ROBAXIN) 750 MG tablet Take 750 mg by mouth every 8 hours as needed      ondansetron (ZOFRAN) 4 MG tablet Take 4 mg by mouth 2 times daily as needed      cetirizine (ZYRTEC ALLERGY) 10 MG tablet Take 1 tablet by mouth daily      pantoprazole (PROTONIX) 40 MG tablet TAKE 1 TABLET BY MOUTH TWICE A  tablet 2    pravastatin (PRAVACHOL) 20 MG tablet Take 1 tablet by mouth daily 90 tablet 3    vitamin B-12 (CYANOCOBALAMIN) 1000 MCG tablet       acetaminophen (TYLENOL) 500 MG tablet Take 500 mg by mouth every 6 hours as needed for Pain      Multiple Vitamins-Minerals (CENTRUM SILVER PO) Take 1 tablet by mouth daily       metoprolol succinate (TOPROL XL) 25 MG extended release tablet Take 1 tablet by mouth daily 90 tablet 0     No current facility-administered medications on file prior to visit. Allergies:   Allergies   Allergen Reactions    Latex Rash    Reglan [Metoclopramide Hcl] Rash    Univasc [Moexipril] Other (See Comments)     unsure    Crestor [Rosuvastatin]      myalgia    Zoloft [Sertraline]      nightmares    Celecoxib Palpitations    Keflex [Cephalexin] Diarrhea    Lipitor Other (See Comments)     myalgia    Metoclopramide Anxiety    Prochlorperazine Anxiety    Prochlorperazine Edisylate     Sulfa Antibiotics Nausea And Vomiting    Vioxx        REVIEW OF SYSTEMS:    CONSTITUTIONAL: Negative for fevers and chills  HEENT: Negative for oropharyngeal exudate, post nasal drip, sinus pain / pressure, nasal congestion, ear pain  RESPIRATORY:  See HPI  CARDIOVASCULAR: Negative for chest pain, palpitations, edema  GASTROINTESTINAL: Negative for nausea, vomiting, diarrhea, constipation and abdominal pain  HEMATOLOGICAL: 10/23/2007, 11/11/2008, 09/21/2009, 10/10/2016    Influenza, Quadv, IM, PF (6 mo and older Fluzone, Flulaval, Fluarix, and 3 yrs and older Afluria) 11/01/2018    Influenza, Quadv, adjuvanted, 65 yrs +, IM, PF (Fluad) 09/24/2020, 09/03/2021    Influenza, Triv, inactivated, subunit, adjuvanted, IM (Fluad 65 yrs and older) 11/11/2019    Pneumococcal Conjugate 13-valent ([id number]) 09/24/2020    Pneumococcal Polysaccharide ([id number]) 03/04/2015, 09/29/2021    Tdap (Boostrix, Adacel) 11/05/2013       Assessment: This is a 79 y.o. female with moderate obstructive sleep apnea and chronic cough    Plan:   -SAL: Moderate based on her 2013 study. We will get a formal sleep study because of the abdominal pain associated with her surgery for small bowel obstruction. It sounds like some adjustments may need to be made to the CPAP. But asked my staff to get her sleep report/compliance report from Kristine Garza to see what her average CPAP needs are. If she needs an in lab sleep study she would be appropriate for this now. Chronic cough: The biggest complaint is actually of the sinus drainage as she says Zyrtec and nasal sprays to help control the cough. She does not recall the name of the nasal spray so she is going to call the office to inform us when she has that information. She also has GERD and takes a PPI twice daily or if she gets bad heartburn. She had pulmonary function test in July and her spirometry is perfect but her residual volume and total lung capacity are both elevated. Unclear if this clinically significant. Methacholine challenge may be helpful to ferret out whether or not she actually has asthma or not, but my initial impression is that she does not    - Tobacco use: The patient is not currently smoking.     - RTC 6 months w/ MD. Call or RTC sooner if symptoms persist or worsen acutely.

## 2022-02-15 ENCOUNTER — TELEPHONE (OUTPATIENT)
Dept: PULMONOLOGY | Age: 67
End: 2022-02-15

## 2022-02-15 NOTE — PROGRESS NOTES
Called [facility] to get compliance fax and have patient link to [place]. Got the AirView and Addison Flynn is doing great with her CPAP and is using it 100% of the time. It's an autopap and her maximum used pressure is 11.8.

## 2022-02-15 NOTE — TELEPHONE ENCOUNTER
DAVIDI:  Alysonnola Pablo states she was to call to let you know what her nasal spray was, it is Flonase nasal spray, 50mcg.

## 2022-02-28 ENCOUNTER — TELEPHONE (OUTPATIENT)
Dept: INTERNAL MEDICINE CLINIC | Age: 67
End: 2022-02-28

## 2022-02-28 NOTE — TELEPHONE ENCOUNTER
Spoke to patient and told her to remain on her Eliquis until I see for this week.   Scans are a bit confusing as to whether they showed acute DVT in November and if the most recent scan was bilateral.

## 2022-03-02 ENCOUNTER — OFFICE VISIT (OUTPATIENT)
Dept: INTERNAL MEDICINE CLINIC | Age: 67
End: 2022-03-02
Payer: MEDICARE

## 2022-03-02 VITALS
SYSTOLIC BLOOD PRESSURE: 152 MMHG | WEIGHT: 256.6 LBS | BODY MASS INDEX: 45.45 KG/M2 | HEART RATE: 59 BPM | DIASTOLIC BLOOD PRESSURE: 82 MMHG | OXYGEN SATURATION: 97 % | RESPIRATION RATE: 18 BRPM

## 2022-03-02 DIAGNOSIS — I47.1 ATRIAL TACHYCARDIA (HCC): ICD-10-CM

## 2022-03-02 DIAGNOSIS — F32.4 MAJOR DEPRESSIVE DISORDER WITH SINGLE EPISODE, IN PARTIAL REMISSION (HCC): ICD-10-CM

## 2022-03-02 DIAGNOSIS — E66.01 OBESITY, CLASS III, BMI 40-49.9 (MORBID OBESITY) (HCC): ICD-10-CM

## 2022-03-02 DIAGNOSIS — Z91.81 AT HIGH RISK FOR FALLS: ICD-10-CM

## 2022-03-02 DIAGNOSIS — I10 ESSENTIAL HYPERTENSION: ICD-10-CM

## 2022-03-02 DIAGNOSIS — I73.9 PAD (PERIPHERAL ARTERY DISEASE) (HCC): ICD-10-CM

## 2022-03-02 DIAGNOSIS — I82.409 RECURRENT ACUTE DEEP VEIN THROMBOSIS (DVT) OF LOWER EXTREMITY, UNSPECIFIED LATERALITY (HCC): Primary | ICD-10-CM

## 2022-03-02 PROCEDURE — 4040F PNEUMOC VAC/ADMIN/RCVD: CPT | Performed by: INTERNAL MEDICINE

## 2022-03-02 PROCEDURE — G8484 FLU IMMUNIZE NO ADMIN: HCPCS | Performed by: INTERNAL MEDICINE

## 2022-03-02 PROCEDURE — G8427 DOCREV CUR MEDS BY ELIG CLIN: HCPCS | Performed by: INTERNAL MEDICINE

## 2022-03-02 PROCEDURE — 3017F COLORECTAL CA SCREEN DOC REV: CPT | Performed by: INTERNAL MEDICINE

## 2022-03-02 PROCEDURE — 1090F PRES/ABSN URINE INCON ASSESS: CPT | Performed by: INTERNAL MEDICINE

## 2022-03-02 PROCEDURE — G8417 CALC BMI ABV UP PARAM F/U: HCPCS | Performed by: INTERNAL MEDICINE

## 2022-03-02 PROCEDURE — 1036F TOBACCO NON-USER: CPT | Performed by: INTERNAL MEDICINE

## 2022-03-02 PROCEDURE — G8399 PT W/DXA RESULTS DOCUMENT: HCPCS | Performed by: INTERNAL MEDICINE

## 2022-03-02 PROCEDURE — 1123F ACP DISCUSS/DSCN MKR DOCD: CPT | Performed by: INTERNAL MEDICINE

## 2022-03-02 PROCEDURE — 99214 OFFICE O/P EST MOD 30 MIN: CPT | Performed by: INTERNAL MEDICINE

## 2022-03-02 RX ORDER — FLUTICASONE PROPIONATE 50 MCG
1 SPRAY, SUSPENSION (ML) NASAL DAILY
COMMUNITY
End: 2022-08-26 | Stop reason: SDUPTHER

## 2022-03-02 RX ORDER — LOSARTAN POTASSIUM 50 MG/1
50 TABLET ORAL DAILY
Qty: 30 TABLET | Refills: 3 | Status: SHIPPED | OUTPATIENT
Start: 2022-03-02 | End: 2022-06-09

## 2022-03-02 NOTE — PATIENT INSTRUCTIONS
Stay on eliquis until end of March    Then check labs in mid-April to check for underlying clotting disorder    If left hip symptoms persist, could try Nsaid once off Eliquis    Increase to losartan 50 mg    Weight management consult

## 2022-03-02 NOTE — PROGRESS NOTES
Godfrey Harding (:  1955) is a 79 y.o. female, here for evaluation of the following chief complaint(s):    Follow-up      ASSESSMENT/PLAN:  1. Recurrent acute deep vein thrombosis (DVT) of lower extremity, unspecified laterality ([facility] [address]  We reviewed her multiple Doppler studies. We agreed that she would stay on her Eliquis for 3 months past her most recent Doppler that showed acute DVT and so she can stop at the end of March. Her initial DVT was provoked but it is unclear if the additional acute DVTs were. We will check related labs once she is off her Eliquis. -     Factor V Leiden; Future  -     Prothrombin Gene Mutation; Future  -     Protein C Functional; Future  -     Protein S Activity; Future  -     Antithrombin 3 Activity; Future  2. Essential hypertension  -     Fair control. Increase to losartan 50 mg daily. Continue Toprol and Verapamil. 3. Major depressive disorder with single episode, 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[facility] [address]  Well-controlled on Wellbutrin  4. PAD (peripheral artery disease) [facility])  Reviewed  note of Dr. Lopez Flores  5. Atrial tachycardia (HCC)   On Toprol. Established with cardiology  6. Body mass index (BMI) 45.0-49.9, adult ([facility] [address]  -     [employer]  8. At high risk for falls  She has seen orthopedics and physical therapy has been recommended. --  Hyperlipidemia, unspecified hyperlipidemia type  Stable on pravastatin      Low back pain sp surgery  still using acetaminophen, and methocarbamol     Epigastric pain - Remains on pantoprazole       Return in about 6 weeks (around 2022). SUBJECTIVE/OBJECTIVE:  HPI     Patient is here for routine visit. She has questions about length of therapy of her Eliquis for DVT. She denies chest pain or shortness of breath. She has been having difficulty with her left hip and is seeing physical therapy. Her depression is under reasonable control.     She is interested in working on [employer] loss.    Review of Systems    Past Medical History:   Diagnosis Date    Allergic rhinitis     Anxiety     Atrial tachycardia ([facility] [address]     dr Ingrid Tipton ([facility])    Carpal tunnel syndrome     Cervicalgia     Chronic back pain     low    Depression     DVT (deep venous thrombosis) ([facility] [address] 2021    after surgery    GERD (gastroesophageal reflux disease)     has schatzki ring    Headache(784.0)     hemiplegic migraines, improved    Hernia hiatal     Histoplasmosis     Hot flashes     takes wellbutrin    Hyperlipidemia     Hypertension     Obesity     Osteoarthritis     multiple joints    Other partial intestinal obstruction ([facility] [address] 2021    wound dehiscence    PONV (postoperative nausea and vomiting)     Shingles     nerve pain in her low back persists    Sleep apnea     CPAP set of 4    Tricuspid regurgitation     moderate       Current Outpatient Medications   Medication Sig Dispense Refill    fluticasone (FLONASE) 50 MCG/ACT nasal spray 1 spray by Each Nostril route daily      losartan (COZAAR) 50 MG tablet Take 1 tablet by mouth daily 30 tablet 3    verapamil (CALAN SR) 120 MG extended release tablet TAKE 1 TABLET BY MOUTH NIGHTLY DO NOT CRUSH OR CHEW. 90 tablet 3    clotrimazole-betamethasone (LOTRISONE) 1-0.05 % cream APPLY TO AFFECTED AREA TWICE A DAY 45 g 0    buPROPion (WELLBUTRIN XL) 150 MG extended release tablet Take 1 tablet by mouth every morning 90 tablet 1    ELIQUIS 5 MG TABS tablet TAKE 1 TABLET BY MOUTH TWICE A DAY 60 tablet 1    metroNIDAZOLE (METROGEL) 0.75 % gel APPLY TO AFFECTED AREA TWICE A DAY 45 g 0    methocarbamol (ROBAXIN) 750 MG tablet Take 750 mg by mouth every 8 hours as needed      cetirizine (ZYRTEC ALLERGY) 10 MG tablet Take 1 tablet by mouth daily      metoprolol succinate (TOPROL XL) 25 MG extended release tablet Take 1 tablet by mouth daily 90 tablet 0    pantoprazole (PROTONIX) 40 MG tablet TAKE 1 TABLET BY MOUTH TWICE A  tablet 2    pravastatin (PRAVACHOL) 20 MG tablet Take 1 tablet by mouth daily 90 tablet 3    acetaminophen (TYLENOL) 500 MG tablet Take 500 mg by mouth every 6 hours as needed for Pain      Multiple Vitamins-Minerals (CENTRUM SILVER PO) Take 1 tablet by mouth daily        No current facility-administered medications for this visit. Physical Exam  Constitutional:       General: She is not in acute distress. HENT:      Head: Normocephalic and atraumatic. Cardiovascular:      Rate and Rhythm: Normal rate and regular rhythm. Pulmonary:      Effort: Pulmonary effort is normal.      Breath sounds: Normal breath sounds. Musculoskeletal:      Right lower leg: Edema present. Left lower leg: No edema. Neurological:      Mental Status: She is alert and oriented to person, place, and time. Mental status is at baseline. Psychiatric:         Mood and Affect: Mood normal.               This note was generated completely or in part utilizing Dragon dictation speech recognition software. Occasionally, words are mistranscribed and despite editing, the text may contain inaccuracies due to incorrect word recognition. If further clarification is needed please contact the office at [phone]          An electronic signature was used to authenticate this note. --Flor Raphael MD   On the basis of positive falls risk screening, assessment and plan is as follows: continue to see physical therapy.

## 2022-03-09 ENCOUNTER — PATIENT MESSAGE (OUTPATIENT)
Dept: INTERNAL MEDICINE CLINIC | Age: 67
End: 2022-03-09

## 2022-03-09 DIAGNOSIS — K21.9 GASTROESOPHAGEAL REFLUX DISEASE, UNSPECIFIED WHETHER ESOPHAGITIS PRESENT: ICD-10-CM

## 2022-03-10 RX ORDER — PANTOPRAZOLE SODIUM 40 MG/1
TABLET, DELAYED RELEASE ORAL
Qty: 180 TABLET | Refills: 1 | Status: SHIPPED | OUTPATIENT
Start: 2022-03-10 | End: 2022-09-19

## 2022-03-10 NOTE — TELEPHONE ENCOUNTER
From: Nancy Ni  To: Dr. Socorro Ruth: 3/9/2022 7:37 PM EST  Subject: Taking Eliquis    Hello. I know you wanted me to keep taking Eliquis until the end if the month. But l have only enough till Sunday this week. So should l go ahead and refill it?

## 2022-03-28 ENCOUNTER — OFFICE VISIT (OUTPATIENT)
Dept: ORTHOPEDIC SURGERY | Age: 67
End: 2022-03-28
Payer: MEDICARE

## 2022-03-28 VITALS — WEIGHT: 255 LBS | BODY MASS INDEX: 45.18 KG/M2 | RESPIRATION RATE: 16 BRPM | HEIGHT: 63 IN

## 2022-03-28 DIAGNOSIS — Z96.653 HISTORY OF TOTAL KNEE ARTHROPLASTY, BILATERAL: ICD-10-CM

## 2022-03-28 DIAGNOSIS — M70.52 PES ANSERINUS BURSITIS OF LEFT KNEE: Primary | ICD-10-CM

## 2022-03-28 PROCEDURE — G8399 PT W/DXA RESULTS DOCUMENT: HCPCS | Performed by: ORTHOPAEDIC SURGERY

## 2022-03-28 PROCEDURE — G8417 CALC BMI ABV UP PARAM F/U: HCPCS | Performed by: ORTHOPAEDIC SURGERY

## 2022-03-28 PROCEDURE — 1090F PRES/ABSN URINE INCON ASSESS: CPT | Performed by: ORTHOPAEDIC SURGERY

## 2022-03-28 PROCEDURE — 1123F ACP DISCUSS/DSCN MKR DOCD: CPT | Performed by: ORTHOPAEDIC SURGERY

## 2022-03-28 PROCEDURE — 99213 OFFICE O/P EST LOW 20 MIN: CPT | Performed by: ORTHOPAEDIC SURGERY

## 2022-03-28 PROCEDURE — 3017F COLORECTAL CA SCREEN DOC REV: CPT | Performed by: ORTHOPAEDIC SURGERY

## 2022-03-28 PROCEDURE — G8427 DOCREV CUR MEDS BY ELIG CLIN: HCPCS | Performed by: ORTHOPAEDIC SURGERY

## 2022-03-28 PROCEDURE — 4040F PNEUMOC VAC/ADMIN/RCVD: CPT | Performed by: ORTHOPAEDIC SURGERY

## 2022-03-28 PROCEDURE — G8484 FLU IMMUNIZE NO ADMIN: HCPCS | Performed by: ORTHOPAEDIC SURGERY

## 2022-03-28 PROCEDURE — 1036F TOBACCO NON-USER: CPT | Performed by: ORTHOPAEDIC SURGERY

## 2022-03-28 PROCEDURE — 20610 DRAIN/INJ JOINT/BURSA W/O US: CPT | Performed by: ORTHOPAEDIC SURGERY

## 2022-03-28 RX ORDER — BUPIVACAINE HYDROCHLORIDE 2.5 MG/ML
2 INJECTION, SOLUTION INFILTRATION; PERINEURAL ONCE
Status: COMPLETED | OUTPATIENT
Start: 2022-03-28 | End: 2022-03-28

## 2022-03-28 RX ORDER — TRIAMCINOLONE ACETONIDE 40 MG/ML
40 INJECTION, SUSPENSION INTRA-ARTICULAR; INTRAMUSCULAR ONCE
Status: COMPLETED | OUTPATIENT
Start: 2022-03-28 | End: 2022-03-28

## 2022-03-28 RX ADMIN — BUPIVACAINE HYDROCHLORIDE 5 MG: 2.5 INJECTION, SOLUTION INFILTRATION; PERINEURAL at 10:48

## 2022-03-28 RX ADMIN — TRIAMCINOLONE ACETONIDE 40 MG: 40 INJECTION, SUSPENSION INTRA-ARTICULAR; INTRAMUSCULAR at 10:48

## 2022-03-28 NOTE — PROGRESS NOTES
[facility]  Office Visit    Chief Complaint: Left knee instability following total knee arthroplasty; right knee pain after right total knee arthroplasty    HPI:  Ramone Guevara is a 79 y.o. who is here for evaluation of her bilateral total knee arthroplasties. The right side was done by Dr. Shannon Albarran in 2016 and the left side was done in 2015 by Dr. Sylvain Chinchilla [facility]). She was last seen in November 2021 for this issue. She was diagnosed with mild instability of the left knee which is improving with physical therapy. She does still have some medial left knee pain. She is also here today for acute onset of right lateral knee pain. This has been present for the past 5 days. There is no new injury. She has been active and aquatic physical therapy and believes it may be related to this. Pain is lateral and is worse with weightbearing and walking. Her history is significant for recent low back surgery in July 2021. She had bilateral lower extremity DVTs after this and is currently on Eliquis. For this reason, she is unable to take NSAIDs.     Patient Active Problem List   Diagnosis    Vitamin D deficiency    Generalized osteoarthrosis, involving multiple sites    Chronic low back pain    Hemiplegic migraine    Allergic rhinitis    GERD (gastroesophageal reflux disease)    Degenerative disc disease, lumbar    Essential hypertension    Major depressive disorder with single episode, 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[facility])    Mixed hyperlipidemia    Rosacea    Shingles (herpes zoster) polyneuropathy    Palpitations    Atrial tachycardia ([facility])    Paresthesia    PAD (peripheral artery disease) ([facility])    Chest pain    Morbidly obese ([facility])    Epigastric discomfort    Chronic back pain    Lumbar stenosis with neurogenic claudication    Dehiscence of fascia    Ileus ([facility])    S/P lumbar and lumbosacral fusion by anterior technique       ROS:  Constitutional: denies fever, chills, weight loss  MSK: denies pain in other joints, muscle aches  Neurological: denies numbness, tingling, weakness    Exam:  Appearance: sitting in exam room chair, appears to be in no acute distress, awake and alert  Resp: unlabored breathing on room air  Skin: warm, dry and intact with out erythema or significant increased temperature  Neuro: grossly intact both lower extremities. Intact sensation to light touch. Motor exam 4+ to 5/5 in all major motor groups. Right knee: Incision is well-healed. Range of motion 0 to 120 degrees. The knee is stable to varus and valgus stress and stable to anterior and posterior drawer sign. Tender over IT band. Sensation is intact light touch. There is brisk capillary refill. There is 5/5 muscle strength in all muscle groups. Left knee: Incision is well-healed. Tender along the medial lateral knee. There is no knee effusion. Range of motion 0 to 130 degrees. The knee is stable to varus and valgus stress and does display mild laxity with anterior drawer. Sensation is intact light touch. There is brisk capillary refill. There is 5/5 muscle strength in all muscle groups. Imaging:  None    Assessment:  Bilateral total knee arthroplasty with left knee flexion instability with pes bursitis and right knee IT band syndrome    Plan:  We discussed the diagnosis and treatment options. Her left knee instability is improving but she continues to have pain over her pes. I recommended and performed a steroid injection in this area today as described below. She has acute onset IT band friction syndrome of the right knee for which I prescribed a Medrol Dosepak. I recommended she continue her physical therapy exercises as soon as this calms down. She will return here in follow-up as needed. Procedure:  After verbal consent was obtained, the patient's left knee was prepped with alcohol. Skin was anesthetized with ethyl chloride.   The pes bursa was then injected under sterile technique with 2mL of 0.25% marcaine and 1 mL of 40 mg/mL Kenalog. A bandage was applied. The patient tolerated the procedure well and there were no complications. Total time spent on today's encounter was at least 24 minutes. This time included reviewing prior notes, radiographs, and lab results when available, reviewing history obtained by medical assistant, performing history and physical exam, reviewing tests/radiographs with the patient, counseling the patient, ordering medications or tests, documentation in the electronic health record, and coordination of care. This dictation was done with [employer] dictation and may contain mechanical errors related to translation.

## 2022-03-29 RX ORDER — METHYLPREDNISOLONE 4 MG/1
TABLET ORAL
Qty: 1 KIT | Refills: 0 | Status: SHIPPED | OUTPATIENT
Start: 2022-03-29 | End: 2022-04-14

## 2022-04-04 ENCOUNTER — OFFICE VISIT (OUTPATIENT)
Dept: BARIATRICS/WEIGHT MGMT | Age: 67
End: 2022-04-04

## 2022-04-04 VITALS
DIASTOLIC BLOOD PRESSURE: 86 MMHG | BODY MASS INDEX: 45.04 KG/M2 | HEIGHT: 63 IN | WEIGHT: 254.2 LBS | HEART RATE: 60 BPM | SYSTOLIC BLOOD PRESSURE: 134 MMHG

## 2022-04-04 DIAGNOSIS — E78.2 MIXED HYPERLIPIDEMIA: ICD-10-CM

## 2022-04-04 DIAGNOSIS — E66.01 MORBID OBESITY WITH BMI OF 45.0-49.9, ADULT (HCC): ICD-10-CM

## 2022-04-04 PROCEDURE — 99999 PR OFFICE/OUTPT VISIT,PROCEDURE ONLY: CPT | Performed by: FAMILY MEDICINE

## 2022-04-04 NOTE — PROGRESS NOTES
Chhaya Bee is a 79 y.o. female with a date of birth of 1955. Vitals:    04/04/22 1003   BP: 134/86   Pulse: 60   Weight: 254 lb 3.2 oz (115.3 kg)   Height: 5' 3\" (1.6 m)    BMI: Body mass index is 45.03 kg/m². Obesity Classification: Class III    Weight History: Wt Readings from Last 3 Encounters:   04/04/22 254 lb 3.2 oz (115.3 kg)   03/28/22 255 lb (115.7 kg)   03/02/22 256 lb 9.6 oz (116.4 kg)     Patient's lowest adult weight was 150 lb at age 29's. Patient's highest adult weight was 259 lb at age 67-76. Had back surgery last year, lost 20 lbs then gained 50 lbs. Patient has participated in the following weight loss programs: Weight [employer], low carb and calorie restriction. Patient has participated in meal replacement/liquid diets. Slimfast  Patient has not participated in weight loss medications. Patient is not lactose intolerant. Patient does not have Sikhism/cultural food preferences. Patient does not have food allergies. 24 hour recall/food frequency chart:  Breakfast: Oatmeal + english muffin w/ butter/cinnamon sugar  Snack: Grapes  Lunch: None  Snack: Grapes + 2 slices toast  Dinner: Salad w/ grilled chix + pecans/cranberries/dressings  Snack: None  Drinks throughout the day: decaf tea w/ sweetener, water, gave up diet coke for lent, previously 2-3 20 oz bottles/day, coffee w/ sf creamer/ sf syrup  Do you drink alcohol? no.     Patient does not meet the criteria for binge eating disorder \"I eat what's on my plate\". Patient does have grazing. Patient does not have night eating. Patient does have a history of emotional eating/stress or eating out of boredom.     Physical Activity: [employer] water [employer] 4x/week for past month      Goals  Weight: 200 lbs  Health Improvement: decrease medication, improve knee pain, and take care of her back - prevent future surgeries     Assessment  Nutritional Needs: RMR=(9.99 x 115.3) + (6.25 x 160) - (4.92 x 79 y.o.) -161= 1661 kcal x 1.3 (sedentary activity factor)= 2159 kcal - 1000 (for 2 lb weight loss/week)= 1159 kcal.    Plan  Plan/Recommendations: General weight loss/lifestyle modification strategies discussed (elicit support from others; identify saboteurs; non-food rewards, etc). Diet interventions: 1200 kcal LC. Regular aerobic exercise program discussed. Optifast:  Not Interested  Diet Medications:  Not Interested    Handouts: 9 inch LC plate, MFP instructions    PES Statement: Overweight/Obesity related to lack of exercise, sedentary lifestyle, unhealthy eating habits, and unsuccessful diet attempts as evidenced by BMI. Body mass index is 45.03 kg/m². Will follow up as necessary.     Mary Jane Vargas RD, LD

## 2022-04-04 NOTE — TELEPHONE ENCOUNTER
Requested Prescriptions     Pending Prescriptions Disp Refills    pravastatin (PRAVACHOL) 20 MG tablet [Pharmacy Med Name: PRAVASTATIN 20MG TABLETS] 90 tablet 3     Sig: TAKE 1 TABLET BY MOUTH EVERY DAY                Last Office Visit: 1/26/2022     Next Office Visit: 7/27/2022     Last labs : 11/09/2021

## 2022-04-06 RX ORDER — PRAVASTATIN SODIUM 20 MG
TABLET ORAL
Qty: 90 TABLET | Refills: 3 | Status: SHIPPED | OUTPATIENT
Start: 2022-04-06

## 2022-04-07 ENCOUNTER — TELEMEDICINE (OUTPATIENT)
Dept: BARIATRICS/WEIGHT MGMT | Age: 67
End: 2022-04-07
Payer: MEDICARE

## 2022-04-07 DIAGNOSIS — E66.01 MORBID OBESITY WITH BMI OF 45.0-49.9, ADULT (HCC): Primary | ICD-10-CM

## 2022-04-07 DIAGNOSIS — Z71.3 DIETARY COUNSELING AND SURVEILLANCE: ICD-10-CM

## 2022-04-07 PROCEDURE — G8427 DOCREV CUR MEDS BY ELIG CLIN: HCPCS | Performed by: FAMILY MEDICINE

## 2022-04-07 PROCEDURE — 3017F COLORECTAL CA SCREEN DOC REV: CPT | Performed by: FAMILY MEDICINE

## 2022-04-07 PROCEDURE — 1123F ACP DISCUSS/DSCN MKR DOCD: CPT | Performed by: FAMILY MEDICINE

## 2022-04-07 PROCEDURE — 99203 OFFICE O/P NEW LOW 30 MIN: CPT | Performed by: FAMILY MEDICINE

## 2022-04-07 PROCEDURE — 4040F PNEUMOC VAC/ADMIN/RCVD: CPT | Performed by: FAMILY MEDICINE

## 2022-04-07 PROCEDURE — 1090F PRES/ABSN URINE INCON ASSESS: CPT | Performed by: FAMILY MEDICINE

## 2022-04-07 PROCEDURE — G8399 PT W/DXA RESULTS DOCUMENT: HCPCS | Performed by: FAMILY MEDICINE

## 2022-04-07 ASSESSMENT — ENCOUNTER SYMPTOMS
COUGH: 0
CHEST TIGHTNESS: 0
CHOKING: 0
ABDOMINAL DISTENTION: 0
BLOOD IN STOOL: 0
DIARRHEA: 0
WHEEZING: 0
PHOTOPHOBIA: 0
ABDOMINAL PAIN: 0
EYE PAIN: 0
APNEA: 0
NAUSEA: 0
VOMITING: 0
SHORTNESS OF BREATH: 0
CONSTIPATION: 0

## 2022-04-07 NOTE — PROGRESS NOTES
Patient: Jp Denton     Encounter Date: 4/7/2022    YOB: 1955               Age: 79 y.o. Patient identification was verified at the start of the visit. Patient-Reported Vitals 4/7/2022   Patient-Reported Weight 254   Patient-Reported Height 5'3\"   Patient-Reported Pulse 66   Patient-Reported Temperature 97.5   Patient-Reported SpO2 98         BP Readings from Last 1 Encounters:   04/04/22 134/86       BMI Readings from Last 1 Encounters:   04/04/22 45.03 kg/m²       Pulse Readings from Last 1 Encounters:   04/04/22 60                                               Chief Complaint   Patient presents with    Bariatric, Initial Visit     MWM- NP         Wt Readings from Last 3 Encounters:   04/04/22 254 lb 3.2 oz (115.3 kg)   03/28/22 255 lb (115.7 kg)   03/02/22 256 lb 9.6 oz (116.4 kg)     HPI:    79 y.o. female presents to establish care via video visit. The patient's medical history is significant for class III obesity. The patient has a long-standing history of obesity which started gradually. The problem is severe. The patient has been gaining weight. Risk factors include annual weight gain of >2 lbs (1 kg)/ year and sedentary lifestyle. Aggravating factors include poor diet and lack of physical activity. The patient has tried various diet/exercise plans which have been ineffective in the long-run. She is motivated to start losing weight to help improve her overall health. When did you become overweight? [] Childhood   [] Teens   [x] Adulthood   [] Pregnancy   [] Menopause    Highest adult weight: 259 pounds at current age     Triggers for weight gain?    [x] Stress   [] Illness   [] Medications   [] Travel  []Injury     [] Nightshift work   [] Insomnia  [] No specific triggers   [x] Other: Limited mobility due to back surgery     Food triggers:   [x] Stress   [x] Boredom   [] Fast food   [] Eating out   [] Seeking reward   [] Social     Have you ever taken medications to help you lose weight? [] Yes  [x] No    Have you ever been on a meal replacement program?  [] Yes  [x] No        The patient denies any significant cardiac or psychiatric disease. The patient denies a history thyroid disease. The patient denies a history of glaucoma. The patient denies a history of seizure disorders/epiliepsy. Dietitian's assessment reviewed and addressed with patient. Reviewed:  [x] Nutrition and the importance of regular protein intake  [x] Hidden CHO/carbohydrate sources  [x] Alcohol use  [x] Tobacco use  [x] Drug use- Denies   [x] Importance of exercise and reducing sedentary time        Controlled Substance Monitoring:     No flowsheet data found.      Allergies   Allergen Reactions    Latex Rash    Reglan [Metoclopramide Hcl] Rash    Univasc [Moexipril] Other (See Comments)     unsure    Crestor [Rosuvastatin]      myalgia    Zoloft [Sertraline]      nightmares    Celecoxib Palpitations    Keflex [Cephalexin] Diarrhea    Lipitor Other (See Comments)     myalgia    Metoclopramide Anxiety    Prochlorperazine Anxiety    Prochlorperazine Edisylate     Sulfa Antibiotics Nausea And Vomiting    Vioxx          Current Outpatient Medications:     pravastatin (PRAVACHOL) 20 MG tablet, TAKE 1 TABLET BY MOUTH EVERY DAY, Disp: 90 tablet, Rfl: 3    methylPREDNISolone (MEDROL DOSEPACK) 4 MG tablet, Take by mouth., Disp: 1 kit, Rfl: 0    pantoprazole (PROTONIX) 40 MG tablet, TAKE 1 TABLET BY MOUTH TWICE A DAY, Disp: 180 tablet, Rfl: 1    apixaban (ELIQUIS) 5 MG TABS tablet, TAKE 1 TABLET BY MOUTH TWICE A DAY, Disp: 60 tablet, Rfl: 0    fluticasone (FLONASE) 50 MCG/ACT nasal spray, 1 spray by Each Nostril route daily, Disp: , Rfl:     losartan (COZAAR) 50 MG tablet, Take 1 tablet by mouth daily, Disp: 30 tablet, Rfl: 3    verapamil (CALAN SR) 120 MG extended release tablet, TAKE 1 TABLET BY MOUTH NIGHTLY DO NOT CRUSH OR CHEW., Disp: 90 tablet, Rfl: 3    clotrimazole-betamethasone (LOTRISONE) 1-0.05 % cream, APPLY TO AFFECTED AREA TWICE A DAY, Disp: 45 g, Rfl: 0    buPROPion (WELLBUTRIN XL) 150 MG extended release tablet, Take 1 tablet by mouth every morning, Disp: 90 tablet, Rfl: 1    metroNIDAZOLE (METROGEL) 0.75 % gel, APPLY TO AFFECTED AREA TWICE A DAY, Disp: 45 g, Rfl: 0    methocarbamol (ROBAXIN) 750 MG tablet, Take 750 mg by mouth every 8 hours as needed, Disp: , Rfl:     cetirizine (ZYRTEC ALLERGY) 10 MG tablet, Take 1 tablet by mouth daily, Disp: , Rfl:     metoprolol succinate (TOPROL XL) 25 MG extended release tablet, Take 1 tablet by mouth daily, Disp: 90 tablet, Rfl: 0    acetaminophen (TYLENOL) 500 MG tablet, Take 500 mg by mouth every 6 hours as needed for Pain, Disp: , Rfl:     Multiple Vitamins-Minerals (CENTRUM SILVER PO), Take 1 tablet by mouth daily , Disp: , Rfl:     Patient Active Problem List   Diagnosis    Vitamin D deficiency    Generalized osteoarthrosis, involving multiple sites    Chronic low back pain    Hemiplegic migraine    Allergic rhinitis    GERD (gastroesophageal reflux disease)    Degenerative disc disease, lumbar    Essential hypertension    Major depressive disorder with single episode, 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[facility])    Mixed hyperlipidemia    Rosacea    Shingles (herpes zoster) polyneuropathy    Palpitations    Atrial tachycardia ([facility])    Paresthesia    [facility]peripheral artery disease) [facility]    Chest pain    Morbidly obese ([facility])    Epigastric discomfort    Chronic back pain    Lumbar stenosis with neurogenic claudication    Dehiscence of fascia    Ileus ([facility])    S/P lumbar and lumbosacral fusion by anterior technique    Morbid obesity with BMI of 45.0-49.9, adult ([facility]       Past Medical History:   Diagnosis Date    Allergic rhinitis     Anxiety     Atrial tachycardia ([address]     dr Hilda Ellison ([facility])    Carpal tunnel syndrome     Cervicalgia     Chronic back pain     low    Depression     DVT (deep venous thrombosis) ([facility]) 2021    after surgery, bilateral    GERD (gastroesophageal reflux disease)     has schatzki ring    Headache(784.0)     hemiplegic migraines, improved    Hernia hiatal     Histoplasmosis     Hot flashes     takes wellbutrin    Hyperlipidemia     Hypertension     Obesity     Osteoarthritis     multiple joints    Other partial intestinal obstruction ([address] 2021    wound dehiscence    PONV (postoperative nausea and vomiting)     Shingles     nerve pain in her low back persists    Sleep apnea     CPAP set of 4    Tricuspid regurgitation     moderate       Past Surgical History:   Procedure Laterality Date    ABDOMEN SURGERY N/A 8/1/2021    REPAIR OF WOUND DEHISCENCE performed by Humberto Watts MD at [address] [place] [address] Right 2013    benign    BREAST SURGERY Right     Biopsy w/clip    BUNIONECTOMY      right     COLONOSCOPY  10/2018    fu 10 yrs    KNEE ARTHROSCOPY      KNEE CARTILAGE SURGERY      LOBECTOMY      partial right lung lobectomy    LUMBAR FUSION N/A 7/29/2021    L4-S1 ANTERIOR LUMBAR INTERBODY FUSION WITH PEDICLE SCREW FIXATION performed by Elina England. Drew Preston MD at [facility] PARATHYROID GLAND SURGERY      PARTIAL HYSTERECTOMY      ovaries retained    SINUS SURGERY      TOTAL KNEE ARTHROPLASTY Bilateral 03/03/2015       Family History   Problem Relation Age of Onset    High Blood Pressure Mother     Heart Disease Mother     Cancer Mother         vaginal    High Blood Pressure Father     Heart Disease Father     Heart Disease Sister     Colon Cancer Sister     Heart Disease Brother     High Blood Pressure Brother     Cancer Brother         oral    Breast Cancer Sister         50+    COPD Sister     Breast Cancer Sister         50+    Heart Disease Brother     Hearing Loss Brother         chf and transplant    High Blood Pressure Brother        Review of Systems   Constitutional: Negative for fatigue.    Eyes: Negative for photophobia, pain and visual disturbance. Respiratory: Negative for apnea, cough, choking, chest tightness, shortness of breath and wheezing. Cardiovascular: Negative for chest pain, palpitations and leg swelling. Gastrointestinal: Negative for abdominal distention, abdominal pain, blood in stool, constipation, diarrhea, nausea and vomiting. Endocrine: Negative for cold intolerance and heat intolerance. Musculoskeletal: Negative for arthralgias and myalgias. Skin: Negative for rash. Neurological: Negative for dizziness, tremors, syncope, weakness, numbness and headaches. Psychiatric/Behavioral: Negative for agitation, confusion, decreased concentration, dysphoric mood, hallucinations, sleep disturbance and suicidal ideas. The patient is not nervous/anxious and is not hyperactive. Physical Exam  Constitutional:       Appearance: She is well-developed. HENT:      Head: Normocephalic. Eyes:      Conjunctiva/sclera: Conjunctivae normal.   Abdominal:      General: Abdomen is protuberant. Musculoskeletal:         General: No swelling. Neurological:      Mental Status: She is alert and oriented to person, place, and time. Psychiatric:         Mood and Affect: Mood normal.         Behavior: Behavior normal.         Thought Content: Thought content normal.         Judgment: Judgment normal.         Nurse Only on 12/27/2021   Component Date Value Ref Range Status    Rapid Influenza A Ag 12/27/2021 Negative  Negative Final    Rapid Influenza B Ag 12/27/2021 Negative  Negative Final    SARS-CoV-2 12/27/2021 Not Detected  Not detected Final    Comment: This test has been authorized by FDA under an  Emergency Use Authorization (EUA).   This test is only authorized for the duration of the  time of declaration that circumstances exist justifying the  authorization of the emergency use of in vitro diagnostic  testing for detection of the SARS-CoV-2 virus  and/or diagnosis of COVID-19 infection under  section 564 (b)(1) of the Act, 21 U. S.C. 318XOW-0 (b) (1),  unless the authorization is terminated or revoked sooner. Patient Fact [employer]  Provider Fact [employer].    METHODOLOGY: RT PCR           Assessment and Plan:    1. Morbid obesity with BMI of 45.0-49.9, adult ([facility] [facility]  Heavily counseled on the importance of therapeutic lifestyle changes through diet and exercise. The patient understands that the goal of treatment is to reach and stay at a healthy weight. The initial treatment goal is to lose at least 5-10% of her body weight in 12 weeks. This will require changes in eating habits, increased physical activity, and behavior changes. Counseled on low carb/blanca diet. Patient handouts and education material provided and reviewed in detail with the patient. All questions answered. Encouraged patient to keep a food journal and to bring it to her next visit. Discussed available treatment options in addition to lifestyle changes including medications or OPTIFAST. She is interested in lifestyle management only. F/u 4-6 weeks. HP access on hold until she is ready. 2. Dietary counseling and surveillance  [id number]/low carb meal plan. Nutrition:  [] LCHF/Ketogenic [x] Low carb/low-calorie diet [] Low-calorie diet     []Maintenance        FITTE:   [x] Cardio [] Resistance/stength exercises   [x] ACSM recommendations (150 minutes/week)           Behavior:   [x] Motivational interviewing performed    [] Referral for counseling  [x] Discussed strategies to overcome habits/challenges for focus      [x] Stress management   [x] Stimulus control  [x] Sleep hygiene      No orders of the defined types were placed in this encounter. No follow-ups on file. Kelby Anguiano is a 79 y.o. female being evaluated by a Virtual Visit (video visit) encounter to address concerns as mentioned above. A caregiver was present when appropriate.  Due to this being a TeleHealth encounter (During QBQYM-84 public health emergency), evaluation of the following organ systems was limited: Vitals/Constitutional/EENT/Resp/CV/GI//MS/Neuro/Skin/Heme-Lymph-Imm. Pursuant to the emergency declaration under the [facility], [address] [place] [address] and the ERN and [employer] Act, this Virtual Visit was conducted with patient's (and/or legal guardian's) consent, to reduce the patient's risk of exposure to COVID-19 and provide necessary medical care. The patient (and/or legal guardian) has also been advised to contact this office for worsening conditions or problems, and seek emergency medical treatment and/or call 911 if deemed necessary. Services were provided through a video synchronous discussion virtually to substitute for in-person clinic visit. Patient and provider were located at their individual homes. --Vilma Godinez MD on 4/7/2022 at 4:45 PM    An electronic signature was used to authenticate this note.

## 2022-04-08 ENCOUNTER — TELEPHONE (OUTPATIENT)
Dept: BARIATRICS/WEIGHT MGMT | Age: 67
End: 2022-04-08

## 2022-04-08 NOTE — TELEPHONE ENCOUNTER
Called pt and reviewed how to change the calorie goal and how to take away an exercise she accidentally added. No further questions at this time. ----- Message from Rodrick Batista MD sent at 4/7/2022  4:53 PM EDT -----  Regarding: MFP Questions  Please call pt to review how to use MFP. Thank you.

## 2022-04-13 NOTE — PROGRESS NOTES
Merle Chapa (:  1955) is a 79 y.o. female, here for evaluation of the following chief complaint(s):    Follow-up (skin concerns )      ASSESSMENT/PLAN:  1. Recurrent acute deep vein thrombosis (DVT) of lower extremity, unspecified laterality ([facility]  Completed course of therapy with Eliquis. Disorder labs are still pending. She is taking daily aspirin. She wears compression stockings. She knows to get up and move around on long car trips. -     aspirin EC 81 MG EC tablet; Take 1 tablet by mouth daily, Disp-30 tablet, R-3OTC  2. Gastroesophageal reflux disease, unspecified whether esophagitis present  Controlled on pantoprazole  3. Essential hypertension  Controlled on losartan and metoprolol  4. Major depressive disorder with single episode, 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HCC)  Controlled on Wellbutrin  5. Hyperlipidemia, unspecified hyperlipidemia type  Taking pravastatin. Labs next time. 6. Seborrheic dermatitis    -     ketoconazole (NIZORAL) 2 % shampoo; Apply topically daily as needed. , Disp-120 mL, R-0, Normal  7. Contact dermatitis due to other chemical product, unspecified contact dermatitis type  From CPAP. Interestingly her skin cleared up when on a Medrol Dosepak  -     pimecrolimus (ELIDEL) 1 % cream; Apply topically 2 times daily. , Disp-30 g, R-0, Normal  8. Rosacea  MetroGel not effective  -     clindamycin-benzoyl peroxide (BENZACLIN) 1-5 % gel; Apply topically 2 times daily. , Disp-35 g, R-0, Normal  9. Non-seasonal allergic rhinitis, unspecified trigger  She is now taking Claritin    --  PAD (peripheral artery disease) [facility])  Reviewed  note of Dr. Vinita Palacios  Atrial tachycardia [facility])   On Toprol.   Established with cardiology  Body mass index (BMI) 45.0-49.9, adult ([facility] [facility]  -     [employer] [place]  At high risk for falls  Is participating in water aerobics  --  Lumbar corset while working to prevent back pain     Return in about 4 months (around 8/14/2022). SUBJECTIVE/OBJECTIVE:  HPI   Patient is here for routine visit. She is feeling so much better. She is going back to work tomorrow at [employer]. She is now off her Eliquis. She is taking a daily aspirin and wearing her compression stockings. She recently took a Medrol Dosepak for exacerbation of back pain. She feels improved. Interestingly her skin cleared up on her face on the Medrol Dosepak with much less redness. MetroGel has never really been effective. She is happy that her hair stopped falling out. She is working out at [employer] doing [employer]. Past Medical History:   Diagnosis Date    Allergic rhinitis     Anxiety     Atrial tachycardia ([facility]     dr Cyn Smalls ([facility])    Carpal tunnel syndrome     Cervicalgia     Chronic back pain     low    Depression     DVT (deep venous thrombosis) ([facility] [facility] 2021    after surgery, bilateral    GERD (gastroesophageal reflux disease)     has schatzki ring    Headache(784.0)     hemiplegic migraines, improved    Hernia hiatal     Histoplasmosis     Hot flashes     takes wellbutrin    Hyperlipidemia     Hypertension     Obesity     Osteoarthritis     multiple joints    Other partial intestinal obstruction ([facility] [address] [facility] 2021    wound dehiscence    PONV (postoperative nausea and vomiting)     Shingles     nerve pain in her low back persists    Sleep apnea     CPAP set of 4    Tricuspid regurgitation     moderate       Current Outpatient Medications   Medication Sig Dispense Refill    aspirin EC 81 MG EC tablet Take 1 tablet by mouth daily 30 tablet 3    Omega-3 Fatty Acids (FISH OIL) 1000 MG CAPS Take 2 capsules by mouth daily      loratadine (CLARITIN) 10 MG tablet Take 10 mg by mouth daily      ketoconazole (NIZORAL) 2 % shampoo Apply topically daily as needed. 120 mL 0    pimecrolimus (ELIDEL) 1 % cream Apply topically 2 times daily.  30 g 0    clindamycin-benzoyl peroxide (BENZACLIN) 1-5 % gel Apply topically 2 times daily. 35 g 0    pravastatin (PRAVACHOL) 20 MG tablet TAKE 1 TABLET BY MOUTH EVERY DAY 90 tablet 3    pantoprazole (PROTONIX) 40 MG tablet TAKE 1 TABLET BY MOUTH TWICE A  tablet 1    fluticasone (FLONASE) 50 MCG/ACT nasal spray 1 spray by Each Nostril route daily      losartan (COZAAR) 50 MG tablet Take 1 tablet by mouth daily 30 tablet 3    verapamil (CALAN SR) 120 MG extended release tablet TAKE 1 TABLET BY MOUTH NIGHTLY DO NOT CRUSH OR CHEW. 90 tablet 3    buPROPion (WELLBUTRIN XL) 150 MG extended release tablet Take 1 tablet by mouth every morning 90 tablet 1    methocarbamol (ROBAXIN) 750 MG tablet Take 750 mg by mouth every 8 hours as needed      metoprolol succinate (TOPROL XL) 25 MG extended release tablet Take 1 tablet by mouth daily 90 tablet 0    acetaminophen (TYLENOL) 500 MG tablet Take 500 mg by mouth every 6 hours as needed for Pain      Multiple Vitamins-Minerals (CENTRUM SILVER PO) Take 1 tablet by mouth daily        No current facility-administered medications for this visit. Physical Exam  Vitals and nursing note reviewed. Constitutional:       General: She is not in acute distress. Appearance: She is well-developed. HENT:      Head: Normocephalic and atraumatic. Right Ear: External ear normal.      Left Ear: External ear normal.   Eyes:      General: No scleral icterus. Extraocular Movements: Extraocular movements intact. Neck:      Thyroid: No thyromegaly. Cardiovascular:      Rate and Rhythm: Normal rate and regular rhythm. Heart sounds: No murmur heard. Pulmonary:      Effort: No respiratory distress. Breath sounds: Normal breath sounds. No wheezing or rales. Abdominal:      General: Bowel sounds are normal. There is no distension. Palpations: Abdomen is soft. Tenderness: There is no abdominal tenderness. Musculoskeletal:         General: Normal range of motion. Lymphadenopathy:      Cervical: No cervical adenopathy. Skin:     General: Skin is warm and dry. Comments: Mild diffuse facial redness   Neurological:      Mental Status: She is alert and oriented to person, place, and time. Cranial Nerves: No cranial nerve deficit. Sensory: No sensory deficit. Coordination: Coordination normal.   Psychiatric:         Behavior: Behavior normal.               This note was generated completely or in part utilizing Dragon dictation speech recognition software. Occasionally, words are mistranscribed and despite editing, the text may contain inaccuracies due to incorrect word recognition. If further clarification is needed please contact the office at [phone]          An electronic signature was used to authenticate this note.     --Abby Moreira MD

## 2022-04-14 ENCOUNTER — OFFICE VISIT (OUTPATIENT)
Dept: INTERNAL MEDICINE CLINIC | Age: 67
End: 2022-04-14
Payer: MEDICARE

## 2022-04-14 VITALS
HEART RATE: 65 BPM | DIASTOLIC BLOOD PRESSURE: 62 MMHG | WEIGHT: 254.2 LBS | BODY MASS INDEX: 45.03 KG/M2 | OXYGEN SATURATION: 95 % | SYSTOLIC BLOOD PRESSURE: 138 MMHG

## 2022-04-14 DIAGNOSIS — E78.5 HYPERLIPIDEMIA, UNSPECIFIED HYPERLIPIDEMIA TYPE: ICD-10-CM

## 2022-04-14 DIAGNOSIS — F32.4 MAJOR DEPRESSIVE DISORDER WITH SINGLE EPISODE, IN PARTIAL REMISSION (HCC): ICD-10-CM

## 2022-04-14 DIAGNOSIS — I10 ESSENTIAL HYPERTENSION: ICD-10-CM

## 2022-04-14 DIAGNOSIS — I82.409 RECURRENT ACUTE DEEP VEIN THROMBOSIS (DVT) OF LOWER EXTREMITY, UNSPECIFIED LATERALITY (HCC): Primary | ICD-10-CM

## 2022-04-14 DIAGNOSIS — L21.9 SEBORRHEIC DERMATITIS: ICD-10-CM

## 2022-04-14 DIAGNOSIS — J30.89 NON-SEASONAL ALLERGIC RHINITIS, UNSPECIFIED TRIGGER: ICD-10-CM

## 2022-04-14 DIAGNOSIS — L25.3 CONTACT DERMATITIS DUE TO OTHER CHEMICAL PRODUCT, UNSPECIFIED CONTACT DERMATITIS TYPE: ICD-10-CM

## 2022-04-14 DIAGNOSIS — L71.9 ROSACEA: ICD-10-CM

## 2022-04-14 DIAGNOSIS — K21.9 GASTROESOPHAGEAL REFLUX DISEASE, UNSPECIFIED WHETHER ESOPHAGITIS PRESENT: ICD-10-CM

## 2022-04-14 PROCEDURE — 1090F PRES/ABSN URINE INCON ASSESS: CPT | Performed by: INTERNAL MEDICINE

## 2022-04-14 PROCEDURE — 1036F TOBACCO NON-USER: CPT | Performed by: INTERNAL MEDICINE

## 2022-04-14 PROCEDURE — 3017F COLORECTAL CA SCREEN DOC REV: CPT | Performed by: INTERNAL MEDICINE

## 2022-04-14 PROCEDURE — G8417 CALC BMI ABV UP PARAM F/U: HCPCS | Performed by: INTERNAL MEDICINE

## 2022-04-14 PROCEDURE — 99214 OFFICE O/P EST MOD 30 MIN: CPT | Performed by: INTERNAL MEDICINE

## 2022-04-14 PROCEDURE — 4040F PNEUMOC VAC/ADMIN/RCVD: CPT | Performed by: INTERNAL MEDICINE

## 2022-04-14 PROCEDURE — G8427 DOCREV CUR MEDS BY ELIG CLIN: HCPCS | Performed by: INTERNAL MEDICINE

## 2022-04-14 PROCEDURE — 1123F ACP DISCUSS/DSCN MKR DOCD: CPT | Performed by: INTERNAL MEDICINE

## 2022-04-14 PROCEDURE — G8399 PT W/DXA RESULTS DOCUMENT: HCPCS | Performed by: INTERNAL MEDICINE

## 2022-04-14 RX ORDER — KETOCONAZOLE 20 MG/ML
SHAMPOO TOPICAL
Qty: 120 ML | Refills: 0 | Status: SHIPPED | OUTPATIENT
Start: 2022-04-14 | End: 2022-06-23

## 2022-04-14 RX ORDER — CHLORAL HYDRATE 500 MG
2000 CAPSULE ORAL DAILY
COMMUNITY
Start: 2022-04-14

## 2022-04-14 RX ORDER — CLINDAMYCIN AND BENZOYL PEROXIDE 10; 50 MG/G; MG/G
GEL TOPICAL
Qty: 35 G | Refills: 0 | Status: SHIPPED | OUTPATIENT
Start: 2022-04-14

## 2022-04-14 RX ORDER — ASPIRIN 81 MG/1
81 TABLET ORAL DAILY
Qty: 30 TABLET | Refills: 3 | COMMUNITY
Start: 2022-04-14

## 2022-04-14 RX ORDER — LORATADINE 10 MG/1
10 TABLET ORAL DAILY
COMMUNITY
Start: 2022-03-09 | End: 2022-09-06

## 2022-04-14 RX ORDER — PIMECROLIMUS 10 MG/G
CREAM TOPICAL
Qty: 30 G | Refills: 0 | Status: SHIPPED | OUTPATIENT
Start: 2022-04-14

## 2022-04-14 NOTE — PATIENT INSTRUCTIONS
Consider lumbar corset    Selsun blue shampoo or ketoconazole rx shampoo and can apply to scalp and facial skin    elidel cream (contact dermatitis)        Dermatology group  [phone]

## 2022-04-18 ENCOUNTER — TELEPHONE (OUTPATIENT)
Dept: ADMINISTRATIVE | Age: 67
End: 2022-04-18

## 2022-04-20 ENCOUNTER — TELEPHONE (OUTPATIENT)
Dept: INTERNAL MEDICINE CLINIC | Age: 67
End: 2022-04-20

## 2022-04-21 RX ORDER — DIAPER,BRIEF,INFANT-TODD,DISP
EACH MISCELLANEOUS
Qty: 30 G | Refills: 1 | Status: SHIPPED | OUTPATIENT
Start: 2022-04-21 | End: 2022-04-28

## 2022-04-21 NOTE — TELEPHONE ENCOUNTER
Let patient know her plan denied the Elidel that I had prescribed for her facial rash. They recommend topical hydrocortisone which will likely help but only should be used for short time periods. If not effective, then would advise Dermatology.

## 2022-04-21 NOTE — TELEPHONE ENCOUNTER
Pt was notified of the denial and is fine with trying the hydrocortisone     Pt is requesting a refill to be sent

## 2022-05-16 RX ORDER — GABAPENTIN 100 MG/1
100 CAPSULE ORAL 2 TIMES DAILY
Qty: 60 CAPSULE | Refills: 0 | Status: SHIPPED | OUTPATIENT
Start: 2022-05-16 | End: 2022-06-15

## 2022-06-09 DIAGNOSIS — I10 ESSENTIAL HYPERTENSION: ICD-10-CM

## 2022-06-09 RX ORDER — LOSARTAN POTASSIUM 50 MG/1
50 TABLET ORAL DAILY
Qty: 90 TABLET | Refills: 0 | Status: SHIPPED | OUTPATIENT
Start: 2022-06-09 | End: 2022-09-06

## 2022-06-14 ENCOUNTER — PATIENT MESSAGE (OUTPATIENT)
Dept: INTERNAL MEDICINE CLINIC | Age: 67
End: 2022-06-14

## 2022-06-14 NOTE — TELEPHONE ENCOUNTER
From: Diego Guzman  To: Dr. Mantilla Lights: 6/14/2022 5:36 PM EDT  Subject: ReFill gabapentin     Hello, [facility] will be sending over a request for refill in the morning.  I only have a couple more days left  Thank you,  Ronald Ha

## 2022-06-15 RX ORDER — GABAPENTIN 100 MG/1
CAPSULE ORAL
Qty: 60 CAPSULE | Refills: 2 | Status: SHIPPED | OUTPATIENT
Start: 2022-06-15 | End: 2022-09-14

## 2022-06-20 NOTE — PROGRESS NOTES
Venancio Ferrell (:  1955) is a 79 y.o. female, here for evaluation of the following chief complaint(s):    Follow-up (both feet and hand burning, numbness slight tingling)      ASSESSMENT/PLAN:  1. Neuropathy  Check related labs. Foot symptoms more likely plantar fasciitis than neuropathy. 2. Carpal tunnel syndrome, bilateral upper limbs   -     EMG; Future  3. Chronic left shoulder pain  Can try topical Diclofenac gel  -     XR SHOULDER LEFT (MIN 2 VIEWS); Future  4. Neck pain   Suggested more supportive pillow  -     XR CERVICAL SPINE (2-3 VIEWS); Future  5. Recurrent acute deep vein thrombosis (DVT) of lower extremity, unspecified laterality ([facility] [address]  Completed course of therapy with Eliquis. Recheck Protein C lab. She is taking daily aspirin. She wears compression stockings. She knows to get up and move around on long car trips. -     PROTEIN C FUNCTIONAL; Future  6. Gastroesophageal reflux disease, unspecified whether esophagitis present  Controlled on pantoprazole  -     Magnesium; Future  7. Hyperlipidemia, unspecified hyperlipidemia type  Taking pravastatin. -     Lipid Panel; Future  -     Comprehensive Metabolic Panel; Future  8. Hyperglycemia  -     Hemoglobin A1C; Future  Essential hypertension  Controlled on losartan and metoprolol  Major depressive disorder with single episode, 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HCC)  Controlled on Wellbutrin    Keep 22 appt    SUBJECTIVE/OBJECTIVE:  HPI   Patient complains of burning bottom of foot pain when up on her feet for a long time at her job. She states low-dose gabapentin helps but higher dose gabapentin makes her sleepy. She also uses topical Topricin (herbal remedy) and tries to wear supportive shoes. She also complains of left anterior shoulder discomfort that goes down her arm. At night her hands hurt and go to sleep when she lays on her side, pedro the first 3 fingers. She wears compression supports on her arms which help.     On chart review I observed a June 2028 normal EMG. Also on chart review was a bone scan showing degenerative/arthritic activity in the ankle and midfoot. Past Medical History:   Diagnosis Date    Allergic rhinitis     Anxiety     Atrial tachycardia ([facility] [address]     dr Chris Alfredo ([facility])    Carpal tunnel syndrome     Cervicalgia     Chronic back pain     low    Depression     DVT (deep venous thrombosis) ([facility] [address] 2021    after surgery, bilateral    GERD (gastroesophageal reflux disease)     has schatzki ring    Headache(784.0)     hemiplegic migraines, improved    Hernia hiatal     Histoplasmosis     Hot flashes     takes wellbutrin    Hyperlipidemia     Hypertension     Obesity     Osteoarthritis     multiple joints    Other partial intestinal obstruction (HCC) 2021    wound dehiscence    PONV (postoperative nausea and vomiting)     Shingles     nerve pain in her low back persists    Sleep apnea     CPAP set of 4    Tricuspid regurgitation     moderate       Current Outpatient Medications   Medication Sig Dispense Refill    gabapentin (NEURONTIN) 100 MG capsule TAKE 1 CAPSULE BY MOUTH TWICE DAILY 60 capsule 2    losartan (COZAAR) 50 mg tablet TAKE 1 TABLET BY MOUTH DAILY 90 tablet 0    aspirin EC 81 MG EC tablet Take 1 tablet by mouth daily 30 tablet 3    Omega-3 Fatty Acids (FISH OIL) 1000 MG CAPS Take 2 capsules by mouth daily      loratadine (CLARITIN) 10 MG tablet Take 10 mg by mouth daily      pimecrolimus (ELIDEL) 1 % cream Apply topically 2 times daily. 30 g 0    clindamycin-benzoyl peroxide (BENZACLIN) 1-5 % gel Apply topically 2 times daily.  35 g 0    pravastatin (PRAVACHOL) 20 MG tablet TAKE 1 TABLET BY MOUTH EVERY DAY 90 tablet 3    pantoprazole (PROTONIX) 40 MG tablet TAKE 1 TABLET BY MOUTH TWICE A  tablet 1    fluticasone (FLONASE) 50 MCG/ACT nasal spray 1 spray by Each Nostril route daily      verapamil (CALAN SR) 120 MG extended release tablet TAKE 1 TABLET BY MOUTH NIGHTLY DO NOT CRUSH OR CHEW. 90 tablet 3    buPROPion (WELLBUTRIN XL) 150 MG extended release tablet Take 1 tablet by mouth every morning 90 tablet 1    metoprolol succinate (TOPROL XL) 25 MG extended release tablet Take 1 tablet by mouth daily 90 tablet 0    acetaminophen (TYLENOL) 500 MG tablet Take 500 mg by mouth every 6 hours as needed for Pain      Multiple Vitamins-Minerals (CENTRUM SILVER PO) Take 1 tablet by mouth daily        No current facility-administered medications for this visit. Fish Oil     Physical Exam  Vitals reviewed. Constitutional:       General: She is not in acute distress. HENT:      Head: Normocephalic and atraumatic. Neck:      Comments: Lower neck kyphosis  Cardiovascular:      Rate and Rhythm: Normal rate and regular rhythm. Pulmonary:      Breath sounds: Normal breath sounds. Musculoskeletal:         General: Tenderness (left anterior shoulder) present. Neurological:      Mental Status: She is alert and oriented to person, place, and time. Gait: Gait abnormal (slow). Comments: Neg tinel's   Psychiatric:         Mood and Affect: Mood normal.               This note was generated completely or in part utilizing Dragon dictation speech recognition software. Occasionally, words are mistranscribed and despite editing, the text may contain inaccuracies due to incorrect word recognition. If further clarification is needed please contact the office at [phone]          An electronic signature was used to authenticate this note.     --Redd Smith MD

## 2022-06-23 ENCOUNTER — OFFICE VISIT (OUTPATIENT)
Dept: INTERNAL MEDICINE CLINIC | Age: 67
End: 2022-06-23
Payer: MEDICARE

## 2022-06-23 ENCOUNTER — HOSPITAL ENCOUNTER (OUTPATIENT)
Age: 67
Discharge: HOME | End: 2022-06-23
Payer: MEDICARE

## 2022-06-23 ENCOUNTER — HOSPITAL ENCOUNTER (OUTPATIENT)
Dept: GENERAL RADIOLOGY | Age: 67
Discharge: HOME | End: 2022-06-23
Payer: MEDICARE

## 2022-06-23 VITALS
OXYGEN SATURATION: 95 % | BODY MASS INDEX: 44.96 KG/M2 | HEART RATE: 56 BPM | SYSTOLIC BLOOD PRESSURE: 138 MMHG | DIASTOLIC BLOOD PRESSURE: 72 MMHG | WEIGHT: 253.8 LBS

## 2022-06-23 DIAGNOSIS — R73.9 HYPERGLYCEMIA: ICD-10-CM

## 2022-06-23 DIAGNOSIS — G89.29 CHRONIC LEFT SHOULDER PAIN: Primary | ICD-10-CM

## 2022-06-23 DIAGNOSIS — G56.03 CARPAL TUNNEL SYNDROME, BILATERAL UPPER LIMBS: ICD-10-CM

## 2022-06-23 DIAGNOSIS — M54.2 CERVICALGIA: ICD-10-CM

## 2022-06-23 DIAGNOSIS — M25.512 CHRONIC LEFT SHOULDER PAIN: ICD-10-CM

## 2022-06-23 DIAGNOSIS — I82.409 RECURRENT ACUTE DEEP VEIN THROMBOSIS (DVT) OF LOWER EXTREMITY, UNSPECIFIED LATERALITY (HCC): ICD-10-CM

## 2022-06-23 DIAGNOSIS — K21.9 GASTROESOPHAGEAL REFLUX DISEASE, UNSPECIFIED WHETHER ESOPHAGITIS PRESENT: ICD-10-CM

## 2022-06-23 DIAGNOSIS — G89.29 CHRONIC LEFT SHOULDER PAIN: ICD-10-CM

## 2022-06-23 DIAGNOSIS — M25.512 CHRONIC LEFT SHOULDER PAIN: Primary | ICD-10-CM

## 2022-06-23 DIAGNOSIS — M54.2 NECK PAIN: ICD-10-CM

## 2022-06-23 DIAGNOSIS — G62.9 NEUROPATHY: Primary | ICD-10-CM

## 2022-06-23 DIAGNOSIS — E78.5 HYPERLIPIDEMIA, UNSPECIFIED HYPERLIPIDEMIA TYPE: ICD-10-CM

## 2022-06-23 PROCEDURE — G8417 CALC BMI ABV UP PARAM F/U: HCPCS | Performed by: INTERNAL MEDICINE

## 2022-06-23 PROCEDURE — 1090F PRES/ABSN URINE INCON ASSESS: CPT | Performed by: INTERNAL MEDICINE

## 2022-06-23 PROCEDURE — 1123F ACP DISCUSS/DSCN MKR DOCD: CPT | Performed by: INTERNAL MEDICINE

## 2022-06-23 PROCEDURE — 3017F COLORECTAL CA SCREEN DOC REV: CPT | Performed by: INTERNAL MEDICINE

## 2022-06-23 PROCEDURE — 99214 OFFICE O/P EST MOD 30 MIN: CPT | Performed by: INTERNAL MEDICINE

## 2022-06-23 PROCEDURE — G8399 PT W/DXA RESULTS DOCUMENT: HCPCS | Performed by: INTERNAL MEDICINE

## 2022-06-23 PROCEDURE — 73030 X-RAY EXAM OF SHOULDER: CPT

## 2022-06-23 PROCEDURE — G8427 DOCREV CUR MEDS BY ELIG CLIN: HCPCS | Performed by: INTERNAL MEDICINE

## 2022-06-23 PROCEDURE — 72040 X-RAY EXAM NECK SPINE 2-3 VW: CPT

## 2022-06-23 PROCEDURE — 1036F TOBACCO NON-USER: CPT | Performed by: INTERNAL MEDICINE

## 2022-06-23 SDOH — ECONOMIC STABILITY: FOOD INSECURITY: WITHIN THE PAST 12 MONTHS, YOU WORRIED THAT YOUR FOOD WOULD RUN OUT BEFORE YOU GOT MONEY TO BUY MORE.: NEVER TRUE

## 2022-06-23 SDOH — ECONOMIC STABILITY: FOOD INSECURITY: WITHIN THE PAST 12 MONTHS, THE FOOD YOU BOUGHT JUST DIDN'T LAST AND YOU DIDN'T HAVE MONEY TO GET MORE.: NEVER TRUE

## 2022-06-23 ASSESSMENT — SOCIAL DETERMINANTS OF HEALTH (SDOH): HOW HARD IS IT FOR YOU TO PAY FOR THE VERY BASICS LIKE FOOD, HOUSING, MEDICAL CARE, AND HEATING?: NOT HARD AT ALL

## 2022-06-23 NOTE — PATIENT INSTRUCTIONS
EMG  [id number]    Memory foam, supportive pillow    See podiatrist  [employer] [facility]  Cold soda can, tylenol, gabapentin, capsaicin cream and lidocaine creams may help    Topical diclofenac gel  Get shoulder and neck xray  [phone]

## 2022-06-30 ENCOUNTER — HOSPITAL ENCOUNTER (OUTPATIENT)
Dept: PHYSICAL THERAPY | Age: 67
Setting detail: THERAPIES SERIES
Discharge: HOME | End: 2022-06-30
Payer: MEDICARE

## 2022-06-30 PROCEDURE — 97110 THERAPEUTIC EXERCISES: CPT

## 2022-06-30 PROCEDURE — 97140 MANUAL THERAPY 1/> REGIONS: CPT

## 2022-06-30 PROCEDURE — 97161 PT EVAL LOW COMPLEX 20 MIN: CPT

## 2022-06-30 NOTE — FLOWSHEET NOTE
[facility]  [address] [place] [address], [address], [address]  Phone: [phone]   Fax: [phone]    Physical Therapy Treatment Note/ Progress Report:     Date:  2022    Patient Name:  Bard Vu    :  1955  MRN: [id number]  Medical/Treatment Diagnosis Information:  · Diagnosis: M25.512,G89.29 chronic L shoulder pain, M54.2 Cervicalgia  · Treatment Diagnosis: M25.512,G89.29 chronic L shoulder pain, M54.2 Cervicalgia  Insurance/Certification information:  PT Insurance Information: [employer]  Physician Information:   [address] [place] [address] signed:    [] Yes  [x] No    Date of Patient follow up with Physician:      Progress Report: []  Yes  [x]  No     Date Range for reporting period:  Beginnin22  Ending:      Progress report due (10 Rx/or 30 days whichever is less): 28    Recertification due (POC duration/ or 90 days whichever is less):      Visit # Insurance Allowable Auth Needed   1/10 10 visits -8/15 [x]Yes   []No     RESTRICTIONS/PRECAUTIONS: as tolerated  Latex Allergy:  [x]NO      []YES  Preferred Language for Healthcare:   [x]English       []other:  Functional Scale: 22: FOTO neck 65 (35%), dc goal 69    Pain level:  5/10 rest, 10/10 @ night/activity     SUBJECTIVE:  See eval    OBJECTIVE: See eval   Observation:    Test measurements:      Exercises/Interventions: Exercises in bold performed in department today. Items not bolded are carried forward from prior visits for continuity of the record.     Exercise/Equipment Resistance/Repetitions HEP Other comments     []    Doorway pec stretch 30 sec x 3 []    Chin tucks  []    UT stretch  []    Manual cervica traction, mobilization for side bending and rotations,passive stretching upper traps, STM upper trap/levator/scalenes manual []      []      []      []      []      []      []      []      []      []      []      []      []      []      Home Exercise Program: pec stretch    Therapeutic Exercise:   [x] (36885) Provided verbal/tactile cueing for activities related to strengthening, flexibility, endurance, ROM  for improvements in scapular, scapulothoracic and UE control with self care, reaching, carrying, lifting, house/yardwork, driving/computer work. (10 min)     NMR:   [] (01624) Provided verbal/tactile cueing for activities related to improving balance, coordination, kinesthetic sense, posture, motor skill, proprioception  to assist with  scapular, scapulothoracic and UE control with self care, reaching, carrying, lifting, house/yardwork, driving/computer work. Therapeutic Activities:    [] (55431) Provided verbal/tactile cueing for activities related to improving balance, coordination, kinesthetic sense, posture, motor skill, proprioception and motor activation to allow for proper function of scapular, scapulothoracic and UE control with self care, carrying, lifting, driving/computer work. Home Exercise Program:    [x] (03317) Reviewed/Progressed HEP activities related to strengthening, flexibility, endurance, ROM of scapular, scapulothoracic and UE control with self care, reaching, carrying, lifting, house/yardwork, driving/computer work.   [] (19920) Reviewed/Progressed HEP activities related to improving balance, coordination, kinesthetic sense, posture, motor skill, proprioception of scapular, scapulothoracic and UE control with self care, reaching, carrying, lifting, house/yardwork, driving/computer work.      Manual Treatments:  PROM / STM / Oscillations-Mobs:  G-I, II, III, IV (PA's, Inf., Post.)  [x] (59273) Provided manual therapy to mobilize soft tissue/joints of cervical/CT, scapular GHJ and UE for the purpose of modulating pain, promoting relaxation,  increasing ROM, reducing/eliminating soft tissue swelling/inflammation/restriction, improving soft tissue extensibility and allowing for proper ROM for normal function with self care, reaching, carrying, lifting, house/yardwork, driving/computer work. (25 min)    Modalities:    [] Electric Stimulation:   [] Ultrasound:   [] Other:       Charges:  Timed Code Treatment Minutes: eval x 20, MT x 25, TE x 10   Total Treatment Minutes: 55 min      [] EVAL (LOW) 66846 (typically 20 minutes face-to-face)  [] EVAL (MOD) 60060 (typically 30 minutes face-to-face)  [] EVAL (HIGH) 79992 (typically 45 minutes face-to-face)  [] RE-EVAL     [x] HB(75641) x 1      [] NMR (58505) x       [x] Manual (26654) x  2     [] TA (07622) x         [] ES(attended) (81502)   [] ES (un) (72138)   [] DRY NEEDLE 1 OR 2 MUSCLES  [] DRY NEEDLE 3+ MUSCLES  [] Mech Traction (53678)  [] Ultrasound (67415)  [] Other:      GOALS:    Patient stated goal: \"help with hand numbness\"  []? Progressing: []? Met: []? Not Met: []? Adjusted     Therapist goals for Patient:   Short Term Goals: To be achieved in: 3 weeks  - Independent in HEP and progression per patient tolerance, in order to prevent re-injury   []? Progressing: []? Met: []? Not Met: []? Adjusted  - Patient will have a decrease in pain to 5/10 facilitate improvement in movement, function, and sleeping through the night.  []? Progressing: []? Met: []? Not Met: []? Adjusted     Long Term Goals: To be achieved in: 5 weeks  - Disability index score of 69 for the FOTO neck to assist with reaching prior level of function. []? Progressing: []? Met: []? Not Met: []? Adjusted  - Patient will demonstrate increased  AROM to full in the cervical spine, symmetrical and painfree to allow for proper joint functioning with turning head    []? Progressing: []? Met: []? Not Met: []? Adjusted  - Patient will demonstrate an increase in Strength to full, symmetrical and painfree to allow for proper functional mobility with bending,lifting and carrying at work  - Patient will return to sleeping habits without increased symptoms or restriction as prior to onset of worsening pain.       []? Progressing: []? Met: []? Not Met: []? Adjusted  -Patient will demonstrate an increase in postural awareness and control and activation of  Deep cervical stabilizers to allow for proper functional mobility as indicated by patients Functional Deficits. []? Progressing: []? Met: []? Not Met: []? Adjusted                        ASSESSMENT:  78 YO female pt referred to PT due to Cervicalgia (M54.2) and L shoulder pain (M25.512) with radicular pain,N&T throughout B UE and hands. Pt presents with postural deficits, palpable tenderness throughout neck and shoulder muscles, decreased strengthl as well as pain. Pt is limited in sleeping,lifting,carrying and performing duties at work. Pt tolerated eval without reproduction of pain,N&T and reported feeling better following manual techniques applied to cervical spine. Pt demonstrates good rehab potential and will benefit from continued PT to restore max functional potential without limitations from pain and radicular symptoms    Treatment/Activity Tolerance:  [x] Patient tolerated treatment well [] Patient limited by fatique  [] Patient limited by pain  [] Patient limited by other medical complications  [] Other:     Overall Progression Towards Functional goals/ Treatment Progress Update:  [] Patient is progressing as expected towards functional goals listed. [] Progression is slowed due to complexities/Impairments listed. [] Progression has been slowed due to co-morbidities.   [x] Plan just implemented, too soon to assess goals progression <30days   [] Goals require adjustment due to lack of progress  [] Patient is not progressing as expected and requires additional follow up with physician  [] Other    Prognosis for POC: [x] Good [] Fair  [] Poor    Patient requires continued skilled intervention: [x] Yes  [] No        PLAN: See eval  [] Continue per plan of care [] Alter current plan (see comments)  [x] Plan of care initiated [] Hold pending MD visit [] Discharge    Electronically signed by: Ashlee Goddard PT    Note: If patient does not return for scheduled/recommended follow up visits, this note will serve as a discharge from care along with the most recent update on progress.

## 2022-06-30 NOTE — PLAN OF CARE
The [facility] [employer] Outpatient Therapy  [address], [address] [place] [address], [address] [place] [address] Av  Phone: [phone]   Fax: [phone]                                                       Physical Therapy Certification    Dear Dr. Jonathan Escobar    We had the pleasure of evaluating the following patient for physical therapy services at [facility] ([facility]). A summary of our findings can be found in the initial assessment below. This includes our plan of care. If you have any questions or concerns regarding these findings, please do not hesitate to contact me at the office phone number checked above. Thank you for the referral.       Physician Signature:_______________________________Date:__________________  By signing above (or electronic signature), therapist's plan is approved by physician      Patient: Wayne Lombardi   : 1955   MRN: [id number]  Referring Physician:        Evaluation Date: 2022      Medical Diagnosis Information:  Diagnosis: M25.512,G89.29 chronic L shoulder pain, M54.2 Cervicalgia   Treatment Diagnosis: M25.512,G89.29 chronic L shoulder pain, M54.2 Cervicalgia                                         Insurance information: PT Insurance Information: [employer]     Precautions/ Contra-indications: as tolerated  Latex Allergy:  [x]NO      []YES  Preferred Language for Healthcare:   [x]English       []other:  C-SSRS Triggered by Intake questionnaire (Past 2 wk assessment):   [x] No, Questionnaire did not trigger screening.   [] Yes, Patient intake triggered further evaluation      [] C-SSRS Screening completed  [] PCP notified via Plan of Care  [] Emergency services notified    SUBJECTIVE:  History of Present Illness:      Pt presents with c/o chronic neck pain with numbness throughout B UEs and hands. Pt reports issue is chronic but has worsened in the past 6 months and with starting a job at [employer] in March. . Pt has been diagnosed with CTS and states she will have an EMG in August. Pt describes pain as \"burning\" and numbness is relieved with cervical extension. Pain and numbness interfere with sleeping,and pt is limited also by multiple comorbidities with pain throughout knees,back and feet. Pain       Patient reports pain is 5 /10 pain at present and 10 /10 pain at its worst.  Pain increases with: laying on her sides (sleeping), working, bending,carrying things,      Decreases with: neck extension,position changes, rest    Pain description:  Burning, N&T,radiates shoulders to hands weak/achey pain  Pt. reports pain with coughing, sneezing and laughing:   []Yes   [x]No    []NA     Current Functional Limitations:   [x]Yes   []No  Functional Complaints: working, IADLs and working with pain, sleeping     PLOF:   []No functional limitations   [x]Pre-exisiting limitations: chronic issues with pain in knees,back,feet    Pt's sleep is affected?    [x]Yes   []No         Relevant Medical History: OA,HLD,HTN,B TKR partial R lung lobectomy(histoplasmosis), R bunion surgery,anxiety,depression,atrial tachy,L4-S1 fusion, SBO,wound dehiscence, parathyroid gland surgery, neuropathy (per pt)    Co-morbidities/Complexities (which will affect course of rehabilitation):   []None           Arthritic conditions   []Rheumatoid arthritis (M05.9)  [x]Osteoarthritis (M19.91)   Cardiovascular conditions   [x]Hypertension (I10)  [x]Hyperlipidemia (E78.5)  []Angina pectoris (I20)  []Atherosclerosis (I70)   Musculoskeletal conditions   [x]Disc pathology   []Congenital spine pathologies   [x]Prior surgical intervention  []Osteoporosis (M81.8)  []Osteopenia (M85.8)   Endocrine conditions   []Hypothyroid (E03.9)  []Hyperthyroid Gastrointestinal conditions   []Constipation (Q35.38)   Metabolic conditions   []Morbid obesity (E66.01)  []Diabetes type 1(E10.65) or 2 (E11.65)   [x]Neuropathy (G60.9)     Pulmonary conditions   []Asthma (J45)  []Coughing   []COPD (J44.9)   Psychological Disorders  [x]Anxiety (F41.9)  [x]Depression (F32.9)   []Other:   []Other:           Occupation/School: retired, works 3 days per week at [employer]    Sports/Hobbies/Recreational Activities: water aerobics classes       OBJECTIVE:     Functional Scale/Score:    Gait/Steps/Balance       []Gait WNL                           [x]Deviations on a level linoleum surface include: Trendelenberg,increased lateral displacement (waddles)     Balance tests      Hautard's test: [x]NT []Neg  []Pos with R rot  []Pos with L rot  []Pos with ext      Posture: [x]WNL  [x]Forward head  [x]Forward flexed trunk   [x]Pronounced CT junction w/rounded shoulder girdle    [x]Increased thoracic kyphosis   []Scoliosis  []Scapular winging  [x]Decreased WB on   []R   [x]L     []Other:       Range of Motion  [x]All [facility]  []Cervical Spine   []Thoracic Spine     ROM Deficits Identified             *Denotes pain AROM              % Decresased PROM              % Decreased MMT/Resisted Tests   Flexion WNL     Extension WNL     Sidebending Left 25 deg  tight   Sidebending Right 25 deg  tight   Rotation Left WNL     Rotation Right WNL         UE Range of Motion/Strength Testing      [x]All ROM WFL except as marked below   [x]All strength WFL (5/5) except as marked below    [x]All myotomes WFL (5/5) except as marked below    Range Tested MMT/ Resisted PROM AROM Comments   *denotes pain Left Right Left Right Left Right    Cervical Flexion C1-2          Cervical Sidebend  C3          Shoulder Shrug  C4          Shoulder Flexion          Shoulder Extension          Shoulder Abduction  C5          Shoulder Adduction           Shoulder IR          Shoulder ER          Elbow Flexion  C6          Elbow Extension C7          Pronation          Supination          Wrist Flexion C7          Wrist Extension C6          Radial Deviation          Ulnar Deviation                     Thumb Ext C8          Intrinsics T1              Scapular Strength    [x]All WFL (5/5) except as marked below []NT   Scapula Left Right   Upper Trapezius good good   Middle Trapezius fair fair   Lower Trapezius poor poor   Rhomboid fair fair   Serratus Anterior   fair fair   Latissimus Dorsi fair fair       Deep Tendon Reflexes   [x]All reflexes WNL (2+)    []NT   Abnormal Reflex Findings Left Right   Biceps (C5,6)     Brachioradialis (C6)     Triceps (C7)     Abductor Digiti Minimi (C8,T1)     Quadriceps (L3,4)     Achilles (S1,2)     Ankle clonus     Vilma's reflex      Babinski's reflex         Dermatomal Sensation  [x]All dermatomes WNL for light touch except as marked below   Abnormal Dermatome Findings Left Right   Posterior Head (C2)     Posterior Neck (C3)     Supraclavicular (C4)     Lateral Upper Arm (C5)     Lateral Forearm/1st(C6)     3rd digit (C7)     5th digit (C8)      Medial Arm (T1)        Flexibility     Muscle Abnormal Findings   Pectoralis Minor []WNL   [x]Decreased R   [x]Decreased L      Levator Scapula []WNL   [x]Decreased R   [x]Decreased L      Scalenes []WNL   [x]Decreased R   [x]Decreased L      Upper Trapezius  []WNL   [x]Decreased R   [x]Decreased L      Suboccipitals  []WNL   [x]Decreased R   [x]Decreased L      Other:  []WNL   []Decreased R   []Decreased L        Special Tests- cervical/thoracic seated     Special Test Abnormal Findings   [place] c-spine rules  []Neg   []Pos   []NA   Bonnie Samaria Joel []Neg   []Pos      Vertebral Artery/Positional Provocative Testing [x]Neg   []Pos R   []Pos L      Spurling's/Foraminal []Neg   []Pos R   []Pos L      Distraction [x]Relief noted   []No relief noted      Compression  [x]Neg   []Pos      Elevated Arm Stress Test (EAST)  [x]Neg   []Pos R   []Pos L      Thoracic Outlet   Other:  [x]Neg   []Pos R   []Pos L      Other:  []Neg   []Pos R   []Pos L      Other:  []Neg   []Pos R   []Pos L        Palpation     Patient reported tenderness with palpation:  [x]Yes   []No   []NA  Location:  B Upper trap,cervical paraspinal,scalenes,levator and occipital base    Special Tests-supine     Special Test Abnormal Findings   Alar ligament/ C2 spinous kick test []Neg   []Pos R   []Pos L      Vertebral artery/Positional provocative testing [x]Neg   []Pos R   []Pos L      Modified shear test  []Neg   []Pos R   []Pos L      Median nerve tension test []Neg   []Pos R   []Pos L      Radial nerve tension test []Neg   []Pos R   []Pos L      Ulnar nerve tension test  []Neg   []Pos R   []Pos L      Other []Neg   []Pos R   []Pos L      Other  []Neg   []Pos R   []Pos L        Bandages/Dressings/Incisions: NA                       [x] Patient history, allergies, meds reviewed. Medical chart reviewed. See intake form. Review Of Systems (ROS):  [x]Performed Review of systems (Integumentary, CardioPulmonary, Neurological) by intake and observation. Intake form has been scanned into medical record. Patient has been instructed to contact their primary care physician regarding ROS issues if not already being addressed at this time. Barriers to/and or personal factors that will affect rehab potential:              []Age  []Sex    []Smoker              []Motivation/Lack of Motivation                        [x]Co-Morbidities              []Cognitive Function, education/learning barriers              []Environmental, home barriers              []profession/work barriers  []past PT/medical experience  []other:  Justification:     Falls Risk Assessment (30 days):  [x] Falls Risk assessed and no intervention required. [] Falls Risk assessed and Patient requires intervention due to being higher risk   TUG score (>12s at risk):     [] Falls education provided, including:         ASSESSMENT: 80 YO female pt referred to PT due to Cervicalgia (M54.2) and L shoulder pain (M25.512) with radicular pain,N&T throughout B UE and hands. Pt presents with postural deficits, palpable tenderness throughout neck and shoulder muscles, decreased strengthl as well as pain.  Pt is limited in sleeping,lifting,carrying and performing duties at work. Pt tolerated eval without reproduction of pain,N&T and reported feeling better following manual techniques applied to cervical spine. Pt demonstrates good rehab potential and will benefit from continued PT to restore max functional potential without limitations from pain and radicular symptoms     Functional Impairments:     []Noted cervical/thoracic/Glenohumeral joint hypomobility  []Decreased cervical/UE functional ROM  []Decreased Deep Cervical Flexor control or ability to hold head up  [x]Decreased Rotator Cuff/scapular/core strength and neuromuscular control   []Decreased Upper Extremity functional strength     []Noted cervical/thoracic/GHJ joint hypermobility  [x]Abnormal reflexes/sensation/myotomal/dermatomal deficits  []Noted Headache pain aggravated by neck movements with/without dizziness    Functional Activity Limitations (from functional questionnaire and intake)   []Reduced tolerance to prolonged functional positions  []Reduced tolerance to changes of positions or transfers between positions  [x]Reduced ability to maintain good posture and demonstrate good body mechanics with sitting, bending, and lifting  [x]Reduced ability to perform lifting, reaching, carrying tasks    [x]Reduced ability to sleep   []Reduced tolerance to driving  []Reduced tolerance to computer work  []Reduced ability to concentrate    []Reduced ability to tolerate any impact through UE or spine  [x]Reduced ability to ambulate prolonged functional periods/distances     Participation Restrictions   []Reduced participation in self care activities   [x]Reduced participation in home management activities   [x]Reduced participation in work activities   []Reduced participation in social activities. [x]Reduced participation in sport/recreational activities.     Classification:   []signs/symptoms consistent with neck pain with mobility deficits    []signs/symptoms consistent with neck pain with movement coordinated impairments     [x]signs/symptoms consistent with neck pain with radiating pain   []signs/symptoms consistent with neck pain with headaches (cervicogenic)   [x]signs/symptoms consistent with nerve root involvement including myotome & dermatome dysfunction    []sign/symptoms consistent with facet dysfunction of cervical and thoracic spine     []signs/symptoms consistent with discogenic cervical pain  []signs/symptoms consistent with rib dysfunction  [x]signs/symptoms consistent with postural dysfunction    []signs/symptoms consistent with shoulder pathology   []signs/symptoms consistent with post-surgical status including decreased ROM, strength and function.   []signs/symptoms consistent suggesting central cord compression/UMN syndromes  []signs/symptoms consistent with pathology which may benefit from Dry Needling    []signs/symptoms which may limit the use of advanced manual therapy techniques: (Elevated CV risk profile, recent trauma, intolerance to end range positions, prior TIA, visual issues, UE neurological compromise)       Prognosis/Rehab Potential:      []Excellent   [x]Good    []Fair   []Poor    Tolerance of evaluation/treatment:    []Excellent   [x]Good    []Fair   []Poor     Physical Therapy Evaluation Complexity Justification  [x] A history of present problem with:  [] no personal factors and/or comorbidities that impact the plan of care;  []1-2 personal factors and/or comorbidities that impact the plan of care  [x]3 personal factors and/or comorbidities that impact the plan of care  [x] An examination of body systems using standardized tests and measures addressing any of the following: body structures and functions (impairments), activity limitations, and/or participation restrictions;:  [x] a total of 1-2 or more elements   [] a total of 3 or more elements   [] a total of 4 or more elements   [x] A clinical presentation with:  [x] stable and/or uncomplicated characteristics   [] evolving clinical presentation with changing characteristics  [] unstable and unpredictable characteristics;   [x] Clinical decision making of [x] low, [] moderate, [] high complexity using standardized patient assessment instrument and/or measurable assessment of functional outcome. [x] EVAL (LOW) 36723 (typically 20 minutes face-to-face)  [] EVAL (MOD) 61159 (typically 30 minutes face-to-face)  [] EVAL (HIGH) 95654 (typically 45 minutes face-to-face)  [] RE-EVAL     PLAN:   Frequency/Duration:  2 days per week for 5 Weeks:  Interventions:  [x]  Therapeutic exercise including: strength training, ROM, for cervical spine,scapula, core and Upper extremity, including postural re-education. [x]  NMR activation and proprioception for Deep cervical flexors, periscapular and RC muscles and Core, including postural re-education. [x]  Manual therapy as indicated for C/T spine, ribs, Soft tissue to include: Dry Needling/IASTM, STM, PROM, Gr I-IV mobilizations, manipulation. [x]  Therapeutic Activities for Deep cervical flexors, periscapular and RC muscles and Core, including postural re-education. [x]  Modalities as needed that may include: thermal agents, E-stim, Biofeedback, US, iontophoresis as indicated  [x]  Patient education on joint protection, postural re-education, activity modification, progression of HEP. GOALS:  Patient stated goal: \"help with hand numbness\"  [] Progressing: [] Met: [] Not Met: [] Adjusted    Therapist goals for Patient:   Short Term Goals: To be achieved in: 3 weeks  - Independent in HEP and progression per patient tolerance, in order to prevent re-injury   [] Progressing: [] Met: [] Not Met: [] Adjusted  - Patient will have a decrease in pain to 5/10 facilitate improvement in movement, function, and sleeping through the night. [] Progressing: [] Met: [] Not Met: [] Adjusted    Long Term Goals:  To be achieved in: 5 weeks  - Disability index score of 69 for the FOTO neck to assist with reaching prior level of function. [] Progressing: [] Met: [] Not Met: [] Adjusted  - Patient will demonstrate increased  AROM to full in the cervical spine, symmetrical and painfree to allow for proper joint functioning with turning head    [] Progressing: [] Met: [] Not Met: [] Adjusted  - Patient will demonstrate an increase in Strength to full, symmetrical and painfree to allow for proper functional mobility with bending,lifting and carrying at work  - Patient will return to sleeping habits without increased symptoms or restriction as prior to onset of worsening pain. [] Progressing: [] Met: [] Not Met: [] Adjusted  -Patient will demonstrate an increase in postural awareness and control and activation of  Deep cervical stabilizers to allow for proper functional mobility as indicated by patients Functional Deficits.    [] Progressing: [] Met: [] Not Met: [] Adjusted        Electronically signed by:  Gabriele Vallejo, PT

## 2022-07-01 ENCOUNTER — TELEPHONE (OUTPATIENT)
Dept: INTERNAL MEDICINE CLINIC | Age: 67
End: 2022-07-01

## 2022-07-04 DIAGNOSIS — F32.4 MAJOR DEPRESSIVE DISORDER WITH SINGLE EPISODE, IN PARTIAL REMISSION (HCC): ICD-10-CM

## 2022-07-05 RX ORDER — BUPROPION HYDROCHLORIDE 150 MG/1
150 TABLET ORAL EVERY MORNING
Qty: 90 TABLET | Refills: 1 | Status: SHIPPED | OUTPATIENT
Start: 2022-07-05

## 2022-07-06 DIAGNOSIS — D47.2 MGUS (MONOCLONAL GAMMOPATHY OF UNKNOWN SIGNIFICANCE): Primary | ICD-10-CM

## 2022-07-06 DIAGNOSIS — M54.16 LUMBAR RADICULOPATHY: ICD-10-CM

## 2022-07-06 DIAGNOSIS — G62.9 NEUROPATHY: ICD-10-CM

## 2022-07-06 NOTE — TELEPHONE ENCOUNTER
Spoke to pt and discussed possible MGUS and referral to Dr. Roxana Lozada. Also she continues with burning in her feet despite changing shoes and trying Gabapentin.  Will obtain EMG

## 2022-07-07 ENCOUNTER — TELEPHONE (OUTPATIENT)
Dept: INTERNAL MEDICINE CLINIC | Age: 67
End: 2022-07-07

## 2022-07-11 ENCOUNTER — APPOINTMENT (OUTPATIENT)
Dept: PHYSICAL THERAPY | Age: 67
End: 2022-07-11
Payer: MEDICARE

## 2022-07-15 ENCOUNTER — HOSPITAL ENCOUNTER (OUTPATIENT)
Dept: PHYSICAL THERAPY | Age: 67
Setting detail: THERAPIES SERIES
Discharge: HOME | End: 2022-07-15
Payer: MEDICARE

## 2022-07-15 PROCEDURE — 97140 MANUAL THERAPY 1/> REGIONS: CPT

## 2022-07-15 PROCEDURE — 97110 THERAPEUTIC EXERCISES: CPT

## 2022-07-15 NOTE — FLOWSHEET NOTE
The [facility] [employer] [employer] Outpatient Therapy  [address] [place] [address], [address] [facility] [employer], [address] [place] [address] Ave  Phone: [phone]   Fax: [phone]    Physical Therapy Treatment Note/ Progress Report:     Date:  7/15/2022    Patient Name:  Emily Maxwell    :  1955  MRN: [id number]  Medical/Treatment Diagnosis Information:  Diagnosis: M25.512,G89.29 chronic L shoulder pain, M54.2 Cervicalgia  Treatment Diagnosis: M25.512,G89.29 chronic L shoulder pain, M54.2 Cervicalgia  Insurance/Certification information:  PT Insurance Information: [employer] Medicare  Physician Information:   [address] [place] [address] signed:    [] Yes  [x] No    Date of Patient follow up with Physician:      Progress Report: []  Yes  [x]  No     Date Range for reporting period:  Beginnin22  Ending:      Progress report due (10 Rx/or 30 days whichever is less):     Recertification due (POC duration/ or 90 days whichever is less):      Visit # Insurance Allowable Auth Needed   /10 10 visits -8/15 [x]Yes   []No     RESTRICTIONS/PRECAUTIONS: as tolerated  Latex Allergy:  [x]NO      []YES  Preferred Language for Healthcare:   [x]English       []other:  Functional Scale: 22: FOTO neck 65 (35%), dc goal 69    Pain level:  4/10 neck pain, L shoulder 0/10    SUBJECTIVE:  shoulder pain has been minimal to none since last seen, hands continue to go numb probably due to B CTS, EMG moved to Aug 22nd to include testing of feet as well. Neck continues daily pain 4/10 and migraine headaches have started back ever couple of weeks    OBJECTIVE:   Observation: FHP,rounded shoulders  Test measurements:  cervical side bending 50% limited    Exercises/Interventions: Exercises in bold performed in department today. Items not bolded are carried forward from prior visits for continuity of the record.     Exercise/Equipment Resistance/Repetitions HEP Other comments     []    Doorway pec stretch 30 sec x 3 []    Chin tucks x10 []    UT stretch 30sec x 3 []    Manual cervica traction, passive stretching upper traps, STM upper trap/levator/scalenes manual []      []    Lat pull down X10, red []    rows X10, red []      []      []      []      []      []      []      []      []      []      []      Home Exercise Program:   Access Code: [address] [place] [address]  URL: [email or web address]  Date: 07/15/2022  Prepared by: Angel Avila    Exercises  Doorway Pec Stretch at 90 Degrees Abduction - 1 x daily - 7 x weekly - 1 sets - 3 reps - 30 hold  Seated Cervical Sidebending Stretch - 1 x daily - 7 x weekly - 1 sets - 3 reps  Seated Levator Scapulae Stretch - 1 x daily - 7 x weekly - 1 sets - 3 reps  Supine Chin Tuck - 1 x daily - 7 x weekly - 1 sets - 10 reps  Standing Row with Anchored Resistance - 1 x daily - 7 x weekly - 1-3 sets - 10 reps  Seated Lat Pull Down with Resistance - Elbows Bent - 1 x daily - 7 x weekly - 1-3 sets - 10 reps    Therapeutic Exercise:   [x] (50582) Provided verbal/tactile cueing for activities related to strengthening, flexibility, endurance, ROM  for improvements in scapular, scapulothoracic and UE control with self care, reaching, carrying, lifting, house/yardwork, driving/computer work. (18 min)     NMR:   [] (59568) Provided verbal/tactile cueing for activities related to improving balance, coordination, kinesthetic sense, posture, motor skill, proprioception  to assist with  scapular, scapulothoracic and UE control with self care, reaching, carrying, lifting, house/yardwork, driving/computer work. Therapeutic Activities:    [] (46129) Provided verbal/tactile cueing for activities related to improving balance, coordination, kinesthetic sense, posture, motor skill, proprioception and motor activation to allow for proper function of scapular, scapulothoracic and UE control with self care, carrying, lifting, driving/computer work.      Home Exercise Program:    [x] (78240) Reviewed/Progressed HEP activities related to strengthening, flexibility, endurance, ROM of scapular, scapulothoracic and UE control with self care, reaching, carrying, lifting, house/yardwork, driving/computer work.   [] (63250) Reviewed/Progressed HEP activities related to improving balance, coordination, kinesthetic sense, posture, motor skill, proprioception of scapular, scapulothoracic and UE control with self care, reaching, carrying, lifting, house/yardwork, driving/computer work. Manual Treatments:  PROM / STM / Oscillations-Mobs:  G-I, II, III, IV (PA's, Inf., Post.)  [x] (31503) Provided manual therapy to mobilize soft tissue/joints of cervical/CT, scapular GHJ and UE for the purpose of modulating pain, promoting relaxation,  increasing ROM, reducing/eliminating soft tissue swelling/inflammation/restriction, improving soft tissue extensibility and allowing for proper ROM for normal function with self care, reaching, carrying, lifting, house/yardwork, driving/computer work. (28 min)    Modalities:    [] Electric Stimulation:   [] Ultrasound:   [] Other:       Charges:  Timed Code Treatment Minutes: MT x 28, TE x 18   Total Treatment Minutes: 46 min      [] EVAL (LOW) 24097 (typically 20 minutes face-to-face)  [] EVAL (MOD) 72327 (typically 30 minutes face-to-face)  [] EVAL (HIGH) 48966 (typically 45 minutes face-to-face)  [] RE-EVAL     [x] DI(69932) x 1      [] NMR (59012) x       [x] Manual (21517) x  2     [] TA (06664) x         [] ES(attended) (39996)   [] ES (un) (48475)   [] DRY NEEDLE 1 OR 2 MUSCLES  [] DRY NEEDLE 3+ MUSCLES  [] [facility] Traction (14746)  [] Ultrasound (38962)  [] Other:      GOALS:    Patient stated goal: \"help with hand numbness\"  [] Progressing: [] Met: [] Not Met: [] Adjusted     Therapist goals for Patient:   Short Term Goals:  To be achieved in: 3 weeks  - Independent in HEP and progression per patient tolerance, in order to prevent re-injury   [x] Progressing: [] Met: [] Not Met: [] Adjusted  - Patient will have a Update:  [x] Patient is progressing as expected towards functional goals listed. [] Progression is slowed due to complexities/Impairments listed. [] Progression has been slowed due to co-morbidities. [] Plan just implemented, too soon to assess goals progression <30days   [] Goals require adjustment due to lack of progress  [] Patient is not progressing as expected and requires additional follow up with physician  [] Other    Prognosis for POC: [x] Good [] Fair  [] Poor    Patient requires continued skilled intervention: [x] Yes  [] No        PLAN: See eval  [x] Continue per plan of care [] Alter current plan (see comments)  [] Plan of care initiated [] Hold pending MD visit [] Discharge    Electronically signed by: Elvis Sauceda PT    Note: If patient does not return for scheduled/recommended follow up visits, this note will serve as a discharge from care along with the most recent update on progress.

## 2022-07-21 ENCOUNTER — HOSPITAL ENCOUNTER (OUTPATIENT)
Dept: GENERAL RADIOLOGY | Age: 67
Discharge: HOME | End: 2022-07-21
Payer: MEDICARE

## 2022-07-21 DIAGNOSIS — D47.2 MONOCLONAL PARAPROTEINEMIA: ICD-10-CM

## 2022-07-21 PROCEDURE — 77075 RADEX OSSEOUS SURVEY COMPL: CPT

## 2022-07-28 ENCOUNTER — HOSPITAL ENCOUNTER (OUTPATIENT)
Dept: PHYSICAL THERAPY | Age: 67
Setting detail: THERAPIES SERIES
Discharge: HOME | End: 2022-07-28
Payer: MEDICARE

## 2022-07-28 PROCEDURE — 97140 MANUAL THERAPY 1/> REGIONS: CPT

## 2022-07-28 PROCEDURE — 97110 THERAPEUTIC EXERCISES: CPT

## 2022-07-28 NOTE — DISCHARGE SUMMARY
[facility] Outpatient Therapy  [address] [place] [address], JEANE Torrez 51, [address]  Phone: [phone]   Fax: [phone]    Physical Therapy Treatment Note/ Progress Report:     Date:  2022    Patient Name:  Bard Vu    :  1955  MRN: [id number]  Medical/Treatment Diagnosis Information:  Diagnosis: M25.512,G89.29 chronic L shoulder pain, M54.2 Cervicalgia  Treatment Diagnosis: M25.512,G89.29 chronic L shoulder pain, M54.2 Cervicalgia  Insurance/Certification information:  PT Insurance Information: [employer]  Physician Information:   [address] [place] [address] signed:    [] Yes  [x] No    Date of Patient follow up with Physician:      Progress Report: []  Yes  [x]  No     Date Range for reporting period:  Beginnin22  Endin22    Progress report due (10 Rx/or 30 days whichever is less): 3/56/21    Recertification due (POC duration/ or 90 days whichever is less):      Visit # Insurance Allowable Auth Needed   3/10 10 visits -8/15 [x]Yes   []No     RESTRICTIONS/PRECAUTIONS: as tolerated  Latex Allergy:  [x]NO      []YES  Preferred Language for Healthcare:   [x]English       []other:  Functional Scale: 22: FOTO neck 65 (35%), dc goal 69  Functional Scale: 22: FOTO neck 87 (13%),    Pain level:  0/10 neck pain, L shoulder 0/10    SUBJECTIVE:  minimal to no pain since last seen, has resumed PLOF/working without pain or difficulty. Pt reports feeling good and states \"as long as I do my exercises, Im fine\", requesting today be her last visit to continue with HEP    OBJECTIVE:   Observation: FHP,rounded shoulders with improved postural awareness  Test measurements:  cervical ROM WNL, mild tightness B UT improved with stretches. No N&T noted throughout visit, demonstrates I HEP to continue I. Exercises/Interventions: Exercises in bold performed in department today.   Items not bolded are carried forward from prior visits for continuity of the record. Exercise/Equipment Resistance/Repetitions HEP Other comments     []    Doorway pec stretch 30 sec x 3 []    Seated Chin tucks x10 []    UT stretch 30sec x 3 []    Manual cervica traction, passive stretching B upper traps, occipital base releasemanual []      []    Lat pull down X10, red []    rows X10, red []    Backwards shoulder rolls x10 []    Y's,T's,W's x10 []      []      []      []      []      []      []      []      []      Access Code: [email or web address]  URL: [email or web address]  Date: 07/28/2022  Prepared by: Scotty Dee    Exercises  Doorway Pec Stretch at 90 Degrees Abduction - 1 x daily - 7 x weekly - 1 sets - 3 reps - 30 hold  Seated Cervical Sidebending Stretch - 1 x daily - 7 x weekly - 1 sets - 3 reps  Seated Levator Scapulae Stretch - 1 x daily - 7 x weekly - 1 sets - 3 reps  Standing Row with Anchored Resistance - 1 x daily - 7 x weekly - 1-3 sets - 10 reps  Seated Lat Pull Down with Resistance - Elbows Bent - 1 x daily - 7 x weekly - 1-3 sets - 10 reps  Seated Passive Cervical Retraction - 1 x daily - 7 x weekly - 1 sets - 10 reps  Standing Backward Shoulder Rolls - 1 x daily - 7 x weekly - 1 sets - 10 reps  Prone Scapular Retraction Y - 1 x daily - 7 x weekly - 3 sets - 10 reps  Prone Scapular Retraction Arms at Side - 1 x daily - 7 x weekly - 3 sets - 10 reps  Prone W Scapular Retraction - 1 x daily - 7 x weekly - 3 sets - 10 reps    Therapeutic Exercise:   [x] (29241) Provided verbal/tactile cueing for activities related to strengthening, flexibility, endurance, ROM  for improvements in scapular, scapulothoracic and UE control with self care, reaching, carrying, lifting, house/yardwork, driving/computer work.   (132min)     NMR:   [] (83084) Provided verbal/tactile cueing for activities related to improving balance, coordination, kinesthetic sense, posture, motor skill, proprioception  to assist with  scapular, scapulothoracic and UE control with self care, reaching, carrying, lifting, house/yardwork, driving/computer work. Therapeutic Activities:    [] (94658) Provided verbal/tactile cueing for activities related to improving balance, coordination, kinesthetic sense, posture, motor skill, proprioception and motor activation to allow for proper function of scapular, scapulothoracic and UE control with self care, carrying, lifting, driving/computer work. Home Exercise Program:    [x] (12649) Reviewed/Progressed HEP activities related to strengthening, flexibility, endurance, ROM of scapular, scapulothoracic and UE control with self care, reaching, carrying, lifting, house/yardwork, driving/computer work.   [] (79814) Reviewed/Progressed HEP activities related to improving balance, coordination, kinesthetic sense, posture, motor skill, proprioception of scapular, scapulothoracic and UE control with self care, reaching, carrying, lifting, house/yardwork, driving/computer work. Manual Treatments:  PROM / STM / Oscillations-Mobs:  G-I, II, III, IV (PA's, Inf., Post.)  [x] (77043) Provided manual therapy to mobilize soft tissue/joints of cervical/CT, scapular GHJ and UE for the purpose of modulating pain, promoting relaxation,  increasing ROM, reducing/eliminating soft tissue swelling/inflammation/restriction, improving soft tissue extensibility and allowing for proper ROM for normal function with self care, reaching, carrying, lifting, house/yardwork, driving/computer work.  (16 min)    Modalities:    [] Electric Stimulation:   [] Ultrasound:   [] Other:       Charges:  Timed Code Treatment Minutes: MT x 16, TE x 32   Total Treatment Minutes: 48 min      [] EVAL (LOW) 84889 (typically 20 minutes face-to-face)  [] EVAL (MOD) 65421 (typically 30 minutes face-to-face)  [] EVAL (HIGH) 94650 (typically 45 minutes face-to-face)  [] RE-EVAL     [x] OQ(13643) x 1      [] NMR (82788) x       [x] Manual (39003) x  2     [] TA (38963) x         [] ES(attended) (97331)   [] ES (un) (13339)   [] DRY NEEDLE 1 OR 2 MUSCLES  [] DRY NEEDLE 3+ MUSCLES  [] [facility] Traction (48470)  [] Ultrasound (92906)  [] Other:      GOALS:    Patient stated goal: \"help with hand numbness\"  [] Progressing: [] Met: [] Not Met: [] Adjusted     Therapist goals for Patient:   Short Term Goals: To be achieved in: 3 weeks  - Independent in HEP and progression per patient tolerance, in order to prevent re-injury   [] Progressing: [x] Met: [] Not Met: [] Adjusted  - Patient will have a decrease in pain to 5/10 facilitate improvement in movement, function, and sleeping through the night. [] Progressing: [x] Met: [] Not Met: [] Adjusted     Long Term Goals: To be achieved in: 5 weeks  - Disability index score of 69 for the FOTO neck to assist with reaching prior level of function. [] Progressing: [x] Met: [] Not Met: [] Adjusted  - Patient will demonstrate increased  AROM to full in the cervical spine, symmetrical and painfree to allow for proper joint functioning with turning head    [] Progressing: [x] Met: [] Not Met: [] Adjusted  - Patient will demonstrate an increase in Strength to full, symmetrical and painfree to allow for proper functional mobility with bending,lifting and carrying at work  - Patient will return to sleeping habits without increased symptoms or restriction as prior to onset of worsening pain. [] Progressing: [x] Met: [] Not Met: [] Adjusted  -Patient will demonstrate an increase in postural awareness and control and activation of  Deep cervical stabilizers to allow for proper functional mobility as indicated by patients Functional Deficits. [] Progressing: [x] Met: [] Not Met: [] Adjusted                        ASSESSMENT:  80 YO female pt referred to PT due to Cervicalgia (M54.2) and L shoulder pain (M25.512) with radicular pain,N&T throughout B UE and hands. EMG scheduled next month due to probable CTS.  Pt has tolerated therapy without pain or difficulty and reports 0-1 pain since last seen. Pt demonstrates I understanding of HEP and feels she has met her goals with request for dc with HEP. Pt has full ROM throughout L UE and cervical spine, no N&T noted throughout visit, demonstrates good strength throughout cervical region and L UE and reports sleeping and working without restriction. Pt has been basically pain free since last seen with all goals met. Functional Scale:    [] LEFS   [] ABC Scale  [] UEFI/UEFS  [] Functional Gait Index  [] Modified Oswestry  [] TIERNEY  [] NDI    [] LLIS  [] [facility] [address]    [x] Other:     Initial Score (% disability): 6/30/22  FOTO neck: 65 (35%)  Discharge Score (% disability):  7/28/22 FOTO neck: 87(13%)      Episode of Care:   # Visits:  3  Duration (# Weeks):  4      Treatment Approach:  [] Surgical  [x] Conservative    Special Certifications/Equipment Used (check all that apply):  [] Lymphedema  [] LSVT  [] OMT-C   [] Women's Health  [] Vestibular   [] Music Treadmill  [] Dry Needling  [] CHT  [] Other:     Co-morbidities/Complexities:   # of relevant co-morbidities:  3-4    []None     []Other:    []Covid-19   Arthritic conditions   []Rheumatoid arthritis (M05.9)  [x]Osteoarthritis (M19.91)   Cardiovascular conditions   []Hypertension (I10)  []Hyperlipidemia (E78.5)  []Angina pectoris (I20)  []Atherosclerosis (I70)   Musculoskeletal conditions   [x]Disc pathology   [x]Congenital spine pathologies   [x]Prior surgical intervention  []Osteoporosis (M81.8)  []Osteopenia (M85.8)   Endocrine conditions   []Hypothyroid (E03.9)  []Hyperthyroid Gastrointestinal conditions   []Constipation (I09.27)   Metabolic conditions   []Morbid obesity (E66.01)  []Obesity (E66)  []Diabetes type 1(E10.65)  []Diabetes type 2 (E11.65)  []Neuropathy (G60.9)   Pulmonary conditions   []Asthma (J45)  []Coughing   []COPD (J44.9) Psychological Disorders  [x]Anxiety (F41.9)  [x]Depression (F32.9)   []Bipolar (F31.9) Neurological conditions  []CVA (I69)  []Parkinsons (G20)  []Other: Patient requires continued skilled intervention: [x] Yes  [x] No        PLAN:   [] Continue per plan of care [] Alter current plan (see comments)  [] Plan of care initiated [] Hold pending MD visit [x] Discharge    Electronically signed by: Staci Yates PT    Note: If patient does not return for scheduled/recommended follow up visits, this note will serve as a discharge from care along with the most recent update on progress.

## 2022-08-10 ENCOUNTER — HOSPITAL ENCOUNTER (OUTPATIENT)
Dept: CT IMAGING | Age: 67
Discharge: HOME | End: 2022-08-10
Payer: MEDICARE

## 2022-08-10 DIAGNOSIS — D47.2 MONOCLONAL GAMMOPATHY: ICD-10-CM

## 2022-08-10 PROCEDURE — 70450 CT HEAD/BRAIN W/O DYE: CPT

## 2022-08-10 NOTE — Clinical Note
Can someone see if we can pull Ms. Keya Barrett compliance report with her CPAP?

## 2022-08-19 ENCOUNTER — OFFICE VISIT (OUTPATIENT)
Dept: CARDIOLOGY CLINIC | Age: 67
End: 2022-08-19
Payer: MEDICARE

## 2022-08-19 VITALS
WEIGHT: 248.4 LBS | SYSTOLIC BLOOD PRESSURE: 130 MMHG | HEART RATE: 61 BPM | DIASTOLIC BLOOD PRESSURE: 70 MMHG | BODY MASS INDEX: 44 KG/M2

## 2022-08-19 DIAGNOSIS — I47.1 ATRIAL TACHYCARDIA (HCC): Primary | ICD-10-CM

## 2022-08-19 PROCEDURE — 1123F ACP DISCUSS/DSCN MKR DOCD: CPT | Performed by: INTERNAL MEDICINE

## 2022-08-19 PROCEDURE — G8417 CALC BMI ABV UP PARAM F/U: HCPCS | Performed by: INTERNAL MEDICINE

## 2022-08-19 PROCEDURE — 1090F PRES/ABSN URINE INCON ASSESS: CPT | Performed by: INTERNAL MEDICINE

## 2022-08-19 PROCEDURE — G8399 PT W/DXA RESULTS DOCUMENT: HCPCS | Performed by: INTERNAL MEDICINE

## 2022-08-19 PROCEDURE — G8427 DOCREV CUR MEDS BY ELIG CLIN: HCPCS | Performed by: INTERNAL MEDICINE

## 2022-08-19 PROCEDURE — 1036F TOBACCO NON-USER: CPT | Performed by: INTERNAL MEDICINE

## 2022-08-19 PROCEDURE — 99214 OFFICE O/P EST MOD 30 MIN: CPT | Performed by: INTERNAL MEDICINE

## 2022-08-19 PROCEDURE — 3017F COLORECTAL CA SCREEN DOC REV: CPT | Performed by: INTERNAL MEDICINE

## 2022-08-19 NOTE — PROGRESS NOTES
Cc: HTN, HLP, atypical CP, PAD, PAT    HPI:     Ms Jey Diehl is a 78 yo overweight woman (BMI 39) with h/o SAL on CPAP, GERD, HTN, HLP, past smoker (quit '89), SVT/PAT, PAD, post-op DVT's on eliquis, MGUS (diagnosed 07/2022). Patient was seen by Dr. Jenn Brady and Dr Yan Read at [facility] in the past, last visit in 2014. She had chest pains at that time and underwent cardiac evaluation. Echo 07/2014: normal except for diastolic I (note, no valvular disease). Cath 07/2014: normal coronaries, normal EF 70%. ECG 3/26/19: normal.     Patient reported strong family history of premature CAD and heart failure. Her brother had a cardiac transplant in his 42's and her sisters had stents in their 42-51s. Patient reported palpitations with lightheadedness. 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 Echo 04/2019: normal (no mitral valve prolapse) except for moderate TR, RVSP 44. Zio monitor x 14 days (4/22/19): NSR with frequent SVT's. Patient was seen by Dr. Tyrone Wen and was initially started on flecainide in addition to Toprol however patient developed blurry vision and flecainide was changed to verapamil. Exe nuc stress 5/20/20: small mild reversible defect in distal anteroseptal wall (apex is spared), nl EF and wall motion. Patient had mild CP prior to test, increased pain with exertion, improving with rest. ECG no changes, normal, duration only 3 minutes. C 5/21/2020: Normal coronaries, LVEDP 13 mmHg, LVEF normal.     Bilateral lower extremity arterial ultrasound 2/18/2021: Right mid SFA less than 50% stenosis     FLP 06/2022: , HDL 56, LDL 77, TG 86 on Pravachol 20 mg daily. Patient follows with Dr Rucker Shallow [facility]) for MGUS. Patient is here for follow-up. She reports rare palpitations and some fatigue otherwise no symptoms. She admits to some peripheral neuropathy and carpal tunnel syndrome symptoms.       Histories     Past Medical History:   has a past medical history of Allergic rhinitis, Anxiety, Atrial tachycardia (HCC), Carpal tunnel syndrome, Cervicalgia, Chronic back pain, Depression, DVT (deep venous thrombosis) ([address] GERD (gastroesophageal reflux disease), Headache(784.0), Hernia hiatal, Histoplasmosis, Hot flashes, Hyperlipidemia, Hypertension, Obesity, Osteoarthritis, Other partial intestinal obstruction (HCC), PONV (postoperative nausea and vomiting), Shingles, Sleep apnea, and Tricuspid regurgitation. Surgical History:   has a past surgical history that includes Knee arthroscopy; Knee cartilage surgery; Lung lobectomy; sinus surgery; Bunionectomy; Partial hysterectomy; Total knee arthroplasty (Bilateral, 03/03/2015); Parathyroid gland surgery; Lung biopsy; Colonoscopy (10/2018); Breast surgery (Right); lumbar fusion (N/A, 7/29/2021); Abdomen surgery (N/A, 8/1/2021); and Breast biopsy (Right, 2013). Social History:   reports that she quit smoking about 32 years ago. Her smoking use included cigarettes. She has a 5.00 pack-year smoking history. She has never used smokeless tobacco. She reports that she does not drink alcohol and does not use drugs. Family History:  No evidence for sudden cardiac death or premature CAD      Medications:     Home medications were reviewed and are listed below    Prior to Admission medications    Medication Sig Start Date End Date Taking? Authorizing Provider   buPROPion (WELLBUTRIN XL) 150 MG extended release tablet TAKE 1 TABLET BY MOUTH EVERY MORNING 7/5/22  Yes Laurie Spain MD   aspirin EC 81 MG EC tablet Take 1 tablet by mouth daily 4/14/22  Yes Tammy Chris MD   Omega-3 Fatty Acids (FISH OIL) 1000 MG CAPS Take 2 capsules by mouth daily 4/14/22  Yes Laurie Spain MD   loratadine (CLARITIN) 10 MG tablet Take 10 mg by mouth daily 3/9/22  Yes Historical Provider, MD   pimecrolimus (ELIDEL) 1 % cream Apply topically 2 times daily.  4/14/22  Yes Tammy Mateusz Lyn MD   clindamycin-benzoyl peroxide (BENZACLIN) 1-5 % gel Apply topically 2 times daily. 4/14/22  Yes Dangelo Genao MD   pravastatin (PRAVACHOL) 20 MG tablet TAKE 1 TABLET BY MOUTH EVERY DAY 4/6/22  Yes Giorgi Freed MD   pantoprazole (PROTONIX) 40 MG tablet TAKE 1 TABLET BY MOUTH TWICE A DAY 3/10/22  Yes Dangelo Genao MD   fluticasone (FLONASE) 50 MCG/ACT nasal spray 1 spray by Each Nostril route daily   Yes Historical Provider, MD   verapamil (CALAN SR) 120 MG extended release tablet TAKE 1 TABLET BY MOUTH NIGHTLY DO NOT CRUSH OR CHEW. 12/30/21  Yes LISA Mccrary CNP   acetaminophen (TYLENOL) 500 MG tablet Take 500 mg by mouth every 6 hours as needed for Pain   Yes Historical Provider, MD   Multiple Vitamins-Minerals (CENTRUM SILVER PO) Take 1 tablet by mouth daily    Yes Historical Provider, MD   gabapentin (NEURONTIN) 100 MG capsule TAKE 1 CAPSULE BY MOUTH TWICE DAILY 6/15/22 7/15/22  Dangelo Genao MD   losartan (COZAAR) 50 mg tablet TAKE 1 TABLET BY MOUTH DAILY 6/9/22 7/9/22  Dangelo Genao MD   metoprolol succinate (TOPROL XL) 25 MG extended release tablet Take 1 tablet by mouth daily 5/28/21 6/23/22  Dangelo Genao MD          Allergy:     Latex, Reglan [metoclopramide hcl], Univasc [moexipril], Crestor [rosuvastatin], Zoloft [sertraline], Celecoxib, Keflex [cephalexin], Lipitor, Metoclopramide, Prochlorperazine, Prochlorperazine edisylate, Sulfa antibiotics, and Vioxx       Review of Systems:     All 12 point review of symptoms completed. Pertinent positives identified in the HPI, all other review of symptoms negative as below. CONSTITUTIONAL: No fatigue  SKIN: No rash or pruritis. EYES: No visual changes or diplopia. No scleral icterus. ENT: No Headaches, hearing loss or vertigo. No mouth sores or sore throat. CARDIOVASCULAR: No chest pain/chest pressure/chest discomfort. No palpitations. No edema. RESPIRATORY: No dyspnea. No cough or wheezing, no sputum production. GASTROINTESTINAL: No N/V/D. No abdominal pain, appetite loss, blood in stools. GENITOURINARY: No dysuria, trouble voiding, or hematuria. MUSCULOSKELETAL:  No gait disturbance, weakness or joint complaints. NEUROLOGICAL: No headache, diplopia, change in muscle strength, numbness or tingling. No change in gait, balance, coordination, mood, affect, memory, mentation, behavior. PSHYCH: No anxiety, loss of interest, change in sexual behavior, feelings of self-harm, or confusion. ENDOCRINE: No excessive thirst, fluid intake, or urination. No tremor. HEMATOLOGIC: No abnormal bruising or bleeding. ALLERGY: No nasal congestion or hives.       Physical Examination:     Vitals:    08/19/22 1432   BP: 130/70   Pulse: 61   Weight: 248 lb 6.4 oz (112.7 kg)       Wt Readings from Last 3 Encounters:   08/19/22 248 lb 6.4 oz (112.7 kg)   06/23/22 253 lb 12.8 oz (115.1 kg)   04/14/22 254 lb 3.2 oz (115.3 kg)         General Appearance:  Alert, cooperative, no distress, appears stated age Appropriate weight   Head:  Normocephalic, without obvious abnormality, atraumatic   Eyes:  PERRL, conjunctiva/corneas clear EOM intact  Ears normal   Throat no lesions       Nose: Nares normal, no drainage or sinus tenderness   Throat: Lips, mucosa, and tongue normal   Neck: Supple, symmetrical, trachea midline, no adenopathy, thyroid: not enlarged, symmetric, no tenderness/mass/nodules, no carotid bruit       Lungs:   Clear to auscultation bilaterally, respirations unlabored   Chest Wall:  No tenderness or deformity   Heart:  Regular rhythm, rate is controlled, S1, S2 normal, there is no murmur, there is no rub or gallop, cannot assess jvd, no bilateral lower extremity edema   Abdomen:   Soft, non-tender, bowel sounds active all four quadrants,  no masses, no organomegaly       Extremities: Extremities normal, atraumatic, no cyanosis   Pulses: 2+ and symmetric   Skin: Skin color, texture, turgor normal, no rashes or lesions   Pysch: Normal mood and affect   Neurologic: Normal gross motor and sensory exam.  Cranial nerves intact        Labs:     Lab Results   Component Value Date    WBC 5.1 11/09/2021    HGB 13.4 11/09/2021    HCT 40.4 11/09/2021    MCV 88.2 11/09/2021     11/09/2021     Lab Results   Component Value Date     06/23/2022    K 5.2 (H) 06/23/2022     06/23/2022    CO2 25 06/23/2022    BUN 16 06/23/2022    CREATININE 0.5 (L) 06/23/2022    GLUCOSE 103 (H) 06/23/2022    CALCIUM 9.9 06/23/2022    PROT 6.6 06/23/2022    LABALBU 3.5 06/23/2022    LABALBU 4.5 06/23/2022    BILITOT 0.4 06/23/2022    ALKPHOS 92 06/23/2022    AST 25 06/23/2022    ALT 25 06/23/2022    LABGLOM >60 06/23/2022    GFRAA >60 06/23/2022    AGRATIO 2.1 06/23/2022    GLOB 2.3 09/03/2021         Lab Results   Component Value Date    CHOL 150 06/23/2022    CHOL 152 07/26/2021    CHOL 163 09/28/2020     Lab Results   Component Value Date    TRIG 86 06/23/2022    TRIG 141 07/26/2021    TRIG 149 09/28/2020     Lab Results   Component Value Date    HDL 56 06/23/2022    HDL 50 07/26/2021    HDL 54 09/28/2020     Lab Results   Component Value Date    LDLCALC 77 06/23/2022    LDLCALC 74 07/26/2021    LDLCALC 79 09/28/2020     Lab Results   Component Value Date    LABVLDL 17 06/23/2022    LABVLDL 28 07/26/2021    LABVLDL 30 09/28/2020     Lab Results   Component Value Date    CHOLHDLRATIO 2.8 04/16/2011    CHOLHDLRATIO 2.5 01/08/2011    CHOLHDLRATIO 2.9 10/02/2010       Lab Results   Component Value Date    INR 1.07 08/13/2021    INR 1.14 (H) 08/12/2021    INR 1.14 (H) 08/10/2021    PROTIME 12.1 08/13/2021    PROTIME 12.9 (H) 08/12/2021    PROTIME 12.9 (H) 08/10/2021       The 10-year ASCVD risk score (Stas Small, et al., 2013) is: 8.3%    Values used to calculate the score:      Age: 79 years      Sex: Female      Is Non- : No      Diabetic: No      Tobacco smoker:

## 2022-08-22 ENCOUNTER — PROCEDURE VISIT (OUTPATIENT)
Dept: NEUROLOGY | Age: 67
End: 2022-08-22
Payer: MEDICARE

## 2022-08-22 DIAGNOSIS — R20.8 BURNING SENSATION OF FEET: ICD-10-CM

## 2022-08-22 DIAGNOSIS — R20.0 BILATERAL NUMBNESS AND TINGLING OF ARMS AND LEGS: Primary | ICD-10-CM

## 2022-08-22 DIAGNOSIS — R20.2 BILATERAL NUMBNESS AND TINGLING OF ARMS AND LEGS: Primary | ICD-10-CM

## 2022-08-22 DIAGNOSIS — G56.03 BILATERAL CARPAL TUNNEL SYNDROME: ICD-10-CM

## 2022-08-22 PROCEDURE — 96374 THER/PROPH/DIAG INJ IV PUSH: CPT

## 2022-08-22 PROCEDURE — 99285 EMERGENCY DEPT VISIT HI MDM: CPT

## 2022-08-22 PROCEDURE — 95913 NRV CNDJ TEST 13/> STUDIES: CPT | Performed by: PSYCHIATRY & NEUROLOGY

## 2022-08-22 PROCEDURE — 95886 MUSC TEST DONE W/N TEST COMP: CPT | Performed by: PSYCHIATRY & NEUROLOGY

## 2022-08-22 NOTE — PATIENT INSTRUCTIONS
Verbal consent was obtained from patient and/or patient's advocate for in office procedure with Dr. Edelmira Mcdowell (EMG or EEG).

## 2022-08-22 NOTE — PROGRESS NOTES
Meghann Shaw M.D. [facility]  Board Certified in [address] [facility] [address] [facility], [address] [facility] [address] [facility]    EMG / NERVE CONDUCTION STUDY      PATIENT:  Arnel        DATE OF EM22     YOB: 1955       REASON FOR EMG:   Tingling and numbness in both arms, burning sensation in feet, please do EMG nerve conduction studies of both upper and both lower extremities. Patient has had previous lumbar disc surgery      REFERRING PHYSICIAN:  Diana Cordoba MD  [address] [facility],  [address] [facility]     SUMMARY:   Bilateral median sensory nerve studies had prolonged distal latencies. The left median motor nerve study had a prolonged distal latency in the right median motor had a borderline distal latency. Bilateral ulnar motor and sensory nerve studies were normal.  Bilateral peroneal motor nerve studies were normal.  The slightly low amplitude is probably due to local muscle atrophy and the patient's obesity. Bilateral posterior tibial motor nerve studies were normal.  Bilateral sural sensory nerve studies were normal.  Needle EMG of several muscles in both upper and both lower extremities was normal.  Lumbar paraspinal muscles were not tested by needle EMG since patient has had previous lumbar disc surgery and would be expected to have postsurgical denervation. CLINICAL DIAGNOSIS:  Neuropathy versus radiculopathy        EMG RESULTS:     1. This patient has bilateral median nerve lesions at the wrist.  (Carpal tunnel syndrome). The left side is moderately severe and the right side is mild to moderate. 2.  There is no electrophysiological evidence for neuropathy or radiculopathy in the lower extremities.   Because of the patient's obesity, this was a technically difficult study.        ---------------------------------------------  Caralee Spurling, M.D.   Electromyographer / Neurologist

## 2022-08-23 ENCOUNTER — HOSPITAL ENCOUNTER (EMERGENCY)
Age: 67
Discharge: HOME | End: 2022-08-23
Attending: STUDENT IN AN ORGANIZED HEALTH CARE EDUCATION/TRAINING PROGRAM
Payer: MEDICARE

## 2022-08-23 ENCOUNTER — APPOINTMENT (OUTPATIENT)
Dept: CT IMAGING | Age: 67
End: 2022-08-23
Payer: MEDICARE

## 2022-08-23 VITALS
WEIGHT: 250 LBS | TEMPERATURE: 97.6 F | OXYGEN SATURATION: 99 % | RESPIRATION RATE: 18 BRPM | SYSTOLIC BLOOD PRESSURE: 151 MMHG | BODY MASS INDEX: 44.3 KG/M2 | HEIGHT: 63 IN | HEART RATE: 54 BPM | DIASTOLIC BLOOD PRESSURE: 74 MMHG

## 2022-08-23 DIAGNOSIS — R10.31 ABDOMINAL PAIN, RIGHT LOWER QUADRANT: Primary | ICD-10-CM

## 2022-08-23 LAB
ALBUMIN SERPL-MCNC: 4.6 G/DL (ref 3.4–5)
ALP BLD-CCNC: 97 U/L (ref 40–129)
ALT SERPL-CCNC: 21 U/L (ref 10–40)
ANION GAP SERPL CALCULATED.3IONS-SCNC: 11 MMOL/L (ref 3–16)
AST SERPL-CCNC: 25 U/L (ref 15–37)
BASOPHILS ABSOLUTE: 0.1 K/UL (ref 0–0.2)
BASOPHILS RELATIVE PERCENT: 1 %
BILIRUB SERPL-MCNC: <0.2 MG/DL (ref 0–1)
BILIRUBIN DIRECT: <0.2 MG/DL (ref 0–0.3)
BILIRUBIN URINE: NEGATIVE
BILIRUBIN, INDIRECT: NORMAL MG/DL (ref 0–1)
BLOOD, URINE: NEGATIVE
BUN BLDV-MCNC: 12 MG/DL (ref 7–20)
CALCIUM SERPL-MCNC: 9.9 MG/DL (ref 8.3–10.6)
CHLORIDE BLD-SCNC: 104 MMOL/L (ref 99–110)
CLARITY: CLEAR
CO2: 24 MMOL/L (ref 21–32)
COLOR: YELLOW
CREAT SERPL-MCNC: 0.6 MG/DL (ref 0.6–1.2)
EOSINOPHILS ABSOLUTE: 0.2 K/UL (ref 0–0.6)
EOSINOPHILS RELATIVE PERCENT: 3 %
GFR AFRICAN AMERICAN: >60
GFR NON-AFRICAN AMERICAN: >60
GLUCOSE BLD-MCNC: 115 MG/DL (ref 70–99)
GLUCOSE URINE: NEGATIVE MG/DL
HCT VFR BLD CALC: 40.7 % (ref 36–48)
HEMOGLOBIN: 13.4 G/DL (ref 12–16)
KETONES, URINE: NEGATIVE MG/DL
LEUKOCYTE ESTERASE, URINE: NEGATIVE
LIPASE: 30 U/L (ref 13–60)
LYMPHOCYTES ABSOLUTE: 2.9 K/UL (ref 1–5.1)
LYMPHOCYTES RELATIVE PERCENT: 40.7 %
MAGNESIUM: 2 MG/DL (ref 1.8–2.4)
MCH RBC QN AUTO: 29.6 PG (ref 26–34)
MCHC RBC AUTO-ENTMCNC: 32.9 G/DL (ref 31–36)
MCV RBC AUTO: 89.8 FL (ref 80–100)
MICROSCOPIC EXAMINATION: NORMAL
MONOCYTES ABSOLUTE: 0.7 K/UL (ref 0–1.3)
MONOCYTES RELATIVE PERCENT: 9.3 %
NEUTROPHILS ABSOLUTE: 3.3 K/UL (ref 1.7–7.7)
NEUTROPHILS RELATIVE PERCENT: 46 %
NITRITE, URINE: NEGATIVE
PDW BLD-RTO: 13 % (ref 12.4–15.4)
PH UA: 6 (ref 5–8)
PLATELET # BLD: 286 K/UL (ref 135–450)
PMV BLD AUTO: 7.2 FL (ref 5–10.5)
POTASSIUM SERPL-SCNC: 5.1 MMOL/L (ref 3.5–5.1)
PROTEIN UA: NEGATIVE MG/DL
RBC # BLD: 4.53 M/UL (ref 4–5.2)
SODIUM BLD-SCNC: 139 MMOL/L (ref 136–145)
SPECIFIC GRAVITY UA: 1.01 (ref 1–1.03)
TOTAL PROTEIN: 7.2 G/DL (ref 6.4–8.2)
URINE TYPE: NORMAL
UROBILINOGEN, URINE: 0.2 E.U./DL
WBC # BLD: 7.2 K/UL (ref 4–11)

## 2022-08-23 PROCEDURE — 80076 HEPATIC FUNCTION PANEL: CPT

## 2022-08-23 PROCEDURE — 80048 BASIC METABOLIC PNL TOTAL CA: CPT

## 2022-08-23 PROCEDURE — 6370000000 HC RX 637 (ALT 250 FOR IP): Performed by: STUDENT IN AN ORGANIZED HEALTH CARE EDUCATION/TRAINING PROGRAM

## 2022-08-23 PROCEDURE — 81003 URINALYSIS AUTO W/O SCOPE: CPT

## 2022-08-23 PROCEDURE — 83735 ASSAY OF MAGNESIUM: CPT

## 2022-08-23 PROCEDURE — 36415 COLL VENOUS BLD VENIPUNCTURE: CPT

## 2022-08-23 PROCEDURE — 85025 COMPLETE CBC W/AUTO DIFF WBC: CPT

## 2022-08-23 PROCEDURE — 74177 CT ABD & PELVIS W/CONTRAST: CPT

## 2022-08-23 PROCEDURE — 6360000004 HC RX CONTRAST MEDICATION: Performed by: STUDENT IN AN ORGANIZED HEALTH CARE EDUCATION/TRAINING PROGRAM

## 2022-08-23 PROCEDURE — 83690 ASSAY OF LIPASE: CPT

## 2022-08-23 PROCEDURE — 6360000002 HC RX W HCPCS: Performed by: STUDENT IN AN ORGANIZED HEALTH CARE EDUCATION/TRAINING PROGRAM

## 2022-08-23 RX ORDER — ACETAMINOPHEN 500 MG
1000 TABLET ORAL
Status: COMPLETED | OUTPATIENT
Start: 2022-08-23 | End: 2022-08-23

## 2022-08-23 RX ADMIN — IOPAMIDOL 80 ML: 755 INJECTION, SOLUTION INTRAVENOUS at 03:22

## 2022-08-23 RX ADMIN — ACETAMINOPHEN 1000 MG: 500 TABLET ORAL at 05:00

## 2022-08-23 RX ADMIN — HYDROMORPHONE HYDROCHLORIDE 0.5 MG: 1 INJECTION, SOLUTION INTRAMUSCULAR; INTRAVENOUS; SUBCUTANEOUS at 02:14

## 2022-08-23 ASSESSMENT — PAIN - FUNCTIONAL ASSESSMENT
PAIN_FUNCTIONAL_ASSESSMENT: 0-10
PAIN_FUNCTIONAL_ASSESSMENT: 0-10

## 2022-08-23 ASSESSMENT — PAIN SCALES - GENERAL
PAINLEVEL_OUTOF10: 3
PAINLEVEL_OUTOF10: 8

## 2022-08-23 ASSESSMENT — PAIN DESCRIPTION - LOCATION: LOCATION: FLANK

## 2022-08-23 ASSESSMENT — PAIN DESCRIPTION - ORIENTATION: ORIENTATION: RIGHT

## 2022-08-23 NOTE — ED PROVIDER NOTES
[address] [facility]          ATTENDING PHYSICIAN NOTE     Date of evaluation: 8/22/2022    Chief Complaint     Flank Pain (Right sided since 2100)    History of Present Illness     Kylee Craft is a 79 y.o. female with a past medical history of hypertension, hyperlipidemia, tricuspid regurg, previous DVT not currently on anticoagulation who presents with right lower quadrant pain. States she was in her usual state of health until approximately 830-9 PM this evening when, while watching TV, she had acute onset of excruciating right lower quadrant pain. It does not radiate. It is severe and sharp in nature. She has no associated nausea, vomiting. No dysuria, hematuria, diarrhea, constipation. Patient does state that she has a history of multiple intra-abdominal surgeries including small bowel obstruction 1 year ago. She also has had a partial hysterectomy. No fevers or chills. No back pain. Review of Systems     ROS:  Pertinent Positives include abdominal pain. Pertinent negatives include fever, nausea, vomiting. 14 point review of systems performed and is otherwise negative except as noted in HPI above. Past Medical, Surgical, Family, and Social History     She has a past medical history of Allergic rhinitis, Anxiety, Atrial tachycardia ([address] Carpal tunnel syndrome, Cervicalgia, Chronic back pain, Depression, DVT (deep venous thrombosis) ([address] GERD (gastroesophageal reflux disease), Headache(784.0), Hernia hiatal, Histoplasmosis, Hot flashes, Hyperlipidemia, Hypertension, Obesity, Osteoarthritis, Other partial intestinal obstruction ([address] PONV (postoperative nausea and vomiting), Shingles, Sleep apnea, and Tricuspid regurgitation. She has a past surgical history that includes Knee arthroscopy; Knee cartilage surgery; Lung lobectomy; sinus surgery; Bunionectomy; Partial hysterectomy; Total knee arthroplasty (Bilateral, 03/03/2015); Parathyroid gland surgery;  Lung biopsy; Colonoscopy (10/2018); Breast surgery (Right); lumbar fusion (N/A, 7/29/2021); Abdomen surgery (N/A, 8/1/2021); and Breast biopsy (Right, 2013). Her family history includes Breast Cancer in her sister and sister; COPD in her sister; Cancer in her brother and mother; Colon Cancer in her sister; Hearing Loss in her brother; Heart Disease in her brother, brother, father, mother, and sister; High Blood Pressure in her brother, brother, father, and mother. She reports that she quit smoking about 32 years ago. Her smoking use included cigarettes. She has a 5.00 pack-year smoking history. She has never used smokeless tobacco. She reports that she does not drink alcohol and does not use drugs. Medications     Discharge Medication List as of 8/23/2022  5:37 AM        CONTINUE these medications which have NOT CHANGED    Details   buPROPion (WELLBUTRIN XL) 150 MG extended release tablet TAKE 1 TABLET BY MOUTH EVERY MORNING, Disp-90 tablet, R-1Normal      gabapentin (NEURONTIN) 100 MG capsule TAKE 1 CAPSULE BY MOUTH TWICE DAILY, Disp-60 capsule, R-2Normal      losartan (COZAAR) 50 mg tablet TAKE 1 TABLET BY MOUTH DAILY, Disp-90 tablet, R-0**Patient requests 90 days supply**Normal      aspirin EC 81 MG EC tablet Take 1 tablet by mouth daily, Disp-30 tablet, R-3OTC      Omega-3 Fatty Acids (FISH OIL) 1000 MG CAPS Take 2 capsules by mouth dailyOTC      loratadine (CLARITIN) 10 MG tablet Take 10 mg by mouth dailyHistorical Med      pimecrolimus (ELIDEL) 1 % cream Apply topically 2 times daily. , Disp-30 g, R-0, Normal      clindamycin-benzoyl peroxide (BENZACLIN) 1-5 % gel Apply topically 2 times daily. , Disp-35 g, R-0, Normal      pravastatin (PRAVACHOL) 20 MG tablet TAKE 1 TABLET BY MOUTH EVERY DAY, Disp-90 tablet, R-3Normal      pantoprazole (PROTONIX) 40 MG tablet TAKE 1 TABLET BY MOUTH TWICE A DAY, Disp-180 tablet, R-1Normal      fluticasone (FLONASE) 50 MCG/ACT nasal spray 1 spray by Each Nostril route dailyHistorical Med      verapamil (CALAN SR) 120 MG extended release tablet TAKE 1 TABLET BY MOUTH NIGHTLY DO NOT CRUSH OR CHEW., Disp-90 tablet, R-3Normal      metoprolol succinate (TOPROL XL) 25 MG extended release tablet Take 1 tablet by mouth daily, Disp-90 tablet, R-0NO PRINT      acetaminophen (TYLENOL) 500 MG tablet Take 500 mg by mouth every 6 hours as needed for PainHistorical Med      Multiple Vitamins-Minerals (CENTRUM SILVER PO) Take 1 tablet by mouth daily              Allergies     She is allergic to latex, reglan [metoclopramide hcl], univasc [moexipril], crestor [rosuvastatin], zoloft [sertraline], celecoxib, keflex [cephalexin], lipitor, metoclopramide, prochlorperazine, prochlorperazine edisylate, sulfa antibiotics, and vioxx. Physical Exam     INITIAL VITALS:   Vitals:    08/23/22 0407   BP: (!) 151/74   Pulse: 54   Resp: 18   Temp:    SpO2: 99%       General:  WDWN obese female, grimacing in pain but nontoxic  HEENT:  Normocephalic, atraumatic, MMM. No facial asymmetry. Eyes: Anicteric, EOMI, PERRL   Neck:  Supple, full ROM, no bony TTP, no paraspinal TTP, no LAD   Pulmonary:   CTA bl, no wheezes, rales, rhonchi, no increased WOB or tachypnea, no chest wall TTP or visible deformities   Cardiac:  regular rate, regular rhythm, no m/r/g, no JVD, no peripheral edema  Abdomen:  Soft, nondistended, non tender to palpation in the right lower quadrant and right lateral mid abdomen with guarding. Otherwise, nontender to palpation in epigastrium. No CVA tenderness bilaterally  Extremities:  Warm, 2+ radial pulses b/l, 2+ DP pulses b/l.  No extremity deformity, edema or tenderness appreciated  Skin: No rashes or bruises, no appreciable pallor  Neuro:   Awake, alert, moving UE and LE spontaneously, CN 2-12 grossly intact, pt observed ambulating without difficulty   Psych:   Mood and affect appropriate for situation, denies SI/HI/AVH     ED Diagnostics   ED BEDSIDE ULTRASOUND:  No results found. Labs:  Please see electronic medical record for any tests performed in the ED     Radiology:  Please see electronic medical record for any tests performed in the ED    Procedures     ED Course     Nursing Notes, Past Medical Hx, Past Surgical Hx, Social Hx,Allergies, and Family Hx were reviewed. patient was given the following medications:  Orders Placed This Encounter   Medications    HYDROmorphone (DILAUDID) injection 0.5 mg    iopamidol (ISOVUE-370) 76 % injection 80 mL    acetaminophen (TYLENOL) tablet 1,000 mg       CONSULTS:  None    MEDICAL DECISIONMAKING / ASSESSMENT / PLAN     Torie Urbina is a 79 y.o. female with a history and presentation as described above in HPI. Appropriate labs and diagnostic studies were reviewed as they were made available. Pertinent laboratory studies in medical decision making are listed below. Patient presents with acute onset RLQ pain. Considerations of etiology include nephrolithiasis, appendicitis. Pt had significant pain relief with morphine and tylenol; pt cannot tolerate NSAIDs. Labs including UA, CBC, CMP, lipase are negative. CT abd/pelvis is without obvious etiology for her pain. Discussed with pain who continues to feel well and is tolerating PO. At this time, fell she is medically stable for discharge. Return precautions discussed. Clinical Impression     1.  Abdominal pain, right lower quadrant      Disposition     PATIENT REFERRED TO:  Ani Mcdermott MD  [address]    Go on 8/26/2022  as scheduled    DISCHARGE MEDICATIONS:  Discharge Medication List as of 8/23/2022  5:37 AM          DISPOSITION Decision To Discharge 08/23/2022 05:36:02 AM         Fidencio Jenkins MD  08/24/22 0966

## 2022-08-25 NOTE — PROGRESS NOTES
Bard Vu (:  1955) is a 79 y.o. female, here for evaluation of the following chief complaint(s):    Follow-up      ASSESSMENT/PLAN:  1. Hepatomegaly  -     US LIVER; Future  2. Opacification of ethmoid sinus  (Noted on head ct)  -     Florinda Walton MD, Otolaryngology, [place]  -     fluticasone [facility]) 50 MCG/ACT nasal spray; 1 spray by Each Nostril route daily, Disp-16 g, R-3Normal  3. Carpal tunnel syndrome of left wrist  -     Skylar Anderson MD, Hand Surgery (Hand, Wrist, Upper Extremity), [facility]    Gastroesophageal reflux disease, unspecified whether esophagitis present  Controlled on pantoprazole    Hyperlipidemia, unspecified hyperlipidemia type  Taking pravastatin. For PAD, continue low dose aspirin  -  Essential hypertension and PSVT  Controlled on losartan and metoprolol and verapamil    Major depressive disorder with single episode, 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HCC)  Controlled on Wellbutrin    MGUS- reviewed recent imaging done by Hematology. Return in about 6 months (around 2023). SUBJECTIVE/OBJECTIVE:  HPI  Patient is here for routine visit. She had a recent visit to the emergency room for right lower quadrant pain which is feeling better. There was an incidental finding of hepatomegaly but this was not noted on prior scans. Her recent EMG showed carpal tunnel syndrome and she is symptomatic. Overall she is feeling better and [facility] released her after surgery. There is some stress at home with caring for her sick .  Phq 9- 5      Review of Systems  Wt down 5    Past Medical History:   Diagnosis Date    Allergic rhinitis     Anxiety     Atrial tachycardia ([address]     dr Kam Ball ([facility] cors)    Carpal tunnel syndrome     Cervicalgia     Chronic back pain     low    Depression     DVT (deep venous thrombosis) ([address]     after surgery, bilateral    GERD (gastroesophageal reflux disease)     has schatzki ring    Headache(784.0)     hemiplegic migraines, improved    Hernia hiatal     Histoplasmosis     Hot flashes     takes wellbutrin    Hyperlipidemia     Hypertension     Obesity     Osteoarthritis     multiple joints    Other partial intestinal obstruction ([address] 2021    wound dehiscence    PONV (postoperative nausea and vomiting)     Shingles     nerve pain in her low back persists    Sleep apnea     CPAP set of 4    Tricuspid regurgitation     moderate       Current Outpatient Medications   Medication Sig Dispense Refill    fluticasone (FLONASE) 50 MCG/ACT nasal spray 1 spray by Each Nostril route daily 16 g 3    buPROPion (WELLBUTRIN XL) 150 MG extended release tablet TAKE 1 TABLET BY MOUTH EVERY MORNING 90 tablet 1    gabapentin (NEURONTIN) 100 MG capsule TAKE 1 CAPSULE BY MOUTH TWICE DAILY 60 capsule 2    losartan (COZAAR) 50 mg tablet TAKE 1 TABLET BY MOUTH DAILY 90 tablet 0    aspirin EC 81 MG EC tablet Take 1 tablet by mouth daily 30 tablet 3    Omega-3 Fatty Acids (FISH OIL) 1000 MG CAPS Take 2 capsules by mouth daily      loratadine (CLARITIN) 10 MG tablet Take 10 mg by mouth daily      pimecrolimus (ELIDEL) 1 % cream Apply topically 2 times daily. 30 g 0    clindamycin-benzoyl peroxide (BENZACLIN) 1-5 % gel Apply topically 2 times daily. 35 g 0    pravastatin (PRAVACHOL) 20 MG tablet TAKE 1 TABLET BY MOUTH EVERY DAY 90 tablet 3    pantoprazole (PROTONIX) 40 MG tablet TAKE 1 TABLET BY MOUTH TWICE A  tablet 1    verapamil (CALAN SR) 120 MG extended release tablet TAKE 1 TABLET BY MOUTH NIGHTLY DO NOT CRUSH OR CHEW. 90 tablet 3    metoprolol succinate (TOPROL XL) 25 MG extended release tablet Take 1 tablet by mouth daily 90 tablet 0    acetaminophen (TYLENOL) 500 MG tablet Take 500 mg by mouth every 6 hours as needed for Pain      Multiple Vitamins-Minerals (CENTRUM SILVER PO) Take 1 tablet by mouth daily        No current facility-administered medications for this visit. Physical Exam  Vitals reviewed.    Constitutional:

## 2022-08-26 ENCOUNTER — OFFICE VISIT (OUTPATIENT)
Dept: INTERNAL MEDICINE CLINIC | Age: 67
End: 2022-08-26
Payer: MEDICARE

## 2022-08-26 VITALS
OXYGEN SATURATION: 97 % | BODY MASS INDEX: 43.93 KG/M2 | DIASTOLIC BLOOD PRESSURE: 72 MMHG | HEART RATE: 58 BPM | WEIGHT: 248 LBS | SYSTOLIC BLOOD PRESSURE: 138 MMHG

## 2022-08-26 DIAGNOSIS — R16.0 HEPATOMEGALY: Primary | ICD-10-CM

## 2022-08-26 DIAGNOSIS — R93.0 OPACIFICATION OF ETHMOID SINUS: ICD-10-CM

## 2022-08-26 DIAGNOSIS — G56.02 CARPAL TUNNEL SYNDROME OF LEFT WRIST: ICD-10-CM

## 2022-08-26 PROCEDURE — 1123F ACP DISCUSS/DSCN MKR DOCD: CPT | Performed by: INTERNAL MEDICINE

## 2022-08-26 PROCEDURE — 1036F TOBACCO NON-USER: CPT | Performed by: INTERNAL MEDICINE

## 2022-08-26 PROCEDURE — 3017F COLORECTAL CA SCREEN DOC REV: CPT | Performed by: INTERNAL MEDICINE

## 2022-08-26 PROCEDURE — 1090F PRES/ABSN URINE INCON ASSESS: CPT | Performed by: INTERNAL MEDICINE

## 2022-08-26 PROCEDURE — G8399 PT W/DXA RESULTS DOCUMENT: HCPCS | Performed by: INTERNAL MEDICINE

## 2022-08-26 PROCEDURE — G8417 CALC BMI ABV UP PARAM F/U: HCPCS | Performed by: INTERNAL MEDICINE

## 2022-08-26 PROCEDURE — G8427 DOCREV CUR MEDS BY ELIG CLIN: HCPCS | Performed by: INTERNAL MEDICINE

## 2022-08-26 PROCEDURE — 99214 OFFICE O/P EST MOD 30 MIN: CPT | Performed by: INTERNAL MEDICINE

## 2022-08-26 RX ORDER — FLUTICASONE PROPIONATE 50 MCG
1 SPRAY, SUSPENSION (ML) NASAL DAILY
Qty: 16 G | Refills: 3 | Status: SHIPPED | OUTPATIENT
Start: 2022-08-26

## 2022-08-26 NOTE — PATIENT INSTRUCTIONS
Abdomen ultrasound  [id number]      [employer]  Shots are at 0 and 2-6 months later    ENT re head ct    Ely Rodriguez

## 2022-09-01 ENCOUNTER — TELEPHONE (OUTPATIENT)
Dept: ENT CLINIC | Age: 67
End: 2022-09-01

## 2022-09-01 ENCOUNTER — OFFICE VISIT (OUTPATIENT)
Dept: ENT CLINIC | Age: 67
End: 2022-09-01
Payer: MEDICARE

## 2022-09-01 VITALS
HEIGHT: 63 IN | WEIGHT: 247 LBS | DIASTOLIC BLOOD PRESSURE: 79 MMHG | HEART RATE: 62 BPM | SYSTOLIC BLOOD PRESSURE: 117 MMHG | BODY MASS INDEX: 43.77 KG/M2

## 2022-09-01 DIAGNOSIS — J34.1 MUCOCELE OF ETHMOID SINUS: Primary | ICD-10-CM

## 2022-09-01 PROCEDURE — 3017F COLORECTAL CA SCREEN DOC REV: CPT | Performed by: OTOLARYNGOLOGY

## 2022-09-01 PROCEDURE — G8427 DOCREV CUR MEDS BY ELIG CLIN: HCPCS | Performed by: OTOLARYNGOLOGY

## 2022-09-01 PROCEDURE — 1090F PRES/ABSN URINE INCON ASSESS: CPT | Performed by: OTOLARYNGOLOGY

## 2022-09-01 PROCEDURE — 1036F TOBACCO NON-USER: CPT | Performed by: OTOLARYNGOLOGY

## 2022-09-01 PROCEDURE — 99204 OFFICE O/P NEW MOD 45 MIN: CPT | Performed by: OTOLARYNGOLOGY

## 2022-09-01 PROCEDURE — G8417 CALC BMI ABV UP PARAM F/U: HCPCS | Performed by: OTOLARYNGOLOGY

## 2022-09-01 PROCEDURE — G8399 PT W/DXA RESULTS DOCUMENT: HCPCS | Performed by: OTOLARYNGOLOGY

## 2022-09-01 PROCEDURE — 31231 NASAL ENDOSCOPY DX: CPT | Performed by: OTOLARYNGOLOGY

## 2022-09-01 PROCEDURE — 1123F ACP DISCUSS/DSCN MKR DOCD: CPT | Performed by: OTOLARYNGOLOGY

## 2022-09-01 ASSESSMENT — ENCOUNTER SYMPTOMS
RHINORRHEA: 0
FACIAL SWELLING: 0
SORE THROAT: 0
SINUS PRESSURE: 0
TROUBLE SWALLOWING: 0
EYE PAIN: 0
DIARRHEA: 0
NAUSEA: 0
EYE ITCHING: 0
VOICE CHANGE: 0
CHOKING: 0
EYE REDNESS: 0
COUGH: 0
SHORTNESS OF BREATH: 0
SINUS PAIN: 0

## 2022-09-01 NOTE — LETTER
[facility]  [address]  Phone: [phone]  Fax: [phone]    Mane Abdi MD    September 1, 2022     Emma Winchester, [address] [place] [address]    Patient: Demetrios Mcburney   MR Number: [id number]   YOB: 1955   Date of Visit: 9/1/2022       Dear Emma Winchester:    Thank you for referring Navneet Coffey to me for evaluation/treatment. Below are the relevant portions of my assessment and plan of care. Diagnosis Orders   1. Mucocele of ethmoid sinus  CT SINUS WO CONTRAST            Follow up in 6 months with new CT sinuses. Reviewed CT head and interpreted myself. Reviewed lab work 8-2022      If you have questions, please do not hesitate to call me. I look forward to following Napoleon Morales along with you.     Sincerely,      Mane Abdi MD

## 2022-09-01 NOTE — TELEPHONE ENCOUNTER
If provider orders tests at today's visit, patient would like to be contacted via Telephone.  If to contact patient by phone, patient's preferred phone # is [phone] and it is OK to leave message on voice mail or with family member.  If medications are ordered at today's visit, the pharmacy name/location patient would like them to be sent to is   [id number] - [place], [place] - [address] [place]  [address] [facility]  [facility] [address]  Phone: [phone] Fax: [phone]   Noted

## 2022-09-01 NOTE — PROGRESS NOTES
Subjective:      Patient ID: Tc Mcgee is a 79 y.o. female. HPI  Chief Complaint   Patient presents with    Image finding  History of Present Illness:Laly is a(n) 79 y.o. female who presents with a blocked left posterior ethmoid cell on CT head for other pathology. Has a remote history of sinus surgery. Currently no sinus complaints       Patient Active Problem List   Diagnosis    Vitamin D deficiency    Generalized osteoarthrosis, involving multiple sites    Chronic low back pain    Hemiplegic migraine    Allergic rhinitis    GERD (gastroesophageal reflux disease)    Degenerative disc disease, lumbar    Essential hypertension    Major depressive disorder with single episode, 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HCC)    Mixed hyperlipidemia    Rosacea    Shingles (herpes zoster) polyneuropathy    Palpitations    Atrial tachycardia (HCC)    Paresthesia    PAD (peripheral artery disease) ([facility])    Chest pain    Morbidly obese ([facility])    Epigastric discomfort    Chronic back pain    Lumbar stenosis with neurogenic claudication    Dehiscence of fascia    Ileus ([facility])    S/P lumbar and lumbosacral fusion by anterior technique    Morbid obesity with BMI of 45.0-49.9, adult [facility])     Past Surgical History:   Procedure Laterality Date    ABDOMEN SURGERY N/A 08/01/2021    REPAIR OF WOUND DEHISCENCE performed by Timur Jung MD at [facility] Right 2013    benign    BREAST SURGERY Right     Biopsy w/clip    BUNIONECTOMY      right     COLONOSCOPY  10/2018    fu 10 yrs    ESOPHAGOGASTRODUODENOSCOPY  2016    ? KNEE ARTHROSCOPY      KNEE CARTILAGE SURGERY      LOBECTOMY      partial right lung lobectomy    LUMBAR FUSION N/A 07/29/2021    L4-S1 ANTERIOR LUMBAR INTERBODY FUSION WITH PEDICLE SCREW FIXATION performed by Minerva Gleason.  Blaire Enriuqez MD at [place] [id number]. (CERVIX NOT REMOVED)      ovaries retained    SINUS SURGERY      TOTAL KNEE ARTHROPLASTY Bilateral 2015     Family History   Problem Relation Age of Onset    High Blood Pressure Mother     Heart Disease Mother     Cancer Mother         vaginal    High Blood Pressure Father     Heart Disease Father     Heart Disease Sister     Colon Cancer Sister     Heart Disease Brother     High Blood Pressure Brother     Cancer Brother         oral    Breast Cancer Sister         52+    COPD Sister     Breast Cancer Sister         50+    Heart Disease Brother     Hearing Loss Brother         chf and transplant    High Blood Pressure Brother      Social History     Socioeconomic History    Marital status:      Spouse name: Not on file    Number of children: 2    Years of education: Not on file    Highest education level: Not on file   Occupational History    Occupation: , retired/disabled   Tobacco Use    Smoking status: Former     Packs/day: 1.00     Years: 5.00     Pack years: 5.00     Types: Cigarettes     Quit date: 1990     Years since quittin.6    Smokeless tobacco: Never   Vaping Use    Vaping Use: Never used   Substance and Sexual Activity    Alcohol use: No    Drug use: No    Sexual activity: Not on file   Other Topics Concern    Not on file   Social History Narrative    2 kids are 39, 39 5/21    Lives w      Social Determinants of Health     Financial Resource Strain: Low Risk     Difficulty of Paying Living Expenses: Not hard at all   Food Insecurity: No Food Insecurity    Worried About Running Out of Food in the Last Year: Never true    Ran Out of Food in the Last Year: Never true   Transportation Needs: Not on file   Physical Activity: Not on file   Stress: Not on file   Social Connections: Not on file   Intimate Partner Violence: Not on file   Housing Stability: Not on file       DRUG/FOOD ALLERGIES: Latex, Reglan [metoclopramide hcl], Univasc [moexipril], Crestor [rosuvastatin], Zoloft [sertraline], Celecoxib, Keflex [cephalexin], Lipitor, Metoclopramide, Prochlorperazine, Prochlorperazine edisylate, Sulfa antibiotics, and Vioxx    CURRENT MEDICATIONS  Prior to Admission medications    Medication Sig Start Date End Date Taking? Authorizing Provider   fluticasone (FLONASE) 50 MCG/ACT nasal spray 1 spray by Each Nostril route daily 8/26/22  Yes Erik Abdalla MD   buPROPion (WELLBUTRIN XL) 150 MG extended release tablet TAKE 1 TABLET BY MOUTH EVERY MORNING 7/5/22  Yes Erik Abdalla MD   aspirin EC 81 MG EC tablet Take 1 tablet by mouth daily 4/14/22  Yes Tammy Alvarado MD   Omega-3 Fatty Acids (FISH OIL) 1000 MG CAPS Take 2 capsules by mouth daily 4/14/22  Yes Erik Abdalla MD   loratadine (CLARITIN) 10 MG tablet Take 10 mg by mouth daily 3/9/22  Yes Historical Provider, MD   pimecrolimus (ELIDEL) 1 % cream Apply topically 2 times daily. 4/14/22  Yes Erik Abdalla MD   clindamycin-benzoyl peroxide (BENZACLIN) 1-5 % gel Apply topically 2 times daily.  4/14/22  Yes Erik Abdalla MD   pravastatin (PRAVACHOL) 20 MG tablet TAKE 1 TABLET BY MOUTH EVERY DAY 4/6/22  Yes Liana Stark MD   pantoprazole (PROTONIX) 40 MG tablet TAKE 1 TABLET BY MOUTH TWICE A DAY 3/10/22  Yes Erik Abdalla MD   verapamil (CALAN SR) 120 MG extended release tablet TAKE 1 TABLET BY MOUTH NIGHTLY DO NOT CRUSH OR CHEW. 12/30/21  Yes Virginia Haver, APRN - CNP   acetaminophen (TYLENOL) 500 MG tablet Take 500 mg by mouth every 6 hours as needed for Pain   Yes Historical Provider, MD   Multiple Vitamins-Minerals (CENTRUM SILVER PO) Take 1 tablet by mouth daily    Yes Historical Provider, MD   gabapentin (NEURONTIN) 100 MG capsule TAKE 1 CAPSULE BY MOUTH TWICE DAILY 6/15/22 8/26/22  Erik Abdalla MD   losartan (COZAAR) 50 mg tablet TAKE 1 TABLET BY MOUTH DAILY 6/9/22 8/26/22  Erik Abdalla MD   metoprolol succinate (TOPROL XL) 25 MG extended release tablet Take 1 tablet by mouth daily 5/28/21 8/26/22  Leah Vance Jef Weaver MD         Lab Studies:  Lab Results   Component Value Date    WBC 7.2 08/23/2022    HGB 13.4 08/23/2022    HCT 40.7 08/23/2022    MCV 89.8 08/23/2022     08/23/2022     Lab Results   Component Value Date    GLUCOSE 115 (H) 08/23/2022    BUN 12 08/23/2022    CREATININE 0.6 08/23/2022    K 5.1 08/23/2022     08/23/2022     08/23/2022    CALCIUM 9.9 08/23/2022     Lab Results   Component Value Date    MG 2.00 08/23/2022     Lab Results   Component Value Date    PHOS 3.8 08/07/2021     Lab Results   Component Value Date    ALKPHOS 97 08/23/2022    ALT 21 08/23/2022    AST 25 08/23/2022    BILITOT <0.2 08/23/2022    PROT 7.2 08/23/2022      Review of Systems   Constitutional:  Negative for activity change, appetite change, chills, fatigue and fever. HENT:  Negative for congestion, ear discharge, ear pain, facial swelling, hearing loss, nosebleeds, postnasal drip, rhinorrhea, sinus pressure, sinus pain, sneezing, sore throat, tinnitus, trouble swallowing and voice change. Eyes:  Negative for pain, redness, itching and visual disturbance. Respiratory:  Negative for cough, choking and shortness of breath. Gastrointestinal:  Negative for diarrhea and nausea. Endocrine: Negative for cold intolerance and heat intolerance. Objective:   Physical Exam  Constitutional:       Appearance: She is well-developed. HENT:      Head: Not macrocephalic and not microcephalic. No Kiser's sign, abrasion, right periorbital erythema, left periorbital erythema or laceration. Nose: No nasal deformity, septal deviation, laceration, mucosal edema or rhinorrhea. Right Nostril: No epistaxis or occlusion. Left Nostril: No epistaxis or occlusion. Right Turbinates: Not enlarged, swollen or pale. Left Turbinates: Not enlarged, swollen or pale. Right Sinus: No maxillary sinus tenderness or frontal sinus tenderness.       Left Sinus: No maxillary sinus tenderness or frontal sinus tenderness. Mouth/Throat:      Lips: No lesions. Mouth: Mucous membranes are moist.      Tongue: No lesions. Palate: No mass. Pharynx: Uvula midline. No oropharyngeal exudate or posterior oropharyngeal erythema. Tonsils: No tonsillar abscesses. Eyes:      General:         Right eye: No discharge. Left eye: No discharge. Extraocular Movements:      Right eye: Normal extraocular motion. Left eye: Normal extraocular motion. Conjunctiva/sclera:      Right eye: Right conjunctiva is not injected. No chemosis or exudate. Left eye: Left conjunctiva is not injected. No chemosis or exudate. Neck:      Thyroid: No thyroid mass or thyromegaly. Cardiovascular:      Rate and Rhythm: Normal rate and regular rhythm. Pulmonary:      Effort: No tachypnea or respiratory distress. Lymphadenopathy:      Head:      Right side of head: No preauricular or posterior auricular adenopathy. Left side of head: No preauricular or posterior auricular adenopathy. Cervical:      Right cervical: No superficial, deep or posterior cervical adenopathy. Left cervical: No superficial, deep or posterior cervical adenopathy. Neurological:      Mental Status: She is alert and oriented to person, place, and time. Narrative   EXAM: CT HEAD WO CONTRAST       INDICATION: Monoclonal gammopathy;       COMPARISON: Bone survey radiographs 7/21/2022; CT 1/7/2022       TECHNICAL: Axial CT imaging obtained from vertex to skull base. Axial images and multiplanar reformatted images reviewed. CT radiation dose optimization techniques (automated exposure control, and use of iterative reconstruction techniques, or adjustment of the mA and/or kV according to patient size) were used to limit patient radiation dose. IV Contrast: None. FINDINGS:       INTRACRANIAL HEMORRHAGE: None. VENTRICLES: Normal in size and configuration for age.        BRAIN PARENCHYMA: There is a mild-to-moderate amount of small areas of patchy hypodensity involving the cerebral subcortical white matter without evidence for significant change compared to January 2022. SKULL: No evidence for pathologic lucency involving the skull. 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 A 6 mm lucency involving the right frontal skull corresponds to the radiographic lucency on the prior bone    survey and was present in 2007. PARANASAL SINUSES AND MASTOIDS: No acute sinusitis or mastoiditis. There is stable heterogeneous opacification of left posterior ethmoid air cell without evidence for bone destruction, which could represent complex mucous retention cysts, mucocele,    inspissated secretions with chronic focal sinusitis or superimposed noninvasive fungal sinusitis. ORBITS: Normal.       EXTRACRANIAL SOFT TISSUES: Normal.           Impression       1. No intracranial hemorrhage, mass effect or large acute territorial infarction   2. Moderate cerebral white matter disease is nonspecific, but most commonly chronic small vessel ischemia. No significant change compared to 1/7/2022.   3. 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 Due to the patients chronic sinus disease and/or history of sinonasal neoplasm for surveillance a nasal endoscopy with or without debridement will be performed to complete a significant physical examination of the patient which cannot be performed by anterior rhinoscopy alone (failure of complete examination of the paranasal sinuses).  Failure to provide this procedure may lead to late detection of significant chronic benign disease, acute exacerbation, resolution or failure of early diagnosis of recurrent cancer. The procedure report is present in the body of the chart. Nasal Endoscopy    Pre OP: ethmoid sinus mucocele  Post OP: Sam3  Reason: New patient evaluation  Procedure: Nasal endoscopy  Surgeon: Brittany Coffman  Anesthesia: Afrin with 2% lidocaine  Estimated Blood Loss: None      After obtaining verbal consent from the patient 1% lidocaine with afrin was sprayed into the nasal cavities. After allowing a time for anesthesia, a nasal endoscope was placed into the nostril. The septum, inferior, and middle turbinates were examined. The middle meatus, and sphenoethmoid recess was examined bilaterally. Cultures were not obtained from the sinuses. There were no complications. Pertinent positives included: There was not edema and purulence in the left middle meatus. There was not edema and purulence at the right middle meatus. Polyps were not identified in the sinuses. Masses were not identified. Tolerated well without complication. I attest that I was present for and did the entire procedure myself. Assessment:       Diagnosis Orders   1. Mucocele of ethmoid sinus  CT SINUS WO CONTRAST              Plan:      Follow up in 6 months with new CT sinuses. Reviewed CT head and interpreted myself.    Reviewed lab work 8-2022          Nigel Frazier MD

## 2022-09-03 SDOH — HEALTH STABILITY: PHYSICAL HEALTH: ON AVERAGE, HOW MANY DAYS PER WEEK DO YOU ENGAGE IN MODERATE TO STRENUOUS EXERCISE (LIKE A BRISK WALK)?: 3 DAYS

## 2022-09-04 DIAGNOSIS — J30.89 NON-SEASONAL ALLERGIC RHINITIS, UNSPECIFIED TRIGGER: ICD-10-CM

## 2022-09-06 ENCOUNTER — OFFICE VISIT (OUTPATIENT)
Dept: ORTHOPEDIC SURGERY | Age: 67
End: 2022-09-06
Payer: MEDICARE

## 2022-09-06 VITALS — RESPIRATION RATE: 16 BRPM | BODY MASS INDEX: 43.77 KG/M2 | HEIGHT: 63 IN | WEIGHT: 247 LBS

## 2022-09-06 DIAGNOSIS — I10 ESSENTIAL HYPERTENSION: ICD-10-CM

## 2022-09-06 DIAGNOSIS — G56.03 CARPAL TUNNEL SYNDROME, BILATERAL: Primary | ICD-10-CM

## 2022-09-06 PROCEDURE — 1090F PRES/ABSN URINE INCON ASSESS: CPT | Performed by: ORTHOPAEDIC SURGERY

## 2022-09-06 PROCEDURE — 1036F TOBACCO NON-USER: CPT | Performed by: ORTHOPAEDIC SURGERY

## 2022-09-06 PROCEDURE — G8399 PT W/DXA RESULTS DOCUMENT: HCPCS | Performed by: ORTHOPAEDIC SURGERY

## 2022-09-06 PROCEDURE — G8417 CALC BMI ABV UP PARAM F/U: HCPCS | Performed by: ORTHOPAEDIC SURGERY

## 2022-09-06 PROCEDURE — G8427 DOCREV CUR MEDS BY ELIG CLIN: HCPCS | Performed by: ORTHOPAEDIC SURGERY

## 2022-09-06 PROCEDURE — 99214 OFFICE O/P EST MOD 30 MIN: CPT | Performed by: ORTHOPAEDIC SURGERY

## 2022-09-06 PROCEDURE — 1123F ACP DISCUSS/DSCN MKR DOCD: CPT | Performed by: ORTHOPAEDIC SURGERY

## 2022-09-06 PROCEDURE — 3017F COLORECTAL CA SCREEN DOC REV: CPT | Performed by: ORTHOPAEDIC SURGERY

## 2022-09-06 RX ORDER — LOSARTAN POTASSIUM 50 MG/1
TABLET ORAL
Qty: 90 TABLET | Refills: 0 | Status: SHIPPED | OUTPATIENT
Start: 2022-09-06

## 2022-09-06 RX ORDER — LORATADINE 10 MG/1
TABLET ORAL
Qty: 90 TABLET | Refills: 0 | Status: SHIPPED | OUTPATIENT
Start: 2022-09-06

## 2022-09-06 NOTE — PROGRESS NOTES
This 79 y.o., right hand dominant  is seen in referral for Michelle Monroe MD with a chief complaint of numbness and tingling of the bilateral hands, L>R. Symptoms have been present for 10 years. The patient also frequently notices pain in the hand, wrist and arm, as well as weakness of , morning stiffness and swelling as well as difficulty performing functions which require fine touch. Symptoms are sometimes worse at night and can disturb sleep. The problem appears to recently have become worse. Conservative treatment has been prescribed(brace). There is no history of wrist fracture. There is history and/or family history of diabetes. .  There is history of parathyroid disease. There is family history of carpal tunnel syndrome(son). The pain assessment has been reviewed and is correct as stated. The patient's social history, past medical history, family history, medications, allergies and review of systems, entered 9/6/22, have been reviewed, and dated and are recorded in the chart. On examination the patient is Height: 5' 3\" (160 cm) tall and weighs Weight: 247 lb (112 kg). Respirations are 18 per minute. The patient is well nourished, is oriented to time and place, demonstrates appropriate mood and affect as well as normal gait and station. Cervical range of motion is normal for the patient's stated age and does not produce pain or paresthesias in either upper extremity. There are deformities of the DIP joints of multiple fingers consistent with osteoarthritis. There is soft tissue swelling involving the volar aspect of the bilateral wrists. There is moderate bilateral thenar muscle atrophy, aggravated by bilateral thumb CMC arthritis. .  The Tinel sign is positive over the median nerve at the  carpal tunnel bilaterally and negative over the ulnar nerve at the elbow bilaterally. The Phalen test is positive on the left at 0 seconds.   There is hypalgesia, with associated dysesthesia, on sensory testing over the median nerve distribution. Two point discrimination is normal at the tip of the middle fingers. Range of motion of the thumbs is limited due to arthritis, R>L, but finger and wrist range of motion is normal.  Skin is intact without lesions. Distal circulation is normal.  All joints are stable. Fine motor coordination and gross muscle strength are normal. Deep tendon reflexes are brisk and present bilaterally. There are no subcutaneous masses or enlarged epitrochlear lymph nodes. I have personally reviewed and interpreted all previous external imaging studies(DEXA scan), laboratory tests(CBC+diff, CMP, HbA1c, Hepatic function), diagnostic procedures(EMG) and medical encounters pertinent to this patient's visit today. Review and independent interpretation of an external EMG study, dated 8/22/22 , preformed by Dr. Armando Mills, a physician outside of this office, is abnormal. There is moderately severe bilateral carpal tunnel syndrome. The test results are shared with the patient. Impression:     Carpal tunnel syndrome, chronic, bilateral, with clinical evidence of nerve damage. The nature of their medical problem is fully discussed with the patient, including all treatment options. Surgery is also discussed, including the possible risks, complications, prognosis and postoperative care. All questions are answered. The surgery consent form is explained and signed. Surgery will be scheduled and the patient is asked to call me if there are any additional questions. The patient understands that the surgery will be done by Dr. Judith Paul. The patient is instructed to stop taking aspirin 7 days before each surgery. They will check with their medical doctor(s), if needed, to ask permission to do so. Furthermore, the patient is advised that surgery may likely not result in return of normal function.   However, the result of surgery will usually result in better function than not having surgery.

## 2022-09-06 NOTE — LETTER
discussed the specific limitations and risks of hospital and/or office based treatment at this time due to the COVID-19 pandemic                I have been counseled about the risks of suzy Covid-19 in the reuben-operative and post-operative periods related to this procedure. I have been made aware that suzy Covid-19 around the time of a surgical procedure may worsen my prognosis for recovering from the virus and lend to a higher morbidity and or mortality risk. With this knowledge, I have requested to proceed with the procedure as scheduled. 4. I have also been informed by the informing physician that there are other risks from both known and unknown causes that are attendant to the performance of any surgical procedure. I am aware that the practice of medicine and surgery is not an exact science, and that no guarantees have been made to me concerning the results of the operation and/or procedure(s). 5. I   CONSENT / REFUSE CONSENT  (strike the phrase that does not apply) to the taking of photographs before, during and/or after the operation or procedure for scientific/educational purposes. 6. I consent to the administration of anesthesia and to the use of such anesthetics as may be deemed advisable by the anesthesiologist who has been engaged by me or my physician.     7. I certify that I have read and understand the above consent to operation and/or other procedure(s); that the explanations therein referred to were made to me by the informing physician in advance of my signing this consent; that all blanks or statements requiring insertion or completion were filled in and inapplicable paragraphs, if any, were stricken before I signed; and that all questions asked by me about the operation and/or procedure(s) which I have consented to have been fully answered in a satisfactory manner.                                 _______________________           9/6/22 Witness     Signature Of Patient         Date        Grazyna Segovia                                                 Informing Physician                                           Signature of Informing Physician                              If patient is unable to sign or is a minor, complete one of the following:    (A)  Patient is a minor   years of age. (B)  Patient is unable to sign because: The undersigned represents that he or she is duly authorized to execute this consent for and on behalf of the above named patient. Witness               o  Parent  o  Guardian   o  Spouse       o  Other (specify)                                           Patient Name: Bard Vu  Patient YOB: 1955  Dr. Aristeo Espino' Return To Work Policy  Regarding your ability to return to work after surgery or injury, Dr. Aristeo Espino will not state that any patient is off of work or cannot work at all. He will place you on restrictions after your surgical procedure or injury. Depending on the details of your particular situation, Dr. Aristeo Espino may state that you will have either light use or no use of your hand for a specific number of weeks. It is your obligation to communicate with your employer regarding your restrictions. It is your employer's decision as to whether they will accommodate your restrictions (i.e. allow you to come to work in your restricted capacity) or to not allow you to return to work under your restrictions. Dr. Aristeo Espino does not participate in making this decision and cannot influence your employer regarding their decision. If you do not communicate your restrictions to your employer, or if you do not present to work as you are scheduled to, Dr. Aristeo Espino will not provide an 'excuse' to explain your absence. A doctors note, or official forms (BWC, FMLA, etc.) will be filled out, upon request, to indicate your date of surgery and your restrictions as stated above.   Dr. Aristeo Espino'

## 2022-09-07 ENCOUNTER — TELEPHONE (OUTPATIENT)
Dept: ORTHOPEDIC SURGERY | Age: 67
End: 2022-09-07

## 2022-09-08 ENCOUNTER — ANCILLARY ORDERS (OUTPATIENT)
Dept: INTERNAL MEDICINE CLINIC | Age: 67
End: 2022-09-08

## 2022-09-08 ENCOUNTER — HOSPITAL ENCOUNTER (OUTPATIENT)
Dept: ULTRASOUND IMAGING | Age: 67
Discharge: HOME | End: 2022-09-08
Payer: MEDICARE

## 2022-09-08 DIAGNOSIS — K86.89 FATTY PANCREAS: Primary | ICD-10-CM

## 2022-09-08 DIAGNOSIS — R16.0 HEPATOMEGALY: ICD-10-CM

## 2022-09-08 PROCEDURE — 76705 ECHO EXAM OF ABDOMEN: CPT

## 2022-09-14 ENCOUNTER — TELEPHONE (OUTPATIENT)
Dept: INTERNAL MEDICINE CLINIC | Age: 67
End: 2022-09-14

## 2022-09-14 RX ORDER — GABAPENTIN 100 MG/1
CAPSULE ORAL
Qty: 60 CAPSULE | Refills: 2 | Status: SHIPPED | OUTPATIENT
Start: 2022-09-14 | End: 2022-10-26

## 2022-09-14 NOTE — TELEPHONE ENCOUNTER
----- Message from Bren Perez sent at 9/14/2022  3:37 PM EDT -----  Subject: Message to Provider    QUESTIONS  Information for Provider? Patient called Dr. Mady Mcfadden, liver   specialist, and she is scheduled to see him on 9/23/2022 at 8:20.   ---------------------------------------------------------------------------  --------------  [id number] [facility]  [phone]; OK to leave message on voicemail  ---------------------------------------------------------------------------  --------------  SCRIPT ANSWERS  Relationship to Patient?  Self

## 2022-09-19 DIAGNOSIS — K21.9 GASTROESOPHAGEAL REFLUX DISEASE, UNSPECIFIED WHETHER ESOPHAGITIS PRESENT: ICD-10-CM

## 2022-09-19 RX ORDER — PANTOPRAZOLE SODIUM 40 MG/1
TABLET, DELAYED RELEASE ORAL
Qty: 180 TABLET | Refills: 1 | Status: CANCELLED | OUTPATIENT
Start: 2022-09-19

## 2022-09-19 RX ORDER — PANTOPRAZOLE SODIUM 40 MG/1
TABLET, DELAYED RELEASE ORAL
Qty: 180 TABLET | Refills: 1 | Status: SHIPPED | OUTPATIENT
Start: 2022-09-19 | End: 2022-10-07

## 2022-09-26 ENCOUNTER — TELEPHONE (OUTPATIENT)
Dept: INTERNAL MEDICINE CLINIC | Age: 67
End: 2022-09-26

## 2022-09-26 ENCOUNTER — OFFICE VISIT (OUTPATIENT)
Dept: PULMONOLOGY | Age: 67
End: 2022-09-26
Payer: MEDICARE

## 2022-09-26 ENCOUNTER — TELEPHONE (OUTPATIENT)
Dept: ORTHOPEDIC SURGERY | Age: 67
End: 2022-09-26

## 2022-09-26 VITALS
HEART RATE: 54 BPM | OXYGEN SATURATION: 96 % | BODY MASS INDEX: 44.47 KG/M2 | WEIGHT: 251 LBS | DIASTOLIC BLOOD PRESSURE: 74 MMHG | HEIGHT: 63 IN | SYSTOLIC BLOOD PRESSURE: 152 MMHG

## 2022-09-26 DIAGNOSIS — S40.022A CONTUSION OF BOTH UPPER EXTREMITIES, INITIAL ENCOUNTER: Primary | ICD-10-CM

## 2022-09-26 DIAGNOSIS — R05.3 CHRONIC COUGH: Primary | ICD-10-CM

## 2022-09-26 DIAGNOSIS — D47.2 MGUS (MONOCLONAL GAMMOPATHY OF UNKNOWN SIGNIFICANCE): ICD-10-CM

## 2022-09-26 DIAGNOSIS — S40.021A CONTUSION OF BOTH UPPER EXTREMITIES, INITIAL ENCOUNTER: Primary | ICD-10-CM

## 2022-09-26 DIAGNOSIS — G47.33 OSA ON CPAP: ICD-10-CM

## 2022-09-26 DIAGNOSIS — Z99.89 OSA ON CPAP: ICD-10-CM

## 2022-09-26 PROCEDURE — G8427 DOCREV CUR MEDS BY ELIG CLIN: HCPCS | Performed by: INTERNAL MEDICINE

## 2022-09-26 PROCEDURE — 99214 OFFICE O/P EST MOD 30 MIN: CPT | Performed by: INTERNAL MEDICINE

## 2022-09-26 PROCEDURE — 1123F ACP DISCUSS/DSCN MKR DOCD: CPT | Performed by: INTERNAL MEDICINE

## 2022-09-26 PROCEDURE — G8417 CALC BMI ABV UP PARAM F/U: HCPCS | Performed by: INTERNAL MEDICINE

## 2022-09-26 PROCEDURE — G8399 PT W/DXA RESULTS DOCUMENT: HCPCS | Performed by: INTERNAL MEDICINE

## 2022-09-26 PROCEDURE — 1036F TOBACCO NON-USER: CPT | Performed by: INTERNAL MEDICINE

## 2022-09-26 PROCEDURE — 3017F COLORECTAL CA SCREEN DOC REV: CPT | Performed by: INTERNAL MEDICINE

## 2022-09-26 PROCEDURE — 1090F PRES/ABSN URINE INCON ASSESS: CPT | Performed by: INTERNAL MEDICINE

## 2022-09-26 NOTE — PROGRESS NOTES
[facility]    Outpatient follow-up note    Subjective:   Referring Physician: Yasmine Baugh / HPI:     The patient is 79 y.o. female who presents today for a follow up visit for obstructive sleep apnea and cough. Claudetta Canter was able to get a new sleep device and is sleeping well with it. She does still have some chronic cough but it improves with treatments for sinus disease. Since last visit she was noted to have M spike proteins in her blood and has been diagnosed with MGUS. As part of the work-up she did have imaging that showed some lucencies in her skull and some chronic sinusitis on the left side for which she is now seeing ear nose and throat. Interval history:  Patient says she is compliant with her auto titrating CPAP but notes that sometimes she wakes up in the pressures so high that it is leaking out of her mask and it wakes her. She uses [employer]. She is on a autotitrator that goes from 4-20 on the CPAP. She also has a cough that is nonproductive and will wake her up from sleep at night. She had a recent sinus infection in January noted on a head CT that was performed because she fell and hit her head while on anticoagulation. Sinus inflammation was the only abnormality on the CT scan. Initial history:  Patient had a SNAP study back in 2013 when she weighed 219 pounds. It showed that she had an apnea-hypopnea index of 24.4, with most of the RDI being hypopneas. She has been on a Nguyen dream station since 2013 and found out that it has been recalled because of some degradation in July when she had back surgery. Back surgery recently was complicated by a bowel obstruction and needed additional surgery and then has had to have a couple of wound vacs. She is still healing and has an open wound in her abdomen is wearing abdominal binder. She is ambulating with a cane but says she mostly uses a walker.   She also has some complaints of chronic cough associated with sinus drainage. The cough she says improves with Zyrtec and nasal sprays but the sinus drainage persists. She was on Singulair in the past which was stopped. She said it did slightly improve the sinus drainage but nothing made it stop.        Past Medical History:    Past Medical History:   Diagnosis Date    Allergic rhinitis     Anxiety     Atrial tachycardia ([address]     dr Vanna Camarena ([facility] cors)    Carpal tunnel syndrome     Cervicalgia     Chronic back pain     low    Depression     DVT (deep venous thrombosis) ([address]     after surgery, bilateral    Fatty liver     GERD (gastroesophageal reflux disease)     has schatzki ring    Headache(784.0)     hemiplegic migraines, improved    Hernia hiatal     Histoplasmosis     Hot flashes     takes wellbutrin    Hyperlipidemia     Hypertension     Obesity     Osteoarthritis     multiple joints    Other partial intestinal obstruction ([address]     wound dehiscence    PONV (postoperative nausea and vomiting)     Shingles     nerve pain in her low back persists    Sleep apnea     CPAP set of 4    Tricuspid regurgitation     moderate       Social History:    Social History     Tobacco Use   Smoking Status Former    Packs/day: 1.00    Years: 5.00    Pack years: 5.00    Types: Cigarettes    Quit date: 1990    Years since quittin.7   Smokeless Tobacco Never       Family History:  Family History   Problem Relation Age of Onset    High Blood Pressure Mother     Heart Disease Mother     Cancer Mother         vaginal    High Blood Pressure Father     Heart Disease Father     Heart Disease Sister     Colon Cancer Sister     Heart Disease Brother     High Blood Pressure Brother     Cancer Brother         oral    Breast Cancer Sister         52+    COPD Sister     Breast Cancer Sister         50+    Heart Disease Brother     Hearing Loss Brother         chf and transplant    High Blood Pressure Brother      Current Medications:  Current Outpatient Medications on File Prior to Visit   Medication Sig Dispense Refill    pantoprazole (PROTONIX) 40 MG tablet TAKE 1 TABLET BY MOUTH TWICE DAILY 180 tablet 1    gabapentin (NEURONTIN) 100 MG capsule TAKE 1 CAPSULE BY MOUTH TWICE DAILY 60 capsule 2    loratadine (CLARITIN) 10 MG tablet TAKE 1 TABLET BY MOUTH DAILY 90 tablet 0    losartan (COZAAR) 50 MG tablet TAKE 1 TABLET BY MOUTH EVERY DAY 90 tablet 0    fluticasone (FLONASE) 50 MCG/ACT nasal spray 1 spray by Each Nostril route daily 16 g 3    buPROPion (WELLBUTRIN XL) 150 MG extended release tablet TAKE 1 TABLET BY MOUTH EVERY MORNING 90 tablet 1    aspirin EC 81 MG EC tablet Take 1 tablet by mouth daily 30 tablet 3    Omega-3 Fatty Acids (FISH OIL) 1000 MG CAPS Take 2 capsules by mouth daily      pimecrolimus (ELIDEL) 1 % cream Apply topically 2 times daily. 30 g 0    clindamycin-benzoyl peroxide (BENZACLIN) 1-5 % gel Apply topically 2 times daily. 35 g 0    pravastatin (PRAVACHOL) 20 MG tablet TAKE 1 TABLET BY MOUTH EVERY DAY 90 tablet 3    verapamil (CALAN SR) 120 MG extended release tablet TAKE 1 TABLET BY MOUTH NIGHTLY DO NOT CRUSH OR CHEW. 90 tablet 3    acetaminophen (TYLENOL) 500 MG tablet Take 500 mg by mouth every 6 hours as needed for Pain      Multiple Vitamins-Minerals (CENTRUM SILVER PO) Take 1 tablet by mouth daily       metoprolol succinate (TOPROL XL) 25 MG extended release tablet Take 1 tablet by mouth daily 90 tablet 0     No current facility-administered medications on file prior to visit. Allergies:   Allergies   Allergen Reactions    Latex Rash    Reglan [Metoclopramide Hcl] Rash    Univasc [Moexipril] Other (See Comments)     unsure    Crestor [Rosuvastatin]      myalgia    Zoloft [Sertraline]      nightmares    Celecoxib Palpitations    Keflex [Cephalexin] Diarrhea    Lipitor Other (See Comments)     myalgia    Metoclopramide Anxiety    Prochlorperazine Anxiety    Prochlorperazine Edisylate     Sulfa Antibiotics Nausea And Vomiting    Vioxx REVIEW OF SYSTEMS:    CONSTITUTIONAL: Negative for fevers and chills  HEENT: Negative for oropharyngeal exudate, post nasal drip, sinus pain / pressure, nasal congestion, ear pain  RESPIRATORY:  See HPI  CARDIOVASCULAR: Negative for chest pain, palpitations, edema  GASTROINTESTINAL: Negative for nausea, vomiting, diarrhea, constipation and abdominal pain  HEMATOLOGICAL: Negative for adenopathy  SKIN: Negative for clubbing, cyanosis, skin lesions  EXTREMITIES: Negative for weakness, decreased ROM  NEUROLOGICAL: Negative for unilateral weakness, speech or gait abnormalities  PSYCH: Negative for anxiety, depression    Objective:   PHYSICAL EXAM:        VITALS:  BP (!) 152/74 (Site: Right Upper Arm, Position: Sitting, Cuff Size: Large Adult)   Pulse 54   Ht 5' 3\" (1.6 m)   Wt 251 lb (113.9 kg)   LMP  (LMP Unknown)   SpO2 96%   BMI 44.46 kg/m²     CONSTITUTIONAL:  Awake, alert, cooperative, no apparent distress, and appears stated age  HEENT: No oropharyngeal exudate, PERRL, no cervical adenopathy, no tracheal deviation, thyroid size normal  LUNGS:  No increased work of breathing and clear to auscultation, no crackles or wheezing   CARDIOVASCULAR:  normal S1 and S2 and no JVD  ABDOMEN: Abdominal binder in place  EXT: No edema, no calf tenderness. Pulses are present bilaterally. NEUROLOGIC:  Mental Status Exam:  Level of Alertness:   awake  Orientation:   person, place, time. SKIN:  normal skin color, texture, turgor, no redness, warmth, or swelling     DATA:      Radiology Review:  Pertinent images / reports were reviewed as a part of this visit. Nothing pertinent    Last PFTs:7/2021  Comment:   Normal spirometry and diffusion capacity   Increase in residual volume and decrease in expiratory reserve   volume are likely due to obesity. Clinical correlation is   recommended.     Immunizations:   Immunization History   Administered Date(s) Administered    COVID-19, PFIZER PURPLE top, DILUTE for use, (age 15 y+), 30mcg/0.3mL 03/31/2021, 04/21/2021, 01/08/2022    Influenza 11/05/2013    Influenza Virus Vaccine 09/21/2009, 11/11/2014, 11/19/2015    Influenza, AFLURIA (age 1 yrs+), FLUZONE, (age 10 mo+), MDV, 0.5mL 11/10/2006, 10/23/2007, 11/11/2008, 09/21/2009, 10/10/2016    Influenza, FLUAD, (age 72 y+), Adjuvanted, 0.5mL 09/24/2020, 09/03/2021    Influenza, FLUARIX, FLULAVAL, FLUZONE (age 10 mo+) AND AFLURIA, (age 1 y+), PF, 0.5mL 11/01/2018    Influenza, High Dose (Fluzone 65 yrs and older) 09/26/2017    Influenza, Triv, inactivated, subunit, adjuvanted, IM (Fluad 65 yrs and older) 11/11/2019    Pneumococcal Conjugate 13-valent ([id number]) 09/24/2020    Pneumococcal Polysaccharide ([id number]) 03/04/2015, 09/29/2021    Tdap (Boostrix, Adacel) 11/05/2013       Assessment: This is a 79 y.o. female with moderate obstructive sleep apnea, chronic cough and MGUS    Plan:   SAL: Moderate based on her 2013 study. Has a new device that is working well. She reports compliance but we will have to try a full compliance report. Chronic cough: Continue Zyrtec and nasal sprays to help control the cough. She also does have follow-up with ear nose and throat should the sinus issue get worse. MGUS: Seeing Dr. Florian Delacruz with [facility]    - Tobacco use: The patient is not currently smoking.     - RTC 6 months w/ MD. Call or RTC sooner if symptoms persist or worsen acutely.

## 2022-09-26 NOTE — TELEPHONE ENCOUNTER
Pt was called to inquire about the bruising on her arms  The pt stated that she has had this for a 1 week     She didn't report any pain or discomfort at the bruising site    She has noticed that her carpal tunnel is giving her some issues     She states that's she is having some burning and pain     Pt was advised that PCP Is not in office at this time     The pt was told to log into [facility] [employer] and complete an e-visit

## 2022-09-26 NOTE — TELEPHONE ENCOUNTER
Called and talked to patient. She notes bruising on bilateral arms. She notes with this her carpel tunnel in her hands has gotten worsen. She is on ASA but has not bumped her arms or done anything to cause bruising. No associated swelling, redness or pain other than her carpel tunnel pain. Patient concerned about her MGUS. Since I am unable to get her in today to the office, patient to come in for lab work today. Patient can then be scheduled with dr. Neo Reeves for later this week. Patient also to call surgeon about worsening carpel tunnel.

## 2022-09-26 NOTE — TELEPHONE ENCOUNTER
Please call patient to advise. States having bilat hand pain, very painful, numbness tingling sensation.

## 2022-09-26 NOTE — TELEPHONE ENCOUNTER
Patient is calling with concern of bruising on both inner arms and right upper arm above the elbow. No availability today, please advise on scheduling.

## 2022-09-29 ENCOUNTER — OFFICE VISIT (OUTPATIENT)
Dept: INTERNAL MEDICINE CLINIC | Age: 67
End: 2022-09-29
Payer: MEDICARE

## 2022-09-29 VITALS
HEART RATE: 64 BPM | OXYGEN SATURATION: 98 % | TEMPERATURE: 97.8 F | DIASTOLIC BLOOD PRESSURE: 74 MMHG | HEIGHT: 63 IN | WEIGHT: 247 LBS | BODY MASS INDEX: 43.77 KG/M2 | SYSTOLIC BLOOD PRESSURE: 128 MMHG

## 2022-09-29 DIAGNOSIS — R23.3 BRUISING, SPONTANEOUS: Primary | ICD-10-CM

## 2022-09-29 DIAGNOSIS — Z23 NEED FOR INFLUENZA VACCINATION: ICD-10-CM

## 2022-09-29 PROCEDURE — 99213 OFFICE O/P EST LOW 20 MIN: CPT | Performed by: INTERNAL MEDICINE

## 2022-09-29 PROCEDURE — 3017F COLORECTAL CA SCREEN DOC REV: CPT | Performed by: INTERNAL MEDICINE

## 2022-09-29 PROCEDURE — G8399 PT W/DXA RESULTS DOCUMENT: HCPCS | Performed by: INTERNAL MEDICINE

## 2022-09-29 PROCEDURE — G8427 DOCREV CUR MEDS BY ELIG CLIN: HCPCS | Performed by: INTERNAL MEDICINE

## 2022-09-29 PROCEDURE — 1036F TOBACCO NON-USER: CPT | Performed by: INTERNAL MEDICINE

## 2022-09-29 PROCEDURE — G0008 ADMIN INFLUENZA VIRUS VAC: HCPCS | Performed by: INTERNAL MEDICINE

## 2022-09-29 PROCEDURE — G8417 CALC BMI ABV UP PARAM F/U: HCPCS | Performed by: INTERNAL MEDICINE

## 2022-09-29 PROCEDURE — 1090F PRES/ABSN URINE INCON ASSESS: CPT | Performed by: INTERNAL MEDICINE

## 2022-09-29 PROCEDURE — 90694 VACC AIIV4 NO PRSRV 0.5ML IM: CPT | Performed by: INTERNAL MEDICINE

## 2022-09-29 PROCEDURE — 1123F ACP DISCUSS/DSCN MKR DOCD: CPT | Performed by: INTERNAL MEDICINE

## 2022-09-29 ASSESSMENT — PATIENT HEALTH QUESTIONNAIRE - PHQ9
10. IF YOU CHECKED OFF ANY PROBLEMS, HOW DIFFICULT HAVE THESE PROBLEMS MADE IT FOR YOU TO DO YOUR WORK, TAKE CARE OF THINGS AT HOME, OR GET ALONG WITH OTHER PEOPLE: 0
SUM OF ALL RESPONSES TO PHQ9 QUESTIONS 1 & 2: 0
1. LITTLE INTEREST OR PLEASURE IN DOING THINGS: 0
SUM OF ALL RESPONSES TO PHQ QUESTIONS 1-9: 0
SUM OF ALL RESPONSES TO PHQ QUESTIONS 1-9: 0
6. FEELING BAD ABOUT YOURSELF - OR THAT YOU ARE A FAILURE OR HAVE LET YOURSELF OR YOUR FAMILY DOWN: 0
5. POOR APPETITE OR OVEREATING: 0
4. FEELING TIRED OR HAVING LITTLE ENERGY: 0
SUM OF ALL RESPONSES TO PHQ QUESTIONS 1-9: 0
2. FEELING DOWN, DEPRESSED OR HOPELESS: 0
8. MOVING OR SPEAKING SO SLOWLY THAT OTHER PEOPLE COULD HAVE NOTICED. OR THE OPPOSITE, BEING SO FIGETY OR RESTLESS THAT YOU HAVE BEEN MOVING AROUND A LOT MORE THAN USUAL: 0
7. TROUBLE CONCENTRATING ON THINGS, SUCH AS READING THE NEWSPAPER OR WATCHING TELEVISION: 0
SUM OF ALL RESPONSES TO PHQ QUESTIONS 1-9: 0
3. TROUBLE FALLING OR STAYING ASLEEP: 0
9. THOUGHTS THAT YOU WOULD BE BETTER OFF DEAD, OR OF HURTING YOURSELF: 0

## 2022-09-29 NOTE — PROGRESS NOTES
Paula Johnson (:  1955) is a 79 y.o. female, here for evaluation of the following chief complaint(s):    Bleeding/Bruising (Bruised arms )      ASSESSMENT/PLAN:  1. Bruising, spontaneous  Suspect patient may have had some mild trauma to her arms that she does not recall since bruising is only on her arms. I reviewed her last hematology note related to MGUS. I suspect it does not relate to her MGUS. Cut down aspirin to every other day and reduce to fish oil once daily  Monitor for additional bruising. If persists, told patient to check additional lab. We will also have her get in with hematology sooner or dermatology. -     Protime-INR; Future      SUBJECTIVE/OBJECTIVE:  HPI  Patient complains of scattered bruises on her arms that she noted about a week ago. She has 6 or 7 and none of them are new in recent days. She states they mildly tender to touch but she does not recall any trauma. She does take aspirin every day for possible history of PVD. She also takes daily fish oil. She denies recent steroids. She does not take any new herbal or over-the-counter medications. She does not have bruises anywhere about her arms. She has not had any abnormal bleeding.       Review of Systems    Past Medical History:   Diagnosis Date    Allergic rhinitis     Anxiety     Atrial tachycardia ([address]     dr Robbi Alva ([facility] cors)    Carpal tunnel syndrome     Cervicalgia     Chronic back pain     low    Depression     DVT (deep venous thrombosis) ([address]     after surgery, bilateral    Fatty liver     GERD (gastroesophageal reflux disease)     has schatzki ring    Headache(784.0)     hemiplegic migraines, improved    Hernia hiatal     Histoplasmosis     Hot flashes     takes wellbutrin    Hyperlipidemia     Hypertension     Obesity     Osteoarthritis     multiple joints    Other partial intestinal obstruction ([address]     wound dehiscence    PONV (postoperative nausea and vomiting)     PVD (peripheral vascular present. Mental Status: She is alert and oriented to person, place, and time. Psychiatric:         Mood and Affect: Mood normal.             This note was generated completely or in part utilizing Dragon dictation speech recognition software. Occasionally, words are mistranscribed and despite editing, the text may contain inaccuracies due to incorrect word recognition. If further clarification is needed please contact the office at [phone]          An electronic signature was used to authenticate this note.     --Anton Arthur MD

## 2022-09-29 NOTE — PATIENT INSTRUCTIONS
Cut down aspirin to every other day and reduce to fish oil once daily    Monitor for additional bruising    Flu shot    Consider hematology sooner

## 2022-10-04 NOTE — PROGRESS NOTES
Jennifer Hilts    Age 79 y.o.    female    1955    MRN [id number]    10/11/2022  Arrival Time_____________  OR Time____________[address]     Procedure(s):  LEFT CARPAL TUNNEL RELEASE                      Monitor Anesthesia Care    Surgeon(s):  Nga Smith, MD       Phone [phone] ([place])     [address]  Cell         Work  _____________________________________________________________________  _____________________________________________________________________  _____________________________________________________________________  _____________________________________________________________________  _____________________________________________________________________    PCP _____________________________ Phone_________________     H&P__________________Bringing      Chart            Epic   DOS      Called________  EKG__________________Bringing      Chart            Epic   DOS      Called________  LAB__________________ Bringing      Chart            Epic   DOS      Called________  Cardiac Clearance_______Bringing      Chart            Epic      DOS      Called________    Cardiologist________________________ Phone___________________________    ? Gnosticism concerns / Waiver on Chart            [employer]________________  ? Pre-op Instructions Given [place]          _________________________________  ? Directions to Surgery Center                          _________________________________  ? Transportation Home_______________      __________________________________  ?  Crutches/Walker__________________        __________________________________    ________Pre-op Orders   _______Transcribed    _______Wt.  ________Pharmacy          _______SCD  ______VTE     ______TED Benjamin Bogaert  _______  Surgery Consent    _______  Anesthesia Consent         COVID DATE______________LOCATION________________ RESULT__________

## 2022-10-05 ENCOUNTER — TELEPHONE (OUTPATIENT)
Dept: ORTHOPEDIC SURGERY | Age: 67
End: 2022-10-05

## 2022-10-05 NOTE — PROGRESS NOTES

## 2022-10-05 NOTE — PROGRESS NOTES
Mari Solo    Age 79 y.o.    female    1955    MRN [id number]    10/11/2022  Arrival Time_____________  OR Time____________[address]     Procedure(s):  LEFT CARPAL TUNNEL RELEASE                      Monitor Anesthesia Care    Surgeon(s):  Asya Alexander, MD       Phone [phone] ([place])     [address]  Cell         Work  _____________________________________________________________________  _____________________________________________________________________  _____________________________________________________________________  _____________________________________________________________________  _____________________________________________________________________    PCP _____________________________ Phone_________________     H&P__________________Bringing      Chart            Epic   DOS      Called________  EKG__________________Bringing      Chart            Epic   DOS      Called________  LAB__________________ Bringing      Chart            Epic   DOS      Called________  Cardiac Clearance_______Bringing      Chart            Epic      DOS      Called________    Cardiologist________________________ Phone___________________________    ? Mormonism concerns / Waiver on Chart            [employer]________________  ? Pre-op Instructions Given [place]          _________________________________  ? Directions to Surgery Center                          _________________________________  ? Transportation Home_______________      __________________________________  ?  Crutches/Walker__________________        __________________________________    ________Pre-op Orders   _______Transcribed    _______Wt.  ________Pharmacy          _______SCD  ______VTE     ______TED [place]  _______  Surgery Consent    _______  Anesthesia Consent         COVID DATE______________LOCATION________________ RESULT__________

## 2022-10-07 ENCOUNTER — OFFICE VISIT (OUTPATIENT)
Dept: INTERNAL MEDICINE CLINIC | Age: 67
End: 2022-10-07
Payer: MEDICARE

## 2022-10-07 VITALS
RESPIRATION RATE: 14 BRPM | DIASTOLIC BLOOD PRESSURE: 68 MMHG | OXYGEN SATURATION: 97 % | HEART RATE: 66 BPM | WEIGHT: 243 LBS | SYSTOLIC BLOOD PRESSURE: 128 MMHG | BODY MASS INDEX: 43.05 KG/M2

## 2022-10-07 DIAGNOSIS — I07.1 TRICUSPID VALVE INSUFFICIENCY, UNSPECIFIED ETIOLOGY: ICD-10-CM

## 2022-10-07 DIAGNOSIS — K21.9 GASTROESOPHAGEAL REFLUX DISEASE, UNSPECIFIED WHETHER ESOPHAGITIS PRESENT: ICD-10-CM

## 2022-10-07 DIAGNOSIS — Z01.818 PREOP EXAMINATION: Primary | ICD-10-CM

## 2022-10-07 PROCEDURE — 99213 OFFICE O/P EST LOW 20 MIN: CPT | Performed by: INTERNAL MEDICINE

## 2022-10-07 PROCEDURE — G8417 CALC BMI ABV UP PARAM F/U: HCPCS | Performed by: INTERNAL MEDICINE

## 2022-10-07 PROCEDURE — G8484 FLU IMMUNIZE NO ADMIN: HCPCS | Performed by: INTERNAL MEDICINE

## 2022-10-07 PROCEDURE — G8427 DOCREV CUR MEDS BY ELIG CLIN: HCPCS | Performed by: INTERNAL MEDICINE

## 2022-10-07 PROCEDURE — 1090F PRES/ABSN URINE INCON ASSESS: CPT | Performed by: INTERNAL MEDICINE

## 2022-10-07 PROCEDURE — 93000 ELECTROCARDIOGRAM COMPLETE: CPT | Performed by: INTERNAL MEDICINE

## 2022-10-07 RX ORDER — PANTOPRAZOLE SODIUM 40 MG/1
TABLET, DELAYED RELEASE ORAL
Qty: 90 TABLET | Refills: 0
Start: 2022-10-07

## 2022-10-07 RX ORDER — METHOCARBAMOL 750 MG/1
750 TABLET, FILM COATED ORAL 3 TIMES DAILY PRN
Qty: 30 TABLET | Refills: 0
Start: 2022-10-07 | End: 2022-10-17

## 2022-10-07 ASSESSMENT — ENCOUNTER SYMPTOMS
BACK PAIN: 1
NAUSEA: 0
RHINORRHEA: 0
COUGH: 0
SHORTNESS OF BREATH: 0
DIARRHEA: 0
SORE THROAT: 0
ABDOMINAL PAIN: 0
TROUBLE SWALLOWING: 0

## 2022-10-07 NOTE — PROGRESS NOTES
Preoperative Consultation      Jennifer Euceda  YOB: 1955    Date of Service:  10/7/2022    Vitals:    10/07/22 1246   BP: 128/68   Pulse: 66   Resp: 14   SpO2: 97%   Weight: 243 lb (110.2 kg)           Chief Complaint   Patient presents with    Pre-op Exam     LEFT CARPAL TUNNEL RELEASE @ [facility] 10/11/22 and Right CARPAL TUNNEL RELEASE @ [facility] 11/1/22 - Dr Nelida Alvarez      HPI:    This patient presents to the office today for a preoperative consultation at the request of surgeon, Dr. Nelida Alvarez, who plans on performing left carpal tunnel release at [facility] on 10/11/22 and right carpal tunnel release on 11/1/22.     Planned anesthesia: MAC   Known anesthesia problems: None   Bleeding risk: No recent or remote history of abnormal bleeding  Personal or FH of DVT/PE: Yes - 2021   Recent steroid use: no  Exercise tolerance: greater than 4 METs        Past Medical History:   Diagnosis Date    Allergic rhinitis     Anxiety     Atrial tachycardia ([address]     dr Tati Jain ([facility])    Carpal tunnel syndrome     Cervicalgia     Chronic back pain     low    Depression     DVT (deep venous thrombosis) ([address] 2021    after surgery, bilateral    Fatty liver     GERD (gastroesophageal reflux disease)     has schatzki ring    Headache(784.0)     hemiplegic migraines, improved    Hernia hiatal     Histoplasmosis     Hot flashes     takes wellbutrin    Hyperlipidemia     Hypertension     Obesity     Osteoarthritis     multiple joints    Other partial intestinal obstruction ([address] 2021    wound dehiscence    PONV (postoperative nausea and vomiting)     PVD (peripheral vascular disease) ([address]     right darian reduced on screening    Shingles     nerve pain in her low back persists    Sleep apnea     CPAP set of 4    Tricuspid regurgitation     moderate     Past Surgical History:   Procedure Laterality Date    ABDOMEN SURGERY N/A 08/01/2021    REPAIR OF WOUND DEHISCENCE performed by Canelo Hines MD at [facility]. BIOPSY Right 2013    benign    BREAST SURGERY Right     Biopsy w/clip    BUNIONECTOMY      right     COLONOSCOPY  10/2018    fu 10 yrs    ESOPHAGOGASTRODUODENOSCOPY  2016    ? KNEE ARTHROSCOPY      KNEE CARTILAGE SURGERY      LOBECTOMY      partial right lung lobectomy    LUMBAR FUSION N/A 2021    L4-S1 ANTERIOR LUMBAR INTERBODY FUSION WITH PEDICLE SCREW FIXATION performed by Katheren Cooks.  Drew Preston MD at [address] [facility] SURGERY      PARTIAL HYSTERECTOMY (CERVIX NOT REMOVED)      ovaries retained    SINUS SURGERY      TOTAL KNEE ARTHROPLASTY Bilateral 2015     Family History   Problem Relation Age of Onset    High Blood Pressure Mother     Heart Disease Mother     Cancer Mother         vaginal    COPD Father     High Blood Pressure Father     Heart Disease Father     Heart Disease Sister     Colon Cancer Sister     Breast Cancer Sister         52+    COPD Sister     Breast Cancer Sister         50+    Heart Disease Brother     High Blood Pressure Brother     Cancer Brother         oral    Heart Disease Brother     Hearing Loss Brother         chf and transplant    High Blood Pressure Brother     Heart Disease Nephew      Social History     Tobacco Use    Smoking status: Former     Packs/day: 1.00     Years: 5.00     Pack years: 5.00     Types: Cigarettes     Quit date: 1990     Years since quittin.7    Smokeless tobacco: Never   Vaping Use    Vaping Use: Never used   Substance Use Topics    Alcohol use: No    Drug use: No       Outpatient Medications Marked as Taking for the 10/7/22 encounter (Office Visit) with Guy Polk MD   Medication Sig Dispense Refill    pantoprazole (PROTONIX) 40 MG tablet TAKE 1 TABLET BY MOUTH  DAILY 90 tablet 0    methocarbamol (ROBAXIN-750) 750 MG tablet Take 1 tablet by mouth 3 times daily as needed (back pain) 30 tablet 0    gabapentin (NEURONTIN) 100 MG capsule TAKE 1 CAPSULE BY MOUTH TWICE DAILY 60 capsule 2 loratadine (CLARITIN) 10 MG tablet TAKE 1 TABLET BY MOUTH DAILY 90 tablet 0    losartan (COZAAR) 50 MG tablet TAKE 1 TABLET BY MOUTH EVERY DAY 90 tablet 0    fluticasone (FLONASE) 50 MCG/ACT nasal spray 1 spray by Each Nostril route daily 16 g 3    buPROPion (WELLBUTRIN XL) 150 MG extended release tablet TAKE 1 TABLET BY MOUTH EVERY MORNING 90 tablet 1    aspirin EC 81 MG EC tablet Take 1 tablet by mouth daily 30 tablet 3    Omega-3 Fatty Acids (FISH OIL) 1000 MG CAPS Take 2 capsules by mouth daily      pimecrolimus (ELIDEL) 1 % cream Apply topically 2 times daily. 30 g 0    clindamycin-benzoyl peroxide (BENZACLIN) 1-5 % gel Apply topically 2 times daily. 35 g 0    pravastatin (PRAVACHOL) 20 MG tablet TAKE 1 TABLET BY MOUTH EVERY DAY 90 tablet 3    verapamil (CALAN SR) 120 MG extended release tablet TAKE 1 TABLET BY MOUTH NIGHTLY DO NOT CRUSH OR CHEW. 90 tablet 3    acetaminophen (TYLENOL) 500 MG tablet Take 500 mg by mouth every 6 hours as needed for Pain      Multiple Vitamins-Minerals (CENTRUM SILVER PO) Take 1 tablet by mouth daily        Allergies   Allergen Reactions    Latex Rash    Reglan [Metoclopramide Hcl] Rash    Univasc [Moexipril] Other (See Comments)     unsure    Crestor [Rosuvastatin]      myalgia    Zoloft [Sertraline]      nightmares    Celecoxib Palpitations    Keflex [Cephalexin] Diarrhea    Lipitor Other (See Comments)     myalgia    Metoclopramide Anxiety    Prochlorperazine Anxiety    Prochlorperazine Edisylate     Sulfa Antibiotics Nausea And Vomiting    Vioxx        Review of Systems   Constitutional:  Negative for fatigue and fever. HENT:  Negative for rhinorrhea, sore throat and trouble swallowing. Eyes:  Negative for visual disturbance. Respiratory:  Negative for cough and shortness of breath. Cardiovascular:  Negative for chest pain and palpitations. Gastrointestinal:  Negative for abdominal pain, diarrhea and nausea.    Genitourinary:  Negative for decreased urine volume, dysuria and frequency. Musculoskeletal:  Positive for arthralgias, back pain and neck pain. Negative for myalgias. Skin:  Negative for rash. Allergic/Immunologic: Negative for immunocompromised state. Neurological:  Positive for headaches. Negative for dizziness and numbness. Hematological:  Does not bruise/bleed easily. Psychiatric/Behavioral:  Negative for dysphoric mood and sleep disturbance. The patient is not nervous/anxious. Physical Exam  Vitals and nursing note reviewed. Constitutional:       General: She is not in acute distress. Appearance: She is well-developed. HENT:      Head: Normocephalic and atraumatic. Right Ear: External ear normal.      Left Ear: External ear normal.      Nose: Nose normal.   Eyes:      General: No scleral icterus. Pupils: Pupils are equal, round, and reactive to light. Neck:      Thyroid: No thyromegaly. Cardiovascular:      Rate and Rhythm: Normal rate and regular rhythm. Heart sounds: Murmur heard. Pulmonary:      Effort: No respiratory distress. Breath sounds: Normal breath sounds. No wheezing or rales. Abdominal:      General: Bowel sounds are normal. There is no distension. Palpations: Abdomen is soft. Tenderness: There is no abdominal tenderness. Musculoskeletal:      Right lower leg: Edema (tr) present. Left lower leg: Edema (tr) present. Lymphadenopathy:      Cervical: No cervical adenopathy. Skin:     General: Skin is warm and dry. Neurological:      Mental Status: She is alert and oriented to person, place, and time. Cranial Nerves: No cranial nerve deficit. Sensory: No sensory deficit. Coordination: Coordination normal.   Psychiatric:         Behavior: Behavior normal.              Assessment/Plan:     1.  Preop examination    - EKG 12 Lead    Pre-Operative Risk assessment using 2014 ACC/AHA guidelines     Emergent procedure NO  Active Cardiac Condition NO (decompensated HF, Arrhythmia, MI <3 weeks, severe valve disease)  Risk Level of Procedure Low Risk (endoscopy, superficial skin, breast, ambulatory, or cataract, etc.)  Revised Cardiac Risk Index Risk factors:  HO psvt and moderate tricuspid regurgitation  Measurement of Exercise Tolerance before Surgery >4 YES    According to the 2014 ACC/AHA pre-operative risk assessment guidelines Griselda Villalta is a low risk for major cardiac complications during a low risk procedure and may continue as planned. Okay to stop aspirin, Nsaids, and vitamins one week before surgery. May take blood pressure medicines the morning of surgery with small sip of water.

## 2022-10-11 ENCOUNTER — TELEPHONE (OUTPATIENT)
Dept: ORTHOPEDIC SURGERY | Age: 67
End: 2022-10-11

## 2022-10-11 ENCOUNTER — HOSPITAL ENCOUNTER (OUTPATIENT)
Age: 67
Setting detail: OUTPATIENT SURGERY
Discharge: HOME | End: 2022-10-11
Attending: ORTHOPAEDIC SURGERY | Admitting: ORTHOPAEDIC SURGERY
Payer: MEDICARE

## 2022-10-11 ENCOUNTER — ANESTHESIA EVENT (OUTPATIENT)
Dept: OPERATING ROOM | Age: 67
End: 2022-10-11
Payer: MEDICARE

## 2022-10-11 ENCOUNTER — ANESTHESIA (OUTPATIENT)
Dept: OPERATING ROOM | Age: 67
End: 2022-10-11
Payer: MEDICARE

## 2022-10-11 VITALS
DIASTOLIC BLOOD PRESSURE: 60 MMHG | RESPIRATION RATE: 16 BRPM | SYSTOLIC BLOOD PRESSURE: 123 MMHG | HEIGHT: 63 IN | OXYGEN SATURATION: 97 % | TEMPERATURE: 97 F | BODY MASS INDEX: 43.77 KG/M2 | HEART RATE: 52 BPM | WEIGHT: 247 LBS

## 2022-10-11 PROCEDURE — 2580000003 HC RX 258: Performed by: ORTHOPAEDIC SURGERY

## 2022-10-11 PROCEDURE — 3600000013 HC SURGERY LEVEL 3 ADDTL 15MIN: Performed by: ORTHOPAEDIC SURGERY

## 2022-10-11 PROCEDURE — 2709999900 HC NON-CHARGEABLE SUPPLY: Performed by: ORTHOPAEDIC SURGERY

## 2022-10-11 PROCEDURE — A4217 STERILE WATER/SALINE, 500 ML: HCPCS | Performed by: ORTHOPAEDIC SURGERY

## 2022-10-11 PROCEDURE — 7100000011 HC PHASE II RECOVERY - ADDTL 15 MIN: Performed by: ORTHOPAEDIC SURGERY

## 2022-10-11 PROCEDURE — 64721 CARPAL TUNNEL SURGERY: CPT | Performed by: ORTHOPAEDIC SURGERY

## 2022-10-11 PROCEDURE — 3700000001 HC ADD 15 MINUTES (ANESTHESIA): Performed by: ORTHOPAEDIC SURGERY

## 2022-10-11 PROCEDURE — 3700000000 HC ANESTHESIA ATTENDED CARE: Performed by: ORTHOPAEDIC SURGERY

## 2022-10-11 PROCEDURE — 2580000003 HC RX 258: Performed by: ANESTHESIOLOGY

## 2022-10-11 PROCEDURE — 6360000002 HC RX W HCPCS: Performed by: NURSE ANESTHETIST, CERTIFIED REGISTERED

## 2022-10-11 PROCEDURE — 3600000003 HC SURGERY LEVEL 3 BASE: Performed by: ORTHOPAEDIC SURGERY

## 2022-10-11 PROCEDURE — 2500000003 HC RX 250 WO HCPCS: Performed by: ORTHOPAEDIC SURGERY

## 2022-10-11 PROCEDURE — 7100000010 HC PHASE II RECOVERY - FIRST 15 MIN: Performed by: ORTHOPAEDIC SURGERY

## 2022-10-11 RX ORDER — LABETALOL HYDROCHLORIDE 5 MG/ML
5 INJECTION, SOLUTION INTRAVENOUS EVERY 10 MIN PRN
Status: CANCELLED | OUTPATIENT
Start: 2022-10-11

## 2022-10-11 RX ORDER — OXYCODONE HYDROCHLORIDE 5 MG/1
10 TABLET ORAL PRN
Status: CANCELLED | OUTPATIENT
Start: 2022-10-11 | End: 2022-10-11

## 2022-10-11 RX ORDER — PROPOFOL 10 MG/ML
INJECTION, EMULSION INTRAVENOUS PRN
Status: DISCONTINUED | OUTPATIENT
Start: 2022-10-11 | End: 2022-10-11 | Stop reason: SDUPTHER

## 2022-10-11 RX ORDER — ONDANSETRON 2 MG/ML
4 INJECTION INTRAMUSCULAR; INTRAVENOUS
Status: CANCELLED | OUTPATIENT
Start: 2022-10-11

## 2022-10-11 RX ORDER — SODIUM CHLORIDE 0.9 % (FLUSH) 0.9 %
5-40 SYRINGE (ML) INJECTION PRN
Status: CANCELLED | OUTPATIENT
Start: 2022-10-11

## 2022-10-11 RX ORDER — MAGNESIUM HYDROXIDE 1200 MG/15ML
LIQUID ORAL CONTINUOUS PRN
Status: COMPLETED | OUTPATIENT
Start: 2022-10-11 | End: 2022-10-11

## 2022-10-11 RX ORDER — DIPHENHYDRAMINE HYDROCHLORIDE 50 MG/ML
12.5 INJECTION INTRAMUSCULAR; INTRAVENOUS
Status: CANCELLED | OUTPATIENT
Start: 2022-10-11 | End: 2022-10-12

## 2022-10-11 RX ORDER — SODIUM CHLORIDE 0.9 % (FLUSH) 0.9 %
5-40 SYRINGE (ML) INJECTION EVERY 12 HOURS SCHEDULED
Status: CANCELLED | OUTPATIENT
Start: 2022-10-11

## 2022-10-11 RX ORDER — MEPERIDINE HYDROCHLORIDE 50 MG/ML
12.5 INJECTION INTRAMUSCULAR; INTRAVENOUS; SUBCUTANEOUS EVERY 5 MIN PRN
Status: CANCELLED | OUTPATIENT
Start: 2022-10-11

## 2022-10-11 RX ORDER — SODIUM CHLORIDE, SODIUM LACTATE, POTASSIUM CHLORIDE, CALCIUM CHLORIDE 600; 310; 30; 20 MG/100ML; MG/100ML; MG/100ML; MG/100ML
INJECTION, SOLUTION INTRAVENOUS CONTINUOUS
Status: DISCONTINUED | OUTPATIENT
Start: 2022-10-11 | End: 2022-10-11 | Stop reason: HOSPADM

## 2022-10-11 RX ORDER — LIDOCAINE HYDROCHLORIDE 10 MG/ML
2 INJECTION, SOLUTION INFILTRATION; PERINEURAL
Status: DISCONTINUED | OUTPATIENT
Start: 2022-10-11 | End: 2022-10-11 | Stop reason: HOSPADM

## 2022-10-11 RX ORDER — SODIUM CHLORIDE 9 MG/ML
INJECTION, SOLUTION INTRAVENOUS PRN
Status: CANCELLED | OUTPATIENT
Start: 2022-10-11

## 2022-10-11 RX ORDER — OXYCODONE HYDROCHLORIDE 5 MG/1
5 TABLET ORAL PRN
Status: CANCELLED | OUTPATIENT
Start: 2022-10-11 | End: 2022-10-11

## 2022-10-11 RX ADMIN — SODIUM CHLORIDE, POTASSIUM CHLORIDE, SODIUM LACTATE AND CALCIUM CHLORIDE: 600; 310; 30; 20 INJECTION, SOLUTION INTRAVENOUS at 10:44

## 2022-10-11 RX ADMIN — PROPOFOL 50 MG: 10 INJECTION, EMULSION INTRAVENOUS at 11:31

## 2022-10-11 RX ADMIN — PROPOFOL 50 MG: 10 INJECTION, EMULSION INTRAVENOUS at 11:29

## 2022-10-11 RX ADMIN — PROPOFOL 100 MG: 10 INJECTION, EMULSION INTRAVENOUS at 11:26

## 2022-10-11 RX ADMIN — PROPOFOL 50 MG: 10 INJECTION, EMULSION INTRAVENOUS at 11:33

## 2022-10-11 ASSESSMENT — PAIN SCALES - GENERAL: PAINLEVEL_OUTOF10: 0

## 2022-10-11 ASSESSMENT — PAIN - FUNCTIONAL ASSESSMENT: PAIN_FUNCTIONAL_ASSESSMENT: 0-10

## 2022-10-11 NOTE — ANESTHESIA POSTPROCEDURE EVALUATION
Department of Anesthesiology  Postprocedure Note    Patient: Anastasia Reinoso  MRN: [id number]  YOB: 1955  Date of evaluation: 10/11/2022      Procedure Summary     Date: 10/11/22 Room / Location: [facility] [address] OR 01 / [facility]    Anesthesia Start: 1126 Anesthesia Stop: 4657    Procedure: LEFT CARPAL TUNNEL RELEASE (Left) Diagnosis:       Left carpal tunnel syndrome      (LEFT CARPAL TUNNEL SYNDROME)    Surgeons: Rosalee Bonilla MD Responsible Provider: Geno Reyes MD    Anesthesia Type: general ASA Status: 3          Anesthesia Type: No value filed.     Halle Phase I: Halle Score: 10    Halle Phase II: Halle Score: 10      Anesthesia Post Evaluation    Comments: Postoperative Anesthesia Note    Name:    Anastasia Reinoso  MRN:      [id number]    Patient Vitals in the past 12 hrs:  10/11/22 1202, BP:123/60, Pulse:52, Resp:16, SpO2:97 %  10/11/22 1152, BP:(!) 126/46, Pulse:59, Resp:16, SpO2:98 %  10/11/22 1147, BP:(!) 140/49, Pulse:60, Resp:16, SpO2:96 %  10/11/22 1142, BP:(!) 124/56, Temp:97 °F (36.1 °C), Temp src:Temporal, Pulse:60, Resp:16, SpO2:97 %  10/11/22 1034, BP:(!) 153/73, Temp:98.5 °F (36.9 °C), Temp src:Temporal, Pulse:54, Resp:20, SpO2:97 %, Height:5' 3\" (1.6 m), Weight:247 lb (112 kg)     LABS:    CBC  Lab Results       Component                Value               Date/Time                  WBC                      5.6                 09/26/2022 02:30 PM        HGB                      12.9                09/26/2022 02:30 PM        HCT                      38.7                09/26/2022 02:30 PM        PLT                      255                 09/26/2022 02:30 PM   RENAL  Lab Results       Component                Value               Date/Time                  NA                       141                 09/26/2022 02:30 PM        K                        4.2                 09/26/2022 02:30 PM        K                        3.6                 08/01/2021 11:09 AM        CL 102                 09/26/2022 02:30 PM        CO2                      26 09/26/2022 02:30 PM        BUN                      13                  09/26/2022 02:30 PM        CREATININE               0.6                 09/26/2022 02:30 PM        GLUCOSE                  137 (H)             09/26/2022 02:30 PM        GLUCOSE                  85                  07/23/2011 09:02 AM   COAGS  Lab Results       Component                Value               Date/Time                  PROTIME                  12.1                08/13/2021 06:48 AM        INR                      1.07                08/13/2021 06:48 AM        APTT                     30.4                07/16/2021 11:43 AM     Intake & Output:  @[id number]@    Nausea & Vomiting:  No    Level of Consciousness:  Awake    Pain Assessment:  Adequate analgesia    Anesthesia Complications:  No apparent anesthetic complications    SUMMARY      Vital signs stable  OK to discharge from Stage I post anesthesia care.   Care transferred from Anesthesiology department on discharge from perioperative area

## 2022-10-11 NOTE — OP NOTE
OPERATIVE REPORT          Patient:  Sybil Osorio    YOB: 1955  Date of Service:  10/11/2022   Location:  [facility]    Preoperative Diagnosis:    Left carpal tunnel syndrome    Postoperative Diagnosis:    Same    Procedure:    Left carpal tunnel release      Surgeon:    Alberto Hall. Dayami Franklin MD    Surgical Assistant:    BETTIE Cherry Assistant    Anesthesia:   Local with Sedation    Blood Loss:   Minimal    Complications:  None    Tourniquet Time: 3 minutes     Indications:  Ms. Sybil Osorio  is a 79y.o. year-old female with Left carpal tunnel syndrome. I have discussed preoperatively with her  the complications, limitations, expectations, alternatives and risks of surgical care, which she has understood. All of her questions have been fully answered, and she has provided written informed consent to proceed. Procedure:   After written consent was obtained and the proper operative site was identified and marked, Ms. Sybil Osorio was brought to the operating room, placed in the supine position on the operating room table with the Left arm extended upon a hand table. Under an appropriate level of sedation, local anesthetic (1% Lidocaine and 1/2% Marcaine both without Epinephrine) was instilled in the planned surgical field. Her Left upper extremity was prepped and draped in the usual sterile fashion. After Esmarch exsanguination, the pneumotourniquet was inflated to 250 mm of mercury. A 2 cm longitudinal incision was fashioned at the base of the palm, paralleling the longitudinal thenar crease. Dissection was carried carefully through the subcutaneous tissue identifying and protecting the neurovascular structures. The palmar fascia was incised longitudinally, exposing the transverse carpal ligament. The transverse carpal ligament was incised from its proximal to distal most extent, under direct visualization.  The terminal 2 cm of antebrachial fascia was similarly incised under direct visualization. The contents of the carpal tunnel were inspected and found to be free of mass, lesion or other abnormality. Digital palpation revealed no further constriction about the median nerve. The wound was irrigated copiously with sterile saline for irrigation and the pneumotourniquet was deflated after a period of 3 minutes elevation. The fingers were immediately pink & well perfused. Hemostasis was easily obtained with direct pressure and electrocautery and the wound was closed with interrupted sutures. The wound was dressed with adaptic, dry sterile dressings and a bulky soft hand & wrist dressing was applied. Ms. Katelin Gutierres  was awakened from light sedation, having tolerated the procedure without apparent complication. She  was returned to the recovery room in stable condition. At the conclusion of the procedure all needle, instrument, and sponge counts were correct. Andie Fontana MD   10/11/2022, 11:23 AM

## 2022-10-11 NOTE — ANESTHESIA PRE PROCEDURE
Department of Anesthesiology  Preprocedure Note       Name:  Frances Hinton   Age:  79 y.o.  :  1955                                          MRN:  [id number]         Date:  10/11/2022      Surgeon: Moris Shirley):  Giuseppe Wasserman MD    Procedure: Procedure(s):  LEFT CARPAL TUNNEL RELEASE    Medications prior to admission:   Prior to Admission medications    Medication Sig Start Date End Date Taking? Authorizing Provider   pantoprazole (PROTONIX) 40 MG tablet TAKE 1 TABLET BY MOUTH  DAILY 10/7/22   Josue Hope MD   methocarbamol (ROBAXIN-750) 750 MG tablet Take 1 tablet by mouth 3 times daily as needed (back pain) 10/7/22 10/17/22  Josue Hope MD   gabapentin (NEURONTIN) 100 MG capsule TAKE 1 CAPSULE BY MOUTH TWICE DAILY 9/14/22 10/14/22  Josue Hope MD   loratadine (CLARITIN) 10 MG tablet TAKE 1 TABLET BY MOUTH DAILY 22   Josue Hope MD   losartan (COZAAR) 50 MG tablet TAKE 1 TABLET BY MOUTH EVERY DAY 22   Josue Hope MD   fluticasone (FLONASE) 50 MCG/ACT nasal spray 1 spray by Each Nostril route daily 22   Josue Hope MD   buPROPion (WELLBUTRIN XL) 150 MG extended release tablet TAKE 1 TABLET BY MOUTH EVERY MORNING 22   Josue Hope MD   aspirin EC 81 MG EC tablet Take 1 tablet by mouth daily 22   Josue Hope MD   Omega-3 Fatty Acids (FISH OIL) 1000 MG CAPS Take 2 capsules by mouth daily 22   Josue Hope MD   pimecrolimus (ELIDEL) 1 % cream Apply topically 2 times daily. 22   Josue Hope MD   clindamycin-benzoyl peroxide (BENZACLIN) 1-5 % gel Apply topically 2 times daily.  22   Josue Hope MD   pravastatin (PRAVACHOL) 20 MG tablet TAKE 1 TABLET BY MOUTH EVERY DAY 22   Helder Gutierrez MD   verapamil (CALAN SR) 120 MG extended release tablet TAKE 1 TABLET BY MOUTH NIGHTLY DO NOT CRUSH OR CHEW. 21   Stevan Meléndez BAKARI NiocleN - CNP   metoprolol succinate (TOPROL XL) 25 MG extended release tablet Take 1 tablet by mouth daily 5/28/21 10/11/22  450 Francy Dean MD   acetaminophen (TYLENOL) 500 MG tablet Take 500 mg by mouth every 6 hours as needed for Pain    Historical Provider, MD   Multiple Vitamins-Minerals (CENTRUM SILVER PO) Take 1 tablet by mouth daily     Historical Provider, MD       Current medications:    Current Facility-Administered Medications   Medication Dose Route Frequency Provider Last Rate Last Admin    lidocaine 1 % injection 2 mL  2 mL IntraDERmal Once PRN Windy Wong MD        lactated ringers infusion   IntraVENous Continuous Windy Wong  mL/hr at 10/11/22 1044 New Bag at 10/11/22 1044       Allergies:     Allergies   Allergen Reactions    Latex Rash    Reglan [Metoclopramide Hcl] Rash    Univasc [Moexipril] Other (See Comments)     unsure    Crestor [Rosuvastatin]      myalgia    Zoloft [Sertraline]      nightmares    Celecoxib Palpitations    Keflex [Cephalexin] Diarrhea    Lipitor Other (See Comments)     myalgia    Metoclopramide Anxiety    Prochlorperazine Anxiety    Prochlorperazine Edisylate     Sulfa Antibiotics Nausea And Vomiting    Vioxx        Problem List:    Patient Active Problem List   Diagnosis Code    Vitamin D deficiency E55.9    Generalized osteoarthrosis, involving multiple sites M15.9    Chronic low back pain M54.50, G89.29    Hemiplegic migraine G43.409    Allergic rhinitis J30.9    GERD (gastroesophageal reflux disease) K21.9    Degenerative disc disease, lumbar M51.36    Essential hypertension I10    Major depressive disorder with single episode, 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[facility]) 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    Mixed hyperlipidemia E78.2    Rosacea L71.9    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    Palpitations R00.2    Atrial tachycardia ([facility]) I47.1    Paresthesia R20.2    PAD (peripheral artery disease) ([facility]) I73.9    Chest pain R07.9    Morbidly obese ([facility]) E66.01    Epigastric discomfort R10.13    Chronic back pain M54.9, G89.29    Lumbar stenosis with neurogenic claudication M48.062    Dehiscence of fascia T81.30XA    Ileus ([facility]) K56.7    S/P lumbar and lumbosacral fusion by anterior technique Z98.1    Morbid obesity with BMI of 45.0-49.9, adult ([facility]) E66.01, Z68.42    Carpal tunnel syndrome, bilateral G56.03    SAL on CPAP G47.33, Z99.89    MGUS (monoclonal gammopathy of unknown significance) D47.2       Past Medical History:        Diagnosis Date    Allergic rhinitis     Anxiety     Atrial tachycardia ([address]     dr Fabian Valladares (wnl cors)    Carpal tunnel syndrome     Cervicalgia     Chronic back pain     low    Depression     DVT (deep venous thrombosis) ([address] 2021    after surgery, bilateral    Fatty liver     GERD (gastroesophageal reflux disease)     has schatzki ring    Headache(784.0)     hemiplegic migraines, improved    Hernia hiatal     Histoplasmosis     Hot flashes     takes wellbutrin    Hyperlipidemia     Hypertension     Obesity     Osteoarthritis     multiple joints    Other partial intestinal obstruction ([address] 2021    wound dehiscence    PONV (postoperative nausea and vomiting)     PVD (peripheral vascular disease) ([address]     right darian reduced on screening    Shingles     nerve pain in her low back persists    Sleep apnea     CPAP set of 4    Tricuspid regurgitation     moderate       Past Surgical History:        Procedure Laterality Date    ABDOMEN SURGERY N/A 08/01/2021    REPAIR OF WOUND DEHISCENCE performed by Saroj Howard MD at [facility] [address] Right 2013    benign    BREAST SURGERY Right     Biopsy w/clip    BUNIONECTOMY      right     COLONOSCOPY  10/2018    fu 10 yrs    ESOPHAGOGASTRODUODENOSCOPY  2016    ?     KNEE ARTHROSCOPY      KNEE CARTILAGE SURGERY      LOBECTOMY      partial right lung lobectomy    LUMBAR FUSION N/A 07/29/2021    L4-S1 ANTERIOR LUMBAR INTERBODY FUSION WITH PEDICLE SCREW FIXATION performed by Ochoa Streeter. Holley Boyce MD at [facility] PARATHYROID GLAND SURGERY      PARTIAL HYSTERECTOMY (CERVIX NOT REMOVED)      ovaries retained    SINUS SURGERY      TOTAL KNEE ARTHROPLASTY Bilateral 2015       Social History:    Social History     Tobacco Use    Smoking status: Former     Packs/day: 1.00     Years: 5.00     Pack years: 5.00     Types: Cigarettes     Quit date: 1990     Years since quittin.7    Smokeless tobacco: Never   Substance Use Topics    Alcohol use: No                                Counseling given: Not Answered      Vital Signs (Current):   Vitals:    10/05/22 1101 10/11/22 1034   BP:  (!) 153/73   Pulse:  54   Resp:  20   Temp:  98.5 °F (36.9 °C)   TempSrc:  Temporal   SpO2:  97%   Weight: 247 lb (112 kg) 247 lb (112 kg)   Height: 5' 3\" (1.6 m) 5' 3\" (1.6 m)                                              BP Readings from Last 3 Encounters:   10/11/22 (!) 153/73   10/07/22 128/68   22 128/74       NPO Status: Time of last liquid consumption: 0800                        Time of last solid consumption: 1800                        Date of last liquid consumption: 10/11/22                        Date of last solid food consumption: 10/10/22    BMI:   Wt Readings from Last 3 Encounters:   10/11/22 247 lb (112 kg)   10/07/22 243 lb (110.2 kg)   22 247 lb (112 kg)     Body mass index is 43.75 kg/m².     CBC:   Lab Results   Component Value Date/Time    WBC 5.6 2022 02:30 PM    RBC 4.25 2022 02:30 PM    HGB 12.9 2022 02:30 PM    HCT 38.7 2022 02:30 PM    MCV 90.9 2022 02:30 PM    RDW 13.6 2022 02:30 PM     2022 02:30 PM       CMP:   Lab Results   Component Value Date/Time     2022 02:30 PM    K 4.2 2022 02:30 PM    K 3.6 2021 11:09 AM     2022 02:30 PM    CO2 26 2022 02:30 PM    BUN 2022 02:30 PM    CREATININE 0.6 09/26/2022 02:30 PM    GFRAA >60 09/26/2022 02:30 PM    GFRAA >60 04/20/2013 10:36 AM    AGRATIO 2.3 09/26/2022 02:30 PM    LABGLOM >60 09/26/2022 02:30 PM    LABGLOM 97 04/16/2011 08:00 AM    GLUCOSE 137 09/26/2022 02:30 PM    GLUCOSE 85 07/23/2011 09:02 AM    PROT 6.5 09/26/2022 02:30 PM    PROT 6.7 09/22/2012 08:54 AM    CALCIUM 9.5 09/26/2022 02:30 PM    BILITOT <0.2 09/26/2022 02:30 PM    ALKPHOS 98 09/26/2022 02:30 PM    AST 21 09/26/2022 02:30 PM    ALT 25 09/26/2022 02:30 PM       POC Tests: No results for input(s): POCGLU, POCNA, POCK, POCCL, POCBUN, POCHEMO, POCHCT in the last 72 hours. Coags:   Lab Results   Component Value Date/Time    PROTIME 12.1 08/13/2021 06:48 AM    INR 1.07 08/13/2021 06:48 AM    APTT 30.4 07/16/2021 11:43 AM       HCG (If Applicable): No results found for: PREGTESTUR, PREGSERUM, HCG, HCGQUANT     ABGs: No results found for: PHART, PO2ART, ZIT4VWW, AUZ5GOW, BEART, X2VMIMPF     Type & Screen (If Applicable):  No results found for: LABABO, LABRH    Drug/Infectious Status (If Applicable):  No results found for: HIV, HEPCAB    COVID-19 Screening (If Applicable):   Lab Results   Component Value Date/Time    COVID19 Not Detected 12/27/2021 12:09 PM           Anesthesia Evaluation  Patient summary reviewed   history of anesthetic complications: PONV.   Airway: Mallampati: II  TM distance: >3 FB   Neck ROM: full  Mouth opening: > = 3 FB   Dental:    (+) upper dentures      Pulmonary:normal exam  breath sounds clear to auscultation  (+) sleep apnea:      (-) COPD and asthma                           Cardiovascular:  Exercise tolerance: good (>4 METS),   (+) hypertension:,     (-) CAD,  angina and  WONG      Rhythm: regular  Rate: normal                    Neuro/Psych:   (+) neuromuscular disease:, headaches:, psychiatric history:   (-) seizures, TIA and CVA           GI/Hepatic/Renal:   (+) hiatal hernia, GERD: well controlled, liver disease:, morbid obesity     (-) no renal disease Endo/Other:        (-) diabetes mellitus               Abdominal:             Vascular:   + DVT, . Other Findings:           Anesthesia Plan      general     ASA 3     (I discussed with the patient the risks and benefits of PIV, anesthesia, IV Narcotics, PACU. All questions were answered the patient agrees with the plan and wishes to proceed)  Induction: intravenous.                             Rick Cochran MD   10/11/2022

## 2022-10-11 NOTE — TELEPHONE ENCOUNTER
CPT: [id number]  BODY PART: right wrist  STATUS: outpatient  LOCATION: [facility]: Approved    Submitted online with [employer], approved # [id number]  11/1/22-11/30/22

## 2022-10-11 NOTE — H&P
Pre-operative Update of H&P:    I  have seen & examined Ms. Terrence Barbour related solely to her hand and upper extremity conditions, prior to the scheduled procedure on the date of her surgery. The indications for the planned surgical procedure & and her upper-extremity condition are unchanged.

## 2022-10-11 NOTE — DISCHARGE INSTRUCTIONS
Post-Operative Instructions    Carpal Tunnel Release:    Keep hand elevated with fingers above eye-level to control swelling. Keep hand and bandage clean and dry. Do not change or unwrap bandage. Please leave bandage in place until your follow-up appointment. Maintain finger motion by fully straightening and fully bending fingers and thumb at least once an hour (while awake). This may cause some discomfort, but will not damage surgery. You may use your operated hand for lightweight tasks (e.g. writing, eating, dressing, etc.). NO LIFTING, CARRYING OR HEAVY USE. Most Patients do not have \"Serious Pain\" after this procedure and thus most patients do not require prescription pain medication. You may take over the counter medication (Tylenol, Advil, Aleve, etc.) as needed. If you are unable to tolerate the discomfort after your surgery and the OTC medications do not provide some relief, you may contact our office to discuss other options. .    Please call the office at [phone]  in 24 - 48 hours to schedule a follow up appointment for approximately 7 - 10 days after surgery. Please call the office at [phone]  if you have any questions or problems. Lauro Scott MD    ANESTHESIA DISCHARGE INSTRUCTIONS    You are under the influence of drugs- do not drink alcohol, drive a car, operate machinery(such as power tools, kitchen appliances, etc), sign legal documents, or make any important decisions for 24 hours (or while on pain medications). Children should not ride bikes or [place] or play on gym sets  for 24 hours after surgery. A responsible adult should be with you for 24 hours. Rest at home today- increase activity as tolerated. Progress slowly to a regular diet unless your physician has instructed you otherwise. Drink plenty of water. CALL YOUR DOCTOR IF YOU:  Have moderate to severe nausea or vomiting AND are unable to hold down fluids or prescribed medications.   Have bright red bloody drainage from your dressing that won't stop oozing. Do not get relief with your pain medication    NORMAL (POSSIBLE) SIDE EFFECTS FROM ANESTHESIA:     Confusion, temporary memory loss, delayed reaction times in the first 24 hours  Lightheadedness, dizziness, difficulty focusing, blurred vision  Nausea/vomiting can happen  Shivering, feeling cold, sore throat, cough and muscle aches should stop within 24-48 hours  Trouble urinating - call your surgeon if it has been more than 8 hrs  Bruising or soreness at the IV site - call if it remains red, firm or there is drainage             FEMALES OF CHILDBEARING AGE WHO ARE TAKING BIRTH CONTROL PILLS:  You may have received a medication during your procedure that interferes with the   actions of birth control pills (Bridion or Emend). Use some other kind of birth control in addition to your pills, like a condom, for 1 month after your procedure to prevent unwanted pregnancy. The following instructions are to be followed if you have a known history or diagnosis of sleep apnea: For all sleep apnea patients:  ? Sleep on your side or sitting up in a chair whenever possible, especially the first 24 hours after surgery. ? Use only medicines prescribed by your doctor. ? Do not drink alcohol. ? If you have a dental device to assist you while at rest, use it at all times for the first 24 hours. For patients using CPAP machines:  ? Use your CPAP machine during all periods of sleep as usual.  ? Use your CPAP machine during all periods of daytime rest while on pain medicines. ** Follow up with your primary care doctor for continued care. IF YOU DO NOT TAKE ALL OF YOUR NARCOTIC PAIN MEDICATION, please dispose of them responsibly. There are drop off boxes in the Emergency Departments 24/7 at both [facility] and [place]. If these locations are not convenient, other options for discarding them can be found at:  [email or web address]    Hospital or office staff may NOT accept any medications to drop off in the cabinet for you. What is a Surgical Site Infection or  (SSI)? A surgical site infection (SSI) is an infection that occurs after surgery in the part of the body where the surgery took place. Most patients who have surgery do not develop an infection. However, infections can develop in about 1-3 cases for every 100 patients who have had surgery. Our goal is for you to NOT experience any complications and be completely satisfied with your care! However, some signs or symptoms to look for and report immediately to your doctor are:   1. Fever above 101 degrees    2. Redness and increasing pain around the area  where you had surgery   3. Drainage of cloudy fluid or pus coming from the surgical area    Some of the things we/ you can do to prevent SSI's are:   1. Clean hands with soap and water or an alcohol-based hand rub before and after caring for the operative area. This occurs the day of surgery and for the next 2 weeks. 2.Sometimes you receive an appropriate antibiotic within 60 minutes before your surgery or take one for several days after surgery depending on your surgeon's instructions and/or the type of surgery you are having. 3. Family and/or friends who visit you should NOT touch the surgical wound or dressings until advised by your surgeon. 4. Be sure to elevate and decrease the swelling after your surgery to help prevent infection. 5. If you are a diabetic, you need to closely monitor your blood sugar levels and report any significant increases or changes to your surgeon to help promote the healing process.

## 2022-10-14 PROBLEM — G56.00 CARPAL TUNNEL SYNDROME: Status: ACTIVE | Noted: 2022-10-14

## 2022-10-19 ENCOUNTER — OFFICE VISIT (OUTPATIENT)
Dept: ORTHOPEDIC SURGERY | Age: 67
End: 2022-10-19

## 2022-10-19 VITALS — BODY MASS INDEX: 43.77 KG/M2 | WEIGHT: 247 LBS | RESPIRATION RATE: 16 BRPM | HEIGHT: 63 IN

## 2022-10-19 DIAGNOSIS — G56.03 CARPAL TUNNEL SYNDROME, BILATERAL: Primary | ICD-10-CM

## 2022-10-19 PROCEDURE — 99024 POSTOP FOLLOW-UP VISIT: CPT | Performed by: ORTHOPAEDIC SURGERY

## 2022-10-19 NOTE — PROGRESS NOTES
Ms. Mehran Cortez returns today in follow-up of her recent left Carpal Tunnel Release done approximately 1 week ago. She has done well noting mild discomfort and no other reported complications. She notes pre-operative symptoms to be significantly improved at this time. Physical Exam:  Bandage intact and well cared for  Skin incision is healing well, without erythema, drainage or sign of infection. Digital range of motion is without significant limitation. Wrist range of motion is without significant limitation. Sensation is improved from preoperatvely  Vascular examination reveals normal, good capillary refill, and good color. Swelling is minimal.  Sensory and Motor Median Nerve function is intact. Impression:  Ms. Mehran Cortez is doing well after recent left Carpal Tunnel Release. Plan:  Ms. Mehran Cortez is instructed in work on Active & Passive range of motion of the digits, wrist, & elbow. These modalities were specifically demonstrated to her today. We discussed the appropriateness of gradual resumption of use of the operated hand and the return to normal use as comfort allows. She is given instructions regarding management of the fresh surgical incision and progressive use of desensitization and tissue massage techniques. We discussed the appropriate expectations and timeline for symptom improvement. She is provided a written patient instruction sheet titled: Postoperative Instructions After Carpal Tunnel Release. I have asked Ms. Mehran Cortez to follow-up with me or contact me by telephone over the next 2-4 weeks if her symptoms have not fully resolved or if she has not regained full & painless return of function. She is also specifically instructed to return to the office or call for an appointment sooner if her symptoms are changing or worsening prior to that time.

## 2022-10-19 NOTE — PATIENT INSTRUCTIONS
Postoperative Instructions After Carpal Tunnel Release    Dr. Wandalee Dancer. [place]        After bandages are removed one week from surgery, you may chose to wear a small bandage over the incision if you wish, though you do not need to. Keep incision dry until sutures have fully dissolved  or it has been 12 - 14 days since your surgery. Thereafter, you may wash with mild soap and water and shower normally. Work hard on motion of the fingers and wrist, straightening each finger fully and bending each finger fully, bending wrist forward and bending wrist backwards. Do not be concerned if you experience discomfort. This will not damage the surgery. You may begin using the hand as it feels comfortable beginning 12 - 14 days from the day of surgery. You may not feel entirely comfortable gripping or lifting heavy objects for several weeks. You may expect to see some skin peel off around the incision. You may be left with a small area of pink baby skin. This is quite normal.  6. Once your stiches have fully disappeared & skin appears normal, you should begin gently massaging the incision with Vitamin E (may use Vitamin E lotion or contents of Vitamin E capsule). Thank you for choosing [facility] for your Hand and Upper Extremity needs. If we can be of any further assistance to you, please do not hesitate to contact us.     Office Phone Number:  [phone]  or  [phone]

## 2022-10-19 NOTE — LETTER
[address] [place] [address] [address] [place] [address]  Phone: [phone]  Fax: [phone] Terell Krishnan RN        October 19, 2022     Patient: Mejia Morton   YOB: 1955   Date of Visit: 10/19/2022       To Whom it May Concern:    Gary Peterson was seen in my clinic on 10/19/2022. She may return to work on 10/25/2022 WITHOUT restrictions, until then she is to have no USE of LEFT HAND. If you have any questions or concerns, please don't hesitate to call.     Sincerely,         Dada Rollins RN

## 2022-11-01 ENCOUNTER — TELEPHONE (OUTPATIENT)
Dept: ORTHOPEDIC SURGERY | Age: 67
End: 2022-11-01

## 2022-11-01 NOTE — TELEPHONE ENCOUNTER
Message from [facility]: Betsey Garza is a cancel for tomorrow - Her son is having emergency open heart surgery.      Will call her at a later date to reschedule

## 2022-11-14 ENCOUNTER — HOSPITAL ENCOUNTER (OUTPATIENT)
Dept: NON INVASIVE DIAGNOSTICS | Age: 67
Discharge: HOME | End: 2022-11-14
Payer: MEDICARE

## 2022-11-14 DIAGNOSIS — I07.1 TRICUSPID VALVE INSUFFICIENCY, UNSPECIFIED ETIOLOGY: ICD-10-CM

## 2022-11-14 LAB
LV EF: 63 %
LVEF MODALITY: NORMAL

## 2022-11-14 PROCEDURE — 93306 TTE W/DOPPLER COMPLETE: CPT

## 2022-11-16 ENCOUNTER — OFFICE VISIT (OUTPATIENT)
Dept: CARDIOLOGY CLINIC | Age: 67
End: 2022-11-16
Payer: MEDICARE

## 2022-11-16 VITALS
SYSTOLIC BLOOD PRESSURE: 130 MMHG | BODY MASS INDEX: 44.07 KG/M2 | HEART RATE: 82 BPM | DIASTOLIC BLOOD PRESSURE: 68 MMHG | WEIGHT: 248.8 LBS

## 2022-11-16 DIAGNOSIS — I47.1 ATRIAL TACHYCARDIA (HCC): Primary | ICD-10-CM

## 2022-11-16 PROCEDURE — G8427 DOCREV CUR MEDS BY ELIG CLIN: HCPCS | Performed by: INTERNAL MEDICINE

## 2022-11-16 PROCEDURE — 3078F DIAST BP <80 MM HG: CPT | Performed by: INTERNAL MEDICINE

## 2022-11-16 PROCEDURE — 1123F ACP DISCUSS/DSCN MKR DOCD: CPT | Performed by: INTERNAL MEDICINE

## 2022-11-16 PROCEDURE — 3074F SYST BP LT 130 MM HG: CPT | Performed by: INTERNAL MEDICINE

## 2022-11-16 PROCEDURE — 3017F COLORECTAL CA SCREEN DOC REV: CPT | Performed by: INTERNAL MEDICINE

## 2022-11-16 PROCEDURE — 99214 OFFICE O/P EST MOD 30 MIN: CPT | Performed by: INTERNAL MEDICINE

## 2022-11-16 PROCEDURE — G8399 PT W/DXA RESULTS DOCUMENT: HCPCS | Performed by: INTERNAL MEDICINE

## 2022-11-16 PROCEDURE — G8417 CALC BMI ABV UP PARAM F/U: HCPCS | Performed by: INTERNAL MEDICINE

## 2022-11-16 PROCEDURE — 1036F TOBACCO NON-USER: CPT | Performed by: INTERNAL MEDICINE

## 2022-11-16 PROCEDURE — G8484 FLU IMMUNIZE NO ADMIN: HCPCS | Performed by: INTERNAL MEDICINE

## 2022-11-16 PROCEDURE — 1090F PRES/ABSN URINE INCON ASSESS: CPT | Performed by: INTERNAL MEDICINE

## 2022-11-16 RX ORDER — METOPROLOL SUCCINATE 50 MG/1
50 TABLET, EXTENDED RELEASE ORAL DAILY
Qty: 90 TABLET | Refills: 3 | Status: SHIPPED | OUTPATIENT
Start: 2022-11-16 | End: 2022-12-01

## 2022-11-17 NOTE — PROGRESS NOTES
Cc: HTN, HLP, atypical CP, PAD, PAT    HPI:     Faith Gonzalez is a 78 yo overweight woman (BMI 39) with h/o SAL on CPAP, GERD, HTN, HLP, past smoker (quit '89), SVT/PAT, PAD, post-op DVT's on eliquis, MGUS (diagnosed 07/2022), varicose veins. Patient was seen by Dr. Ramesh Quintanilla and Dr Pankaj Vargas at [facility] in the past, last visit in 2014. She had chest pains at that time and underwent cardiac evaluation. Echo 07/2014: normal except for diastolic I (note, no valvular disease). Cath 07/2014: normal coronaries, normal EF 70%. ECG 3/26/19: normal.     Patient reported strong family history of premature CAD and heart failure. Her brother had a cardiac transplant in his 42's and her sisters had stents in their 42-51s. Patient reported palpitations with lightheadedness. 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 Echo 04/2019: normal (no mitral valve prolapse) except for moderate TR, RVSP 44. Zio monitor x 14 days (4/22/19): NSR with frequent SVT's. Patient was seen by Dr. Aldo Abdalla and was initially started on flecainide in addition to Toprol however patient developed blurry vision and flecainide was changed to verapamil. Exe nuc stress 5/20/20: small mild reversible defect in distal anteroseptal wall (apex is spared), nl EF and wall motion. Patient had mild CP prior to test, increased pain with exertion, improving with rest. ECG no changes, normal, duration only 3 minutes. LHC 5/21/2020: Normal coronaries, LVEDP 13 mmHg, LVEF normal.     Bilateral lower extremity arterial ultrasound 2/18/2021: Right mid SFA less than 50% stenosis     FLP 06/2022: , HDL 56, LDL 77, TG 86 on Pravachol 20 mg daily. Echo 11/2022: Moderate LVH, LVEF 60%, indeterminate diastolic function, normal RV, moderate LAE, normal valves. Patient follows with Dr Leigh Bound [facility]) for MGUS. Patient is here for follow-up.   She reports increased palpitations lasting about 5 to 10 seconds each time about once or twice per week. During those episodes she feels flushed. The palpitations can be tachycardia or skipped beats. Her SBP at home 130-140 mmhg. Histories     Past Medical History:   has a past medical history of Allergic rhinitis, Anxiety, Atrial tachycardia ([address] Carpal tunnel syndrome, Cervicalgia, Chronic back pain, Depression, DVT (deep venous thrombosis) ([facility]), Fatty liver, GERD (gastroesophageal reflux disease), Headache(784.0), Hernia hiatal, Histoplasmosis, Hot flashes, Hyperlipidemia, Hypertension, Obesity, Osteoarthritis, Other partial intestinal obstruction (HCC), PONV (postoperative nausea and vomiting), PVD (peripheral vascular disease) ([facility] [address] Shingles, Sleep apnea, and Tricuspid regurgitation. Surgical History:   has a past surgical history that includes Knee arthroscopy; Knee cartilage surgery; Lung lobectomy; sinus surgery; Bunionectomy; Partial hysterectomy; Total knee arthroplasty (Bilateral, 03/03/2015); Parathyroid gland surgery; Lung biopsy; Colonoscopy (10/2018); Breast surgery (Right); lumbar fusion (N/A, 07/29/2021); Abdomen surgery (N/A, 08/01/2021); Breast biopsy (Right, 2013); Esophagogastroduodenoscopy (2016); and Carpal tunnel release (Left, 10/11/2022). Social History:   reports that she quit smoking about 32 years ago. Her smoking use included cigarettes. She has a 5.00 pack-year smoking history. She has never used smokeless tobacco. She reports that she does not drink alcohol and does not use drugs. Family History:  No evidence for sudden cardiac death or premature CAD      Medications:     Home medications were reviewed and are listed below    Prior to Admission medications    Medication Sig Start Date End Date Taking?  Authorizing Provider   metoprolol succinate (TOPROL XL) 50 MG extended release tablet Take 1 tablet by mouth daily 11/16/22  Yes Rio Paris MD   gabapentin (NEURONTIN) 100 MG capsule Take 100 mg by mouth in the morning and at bedtime. Historical Provider, MD   pantoprazole (PROTONIX) 40 MG tablet TAKE 1 TABLET BY MOUTH  DAILY 10/7/22   Brennon Echavarria MD   loratadine (CLARITIN) 10 MG tablet TAKE 1 TABLET BY MOUTH DAILY 9/6/22   Brennon Echavarria MD   losartan (COZAAR) 50 MG tablet TAKE 1 TABLET BY MOUTH EVERY DAY 9/6/22   Brennon Echavarria MD   fluticasone (FLONASE) 50 MCG/ACT nasal spray 1 spray by Each Nostril route daily 8/26/22   Brennon Echavarria MD   buPROPion (WELLBUTRIN XL) 150 MG extended release tablet TAKE 1 TABLET BY MOUTH EVERY MORNING 7/5/22   Brennon Echavarria MD   aspirin EC 81 MG EC tablet Take 1 tablet by mouth daily 4/14/22   Brennon Echavarria MD   Omega-3 Fatty Acids (FISH OIL) 1000 MG CAPS Take 2 capsules by mouth daily 4/14/22   Brennon Echavarria MD   pimecrolimus (ELIDEL) 1 % cream Apply topically 2 times daily. 4/14/22   Brennon Echavarria MD   clindamycin-benzoyl peroxide (BENZACLIN) 1-5 % gel Apply topically 2 times daily. 4/14/22   Brennon Echavarria MD   pravastatin (PRAVACHOL) 20 MG tablet TAKE 1 TABLET BY MOUTH EVERY DAY 4/6/22   Dylan Vargas MD   verapamil (CALAN SR) 120 MG extended release tablet TAKE 1 TABLET BY MOUTH NIGHTLY DO NOT CRUSH OR CHEW. 12/30/21   Claudette Ege, APRN - CNP   acetaminophen (TYLENOL) 500 MG tablet Take 500 mg by mouth every 6 hours as needed for Pain    Historical Provider, MD   Multiple Vitamins-Minerals (CENTRUM SILVER PO) Take 1 tablet by mouth daily     Historical Provider, MD          Allergy:     Latex, Reglan [metoclopramide hcl], Univasc [moexipril], Crestor [rosuvastatin], Zoloft [sertraline], Celecoxib, Keflex [cephalexin], Lipitor, Metoclopramide, Prochlorperazine, Prochlorperazine edisylate, Sulfa antibiotics, and Vioxx       Review of Systems:     All 12 point review of symptoms completed.  Pertinent positives identified in the HPI, all other review of symptoms negative as below. CONSTITUTIONAL: No fatigue  SKIN: No rash or pruritis. EYES: No visual changes or diplopia. No scleral icterus. ENT: No Headaches, hearing loss or vertigo. No mouth sores or sore throat. CARDIOVASCULAR: No chest pain/chest pressure/chest discomfort. + palpitations. No edema. RESPIRATORY: No dyspnea. No cough or wheezing, no sputum production. GASTROINTESTINAL: No N/V/D. No abdominal pain, appetite loss, blood in stools. GENITOURINARY: No dysuria, trouble voiding, or hematuria. MUSCULOSKELETAL:  No gait disturbance, weakness or joint complaints. NEUROLOGICAL: No headache, diplopia, change in muscle strength, numbness or tingling. No change in gait, balance, coordination, mood, affect, memory, mentation, behavior. PSHYCH: No anxiety, loss of interest, change in sexual behavior, feelings of self-harm, or confusion. ENDOCRINE: No excessive thirst, fluid intake, or urination. No tremor. HEMATOLOGIC: No abnormal bruising or bleeding. ALLERGY: No nasal congestion or hives.       Physical Examination:     Vitals:    11/16/22 1507   BP: 130/68   Pulse: 82   Weight: 248 lb 12.8 oz (112.9 kg)       Wt Readings from Last 3 Encounters:   11/16/22 248 lb 12.8 oz (112.9 kg)   10/19/22 247 lb (112 kg)   10/11/22 247 lb (112 kg)         General Appearance:  Alert, cooperative, no distress, appears stated age Appropriate weight   Head:  Normocephalic, without obvious abnormality, atraumatic   Eyes:  PERRL, conjunctiva/corneas clear EOM intact  Ears normal   Throat no lesions       Nose: Nares normal, no drainage or sinus tenderness   Throat: Lips, mucosa, and tongue normal   Neck: Supple, symmetrical, trachea midline, no adenopathy, thyroid: not enlarged, symmetric, no tenderness/mass/nodules, no carotid bruit       Lungs:   Clear to auscultation bilaterally, respirations unlabored   Chest Wall:  No tenderness or deformity   Heart:  Regular rhythm, rate is controlled, S1, S2 normal, there is no murmur, there is no rub or gallop, cannot assess jvd, no bilateral lower extremity edema   Abdomen:   Soft, non-tender, bowel sounds active all four quadrants,  no masses, no organomegaly       Extremities: Extremities normal, atraumatic, no cyanosis   Pulses: 2+ and symmetric   Skin: Skin color, texture, turgor normal, no rashes or lesions   Pysch: Normal mood and affect   Neurologic: Normal gross motor and sensory exam.  Cranial nerves intact        Labs:     Lab Results   Component Value Date    WBC 5.6 09/26/2022    HGB 12.9 09/26/2022    HCT 38.7 09/26/2022    MCV 90.9 09/26/2022     09/26/2022     Lab Results   Component Value Date     09/26/2022    K 4.2 09/26/2022     09/26/2022    CO2 26 09/26/2022    BUN 13 09/26/2022    CREATININE 0.6 09/26/2022    GLUCOSE 137 (H) 09/26/2022    CALCIUM 9.5 09/26/2022    PROT 6.5 09/26/2022    LABALBU 4.5 09/26/2022    BILITOT <0.2 09/26/2022    ALKPHOS 98 09/26/2022    AST 21 09/26/2022    ALT 25 09/26/2022    LABGLOM >60 09/26/2022    GFRAA >60 09/26/2022    AGRATIO 2.3 (H) 09/26/2022    GLOB 2.3 09/03/2021         Lab Results   Component Value Date    CHOL 150 06/23/2022    CHOL 152 07/26/2021    CHOL 163 09/28/2020     Lab Results   Component Value Date    TRIG 86 06/23/2022    TRIG 141 07/26/2021    TRIG 149 09/28/2020     Lab Results   Component Value Date    HDL 56 06/23/2022    HDL 50 07/26/2021    HDL 54 09/28/2020     Lab Results   Component Value Date    LDLCALC 77 06/23/2022    LDLCALC 74 07/26/2021    LDLCALC 79 09/28/2020     Lab Results   Component Value Date    LABVLDL 17 06/23/2022    LABVLDL 28 07/26/2021    LABVLDL 30 09/28/2020     Lab Results   Component Value Date    CHOLHDLRATIO 2.8 04/16/2011    CHOLHDLRATIO 2.5 01/08/2011    CHOLHDLRATIO 2.9 10/02/2010       Lab Results   Component Value Date    INR 1.07 08/13/2021    INR 1.14 (H) 08/12/2021    INR 1.14 (H) 08/10/2021    PROTIME 12.1 08/13/2021    PROTIME 12.9 (H) 08/12/2021 PROTIME 12.9 (H) 08/10/2021       The 10-year ASCVD risk score (Naz DE GUZMAN, et al., 2019) is: 8.3%    Values used to calculate the score:      Age: 79 years      Sex: Female      Is Non- : No      Diabetic: No      Tobacco smoker: No      Systolic Blood Pressure: 213 mmHg      Is BP treated: Yes      HDL Cholesterol: 56 mg/dL      Total Cholesterol: 150 mg/dL      Assessment / Plan:      Diagnosis Orders   1. Atrial tachycardia (HCC)             1.  PSVT:  Patient has rare and very brief episodes.      -We will increase Toprol to 50 mg daily and continue verapamil extended release 120 mg daily. 2.  Hypertension:  Patient's BP is well controlled with current medications. - Continue with metoprolol, verapamil, losartan 50 mg daily. 3.  Hyperlipidemia:  Patient was having myalgias (increasing lower back pain) on intermediate dose Crestor.     -Cw Pravachol at 20 mg p.o. daily. 4. PAD:  Patient has evidence of mild PAD in the right lower extremity.     -Continue with low-dose aspirin 81 mg daily and statin           We will schedule a follow up visit in 6 months      I have spent 35 minutes of face to face time with the patient with more than 50% spent counseling and coordinating care. I have personally reviewed the reports and images of labs, radiological studies, cardiac studies including ECG's and telemetry, current and old medical records. The note was completed using EMR and Dragon dictation system. Every effort was made to ensure accuracy; however, inadvertent computerized transcription errors may be present. All questions and concerns were addressed to the patient/family. Alternatives to my treatment were discussed. I would like to thank you for providing me the opportunity to participate in the care of your patient. If you have any questions, please do not hesitate to contact me.      Rachael Ariza MD, [facility], [facility] [address] [employer]  [address] [facility] Phone: [phone]  Fax: [phone]

## 2022-11-29 ENCOUNTER — TELEPHONE (OUTPATIENT)
Dept: INTERNAL MEDICINE CLINIC | Age: 67
End: 2022-11-29

## 2022-11-29 ENCOUNTER — PATIENT MESSAGE (OUTPATIENT)
Dept: INTERNAL MEDICINE CLINIC | Age: 67
End: 2022-11-29

## 2022-12-01 RX ORDER — METOPROLOL SUCCINATE 25 MG/1
25 TABLET, EXTENDED RELEASE ORAL DAILY
Qty: 30 TABLET | Refills: 1
Start: 2022-12-01

## 2022-12-01 NOTE — TELEPHONE ENCOUNTER
Spoke to pt and gets dizzy and very low bp and pulse on toprol 50 mg. Told her to cut back to 25 mg dose.

## 2022-12-12 DIAGNOSIS — I10 ESSENTIAL HYPERTENSION: ICD-10-CM

## 2022-12-12 DIAGNOSIS — J30.89 NON-SEASONAL ALLERGIC RHINITIS, UNSPECIFIED TRIGGER: ICD-10-CM

## 2022-12-12 RX ORDER — LORATADINE 10 MG/1
TABLET ORAL
Qty: 90 TABLET | Refills: 0 | Status: SHIPPED | OUTPATIENT
Start: 2022-12-12

## 2022-12-12 RX ORDER — LOSARTAN POTASSIUM 50 MG/1
TABLET ORAL
Qty: 90 TABLET | Refills: 0 | Status: SHIPPED | OUTPATIENT
Start: 2022-12-12

## 2022-12-19 ENCOUNTER — HOSPITAL ENCOUNTER (OUTPATIENT)
Dept: MAMMOGRAPHY | Age: 67
Discharge: HOME | End: 2022-12-19
Payer: MEDICARE

## 2022-12-19 VITALS — WEIGHT: 248 LBS | BODY MASS INDEX: 43.94 KG/M2 | HEIGHT: 63 IN

## 2022-12-19 DIAGNOSIS — Z12.31 VISIT FOR SCREENING MAMMOGRAM: ICD-10-CM

## 2022-12-19 PROCEDURE — 77067 SCR MAMMO BI INCL CAD: CPT

## 2022-12-27 RX ORDER — GABAPENTIN 100 MG/1
CAPSULE ORAL
Qty: 60 CAPSULE | Refills: 2 | Status: SHIPPED | OUTPATIENT
Start: 2022-12-27 | End: 2023-01-16

## 2023-01-06 ENCOUNTER — OFFICE VISIT (OUTPATIENT)
Dept: ORTHOPEDIC SURGERY | Age: 68
End: 2023-01-06

## 2023-01-06 VITALS — HEIGHT: 63 IN | WEIGHT: 245 LBS | BODY MASS INDEX: 43.41 KG/M2

## 2023-01-06 DIAGNOSIS — Z96.653 HISTORY OF TOTAL KNEE ARTHROPLASTY, BILATERAL: Primary | ICD-10-CM

## 2023-01-06 DIAGNOSIS — M70.51 PES ANSERINUS BURSITIS OF RIGHT KNEE: ICD-10-CM

## 2023-01-06 RX ORDER — TRIAMCINOLONE ACETONIDE 40 MG/ML
40 INJECTION, SUSPENSION INTRA-ARTICULAR; INTRAMUSCULAR ONCE
Status: COMPLETED | OUTPATIENT
Start: 2023-01-06 | End: 2023-01-06

## 2023-01-06 RX ORDER — BUPIVACAINE HYDROCHLORIDE 2.5 MG/ML
2 INJECTION, SOLUTION INFILTRATION; PERINEURAL ONCE
Status: COMPLETED | OUTPATIENT
Start: 2023-01-06 | End: 2023-01-06

## 2023-01-06 RX ADMIN — TRIAMCINOLONE ACETONIDE 40 MG: 40 INJECTION, SUSPENSION INTRA-ARTICULAR; INTRAMUSCULAR at 11:01

## 2023-01-06 RX ADMIN — BUPIVACAINE HYDROCHLORIDE 5 MG: 2.5 INJECTION, SOLUTION INFILTRATION; PERINEURAL at 11:01

## 2023-01-06 NOTE — PROGRESS NOTES
[facility]  [facility]    Chief Complaint: Left knee instability following total knee arthroplasty; right knee pain after right total knee arthroplasty    HPI:  Trent Bojorquez is a 79 y.o. who is here for evaluation of her bilateral total knee arthroplasties. She was last seen for this issues in March 2022. For review, the right side was done by Dr. Irizarry in 2016 and the left side was done in 2015 by Dr. Janet Petersen ([facility]). She was diagnosed with mild instability of the left knee which is improving with physical therapy. She does still have some medial left knee pain. She is also here today similar issues in the right knee. She reports medial right knee pain today. She has been active in aquatic physical therapy. Pain is lateral and is worse with weightbearing and walking. Her history is significant for recent low back surgery in July 2021. She had bilateral lower extremity DVTs after this and is currently on Eliquis. For this reason, she is unable to take NSAIDs.     Patient Active Problem List   Diagnosis    Vitamin D deficiency    Generalized osteoarthrosis, involving multiple sites    Chronic low back pain    Hemiplegic migraine    Allergic rhinitis    GERD (gastroesophageal reflux disease)    Degenerative disc disease, lumbar    Essential hypertension    Major depressive disorder with single episode, 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[facility])    Mixed hyperlipidemia    Rosacea    Shingles (herpes zoster) polyneuropathy    Palpitations    Atrial tachycardia ([facility])    Paresthesia    PAD (peripheral artery disease) ([facility])    Chest pain    Morbidly obese ([facility])    Epigastric discomfort    Chronic back pain    Lumbar stenosis with neurogenic claudication    Dehiscence of fascia    Ileus ([facility])    S/P lumbar and lumbosacral fusion by anterior technique    Morbid obesity with BMI of 45.0-49.9, adult ([facility])    Carpal tunnel syndrome, bilateral    SAL on CPAP    MGUS (monoclonal gammopathy of unknown significance)    Carpal tunnel syndrome       ROS:  Constitutional: denies fever, chills, weight loss  MSK: denies pain in other joints, muscle aches  Neurological: denies numbness, tingling, weakness    Exam:  Appearance: sitting in exam room chair, appears to be in no acute distress, awake and alert  Resp: unlabored breathing on room air  Skin: warm, dry and intact with out erythema or significant increased temperature  Neuro: grossly intact both lower extremities. Intact sensation to light touch. Motor exam 4+ to 5/5 in all major motor groups. Right knee: Incision is well-healed. Range of motion 0 to 120 degrees. The knee is stable to varus and valgus stress and stable to anterior and posterior drawer sign. Tender over the pes bursa. Sensation is intact light touch. There is brisk capillary refill. There is 5/5 muscle strength in all muscle groups. Left knee: Incision is well-healed. Tender along the medial and lateral knee. There is no knee effusion. Range of motion 0 to 130 degrees. The knee is stable to varus and valgus stress and does display mild laxity with anterior drawer. Sensation is intact light touch. There is brisk capillary refill. There is 5/5 muscle strength in all muscle groups. Imaging:  3 views of bilateral knees were performed and interpreted today. She has bilateral total knee arthroplasty prostheses cemented in the place. These are both of a cruciate retaining design. There are no signs of osteolysis, loosening, fracture, dislocation. Gross alignment appears appropriate. Assessment:  Bilateral total knee arthroplasty with mild flexion instability and pes bursitis, more symptomatic on the right    Plan:  We discussed the diagnosis and treatment options. I encouraged her to continue her aquatic physical therapy exercises, especially focusing on quadricep strengthening of both legs.   I recommended and performed a steroid injection of the right pes bursa today to help with her medial knee pain. We discussed that her best treatment going forward is intermittent pes bursa steroid injections and physical therapy exercises, otherwise the treatment would be revision total knee arthroplasty. She will continue activity as tolerated and follow-up on an annual basis or sooner as needed. Procedure:  After verbal consent was obtained, the patient's right knee was prepped with alcohol. Skin was anesthetized with ethyl chloride. The pes bursa was then injected under sterile technique with 2mL of 0.25% marcaine and 1 mL of 40 mg/mL Kenalog. A bandage was applied. The patient tolerated the procedure well and there were no complications. Total time spent on today's encounter was at least 24 minutes. This time included reviewing prior notes, radiographs, and lab results when available, reviewing history obtained by medical assistant, performing history and physical exam, reviewing tests/radiographs with the patient, counseling the patient, ordering medications or tests, documentation in the electronic health record, and coordination of care. This dictation was done with Dragon dictation and may contain mechanical errors related to translation.

## 2023-01-10 ENCOUNTER — APPOINTMENT (OUTPATIENT)
Dept: GENERAL RADIOLOGY | Age: 68
End: 2023-01-10
Payer: MEDICARE

## 2023-01-10 ENCOUNTER — APPOINTMENT (OUTPATIENT)
Dept: CT IMAGING | Age: 68
End: 2023-01-10
Payer: MEDICARE

## 2023-01-10 ENCOUNTER — HOSPITAL ENCOUNTER (EMERGENCY)
Age: 68
Discharge: HOME | End: 2023-01-10
Attending: EMERGENCY MEDICINE
Payer: MEDICARE

## 2023-01-10 ENCOUNTER — TELEPHONE (OUTPATIENT)
Dept: INTERNAL MEDICINE CLINIC | Age: 68
End: 2023-01-10

## 2023-01-10 VITALS
RESPIRATION RATE: 18 BRPM | OXYGEN SATURATION: 99 % | HEART RATE: 56 BPM | TEMPERATURE: 98.3 F | DIASTOLIC BLOOD PRESSURE: 63 MMHG | SYSTOLIC BLOOD PRESSURE: 130 MMHG

## 2023-01-10 DIAGNOSIS — R42 DIZZINESS: Primary | ICD-10-CM

## 2023-01-10 DIAGNOSIS — I67.1 CEREBRAL ANEURYSM: ICD-10-CM

## 2023-01-10 LAB
A/G RATIO: 1.6 (ref 1.1–2.2)
ALBUMIN SERPL-MCNC: 4.3 G/DL (ref 3.4–5)
ALBUMIN SERPL-MCNC: 4.5 G/DL (ref 3.4–5)
ALP BLD-CCNC: 100 U/L (ref 40–129)
ALP BLD-CCNC: 104 U/L (ref 40–129)
ALT SERPL-CCNC: 23 U/L (ref 10–40)
ALT SERPL-CCNC: 25 U/L (ref 10–40)
AMORPHOUS: NORMAL /HPF
ANION GAP SERPL CALCULATED.3IONS-SCNC: 12 MMOL/L (ref 3–16)
AST SERPL-CCNC: 19 U/L (ref 15–37)
AST SERPL-CCNC: 20 U/L (ref 15–37)
BASOPHILS ABSOLUTE: 0.1 K/UL (ref 0–0.2)
BASOPHILS RELATIVE PERCENT: 0.8 %
BILIRUB SERPL-MCNC: 0.3 MG/DL (ref 0–1)
BILIRUB SERPL-MCNC: <0.2 MG/DL (ref 0–1)
BILIRUBIN DIRECT: <0.2 MG/DL (ref 0–0.3)
BILIRUBIN DIRECT: <0.2 MG/DL (ref 0–0.3)
BILIRUBIN URINE: NEGATIVE
BILIRUBIN, INDIRECT: NORMAL MG/DL (ref 0–1)
BILIRUBIN, INDIRECT: NORMAL MG/DL (ref 0–1)
BLOOD, URINE: NEGATIVE
BUN BLDV-MCNC: 18 MG/DL (ref 7–20)
CALCIUM SERPL-MCNC: 9.8 MG/DL (ref 8.3–10.6)
CHLORIDE BLD-SCNC: 100 MMOL/L (ref 99–110)
CLARITY: CLEAR
CO2: 26 MMOL/L (ref 21–32)
COLOR: YELLOW
CREAT SERPL-MCNC: 0.5 MG/DL (ref 0.6–1.2)
D DIMER: 0.61 UG/ML FEU (ref 0–0.6)
EKG ATRIAL RATE: 54 BPM
EKG DIAGNOSIS: NORMAL
EKG P AXIS: 57 DEGREES
EKG P-R INTERVAL: 174 MS
EKG Q-T INTERVAL: 438 MS
EKG QRS DURATION: 78 MS
EKG QTC CALCULATION (BAZETT): 415 MS
EKG R AXIS: 29 DEGREES
EKG T AXIS: 37 DEGREES
EKG VENTRICULAR RATE: 54 BPM
EOSINOPHILS ABSOLUTE: 0.1 K/UL (ref 0–0.6)
EOSINOPHILS RELATIVE PERCENT: 1.1 %
GFR SERPL CREATININE-BSD FRML MDRD: >60 ML/MIN/{1.73_M2}
GLUCOSE BLD-MCNC: 90 MG/DL (ref 70–99)
GLUCOSE BLD-MCNC: 95 MG/DL
GLUCOSE BLD-MCNC: 95 MG/DL (ref 70–99)
GLUCOSE URINE: NEGATIVE MG/DL
HCT VFR BLD CALC: 41 % (ref 36–48)
HEMOGLOBIN: 13.9 G/DL (ref 12–16)
INFLUENZA A: NOT DETECTED
INFLUENZA B: NOT DETECTED
INR BLD: 0.97 (ref 0.87–1.14)
KETONES, URINE: NEGATIVE MG/DL
LEUKOCYTE ESTERASE, URINE: ABNORMAL
LIPASE: 29 U/L (ref 13–60)
LYMPHOCYTES ABSOLUTE: 2.3 K/UL (ref 1–5.1)
LYMPHOCYTES RELATIVE PERCENT: 31.6 %
MCH RBC QN AUTO: 30.5 PG (ref 26–34)
MCHC RBC AUTO-ENTMCNC: 34 G/DL (ref 31–36)
MCV RBC AUTO: 89.7 FL (ref 80–100)
MICROSCOPIC EXAMINATION: YES
MONOCYTES ABSOLUTE: 0.6 K/UL (ref 0–1.3)
MONOCYTES RELATIVE PERCENT: 7.9 %
NEUTROPHILS ABSOLUTE: 4.3 K/UL (ref 1.7–7.7)
NEUTROPHILS RELATIVE PERCENT: 58.6 %
NITRITE, URINE: NEGATIVE
PDW BLD-RTO: 13.5 % (ref 12.4–15.4)
PERFORMED ON: NORMAL
PH UA: 6.5 (ref 5–8)
PLATELET # BLD: 304 K/UL (ref 135–450)
PMV BLD AUTO: 7 FL (ref 5–10.5)
POTASSIUM REFLEX MAGNESIUM: 4.1 MMOL/L (ref 3.5–5.1)
PRO-BNP: 76 PG/ML (ref 0–124)
PROTEIN UA: NEGATIVE MG/DL
PROTHROMBIN TIME: 12.8 SEC (ref 11.7–14.5)
RBC # BLD: 4.57 M/UL (ref 4–5.2)
RBC UA: NORMAL /HPF (ref 0–4)
RENAL EPITHELIAL, UA: NORMAL /HPF (ref 0–1)
SARS-COV-2 RNA, RT PCR: NOT DETECTED
SODIUM BLD-SCNC: 138 MMOL/L (ref 136–145)
SPECIFIC GRAVITY UA: 1.01 (ref 1–1.03)
TOTAL PROTEIN: 7 G/DL (ref 6.4–8.2)
TOTAL PROTEIN: 7.4 G/DL (ref 6.4–8.2)
TROPONIN: <0.01 NG/ML
TROPONIN: <0.01 NG/ML
URINE REFLEX TO CULTURE: ABNORMAL
URINE TYPE: ABNORMAL
UROBILINOGEN, URINE: 0.2 E.U./DL
WBC # BLD: 7.3 K/UL (ref 4–11)
WBC UA: NORMAL /HPF (ref 0–5)

## 2023-01-10 PROCEDURE — 85610 PROTHROMBIN TIME: CPT

## 2023-01-10 PROCEDURE — 87636 SARSCOV2 & INF A&B AMP PRB: CPT

## 2023-01-10 PROCEDURE — 71260 CT THORAX DX C+: CPT | Performed by: STUDENT IN AN ORGANIZED HEALTH CARE EDUCATION/TRAINING PROGRAM

## 2023-01-10 PROCEDURE — 84484 ASSAY OF TROPONIN QUANT: CPT

## 2023-01-10 PROCEDURE — 85379 FIBRIN DEGRADATION QUANT: CPT

## 2023-01-10 PROCEDURE — 99285 EMERGENCY DEPT VISIT HI MDM: CPT

## 2023-01-10 PROCEDURE — 71046 X-RAY EXAM CHEST 2 VIEWS: CPT

## 2023-01-10 PROCEDURE — 83690 ASSAY OF LIPASE: CPT

## 2023-01-10 PROCEDURE — 81001 URINALYSIS AUTO W/SCOPE: CPT

## 2023-01-10 PROCEDURE — 80053 COMPREHEN METABOLIC PANEL: CPT

## 2023-01-10 PROCEDURE — 6360000004 HC RX CONTRAST MEDICATION: Performed by: STUDENT IN AN ORGANIZED HEALTH CARE EDUCATION/TRAINING PROGRAM

## 2023-01-10 PROCEDURE — 6360000002 HC RX W HCPCS: Performed by: STUDENT IN AN ORGANIZED HEALTH CARE EDUCATION/TRAINING PROGRAM

## 2023-01-10 PROCEDURE — 6370000000 HC RX 637 (ALT 250 FOR IP): Performed by: STUDENT IN AN ORGANIZED HEALTH CARE EDUCATION/TRAINING PROGRAM

## 2023-01-10 PROCEDURE — 96374 THER/PROPH/DIAG INJ IV PUSH: CPT

## 2023-01-10 PROCEDURE — 70450 CT HEAD/BRAIN W/O DYE: CPT

## 2023-01-10 PROCEDURE — 83880 ASSAY OF NATRIURETIC PEPTIDE: CPT

## 2023-01-10 PROCEDURE — 93005 ELECTROCARDIOGRAM TRACING: CPT | Performed by: STUDENT IN AN ORGANIZED HEALTH CARE EDUCATION/TRAINING PROGRAM

## 2023-01-10 PROCEDURE — 85025 COMPLETE CBC W/AUTO DIFF WBC: CPT

## 2023-01-10 PROCEDURE — 36415 COLL VENOUS BLD VENIPUNCTURE: CPT

## 2023-01-10 PROCEDURE — 70498 CT ANGIOGRAPHY NECK: CPT

## 2023-01-10 RX ORDER — SODIUM CHLORIDE, SODIUM LACTATE, POTASSIUM CHLORIDE, AND CALCIUM CHLORIDE .6; .31; .03; .02 G/100ML; G/100ML; G/100ML; G/100ML
1000 INJECTION, SOLUTION INTRAVENOUS ONCE
Status: DISCONTINUED | OUTPATIENT
Start: 2023-01-10 | End: 2023-01-10 | Stop reason: HOSPADM

## 2023-01-10 RX ORDER — MECLIZINE HYDROCHLORIDE 25 MG/1
25 TABLET ORAL ONCE
Status: COMPLETED | OUTPATIENT
Start: 2023-01-10 | End: 2023-01-10

## 2023-01-10 RX ORDER — ONDANSETRON 2 MG/ML
4 INJECTION INTRAMUSCULAR; INTRAVENOUS ONCE
Status: COMPLETED | OUTPATIENT
Start: 2023-01-10 | End: 2023-01-10

## 2023-01-10 RX ORDER — MECLIZINE HYDROCHLORIDE 25 MG/1
25 TABLET ORAL 3 TIMES DAILY PRN
Qty: 30 TABLET | Refills: 0 | Status: SHIPPED | OUTPATIENT
Start: 2023-01-10 | End: 2023-01-20

## 2023-01-10 RX ADMIN — MECLIZINE HYDROCHLORIDE 25 MG: 25 TABLET ORAL at 12:54

## 2023-01-10 RX ADMIN — ONDANSETRON 4 MG: 2 INJECTION INTRAMUSCULAR; INTRAVENOUS at 12:54

## 2023-01-10 RX ADMIN — IOPAMIDOL 75 ML: 755 INJECTION, SOLUTION INTRAVENOUS at 13:00

## 2023-01-10 RX ADMIN — IOPAMIDOL 75 ML: 755 INJECTION, SOLUTION INTRAVENOUS at 12:58

## 2023-01-10 RX ADMIN — SODIUM CHLORIDE, SODIUM LACTATE, POTASSIUM CHLORIDE, AND CALCIUM CHLORIDE 1000 ML: .6; .31; .03; .02 INJECTION, SOLUTION INTRAVENOUS at 13:04

## 2023-01-10 ASSESSMENT — PAIN - FUNCTIONAL ASSESSMENT
PAIN_FUNCTIONAL_ASSESSMENT: NONE - DENIES PAIN
PAIN_FUNCTIONAL_ASSESSMENT: 0-10

## 2023-01-10 ASSESSMENT — PAIN DESCRIPTION - DESCRIPTORS
DESCRIPTORS: ACHING
DESCRIPTORS_2: ACHING

## 2023-01-10 ASSESSMENT — PAIN DESCRIPTION - LOCATION
LOCATION_2: CHEST
LOCATION: HEAD

## 2023-01-10 ASSESSMENT — PAIN SCALES - GENERAL: PAINLEVEL_OUTOF10: 7

## 2023-01-10 ASSESSMENT — PAIN DESCRIPTION - INTENSITY: RATING_2: 5

## 2023-01-10 ASSESSMENT — PAIN DESCRIPTION - ORIENTATION: ORIENTATION_2: LEFT

## 2023-01-10 ASSESSMENT — PAIN DESCRIPTION - PAIN TYPE: TYPE: ACUTE PAIN

## 2023-01-10 NOTE — ED PROVIDER NOTES
[address] [facility] RESIDENT NOTE       Date of evaluation: 1/10/2023    Chief Complaint     Dizziness (Pt states this morning when getting out of bed she felt like she was spinning, dizziness has persisted throughout the morning. Also c/o a headache that started at the same time. ), Headache, and Chest Pain (Pt states she has had an aching in her chest on left side for about 1 wk, also c/o sob when walking. Reports a known aortic aneurysm 3.8cm. )      History of Present Illness     Kailyn Reyes is a 79 y.o. female with past medical history significant for MGUS hemiplegic migraine, hypertension, hyperlipidemia, atrial tachycardia, moderate tricuspid regurg, prior DVT not on anticoagulation and peripheral arterial disease and obesity who presents for multiple complaints. She presents primarily for dizziness that started this morning; she went to bed yesterday evening feeling normal, woke up this morning with dizziness that she feels like is a combination of wooziness, in addition to feeling like she stepped off of a roller coaster. Is been constant but is provoked by turning her head in either direction. She recently got over what sounds like an upper respiratory infection with cough and rhinorrhea within the last few weeks. She has no ear pain, no recent head or ear injuries. She is not anticoagulated currently last was on Eliquis for provoked in the setting of surgical procedure several months ago. She has a headache that started after the dizziness, which is over her head diffusely, unchanged with position, no visual field deficits, has felt dizzy but no weakness, numbness or tingling. She has not had headaches that are associate with similar symptoms before. She has chest pain of the left side this been present for 1 week that she is also concerned about today, not exertional, but feels short of breath.   No hemoptysis, no recent trauma or surgery within the last month. She has baseline leg swelling. No orthopnea. Review of Systems     A complete review of systems was performed and is otherwise negative. Past Medical, Surgical, Family, and Social History     She has a past medical history of Allergic rhinitis, Anxiety, Ascending aorta dilatation (HCC), Atrial tachycardia ([address] Carpal tunnel syndrome, Cervicalgia, Chronic back pain, Depression, DVT (deep venous thrombosis) ([address] Fatty liver, GERD (gastroesophageal reflux disease), Headache(784.0), Hernia hiatal, Histoplasmosis, Hot flashes, Hyperlipidemia, Hypertension, Obesity, Osteoarthritis, Other partial intestinal obstruction ([address] PONV (postoperative nausea and vomiting), PVD (peripheral vascular disease) ([address] Shingles, Sleep apnea, and Tricuspid regurgitation. She has a past surgical history that includes Knee arthroscopy; Knee cartilage surgery; Lung lobectomy; sinus surgery; Bunionectomy; Partial hysterectomy; Total knee arthroplasty (Bilateral, 03/03/2015); Parathyroid gland surgery; Lung biopsy; Colonoscopy (10/2018); Breast surgery (Right); lumbar fusion (N/A, 07/29/2021); Abdomen surgery (N/A, 08/01/2021); Breast biopsy (Right, 2013); Esophagogastroduodenoscopy (2016); and Carpal tunnel release (Left, 10/11/2022). Her family history includes Breast Cancer in her sister and sister; COPD in her father and sister; Cancer in her brother and mother; Colon Cancer in her sister; Hearing Loss in her brother; Heart Disease in her brother, brother, father, mother, nephew, and sister; High Blood Pressure in her brother, brother, father, and mother. She reports that she quit smoking about 33 years ago. Her smoking use included cigarettes. She has a 5.00 pack-year smoking history. She has never used smokeless tobacco. She reports that she does not drink alcohol and does not use drugs.     Medications     Discharge Medication List as of 1/10/2023  3:01 PM        CONTINUE these medications which have NOT CHANGED    Details   gabapentin (NEURONTIN) 100 MG capsule TAKE 1 CAPSULE BY MOUTH TWICE DAILY, Disp-60 capsule, R-2Normal      loratadine (CLARITIN) 10 MG tablet TAKE 1 TABLET BY MOUTH DAILY, Disp-90 tablet, R-0Normal      losartan (COZAAR) 50 MG tablet TAKE 1 TABLET BY MOUTH EVERY DAY, Disp-90 tablet, R-0Normal      metoprolol succinate (TOPROL XL) 25 MG extended release tablet Take 1 tablet by mouth daily, Disp-30 tablet, R-1Adjust Sig      pantoprazole (PROTONIX) 40 MG tablet TAKE 1 TABLET BY MOUTH  DAILY, Disp-90 tablet, R-0NO PRINT      fluticasone (FLONASE) 50 MCG/ACT nasal spray 1 spray by Each Nostril route daily, Disp-16 g, R-3Normal      buPROPion (WELLBUTRIN XL) 150 MG extended release tablet TAKE 1 TABLET BY MOUTH EVERY MORNING, Disp-90 tablet, R-1Normal      aspirin EC 81 MG EC tablet Take 1 tablet by mouth daily, Disp-30 tablet, R-3OTC      Omega-3 Fatty Acids (FISH OIL) 1000 MG CAPS Take 2 capsules by mouth dailyOTC      pimecrolimus (ELIDEL) 1 % cream Apply topically 2 times daily. , Disp-30 g, R-0, Normal      clindamycin-benzoyl peroxide (BENZACLIN) 1-5 % gel Apply topically 2 times daily. , Disp-35 g, R-0, Normal      pravastatin (PRAVACHOL) 20 MG tablet TAKE 1 TABLET BY MOUTH EVERY DAY, Disp-90 tablet, R-3Normal      verapamil (CALAN SR) 120 MG extended release tablet TAKE 1 TABLET BY MOUTH NIGHTLY DO NOT CRUSH OR CHEW., Disp-90 tablet, R-3Normal      acetaminophen (TYLENOL) 500 MG tablet Take 500 mg by mouth every 6 hours as needed for PainHistorical Med      Multiple Vitamins-Minerals (CENTRUM SILVER PO) Take 1 tablet by mouth daily              Allergies     She is allergic to latex, reglan [metoclopramide hcl], univasc [moexipril], crestor [rosuvastatin], zoloft [sertraline], celecoxib, keflex [cephalexin], lipitor, metoclopramide, prochlorperazine, prochlorperazine edisylate, sulfa antibiotics, and vioxx.     Physical Exam     INITIAL VITALS: BP: (!) 161/94, Temp: 98.3 °F (36.8 °C), Heart Rate: 54, Resp: 18, SpO2: 100 %      General:  Well appearing. No acute distress    Eyes:  Pupils equal, round, reactive to light. No discharge from eyes    ENT:  No discharge from nose. OP clear. TMs normal with no erythema or opacification or effusion, no tenderness over the mastoid process      Neck:  Supple, trachea midline   Pulmonary:   Non-labored breathing. Breath sounds clear bilaterally   Cardiac:  Regular rate and rhythm. No rubs, murmurs, gallops   Abdomen:  Soft. Non-tender. Non-distended. Musculoskeletal:  No long bone deformity. Vascular:  Extremities warm and perfused. Normal pulses in all 4 extremities   Skin:  Dry, no rashes  Extremities:  No peripheral edema in bilateral lower extremities  Neuro:  Alert. Moves all four extremities to command. No focal deficit    DiagnosticResults     EKG   Interpreted in conjunction with emergencydepartment physician No att. providers found  Rhythm: normal sinus   Rate: 50-60  Axis: normal  Ectopy: none  Conduction: normal  ST Segments: no acute change  T Waves:no acute change  Q Waves: nonspecific  Clinical Impression: no acute changes and non-specific EKG    RADIOLOGY:  CT CHEST PULMONARY EMBOLISM W CONTRAST   Final Result   1. No pulmonary embolism. 2.  No acute cardiopulmonary process. 3.  Moderate hiatal hernia. CTA HEAD NECK W CONTRAST   Final Result      Neck   1. Tortuous carotid and vertebral arteries without stenosis      Head   1. No intracranial large vessel occlusion or significant stenosis   2. Left superior cerebellar artery 3 mm infundibulum or broad-based aneurysm   3. Fetal origin of left posterior cerebral artery with no visible left P1 segment      CT HEAD WO CONTRAST   Final Result      No acute intracranial pathology        Mild atrophy and small vessel ischemic changes      Hypodense lesion in the left ethmoid air cells, stable. Polypoid lesion not excluded         XR CHEST (2 VW)   Final Result   1.   No acute cardiopulmonary findings.           LABS:   Results for orders placed or performed during the hospital encounter of 01/10/23   COVID-19 & Influenza Combo    Specimen: Nasopharyngeal Swab   Result Value Ref Range    SARS-CoV-2 RNA, RT PCR NOT DETECTED NOT DETECTED    INFLUENZA A NOT DETECTED NOT DETECTED    INFLUENZA B NOT DETECTED NOT DETECTED   CBC with Auto Differential   Result Value Ref Range    WBC 7.3 4.0 - 11.0 K/uL    RBC 4.57 4.00 - 5.20 M/uL    Hemoglobin 13.9 12.0 - 16.0 g/dL    Hematocrit 41.0 36.0 - 48.0 %    MCV 89.7 80.0 - 100.0 fL    MCH 30.5 26.0 - 34.0 pg    MCHC 34.0 31.0 - 36.0 g/dL    RDW 13.5 12.4 - 15.4 %    Platelets 399 765 - 456 K/uL    MPV 7.0 5.0 - 10.5 fL    Neutrophils % 58.6 %    Lymphocytes % 31.6 %    Monocytes % 7.9 %    Eosinophils % 1.1 %    Basophils % 0.8 %    Neutrophils Absolute 4.3 1.7 - 7.7 K/uL    Lymphocytes Absolute 2.3 1.0 - 5.1 K/uL    Monocytes Absolute 0.6 0.0 - 1.3 K/uL    Eosinophils Absolute 0.1 0.0 - 0.6 K/uL    Basophils Absolute 0.1 0.0 - 0.2 K/uL   CMP w/ Reflex to MG   Result Value Ref Range    Sodium 138 136 - 145 mmol/L    Potassium reflex Magnesium 4.1 3.5 - 5.1 mmol/L    Chloride 100 99 - 110 mmol/L    CO2 26 21 - 32 mmol/L    Anion Gap 12 3 - 16    Glucose 90 70 - 99 mg/dL    BUN 18 7 - 20 mg/dL    Creatinine 0.5 (L) 0.6 - 1.2 mg/dL    Est, Glom Filt Rate >60 >60    Calcium 9.8 8.3 - 10.6 mg/dL    Total Protein 7.4 6.4 - 8.2 g/dL    Albumin 4.5 3.4 - 5.0 g/dL    Albumin/Globulin Ratio 1.6 1.1 - 2.2    Total Bilirubin 0.3 0.0 - 1.0 mg/dL    Alkaline Phosphatase 104 40 - 129 U/L    ALT 25 10 - 40 U/L    AST 19 15 - 37 U/L   Hepatic Function Panel   Result Value Ref Range    Bilirubin, Direct <0.2 0.0 - 0.3 mg/dL    Bilirubin, Indirect see below 0.0 - 1.0 mg/dL   Protime-INR   Result Value Ref Range    Protime 12.8 11.7 - 14.5 sec    INR 0.97 0.87 - 1.14   Lipase   Result Value Ref Range    Lipase 29.0 13.0 - 60.0 U/L   Urinalysis with Reflex to Culture Specimen: Urine   Result Value Ref Range    Color, UA Yellow Straw/Yellow    Clarity, UA Clear Clear    Glucose, Ur Negative Negative mg/dL    Bilirubin Urine Negative Negative    Ketones, Urine Negative Negative mg/dL    Specific Gravity, UA 1.010 1.005 - 1.030    Blood, Urine Negative Negative    pH, UA 6.5 5.0 - 8.0    Protein, UA Negative Negative mg/dL    Urobilinogen, Urine 0.2 <2.0 E.U./dL    Nitrite, Urine Negative Negative    Leukocyte Esterase, Urine TRACE (A) Negative    Microscopic Examination YES     Urine Type NotGiven     Urine Reflex to Culture Not Indicated    Brain Natriuretic Peptide   Result Value Ref Range    Pro-BNP 76 0 - 124 pg/mL   Troponin   Result Value Ref Range    Troponin <0.01 <0.01 ng/mL   D-Dimer, Quantitative   Result Value Ref Range    D-Dimer, Quant 0.61 (H) 0.00 - 0.60 ug/mL FEU   Hepatic Function Panel   Result Value Ref Range    Total Protein 7.0 6.4 - 8.2 g/dL    Albumin 4.3 3.4 - 5.0 g/dL    Alkaline Phosphatase 100 40 - 129 U/L    ALT 23 10 - 40 U/L    AST 20 15 - 37 U/L    Total Bilirubin <0.2 0.0 - 1.0 mg/dL    Bilirubin, Direct <0.2 0.0 - 0.3 mg/dL    Bilirubin, Indirect see below 0.0 - 1.0 mg/dL   Troponin   Result Value Ref Range    Troponin <0.01 <0.01 ng/mL   Microscopic Urinalysis   Result Value Ref Range    WBC, UA 0-2 0 - 5 /HPF    RBC, UA None seen 0 - 4 /HPF    Renal Epithelial, UA 0-1 0 - 1 /HPF    Amorphous, UA Rare /HPF   POCT Glucose   Result Value Ref Range    Glucose 95 mg/dL   POCT Glucose   Result Value Ref Range    POC Glucose 95 70 - 99 mg/dl    Performed on ACCU-CHEK    EKG 12 Lead   Result Value Ref Range    Ventricular Rate 54 BPM    Atrial Rate 54 BPM    P-R Interval 174 ms    QRS Duration 78 ms    Q-T Interval 438 ms    QTc Calculation (Bazett) 415 ms    P Axis 57 degrees    R Axis 29 degrees    T Axis 37 degrees    Diagnosis       EKG performed in ER and to be interpreted by ER physician. Confirmed by MD, ER ([id number]),  Nitin Bartholomew ([phone]) on 1/10/2023 12:32:24 PM       ED BEDSIDE ULTRASOUND:   n/a    RECENT VITALS:  BP: 130/63, Temp: 98.3 °F (36.8 °C), Heart Rate: 56,Resp: 18, SpO2: 99 %     Procedures      n/a    ED Course     Nursing Notes, Past Medical Hx, Past Surgical Hx, Social Hx, Allergies, and Family Hx were reviewed. The patient was given the followingmedications:  Orders Placed This Encounter   Medications    lactated ringers bolus    meclizine (ANTIVERT) tablet 25 mg    ondansetron (ZOFRAN) injection 4 mg    iopamidol (ISOVUE-370) 76 % injection 75 mL    iopamidol (ISOVUE-370) 76 % injection 75 mL    meclizine (ANTIVERT) 25 MG tablet     Sig: Take 1 tablet by mouth 3 times daily as needed for Dizziness     Dispense:  30 tablet     Refill:  0       CONSULTS:  None    MEDICAL DECISION MAKING / ASSESSMENT / PLAN     Nora Serna is a 79 y.o. female presenting for dizziness. This patient was also evaluated by the attending physician. All care plans werediscussed and agreed upon. Presenting for mixed resolution of peripheral and central vertigo that has more peripheral features in the setting of recent viral illness gastroenteritis, given meclizine with improvement in symptoms. I have lower suspicion for posterior circulation stroke, vertebral artery dissection, given the fact the patient has no focal neurologic deficits on my examination is able to walk independently and without ataxia. Union-Hallpike was positive as well. She has multiple risk factors with a moderate risk heart score regarding her chest pain but has had multiple negative investigations in the past, and has good follow-up with her primary care physician; we discussed following up for restratification testing on outpatient basis,. No evidence of acute myocardial fracture this point time; she is low risk pulmonary embolism, however given her prior history, D-dimer was performed with elevated now but no pulmonary embolism identified on CTPA.   Given improvement in symptoms, likely vestibular neuronitis or labyrinthitis in the setting of recent viral illness, felt that patient was appropriate for discharge and follow-up with her primary care physician, which she was agreeable to and was discharged stable condition. Notified of possible cerebral aneurysm on imaging and agrees to follow-up with neurosurgery. ED Course as of 01/10/23 1612   Tue Lul 10, 2023   1045 BP(!): 161/94 [LG]   1046 Heart Rate: 54 [LG]   1046 Resp: 18 [LG]   1046 SpO2: 100 % [LG]   1046 Temp: 98.3 °F (36.8 °C) [LG]   1048 LHC 5/21/2020: Normal coronaries, LVEDP 13 mmHg, LVEF normal.     Bilateral lower extremity arterial ultrasound 2/18/2021: Right mid SFA less than 50% stenosis     FLP 06/2022: , HDL 56, LDL 77, TG 86 on Pravachol 20 mg daily. Echo 11/2022: Moderate LVH, LVEF 60%, indeterminate diastolic function, normal RV, moderate LAE, normal valves. [LG]   1050 Vasculature:  Unremarkable. No abdominal aortic aneurysm. [LG]   1325 D-Dimer, Quant(!): 0.61 [LG]   1325 SARS-CoV-2 RNA, RT PCR: NOT DETECTED [LG]   1325 INFLUENZA A: NOT DETECTED [LG]   1325 INFLUENZA B: NOT DETECTED [LG]   1342 Hypodense lesion in the left ethmoid air cells, stable. Polypoid lesion not excluded    [LG]   1400 IMPRESSION:  1. No pulmonary embolism. 2.  No acute cardiopulmonary process. 3.  Moderate hiatal hernia. [LG]   1427 2. Left superior cerebellar artery 3 mm infundibulum or broad-based aneurysm [LG]      ED Course User Index  [LG] Katt Schuster MD         Medical Decision Making  Amount and/or Complexity of Data Reviewed  External Data Reviewed: labs, radiology, ECG and notes. Labs: ordered. Decision-making details documented in ED Course. Radiology: ordered. ECG/medicine tests: ordered. Risk  Prescription drug management. Clinical Impression     1. Dizziness    2.  Cerebral aneurysm        Disposition     PATIENT REFERRED TO:  Colton Doan MD  [address] [place] [address] [address] [facility]  [phone]    Schedule an appointment as soon as possible for a visit in 3 days      [facility]  [place] [address]  [phone]  Schedule an appointment as soon as possible for a visit in 2 weeks  For follow up of possible aneurysm    DISCHARGE MEDICATIONS:  Discharge Medication List as of 1/10/2023  3:01 PM        START taking these medications    Details   meclizine (ANTIVERT) 25 MG tablet Take 1 tablet by mouth 3 times daily as needed for Dizziness, Disp-30 tablet, R-0Normal             DISPOSITION Decision To Discharge 01/10/2023 02:47:35 PM      Corwin Goodpasture, MD    PGY-3, [facility]   Emergency Medicine       Katy Valle MD  Resident  01/10/23 [id number]

## 2023-01-10 NOTE — TELEPHONE ENCOUNTER
Patient scheduled for an ED FU on Monday 1/16/23 @ 4:30pm.  Patient seen at [facility] on 1/10/23 for Vertigo and discharged the same day.      ED Provider Notes Rober Samayoa MD): Schedule an appointment as soon as possible for a visit in 3 days

## 2023-01-10 NOTE — ED NOTES
Pt understands that Meclizine given at 1254 can cause CNS depression/drowsiness and is not to drive home.  Pt reports that she understands education and pt has ride home from  per pt      Raul Acosta RN  01/10/23 [id number]

## 2023-01-10 NOTE — Clinical Note
Nolan Beltre was seen and treated in our emergency department on 1/10/2023. She may return to work on 01/13/2023. If you have any questions or concerns, please don't hesitate to call.       Faith Drummond MD

## 2023-01-10 NOTE — ED PROVIDER NOTES
ED Attending Attestation Note     Date of evaluation: 1/10/2023    This patient was seen by the resident.  I have seen and examined the patient, agree with the workup, evaluation, management and diagnosis. The care plan has been discussed.  I have reviewed the ECG and concur with the resident's interpretation.  My assessment reveals a well-appearing female looks younger than stated age who presents to the emergency room today complaining of headache sinus congestion and some dizziness/imbalance.  She states this is worse with positional changes, better with sitting and rest.  On exam she has full extraocular movements which are conjugate, no appreciable nystagmus, full visual field testing bilaterally to confrontation.  She is ambulatory with a normal narrow based gait, no difficulties whatsoever with finger-nose-finger testing bilaterally.     Jeremy Gonsalez MD  01/10/23 1153

## 2023-01-10 NOTE — DISCHARGE INSTRUCTIONS
If you have further dizziness, vision changes, numbness tingling or weakness, immediately return to the emergency department. If you have chest pain that persists, return to the emergency department for further work-up. We recommend you follow-up with your primary care physician within a few days for reassessment.

## 2023-01-12 DIAGNOSIS — I10 ESSENTIAL HYPERTENSION: ICD-10-CM

## 2023-01-12 RX ORDER — LOSARTAN POTASSIUM 50 MG/1
TABLET ORAL
Qty: 90 TABLET | Refills: 0 | Status: SHIPPED | OUTPATIENT
Start: 2023-01-12

## 2023-01-13 DIAGNOSIS — F32.4 MAJOR DEPRESSIVE DISORDER WITH SINGLE EPISODE, IN PARTIAL REMISSION (HCC): ICD-10-CM

## 2023-01-13 RX ORDER — BUPROPION HYDROCHLORIDE 150 MG/1
150 TABLET ORAL EVERY MORNING
Qty: 90 TABLET | Refills: 1 | Status: SHIPPED | OUTPATIENT
Start: 2023-01-13

## 2023-01-16 ENCOUNTER — OFFICE VISIT (OUTPATIENT)
Dept: INTERNAL MEDICINE CLINIC | Age: 68
End: 2023-01-16
Payer: MEDICARE

## 2023-01-16 VITALS
DIASTOLIC BLOOD PRESSURE: 64 MMHG | OXYGEN SATURATION: 96 % | BODY MASS INDEX: 43.44 KG/M2 | WEIGHT: 245.2 LBS | SYSTOLIC BLOOD PRESSURE: 132 MMHG | HEART RATE: 67 BPM

## 2023-01-16 DIAGNOSIS — K44.9 HIATAL HERNIA: ICD-10-CM

## 2023-01-16 DIAGNOSIS — G44.039 EPISODIC PAROXYSMAL HEMICRANIA, NOT INTRACTABLE: Primary | ICD-10-CM

## 2023-01-16 DIAGNOSIS — I82.409 RECURRENT ACUTE DEEP VEIN THROMBOSIS (DVT) OF LOWER EXTREMITY, UNSPECIFIED LATERALITY (HCC): ICD-10-CM

## 2023-01-16 DIAGNOSIS — K21.9 GASTROESOPHAGEAL REFLUX DISEASE, UNSPECIFIED WHETHER ESOPHAGITIS PRESENT: ICD-10-CM

## 2023-01-16 DIAGNOSIS — E66.01 OBESITY, CLASS III, BMI 40-49.9 (MORBID OBESITY) (HCC): ICD-10-CM

## 2023-01-16 DIAGNOSIS — F32.4 MAJOR DEPRESSIVE DISORDER WITH SINGLE EPISODE, IN PARTIAL REMISSION (HCC): ICD-10-CM

## 2023-01-16 DIAGNOSIS — I47.1 ATRIAL TACHYCARDIA (HCC): ICD-10-CM

## 2023-01-16 PROCEDURE — 3074F SYST BP LT 130 MM HG: CPT | Performed by: INTERNAL MEDICINE

## 2023-01-16 PROCEDURE — G8427 DOCREV CUR MEDS BY ELIG CLIN: HCPCS | Performed by: INTERNAL MEDICINE

## 2023-01-16 PROCEDURE — G8417 CALC BMI ABV UP PARAM F/U: HCPCS | Performed by: INTERNAL MEDICINE

## 2023-01-16 PROCEDURE — 1090F PRES/ABSN URINE INCON ASSESS: CPT | Performed by: INTERNAL MEDICINE

## 2023-01-16 PROCEDURE — G8399 PT W/DXA RESULTS DOCUMENT: HCPCS | Performed by: INTERNAL MEDICINE

## 2023-01-16 PROCEDURE — 3078F DIAST BP <80 MM HG: CPT | Performed by: INTERNAL MEDICINE

## 2023-01-16 PROCEDURE — 3017F COLORECTAL CA SCREEN DOC REV: CPT | Performed by: INTERNAL MEDICINE

## 2023-01-16 PROCEDURE — 99215 OFFICE O/P EST HI 40 MIN: CPT | Performed by: INTERNAL MEDICINE

## 2023-01-16 PROCEDURE — 1123F ACP DISCUSS/DSCN MKR DOCD: CPT | Performed by: INTERNAL MEDICINE

## 2023-01-16 PROCEDURE — 1036F TOBACCO NON-USER: CPT | Performed by: INTERNAL MEDICINE

## 2023-01-16 PROCEDURE — G8484 FLU IMMUNIZE NO ADMIN: HCPCS | Performed by: INTERNAL MEDICINE

## 2023-01-16 RX ORDER — GABAPENTIN 100 MG/1
200 CAPSULE ORAL 2 TIMES DAILY
Qty: 120 CAPSULE | Refills: 2 | Status: SHIPPED | OUTPATIENT
Start: 2023-01-16 | End: 2023-02-15

## 2023-01-16 RX ORDER — PANTOPRAZOLE SODIUM 40 MG/1
TABLET, DELAYED RELEASE ORAL
Qty: 180 TABLET | Refills: 0 | Status: SHIPPED | OUTPATIENT
Start: 2023-01-16

## 2023-01-16 ASSESSMENT — PATIENT HEALTH QUESTIONNAIRE - PHQ9
7. TROUBLE CONCENTRATING ON THINGS, SUCH AS READING THE NEWSPAPER OR WATCHING TELEVISION: 0
1. LITTLE INTEREST OR PLEASURE IN DOING THINGS: 0
SUM OF ALL RESPONSES TO PHQ QUESTIONS 1-9: 1
9. THOUGHTS THAT YOU WOULD BE BETTER OFF DEAD, OR OF HURTING YOURSELF: 0
SUM OF ALL RESPONSES TO PHQ QUESTIONS 1-9: 1
SUM OF ALL RESPONSES TO PHQ9 QUESTIONS 1 & 2: 0
2. FEELING DOWN, DEPRESSED OR HOPELESS: 0
4. FEELING TIRED OR HAVING LITTLE ENERGY: 0
5. POOR APPETITE OR OVEREATING: 0
6. FEELING BAD ABOUT YOURSELF - OR THAT YOU ARE A FAILURE OR HAVE LET YOURSELF OR YOUR FAMILY DOWN: 0
SUM OF ALL RESPONSES TO PHQ QUESTIONS 1-9: 1
10. IF YOU CHECKED OFF ANY PROBLEMS, HOW DIFFICULT HAVE THESE PROBLEMS MADE IT FOR YOU TO DO YOUR WORK, TAKE CARE OF THINGS AT HOME, OR GET ALONG WITH OTHER PEOPLE: 0
8. MOVING OR SPEAKING SO SLOWLY THAT OTHER PEOPLE COULD HAVE NOTICED. OR THE OPPOSITE, BEING SO FIGETY OR RESTLESS THAT YOU HAVE BEEN MOVING AROUND A LOT MORE THAN USUAL: 0
3. TROUBLE FALLING OR STAYING ASLEEP: 1
SUM OF ALL RESPONSES TO PHQ QUESTIONS 1-9: 1

## 2023-01-16 NOTE — Clinical Note
Pls ask radiology to put addendum on head ct from 1/10/23 to comment on the skull lucencies noted on 8/22 head ct. Have they changed or resolved?

## 2023-01-16 NOTE — PATIENT INSTRUCTIONS
Make sure neurosurgeon sends me a note    Call dr Raymond Carrasco to see if head ct from er can substitute for sinus ct    Increase to gabapentin 200 mg 2x/day or 100 mg 3x/day - headaches, flank pain and anxiety but call if symptoms persist    Increase protonix to 2x/day  Call dr Delmy Bates- due repeat egd     For now, hold off on doxycycline for rosacea    Add back flonase at least a few days per week for post nasal drip

## 2023-01-16 NOTE — PROGRESS NOTES
Sima Abel (:  1955) is a 79 y.o. female, here for evaluation of the following chief complaint(s):    Follow-Up from Hospital (Still having come and go dizzy spells )      ASSESSMENT/PLAN:  1. Episodic paroxysmal hemicrania, not intractable  -     for the headache, advised patient to increase her gabapentin to 200 mg 2x/day. Call if causes increased fatigue. With the finding of aneurysm on head CT, she will be seeing [facility]. gabapentin (NEURONTIN) 100 MG capsule; Take 2 capsules by mouth 2 times daily for 30 days. , Disp-120 capsule, R-2Normal  2. Hiatal hernia  Noted on chest ct  -     Carolina Padilla MD, Gastroenterology, [place]  3. Gastroesophageal reflux disease, unspecified whether esophagitis present  -  due to continued symptoms, increase to   pantoprazole (PROTONIX) 40 MG tablet; TAKE 1 TABLET BY MOUTH  twice DAILY, Disp-180 tablet, R-0Normal  4. Obesity, Class III, BMI 40-49.9 (morbid obesity) ([address]  Counseled on weight loss  5. Major depressive disorder with single episode, 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HCC)  Reasonably controlled on Wellbutrin  6. Recurrent acute deep vein thrombosis (DVT) of lower extremity, unspecified laterality ([address]  She is off Eliquis, having completed therapy  7. Atrial tachycardia (HCC)  Controlled on metoprolol and verapamil    Hepatomegaly  -    noted on  US LIVER  Opacification of ethmoid sinus  (Noted on head ct) - head ct showed stable findings. Has seen dr Valentino Riling     Hyperlipidemia, unspecified hyperlipidemia type  Taking pravastatin. For PAD, continue low dose aspirin  -  Essential hypertension and PSVT  Controlled on losartan and metoprolol and verapamil       MGUS- est'd Hematology. Return in about 3 months (around 2023) for awv. SUBJECTIVE/OBJECTIVE:  HPI  Patient is here to follow-up her emergency room visit on January 10 for dizziness. She states the dizziness has resolved but she continues to have headaches.   They go from the top of her head down to her eyes. She feels a constant ache in her eyes and sometimes a sharp pain. Tylenol tends to ease the pain and she has been taking it twice daily. A nap also helps. She states she just saw the eye doctor and that she had normal pressures. She feels as if she had a recent sinus infection last month but was negative for COVID. She did have a bad cough at the time and still has some postnasal drip. She wonders if this may have affected her in her ear. With the imaging she obtained in the emergency room, she was found to have cerebral aneurysm. She has an appointment scheduled at [address] [place] [address] with  [facility]. The scans did not show any growth in the ethmoid sinus that she was previously aware of. There was no mention of skull lucencies that had previously been noted. She wonders if the stress of caring for her  is contributing. She does have a history of migraine headaches. She states her eye doctor gave her some doxycycline for rosacea. She has had an upset stomach and wonders if she should take it. She has an upcoming EGD. She continues with right flank pain. We discussed that she has an enlarged liver which she was told was due to fatty liver. She states the discomfort is in the area where she had shingles.     Review of Systems    Past Medical History:   Diagnosis Date    Allergic rhinitis     Anxiety     Ascending aorta dilatation (HCC)     Atrial tachycardia ([address]     dr Rodriguez Gloss (wnl cors)    Carpal tunnel syndrome     Cervicalgia     Chronic back pain     low    Depression     DVT (deep venous thrombosis) ([address] 2021    after surgery, bilateral    Fatty liver     GERD (gastroesophageal reflux disease)     has schatzki ring    Headache(784.0)     hemiplegic migraines, improved    Hernia hiatal     Histoplasmosis     Hot flashes     takes wellbutrin    Hyperlipidemia     Hypertension     MGUS (monoclonal gammopathy of unknown significance)     Obesity Osteoarthritis     multiple joints    Other partial intestinal obstruction ([address] 2021    wound dehiscence    PONV (postoperative nausea and vomiting)     PVD (peripheral vascular disease) (HCC)     right darian reduced on screening    Shingles     nerve pain in her low back persists    Sleep apnea     CPAP set of 4    Tricuspid regurgitation     moderate       Current Outpatient Medications   Medication Sig Dispense Refill    pantoprazole (PROTONIX) 40 MG tablet TAKE 1 TABLET BY MOUTH  twice DAILY 180 tablet 0    gabapentin (NEURONTIN) 100 MG capsule Take 2 capsules by mouth 2 times daily for 30 days. 120 capsule 2    buPROPion (WELLBUTRIN XL) 150 MG extended release tablet TAKE 1 TABLET BY MOUTH EVERY MORNING 90 tablet 1    losartan (COZAAR) 50 MG tablet TAKE 1 TABLET BY MOUTH EVERY DAY 90 tablet 0    loratadine (CLARITIN) 10 MG tablet TAKE 1 TABLET BY MOUTH DAILY 90 tablet 0    metoprolol succinate (TOPROL XL) 25 MG extended release tablet Take 1 tablet by mouth daily 30 tablet 1    aspirin EC 81 MG EC tablet Take 1 tablet by mouth daily 30 tablet 3    Omega-3 Fatty Acids (FISH OIL) 1000 MG CAPS Take 2 capsules by mouth daily      pimecrolimus (ELIDEL) 1 % cream Apply topically 2 times daily. 30 g 0    pravastatin (PRAVACHOL) 20 MG tablet TAKE 1 TABLET BY MOUTH EVERY DAY 90 tablet 3    verapamil (CALAN SR) 120 MG extended release tablet TAKE 1 TABLET BY MOUTH NIGHTLY DO NOT CRUSH OR CHEW. 90 tablet 3    acetaminophen (TYLENOL) 500 MG tablet Take 500 mg by mouth every 6 hours as needed for Pain      Multiple Vitamins-Minerals (CENTRUM SILVER PO) Take 1 tablet by mouth daily       meclizine (ANTIVERT) 25 MG tablet Take 1 tablet by mouth 3 times daily as needed for Dizziness 30 tablet 0    fluticasone (FLONASE) 50 MCG/ACT nasal spray 1 spray by Each Nostril route daily 16 g 3    clindamycin-benzoyl peroxide (BENZACLIN) 1-5 % gel Apply topically 2 times daily.  35 g 0     No current facility-administered medications for this visit. Physical Exam  Vitals reviewed. Constitutional:       General: She is not in acute distress. HENT:      Head: Normocephalic and atraumatic. Cardiovascular:      Rate and Rhythm: Normal rate and regular rhythm. Pulmonary:      Effort: Pulmonary effort is normal.      Breath sounds: Normal breath sounds. Abdominal:      General: Abdomen is flat. Bowel sounds are normal.      Tenderness: There is no abdominal tenderness. Neurological:      General: No focal deficit present. Mental Status: She is alert and oriented to person, place, and time. Gait: Gait normal.   Psychiatric:         Mood and Affect: Mood normal.         On this date 1/16/2023 I have spent 40 minutes reviewing previous notes, test results and face to face with the patient discussing the diagnosis and importance of compliance with the treatment plan as well as documenting on the day of the visit. This note was generated completely or in part utilizing Dragon dictation speech recognition software. Occasionally, words are mistranscribed and despite editing, the text may contain inaccuracies due to incorrect word recognition. If further clarification is needed please contact the office at [phone]          An electronic signature was used to authenticate this note.     --Abby Moreira MD

## 2023-01-19 ENCOUNTER — TELEPHONE (OUTPATIENT)
Dept: ENT CLINIC | Age: 68
End: 2023-01-19

## 2023-01-19 NOTE — TELEPHONE ENCOUNTER
Patient calling:  She just had a CT scan on 1/10 of head and neck (ordered by her pcp and done at [facility] [place]) Asking if this would be adequate for what Dr. Claudia Tiwari needs. Please advise patient.

## 2023-01-25 ENCOUNTER — TELEPHONE (OUTPATIENT)
Dept: INTERNAL MEDICINE CLINIC | Age: 68
End: 2023-01-25

## 2023-01-25 NOTE — TELEPHONE ENCOUNTER
The radiologist was advised to make ab addendum on head ct from 1/10/23 to comment on the skull lucencies noted on 8/22 head ct. Have they changed or resolved?

## 2023-01-26 ENCOUNTER — OFFICE VISIT (OUTPATIENT)
Dept: ENT CLINIC | Age: 68
End: 2023-01-26
Payer: MEDICARE

## 2023-01-26 VITALS
BODY MASS INDEX: 44.47 KG/M2 | HEART RATE: 57 BPM | TEMPERATURE: 96.8 F | HEIGHT: 63 IN | DIASTOLIC BLOOD PRESSURE: 88 MMHG | WEIGHT: 251 LBS | SYSTOLIC BLOOD PRESSURE: 152 MMHG

## 2023-01-26 DIAGNOSIS — J34.1 MUCOCELE OF ETHMOID SINUS: Primary | ICD-10-CM

## 2023-01-26 PROCEDURE — 99213 OFFICE O/P EST LOW 20 MIN: CPT | Performed by: OTOLARYNGOLOGY

## 2023-01-26 PROCEDURE — 1036F TOBACCO NON-USER: CPT | Performed by: OTOLARYNGOLOGY

## 2023-01-26 PROCEDURE — G8484 FLU IMMUNIZE NO ADMIN: HCPCS | Performed by: OTOLARYNGOLOGY

## 2023-01-26 PROCEDURE — 3017F COLORECTAL CA SCREEN DOC REV: CPT | Performed by: OTOLARYNGOLOGY

## 2023-01-26 PROCEDURE — 1090F PRES/ABSN URINE INCON ASSESS: CPT | Performed by: OTOLARYNGOLOGY

## 2023-01-26 PROCEDURE — G8417 CALC BMI ABV UP PARAM F/U: HCPCS | Performed by: OTOLARYNGOLOGY

## 2023-01-26 PROCEDURE — G8399 PT W/DXA RESULTS DOCUMENT: HCPCS | Performed by: OTOLARYNGOLOGY

## 2023-01-26 PROCEDURE — 3079F DIAST BP 80-89 MM HG: CPT | Performed by: OTOLARYNGOLOGY

## 2023-01-26 PROCEDURE — G8427 DOCREV CUR MEDS BY ELIG CLIN: HCPCS | Performed by: OTOLARYNGOLOGY

## 2023-01-26 PROCEDURE — 3077F SYST BP >= 140 MM HG: CPT | Performed by: OTOLARYNGOLOGY

## 2023-01-26 PROCEDURE — 1123F ACP DISCUSS/DSCN MKR DOCD: CPT | Performed by: OTOLARYNGOLOGY

## 2023-01-26 NOTE — PROGRESS NOTES
Subjective:      Patient ID: Justin Bryan is a 79 y.o. female. HPI  Chief Complaint   Patient presents with    Image finding  History of Present Illness:Laly is a(n) 79 y.o. female who presents with a blocked left posterior ethmoid cell on CT head for other pathology. Has a remote history of sinus surgery. Currently no sinus complaints     Here for one year follow up. Had CT performed on 1-10-23. Personally reviewed and interpreted for sinus disease. Stable left posterior blocked ethmoid cell. No interval change. Not related to visual changes or dizziness. Does have small Aneurysm in cerebellum. Seeing [place]. Patient Active Problem List   Diagnosis    Vitamin D deficiency    Generalized osteoarthrosis, involving multiple sites    Chronic low back pain    Hemiplegic migraine    Allergic rhinitis    GERD (gastroesophageal reflux disease)    Degenerative disc disease, lumbar    Essential hypertension    Major depressive disorder with single episode, 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[facility])    Mixed hyperlipidemia    Rosacea    Shingles (herpes zoster) polyneuropathy    Palpitations    Atrial tachycardia (HCC)    Paresthesia    PAD (peripheral artery disease) ([facility])    Chest pain    Morbidly obese ([facility])    Epigastric discomfort    Chronic back pain    Lumbar stenosis with neurogenic claudication    Dehiscence of fascia    Ileus ([facility])    S/P lumbar and lumbosacral fusion by anterior technique    Morbid obesity with BMI of 45.0-49.9, adult [facility])     Past Surgical History:   Procedure Laterality Date    ABDOMEN SURGERY N/A 08/01/2021    REPAIR OF WOUND DEHISCENCE performed by Shantell Horvath MD at [facility] Right 2013    benign    BREAST SURGERY Right     Biopsy w/clip    BUNIONECTOMY      right     COLONOSCOPY  10/2018    fu 10 yrs    ESOPHAGOGASTRODUODENOSCOPY  2016    ?     KNEE ARTHROSCOPY      KNEE CARTILAGE SURGERY      LOBECTOMY      partial right lung lobectomy    LUMBAR FUSION N/A 07/29/2021    L4-S1 ANTERIOR LUMBAR INTERBODY FUSION WITH PEDICLE SCREW FIXATION performed by [place].  Silvano Restrepo MD at [address] (CERVIX NOT REMOVED)      ovaries retained    SINUS SURGERY      TOTAL KNEE ARTHROPLASTY Bilateral 2015     Family History   Problem Relation Age of Onset    High Blood Pressure Mother     Heart Disease Mother     Cancer Mother         vaginal    High Blood Pressure Father     Heart Disease Father     Heart Disease Sister     Colon Cancer Sister     Heart Disease Brother     High Blood Pressure Brother     Cancer Brother         oral    Breast Cancer Sister         52+    COPD Sister     Breast Cancer Sister         50+    Heart Disease Brother     Hearing Loss Brother         chf and transplant    High Blood Pressure Brother      Social History     Socioeconomic History    Marital status:      Spouse name: Not on file    Number of children: 2    Years of education: Not on file    Highest education level: Not on file   Occupational History    Occupation: , retired/disabled   Tobacco Use    Smoking status: Former     Packs/day: 1.00     Years: 5.00     Pack years: 5.00     Types: Cigarettes     Quit date: 1990     Years since quittin.6    Smokeless tobacco: Never   Vaping Use    Vaping Use: Never used   Substance and Sexual Activity    Alcohol use: No    Drug use: No    Sexual activity: Not on file   Other Topics Concern    Not on file   Social History Narrative    2 kids are 39, 39 5/21    Lives w      Social Determinants of Health     Financial Resource Strain: Low Risk     Difficulty of Paying Living Expenses: Not hard at all   Food Insecurity: No Food Insecurity    Worried About Running Out of Food in the Last Year: Never true    Ran Out of Food in the Last Year: Never true   Transportation Needs: Not on file   Physical Activity: Not on file   Stress: Not on file   Social Connections: Not on file Intimate Partner Violence: Not on file   Housing Stability: Not on file       DRUG/FOOD ALLERGIES: Latex, Reglan [metoclopramide hcl], Univasc [moexipril], Crestor [rosuvastatin], Zoloft [sertraline], Celecoxib, Keflex [cephalexin], Lipitor, Metoclopramide, Prochlorperazine, Prochlorperazine edisylate, Sulfa antibiotics, and Vioxx    CURRENT MEDICATIONS  Prior to Admission medications    Medication Sig Start Date End Date Taking? Authorizing Provider   fluticasone (FLONASE) 50 MCG/ACT nasal spray 1 spray by Each Nostril route daily 8/26/22  Yes Whit Trujillo MD   buPROPion (WELLBUTRIN XL) 150 MG extended release tablet TAKE 1 TABLET BY MOUTH EVERY MORNING 7/5/22  Yes Whit Trujillo MD   aspirin EC 81 MG EC tablet Take 1 tablet by mouth daily 4/14/22  Yes Tammy Andrade MD   Omega-3 Fatty Acids (FISH OIL) 1000 MG CAPS Take 2 capsules by mouth daily 4/14/22  Yes Whit Trujillo MD   loratadine (CLARITIN) 10 MG tablet Take 10 mg by mouth daily 3/9/22  Yes Historical Provider, MD   pimecrolimus (ELIDEL) 1 % cream Apply topically 2 times daily. 4/14/22  Yes Whit Trujillo MD   clindamycin-benzoyl peroxide (BENZACLIN) 1-5 % gel Apply topically 2 times daily.  4/14/22  Yes Whit Trujillo MD   pravastatin (PRAVACHOL) 20 MG tablet TAKE 1 TABLET BY MOUTH EVERY DAY 4/6/22  Yes Diane Messer MD   pantoprazole (PROTONIX) 40 MG tablet TAKE 1 TABLET BY MOUTH TWICE A DAY 3/10/22  Yes Whit Trujillo MD   verapamil (CALAN SR) 120 MG extended release tablet TAKE 1 TABLET BY MOUTH NIGHTLY DO NOT CRUSH OR CHEW. 12/30/21  Yes LISA Kam - CNP   acetaminophen (TYLENOL) 500 MG tablet Take 500 mg by mouth every 6 hours as needed for Pain   Yes Historical Provider, MD   Multiple Vitamins-Minerals (CENTRUM SILVER PO) Take 1 tablet by mouth daily    Yes Historical Provider, MD   gabapentin (NEURONTIN) 100 MG capsule TAKE 1 CAPSULE BY MOUTH TWICE DAILY 6/15/22 8/26/22  Preston Dean MD   losartan (COZAAR) 50 mg tablet TAKE 1 TABLET BY MOUTH DAILY 6/9/22 8/26/22  Preston Dean MD   metoprolol succinate (TOPROL XL) 25 MG extended release tablet Take 1 tablet by mouth daily 5/28/21 8/26/22  Preston Dean MD         Lab Studies:  Lab Results   Component Value Date    WBC 7.2 08/23/2022    HGB 13.4 08/23/2022    HCT 40.7 08/23/2022    MCV 89.8 08/23/2022     08/23/2022     Lab Results   Component Value Date    GLUCOSE 115 (H) 08/23/2022    BUN 12 08/23/2022    CREATININE 0.6 08/23/2022    K 5.1 08/23/2022     08/23/2022     08/23/2022    CALCIUM 9.9 08/23/2022     Lab Results   Component Value Date    MG 2.00 08/23/2022     Lab Results   Component Value Date    PHOS 3.8 08/07/2021     Lab Results   Component Value Date    ALKPHOS 97 08/23/2022    ALT 21 08/23/2022    AST 25 08/23/2022    BILITOT <0.2 08/23/2022    PROT 7.2 08/23/2022      Review of Systems   Constitutional:  Negative for activity change, appetite change, chills, fatigue and fever. HENT:  Negative for congestion, ear discharge, ear pain, facial swelling, hearing loss, nosebleeds, postnasal drip, rhinorrhea, sinus pressure, sinus pain, sneezing, sore throat, tinnitus, trouble swallowing and voice change. Eyes:  Negative for pain, redness, itching and visual disturbance. Respiratory:  Negative for cough, choking and shortness of breath. Gastrointestinal:  Negative for diarrhea and nausea. Endocrine: Negative for cold intolerance and heat intolerance. Objective:   Physical Exam  Constitutional:       Appearance: She is well-developed. HENT:      Head: Not macrocephalic and not microcephalic. No Kiser's sign, abrasion, right periorbital erythema, left periorbital erythema or laceration. Nose: No nasal deformity, septal deviation, laceration, mucosal edema or rhinorrhea. Right Nostril: No epistaxis or occlusion. Left Nostril: No epistaxis or occlusion. Right Turbinates: Not enlarged, swollen or pale. Left Turbinates: Not enlarged, swollen or pale. Right Sinus: No maxillary sinus tenderness or frontal sinus tenderness. Left Sinus: No maxillary sinus tenderness or frontal sinus tenderness. Mouth/Throat:      Lips: No lesions. Mouth: Mucous membranes are moist.      Tongue: No lesions. Palate: No mass. Pharynx: Uvula midline. No oropharyngeal exudate or posterior oropharyngeal erythema. Tonsils: No tonsillar abscesses. Eyes:      General:         Right eye: No discharge. Left eye: No discharge. Extraocular Movements:      Right eye: Normal extraocular motion. Left eye: Normal extraocular motion. Conjunctiva/sclera:      Right eye: Right conjunctiva is not injected. No chemosis or exudate. Left eye: Left conjunctiva is not injected. No chemosis or exudate. Neck:      Thyroid: No thyroid mass or thyromegaly. Cardiovascular:      Rate and Rhythm: Normal rate and regular rhythm. Pulmonary:      Effort: No tachypnea or respiratory distress. Lymphadenopathy:      Head:      Right side of head: No preauricular or posterior auricular adenopathy. Left side of head: No preauricular or posterior auricular adenopathy. Cervical:      Right cervical: No superficial, deep or posterior cervical adenopathy. Left cervical: No superficial, deep or posterior cervical adenopathy. Neurological:      Mental Status: She is alert and oriented to person, place, and time. Narrative   EXAM: CT HEAD WO CONTRAST       INDICATION: Monoclonal gammopathy;       COMPARISON: Bone survey radiographs 7/21/2022; CT 1/7/2022       TECHNICAL: Axial CT imaging obtained from vertex to skull base. Axial images and multiplanar reformatted images reviewed.           CT radiation dose optimization techniques (automated exposure control, and use of iterative reconstruction techniques, or adjustment of the mA and/or kV according to patient size) were used to limit patient radiation dose. IV Contrast: None. FINDINGS:       INTRACRANIAL HEMORRHAGE: None. VENTRICLES: Normal in size and configuration for age. BRAIN PARENCHYMA: There is a mild-to-moderate amount of small areas of patchy hypodensity involving the cerebral subcortical white matter without evidence for significant change compared to January 2022. SKULL: No evidence for pathologic lucency involving the skull. 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 A 6 mm lucency involving the right frontal skull corresponds to the radiographic lucency on the prior bone    survey and was present in 2007. PARANASAL SINUSES AND MASTOIDS: No acute sinusitis or mastoiditis. There is stable heterogeneous opacification of left posterior ethmoid air cell without evidence for bone destruction, which could represent complex mucous retention cysts, mucocele,    inspissated secretions with chronic focal sinusitis or superimposed noninvasive fungal sinusitis. ORBITS: Normal.       EXTRACRANIAL SOFT TISSUES: Normal.           Impression       1. No intracranial hemorrhage, mass effect or large acute territorial infarction   2. Moderate cerebral white matter disease is nonspecific, but most commonly chronic small vessel ischemia. No significant change compared to 1/7/2022.   3. 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 Impression       No acute intracranial pathology         Mild atrophy and small vessel ischemic changes       Hypodense lesion in the left ethmoid air cells, stable.  Polypoid lesion not excluded Assessment:       Diagnosis Orders   1. Mucocele of ethmoid sinus                  Plan:      Stable left ethmoid blocked posterior cell. No evidence of destruction. Follow up in one year. Personally reviewed and interpreted imaging. Stable.              Ishmael Villafuerte MD

## 2023-02-06 ENCOUNTER — TELEPHONE (OUTPATIENT)
Dept: INTERNAL MEDICINE CLINIC | Age: 68
End: 2023-02-06

## 2023-02-06 NOTE — TELEPHONE ENCOUNTER
Left a message for patient, I responded to her "[employer] message asking her to do an e visit. Dr. Jeny Bravo is out of the office today. I asked her to call back if she needed help. If she can not complete this, we need more information.

## 2023-02-06 NOTE — TELEPHONE ENCOUNTER
Patient called back and states she was not able to complete an e-visit today. She tested positive for COVID this AM and has had the following sxs since yesterday morning:  Bad Cough (feels like it is productive but nothing comes up)  HA  MA  Head/chest congestion  Oxygen at 95 this AM (patient denied SOB or difficulty breathing)  Some diarrhea      Patient states she feels like she has a bad cold. Can patient be added for VV today or tomorrow? Please advise.

## 2023-02-06 NOTE — TELEPHONE ENCOUNTER
Patient sent [employer] message earlier this morning because she tested positive for  COVID.  came down with it Friday and was  admitted to hospital.  She woke with a headache, bad cough, runny nose. Please call to  discuss how she should proceed at number provided.

## 2023-02-07 ENCOUNTER — TELEMEDICINE (OUTPATIENT)
Dept: INTERNAL MEDICINE CLINIC | Age: 68
End: 2023-02-07

## 2023-02-07 DIAGNOSIS — U07.1 COVID-19: Primary | ICD-10-CM

## 2023-02-07 SDOH — ECONOMIC STABILITY: HOUSING INSECURITY
IN THE LAST 12 MONTHS, WAS THERE A TIME WHEN YOU DID NOT HAVE A STEADY PLACE TO SLEEP OR SLEPT IN A SHELTER (INCLUDING NOW)?: NO

## 2023-02-07 SDOH — ECONOMIC STABILITY: INCOME INSECURITY: HOW HARD IS IT FOR YOU TO PAY FOR THE VERY BASICS LIKE FOOD, HOUSING, MEDICAL CARE, AND HEATING?: NOT VERY HARD

## 2023-02-07 SDOH — ECONOMIC STABILITY: FOOD INSECURITY: WITHIN THE PAST 12 MONTHS, THE FOOD YOU BOUGHT JUST DIDN'T LAST AND YOU DIDN'T HAVE MONEY TO GET MORE.: SOMETIMES TRUE

## 2023-02-07 SDOH — ECONOMIC STABILITY: TRANSPORTATION INSECURITY
IN THE PAST 12 MONTHS, HAS LACK OF TRANSPORTATION KEPT YOU FROM MEETINGS, WORK, OR FROM GETTING THINGS NEEDED FOR DAILY LIVING?: NO

## 2023-02-07 SDOH — ECONOMIC STABILITY: FOOD INSECURITY: WITHIN THE PAST 12 MONTHS, YOU WORRIED THAT YOUR FOOD WOULD RUN OUT BEFORE YOU GOT MONEY TO BUY MORE.: SOMETIMES TRUE

## 2023-02-07 NOTE — PROGRESS NOTES
2023    [facility]) Physicians  Internal Medicine  Patient Encounter  Kalpesh Vick D.O., [address] -- Audio/Visual (During [facility]- public health emergency)      I discussed at this telephone/video visit is a nontraditional type of visit that we are conducting in lieu of an office visit to minimize patient risk during the coronavirus pandemic. I discussed with the patient that I would not be able to perform a full physical examination, but that in most other respects the visit would be beneficial to his/her continued medical care. He/She again gave verbal consent for us to conduct this type of visit. Chief Complaint   Patient presents with    Positive For Covid-19     Headache, sore throat, cough since yesterday         HPI:    Veda Membreno (:  1955) has requested an audio/video evaluation for the following concern(s): COVID-19    79 y.o. female being evaluated via virtual video visit due to the coronavirus pandemic emergency and public health crisis and inability to see the patient face-to-face in the office. Patient is being evaluated today with complaints of sore throat, cough, headache that started on 2023. Patient tested positive for COVID-19 yesterday. Symptoms started this past Saturday with a scratchy throat. She had taken a COVID test this past Friday because her  tested positive for COVID-19 at the hospital.  This test was negative. He is currently hospitalized with COVID-pneumonia. She tested again on Saturday and this was also negative. On  she developed a cough. The cough was dry and nonproductive. She tested yet again for COVID-19 and that test was negative. On Monday morning at 4 in the morning she woke with a headache. She did a COVID-19 test and this was positive. Patient denies any fever or chills. She has had body aches. Cough has become minimally productive.   She has occasional rattling in her chest but gets better when she coughs. Sputum is clear. Patient denies any worsening shortness of breath or wheezing. She denies any nausea, vomiting, diarrhea. She denies any change in taste or smell. She is not taking any over-the-counter medications at this time. Patient is interested in treatment. Patient has had 3 COVID-19 vaccines. Her last vaccine was 1/8/2022. She has not yet had the updated booster. Past Medical History:   Diagnosis Date    Allergic rhinitis     Anxiety     Ascending aorta dilatation (HCC)     Atrial tachycardia ([address]     dr Dorina Lewis ([facility])    Carpal tunnel syndrome     Cervicalgia     Chronic back pain     low    Depression     DVT (deep venous thrombosis) ([address] 2021    after surgery, bilateral    Fatty liver     GERD (gastroesophageal reflux disease)     has schatzki ring    Headache(784.0)     hemiplegic migraines, improved    Hernia hiatal     Histoplasmosis     Hot flashes     takes wellbutrin    Hyperlipidemia     Hypertension     MGUS (monoclonal gammopathy of unknown significance)     Obesity     Osteoarthritis     multiple joints    Other partial intestinal obstruction ([address] 2021    wound dehiscence    PONV (postoperative nausea and vomiting)     PVD (peripheral vascular disease) ([address]     right darian reduced on screening    Shingles     nerve pain in her low back persists    Sleep apnea     CPAP set of 4    Tricuspid regurgitation     moderate       Prior to Visit Medications    Medication Sig   pantoprazole (PROTONIX) 40 MG tablet TAKE 1 TABLET BY MOUTH  twice DAILY  Patient taking differently: daily TAKE 1 TABLET BY MOUTH  twice DAILY   gabapentin (NEURONTIN) 100 MG capsule Take 2 capsules by mouth 2 times daily for 30 days. Patient taking differently: Take 100 mg by mouth 3 times daily.    buPROPion (WELLBUTRIN XL) 150 MG extended release tablet TAKE 1 TABLET BY MOUTH EVERY MORNING   losartan (COZAAR) 50 MG tablet TAKE 1 TABLET BY MOUTH EVERY DAY   loratadine (CLARITIN) 10 MG tablet TAKE 1 TABLET BY MOUTH DAILY   metoprolol succinate (TOPROL XL) 25 MG extended release tablet Take 1 tablet by mouth daily   fluticasone (FLONASE) 50 MCG/ACT nasal spray 1 spray by Each Nostril route daily   aspirin EC 81 MG EC tablet Take 1 tablet by mouth daily   Omega-3 Fatty Acids (FISH OIL) 1000 MG CAPS Take 2 capsules by mouth daily   pimecrolimus (ELIDEL) 1 % cream Apply topically 2 times daily. pravastatin (PRAVACHOL) 20 MG tablet TAKE 1 TABLET BY MOUTH EVERY DAY   verapamil (CALAN SR) 120 MG extended release tablet TAKE 1 TABLET BY MOUTH NIGHTLY DO NOT CRUSH OR CHEW.   acetaminophen (TYLENOL) 500 MG tablet Take 500 mg by mouth every 6 hours as needed for Pain   Multiple Vitamins-Minerals (CENTRUM SILVER PO) Take 1 tablet by mouth daily    clindamycin-benzoyl peroxide (BENZACLIN) 1-5 % gel Apply topically 2 times daily. Patient not taking: Reported on 2/7/2023         Review of Systems  As per [facility]      PHYSICAL EXAMINATION:    Vital Signs: (As obtained by patient/caregiver or practitioner observation)    Patient-Reported Vitals 2/7/2023   Patient-Reported Weight 245   Patient-Reported Height 5'3\"   Patient-Reported Systolic 541   Patient-Reported Diastolic 70   Patient-Reported Pulse 57   Patient-Reported Temperature 98.6   Patient-Reported SpO2 95%   Patient-Reported Peak Flow Na          Wt Readings from Last 3 Encounters:   01/26/23 251 lb (113.9 kg)   01/16/23 245 lb 3.2 oz (111.2 kg)   01/06/23 245 lb (111.1 kg)     BP Readings from Last 3 Encounters:   01/26/23 (!) 152/88   01/16/23 132/64   01/10/23 130/63           Physical Exam  Constitutional:       General: She is not in acute distress. Appearance: Normal appearance. She is not ill-appearing. HENT:      Nose:      Comments: Sounds nasally congested  Pulmonary:      Effort: Pulmonary effort is normal. No respiratory distress.       Comments: Intermittent loose cough during evaluation  Neurological:      Mental Status: She is alert and oriented to person, place, and time. Psychiatric:         Thought Content: Thought content normal.         Other pertinent observable physical exam findings-     Due to this being a TeleHealth encounter, evaluation of the following organ systems is limited: Vitals/Constitutional/EENT/Resp/CV/GI//MS/Neuro/Skin/Heme-Lymph-Imm. ASSESSMENT/PLAN:  1. COVID-19  Given patient's age and other comorbidities, we elected to prescribe Paxlovid for treatment  Also advised that she use over-the-counter remedies such as Mucinex DM. Patient advised to avoid decongestants such as Sudafed or any product that contains this type of product  Advised patient to push fluids and get plenty of rest  We discussed isolation protocol. We discussed potential adverse side effects of the Paxlovid along with possible drug interactions. Patient will hold her pravastatin for a week. Advised patient to monitor for worsening symptoms such as shortness of breath, hypoxia, spiking fevers. - nirmatrelvir/ritonavir 300/100 (PAXLOVID, 300/100,) 20 x 150 MG & 10 x 100MG TBPK; Take 3 tablets (two 150 mg nirmatrelvir and one 100 mg ritonavir tablets) by mouth every 12 hours for 5 days. Dispense: 30 tablet; Refill: 0    Return if symptoms worsen or fail to improve. An  electronic signature was used to authenticate this note. --Abdiaziz Guajardo, DO on 2/7/2023 at 9:07 AM    119}    Pursuant to the emergency declaration under the [facility], [facility] waiver authority and the [employer] Act, this Virtual  Visit was conducted, with patient's consent, to reduce the patient's risk of exposure to COVID-19 and provide continuity of care for an established patient. Services were provided through a video synchronous discussion virtually to substitute for in-person clinic visit.

## 2023-02-09 ENCOUNTER — HOSPITAL ENCOUNTER (EMERGENCY)
Age: 68
Discharge: HOME | End: 2023-02-09
Attending: EMERGENCY MEDICINE
Payer: MEDICARE

## 2023-02-09 VITALS
TEMPERATURE: 98.1 F | DIASTOLIC BLOOD PRESSURE: 75 MMHG | HEART RATE: 55 BPM | SYSTOLIC BLOOD PRESSURE: 153 MMHG | OXYGEN SATURATION: 94 % | HEIGHT: 63 IN | WEIGHT: 251.9 LBS | BODY MASS INDEX: 44.63 KG/M2 | RESPIRATION RATE: 18 BRPM

## 2023-02-09 DIAGNOSIS — Z78.9 DRUG-DRUG INTERACTION: Primary | ICD-10-CM

## 2023-02-09 LAB
ANION GAP SERPL CALCULATED.3IONS-SCNC: 9 MMOL/L (ref 3–16)
BASOPHILS ABSOLUTE: 0.1 K/UL (ref 0–0.2)
BASOPHILS RELATIVE PERCENT: 1 %
BUN BLDV-MCNC: 16 MG/DL (ref 7–20)
CALCIUM SERPL-MCNC: 9.7 MG/DL (ref 8.3–10.6)
CHLORIDE BLD-SCNC: 102 MMOL/L (ref 99–110)
CO2: 27 MMOL/L (ref 21–32)
CREAT SERPL-MCNC: 0.5 MG/DL (ref 0.6–1.2)
EOSINOPHILS ABSOLUTE: 0.1 K/UL (ref 0–0.6)
EOSINOPHILS RELATIVE PERCENT: 0.8 %
GFR SERPL CREATININE-BSD FRML MDRD: >60 ML/MIN/{1.73_M2}
GLUCOSE BLD-MCNC: 96 MG/DL (ref 70–99)
HCT VFR BLD CALC: 39.6 % (ref 36–48)
HEMOGLOBIN: 13.6 G/DL (ref 12–16)
LYMPHOCYTES ABSOLUTE: 2.1 K/UL (ref 1–5.1)
LYMPHOCYTES RELATIVE PERCENT: 32.8 %
MCH RBC QN AUTO: 30.6 PG (ref 26–34)
MCHC RBC AUTO-ENTMCNC: 34.3 G/DL (ref 31–36)
MCV RBC AUTO: 89.4 FL (ref 80–100)
MONOCYTES ABSOLUTE: 0.6 K/UL (ref 0–1.3)
MONOCYTES RELATIVE PERCENT: 8.8 %
NEUTROPHILS ABSOLUTE: 3.6 K/UL (ref 1.7–7.7)
NEUTROPHILS RELATIVE PERCENT: 56.6 %
PDW BLD-RTO: 13.6 % (ref 12.4–15.4)
PLATELET # BLD: 250 K/UL (ref 135–450)
PMV BLD AUTO: 7.3 FL (ref 5–10.5)
POTASSIUM REFLEX MAGNESIUM: 4.3 MMOL/L (ref 3.5–5.1)
RBC # BLD: 4.43 M/UL (ref 4–5.2)
SODIUM BLD-SCNC: 138 MMOL/L (ref 136–145)
TROPONIN: <0.01 NG/ML
WBC # BLD: 6.3 K/UL (ref 4–11)

## 2023-02-09 PROCEDURE — 93005 ELECTROCARDIOGRAM TRACING: CPT

## 2023-02-09 PROCEDURE — 84484 ASSAY OF TROPONIN QUANT: CPT

## 2023-02-09 PROCEDURE — 80048 BASIC METABOLIC PNL TOTAL CA: CPT

## 2023-02-09 PROCEDURE — 85025 COMPLETE CBC W/AUTO DIFF WBC: CPT

## 2023-02-09 PROCEDURE — 99284 EMERGENCY DEPT VISIT MOD MDM: CPT

## 2023-02-09 ASSESSMENT — ENCOUNTER SYMPTOMS
NAUSEA: 0
ABDOMINAL PAIN: 1
DIARRHEA: 0
COUGH: 1

## 2023-02-09 ASSESSMENT — PAIN - FUNCTIONAL ASSESSMENT: PAIN_FUNCTIONAL_ASSESSMENT: NONE - DENIES PAIN

## 2023-02-09 NOTE — ED PROVIDER NOTES
[address]          ATTENDING PHYSICIAN NOTE       Date of evaluation: 2/9/2023    Chief Complaint     Other (Pt called PCP stating HR was in 40s PCP was concerned for a possible drug interaction between verapamil and paxlovid. )      History of Present Illness     Tiki West is a 76 y.o. female who presents to the emergency department for drug interaction. Patient has COVID since Sunday which she acquired from her  who got it on Saturday and it was confirmed with a at home test.  The patient developed symptoms of runny nose, headaches, cough that was productive and body aches which she called her PCP about the symptoms who prescribed her Paxil bit. The doctor had told her to hold off on taking her statin for 12 hours when she took her Paxil but when she follows correctly. However when he found out that she was taking verapamil along with Paxil that he told her to come to the emergency department. At that time she checked her vitals at home which her oxygen saturations were in the 90s but her heart rate was in the high 40s. She denies any symptoms of dizziness but it was told to come to the ED which she did. She denies any smoking history, alcohol abuse or any illicit drug use. Patient is a semiretired . She was never on oxygen for COVID infection. She never developed any shortness of breath or diarrhea. Patient is vaccine against COVID x3. ASSESSMENT / PLAN  (MEDICAL DECISION MAKING)     INITIAL VITALS: BP: (!) 181/86, Temp: 98.1 °F (36.7 °C), Heart Rate: 59, Resp: 16, SpO2: 98 %      Tiki West is a 76 y.o. female who presented to the emergency department due to drug interaction. Patient was having upper respiratory infection from [place] was developing common symptoms of COVID. When she told her PCP that she had COVID he prescribed her proximal elevated but it may have caused interaction with her other medications.   Her last dose of Paxil that was Wednesday at 10:00 PM this morning she did not take any of her medications. Her heart rate is in the high 50s with no symptoms of dizziness and her vitals are all stable. Physical examination was unremarkable with her lungs sounding clear with no wheezing or rails or rhonchi and no upper respiratory nasal drip or cough with dry mouth. We will obtain an EKG for the assessment of her slow heart rate and put her on telemetry. Patient is able to tolerate p.o. which will encourage her to eat and drink. Likely the medications have gone out of her system as the half-life of Paxil but is around 6-7 hours. Patient does take verapamil at home along with a beta-blocker with last dose on Wed morning. There is a interaction with Paxlovid and verapimil that can cause bradycardia. EKG was unchanged from previous. Patient still endorsing COVID symptoms. Patient asking for lab work up to be safe. Will obtain basic labs and if WNL will discharge patient and advise not to take paxlovid and continue with her other medications she normally takes. Medical Decision Making  Amount and/or Complexity of Data Reviewed  Labs: ordered. Critical Care:  Due to the immediate potential for life-threatening deterioration due to bradycardia, I spent 45 minutes providing critical care. This time excludes time spent performing procedures but includes time spent on direct patient care, history retrieval, review of the chart, and discussions with patient, family, and consultant(s). Clinical Impression     1.  Drug-drug interaction        Disposition     PATIENT REFERRED TO:  Verito Wyatt, [address]  [phone]    Schedule an appointment as soon as possible for a visit in 1 week  Hospital follow up    DISCHARGE MEDICATIONS:  New Prescriptions    No medications on file       DISPOSITION          Diagnostic Results and Other Data       RADIOLOGY:  No orders to display       LABS: No results found for this visit on 02/09/23. EKG   NSR no st changes or t wave inversions     ED BEDSIDE ULTRASOUND:  No results found. MOST RECENT VITALS:  BP: (!) 181/86,Temp: 98.1 °F (36.7 °C), Heart Rate: 59, Resp: 16, SpO2: 98 %     Procedures     None    ED Course     Nursing Notes, Past Medical Hx, Past Surgical Hx, Social Hx,Allergies, and Family Hx were reviewed. The patient was given the following medications:  No orders of the defined types were placed in this encounter. CONSULTS:  None    Review of Systems     Review of Systems   Constitutional:  Positive for appetite change and fatigue. HENT:  Positive for postnasal drip. Respiratory:  Positive for cough. Gastrointestinal:  Positive for abdominal pain. Negative for diarrhea and nausea. All other systems reviewed and are negative. Past Medical, Surgical, Family, and Social History     She has a past medical history of Allergic rhinitis, Anxiety, Ascending aorta dilatation (HCC), Atrial tachycardia ([address] Carpal tunnel syndrome, Cervicalgia, Chronic back pain, Depression, DVT (deep venous thrombosis) ([address] Fatty liver, GERD (gastroesophageal reflux disease), Headache(784.0), Hernia hiatal, Histoplasmosis, Hot flashes, Hyperlipidemia, Hypertension, MGUS (monoclonal gammopathy of unknown significance), Obesity, Osteoarthritis, Other partial intestinal obstruction (HCC), PONV (postoperative nausea and vomiting), PVD (peripheral vascular disease) ([address] Shingles, Sleep apnea, and Tricuspid regurgitation. She has a past surgical history that includes Knee arthroscopy; Knee cartilage surgery; Lung lobectomy; sinus surgery; Bunionectomy; Partial hysterectomy; Total knee arthroplasty (Bilateral, 03/03/2015); Parathyroid gland surgery; Lung biopsy; Colonoscopy (10/2018); Breast surgery (Right); lumbar fusion (N/A, 07/29/2021); Abdomen surgery (N/A, 08/01/2021); Breast biopsy (Right, 2013);  Esophagogastroduodenoscopy (2016); and Carpal tunnel release (Left, 10/11/2022). Her family history includes Breast Cancer in her sister and sister; COPD in her father and sister; Cancer in her brother and mother; Colon Cancer in her sister; Hearing Loss in her brother; Heart Disease in her brother, brother, father, mother, nephew, and sister; High Blood Pressure in her brother, brother, father, and mother. She reports that she quit smoking about 33 years ago. Her smoking use included cigarettes. She has a 5.00 pack-year smoking history. She has never used smokeless tobacco. She reports that she does not drink alcohol and does not use drugs. Medications     Previous Medications    ACETAMINOPHEN (TYLENOL) 500 MG TABLET    Take 500 mg by mouth every 6 hours as needed for Pain    ASPIRIN EC 81 MG EC TABLET    Take 1 tablet by mouth daily    BUPROPION (WELLBUTRIN XL) 150 MG EXTENDED RELEASE TABLET    TAKE 1 TABLET BY MOUTH EVERY MORNING    CLINDAMYCIN-BENZOYL PEROXIDE (BENZACLIN) 1-5 % GEL    Apply topically 2 times daily. FLUTICASONE (FLONASE) 50 MCG/ACT NASAL SPRAY    1 spray by Each Nostril route daily    GABAPENTIN (NEURONTIN) 100 MG CAPSULE    Take 2 capsules by mouth 2 times daily for 30 days. LORATADINE (CLARITIN) 10 MG TABLET    TAKE 1 TABLET BY MOUTH DAILY    LOSARTAN (COZAAR) 50 MG TABLET    TAKE 1 TABLET BY MOUTH EVERY DAY    METOPROLOL SUCCINATE (TOPROL XL) 25 MG EXTENDED RELEASE TABLET    Take 1 tablet by mouth daily    MULTIPLE VITAMINS-MINERALS (CENTRUM SILVER PO)    Take 1 tablet by mouth daily     NIRMATRELVIR/RITONAVIR 300/100 (PAXLOVID, 300/100,) 20 X 150 MG & 10 X 100MG TBPK    Take 3 tablets (two 150 mg nirmatrelvir and one 100 mg ritonavir tablets) by mouth every 12 hours for 5 days. OMEGA-3 FATTY ACIDS (FISH OIL) 1000 MG CAPS    Take 2 capsules by mouth daily    PANTOPRAZOLE (PROTONIX) 40 MG TABLET    TAKE 1 TABLET BY MOUTH  twice DAILY    PIMECROLIMUS (ELIDEL) 1 % CREAM    Apply topically 2 times daily.     PRAVASTATIN (PRAVACHOL) 20 MG TABLET    TAKE 1 TABLET BY MOUTH EVERY DAY    VERAPAMIL (CALAN SR) 120 MG EXTENDED RELEASE TABLET    TAKE 1 TABLET BY MOUTH NIGHTLY DO NOT CRUSH OR CHEW. Allergies     She is allergic to latex, reglan [metoclopramide hcl], univasc [moexipril], crestor [rosuvastatin], zoloft [sertraline], celecoxib, keflex [cephalexin], lipitor, metoclopramide, prochlorperazine, prochlorperazine edisylate, sulfa antibiotics, and vioxx. Physical Exam     INITIAL VITALS: BP: (!) 181/86, Temp: 98.1 °F (36.7 °C), Heart Rate: 59, Resp: 16, SpO2: 98 %   Physical Exam  Vitals reviewed. Constitutional:       Appearance: Normal appearance. She is obese. HENT:      Head: Normocephalic and atraumatic. Nose: Nose normal.      Mouth/Throat:      Pharynx: Oropharynx is clear. Eyes:      Extraocular Movements: Extraocular movements intact. Conjunctiva/sclera: Conjunctivae normal.      Pupils: Pupils are equal, round, and reactive to light. Cardiovascular:      Rate and Rhythm: Normal rate and regular rhythm. Pulses: Normal pulses. Heart sounds: Normal heart sounds. No murmur heard. Pulmonary:      Effort: Pulmonary effort is normal.      Breath sounds: Normal breath sounds. No wheezing or rhonchi. Abdominal:      General: Abdomen is flat. Bowel sounds are normal.      Palpations: Abdomen is soft. Tenderness: There is abdominal tenderness. Comments: Epigastric tenderness on deep palpation    Musculoskeletal:         General: Normal range of motion. Right lower leg: Edema present. Left lower leg: Edema present. Comments: Trace pitting edema bilaterally    Skin:     General: Skin is warm. Capillary Refill: Capillary refill takes less than 2 seconds. Neurological:      General: No focal deficit present. Mental Status: She is alert and oriented to person, place, and time. Mental status is at baseline.                   Bianca Oshea MD  Resident  02/09/23 MD Jose  Resident  02/09/23 [id number]

## 2023-02-09 NOTE — ED PROVIDER NOTES
ED Attending Attestation Note     Date of evaluation: 2/9/2023    This patient was seen by the resident. I have seen and examined the patient, agree with the workup, evaluation, management and diagnosis. The care plan has been discussed. I have reviewed the ECG and concur with the resident's interpretation. Briefly, Fredi Cormier is a 76 y.o. female with a PMH inclusive of HLD, PAD who presents for evaluation of bradycardia. Patient tested positive for COVID earlier in the week. Has been also positive. At the onset of symptoms had dizziness, headache, URI symptoms. Had a virtual visit with her doctor who prescribed Paxlovid. She was told to hold her statin but continue taking her other medications. COVID symptoms improving    She has been monitoring her pulse oxygenation on a home pulse oximeter and her oxygen level has been fine but she noted that her heart rate was low so called her doctor who told her to come into the ED. Patient has no new symptoms related to this that she did not initially have with COVID. No syncope. Had dizziness, HA, URI symptoms, bad taste in her mouth. Notable exam findings include well appearing, normal O2 sat on RA, mildly hypertensive    Assessment/ Medical Decision Making: On my review of her medical record, HR is typically in 50s-60s. Suspect interaction between home verapamil and paxlovid causing bradycardia. EKG on my review with sinus bradycardia and otherwise no acute ST or T wave changes when compared to prior in Oct 2022. At this point, patient is at her baseline since before starting Paxlovid with overall improving COVID symptoms, hemodynamic stability and heart rate back to her baseline. Discussed risk and benefits of labs given her underlying medical comorbidities and patient requested to have these done. These are reassuring. With no chest pain and explanation of symptoms do not feel that she needs serial troponin.   After discussing risk and benefits of stopping the paxlovid or verapamil temporarily, she will stop paxlovid, which I think is reasonable. She will follow-up with her primary care provider.        Lewis Her MD  02/10/23 2050

## 2023-02-09 NOTE — ED NOTES
Patient prepared for and ready to be discharged. Patient discharged at this time to home in care of self in no acute distress after verbalizing understanding of discharge instructions. Patient left after receiving After Visit Summary instructions. IV removed prior to discharge.      Abraham Alexander RN  02/09/23 [id number]

## 2023-02-09 NOTE — DISCHARGE INSTRUCTIONS
Work up was unremarkable. Please see your PCP in 1 week time when your COVID symptoms resolve. Stop taking your Paxlovid and continue taking your current medications.

## 2023-02-10 LAB
EKG ATRIAL RATE: 54 BPM
EKG DIAGNOSIS: NORMAL
EKG P AXIS: 60 DEGREES
EKG P-R INTERVAL: 172 MS
EKG Q-T INTERVAL: 414 MS
EKG QRS DURATION: 78 MS
EKG QTC CALCULATION (BAZETT): 392 MS
EKG R AXIS: 37 DEGREES
EKG T AXIS: 46 DEGREES
EKG VENTRICULAR RATE: 54 BPM

## 2023-03-08 DIAGNOSIS — I47.1 PAT (PAROXYSMAL ATRIAL TACHYCARDIA) (HCC): ICD-10-CM

## 2023-03-08 NOTE — TELEPHONE ENCOUNTER
Requested Prescriptions     Pending Prescriptions Disp Refills    verapamil (CALAN SR) 120 MG extended release tablet [Pharmacy Med Name: VERAPAMIL ER 120MG TABLETS] 90 tablet 3     Sig: TAKE 1 TABLET BY MOUTH AT NIGHT       Last Office Visit: 11/16/2022  Next Office Visit: 05/12/2023

## 2023-03-08 NOTE — TELEPHONE ENCOUNTER
Requested Prescriptions     Pending Prescriptions Disp Refills    verapamil (CALAN SR) 120 MG extended release tablet [Pharmacy Med Name: VERAPAMIL ER 120MG TABLETS] 90 tablet 3     Sig: TAKE 1 TABLET BY MOUTH AT NIGHT            Last Office Visit: 11/16/2022     Next Office Visit: 5/12/2023

## 2023-03-09 DIAGNOSIS — J30.89 NON-SEASONAL ALLERGIC RHINITIS, UNSPECIFIED TRIGGER: ICD-10-CM

## 2023-03-10 RX ORDER — LORATADINE 10 MG/1
TABLET ORAL
Qty: 90 TABLET | Refills: 3 | Status: SHIPPED | OUTPATIENT
Start: 2023-03-10

## 2023-03-10 NOTE — PRE-PROCEDURE INSTRUCTIONS
Called patient about procedure. Told to be here at 0800 for procedure at 0930. NPO after midnight, but can take morning medication with sips of water, patient stated they are not on blood thinners. To have a responsible adult be with patient take them home and stay with them afterwards, if they do not get admitted to [address] [place] [address]. And if available bring current list of medications. No other questions or concerns.

## 2023-03-13 ENCOUNTER — HOSPITAL ENCOUNTER (OUTPATIENT)
Dept: INTERVENTIONAL RADIOLOGY/VASCULAR | Age: 68
Discharge: HOME | End: 2023-03-13
Payer: MEDICARE

## 2023-03-13 VITALS — WEIGHT: 250 LBS | HEIGHT: 63 IN | TEMPERATURE: 97.7 F | BODY MASS INDEX: 44.3 KG/M2

## 2023-03-13 DIAGNOSIS — R42 DIZZINESS: ICD-10-CM

## 2023-03-13 LAB
A/G RATIO: 1.8 (ref 1.1–2.2)
ALBUMIN SERPL-MCNC: 4.4 G/DL (ref 3.4–5)
ALP BLD-CCNC: 99 U/L (ref 40–129)
ALT SERPL-CCNC: 21 U/L (ref 10–40)
ANION GAP SERPL CALCULATED.3IONS-SCNC: 8 MMOL/L (ref 3–16)
AST SERPL-CCNC: 19 U/L (ref 15–37)
BILIRUB SERPL-MCNC: 0.4 MG/DL (ref 0–1)
BUN BLDV-MCNC: 11 MG/DL (ref 7–20)
CALCIUM SERPL-MCNC: 9.5 MG/DL (ref 8.3–10.6)
CHLORIDE BLD-SCNC: 105 MMOL/L (ref 99–110)
CO2: 29 MMOL/L (ref 21–32)
CREAT SERPL-MCNC: 0.6 MG/DL (ref 0.6–1.2)
GFR SERPL CREATININE-BSD FRML MDRD: >60 ML/MIN/{1.73_M2}
GLUCOSE BLD-MCNC: 96 MG/DL (ref 70–99)
HCT VFR BLD CALC: 41.7 % (ref 36–48)
HEMOGLOBIN: 13.9 G/DL (ref 12–16)
INR BLD: 0.94 (ref 0.87–1.14)
MCH RBC QN AUTO: 29.9 PG (ref 26–34)
MCHC RBC AUTO-ENTMCNC: 33.4 G/DL (ref 31–36)
MCV RBC AUTO: 89.5 FL (ref 80–100)
PDW BLD-RTO: 13.9 % (ref 12.4–15.4)
PLATELET # BLD: 263 K/UL (ref 135–450)
PMV BLD AUTO: 6.9 FL (ref 5–10.5)
POTASSIUM SERPL-SCNC: 4 MMOL/L (ref 3.5–5.1)
PROTHROMBIN TIME: 12.5 SEC (ref 11.7–14.5)
RBC # BLD: 4.66 M/UL (ref 4–5.2)
SODIUM BLD-SCNC: 142 MMOL/L (ref 136–145)
TOTAL PROTEIN: 6.8 G/DL (ref 6.4–8.2)
WBC # BLD: 5.4 K/UL (ref 4–11)

## 2023-03-13 PROCEDURE — C1769 GUIDE WIRE: HCPCS

## 2023-03-13 PROCEDURE — 36224 PLACE CATH CAROTD ART: CPT

## 2023-03-13 PROCEDURE — C1760 CLOSURE DEV, VASC: HCPCS

## 2023-03-13 PROCEDURE — 99153 MOD SED SAME PHYS/QHP EA: CPT

## 2023-03-13 PROCEDURE — 6360000004 HC RX CONTRAST MEDICATION: Performed by: NEUROLOGICAL SURGERY

## 2023-03-13 PROCEDURE — C1894 INTRO/SHEATH, NON-LASER: HCPCS

## 2023-03-13 PROCEDURE — 85610 PROTHROMBIN TIME: CPT

## 2023-03-13 PROCEDURE — 36225 PLACE CATH SUBCLAVIAN ART: CPT

## 2023-03-13 PROCEDURE — 99152 MOD SED SAME PHYS/QHP 5/>YRS: CPT

## 2023-03-13 PROCEDURE — 85027 COMPLETE CBC AUTOMATED: CPT

## 2023-03-13 PROCEDURE — 80053 COMPREHEN METABOLIC PANEL: CPT

## 2023-03-13 PROCEDURE — 2709999900 HC NON-CHARGEABLE SUPPLY

## 2023-03-13 PROCEDURE — 36226 PLACE CATH VERTEBRAL ART: CPT

## 2023-03-13 RX ORDER — ACETAMINOPHEN 325 MG/1
650 TABLET ORAL EVERY 4 HOURS PRN
Status: DISCONTINUED | OUTPATIENT
Start: 2023-03-13 | End: 2023-03-14 | Stop reason: HOSPADM

## 2023-03-13 RX ORDER — IODIXANOL 320 MG/ML
200 INJECTION, SOLUTION INTRAVASCULAR
Status: COMPLETED | OUTPATIENT
Start: 2023-03-13 | End: 2023-03-13

## 2023-03-13 RX ORDER — ONDANSETRON 2 MG/ML
4 INJECTION INTRAMUSCULAR; INTRAVENOUS EVERY 8 HOURS PRN
Status: DISCONTINUED | OUTPATIENT
Start: 2023-03-13 | End: 2023-03-14 | Stop reason: HOSPADM

## 2023-03-13 RX ORDER — HYDROCODONE BITARTRATE AND ACETAMINOPHEN 5; 325 MG/1; MG/1
1 TABLET ORAL EVERY 4 HOURS PRN
Status: DISCONTINUED | OUTPATIENT
Start: 2023-03-13 | End: 2023-03-14 | Stop reason: HOSPADM

## 2023-03-13 RX ORDER — SODIUM CHLORIDE 9 MG/ML
INJECTION, SOLUTION INTRAVENOUS CONTINUOUS
Status: DISCONTINUED | OUTPATIENT
Start: 2023-03-13 | End: 2023-03-14 | Stop reason: HOSPADM

## 2023-03-13 RX ORDER — HYDROCODONE BITARTRATE AND ACETAMINOPHEN 5; 325 MG/1; MG/1
2 TABLET ORAL EVERY 4 HOURS PRN
Status: DISCONTINUED | OUTPATIENT
Start: 2023-03-13 | End: 2023-03-14 | Stop reason: HOSPADM

## 2023-03-13 RX ADMIN — IODIXANOL 200 ML: 320 INJECTION, SOLUTION INTRAVASCULAR at 10:50

## 2023-03-13 NOTE — PROCEDURES
DATE OF THE PROCEDURE: 03/13/2023    HISTORY OF PRESENT ILLNESS: 78 y/o female with recent hospital visit for dizziness with incidental finding on CTA of the brain of a possible left superior cerebellar artery origin aneurysm vs infundibulum (normal anatomy). Here for traditional catheter-based diagnostic cerebral angiogram.    OPERATORS:   Primary: Massimo Capps M.D., attending. Assistant: None. SUPERVISION AND INTERPRETATION: Dr. Sanna Sumner personally performed the technical portions of the procedure. Following completion of the technical portions of the procedure, the angiographic images were reviewed at the neurography PACS work station by Dr. Anette Davies. PROCEDURE:  1. Four-Vessel Diagnostic Cerebral Angiogram.  2. Ultrasound-guided arterial assessment and access of the right common femoral artery  3. Moderate IV Sedation supervised by Dr. Sanna Sumner with the assistance of an independent trained observer (IR Nurse) who performed patient assessments and monitored patient vital signs, adult, > 15 minutes. VESSELS CATHETERIZED:   1. Right internal carotid artery. 2. Left internal carotid artery. 3. Left vertebral artery. 4. Right subclavian artery with imaging of the right vertebral artery. ANESTHESIA: Prior to the procedure, the patient's personal and family history were reviewed for any adverse reactions to anesthesia and no pertinent concerns were raised. ASA rdgrdrrdarddrderd:rd rd3rd Malampati: 2    Conscious sedation and intravenous anesthesia was given by the radiology nurse under direct supervision of the attending physician for 45 minutes. Monitored nursing care and medication reports are available on the chart. Local anesthesia was provided at the puncture site with 1% lidocaine.      MODERATE SEDATION START TIME: 1010  MODERATE SEDATION END TIME: 1055  TOTAL DURATION MODERATE IV SEDATION: 45 minutes    MEDICATIONS GIVEN:     150 mcg Fentanyl IV  3 mg Versed IV  1% lidocaine, subcutaneous, 7 mL      RADIOCONTRAST: 80 mL 270 mgI/ml Visipaque contrast IA. EBL: 10 mL    DEVICES USED:   1. 5-Fr Stiff Micropuncture kit. 2. 5-Djiboutian 10 cm sheath. 3. 0.035\" Glidewire. 4. 5-Djiboutian angled glide catheter. 5. 5-Djiboutian Vascade closure device. AIR KERMA: 83.8 mGy   FLUOROSCOPY TIME: 10.6 minutes. CONSENT: Prior to the procedure, the technical aspect of the procedure as well as the potential risks and benefits were explained to the patient. Specifically, the risk of cerebral infarction, puncture site hemorrhage, femoral nerve injury, retroperitoneal hemorrhage, hemorrhagic shock, infection, device failure, anaphylaxis, renal failure, limb loss, death, radiation effects (hair loss, cataracts, radiation burns, tumors, dementia), arterial dissection, arterial pseudoaneurysms and A-V fistula were discussed. The advantages and disadvantages of alternative procedures were presented. An opportunity to state any questions or concerns was given and these were then addressed. Following this process, it was requested that we proceed with the proposed intervention and this was expressed in the form of a written consent. DETAILS OF THE PROCEDURE: The patient was brought to the neuroangiography suite where the groin was shaved, prepped and draped in usual sterile fashion. A whole room time out was performed to confirm the identify of the patient, the procedure to be performed, and the location of the procedure. Ultrasound was used to insonate the potential vascular access sites including the right common femoral artery to insure the proper size and patency prior to access. 1% lidocaine was administered subcutaneously for local anesthetic. Then with live ultrasound guidance arterial puncture of the right common femoral artery was accessed percutaneously using a single wall puncture technique.  A 5-Djiboutian arterial introducer sheath was placed at the puncture site prior to arterial catheterization and was connected to a pressurized heparin saline flush line. A 5-Tajik angled diagnostic catheter was advanced over an 0.035\" Glidewire into the aortic arch under the fluoroscopic guidance and used to selectively catheterize the vessels listed above. In each case, the vessel origin was visualized fluoroscopically by injection of contrast prior to selective catheterization. Biplane diagnostic cerebral angiograms were acquired at each of these catheterizations. After reviewing the images, the catheter was removed. The groin sheath was exchanged over a 5-Tajik Vascade closure device and the arteriotomy was closed without difficulty with excellent hemostasis. The patient tolerated the procedure well and there were no complications. FINDINGS:   LEFT CAROTID, INTRACRANIAL:  Adequate contrast enhancement of the carotid artery is seen. The ophthalmic artery is adequately patent. The carotid terminus is normal with adequate caliber on both A1 and M1 segments. The capillary and venous phases are within normal limits. There are no signs of fistulas, dissections or de adriana aneurysms. Left fetal PCA anatomy. RIGHT CAROTID, INTRACRANIAL: The right internal carotid artery has normal caliber over its entire course. The middle and anterior cerebral arteries fill normally. The middle and anterior cerebral arteries have normal distribution to the cortical branches and the capillary and venous phases are normal.     LEFT VERTEBRAL, INTRACRANIAL: Normal contrast enhancement of the vertebral artery is noted. It demonstrates a normal contour and caliber. The left posterior inferior cerebellar artery is adequately seen. The basilar artery appears normal course and caliber. There is normal filling of bilateral superior cerebellar arteries and bilateral posterior cerebral arteries.  The region of suspicion at the left superior cerebellar artery area is the normal confluence at the basilar terminus with an infundibulum to a mostly hypoplastic left P1 segment of the left PCA. There is a left fetal PCA which creates this anatomy that on CTA is mimicking a possible aneurysm. It is clearly not an aneurysm on these images. RIGHT VERTEBRAL, CERVICAL AND INTRACRANIAL: The catheter position is within the right subclavian artery. The right vertebral artery has a normal caliber in the neck. RIGHT COMMON FEMORAL ARTERY: The common, iliac, external iliac and common femoral arteries are normal in course and caliber. The common femoral artery bifurcation appears normal. The groin sheath was inserted within the common femoral artery above the bifurcation without evidence of any injury or thrombosis. IMPRESSION:  No evidence for aneurysm. The suspicious area of the left superior cerebellar artery region was an infundibulum and is considered to be a normal anatomical variant. RECOMMENDATIONS:  No need for additional neurosurgical follow-up.     Rayne Gavin MD  Neurovascular and Stroke  [facility]

## 2023-03-13 NOTE — DISCHARGE INSTRUCTIONS
[facility]  Cardiovascular Special Procedures  Cerebral Angiogram  Patient Discharge Instructions           Day 1 (Procedure Day):  Rest for the remainder of the day. Minimal stair climbing, if you must use stairs, lead with your unaffected leg. Do not drive a car. Do not be alone. Avoid prolonged sitting, walk around occasionally until bedtime tonight. Leave dressing intact. Day 2:  You may climb stairs, begin slowly  You may drive a car, unless otherwise directed by physician. Remove dressing. You may bathe/shower, but wash gently around the puncture site and pat dry. Place new band-aid on site daily until skin heals. Things to Avoid:  You may not do any strenuous exercises for one week. (ex: golfing, bowling, tennis, vacuum, snow removal, jogging, and aerobic exercises). No hot tubs, baths, or pools for one week. Do not lift anything over 10 pounds for 10 days. Avoid positions that would put pressure on your groin. Like sitting upright in a straight back chair. No lotions, powders, or ointments near site for 5 days. Things to watch for:  You may have a small lump or a bruise. This is common and will go away. If the lump increases and site becomes painful, call physician immediately. If mild discomfort occurs at the puncture site you may place an ice pack on the site at 15 minute intervals. If the puncture site starts to bleed, immediately lie on a hard flat surface and apply pressure just above the puncture site. Have someone call 911 and maintain pressure until the EMS arrives. If you have loss of color, extreme coldness or numbness of the leg, call 911 immediately. Excessive pain of the groin, thigh or calf, call your physician immediately. Watch for signs and symptoms of an infection at the groin site (fever, local pain, redness, warmth or discharge from the puncture site), call your physician immediately if any develop.       Any Questions please call us at [phone] (between 7am- 5pm, Mon-Fri). After hours you should contact your physician. [facility]  Cardiovascular Special Procedures  General Discharge Instructions    PROCEDURE: ____________________________________________________    _x___ Sandra West may be drowsy or lightheaded after receiving sedation. DO NOT operate a vehicle (automobile, bicycle, motorcycle, machinery, or power tools), make any important decisions or sign any important/legal documents, or drink alcoholic beverages for the next 24 hours  _x___ We strongly suggest that a responsible adult be with you for the next 24 hours for your protection and safety  _x___ If the intravenous catheter site is painful, apply warm wet compresses on the site until the soreness is relieved and elevate the arm above the heart. Call your physician if no improvement in 2 to 3 days    DIETARY INSTRUCTIONS:    ____ Drink extra fluids over the next 24 hours (If not contraindicated by illness or by physician order)  ____ Start with clear liquids and progress to normal diet as you feel like eating. If you experience nausea or repeated episodes of vomiting, which persist beyond 12-24 hours, notify your doctor        _x___ Resume your previous diet      MEDICATION INSTRUCTIONS:    ____ See Medication Reconciliation Sheet        Call: _________________________________     FOLLOW-UP APPOINTMENT    Follow up with MD as directed. Belongings returned to patient and/or family: Yes. The Discharge Instructions have been explained to me. I understand and can verbalize these instructions.

## 2023-03-16 ENCOUNTER — OFFICE VISIT (OUTPATIENT)
Dept: PULMONOLOGY | Age: 68
End: 2023-03-16
Payer: MEDICARE

## 2023-03-16 VITALS
WEIGHT: 250 LBS | HEIGHT: 63 IN | OXYGEN SATURATION: 97 % | BODY MASS INDEX: 44.3 KG/M2 | SYSTOLIC BLOOD PRESSURE: 168 MMHG | DIASTOLIC BLOOD PRESSURE: 72 MMHG | HEART RATE: 56 BPM

## 2023-03-16 DIAGNOSIS — R05.3 CHRONIC COUGH: Primary | ICD-10-CM

## 2023-03-16 DIAGNOSIS — Z99.89 OSA ON CPAP: ICD-10-CM

## 2023-03-16 DIAGNOSIS — G47.33 OSA ON CPAP: ICD-10-CM

## 2023-03-16 PROCEDURE — G8417 CALC BMI ABV UP PARAM F/U: HCPCS | Performed by: INTERNAL MEDICINE

## 2023-03-16 PROCEDURE — 1123F ACP DISCUSS/DSCN MKR DOCD: CPT | Performed by: INTERNAL MEDICINE

## 2023-03-16 PROCEDURE — 3078F DIAST BP <80 MM HG: CPT | Performed by: INTERNAL MEDICINE

## 2023-03-16 PROCEDURE — G8427 DOCREV CUR MEDS BY ELIG CLIN: HCPCS | Performed by: INTERNAL MEDICINE

## 2023-03-16 PROCEDURE — 99213 OFFICE O/P EST LOW 20 MIN: CPT | Performed by: INTERNAL MEDICINE

## 2023-03-16 PROCEDURE — 3077F SYST BP >= 140 MM HG: CPT | Performed by: INTERNAL MEDICINE

## 2023-03-16 PROCEDURE — 3017F COLORECTAL CA SCREEN DOC REV: CPT | Performed by: INTERNAL MEDICINE

## 2023-03-16 PROCEDURE — 1090F PRES/ABSN URINE INCON ASSESS: CPT | Performed by: INTERNAL MEDICINE

## 2023-03-16 PROCEDURE — 1036F TOBACCO NON-USER: CPT | Performed by: INTERNAL MEDICINE

## 2023-03-16 PROCEDURE — G8399 PT W/DXA RESULTS DOCUMENT: HCPCS | Performed by: INTERNAL MEDICINE

## 2023-03-16 PROCEDURE — G8484 FLU IMMUNIZE NO ADMIN: HCPCS | Performed by: INTERNAL MEDICINE

## 2023-03-16 NOTE — PROGRESS NOTES
[facility]    Outpatient follow-up note    Subjective:   Referring Physician: Mckenna Gilman / HPI:     The patient is 76 y.o. female who presents today for a follow up visit for obstructive sleep apnea and cough. Paula Whitaker has had an interesting start to 2023. Her  Laurel Kam is currently in the hospital again for his [address]. She's had Covid and some dizzy spells and just had a CT angio of her head because of concern for a cerebral aneurysm. Fortunately there was no aneurysm. She's doing well with her CPAP and her cough is tolerable on zyrtec. Interval history:  Paula Whitaker was able to get a new sleep device and is sleeping well with it. She does still have some chronic cough but it improves with treatments for sinus disease. Since last visit she was noted to have M spike proteins in her blood and has been diagnosed with MGUS. As part of the work-up she did have imaging that showed some lucencies in her skull and some chronic sinusitis on the left side for which she is now seeing ear nose and throat. Interval history:  Patient says she is compliant with her auto titrating CPAP but notes that sometimes she wakes up in the pressures so high that it is leaking out of her mask and it wakes her. She uses [employer]. She is on a autotitrator that goes from 4-20 on the CPAP. She also has a cough that is nonproductive and will wake her up from sleep at night. She had a recent sinus infection in January noted on a head CT that was performed because she fell and hit her head while on anticoagulation. Sinus inflammation was the only abnormality on the CT scan. Initial history:  Patient had a SNAP study back in 2013 when she weighed 219 pounds. It showed that she had an apnea-hypopnea index of 24.4, with most of the RDI being hypopneas.   She has been on a Nguyen dream station since 2013 and found out that it has been recalled because of some degradation in July when she had back surgery. Back surgery recently was complicated by a bowel obstruction and needed additional surgery and then has had to have a couple of wound vacs. She is still healing and has an open wound in her abdomen is wearing abdominal binder. She is ambulating with a cane but says she mostly uses a walker. She also has some complaints of chronic cough associated with sinus drainage. The cough she says improves with Zyrtec and nasal sprays but the sinus drainage persists. She was on Singulair in the past which was stopped. She said it did slightly improve the sinus drainage but nothing made it stop.        Past Medical History:    Past Medical History:   Diagnosis Date    Allergic rhinitis     Anxiety     Ascending aorta dilatation (HCC)     Atrial tachycardia ([address]     dr Haydee Michel ([facility])    Carpal tunnel syndrome     Cervicalgia     Chronic back pain     low    Depression     DVT (deep venous thrombosis) ([address]     after surgery, bilateral    Fatty liver     GERD (gastroesophageal reflux disease)     has schatzki ring    Headache(784.0)     hemiplegic migraines, improved    Hernia hiatal     Histoplasmosis     Hot flashes     takes wellbutrin    Hyperlipidemia     Hypertension     MGUS (monoclonal gammopathy of unknown significance)     Obesity     Osteoarthritis     multiple joints    Other partial intestinal obstruction ([address]     wound dehiscence    PONV (postoperative nausea and vomiting)     PVD (peripheral vascular disease) (HCC)     right darian reduced on screening    Shingles     nerve pain in her low back persists    Sleep apnea     CPAP set of 4    Tricuspid regurgitation     moderate       Social History:    Social History     Tobacco Use   Smoking Status Former    Packs/day: 1.00    Years: 5.00    Pack years: 5.00    Types: Cigarettes    Quit date: 1990    Years since quittin.2   Smokeless Tobacco Never       Family History:  Family History   Problem Relation Age of Onset    High Blood Pressure Mother     Heart Disease Mother     Cancer Mother         vaginal    COPD Father     High Blood Pressure Father     Heart Disease Father     Heart Disease Sister     Colon Cancer Sister     Breast Cancer Sister         52+    COPD Sister     Breast Cancer Sister         50+    Heart Disease Brother     High Blood Pressure Brother     Cancer Brother         oral    Heart Disease Brother     Hearing Loss Brother         chf and transplant    High Blood Pressure Brother     Heart Disease Nephew      Current Medications:  Current Outpatient Medications on File Prior to Visit   Medication Sig Dispense Refill    loratadine (CLARITIN) 10 MG tablet TAKE 1 TABLET BY MOUTH DAILY 90 tablet 3    verapamil (CALAN SR) 120 MG extended release tablet TAKE 1 TABLET BY MOUTH AT NIGHT 90 tablet 3    pantoprazole (PROTONIX) 40 MG tablet TAKE 1 TABLET BY MOUTH  twice DAILY (Patient taking differently: daily TAKE 1 TABLET BY MOUTH  twice DAILY) 180 tablet 0    buPROPion (WELLBUTRIN XL) 150 MG extended release tablet TAKE 1 TABLET BY MOUTH EVERY MORNING 90 tablet 1    losartan (COZAAR) 50 MG tablet TAKE 1 TABLET BY MOUTH EVERY DAY 90 tablet 0    metoprolol succinate (TOPROL XL) 25 MG extended release tablet Take 1 tablet by mouth daily 30 tablet 1    fluticasone (FLONASE) 50 MCG/ACT nasal spray 1 spray by Each Nostril route daily 16 g 3    aspirin EC 81 MG EC tablet Take 1 tablet by mouth daily 30 tablet 3    Omega-3 Fatty Acids (FISH OIL) 1000 MG CAPS Take 2 capsules by mouth daily      pimecrolimus (ELIDEL) 1 % cream Apply topically 2 times daily. 30 g 0    pravastatin (PRAVACHOL) 20 MG tablet TAKE 1 TABLET BY MOUTH EVERY DAY 90 tablet 3    acetaminophen (TYLENOL) 500 MG tablet Take 500 mg by mouth every 6 hours as needed for Pain      Multiple Vitamins-Minerals (CENTRUM SILVER PO) Take 1 tablet by mouth daily       gabapentin (NEURONTIN) 100 MG capsule Take 2 capsules by mouth 2 times daily for 30 days. (Patient taking differently: Take 100 mg by mouth 3 times daily.) 120 capsule 2     No current facility-administered medications on file prior to visit. Allergies: Allergies   Allergen Reactions    Latex Rash    Reglan [Metoclopramide Hcl] Rash    Univasc [Moexipril] Other (See Comments)     unsure    Crestor [Rosuvastatin]      myalgia    Zoloft [Sertraline]      nightmares    Celecoxib Palpitations    Keflex [Cephalexin] Diarrhea    Lipitor Other (See Comments)     myalgia    Metoclopramide Anxiety    Prochlorperazine Anxiety    Prochlorperazine Edisylate     Sulfa Antibiotics Nausea And Vomiting    Vioxx        REVIEW OF SYSTEMS:    CONSTITUTIONAL: Negative for fevers and chills  HEENT: Negative for oropharyngeal exudate, post nasal drip, sinus pain / pressure, nasal congestion, ear pain  RESPIRATORY:  See HPI  CARDIOVASCULAR: Negative for chest pain, palpitations, edema  GASTROINTESTINAL: Negative for nausea, vomiting, diarrhea, constipation and abdominal pain  HEMATOLOGICAL: Negative for adenopathy  SKIN: Negative for clubbing, cyanosis, skin lesions  EXTREMITIES: Negative for weakness, decreased ROM  NEUROLOGICAL: Negative for unilateral weakness, speech or gait abnormalities  PSYCH: Negative for anxiety, depression    Objective:   PHYSICAL EXAM:        VITALS:  BP (!) 168/72 (Site: Right Upper Arm, Position: Sitting, Cuff Size: Large Adult)   Pulse 56   Ht 5' 3\" (1.6 m)   Wt 250 lb (113.4 kg)   LMP  (LMP Unknown)   SpO2 97%   BMI 44.29 kg/m²     CONSTITUTIONAL:  Awake, alert, cooperative, no apparent distress, and appears stated age  HEENT: No oropharyngeal exudate, PERRL, no cervical adenopathy, no tracheal deviation, thyroid size normal  LUNGS:  No increased work of breathing and clear to auscultation, no crackles or wheezing   CARDIOVASCULAR:  normal S1 and S2 and no JVD  ABDOMEN: Abdominal binder in place  EXT: No edema, no calf tenderness. Pulses are present bilaterally.   NEUROLOGIC:  Mental Status Exam:  Level of Alertness:   awake  Orientation:   person, place, time. SKIN:  normal skin color, texture, turgor, no redness, warmth, or swelling     DATA:      Radiology Review:  Pertinent images / reports were reviewed as a part of this visit. Nothing pertinent    Last PFTs:7/2021  Comment:   Normal spirometry and diffusion capacity   Increase in residual volume and decrease in expiratory reserve   volume are likely due to obesity. Clinical correlation is   recommended. Immunizations:   Immunization History   Administered Date(s) Administered    COVID-19, PFIZER PURPLE top, DILUTE for use, (age 15 y+), 30mcg/0.3mL 03/31/2021, 04/21/2021, 01/08/2022    Influenza 11/05/2013    Influenza Virus Vaccine 09/21/2009, 11/11/2014, 11/19/2015    Influenza, AFLURIA (age 1 yrs+), FLUZONE, (age 10 mo+), MDV, 0.5mL 11/10/2006, 10/23/2007, 11/11/2008, 09/21/2009, 10/10/2016    Influenza, FLUAD, (age 72 y+), Adjuvanted, 0.5mL 09/24/2020, 09/03/2021, 09/29/2022    Influenza, FLUARIX, FLULAVAL, FLUZONE (age 10 mo+) AND AFLURIA, (age 1 y+), PF, 0.5mL 11/01/2018    Influenza, High Dose (Fluzone 65 yrs and older) 09/26/2017    Influenza, Triv, inactivated, subunit, adjuvanted, IM (Fluad 65 yrs and older) 11/11/2019    Pneumococcal Conjugate 13-valent ([id number]) 09/24/2020    Pneumococcal Polysaccharide ([id number]) 03/04/2015, 09/29/2021    Tdap (Boostrix, Adacel) 11/05/2013       Assessment: This is a 76 y.o. female with moderate obstructive sleep apnea, chronic cough and MGUS    Plan:   SAL: Moderate based on her 2013 study. Doing well. Chronic cough: Continue Zyrtec and nasal sprays to help control the cough. She's now following with Dr. Bria Lockett. MGUS: Seeing Dr. Yrn Montenegro with [facility]    - Tobacco use: The patient is not currently smoking.     - RTC 6 months w/ MD. Call or RTC sooner if symptoms persist or worsen acutely.

## 2023-03-16 NOTE — DISCHARGE INSTR - COC
Continuity of Care Form    Patient Name: Kiki Santos   :  1955  MRN:  [id number]    Admit date:  2021  Discharge date:  ***    Code Status Order: Full Code   Advance Directives:   Advance Care Flowsheet Documentation       Date/Time Healthcare Directive Type of Healthcare Directive Copy in [address] Agent's Name Healthcare Agent's Phone Number    21 1499  No, patient does not have an advance directive for healthcare treatment  --  --  --  --  --    21 3933  No, patient does not have an advance directive for healthcare treatment  --  --  --  --  --            Admitting Physician:  Familia De La Rosa. Scotty Macias MD  PCP: Kacy Cole MD    Discharging Nurse: [facility] Unit/Room#: 4590/9381-66  Discharging Unit Phone Number: ***    Emergency Contact:   Extended Emergency Contact Information  Primary Emergency Contact: Suleiman Mckeon  Address: [address], [address] Phone: [phone]  Mobile Phone: [phone]  Relation: Spouse  Secondary Emergency Contact: [address] [facility], [address] [facility] Phone: [phone]  Relation: Other    Past Surgical History:  Past Surgical History:   Procedure Laterality Date    ABDOMEN SURGERY N/A 2021    REPAIR OF WOUND DEHISCENCE performed by Car Lomeli MD at [address] Right     Biopsy w/clip    BUNIONECTOMY      right     COLONOSCOPY  10/2018    fu 10 yrs    KNEE ARTHROSCOPY      KNEE CARTILAGE SURGERY      LOBECTOMY      partial right lung lobectomy    LUMBAR FUSION N/A 2021    L4-S1 ANTERIOR LUMBAR INTERBODY FUSION WITH PEDICLE SCREW FIXATION performed by Familia De La Rosa.  Scotty Macias MD at [facility]      PARTIAL HYSTERECTOMY      ovaries retained    SINUS SURGERY      TOTAL KNEE ARTHROPLASTY Bilateral 2015       Immunization History:   Immunization History   Administered Date(s) Administered    COVID-19, Pfizer, PF, 30mcg/0.3mL 03/31/2021, 04/21/2021    Influenza 11/05/2013    Influenza Virus Vaccine 09/21/2009, 11/11/2014, 11/19/2015    Influenza, High Dose (Fluzone 65 yrs and older) 09/26/2017    Influenza, Orlando Sanchez, IM, (6 mo and older Fluzone, Flulaval, Fluarix and 3 yrs and older Afluria) 11/10/2006, 10/23/2007, 11/11/2008, 09/21/2009, 10/10/2016    Influenza, Orlando Sanchez, IM, PF (6 mo and older Fluzone, Flulaval, Fluarix, and 3 yrs and older Afluria) 11/01/2018    Influenza, Quadv, adjuvanted, 65 yrs +, IM, PF (Fluad) 09/24/2020    Influenza, Triv, inactivated, subunit, adjuvanted, IM (Fluad 65 yrs and older) 11/11/2019    Pneumococcal Conjugate 13-valent ([id number]) 09/24/2020    Pneumococcal Polysaccharide ([id number]) 03/04/2015    Tdap (Boostrix, Adacel) 11/05/2013       Active Problems:  Patient Active Problem List   Diagnosis Code    Vitamin D deficiency E55.9    Generalized osteoarthrosis, involving multiple sites M15.9    Chronic low back pain M54.5, G89.29    Hemiplegic migraine G43.409    Allergic rhinitis J30.9    GERD (gastroesophageal reflux disease) K21.9    Degenerative disc disease, lumbar M51.36    Essential hypertension I10    Major depressive disorder with single episode, 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[facility]) F32.4    Mixed hyperlipidemia E78.2    Rosacea L71.9    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    Palpitations R00.2    Atrial tachycardia ([facility]) I47.1    Paresthesia R20.2    PAD (peripheral artery disease) ([facility]) I73.9    Chest pain R07.9    Morbidly obese ([facility]) E66.01    Epigastric discomfort R10.13    Chronic back pain M54.9, G89.29    Lumbar stenosis with neurogenic claudication M48.062    Dehiscence of fascia T81.30XA    Ileus ([facility]) K56.7    S/P lumbar and lumbosacral fusion by anterior technique Z98.1       Isolation/Infection:   Isolation            No Isolation          Patient Infection Status       None to display Nurse Assessment:  Last Vital Signs: BP 99/63   Pulse 65   Temp 98.2 °F (36.8 °C) (Oral)   Resp 16   Ht 5' 3\" (1.6 m)   Wt 259 lb (117.5 kg)   LMP  (LMP Unknown)   SpO2 90%   BMI 45.88 kg/m²     Last documented pain score (0-10 scale): Pain Level: 0  Last Weight:   Wt Readings from Last 1 Encounters:   07/29/21 259 lb (117.5 kg)     Mental Status:  {IP PT MENTAL STATUS:20030:::0}    IV Access:  { YOUSUF IV ACCESS:295611216:::0}    Nursing Mobility/ADLs:  Walking   {CHP DME ADLs:158787337:::0}  Transfer  {CHP DME ADLs:097626843:::0}  Bathing  {CHP DME ADLs:189104588:::0}  Dressing  {CHP DME ADLs:064735460:::0}  Toileting  {CHP DME ADLs:592307673:::0}  Feeding  {CHP DME ADLs:393144158:::0}  Med Admin  {CHP DME ADLs:321205939:::0}  Med Delivery   { YOUSUF MED Delivery:544280514:::0}    Wound Care Documentation and Therapy:        Elimination:  Continence:   · Bowel: {YES / KW:38141}  · Bladder: {YES / MR:86854}  Urinary Catheter: {Urinary Catheter:798127639:::0}   Colostomy/Ileostomy/Ileal Conduit: {YES / HO:13500}       Date of Last BM: ***    Intake/Output Summary (Last 24 hours) at 8/10/2021 0911  Last data filed at 8/10/2021 0758  Gross per 24 hour   Intake 1200 ml   Output 800 ml   Net 400 ml     I/O last 3 completed shifts:   In: 12 [P.O.:960]  Out: 800 [Urine:800]    Safety Concerns:     [address] Safety Concerns:958225050:::0}    Impairments/Disabilities:      [address] Impairments/Disabilities:881642519:::0}    Nutrition Therapy:  Current Nutrition Therapy:   [address] Diet List:982200197:::0}    Routes of Feeding: {CHP DME Other Feedings:579611550:::0}  Liquids: {Slp liquid thickness:88869}  Daily Fluid Restriction: {CHP DME Yes amt example:026602381:::0}  Last Modified Barium Swallow with Video (Video Swallowing Test): {Done Not Done UNAP:152617491:::3}    Treatments at the Time of Hospital Discharge:   Respiratory Treatments: ***  Oxygen Therapy:  {Therapy; copd oxygen:02189:::0}  Ventilator:    { CC Vent List:389194355:::0}    Rehab Therapies: {THERAPEUTIC INTERVENTION:4847175511}  Weight Bearing Status/Restrictions: {[facility] Weight Bearin:::0}  Other Medical Equipment (for information only, NOT a DME order):  {EQUIPMENT:877871160}  Other Treatments: ***    Patient's personal belongings (please select all that are sent with patient):  {CHP DME Belongings:709837390:::0}    RN SIGNATURE:  {Esignature:100811143:::0}    CASE MANAGEMENT/SOCIAL WORK SECTION    Inpatient Status Date: ***    Readmission Risk Assessment Score:  Readmission Risk              Risk of Unplanned Readmission:  15           Discharging to Facility/ [address] [address]:   Phone: [phone]     / signature: Electronically signed by JOYA Lux on 21 at 11:20 AM EDT    PHYSICIAN SECTION    Prognosis: {Prognosis:1842641526:::0}    Condition at Discharge: [address] [place] [address] Patient Condition:105463715:::0}    Rehab Potential (if transferring to Rehab): {Prognosis:6298885948:::0}    Recommended Labs or Other Treatments After Discharge:   PT/PT evaluate and Treat  Protime INR daily, once INR is >2 please stop Lovenox shots. Once INR >2, check protime/INR twice / week. Nursing for vitals and monitor prevena wound vac.      Physician Certification: I certify the above information and transfer of Rama Barefoot  is necessary for the continuing treatment of the diagnosis listed and that she requires Home Care   Update Admission H&P: No change in H&P    PHYSICIAN SIGNATURE:  {Esignature:356850589:::0} Adbry Pregnancy And Lactation Text: It is unknown if this medication will adversely affect pregnancy or breast feeding.

## 2023-05-12 ENCOUNTER — OFFICE VISIT (OUTPATIENT)
Dept: CARDIOLOGY CLINIC | Age: 68
End: 2023-05-12
Payer: MEDICARE

## 2023-05-12 VITALS
WEIGHT: 262.2 LBS | BODY MASS INDEX: 46.45 KG/M2 | SYSTOLIC BLOOD PRESSURE: 120 MMHG | DIASTOLIC BLOOD PRESSURE: 60 MMHG | HEART RATE: 60 BPM

## 2023-05-12 DIAGNOSIS — R00.2 PALPITATIONS: Primary | ICD-10-CM

## 2023-05-12 PROCEDURE — G8417 CALC BMI ABV UP PARAM F/U: HCPCS | Performed by: INTERNAL MEDICINE

## 2023-05-12 PROCEDURE — G8427 DOCREV CUR MEDS BY ELIG CLIN: HCPCS | Performed by: INTERNAL MEDICINE

## 2023-05-12 PROCEDURE — 3078F DIAST BP <80 MM HG: CPT | Performed by: INTERNAL MEDICINE

## 2023-05-12 PROCEDURE — 1090F PRES/ABSN URINE INCON ASSESS: CPT | Performed by: INTERNAL MEDICINE

## 2023-05-12 PROCEDURE — 3074F SYST BP LT 130 MM HG: CPT | Performed by: INTERNAL MEDICINE

## 2023-05-12 PROCEDURE — 1123F ACP DISCUSS/DSCN MKR DOCD: CPT | Performed by: INTERNAL MEDICINE

## 2023-05-12 PROCEDURE — 1036F TOBACCO NON-USER: CPT | Performed by: INTERNAL MEDICINE

## 2023-05-12 PROCEDURE — G8399 PT W/DXA RESULTS DOCUMENT: HCPCS | Performed by: INTERNAL MEDICINE

## 2023-05-12 PROCEDURE — 99214 OFFICE O/P EST MOD 30 MIN: CPT | Performed by: INTERNAL MEDICINE

## 2023-05-12 PROCEDURE — 3017F COLORECTAL CA SCREEN DOC REV: CPT | Performed by: INTERNAL MEDICINE

## 2023-05-12 NOTE — PROGRESS NOTES
Component Value Date    LABVLDL 17 06/23/2022    LABVLDL 28 07/26/2021    LABVLDL 30 09/28/2020     Lab Results   Component Value Date    CHOLHDLRATIO 2.8 04/16/2011    CHOLHDLRATIO 2.5 01/08/2011    CHOLHDLRATIO 2.9 10/02/2010       Lab Results   Component Value Date    INR 0.94 03/13/2023    INR 0.97 01/10/2023    INR 1.07 08/13/2021    PROTIME 12.5 03/13/2023    PROTIME 12.8 01/10/2023    PROTIME 12.1 08/13/2021       The 10-year ASCVD risk score (Naz DE GUZMAN, et al., 2019) is: 8%    Values used to calculate the score:      Age: 76 years      Sex: Female      Is Non- : No      Diabetic: No      Tobacco smoker: No      Systolic Blood Pressure: 199 mmHg      Is BP treated: Yes      HDL Cholesterol: 56 mg/dL      Total Cholesterol: 150 mg/dL      Assessment / Plan:      Diagnosis Orders   1. Palpitations             1.  PSVT:  Patient has rare and very brief episodes.      -Cw Toprol 25 mg daily and continue verapamil extended release 120 mg daily. 2.  Hypertension:  Patient's BP is well controlled with current medications. - Continue with metoprolol, verapamil, losartan 50 mg daily. 3.  Hyperlipidemia:  Patient was having myalgias (increasing lower back pain) on intermediate dose Crestor.     -Cw Pravachol at 20 mg p.o. daily. 4. PAD:  Patient has evidence of mild PAD in the right lower extremity.     -Continue with low-dose aspirin 81 mg daily and statin    5. TAA:   Mild ascending aortic dilatation of 3.8 cm per last echo. -BP control, goal SBP < 120 mmhg. 6.  Fatigue and atypical chest pain:  I suspect patient's chest pains are musculoskeletal in origin, doubt ischemic.    - Conservative approach for now. If symptoms deteriorate, will consider coronary CTA. Return in about 6 months (around 11/12/2023). I have spent 35 minutes of reviewing records and face to face time with the patient with more than 50% spent counseling and coordinating care.

## 2023-05-17 ENCOUNTER — PATIENT MESSAGE (OUTPATIENT)
Dept: INTERNAL MEDICINE CLINIC | Age: 68
End: 2023-05-17

## 2023-05-17 RX ORDER — METHOCARBAMOL 750 MG/1
750 TABLET, FILM COATED ORAL 3 TIMES DAILY PRN
Qty: 30 TABLET | Refills: 0 | Status: SHIPPED | OUTPATIENT
Start: 2023-05-17 | End: 2023-05-27

## 2023-05-17 NOTE — TELEPHONE ENCOUNTER
From: Nora Serna  To: Dr. Ibis Reyes: 5/17/2023 3:04 PM EDT  Subject: Refill on methcarbamoi 750 mg    Hello, would you please refill my muscle relaxer . My back still hurts most of the time and l take one of the relaxers to lay and get relief.  Since l and tugging on my  more in and out of bed- wheel chair it hurting more often and l take Tylenol most of the time but the relaxers help also  Thank you  Romelia Aguirre

## 2023-05-22 ENCOUNTER — TELEPHONE (OUTPATIENT)
Dept: CARDIOLOGY CLINIC | Age: 68
End: 2023-05-22

## 2023-05-22 NOTE — TELEPHONE ENCOUNTER
The patient is requesting an order for a  CT scan. Please call the patient back at [phone] once the order is placed.

## 2023-05-23 NOTE — TELEPHONE ENCOUNTER
The patient said she is returning a call but she didn't have a voicemail, please return call to patient at [phone].

## 2023-05-24 ENCOUNTER — OFFICE VISIT (OUTPATIENT)
Dept: CARDIOLOGY CLINIC | Age: 68
End: 2023-05-24
Payer: MEDICARE

## 2023-05-24 VITALS
DIASTOLIC BLOOD PRESSURE: 64 MMHG | SYSTOLIC BLOOD PRESSURE: 110 MMHG | HEART RATE: 60 BPM | WEIGHT: 262.6 LBS | BODY MASS INDEX: 46.52 KG/M2

## 2023-05-24 DIAGNOSIS — I71.9 AORTIC ANEURYSM WITHOUT RUPTURE, UNSPECIFIED PORTION OF AORTA (HCC): ICD-10-CM

## 2023-05-24 DIAGNOSIS — R06.09 DYSPNEA ON EXERTION: Primary | ICD-10-CM

## 2023-05-24 DIAGNOSIS — R94.30 ABNORMAL RESULT OF CARDIOVASCULAR FUNCTION STUDY, UNSPECIFIED: ICD-10-CM

## 2023-05-24 PROCEDURE — G8427 DOCREV CUR MEDS BY ELIG CLIN: HCPCS | Performed by: INTERNAL MEDICINE

## 2023-05-24 PROCEDURE — 3017F COLORECTAL CA SCREEN DOC REV: CPT | Performed by: INTERNAL MEDICINE

## 2023-05-24 PROCEDURE — 99214 OFFICE O/P EST MOD 30 MIN: CPT | Performed by: INTERNAL MEDICINE

## 2023-05-24 PROCEDURE — G8417 CALC BMI ABV UP PARAM F/U: HCPCS | Performed by: INTERNAL MEDICINE

## 2023-05-24 PROCEDURE — 1090F PRES/ABSN URINE INCON ASSESS: CPT | Performed by: INTERNAL MEDICINE

## 2023-05-24 PROCEDURE — G8399 PT W/DXA RESULTS DOCUMENT: HCPCS | Performed by: INTERNAL MEDICINE

## 2023-05-24 PROCEDURE — 1123F ACP DISCUSS/DSCN MKR DOCD: CPT | Performed by: INTERNAL MEDICINE

## 2023-05-24 PROCEDURE — 3078F DIAST BP <80 MM HG: CPT | Performed by: INTERNAL MEDICINE

## 2023-05-24 PROCEDURE — 1036F TOBACCO NON-USER: CPT | Performed by: INTERNAL MEDICINE

## 2023-05-24 PROCEDURE — 3074F SYST BP LT 130 MM HG: CPT | Performed by: INTERNAL MEDICINE

## 2023-05-24 RX ORDER — LOSARTAN POTASSIUM 50 MG/1
50 TABLET ORAL 2 TIMES DAILY
Qty: 180 TABLET | Refills: 3 | Status: SHIPPED | OUTPATIENT
Start: 2023-05-24

## 2023-05-24 NOTE — PROGRESS NOTES
Cc: HTN, HLP, atypical CP, PAD, PAT    HPI:     Whitney Diaz is a 77 yo overweight woman (BMI 39) with h/o SAL on CPAP, GERD, HTN, HLP, past smoker (quit '89), SVT/PAT, PAD, post-op DVT's on eliquis, MGUS (diagnosed 07/2022), varicose veins. Patient was seen by Dr. Sal Hollins and Dr Braydon Reese at [facility] in the past, last visit in 2014. She had chest pains at that time and underwent cardiac evaluation. Echo 07/2014: normal except for diastolic I (note, no valvular disease). Cath 07/2014: normal coronaries, normal EF 70%. ECG 3/26/19: normal.     Patient reported strong family history of premature CAD and heart failure. Her brother had a cardiac transplant in his 42's and her sisters had stents in their 42-51s. Patient reported palpitations with lightheadedness. 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 Echo 04/2019: normal (no mitral valve prolapse) except for moderate TR, RVSP 44. Zio monitor x 14 days (4/22/19): NSR with frequent SVT's. Patient was seen by Dr. Loa Snellen and was initially started on flecainide in addition to Toprol however patient developed blurry vision and flecainide was changed to verapamil. Exe nuc stress 5/20/20: small mild reversible defect in distal anteroseptal wall (apex is spared), nl EF and wall motion. Patient had mild CP prior to test, increased pain with exertion, improving with rest. ECG no changes, normal, duration only 3 minutes. LHC 5/21/2020: Normal coronaries, LVEDP 13 mmHg, LVEF normal.     Bilateral lower extremity arterial ultrasound 2/18/2021: Right mid SFA less than 50% stenosis     FLP 06/2022: , HDL 56, LDL 77, TG 86 on Pravachol 20 mg daily. Echo 11/2022: Moderate LVH, LVEF 60%, indeterminate diastolic function, normal RV, moderate LAE, normal valves, AoRoot 3.8 cm. Patient follows with Dr Lisa Lovell [facility]) for MGUS. Patient is here for follow-up.   Patient's  requires significant

## 2023-05-30 ENCOUNTER — HOSPITAL ENCOUNTER (OUTPATIENT)
Dept: CT IMAGING | Age: 68
Discharge: HOME | End: 2023-05-30
Payer: MEDICARE

## 2023-05-30 DIAGNOSIS — R94.30 ABNORMAL RESULT OF CARDIOVASCULAR FUNCTION STUDY, UNSPECIFIED: ICD-10-CM

## 2023-05-30 DIAGNOSIS — I71.9 AORTIC ANEURYSM WITHOUT RUPTURE, UNSPECIFIED PORTION OF AORTA (HCC): ICD-10-CM

## 2023-05-30 DIAGNOSIS — R06.09 DYSPNEA ON EXERTION: ICD-10-CM

## 2023-05-30 LAB
PERFORMED ON: ABNORMAL
POC CREATININE: 0.5 MG/DL (ref 0.6–1.2)
POC SAMPLE TYPE: ABNORMAL

## 2023-05-30 PROCEDURE — 75574 CT ANGIO HRT W/3D IMAGE: CPT

## 2023-05-30 PROCEDURE — 2500000003 HC RX 250 WO HCPCS: Performed by: STUDENT IN AN ORGANIZED HEALTH CARE EDUCATION/TRAINING PROGRAM

## 2023-05-30 PROCEDURE — 82565 ASSAY OF CREATININE: CPT

## 2023-05-30 PROCEDURE — 6360000004 HC RX CONTRAST MEDICATION: Performed by: INTERNAL MEDICINE

## 2023-05-30 PROCEDURE — 6370000000 HC RX 637 (ALT 250 FOR IP): Performed by: STUDENT IN AN ORGANIZED HEALTH CARE EDUCATION/TRAINING PROGRAM

## 2023-05-30 RX ORDER — METOPROLOL TARTRATE 5 MG/5ML
5 INJECTION INTRAVENOUS EVERY 5 MIN PRN
Status: DISCONTINUED | OUTPATIENT
Start: 2023-05-30 | End: 2023-05-31 | Stop reason: HOSPADM

## 2023-05-30 RX ORDER — NITROGLYCERIN 0.4 MG/1
0.4 TABLET SUBLINGUAL EVERY 5 MIN PRN
Status: COMPLETED | OUTPATIENT
Start: 2023-05-30 | End: 2023-05-30

## 2023-05-30 RX ADMIN — IOPAMIDOL 80 ML: 755 INJECTION, SOLUTION INTRAVENOUS at 14:15

## 2023-05-30 RX ADMIN — NITROGLYCERIN 0.4 MG: 0.4 TABLET SUBLINGUAL at 14:20

## 2023-05-30 RX ADMIN — METOPROLOL TARTRATE 5 MG: 5 INJECTION INTRAVENOUS at 14:15

## 2023-05-30 NOTE — DISCHARGE INSTRUCTIONS
[facility] your medical [facility]. During your examination, you were given a Beta Blocker called Metopropol or Lopressor to help slow down your heart rate. The dose of the medication you were given was _metroprolol 10 mg__ and 1 sublingual nitroglycerin. Although there are few side effects from this medication when given in small amounts (doses) it is important you follow a few important instructions:    Notify the doctor that ordered your test or go to the nearest emergency room if you experience any of the following:  difficulty breathing  dizziness  extreme fatigue  pounding or irregular heart beat    Continue your usual diet, increase fluids today. (Four 8 ounce glasses of water). 4.You may return back to your normal activity and routine tomorrow. Test results will be sent to your Physician.     If you take Actoplus Met, Avandamet, Glucophage, Glucophage XR, Glucovance, Metformin, Metaglip, Riomet, or Fortmet hold medication for 48 hours after procedure and resum

## 2023-06-01 DIAGNOSIS — G44.039 EPISODIC PAROXYSMAL HEMICRANIA, NOT INTRACTABLE: ICD-10-CM

## 2023-06-01 RX ORDER — GABAPENTIN 100 MG/1
CAPSULE ORAL
Qty: 120 CAPSULE | Refills: 2 | Status: SHIPPED | OUTPATIENT
Start: 2023-06-01 | End: 2023-07-01

## 2023-06-02 NOTE — RESULT ENCOUNTER NOTE
Please let patient know that her coronary CTA revealed normal coronaries. Her chest pains are not cardiac in origin.

## 2023-06-19 SDOH — HEALTH STABILITY: PHYSICAL HEALTH: ON AVERAGE, HOW MANY DAYS PER WEEK DO YOU ENGAGE IN MODERATE TO STRENUOUS EXERCISE (LIKE A BRISK WALK)?: 3 DAYS

## 2023-06-19 ASSESSMENT — PATIENT HEALTH QUESTIONNAIRE - PHQ9
4. FEELING TIRED OR HAVING LITTLE ENERGY: 3
SUM OF ALL RESPONSES TO PHQ QUESTIONS 1-9: 11
SUM OF ALL RESPONSES TO PHQ9 QUESTIONS 1 & 2: 1
5. POOR APPETITE OR OVEREATING: 2
SUM OF ALL RESPONSES TO PHQ QUESTIONS 1-9: 11
SUM OF ALL RESPONSES TO PHQ QUESTIONS 1-9: 11
2. FEELING DOWN, DEPRESSED OR HOPELESS: 0
8. MOVING OR SPEAKING SO SLOWLY THAT OTHER PEOPLE COULD HAVE NOTICED. OR THE OPPOSITE, BEING SO FIGETY OR RESTLESS THAT YOU HAVE BEEN MOVING AROUND A LOT MORE THAN USUAL: 0
10. IF YOU CHECKED OFF ANY PROBLEMS, HOW DIFFICULT HAVE THESE PROBLEMS MADE IT FOR YOU TO DO YOUR WORK, TAKE CARE OF THINGS AT HOME, OR GET ALONG WITH OTHER PEOPLE: 0
3. TROUBLE FALLING OR STAYING ASLEEP: 3
7. TROUBLE CONCENTRATING ON THINGS, SUCH AS READING THE NEWSPAPER OR WATCHING TELEVISION: 2
1. LITTLE INTEREST OR PLEASURE IN DOING THINGS: 1
SUM OF ALL RESPONSES TO PHQ QUESTIONS 1-9: 11
6. FEELING BAD ABOUT YOURSELF - OR THAT YOU ARE A FAILURE OR HAVE LET YOURSELF OR YOUR FAMILY DOWN: 0
9. THOUGHTS THAT YOU WOULD BE BETTER OFF DEAD, OR OF HURTING YOURSELF: 0

## 2023-06-19 ASSESSMENT — LIFESTYLE VARIABLES
HOW OFTEN DO YOU HAVE A DRINK CONTAINING ALCOHOL: 1
HOW MANY STANDARD DRINKS CONTAINING ALCOHOL DO YOU HAVE ON A TYPICAL DAY: 0
HOW OFTEN DO YOU HAVE A DRINK CONTAINING ALCOHOL: NEVER
HOW OFTEN DO YOU HAVE SIX OR MORE DRINKS ON ONE OCCASION: 1
HOW MANY STANDARD DRINKS CONTAINING ALCOHOL DO YOU HAVE ON A TYPICAL DAY: PATIENT DOES NOT DRINK

## 2023-06-22 ENCOUNTER — OFFICE VISIT (OUTPATIENT)
Dept: INTERNAL MEDICINE CLINIC | Age: 68
End: 2023-06-22
Payer: MEDICARE

## 2023-06-22 VITALS
HEART RATE: 56 BPM | BODY MASS INDEX: 46.6 KG/M2 | HEIGHT: 63 IN | DIASTOLIC BLOOD PRESSURE: 74 MMHG | OXYGEN SATURATION: 95 % | WEIGHT: 263 LBS | SYSTOLIC BLOOD PRESSURE: 150 MMHG

## 2023-06-22 DIAGNOSIS — L71.9 ROSACEA: ICD-10-CM

## 2023-06-22 DIAGNOSIS — R60.0 LOWER EXTREMITY EDEMA: ICD-10-CM

## 2023-06-22 DIAGNOSIS — K44.9 HIATAL HERNIA: ICD-10-CM

## 2023-06-22 DIAGNOSIS — E78.5 HYPERLIPIDEMIA, UNSPECIFIED HYPERLIPIDEMIA TYPE: ICD-10-CM

## 2023-06-22 DIAGNOSIS — Z00.00 MEDICARE ANNUAL WELLNESS VISIT, SUBSEQUENT: Primary | ICD-10-CM

## 2023-06-22 PROCEDURE — 3078F DIAST BP <80 MM HG: CPT | Performed by: INTERNAL MEDICINE

## 2023-06-22 PROCEDURE — 3017F COLORECTAL CA SCREEN DOC REV: CPT | Performed by: INTERNAL MEDICINE

## 2023-06-22 PROCEDURE — G8427 DOCREV CUR MEDS BY ELIG CLIN: HCPCS | Performed by: INTERNAL MEDICINE

## 2023-06-22 PROCEDURE — 1036F TOBACCO NON-USER: CPT | Performed by: INTERNAL MEDICINE

## 2023-06-22 PROCEDURE — 99214 OFFICE O/P EST MOD 30 MIN: CPT | Performed by: INTERNAL MEDICINE

## 2023-06-22 PROCEDURE — 3074F SYST BP LT 130 MM HG: CPT | Performed by: INTERNAL MEDICINE

## 2023-06-22 PROCEDURE — G8399 PT W/DXA RESULTS DOCUMENT: HCPCS | Performed by: INTERNAL MEDICINE

## 2023-06-22 PROCEDURE — G0439 PPPS, SUBSEQ VISIT: HCPCS | Performed by: INTERNAL MEDICINE

## 2023-06-22 PROCEDURE — 1090F PRES/ABSN URINE INCON ASSESS: CPT | Performed by: INTERNAL MEDICINE

## 2023-06-22 PROCEDURE — 1123F ACP DISCUSS/DSCN MKR DOCD: CPT | Performed by: INTERNAL MEDICINE

## 2023-06-22 PROCEDURE — G8417 CALC BMI ABV UP PARAM F/U: HCPCS | Performed by: INTERNAL MEDICINE

## 2023-06-22 RX ORDER — TRAZODONE HYDROCHLORIDE 50 MG/1
50 TABLET ORAL NIGHTLY
Qty: 30 TABLET | Refills: 1 | Status: SHIPPED | OUTPATIENT
Start: 2023-06-22 | End: 2023-08-21

## 2023-06-22 RX ORDER — DOXYCYCLINE 100 MG/1
100 CAPSULE ORAL DAILY
Qty: 30 CAPSULE | Refills: 0 | Status: SHIPPED | OUTPATIENT
Start: 2023-06-22

## 2023-06-22 RX ORDER — DOXYCYCLINE 100 MG/1
100 CAPSULE ORAL DAILY
Qty: 90 CAPSULE | OUTPATIENT
Start: 2023-06-22

## 2023-06-22 RX ORDER — DOXYCYCLINE 100 MG/1
100 CAPSULE ORAL DAILY
COMMUNITY
End: 2023-06-22 | Stop reason: SDUPTHER

## 2023-06-22 RX ORDER — FUROSEMIDE 20 MG/1
20 TABLET ORAL DAILY
Qty: 30 TABLET | Refills: 0 | Status: SHIPPED | OUTPATIENT
Start: 2023-06-22 | End: 2023-07-22

## 2023-06-28 ENCOUNTER — OFFICE VISIT (OUTPATIENT)
Dept: CARDIOLOGY CLINIC | Age: 68
End: 2023-06-28
Payer: MEDICARE

## 2023-06-28 VITALS
SYSTOLIC BLOOD PRESSURE: 130 MMHG | BODY MASS INDEX: 46.26 KG/M2 | DIASTOLIC BLOOD PRESSURE: 70 MMHG | HEART RATE: 59 BPM | WEIGHT: 263.2 LBS

## 2023-06-28 DIAGNOSIS — I10 ESSENTIAL HYPERTENSION: Primary | ICD-10-CM

## 2023-06-28 PROCEDURE — 3078F DIAST BP <80 MM HG: CPT | Performed by: INTERNAL MEDICINE

## 2023-06-28 PROCEDURE — 3017F COLORECTAL CA SCREEN DOC REV: CPT | Performed by: INTERNAL MEDICINE

## 2023-06-28 PROCEDURE — 1123F ACP DISCUSS/DSCN MKR DOCD: CPT | Performed by: INTERNAL MEDICINE

## 2023-06-28 PROCEDURE — 1090F PRES/ABSN URINE INCON ASSESS: CPT | Performed by: INTERNAL MEDICINE

## 2023-06-28 PROCEDURE — 1036F TOBACCO NON-USER: CPT | Performed by: INTERNAL MEDICINE

## 2023-06-28 PROCEDURE — 3075F SYST BP GE 130 - 139MM HG: CPT | Performed by: INTERNAL MEDICINE

## 2023-06-28 PROCEDURE — G8399 PT W/DXA RESULTS DOCUMENT: HCPCS | Performed by: INTERNAL MEDICINE

## 2023-06-28 PROCEDURE — G8427 DOCREV CUR MEDS BY ELIG CLIN: HCPCS | Performed by: INTERNAL MEDICINE

## 2023-06-28 PROCEDURE — 99214 OFFICE O/P EST MOD 30 MIN: CPT | Performed by: INTERNAL MEDICINE

## 2023-06-28 PROCEDURE — G8417 CALC BMI ABV UP PARAM F/U: HCPCS | Performed by: INTERNAL MEDICINE

## 2023-06-28 RX ORDER — LOSARTAN POTASSIUM 50 MG/1
50 TABLET ORAL DAILY
Qty: 90 TABLET | Refills: 3 | Status: SHIPPED | OUTPATIENT
Start: 2023-06-28

## 2023-07-05 DIAGNOSIS — R60.0 LOWER EXTREMITY EDEMA: ICD-10-CM

## 2023-07-05 DIAGNOSIS — K44.9 HIATAL HERNIA: ICD-10-CM

## 2023-07-05 DIAGNOSIS — E78.5 HYPERLIPIDEMIA, UNSPECIFIED HYPERLIPIDEMIA TYPE: ICD-10-CM

## 2023-07-06 LAB
ALBUMIN SERPL-MCNC: 4.3 G/DL (ref 3.4–5)
ALBUMIN/GLOB SERPL: 2 {RATIO} (ref 1.1–2.2)
ALP SERPL-CCNC: 94 U/L (ref 40–129)
ALT SERPL-CCNC: 20 U/L (ref 10–40)
ANION GAP SERPL CALCULATED.3IONS-SCNC: 11 MMOL/L (ref 3–16)
AST SERPL-CCNC: 18 U/L (ref 15–37)
BILIRUB SERPL-MCNC: 0.3 MG/DL (ref 0–1)
BUN SERPL-MCNC: 14 MG/DL (ref 7–20)
CALCIUM SERPL-MCNC: 10 MG/DL (ref 8.3–10.6)
CHLORIDE SERPL-SCNC: 101 MMOL/L (ref 99–110)
CHOLEST SERPL-MCNC: 151 MG/DL (ref 0–199)
CO2 SERPL-SCNC: 25 MMOL/L (ref 21–32)
CREAT SERPL-MCNC: 0.7 MG/DL (ref 0.6–1.2)
GFR SERPLBLD CREATININE-BSD FMLA CKD-EPI: >60 ML/MIN/{1.73_M2}
GLUCOSE SERPL-MCNC: 101 MG/DL (ref 70–99)
HDLC SERPL-MCNC: 61 MG/DL (ref 40–60)
LDLC SERPL CALC-MCNC: 65 MG/DL
MAGNESIUM SERPL-MCNC: 1.9 MG/DL (ref 1.8–2.4)
POTASSIUM SERPL-SCNC: 4.3 MMOL/L (ref 3.5–5.1)
PROT SERPL-MCNC: 6.4 G/DL (ref 6.4–8.2)
SODIUM SERPL-SCNC: 137 MMOL/L (ref 136–145)
TRIGL SERPL-MCNC: 124 MG/DL (ref 0–150)
VLDLC SERPL CALC-MCNC: 25 MG/DL

## 2023-07-13 DIAGNOSIS — F32.4 MAJOR DEPRESSIVE DISORDER WITH SINGLE EPISODE, IN PARTIAL REMISSION (HCC): ICD-10-CM

## 2023-07-13 RX ORDER — BUPROPION HYDROCHLORIDE 150 MG/1
150 TABLET ORAL EVERY MORNING
Qty: 90 TABLET | Refills: 1 | Status: SHIPPED | OUTPATIENT
Start: 2023-07-13

## 2023-07-20 SDOH — HEALTH STABILITY: PHYSICAL HEALTH: ON AVERAGE, HOW MANY DAYS PER WEEK DO YOU ENGAGE IN MODERATE TO STRENUOUS EXERCISE (LIKE A BRISK WALK)?: 3 DAYS

## 2023-07-20 SDOH — HEALTH STABILITY: PHYSICAL HEALTH: ON AVERAGE, HOW MANY MINUTES DO YOU ENGAGE IN EXERCISE AT THIS LEVEL?: 50 MIN

## 2023-07-20 ASSESSMENT — SOCIAL DETERMINANTS OF HEALTH (SDOH)
WITHIN THE LAST YEAR, HAVE YOU BEEN HUMILIATED OR EMOTIONALLY ABUSED IN OTHER WAYS BY YOUR PARTNER OR EX-PARTNER?: YES
WITHIN THE LAST YEAR, HAVE TO BEEN RAPED OR FORCED TO HAVE ANY KIND OF SEXUAL ACTIVITY BY YOUR PARTNER OR EX-PARTNER?: NO
WITHIN THE LAST YEAR, HAVE YOU BEEN AFRAID OF YOUR PARTNER OR EX-PARTNER?: NO
WITHIN THE LAST YEAR, HAVE YOU BEEN KICKED, HIT, SLAPPED, OR OTHERWISE PHYSICALLY HURT BY YOUR PARTNER OR EX-PARTNER?: NO

## 2023-07-21 ENCOUNTER — OFFICE VISIT (OUTPATIENT)
Dept: ORTHOPEDIC SURGERY | Age: 68
End: 2023-07-21

## 2023-07-21 VITALS — HEIGHT: 63 IN | WEIGHT: 259 LBS | BODY MASS INDEX: 45.89 KG/M2

## 2023-07-21 DIAGNOSIS — M70.51 PES ANSERINUS BURSITIS OF RIGHT KNEE: Primary | ICD-10-CM

## 2023-07-21 DIAGNOSIS — Z96.653 HISTORY OF TOTAL KNEE ARTHROPLASTY, BILATERAL: ICD-10-CM

## 2023-07-21 RX ORDER — TRIAMCINOLONE ACETONIDE 40 MG/ML
40 INJECTION, SUSPENSION INTRA-ARTICULAR; INTRAMUSCULAR ONCE
Status: COMPLETED | OUTPATIENT
Start: 2023-07-21 | End: 2023-07-21

## 2023-07-21 RX ORDER — BUPIVACAINE HYDROCHLORIDE 2.5 MG/ML
2 INJECTION, SOLUTION INFILTRATION; PERINEURAL ONCE
Status: COMPLETED | OUTPATIENT
Start: 2023-07-21 | End: 2023-07-21

## 2023-07-21 RX ADMIN — BUPIVACAINE HYDROCHLORIDE 5 MG: 2.5 INJECTION, SOLUTION INFILTRATION; PERINEURAL at 10:16

## 2023-07-21 RX ADMIN — TRIAMCINOLONE ACETONIDE 40 MG: 40 INJECTION, SUSPENSION INTRA-ARTICULAR; INTRAMUSCULAR at 10:16

## 2023-07-22 NOTE — PROGRESS NOTES
denies fever, chills, weight loss  MSK: denies pain in other joints, muscle aches  Neurological: denies numbness, tingling, weakness    Exam:  Appearance: sitting in exam room chair, appears to be in no acute distress, awake and alert  Resp: unlabored breathing on room air  Skin: warm, dry and intact with out erythema or significant increased temperature  Neuro: grossly intact both lower extremities. Intact sensation to light touch. Motor exam 4+ to 5/5 in all major motor groups. Right knee: Incision is well-healed. Range of motion 0 to 120 degrees. The knee is stable to varus and valgus stress and stable to anterior and posterior drawer sign. Tender over the pes bursa. Sensation is intact light touch. There is brisk capillary refill. There is 5/5 muscle strength in all muscle groups. Imaging:  Prior bilateral knee radiographs were reviewed today. She has bilateral total knee arthroplasty prostheses cemented in the place. These are both of a cruciate retaining design. There are no signs of osteolysis, loosening, fracture, dislocation. Gross alignment appears appropriate. Assessment:  Bilateral total knee arthroplasty with mild flexion instability and pes bursitis, more symptomatic on the right    Plan:  We discussed the diagnosis and treatment options. I encouraged her to continue her aquatic physical therapy exercises, especially focusing on quadricep strengthening of both legs. I recommended and performed a steroid injection of the right pes bursa today to help with her medial knee pain. We discussed that her best treatment going forward is intermittent pes bursa steroid injections and physical therapy exercises, otherwise the treatment would be revision total knee arthroplasty. She will continue activity as tolerated and follow-up on an annual basis or sooner as needed. Procedure:  After verbal consent was obtained, the patient's right knee was prepped with alcohol.  Skin was anesthetized

## 2023-07-26 DIAGNOSIS — R60.0 LOWER EXTREMITY EDEMA: ICD-10-CM

## 2023-07-26 RX ORDER — FUROSEMIDE 20 MG/1
20 TABLET ORAL DAILY
Qty: 30 TABLET | Refills: 0 | Status: SHIPPED | OUTPATIENT
Start: 2023-07-26 | End: 2023-08-25

## 2023-08-09 DIAGNOSIS — L71.9 ROSACEA: ICD-10-CM

## 2023-08-09 RX ORDER — DOXYCYCLINE 100 MG/1
100 CAPSULE ORAL DAILY
Qty: 30 CAPSULE | Refills: 0 | Status: SHIPPED | OUTPATIENT
Start: 2023-08-09 | End: 2023-08-09

## 2023-08-09 RX ORDER — DOXYCYCLINE 100 MG/1
100 CAPSULE ORAL DAILY
Qty: 90 CAPSULE | Refills: 0 | Status: SHIPPED | OUTPATIENT
Start: 2023-08-09

## 2023-08-25 ENCOUNTER — TELEPHONE (OUTPATIENT)
Dept: ORTHOPEDIC SURGERY | Age: 68
End: 2023-08-25

## 2023-08-25 SDOH — HEALTH STABILITY: PHYSICAL HEALTH: ON AVERAGE, HOW MANY DAYS PER WEEK DO YOU ENGAGE IN MODERATE TO STRENUOUS EXERCISE (LIKE A BRISK WALK)?: 3 DAYS

## 2023-08-25 SDOH — HEALTH STABILITY: PHYSICAL HEALTH: ON AVERAGE, HOW MANY MINUTES DO YOU ENGAGE IN EXERCISE AT THIS LEVEL?: 50 MIN

## 2023-08-25 ASSESSMENT — SOCIAL DETERMINANTS OF HEALTH (SDOH)

## 2023-08-28 ENCOUNTER — OFFICE VISIT (OUTPATIENT)
Dept: ORTHOPEDIC SURGERY | Age: 68
End: 2023-08-28
Payer: MEDICARE

## 2023-08-28 ENCOUNTER — TELEPHONE (OUTPATIENT)
Dept: ORTHOPEDIC SURGERY | Age: 68
End: 2023-08-28

## 2023-08-28 VITALS — HEIGHT: 63 IN | BODY MASS INDEX: 44.65 KG/M2 | RESPIRATION RATE: 16 BRPM | WEIGHT: 252 LBS

## 2023-08-28 DIAGNOSIS — M25.571 RIGHT ANKLE PAIN, UNSPECIFIED CHRONICITY: Primary | ICD-10-CM

## 2023-08-28 PROCEDURE — G8427 DOCREV CUR MEDS BY ELIG CLIN: HCPCS | Performed by: PHYSICIAN ASSISTANT

## 2023-08-28 PROCEDURE — G8417 CALC BMI ABV UP PARAM F/U: HCPCS | Performed by: PHYSICIAN ASSISTANT

## 2023-08-28 PROCEDURE — 1036F TOBACCO NON-USER: CPT | Performed by: PHYSICIAN ASSISTANT

## 2023-08-28 PROCEDURE — 1123F ACP DISCUSS/DSCN MKR DOCD: CPT | Performed by: PHYSICIAN ASSISTANT

## 2023-08-28 PROCEDURE — G8399 PT W/DXA RESULTS DOCUMENT: HCPCS | Performed by: PHYSICIAN ASSISTANT

## 2023-08-28 PROCEDURE — 1090F PRES/ABSN URINE INCON ASSESS: CPT | Performed by: PHYSICIAN ASSISTANT

## 2023-08-28 PROCEDURE — 99213 OFFICE O/P EST LOW 20 MIN: CPT | Performed by: PHYSICIAN ASSISTANT

## 2023-08-28 PROCEDURE — 3017F COLORECTAL CA SCREEN DOC REV: CPT | Performed by: PHYSICIAN ASSISTANT

## 2023-08-28 RX ORDER — METHYLPREDNISOLONE 4 MG/1
TABLET ORAL
Qty: 1 KIT | Refills: 0 | Status: SHIPPED | OUTPATIENT
Start: 2023-08-28

## 2023-09-01 DIAGNOSIS — K21.9 GASTROESOPHAGEAL REFLUX DISEASE, UNSPECIFIED WHETHER ESOPHAGITIS PRESENT: ICD-10-CM

## 2023-09-01 RX ORDER — PANTOPRAZOLE SODIUM 40 MG/1
TABLET, DELAYED RELEASE ORAL
Qty: 180 TABLET | Refills: 0 | Status: SHIPPED | OUTPATIENT
Start: 2023-09-01

## 2023-09-03 DIAGNOSIS — R60.0 LOWER EXTREMITY EDEMA: ICD-10-CM

## 2023-09-03 RX ORDER — FUROSEMIDE 20 MG/1
20 TABLET ORAL DAILY
Qty: 30 TABLET | Refills: 0 | Status: SHIPPED | OUTPATIENT
Start: 2023-09-03 | End: 2023-10-03

## 2023-09-05 RX ORDER — TRAZODONE HYDROCHLORIDE 50 MG/1
TABLET ORAL
Qty: 30 TABLET | Refills: 1 | Status: SHIPPED | OUTPATIENT
Start: 2023-09-05

## 2023-09-05 RX ORDER — FUROSEMIDE 20 MG/1
20 TABLET ORAL DAILY
Qty: 90 TABLET | OUTPATIENT
Start: 2023-09-05 | End: 2023-10-05

## 2023-09-09 ENCOUNTER — APPOINTMENT (OUTPATIENT)
Dept: GENERAL RADIOLOGY | Age: 68
End: 2023-09-09
Payer: MEDICARE

## 2023-09-09 ENCOUNTER — HOSPITAL ENCOUNTER (EMERGENCY)
Age: 68
Discharge: HOME | End: 2023-09-09
Attending: STUDENT IN AN ORGANIZED HEALTH CARE EDUCATION/TRAINING PROGRAM
Payer: MEDICARE

## 2023-09-09 VITALS
WEIGHT: 259.8 LBS | BODY MASS INDEX: 46.03 KG/M2 | DIASTOLIC BLOOD PRESSURE: 72 MMHG | OXYGEN SATURATION: 97 % | RESPIRATION RATE: 20 BRPM | HEIGHT: 63 IN | TEMPERATURE: 97.9 F | SYSTOLIC BLOOD PRESSURE: 171 MMHG | HEART RATE: 58 BPM

## 2023-09-09 DIAGNOSIS — M25.562 ACUTE PAIN OF LEFT KNEE: Primary | ICD-10-CM

## 2023-09-09 PROCEDURE — 96372 THER/PROPH/DIAG INJ SC/IM: CPT

## 2023-09-09 PROCEDURE — 73562 X-RAY EXAM OF KNEE 3: CPT

## 2023-09-09 PROCEDURE — 99284 EMERGENCY DEPT VISIT MOD MDM: CPT

## 2023-09-09 PROCEDURE — 6360000002 HC RX W HCPCS: Performed by: STUDENT IN AN ORGANIZED HEALTH CARE EDUCATION/TRAINING PROGRAM

## 2023-09-09 RX ORDER — KETOROLAC TROMETHAMINE 30 MG/ML
15 INJECTION, SOLUTION INTRAMUSCULAR; INTRAVENOUS ONCE
Status: COMPLETED | OUTPATIENT
Start: 2023-09-09 | End: 2023-09-09

## 2023-09-09 RX ORDER — OXYCODONE HYDROCHLORIDE 5 MG/1
5 TABLET ORAL EVERY 6 HOURS PRN
Qty: 6 TABLET | Refills: 0 | Status: SHIPPED | OUTPATIENT
Start: 2023-09-09 | End: 2023-09-11

## 2023-09-09 RX ORDER — OXYCODONE HYDROCHLORIDE 5 MG/1
5 TABLET ORAL EVERY 6 HOURS PRN
Qty: 6 TABLET | Refills: 0 | Status: SHIPPED | OUTPATIENT
Start: 2023-09-09 | End: 2023-09-09 | Stop reason: SDUPTHER

## 2023-09-09 RX ADMIN — KETOROLAC TROMETHAMINE 15 MG: 30 INJECTION, SOLUTION INTRAMUSCULAR; INTRAVENOUS at 18:10

## 2023-09-09 ASSESSMENT — PAIN SCALES - GENERAL
PAINLEVEL_OUTOF10: 10
PAINLEVEL_OUTOF10: 10

## 2023-09-09 ASSESSMENT — PAIN DESCRIPTION - LOCATION
LOCATION: KNEE
LOCATION: KNEE;LEG

## 2023-09-09 ASSESSMENT — PAIN DESCRIPTION - ORIENTATION
ORIENTATION: LEFT
ORIENTATION: LEFT;POSTERIOR

## 2023-09-09 ASSESSMENT — PAIN DESCRIPTION - DESCRIPTORS: DESCRIPTORS: ACHING

## 2023-09-09 ASSESSMENT — PAIN DESCRIPTION - ONSET: ONSET: ON-GOING

## 2023-09-09 ASSESSMENT — PAIN DESCRIPTION - FREQUENCY: FREQUENCY: CONTINUOUS

## 2023-09-09 ASSESSMENT — PAIN DESCRIPTION - PAIN TYPE: TYPE: ACUTE PAIN

## 2023-09-09 ASSESSMENT — PAIN - FUNCTIONAL ASSESSMENT: PAIN_FUNCTIONAL_ASSESSMENT: 0-10

## 2023-09-09 NOTE — ED NOTES
Patient prepared for and ready to be discharged. Patient discharged at this time in no acute distress after verbalizing understanding of discharge instructions. Patient left after receiving After Visit Summary instructions.        Luis M Torres RN  09/09/23 2186

## 2023-09-09 NOTE — DISCHARGE INSTRUCTIONS
You were seen in the emergency department today for left knee pain. Your xray were negative for any new problems with your bone, your knee replacement hardware or your joints. There is no swelling. As we discussed, please take the previously prescribed steroids. Follow up with your orthopedic surgeon in 1 week if your symptoms persist. It is safe to walk and do as much of your normal activities are you are able to tolerate. Opioids are a group of powerful medicines that relieve pain. They are stronger than pain medicines you can buy without a prescription. Opioids are generally safe if you take them only when needed for severe pain, and only for a short time. A short time means a few days or a week. They might help you recover more comfortably from your surgery or injury, and get back to your usual activities. However, opioids are dangerous if they are not used correctly. If they are misused (for example, if you take them when you don't need them for severe pain), there is a risk of getting addicted. Also, if you take too much at once, or take them with alcohol or certain other drugs, opioids can cause serious harm or even death from overdose. Your doctor will usually prescribe another pain reliever along with the opioid. This might include an NSAID such as ibuprofen (sample brand names: Advil, Motrin) or naproxen (sample brand name: Aleve), or acetaminophen, which is also known as paracetamol (sample brand name: Tylenol). That way, you can use less opioid pain medicine, and use the opioid only when an NSAID or acetaminophen is not enough to relieve your pain. For example, if you take acetaminophen and ibuprofen 3 or 4 times a day, you might get enough pain relief by taking only 1 opioid pill in the morning and 1 in the evening to help you sleep.  And if you keep taking acetaminophen and NSAIDs as your pain gets better, you should be able to stop the opioids first.    It's important to know that some opioid medicines come combined with acetaminophen or an NSAID in the same pill. If your medicine has both, you should not take any extra NSAIDs or acetaminophen without talking to your doctor first. Make sure that you know what is in each of your medicines and when to take them. Using Opioids Safely:    ? Do not drink alcohol while you are taking opioids. ?Do not take opioids with medicines that make you sleepy, or with any other medicines, unless your doctor tells you to. ?Do not drive a car, use dangerous machinery, or do other risky activities while taking an opioid medicine. Opioids can make you feel tired or have trouble thinking clearly. ?Store your opioids in a safe place, such as a locked cabinet. This will prevent children, teens, or anyone else from getting to them. When your pain gets better, get rid of any leftover medicines. Your doctor, nurse, or pharmacist can suggest ways to get rid of them. This might involve flushing them down the toilet, or mixing them with something like dirt or cat litter before putting the mixture in the trash. Some police stations and pharmacies also take leftover medicines. Opioid Side Effects: If you have taken too much of an opioid medicine or think that someone is having a drug overdose, immediately call for an ambulance (in the [facility], call 9-1-1) or get the person to the hospital. Do this if a person:    Can't seem to wake up or seems very drowsy  Becomes very confused  Is breathing very slowly or stops breathing  Passes out or has seizures  Becomes unable to urinate    Emergency responders can treat an opioid overdose with a reversal drug called \"naloxone. \" This can save the person's life. But it needs to be given as soon as possible. Some less dangerous side effects include:     Constipation - Your doctor or nurse might suggest you take a laxative or stool softener to prevent or treat constipation. It's also important to drink plenty of water.   Mild

## 2023-09-14 ENCOUNTER — OFFICE VISIT (OUTPATIENT)
Dept: INTERNAL MEDICINE CLINIC | Age: 68
End: 2023-09-14

## 2023-09-14 VITALS
HEART RATE: 52 BPM | DIASTOLIC BLOOD PRESSURE: 84 MMHG | BODY MASS INDEX: 44.6 KG/M2 | WEIGHT: 251.8 LBS | OXYGEN SATURATION: 97 % | SYSTOLIC BLOOD PRESSURE: 152 MMHG

## 2023-09-14 DIAGNOSIS — I10 ESSENTIAL HYPERTENSION: ICD-10-CM

## 2023-09-14 DIAGNOSIS — M79.10 MYALGIA: ICD-10-CM

## 2023-09-14 DIAGNOSIS — E55.9 VITAMIN D DEFICIENCY: ICD-10-CM

## 2023-09-14 DIAGNOSIS — G44.039 EPISODIC PAROXYSMAL HEMICRANIA, NOT INTRACTABLE: ICD-10-CM

## 2023-09-14 DIAGNOSIS — L71.9 ROSACEA: ICD-10-CM

## 2023-09-14 DIAGNOSIS — D22.9 ATYPICAL NEVUS: ICD-10-CM

## 2023-09-14 DIAGNOSIS — M79.10 MYALGIA: Primary | ICD-10-CM

## 2023-09-14 RX ORDER — DOXYCYCLINE 100 MG/1
100 CAPSULE ORAL DAILY
Qty: 90 CAPSULE | Refills: 0 | Status: SHIPPED | OUTPATIENT
Start: 2023-09-14

## 2023-09-14 RX ORDER — GABAPENTIN 300 MG/1
300 CAPSULE ORAL 3 TIMES DAILY
Qty: 90 CAPSULE | Refills: 0
Start: 2023-09-14 | End: 2023-10-14

## 2023-09-14 RX ORDER — CLOTRIMAZOLE AND BETAMETHASONE DIPROPIONATE 10; .64 MG/G; MG/G
CREAM TOPICAL
COMMUNITY
Start: 2020-08-03

## 2023-09-14 NOTE — PATIENT INSTRUCTIONS
Covid booster at pharmacy    Flu shot today    RSV vaccine $ pharmacy    Shingrix -pharmacy    Gabapentin - increase to 200 mg 2-3x/day  Then increase to 300 mg 3x/day  Let me know if that dose helps  May consider lyrica if no improvement pedro after increase to 600 mg 3x/day    [facility]  [phone]    Complete the steroid pack  Check blood pressure at home in one week and call me with results  If remains elevated greater than 140/90, will increase Losartan dose
Jocelynn PARKER, Alberto PARKER, Kiel Seay (PA student)

## 2023-09-14 NOTE — PROGRESS NOTES
release tablet Take 1 tablet by mouth daily 30 tablet 1    fluticasone (FLONASE) 50 MCG/ACT nasal spray 1 spray by Each Nostril route daily 16 g 3    aspirin EC 81 MG EC tablet Take 1 tablet by mouth daily 30 tablet 3    Omega-3 Fatty Acids (FISH OIL) 1000 MG CAPS Take 2 capsules by mouth daily      acetaminophen (TYLENOL) 500 MG tablet Take 1 tablet by mouth every 6 hours as needed for Pain      Multiple Vitamins-Minerals (CENTRUM SILVER PO) Take 1 tablet by mouth daily       diclofenac sodium (VOLTAREN) 1 % GEL Apply 4 g topically 4 times daily (Patient not taking: Reported on 9/14/2023) 50 g 0     No current facility-administered medications for this visit. Physical Exam  Vitals reviewed. Constitutional:       General: She is not in acute distress. HENT:      Head: Normocephalic and atraumatic. Cardiovascular:      Rate and Rhythm: Normal rate and regular rhythm. Pulmonary:      Effort: Pulmonary effort is normal.      Breath sounds: Normal breath sounds. Neurological:      Mental Status: She is alert and oriented to person, place, and time. Mental status is at baseline. Psychiatric:         Mood and Affect: Mood normal.           On this date 9/14/2023 I have spent 30 minutes reviewing previous notes, test results and face to face with the patient discussing the diagnosis and importance of compliance with the treatment plan as well as documenting on the day of the visit. This note was generated completely or in part utilizing Dragon dictation speech recognition software. Occasionally, words are mistranscribed and despite editing, the text may contain inaccuracies due to incorrect word recognition. If further clarification is needed please contact the office at [phone]          An electronic signature was used to authenticate this note.     --Leighton Cordero MD

## 2023-09-15 LAB
25(OH)D3 SERPL-MCNC: 29.6 NG/ML
CK SERPL-CCNC: 51 U/L (ref 26–192)

## 2023-09-17 RX ORDER — MELATONIN
1 DAILY
COMMUNITY
Start: 2023-09-17

## 2023-09-25 DIAGNOSIS — G44.039 EPISODIC PAROXYSMAL HEMICRANIA, NOT INTRACTABLE: ICD-10-CM

## 2023-09-25 RX ORDER — GABAPENTIN 100 MG/1
CAPSULE ORAL
Qty: 120 CAPSULE | Refills: 2 | OUTPATIENT
Start: 2023-09-25 | End: 2023-10-25

## 2023-09-25 NOTE — TELEPHONE ENCOUNTER
Duplicate request  Verified with pharmacy that she received it on 9/14/23 but no refills.    Last refill: 9/14/23

## 2023-10-05 ENCOUNTER — OFFICE VISIT (OUTPATIENT)
Dept: GYNECOLOGY | Age: 68
End: 2023-10-05
Payer: MEDICARE

## 2023-10-05 VITALS
HEIGHT: 63 IN | DIASTOLIC BLOOD PRESSURE: 80 MMHG | WEIGHT: 254 LBS | HEART RATE: 76 BPM | RESPIRATION RATE: 17 BRPM | SYSTOLIC BLOOD PRESSURE: 124 MMHG | BODY MASS INDEX: 45 KG/M2

## 2023-10-05 DIAGNOSIS — R14.0 ABDOMINAL BLOATING: ICD-10-CM

## 2023-10-05 DIAGNOSIS — R10.31 RIGHT LOWER QUADRANT PAIN: ICD-10-CM

## 2023-10-05 DIAGNOSIS — M85.88 OSTEOPENIA OF OTHER SITE: ICD-10-CM

## 2023-10-05 DIAGNOSIS — Z01.419 WELL WOMAN EXAM WITH ROUTINE GYNECOLOGICAL EXAM: Primary | ICD-10-CM

## 2023-10-05 PROCEDURE — G8484 FLU IMMUNIZE NO ADMIN: HCPCS | Performed by: OBSTETRICS & GYNECOLOGY

## 2023-10-05 PROCEDURE — 3074F SYST BP LT 130 MM HG: CPT | Performed by: OBSTETRICS & GYNECOLOGY

## 2023-10-05 PROCEDURE — 3079F DIAST BP 80-89 MM HG: CPT | Performed by: OBSTETRICS & GYNECOLOGY

## 2023-10-05 PROCEDURE — 99387 INIT PM E/M NEW PAT 65+ YRS: CPT | Performed by: OBSTETRICS & GYNECOLOGY

## 2023-10-05 RX ORDER — FUROSEMIDE 20 MG/1
20 TABLET ORAL 2 TIMES DAILY
COMMUNITY

## 2023-10-05 ASSESSMENT — ENCOUNTER SYMPTOMS
ABDOMINAL PAIN: 1
ABDOMINAL DISTENTION: 1
ALLERGIC/IMMUNOLOGIC NEGATIVE: 1
EYES NEGATIVE: 1
RESPIRATORY NEGATIVE: 1

## 2023-10-06 NOTE — PROGRESS NOTES
Subjective:      Patient ID: Jarod Velez is a 76 y.o. female. Patient is here for annual. Patient with some right lower/mid abdominal pain and bloating. Review of Systems   Constitutional: Negative. HENT: Negative. Eyes: Negative. Respiratory: Negative. Cardiovascular: Negative. Gastrointestinal:  Positive for abdominal distention and abdominal pain. Endocrine: Negative. Genitourinary: Negative. Musculoskeletal: Negative. Skin: Negative. Allergic/Immunologic: Negative. Neurological: Negative. Hematological: Negative. Psychiatric/Behavioral: Negative.        Date of Birth 1955  Past Medical History:   Diagnosis Date    Allergic rhinitis     Anxiety     Ascending aorta dilatation (HCC)     Atrial tachycardia     dr Anjel Vargas ([facility])    Carpal tunnel syndrome     Cervicalgia     Chronic back pain     low    Depression     DVT (deep venous thrombosis) ([address]) 2021    after surgery, bilateral    Fatty liver     GERD (gastroesophageal reflux disease)     has schatzki ring    Headache(784.0)     hemiplegic migraines, improved    Hernia hiatal     Histoplasmosis     Hot flashes     takes wellbutrin    Hyperlipidemia     Hypertension     MGUS (monoclonal gammopathy of unknown significance)     Obesity     Osteoarthritis     multiple joints    Other partial intestinal obstruction ([address]) 2021    wound dehiscence    PONV (postoperative nausea and vomiting)     PVD (peripheral vascular disease) ([address])     right darian reduced on screening    Shingles     nerve pain in her low back persists    Sleep apnea     CPAP set of 4    Tricuspid regurgitation     moderate     Past Surgical History:   Procedure Laterality Date    ABDOMEN SURGERY N/A 08/01/2021    REPAIR OF WOUND DEHISCENCE performed by Mango Pelletier MD at [facility] Right 2013    benign    BREAST SURGERY Right     Biopsy w/clip    BUNIONECTOMY      right     CARPAL TUNNEL RELEASE Left 10/11/2022    LEFT CARPAL

## 2023-10-17 ENCOUNTER — HOSPITAL ENCOUNTER (OUTPATIENT)
Dept: GENERAL RADIOLOGY | Age: 68
Discharge: HOME | End: 2023-10-17
Attending: OBSTETRICS & GYNECOLOGY
Payer: MEDICARE

## 2023-10-17 ENCOUNTER — HOSPITAL ENCOUNTER (OUTPATIENT)
Dept: CT IMAGING | Age: 68
Discharge: HOME | End: 2023-10-17
Attending: OBSTETRICS & GYNECOLOGY
Payer: MEDICARE

## 2023-10-17 DIAGNOSIS — M85.88 OSTEOPENIA OF OTHER SITE: ICD-10-CM

## 2023-10-17 DIAGNOSIS — R14.0 ABDOMINAL BLOATING: ICD-10-CM

## 2023-10-17 LAB
PERFORMED ON: NORMAL
POC CREATININE: 0.6 MG/DL (ref 0.6–1.2)
POC SAMPLE TYPE: NORMAL

## 2023-10-17 PROCEDURE — 6360000004 HC RX CONTRAST MEDICATION: Performed by: OBSTETRICS & GYNECOLOGY

## 2023-10-17 PROCEDURE — 74177 CT ABD & PELVIS W/CONTRAST: CPT

## 2023-10-17 PROCEDURE — 2500000003 HC RX 250 WO HCPCS: Performed by: OBSTETRICS & GYNECOLOGY

## 2023-10-17 PROCEDURE — 77080 DXA BONE DENSITY AXIAL: CPT

## 2023-10-17 PROCEDURE — 82565 ASSAY OF CREATININE: CPT

## 2023-10-17 RX ADMIN — BARIUM SULFATE 900 ML: 20 SUSPENSION ORAL at 14:16

## 2023-10-17 RX ADMIN — IOPAMIDOL 75 ML: 755 INJECTION, SOLUTION INTRAVENOUS at 14:15

## 2023-10-26 ENCOUNTER — OFFICE VISIT (OUTPATIENT)
Dept: PULMONOLOGY | Age: 68
End: 2023-10-26
Payer: MEDICARE

## 2023-10-26 VITALS
BODY MASS INDEX: 45 KG/M2 | RESPIRATION RATE: 16 BRPM | WEIGHT: 254 LBS | SYSTOLIC BLOOD PRESSURE: 110 MMHG | OXYGEN SATURATION: 93 % | DIASTOLIC BLOOD PRESSURE: 72 MMHG | HEART RATE: 57 BPM | HEIGHT: 63 IN

## 2023-10-26 DIAGNOSIS — G47.33 OSA ON CPAP: Primary | ICD-10-CM

## 2023-10-26 DIAGNOSIS — R05.3 CHRONIC COUGH: ICD-10-CM

## 2023-10-26 PROCEDURE — 1123F ACP DISCUSS/DSCN MKR DOCD: CPT | Performed by: INTERNAL MEDICINE

## 2023-10-26 PROCEDURE — 1036F TOBACCO NON-USER: CPT | Performed by: INTERNAL MEDICINE

## 2023-10-26 PROCEDURE — 1090F PRES/ABSN URINE INCON ASSESS: CPT | Performed by: INTERNAL MEDICINE

## 2023-10-26 PROCEDURE — G8399 PT W/DXA RESULTS DOCUMENT: HCPCS | Performed by: INTERNAL MEDICINE

## 2023-10-26 PROCEDURE — G8484 FLU IMMUNIZE NO ADMIN: HCPCS | Performed by: INTERNAL MEDICINE

## 2023-10-26 PROCEDURE — 3017F COLORECTAL CA SCREEN DOC REV: CPT | Performed by: INTERNAL MEDICINE

## 2023-10-26 PROCEDURE — 3078F DIAST BP <80 MM HG: CPT | Performed by: INTERNAL MEDICINE

## 2023-10-26 PROCEDURE — 3074F SYST BP LT 130 MM HG: CPT | Performed by: INTERNAL MEDICINE

## 2023-10-26 PROCEDURE — G8427 DOCREV CUR MEDS BY ELIG CLIN: HCPCS | Performed by: INTERNAL MEDICINE

## 2023-10-26 PROCEDURE — G8417 CALC BMI ABV UP PARAM F/U: HCPCS | Performed by: INTERNAL MEDICINE

## 2023-10-26 PROCEDURE — 99213 OFFICE O/P EST LOW 20 MIN: CPT | Performed by: INTERNAL MEDICINE

## 2023-11-01 DIAGNOSIS — G44.039 EPISODIC PAROXYSMAL HEMICRANIA, NOT INTRACTABLE: ICD-10-CM

## 2023-11-02 RX ORDER — GABAPENTIN 300 MG/1
300 CAPSULE ORAL 3 TIMES DAILY
Qty: 270 CAPSULE | Refills: 0 | Status: SHIPPED | OUTPATIENT
Start: 2023-11-02 | End: 2023-11-07 | Stop reason: SDUPTHER

## 2023-11-02 NOTE — TELEPHONE ENCOUNTER
Script received is not the correct dosage. Changed to new dosage of 300 mg 1 capsule TID.     Last appointment: 9/14/2023  Next appointment: 12/4/2023  Last refill: 9/14/23

## 2023-11-04 DIAGNOSIS — G44.039 EPISODIC PAROXYSMAL HEMICRANIA, NOT INTRACTABLE: ICD-10-CM

## 2023-11-04 NOTE — TELEPHONE ENCOUNTER
Spoke with pt and advised bracing and otc anti-inflammatories, which pt stated she is unable to take. Poor

## 2023-11-06 RX ORDER — GABAPENTIN 100 MG/1
CAPSULE ORAL
Qty: 120 CAPSULE | Refills: 2 | OUTPATIENT
Start: 2023-11-06 | End: 2023-12-06

## 2023-11-06 NOTE — TELEPHONE ENCOUNTER
Dosage changed on Gabapentin. Refill request received from pharmacy is the incorrect dose. Sent new dose to pharmacy on 11/2. We will deny this request as dosage change.

## 2023-11-07 ENCOUNTER — PATIENT MESSAGE (OUTPATIENT)
Dept: INTERNAL MEDICINE CLINIC | Age: 68
End: 2023-11-07

## 2023-11-07 DIAGNOSIS — G44.039 EPISODIC PAROXYSMAL HEMICRANIA, NOT INTRACTABLE: ICD-10-CM

## 2023-11-07 RX ORDER — GABAPENTIN 300 MG/1
600 CAPSULE ORAL 2 TIMES DAILY
Qty: 120 CAPSULE | Refills: 0 | Status: SHIPPED | OUTPATIENT
Start: 2023-11-07 | End: 2023-11-10 | Stop reason: DRUGHIGH

## 2023-11-07 NOTE — TELEPHONE ENCOUNTER
From: Reba Santana  To: Dr. Sandhu Eth: 11/7/2023 7:50 AM EST  Subject: Good Morning,    On my gabapentin, l would like to stay at my dose at 2 caps twice daily instead of the 300 mg 3x a day. .the drug store filled the new script and l turned it down.  Waldo was supposed to  Contact your office thank you,

## 2023-11-10 RX ORDER — GABAPENTIN 100 MG/1
200 CAPSULE ORAL 2 TIMES DAILY
Qty: 120 CAPSULE | Refills: 2 | Status: SHIPPED | OUTPATIENT
Start: 2023-11-10 | End: 2024-02-08

## 2023-11-10 NOTE — TELEPHONE ENCOUNTER
Script recently sent was still in correct. I have pended the 100mg 2 tablets BID which is what she is requesting. Can we please resend that?

## 2023-11-26 DIAGNOSIS — K21.9 GASTROESOPHAGEAL REFLUX DISEASE, UNSPECIFIED WHETHER ESOPHAGITIS PRESENT: ICD-10-CM

## 2023-11-27 RX ORDER — PANTOPRAZOLE SODIUM 40 MG/1
TABLET, DELAYED RELEASE ORAL
Qty: 180 TABLET | Refills: 0 | Status: SHIPPED | OUTPATIENT
Start: 2023-11-27

## 2023-12-12 ENCOUNTER — TELEPHONE (OUTPATIENT)
Dept: INTERNAL MEDICINE CLINIC | Age: 68
End: 2023-12-12

## 2023-12-12 NOTE — TELEPHONE ENCOUNTER
Abdirahman Lopez in stating she has increased swelling in her ankles. She started taking her Furosemide 20 mg yesterday. Should she continue taking this medication daily until swelling resolves ? She states she only takes this medication PRN usually    Please reach patient at telephone number provided to discuss     Thank you         She also states she appreciated you reaching out after her 's passing and apologizes for missing the call. She truly appreciates all your care for him.

## 2023-12-12 NOTE — TELEPHONE ENCOUNTER
Spoke to patient in regards to missed call, per   note:    She can take daily Lasix for 7 days but if not improving, please let me know asap. Also use compression stockings and elevate legs. Per patient understood and will do as recommended, no further questions for provider.

## 2023-12-21 ENCOUNTER — OFFICE VISIT (OUTPATIENT)
Dept: INTERNAL MEDICINE CLINIC | Age: 68
End: 2023-12-21
Payer: MEDICARE

## 2023-12-21 VITALS
HEIGHT: 63 IN | OXYGEN SATURATION: 96 % | TEMPERATURE: 96.4 F | SYSTOLIC BLOOD PRESSURE: 132 MMHG | HEART RATE: 64 BPM | RESPIRATION RATE: 16 BRPM | BODY MASS INDEX: 43.94 KG/M2 | DIASTOLIC BLOOD PRESSURE: 84 MMHG | WEIGHT: 248 LBS

## 2023-12-21 DIAGNOSIS — L71.9 ROSACEA: ICD-10-CM

## 2023-12-21 DIAGNOSIS — F32.4 MAJOR DEPRESSIVE DISORDER WITH SINGLE EPISODE, IN PARTIAL REMISSION (HCC): ICD-10-CM

## 2023-12-21 DIAGNOSIS — I10 ESSENTIAL HYPERTENSION: Primary | ICD-10-CM

## 2023-12-21 DIAGNOSIS — Z12.31 ENCOUNTER FOR SCREENING MAMMOGRAM FOR MALIGNANT NEOPLASM OF BREAST: ICD-10-CM

## 2023-12-21 DIAGNOSIS — K21.9 GASTROESOPHAGEAL REFLUX DISEASE, UNSPECIFIED WHETHER ESOPHAGITIS PRESENT: ICD-10-CM

## 2023-12-21 PROCEDURE — G8427 DOCREV CUR MEDS BY ELIG CLIN: HCPCS | Performed by: INTERNAL MEDICINE

## 2023-12-21 PROCEDURE — G8399 PT W/DXA RESULTS DOCUMENT: HCPCS | Performed by: INTERNAL MEDICINE

## 2023-12-21 PROCEDURE — 1123F ACP DISCUSS/DSCN MKR DOCD: CPT | Performed by: INTERNAL MEDICINE

## 2023-12-21 PROCEDURE — 3017F COLORECTAL CA SCREEN DOC REV: CPT | Performed by: INTERNAL MEDICINE

## 2023-12-21 PROCEDURE — 99214 OFFICE O/P EST MOD 30 MIN: CPT | Performed by: INTERNAL MEDICINE

## 2023-12-21 PROCEDURE — 3075F SYST BP GE 130 - 139MM HG: CPT | Performed by: INTERNAL MEDICINE

## 2023-12-21 PROCEDURE — 1090F PRES/ABSN URINE INCON ASSESS: CPT | Performed by: INTERNAL MEDICINE

## 2023-12-21 PROCEDURE — 3079F DIAST BP 80-89 MM HG: CPT | Performed by: INTERNAL MEDICINE

## 2023-12-21 PROCEDURE — G8484 FLU IMMUNIZE NO ADMIN: HCPCS | Performed by: INTERNAL MEDICINE

## 2023-12-21 PROCEDURE — 1036F TOBACCO NON-USER: CPT | Performed by: INTERNAL MEDICINE

## 2023-12-21 PROCEDURE — G8417 CALC BMI ABV UP PARAM F/U: HCPCS | Performed by: INTERNAL MEDICINE

## 2023-12-21 NOTE — PROGRESS NOTES
Hypertension     MGUS (monoclonal gammopathy of unknown significance)     Obesity     Osteoarthritis     multiple joints    Other partial intestinal obstruction ([address]) 2021    wound dehiscence    PONV (postoperative nausea and vomiting)     PVD (peripheral vascular disease) (HCC)     right darian reduced on screening    Shingles     nerve pain in her low back persists    Sleep apnea     CPAP set of 4    Tricuspid regurgitation     moderate       Current Outpatient Medications   Medication Sig Dispense Refill    pantoprazole (PROTONIX) 40 MG tablet TAKE 1 TABLET BY MOUTH TWICE DAILY 180 tablet 0    gabapentin (NEURONTIN) 100 MG capsule Take 2 capsules by mouth 2 times daily for 90 days.  120 capsule 2    furosemide (LASIX) 20 MG tablet Take 1 tablet by mouth 2 times daily      vitamin D3 (CHOLECALCIFEROL) 25 MCG (1000 UT) TABS tablet Take 1 tablet by mouth daily      doxycycline monohydrate (MONODOX) 100 MG capsule Take 1 capsule by mouth daily 90 capsule 0    diclofenac sodium (VOLTAREN) 1 % GEL Apply 4 g topically 4 times daily 50 g 0    traZODone (DESYREL) 50 MG tablet TAKE 1 TABLET BY MOUTH EVERY NIGHT 30 tablet 1    buPROPion (WELLBUTRIN XL) 150 MG extended release tablet TAKE 1 TABLET BY MOUTH EVERY MORNING 90 tablet 1    losartan (COZAAR) 50 MG tablet Take 1 tablet by mouth daily 90 tablet 3    pravastatin (PRAVACHOL) 20 MG tablet TAKE 1 TABLET BY MOUTH EVERY DAY 90 tablet 3    loratadine (CLARITIN) 10 MG tablet TAKE 1 TABLET BY MOUTH DAILY 90 tablet 3    verapamil (CALAN SR) 120 MG extended release tablet TAKE 1 TABLET BY MOUTH AT NIGHT 90 tablet 3    metoprolol succinate (TOPROL XL) 25 MG extended release tablet Take 1 tablet by mouth daily 30 tablet 1    fluticasone (FLONASE) 50 MCG/ACT nasal spray 1 spray by Each Nostril route daily 16 g 3    aspirin EC 81 MG EC tablet Take 1 tablet by mouth daily 30 tablet 3    Omega-3 Fatty Acids (FISH OIL) 1000 MG CAPS Take 2 capsules by mouth daily      acetaminophen

## 2023-12-26 RX ORDER — TRAZODONE HYDROCHLORIDE 50 MG/1
TABLET ORAL
Qty: 30 TABLET | Refills: 1 | Status: SHIPPED | OUTPATIENT
Start: 2023-12-26

## 2024-01-09 DIAGNOSIS — F32.4 MAJOR DEPRESSIVE DISORDER WITH SINGLE EPISODE, IN PARTIAL REMISSION (HCC): ICD-10-CM

## 2024-01-09 RX ORDER — METOPROLOL SUCCINATE 25 MG/1
25 TABLET, EXTENDED RELEASE ORAL DAILY
Qty: 90 TABLET | Refills: 3 | Status: SHIPPED | OUTPATIENT
Start: 2024-01-09

## 2024-01-09 RX ORDER — BUPROPION HYDROCHLORIDE 150 MG/1
150 TABLET ORAL EVERY MORNING
Qty: 90 TABLET | Refills: 1 | Status: SHIPPED | OUTPATIENT
Start: 2024-01-09

## 2024-01-09 NOTE — TELEPHONE ENCOUNTER
Requested Prescriptions     Pending Prescriptions Disp Refills    metoprolol succinate (TOPROL XL) 25 MG extended release tablet [Pharmacy Med Name: METOPROLOL ER SUCCINATE 50MG TABS] 90 tablet 3     Sig: Take 1 tablet by mouth daily            Last Office Visit: 6/28/2023     Next Office Visit: 1/17/2024

## 2024-01-11 ENCOUNTER — HOSPITAL ENCOUNTER (OUTPATIENT)
Dept: MAMMOGRAPHY | Age: 69
Discharge: HOME | End: 2024-01-11
Payer: MEDICARE

## 2024-01-11 VITALS — WEIGHT: 249 LBS | HEIGHT: 63 IN | BODY MASS INDEX: 44.12 KG/M2

## 2024-01-11 DIAGNOSIS — Z12.31 ENCOUNTER FOR SCREENING MAMMOGRAM FOR MALIGNANT NEOPLASM OF BREAST: ICD-10-CM

## 2024-01-11 PROCEDURE — 77063 BREAST TOMOSYNTHESIS BI: CPT

## 2024-01-17 ENCOUNTER — OFFICE VISIT (OUTPATIENT)
Dept: CARDIOLOGY CLINIC | Age: 69
End: 2024-01-17
Payer: MEDICARE

## 2024-01-17 VITALS
HEART RATE: 67 BPM | DIASTOLIC BLOOD PRESSURE: 78 MMHG | WEIGHT: 253 LBS | SYSTOLIC BLOOD PRESSURE: 150 MMHG | BODY MASS INDEX: 44.82 KG/M2

## 2024-01-17 DIAGNOSIS — I47.19 ATRIAL TACHYCARDIA: Primary | ICD-10-CM

## 2024-01-17 DIAGNOSIS — E66.01 OBESITY, CLASS III, BMI 40-49.9 (MORBID OBESITY) (HCC): ICD-10-CM

## 2024-01-17 DIAGNOSIS — I47.19 PAT (PAROXYSMAL ATRIAL TACHYCARDIA): ICD-10-CM

## 2024-01-17 PROCEDURE — 99214 OFFICE O/P EST MOD 30 MIN: CPT | Performed by: INTERNAL MEDICINE

## 2024-01-17 PROCEDURE — G8417 CALC BMI ABV UP PARAM F/U: HCPCS | Performed by: INTERNAL MEDICINE

## 2024-01-17 PROCEDURE — 3017F COLORECTAL CA SCREEN DOC REV: CPT | Performed by: INTERNAL MEDICINE

## 2024-01-17 PROCEDURE — 1123F ACP DISCUSS/DSCN MKR DOCD: CPT | Performed by: INTERNAL MEDICINE

## 2024-01-17 PROCEDURE — G8484 FLU IMMUNIZE NO ADMIN: HCPCS | Performed by: INTERNAL MEDICINE

## 2024-01-17 PROCEDURE — 3078F DIAST BP <80 MM HG: CPT | Performed by: INTERNAL MEDICINE

## 2024-01-17 PROCEDURE — G8399 PT W/DXA RESULTS DOCUMENT: HCPCS | Performed by: INTERNAL MEDICINE

## 2024-01-17 PROCEDURE — 1036F TOBACCO NON-USER: CPT | Performed by: INTERNAL MEDICINE

## 2024-01-17 PROCEDURE — 1090F PRES/ABSN URINE INCON ASSESS: CPT | Performed by: INTERNAL MEDICINE

## 2024-01-17 PROCEDURE — 3077F SYST BP >= 140 MM HG: CPT | Performed by: INTERNAL MEDICINE

## 2024-01-17 PROCEDURE — G8427 DOCREV CUR MEDS BY ELIG CLIN: HCPCS | Performed by: INTERNAL MEDICINE

## 2024-01-17 NOTE — PROGRESS NOTES
Cc: HTN, HLP, atypical CP, PAD, PAT    HPI:     Mike is a 69 yo overweight woman (BMI 45) with h/o SAL on CPAP, GERD, HTN, HLP, past smoker (quit '89), SVT/PAT, PAD, post-op DVT's on eliquis, MGUS (diagnosed 07/2022), varicose veins.     Patient was seen by Dr. Garrido and Dr Garcia at [facility] in the past, last visit in 2014. She had chest pains at that time and underwent cardiac evaluation.     Echo 07/2014: normal except for diastolic I (note, no valvular disease).     Cath 07/2014: normal coronaries, normal EF 70%.     ECG 3/26/19: normal.     Patient reported strong family history of premature CAD and heart failure. Her brother had a cardiac transplant in his 40's and her sisters had stents in their 40-50s. Patient reported palpitations with lightheadedness. 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     Echo 04/2019: normal (no mitral valve prolapse) except for moderate TR, RVSP 44.     Zio monitor x 14 days (4/22/19): NSR with frequent SVT's.     Patient was seen by Dr. Kelsey and was initially started on flecainide in addition to Toprol however patient developed blurry vision and flecainide was changed to verapamil.     Exe nuc stress 5/20/20: small mild reversible defect in distal anteroseptal wall (apex is spared), nl EF and wall motion. Patient had mild CP prior to test, increased pain with exertion, improving with rest. ECG no changes, normal, duration only 3 minutes.      C 5/21/2020: Normal coronaries, LVEDP 13 mmHg, LVEF normal.     Bilateral lower extremity arterial ultrasound 2/18/2021: Right mid SFA less than 50% stenosis     FLP 06/2022: , HDL 56, LDL 77, TG 86 on Pravachol 20 mg daily.     Echo 11/2022: Moderate LVH, LVEF 60%, indeterminate diastolic function, normal RV, moderate LAE, normal valves, AoRoot 3.8 cm.     Patient follows with Dr Messina ([facility]) for MGUS.     Coronary CTA 05/2023: Normal coronaries    Patient returns to

## 2024-01-25 ENCOUNTER — OFFICE VISIT (OUTPATIENT)
Dept: ENT CLINIC | Age: 69
End: 2024-01-25
Payer: MEDICARE

## 2024-01-25 VITALS
DIASTOLIC BLOOD PRESSURE: 84 MMHG | WEIGHT: 251.8 LBS | HEART RATE: 59 BPM | SYSTOLIC BLOOD PRESSURE: 151 MMHG | BODY MASS INDEX: 44.61 KG/M2 | HEIGHT: 63 IN | TEMPERATURE: 98.1 F

## 2024-01-25 DIAGNOSIS — J34.1 MUCOCELE OF ETHMOID SINUS: Primary | ICD-10-CM

## 2024-01-25 PROCEDURE — 31231 NASAL ENDOSCOPY DX: CPT | Performed by: OTOLARYNGOLOGY

## 2024-01-25 NOTE — PROGRESS NOTES
Patient here for one year follow up chronic ethmoid mucocele. Patient states no current complaints. Here for surveillance. Left.     PE: Nasal cavity clear.         Due to the patients chronic sinus disease and/or history of sinonasal neoplasm for surveillance a nasal endoscopy with or without debridement will be performed to complete a significant physical examination of the patient which cannot be performed by anterior rhinoscopy alone (failure of complete examination of the paranasal sinuses). Failure to provide this procedure may lead to late detection of significant chronic benign disease, acute exacerbation, resolution or failure of early diagnosis of recurrent cancer. The procedure report is present in the body of the chart.       Nasal Endoscopy    Pre OP: Left chronic ethmoid mucocele.   Post OP: Same  Reason: Surveillance.   Procedure: Nasal endoscopy  Surgeon: Horace Henley  Anesthesia: Afrin with 2% lidocaine  Estimated Blood Loss: None      After obtaining verbal consent from the patient 1% lidocaine with afrin was sprayed into the nasal cavities.  After allowing a time for anesthesia, a nasal endoscope was placed into the nostril.  The septum, inferior, and middle turbinates were examined.  The middle meatus, and sphenoethmoid recess was examined bilaterally.  Cultures were not obtained from the sinuses. There were no complications.     Pertinent positives included: There was not edema and purulence in the left middle meatus. There was not edema and purulence at the right middle meatus. Polyps were not identified in the  sinuses. Masses were not identified.     No change in left posterior ethmoid    Tolerated well without complication. I attest that I was present for and did the entire procedure myself.

## 2024-02-02 ENCOUNTER — HOSPITAL ENCOUNTER (EMERGENCY)
Age: 69
Discharge: HOME | End: 2024-02-02
Attending: EMERGENCY MEDICINE
Payer: MEDICARE

## 2024-02-02 ENCOUNTER — TELEPHONE (OUTPATIENT)
Dept: INTERNAL MEDICINE CLINIC | Age: 69
End: 2024-02-02

## 2024-02-02 VITALS
BODY MASS INDEX: 44.6 KG/M2 | SYSTOLIC BLOOD PRESSURE: 148 MMHG | HEART RATE: 64 BPM | RESPIRATION RATE: 18 BRPM | OXYGEN SATURATION: 91 % | TEMPERATURE: 97.9 F | DIASTOLIC BLOOD PRESSURE: 94 MMHG | HEIGHT: 63 IN

## 2024-02-02 DIAGNOSIS — R10.31 RECURRENT RIGHT LOWER QUADRANT ABDOMINAL PAIN: Primary | ICD-10-CM

## 2024-02-02 LAB
ANION GAP SERPL CALCULATED.3IONS-SCNC: 10 MMOL/L (ref 3–16)
BASOPHILS # BLD: 0.1 K/UL (ref 0–0.2)
BASOPHILS NFR BLD: 1.2 %
BILIRUB UR QL STRIP.AUTO: NEGATIVE
BUN SERPL-MCNC: 11 MG/DL (ref 7–20)
CALCIUM SERPL-MCNC: 9.9 MG/DL (ref 8.3–10.6)
CHLORIDE SERPL-SCNC: 105 MMOL/L (ref 99–110)
CLARITY UR: CLEAR
CO2 SERPL-SCNC: 24 MMOL/L (ref 21–32)
COLOR UR: YELLOW
CREAT SERPL-MCNC: <0.5 MG/DL (ref 0.6–1.2)
DEPRECATED RDW RBC AUTO: 13.3 % (ref 12.4–15.4)
EOSINOPHIL # BLD: 0.2 K/UL (ref 0–0.6)
EOSINOPHIL NFR BLD: 3.4 %
EPI CELLS #/AREA URNS HPF: NORMAL /HPF (ref 0–5)
GFR SERPLBLD CREATININE-BSD FMLA CKD-EPI: >60 ML/MIN/{1.73_M2}
GLUCOSE SERPL-MCNC: 99 MG/DL (ref 70–99)
GLUCOSE UR STRIP.AUTO-MCNC: NEGATIVE MG/DL
HCT VFR BLD AUTO: 38.4 % (ref 36–48)
HGB BLD-MCNC: 13 G/DL (ref 12–16)
HGB UR QL STRIP.AUTO: NEGATIVE
KETONES UR STRIP.AUTO-MCNC: NEGATIVE MG/DL
LEUKOCYTE ESTERASE UR QL STRIP.AUTO: NEGATIVE
LYMPHOCYTES # BLD: 1.9 K/UL (ref 1–5.1)
LYMPHOCYTES NFR BLD: 42.1 %
MCH RBC QN AUTO: 30.8 PG (ref 26–34)
MCHC RBC AUTO-ENTMCNC: 33.9 G/DL (ref 31–36)
MCV RBC AUTO: 91 FL (ref 80–100)
MONOCYTES # BLD: 0.4 K/UL (ref 0–1.3)
MONOCYTES NFR BLD: 9.9 %
NEUTROPHILS # BLD: 2 K/UL (ref 1.7–7.7)
NEUTROPHILS NFR BLD: 43.4 %
NITRITE UR QL STRIP.AUTO: NEGATIVE
PH UR STRIP.AUTO: 6.5 [PH] (ref 5–8)
PLATELET # BLD AUTO: 246 K/UL (ref 135–450)
PMV BLD AUTO: 7.2 FL (ref 5–10.5)
POTASSIUM SERPL-SCNC: 4.1 MMOL/L (ref 3.5–5.1)
PROT UR STRIP.AUTO-MCNC: NEGATIVE MG/DL
RBC # BLD AUTO: 4.22 M/UL (ref 4–5.2)
RBC #/AREA URNS HPF: NORMAL /HPF (ref 0–4)
SODIUM SERPL-SCNC: 139 MMOL/L (ref 136–145)
SP GR UR STRIP.AUTO: 1.02 (ref 1–1.03)
UA DIPSTICK W REFLEX MICRO PNL UR: NORMAL
URN SPEC COLLECT METH UR: NORMAL
UROBILINOGEN UR STRIP-ACNC: 0.2 E.U./DL
WBC # BLD AUTO: 4.5 K/UL (ref 4–11)
WBC #/AREA URNS HPF: NORMAL /HPF (ref 0–5)

## 2024-02-02 PROCEDURE — 85025 COMPLETE CBC W/AUTO DIFF WBC: CPT

## 2024-02-02 PROCEDURE — 80048 BASIC METABOLIC PNL TOTAL CA: CPT

## 2024-02-02 PROCEDURE — 82550 ASSAY OF CK (CPK): CPT

## 2024-02-02 PROCEDURE — 81001 URINALYSIS AUTO W/SCOPE: CPT

## 2024-02-02 PROCEDURE — 99283 EMERGENCY DEPT VISIT LOW MDM: CPT

## 2024-02-02 ASSESSMENT — PAIN - FUNCTIONAL ASSESSMENT: PAIN_FUNCTIONAL_ASSESSMENT: 0-10

## 2024-02-02 ASSESSMENT — PAIN DESCRIPTION - LOCATION: LOCATION: ABDOMEN

## 2024-02-02 ASSESSMENT — PAIN DESCRIPTION - DESCRIPTORS: DESCRIPTORS: DISCOMFORT

## 2024-02-02 ASSESSMENT — PAIN SCALES - GENERAL: PAINLEVEL_OUTOF10: 8

## 2024-02-02 NOTE — ED PROVIDER NOTES
THE [facility]  EMERGENCY DEPARTMENT ENCOUNTER          ATTENDING PHYSICIAN NOTE       Date of evaluation: 2/2/2024    Chief Complaint     Abdominal Pain and Hematuria (Patient comes to the ED today for RLQ ABD pain with hematuria. Patient noticed her hematuria since yesterday. Patient has been having on and off ABD pain. )      History of Present Illness     Laly Mckeon is a 68 y.o. female who presents to the emergency department with recurrent episodic right lower quadrant/right pelvic pain, with discoloration of her urine this morning.  Patient states that she was seen in this emergency department some while ago for the same right lower quadrant/right pelvic abdominal pain, and had a workup including CT scan that did not show a particular cause.  On review of records this appears to open in August 2022.  Patient states that, since that time, she has been experiencing this pain intermittently, perhaps once or twice a week, without any complete resolution of the symptoms.  Patient states that she noticed this pain a couple of times this week and it was quite severe, such that she had to take a leftover Tylenol 3 at home in order to resolve the pain.  Last night when she urinated she felt that her urine was a darker orange than usual, so she began to drink more water.  This morning, the patient states that she felt that her urine was red, although when she gave a urine sample on arrival to the emergency department, she states that it looked back to normal again.    The patient describes the pain as being rather focally underneath the pannus in the right lower quadrant.  She states that it has a tendency to come and go.  She does not note any particular inciting factors, but does state that when it is present it feels worse when she is up and moving around, better when she is still, better when she lies on her left side, worse when she lies flat on her back.  She feels that it radiates somewhat into the very

## 2024-02-02 NOTE — ED NOTES
Patient prepared for and ready to be discharged. Patient discharged at this time to home in care of self in no acute distress after verbalizing understanding of discharge instructions. Patient left after receiving After Visit Summary instructions. IV removed prior to discharge.     Adelso Alexander RN  02/02/24 0944

## 2024-02-02 NOTE — TELEPHONE ENCOUNTER
Patient is calling in for  in regards to being seen at ED. Per patient was seen but not admitted due to Lower Rt side pain for the last 2 nights and blood in urin. Per patient the UA came back okay. Patient scheduled for 2/19/24 but wanting to know if their is a sooner date?.    Is 2/19 too far or okay to keep? Patient added to wait list.     Please advise

## 2024-02-02 NOTE — DISCHARGE INSTRUCTIONS
As discussed, your laboratory evaluation in the emergency department today was very reassuring.  You did not have any abnormalities of your blood counts, electrolytes, or kidney function.  Your urine did not show any abnormalities, or any signs of blood.  The cause of the intermittent right lower abdominal pain that you have been experiencing over the last 18 months is not clear.  Your CT scan at the time of the onset of the pain in August 2022 did not show any abnormalities.  You also had a CT scan in October 2023 that did not show any abnormalities, such as masses or kidney stones.  Your pain does seem to be worse with certain movements of the leg, in certain positions, and it may be muscular in nature, as there are muscles on the inside of the abdominal wall, such as the psoas, that are associated with movements of the legs.  It is also possible that there is inflammation in the lining of the intestines that would need a colonoscopy to see.  Please call your doctors office today, to discuss your ER visit and arrange a follow-up appointment, so that she can discuss further steps in the evaluation and management of this pain.  Call your doctor, or return to the emergency department, if you have worsening pain, vomiting, blood in stool, fevers greater than 101 °F, or any other concerning symptoms.

## 2024-02-05 ENCOUNTER — OFFICE VISIT (OUTPATIENT)
Dept: INTERNAL MEDICINE CLINIC | Age: 69
End: 2024-02-05
Payer: MEDICARE

## 2024-02-05 VITALS
BODY MASS INDEX: 44.57 KG/M2 | OXYGEN SATURATION: 97 % | SYSTOLIC BLOOD PRESSURE: 132 MMHG | WEIGHT: 251.6 LBS | DIASTOLIC BLOOD PRESSURE: 72 MMHG | HEART RATE: 61 BPM

## 2024-02-05 DIAGNOSIS — R31.0 GROSS HEMATURIA: Primary | ICD-10-CM

## 2024-02-05 LAB
BILIRUBIN, POC: NEGATIVE
BLOOD URINE, POC: NEGATIVE
CK SERPL-CCNC: 64 U/L (ref 26–192)
CLARITY, POC: CLEAR
COLOR, POC: NORMAL
GLUCOSE URINE, POC: NEGATIVE
KETONES, POC: NEGATIVE
LEUKOCYTE EST, POC: NORMAL
NITRITE, POC: NEGATIVE
PH, POC: 5
PROTEIN, POC: NEGATIVE
SPECIFIC GRAVITY, POC: 1.02
UROBILINOGEN, POC: NEGATIVE

## 2024-02-05 PROCEDURE — G8417 CALC BMI ABV UP PARAM F/U: HCPCS | Performed by: INTERNAL MEDICINE

## 2024-02-05 PROCEDURE — 1036F TOBACCO NON-USER: CPT | Performed by: INTERNAL MEDICINE

## 2024-02-05 PROCEDURE — 3075F SYST BP GE 130 - 139MM HG: CPT | Performed by: INTERNAL MEDICINE

## 2024-02-05 PROCEDURE — G8427 DOCREV CUR MEDS BY ELIG CLIN: HCPCS | Performed by: INTERNAL MEDICINE

## 2024-02-05 PROCEDURE — G8399 PT W/DXA RESULTS DOCUMENT: HCPCS | Performed by: INTERNAL MEDICINE

## 2024-02-05 PROCEDURE — 99214 OFFICE O/P EST MOD 30 MIN: CPT | Performed by: INTERNAL MEDICINE

## 2024-02-05 PROCEDURE — G8484 FLU IMMUNIZE NO ADMIN: HCPCS | Performed by: INTERNAL MEDICINE

## 2024-02-05 PROCEDURE — 1090F PRES/ABSN URINE INCON ASSESS: CPT | Performed by: INTERNAL MEDICINE

## 2024-02-05 PROCEDURE — 3017F COLORECTAL CA SCREEN DOC REV: CPT | Performed by: INTERNAL MEDICINE

## 2024-02-05 PROCEDURE — 3078F DIAST BP <80 MM HG: CPT | Performed by: INTERNAL MEDICINE

## 2024-02-05 PROCEDURE — 1123F ACP DISCUSS/DSCN MKR DOCD: CPT | Performed by: INTERNAL MEDICINE

## 2024-02-05 PROCEDURE — 81002 URINALYSIS NONAUTO W/O SCOPE: CPT | Performed by: INTERNAL MEDICINE

## 2024-02-05 ASSESSMENT — COLUMBIA-SUICIDE SEVERITY RATING SCALE - C-SSRS
1. WITHIN THE PAST MONTH, HAVE YOU WISHED YOU WERE DEAD OR WISHED YOU COULD GO TO SLEEP AND NOT WAKE UP?: NO
6. HAVE YOU EVER DONE ANYTHING, STARTED TO DO ANYTHING, OR PREPARED TO DO ANYTHING TO END YOUR LIFE?: NO
2. HAVE YOU ACTUALLY HAD ANY THOUGHTS OF KILLING YOURSELF?: NO

## 2024-02-05 ASSESSMENT — PATIENT HEALTH QUESTIONNAIRE - PHQ9
6. FEELING BAD ABOUT YOURSELF - OR THAT YOU ARE A FAILURE OR HAVE LET YOURSELF OR YOUR FAMILY DOWN: 1
1. LITTLE INTEREST OR PLEASURE IN DOING THINGS: 1
SUM OF ALL RESPONSES TO PHQ QUESTIONS 1-9: 8
5. POOR APPETITE OR OVEREATING: 1
SUM OF ALL RESPONSES TO PHQ QUESTIONS 1-9: 8
8. MOVING OR SPEAKING SO SLOWLY THAT OTHER PEOPLE COULD HAVE NOTICED. OR THE OPPOSITE, BEING SO FIGETY OR RESTLESS THAT YOU HAVE BEEN MOVING AROUND A LOT MORE THAN USUAL: 1
10. IF YOU CHECKED OFF ANY PROBLEMS, HOW DIFFICULT HAVE THESE PROBLEMS MADE IT FOR YOU TO DO YOUR WORK, TAKE CARE OF THINGS AT HOME, OR GET ALONG WITH OTHER PEOPLE: 0
SUM OF ALL RESPONSES TO PHQ QUESTIONS 1-9: 8
2. FEELING DOWN, DEPRESSED OR HOPELESS: 1
SUM OF ALL RESPONSES TO PHQ9 QUESTIONS 1 & 2: 2
9. THOUGHTS THAT YOU WOULD BE BETTER OFF DEAD, OR OF HURTING YOURSELF: 0
4. FEELING TIRED OR HAVING LITTLE ENERGY: 1
7. TROUBLE CONCENTRATING ON THINGS, SUCH AS READING THE NEWSPAPER OR WATCHING TELEVISION: 1
3. TROUBLE FALLING OR STAYING ASLEEP: 1
SUM OF ALL RESPONSES TO PHQ QUESTIONS 1-9: 8

## 2024-02-05 NOTE — PROGRESS NOTES
Laly Mckeon (:  1955) is a 68 y.o. female, here for evaluation of the following chief complaint(s):    ER follow up (Pt following up from recent ER visit ) and Abdominal Pain (Lower left )      ASSESSMENT/PLAN:  1. Gross hematuria  Unclear etiology and has not recurred.  No imaging was done in the emergency room.  Her urinalysis was normal.  I did advise patient to obtain CT urogram if recurrent right lower quadrant discomfort or if she sees blood in the urine.  She has been asymptomatic for several days at this point.  She did have a normal CT scan of her abdomen and pelvis in October.  In addition to kidney stones, differential diagnoses for her right lower quadrant discomfort would include appendicitis but symptoms seem to intermittent.  -     POCT Urinalysis no Micro  -     Urinalysis  -     CK; Future  -     CT UROGRAM; Future    Keep 24 appt      SUBJECTIVE/OBJECTIVE:  HPI  Patient is here to follow-up recent emergency room visit for gross hematuria and right lower quadrant discomfort.  She states the pain woke her from her sleep and she took some oxycodone it was in the house.  At around the same time, she noted blood in her urine and also an episode of orange urine.  Her only medication change was an increase in the dose of verapamil.  She has been eating a lot of oranges.  She does not have a history of kidney stones.  She has not had an appendectomy.  She did have a recent CT scan of her abdomen and pelvis in October which I reviewed.    Based on chart review, colonoscopy is up-to-date.    She denies nausea, vomiting, diarrhea, fever.    Review of Systems    Past Medical History:   Diagnosis Date    Allergic rhinitis     Anxiety     Ascending aorta dilatation (HCC)     Atrial tachycardia     dr glynn ([facility]ors)    Carpal tunnel syndrome     Cervicalgia     Chronic back pain     low    Depression     DVT (deep venous thrombosis) (HCC)     after surgery, bilateral    Fatty liver     GERD

## 2024-02-06 LAB
BACTERIA URNS QL MICRO: NORMAL /HPF
BILIRUB UR QL STRIP.AUTO: NEGATIVE
CLARITY UR: CLEAR
COLOR UR: YELLOW
EPI CELLS #/AREA URNS AUTO: 0 /HPF (ref 0–5)
GLUCOSE UR STRIP.AUTO-MCNC: NEGATIVE MG/DL
HGB UR QL STRIP.AUTO: NEGATIVE
HYALINE CASTS #/AREA URNS AUTO: 0 /LPF (ref 0–8)
KETONES UR STRIP.AUTO-MCNC: NEGATIVE MG/DL
LEUKOCYTE ESTERASE UR QL STRIP.AUTO: ABNORMAL
NITRITE UR QL STRIP.AUTO: NEGATIVE
PH UR STRIP.AUTO: 6 [PH] (ref 5–8)
PROT UR STRIP.AUTO-MCNC: NEGATIVE MG/DL
RBC CLUMPS #/AREA URNS AUTO: 1 /HPF (ref 0–4)
SP GR UR STRIP.AUTO: 1.02 (ref 1–1.03)
UA DIPSTICK W REFLEX MICRO PNL UR: YES
URN SPEC COLLECT METH UR: ABNORMAL
UROBILINOGEN UR STRIP-ACNC: 0.2 E.U./DL
WBC #/AREA URNS AUTO: 4 /HPF (ref 0–5)

## 2024-02-13 DIAGNOSIS — G44.039 EPISODIC PAROXYSMAL HEMICRANIA, NOT INTRACTABLE: ICD-10-CM

## 2024-02-13 RX ORDER — GABAPENTIN 100 MG/1
200 CAPSULE ORAL 2 TIMES DAILY
Qty: 120 CAPSULE | Refills: 2 | Status: SHIPPED | OUTPATIENT
Start: 2024-02-13 | End: 2024-05-13

## 2024-02-15 ENCOUNTER — HOSPITAL ENCOUNTER (OUTPATIENT)
Dept: CT IMAGING | Age: 69
Discharge: HOME | End: 2024-02-15
Payer: MEDICARE

## 2024-02-15 DIAGNOSIS — R31.0 GROSS HEMATURIA: ICD-10-CM

## 2024-02-15 PROCEDURE — 6360000004 HC RX CONTRAST MEDICATION: Performed by: INTERNAL MEDICINE

## 2024-02-15 PROCEDURE — 74178 CT ABD&PLV WO CNTR FLWD CNTR: CPT | Performed by: INTERNAL MEDICINE

## 2024-02-15 RX ADMIN — IOPAMIDOL 75 ML: 755 INJECTION, SOLUTION INTRAVENOUS at 13:36

## 2024-03-10 DIAGNOSIS — R31.0 GROSS HEMATURIA: Primary | ICD-10-CM

## 2024-03-13 ENCOUNTER — OFFICE VISIT (OUTPATIENT)
Dept: CARDIOLOGY CLINIC | Age: 69
End: 2024-03-13
Payer: MEDICARE

## 2024-03-13 VITALS
DIASTOLIC BLOOD PRESSURE: 70 MMHG | BODY MASS INDEX: 44.89 KG/M2 | WEIGHT: 253.4 LBS | HEART RATE: 68 BPM | SYSTOLIC BLOOD PRESSURE: 146 MMHG

## 2024-03-13 DIAGNOSIS — I47.19 ATRIAL TACHYCARDIA: Primary | ICD-10-CM

## 2024-03-13 PROCEDURE — 3077F SYST BP >= 140 MM HG: CPT | Performed by: INTERNAL MEDICINE

## 2024-03-13 PROCEDURE — G8417 CALC BMI ABV UP PARAM F/U: HCPCS | Performed by: INTERNAL MEDICINE

## 2024-03-13 PROCEDURE — 1036F TOBACCO NON-USER: CPT | Performed by: INTERNAL MEDICINE

## 2024-03-13 PROCEDURE — 3078F DIAST BP <80 MM HG: CPT | Performed by: INTERNAL MEDICINE

## 2024-03-13 PROCEDURE — G8399 PT W/DXA RESULTS DOCUMENT: HCPCS | Performed by: INTERNAL MEDICINE

## 2024-03-13 PROCEDURE — 3017F COLORECTAL CA SCREEN DOC REV: CPT | Performed by: INTERNAL MEDICINE

## 2024-03-13 PROCEDURE — G8427 DOCREV CUR MEDS BY ELIG CLIN: HCPCS | Performed by: INTERNAL MEDICINE

## 2024-03-13 PROCEDURE — G8484 FLU IMMUNIZE NO ADMIN: HCPCS | Performed by: INTERNAL MEDICINE

## 2024-03-13 PROCEDURE — 1090F PRES/ABSN URINE INCON ASSESS: CPT | Performed by: INTERNAL MEDICINE

## 2024-03-13 PROCEDURE — 99214 OFFICE O/P EST MOD 30 MIN: CPT | Performed by: INTERNAL MEDICINE

## 2024-03-13 PROCEDURE — 1123F ACP DISCUSS/DSCN MKR DOCD: CPT | Performed by: INTERNAL MEDICINE

## 2024-03-13 NOTE — PROGRESS NOTES
Cc: HTN, HLP, atypical CP, PAD, PAT    HPI:     Mike is a 68 yo overweight woman (BMI 45) with h/o SAL on CPAP, GERD, HTN, HLP, past smoker (quit '89), SVT/PAT, PAD, post-op DVT's on eliquis, MGUS (diagnosed 07/2022), varicose veins.     Patient was seen by Dr. Garrido and Dr Garcia at [facility] in the past, last visit in 2014. She had chest pains at that time and underwent cardiac evaluation.     Echo 07/2014: normal except for diastolic I (note, no valvular disease).     Cath 07/2014: normal coronaries, normal EF 70%.     ECG 3/26/19: normal.     Patient reported strong family history of premature CAD and heart failure. Her brother had a cardiac transplant in his 40's and her sisters had stents in their 40-50s. Patient reported palpitations with lightheadedness. 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     Echo 04/2019: normal (no mitral valve prolapse) except for moderate TR, RVSP 44.     Zio monitor x 14 days (4/22/19): NSR with frequent SVT's.     Patient was seen by Dr. Kelsey and was initially started on flecainide in addition to Toprol however patient developed blurry vision and flecainide was changed to verapamil.     Exe nuc stress 5/20/20: small mild reversible defect in distal anteroseptal wall (apex is spared), nl EF and wall motion. Patient had mild CP prior to test, increased pain with exertion, improving with rest. ECG no changes, normal, duration only 3 minutes.      C 5/21/2020: Normal coronaries, LVEDP 13 mmHg, LVEF normal.     Bilateral lower extremity arterial ultrasound 2/18/2021: Right mid SFA less than 50% stenosis     FLP 06/2022: , HDL 56, LDL 77, TG 86 on Pravachol 20 mg daily.     Echo 11/2022: Moderate LVH, LVEF 60%, indeterminate diastolic function, normal RV, moderate LAE, normal valves, AoRoot 3.8 cm.     Patient follows with Dr Messina ([facility]) for MGUS.     Coronary CTA 05/2023: Normal coronaries    Patient returns to

## 2024-04-01 RX ORDER — TRAZODONE HYDROCHLORIDE 50 MG/1
TABLET ORAL
Qty: 30 TABLET | Refills: 1 | Status: SHIPPED | OUTPATIENT
Start: 2024-04-01

## 2024-04-01 NOTE — TELEPHONE ENCOUNTER
Requested Prescriptions     Pending Prescriptions Disp Refills    traZODone (DESYREL) 50 MG tablet [Pharmacy Med Name: TRAZODONE 50MG TABLETS] 30 tablet 1     Sig: TAKE 1 TABLET BY MOUTH EVERY NIGHT     Last OV - 2/5/24  Next OV - 6/17/24  Last refill - 12/26/23  Last labs - 2/2/24

## 2024-04-03 DIAGNOSIS — J30.89 NON-SEASONAL ALLERGIC RHINITIS, UNSPECIFIED TRIGGER: ICD-10-CM

## 2024-04-03 RX ORDER — LORATADINE 10 MG/1
TABLET ORAL
Qty: 90 TABLET | Refills: 3 | Status: SHIPPED | OUTPATIENT
Start: 2024-04-03

## 2024-04-03 NOTE — TELEPHONE ENCOUNTER
Medication:   Requested Prescriptions     Pending Prescriptions Disp Refills    loratadine (CLARITIN) 10 MG tablet [Pharmacy Med Name: LORATADINE 10MG TABLETS] 90 tablet 3     Sig: TAKE 1 TABLET BY MOUTH DAILY     Last Filled:  # 90 with 3 refills on 3/10/23    Last appt: 2/5/2024   Next appt: 6/17/2024    Last OARRS:        No data to display

## 2024-04-10 DIAGNOSIS — M25.569 CHRONIC KNEE PAIN, UNSPECIFIED LATERALITY: Primary | ICD-10-CM

## 2024-04-10 DIAGNOSIS — G89.29 CHRONIC KNEE PAIN, UNSPECIFIED LATERALITY: Primary | ICD-10-CM

## 2024-04-20 DIAGNOSIS — E78.2 MIXED HYPERLIPIDEMIA: ICD-10-CM

## 2024-04-22 RX ORDER — PRAVASTATIN SODIUM 20 MG
TABLET ORAL
Qty: 90 TABLET | Refills: 3 | Status: SHIPPED | OUTPATIENT
Start: 2024-04-22

## 2024-05-01 ENCOUNTER — PATIENT MESSAGE (OUTPATIENT)
Dept: INTERNAL MEDICINE CLINIC | Age: 69
End: 2024-05-01

## 2024-05-01 DIAGNOSIS — L71.9 ROSACEA: ICD-10-CM

## 2024-05-02 RX ORDER — TRAZODONE HYDROCHLORIDE 50 MG/1
TABLET ORAL
Qty: 90 TABLET | Refills: 1 | Status: SHIPPED | OUTPATIENT
Start: 2024-05-02 | End: 2024-10-29

## 2024-05-02 NOTE — TELEPHONE ENCOUNTER
Please find out if she is continuing to take the doxycycline as I thought it was causing her an upset stomach.  I believe she takes it for rosacea

## 2024-05-02 NOTE — TELEPHONE ENCOUNTER
Last appointment: 2/5/2024  Next appointment: 6/17/2024  Last refill:    Doxy 9/14/23     Trazadone 4/1/24 30 day supply

## 2024-05-02 NOTE — TELEPHONE ENCOUNTER
From: Laly Mckeon  To: Dr. Tammy Sams  Sent: 5/1/2024 7:13 PM EDT  Subject: jak Benoit was wondering if you would send in a refill for my bdiclofenac sodium topical gel,1 o/o    That helps with my lower back and the burning pain in my feet.  Thank you,  Laly Mckeon

## 2024-05-03 RX ORDER — DOXYCYCLINE 100 MG/1
100 CAPSULE ORAL DAILY
Qty: 90 CAPSULE | Refills: 0 | Status: SHIPPED | OUTPATIENT
Start: 2024-05-03

## 2024-05-03 NOTE — TELEPHONE ENCOUNTER
Patient is calling back in regards to ail for medication information needed, Per patient she is still currently taking doxycycline  and she takes it 3 x a week. Per patient the upset stomach issue came up when she was taking it daily and now taking 3 x a week still helps her with her rosacea.

## 2024-05-06 SDOH — HEALTH STABILITY: PHYSICAL HEALTH: ON AVERAGE, HOW MANY DAYS PER WEEK DO YOU ENGAGE IN MODERATE TO STRENUOUS EXERCISE (LIKE A BRISK WALK)?: 2 DAYS

## 2024-05-07 ENCOUNTER — OFFICE VISIT (OUTPATIENT)
Dept: ORTHOPEDIC SURGERY | Age: 69
End: 2024-05-07
Payer: MEDICARE

## 2024-05-07 VITALS — BODY MASS INDEX: 44.83 KG/M2 | WEIGHT: 253 LBS | HEIGHT: 63 IN

## 2024-05-07 DIAGNOSIS — Z96.653 HISTORY OF TOTAL KNEE ARTHROPLASTY, BILATERAL: Primary | ICD-10-CM

## 2024-05-07 PROCEDURE — 1123F ACP DISCUSS/DSCN MKR DOCD: CPT | Performed by: STUDENT IN AN ORGANIZED HEALTH CARE EDUCATION/TRAINING PROGRAM

## 2024-05-07 PROCEDURE — 1090F PRES/ABSN URINE INCON ASSESS: CPT | Performed by: STUDENT IN AN ORGANIZED HEALTH CARE EDUCATION/TRAINING PROGRAM

## 2024-05-07 PROCEDURE — 3017F COLORECTAL CA SCREEN DOC REV: CPT | Performed by: STUDENT IN AN ORGANIZED HEALTH CARE EDUCATION/TRAINING PROGRAM

## 2024-05-07 PROCEDURE — G8427 DOCREV CUR MEDS BY ELIG CLIN: HCPCS | Performed by: STUDENT IN AN ORGANIZED HEALTH CARE EDUCATION/TRAINING PROGRAM

## 2024-05-07 PROCEDURE — 1036F TOBACCO NON-USER: CPT | Performed by: STUDENT IN AN ORGANIZED HEALTH CARE EDUCATION/TRAINING PROGRAM

## 2024-05-07 PROCEDURE — 99204 OFFICE O/P NEW MOD 45 MIN: CPT | Performed by: STUDENT IN AN ORGANIZED HEALTH CARE EDUCATION/TRAINING PROGRAM

## 2024-05-07 PROCEDURE — G8399 PT W/DXA RESULTS DOCUMENT: HCPCS | Performed by: STUDENT IN AN ORGANIZED HEALTH CARE EDUCATION/TRAINING PROGRAM

## 2024-05-07 PROCEDURE — G8417 CALC BMI ABV UP PARAM F/U: HCPCS | Performed by: STUDENT IN AN ORGANIZED HEALTH CARE EDUCATION/TRAINING PROGRAM

## 2024-05-11 ENCOUNTER — APPOINTMENT (OUTPATIENT)
Dept: GENERAL RADIOLOGY | Age: 69
End: 2024-05-11
Payer: MEDICARE

## 2024-05-11 ENCOUNTER — HOSPITAL ENCOUNTER (EMERGENCY)
Age: 69
Discharge: HOME | End: 2024-05-11
Attending: EMERGENCY MEDICINE
Payer: MEDICARE

## 2024-05-11 VITALS
HEIGHT: 63 IN | RESPIRATION RATE: 15 BRPM | DIASTOLIC BLOOD PRESSURE: 93 MMHG | HEART RATE: 64 BPM | OXYGEN SATURATION: 100 % | WEIGHT: 253 LBS | SYSTOLIC BLOOD PRESSURE: 111 MMHG | TEMPERATURE: 98.5 F | BODY MASS INDEX: 44.83 KG/M2

## 2024-05-11 DIAGNOSIS — R42 DIZZINESS: Primary | ICD-10-CM

## 2024-05-11 LAB
ANION GAP SERPL CALCULATED.3IONS-SCNC: 13 MMOL/L (ref 3–16)
BASOPHILS # BLD: 0.1 K/UL (ref 0–0.2)
BASOPHILS NFR BLD: 1.2 %
BUN SERPL-MCNC: 14 MG/DL (ref 7–20)
CALCIUM SERPL-MCNC: 9.2 MG/DL (ref 8.3–10.6)
CHLORIDE SERPL-SCNC: 106 MMOL/L (ref 99–110)
CO2 SERPL-SCNC: 22 MMOL/L (ref 21–32)
CREAT SERPL-MCNC: <0.5 MG/DL (ref 0.6–1.2)
DEPRECATED RDW RBC AUTO: 13.3 % (ref 12.4–15.4)
EOSINOPHIL # BLD: 0.2 K/UL (ref 0–0.6)
EOSINOPHIL NFR BLD: 2.5 %
GFR SERPLBLD CREATININE-BSD FMLA CKD-EPI: >90 ML/MIN/{1.73_M2}
GLUCOSE BLD-MCNC: 145 MG/DL (ref 70–99)
GLUCOSE SERPL-MCNC: 112 MG/DL (ref 70–99)
HCT VFR BLD AUTO: 36.8 % (ref 36–48)
HGB BLD-MCNC: 12.9 G/DL (ref 12–16)
LYMPHOCYTES # BLD: 2.5 K/UL (ref 1–5.1)
LYMPHOCYTES NFR BLD: 40.2 %
MAGNESIUM SERPL-MCNC: 1.7 MG/DL (ref 1.8–2.4)
MCH RBC QN AUTO: 31.8 PG (ref 26–34)
MCHC RBC AUTO-ENTMCNC: 35.1 G/DL (ref 31–36)
MCV RBC AUTO: 90.7 FL (ref 80–100)
MONOCYTES # BLD: 0.6 K/UL (ref 0–1.3)
MONOCYTES NFR BLD: 10 %
NEUTROPHILS # BLD: 2.8 K/UL (ref 1.7–7.7)
NEUTROPHILS NFR BLD: 46.1 %
NT-PROBNP SERPL-MCNC: 120 PG/ML (ref 0–124)
PERFORMED ON: ABNORMAL
PLATELET # BLD AUTO: 238 K/UL (ref 135–450)
PMV BLD AUTO: 6.6 FL (ref 5–10.5)
POTASSIUM SERPL-SCNC: 3.4 MMOL/L (ref 3.5–5.1)
RBC # BLD AUTO: 4.06 M/UL (ref 4–5.2)
SODIUM SERPL-SCNC: 141 MMOL/L (ref 136–145)
TROPONIN, HIGH SENSITIVITY: 11 NG/L (ref 0–14)
TROPONIN, HIGH SENSITIVITY: 11 NG/L (ref 0–14)
WBC # BLD AUTO: 6.1 K/UL (ref 4–11)

## 2024-05-11 PROCEDURE — 71045 X-RAY EXAM CHEST 1 VIEW: CPT

## 2024-05-11 PROCEDURE — 99285 EMERGENCY DEPT VISIT HI MDM: CPT

## 2024-05-11 PROCEDURE — 80048 BASIC METABOLIC PNL TOTAL CA: CPT

## 2024-05-11 PROCEDURE — 84484 ASSAY OF TROPONIN QUANT: CPT

## 2024-05-11 PROCEDURE — 36415 COLL VENOUS BLD VENIPUNCTURE: CPT

## 2024-05-11 PROCEDURE — 85025 COMPLETE CBC W/AUTO DIFF WBC: CPT

## 2024-05-11 PROCEDURE — 93005 ELECTROCARDIOGRAM TRACING: CPT | Performed by: EMERGENCY MEDICINE

## 2024-05-11 PROCEDURE — 83735 ASSAY OF MAGNESIUM: CPT

## 2024-05-11 PROCEDURE — 83880 ASSAY OF NATRIURETIC PEPTIDE: CPT

## 2024-05-11 PROCEDURE — 2580000003 HC RX 258: Performed by: EMERGENCY MEDICINE

## 2024-05-11 RX ORDER — SODIUM CHLORIDE, SODIUM LACTATE, POTASSIUM CHLORIDE, AND CALCIUM CHLORIDE .6; .31; .03; .02 G/100ML; G/100ML; G/100ML; G/100ML
1000 INJECTION, SOLUTION INTRAVENOUS ONCE
Status: COMPLETED | OUTPATIENT
Start: 2024-05-11 | End: 2024-05-11

## 2024-05-11 RX ADMIN — SODIUM CHLORIDE, POTASSIUM CHLORIDE, SODIUM LACTATE AND CALCIUM CHLORIDE 1000 ML: 600; 310; 30; 20 INJECTION, SOLUTION INTRAVENOUS at 22:07

## 2024-05-11 ASSESSMENT — ENCOUNTER SYMPTOMS
ABDOMINAL PAIN: 0
COUGH: 0
CHEST TIGHTNESS: 0
SHORTNESS OF BREATH: 0
CONSTIPATION: 0
ABDOMINAL DISTENTION: 0
VOMITING: 0
PHOTOPHOBIA: 0
ALLERGIC/IMMUNOLOGIC NEGATIVE: 1
NAUSEA: 0
BACK PAIN: 0
DIARRHEA: 0
WHEEZING: 0
EYE PAIN: 0

## 2024-05-11 ASSESSMENT — LIFESTYLE VARIABLES
HOW MANY STANDARD DRINKS CONTAINING ALCOHOL DO YOU HAVE ON A TYPICAL DAY: PATIENT DOES NOT DRINK
HOW OFTEN DO YOU HAVE A DRINK CONTAINING ALCOHOL: NEVER

## 2024-05-11 NOTE — ED PROVIDER NOTES
THE [facility]  EMERGENCY DEPARTMENT ENCOUNTER          NURSE PRACTITIONER NOTE       Date of evaluation: 5/11/2024    Chief Complaint     Dizziness (Started about an hour ago, reports bring shaky for about 30 minutes)      History of Present Illness     Laly Mckeon is a 69 y.o. female who presents to emergency room with complaints of feeling lightheaded and weak while in the grocery store.  Patient attributed her stool.  Few accidents and she was only there for roughly 15 minutes.  Patient dates that she was staying there in the aisle she began to feel weak and jittery as if she were to pass out.  Patient states she knew she has not eaten all day as she does not eat or drink when she is at work.  Patient therefore drove across street to [employer] and was able to eat a cheeseburger.  Patient states that she was on her way back to [employer] when she was continually feeling shaky and jittery therefore she came to the emergency department for evaluation.    ASSESSMENT / PLAN  (MEDICAL DECISION MAKING)     INITIAL VITALS: BP: (!) 166/76, Temp: 98.5 °F (36.9 °C), Pulse: 69, Respirations: 18, SpO2: 99 %     Laly Mckeon is a 69 y.o. female who presents to emergency room with complaints of feeling lightheaded and weak while in the grocery store.  Patient attributed her stool.  Few accidents and she was only there for roughly 15 minutes.  Patient dates that she was staying there in the aisle she began to feel weak and jittery as if she were to pass out.  Patient states she knew she has not eaten all day as she does not eat or drink when she is at work.  Patient therefore drove across street to [employer] and was able to eat a cheeseburger.  Patient states that she was on her way back to [employer] when she was continually feeling shaky and jittery therefore she came to the emergency department for evaluation.      On evaluation Patient is a anxious appearing 69-year-old female resting in bed in no acute distress.

## 2024-05-12 LAB
EKG ATRIAL RATE: 69 BPM
EKG DIAGNOSIS: NORMAL
EKG P AXIS: 59 DEGREES
EKG P-R INTERVAL: 180 MS
EKG Q-T INTERVAL: 424 MS
EKG QRS DURATION: 88 MS
EKG QTC CALCULATION (BAZETT): 454 MS
EKG R AXIS: 27 DEGREES
EKG T AXIS: 32 DEGREES
EKG VENTRICULAR RATE: 69 BPM

## 2024-05-12 NOTE — DISCHARGE INSTRUCTIONS
Return to emergency department for worsening symptoms or other concerns.  Follow-up with your primary care doctor in 2 to 3 days for recheck.  Come back for chest pain shortness of breath, or any other problems.

## 2024-05-12 NOTE — ED PROVIDER NOTES
ED Attending Attestation Note     Date of evaluation: 5/11/2024    This patient was seen by the advance practice provider.  I have seen and examined the patient, agree with the workup, evaluation, management and diagnosis. The care plan has been discussed.  I have reviewed the ECG and concur with the ALISON's interpretation.  My assessment reveals adult female in no acute distress who presents with some lightheadedness that occurred today while she was at the store.  Not associated with chest pain or shortness of breath.  Says that she feels jittery still, not having any pain.  No tremors appreciated time my exam.  No gross neurological deficit.  Labs are largely unremarkable other than a very mild hypokalemia and hypomagnesemia which I think can be supplemented at home with nutritional changes.  EKG shows sinus rhythm.  Troponin normal and stable.  She reports that she has a feeling similar to the feeling she gets when she lays on her left side which she was told by her cardiologist she should avoid, sort of a fluttering feeling, and I believe she may be describing palpitating type feeling though it is difficult to explain per her.  But she remains in sinus rhythm with a low heart rate and has no actionable lab abnormalities associated with this.  Doubt ACS.  No sign of CVA or central neurological process.  She was given return precautions, and will have her follow-up with her primary care doctor.    IMPRESSION  1) dizziness       Jaskaran Mckeon MD  05/11/24 [id number]

## 2024-05-13 ENCOUNTER — OFFICE VISIT (OUTPATIENT)
Dept: INTERNAL MEDICINE CLINIC | Age: 69
End: 2024-05-13
Payer: MEDICARE

## 2024-05-13 VITALS
SYSTOLIC BLOOD PRESSURE: 136 MMHG | WEIGHT: 249.4 LBS | HEART RATE: 56 BPM | DIASTOLIC BLOOD PRESSURE: 84 MMHG | TEMPERATURE: 98.6 F | OXYGEN SATURATION: 94 % | BODY MASS INDEX: 44.18 KG/M2

## 2024-05-13 DIAGNOSIS — R31.0 GROSS HEMATURIA: ICD-10-CM

## 2024-05-13 DIAGNOSIS — R42 DIZZINESS: Primary | ICD-10-CM

## 2024-05-13 DIAGNOSIS — R42 DIZZINESS: ICD-10-CM

## 2024-05-13 DIAGNOSIS — R10.31 RLQ ABDOMINAL PAIN: ICD-10-CM

## 2024-05-13 LAB
ANION GAP SERPL CALCULATED.3IONS-SCNC: 14 MMOL/L (ref 3–16)
BUN SERPL-MCNC: 15 MG/DL (ref 7–20)
CALCIUM SERPL-MCNC: 9.7 MG/DL (ref 8.3–10.6)
CHLORIDE SERPL-SCNC: 106 MMOL/L (ref 99–110)
CK SERPL-CCNC: 100 U/L (ref 26–192)
CO2 SERPL-SCNC: 24 MMOL/L (ref 21–32)
CREAT SERPL-MCNC: 0.5 MG/DL (ref 0.6–1.2)
GFR SERPLBLD CREATININE-BSD FMLA CKD-EPI: >90 ML/MIN/{1.73_M2}
GLUCOSE SERPL-MCNC: 92 MG/DL (ref 70–99)
MAGNESIUM SERPL-MCNC: 1.9 MG/DL (ref 1.8–2.4)
POTASSIUM SERPL-SCNC: 4 MMOL/L (ref 3.5–5.1)
SODIUM SERPL-SCNC: 144 MMOL/L (ref 136–145)

## 2024-05-13 PROCEDURE — 3075F SYST BP GE 130 - 139MM HG: CPT | Performed by: INTERNAL MEDICINE

## 2024-05-13 PROCEDURE — 3017F COLORECTAL CA SCREEN DOC REV: CPT | Performed by: INTERNAL MEDICINE

## 2024-05-13 PROCEDURE — 1036F TOBACCO NON-USER: CPT | Performed by: INTERNAL MEDICINE

## 2024-05-13 PROCEDURE — G8417 CALC BMI ABV UP PARAM F/U: HCPCS | Performed by: INTERNAL MEDICINE

## 2024-05-13 PROCEDURE — 3079F DIAST BP 80-89 MM HG: CPT | Performed by: INTERNAL MEDICINE

## 2024-05-13 PROCEDURE — 99213 OFFICE O/P EST LOW 20 MIN: CPT | Performed by: INTERNAL MEDICINE

## 2024-05-13 PROCEDURE — 1090F PRES/ABSN URINE INCON ASSESS: CPT | Performed by: INTERNAL MEDICINE

## 2024-05-13 PROCEDURE — G8399 PT W/DXA RESULTS DOCUMENT: HCPCS | Performed by: INTERNAL MEDICINE

## 2024-05-13 PROCEDURE — G8427 DOCREV CUR MEDS BY ELIG CLIN: HCPCS | Performed by: INTERNAL MEDICINE

## 2024-05-13 PROCEDURE — 1123F ACP DISCUSS/DSCN MKR DOCD: CPT | Performed by: INTERNAL MEDICINE

## 2024-05-13 SDOH — ECONOMIC STABILITY: INCOME INSECURITY: HOW HARD IS IT FOR YOU TO PAY FOR THE VERY BASICS LIKE FOOD, HOUSING, MEDICAL CARE, AND HEATING?: NOT HARD AT ALL

## 2024-05-13 SDOH — ECONOMIC STABILITY: FOOD INSECURITY: WITHIN THE PAST 12 MONTHS, THE FOOD YOU BOUGHT JUST DIDN'T LAST AND YOU DIDN'T HAVE MONEY TO GET MORE.: NEVER TRUE

## 2024-05-13 SDOH — ECONOMIC STABILITY: FOOD INSECURITY: WITHIN THE PAST 12 MONTHS, YOU WORRIED THAT YOUR FOOD WOULD RUN OUT BEFORE YOU GOT MONEY TO BUY MORE.: NEVER TRUE

## 2024-05-13 NOTE — PATIENT INSTRUCTIONS
Probiotics  [place] diet    Gi/selvin  [phone]    Eat foods higher in magnesium and potassium    Labs

## 2024-05-13 NOTE — PROGRESS NOTES
Laly Mckeon (:  1955) is a 69 y.o. female, here for evaluation of the following chief complaint(s):    ED Follow-up      ASSESSMENT/PLAN:    Laly was seen today for ed follow-up.    Diagnoses and all orders for this visit:    Dizziness  Feels improved.  Recheck labs to evaluate for hypokalemia and hypomagnesemia  -     Basic Metabolic Panel; Future  -     Magnesium; Future    Advised seeing gastroenterology for chronic right lower quadrant pain that has not been fully explained    Keep 24 appt      SUBJECTIVE/OBJECTIVE:  HPI  Patient is here after recent visit to emergency room.  She had been feeling very dizzy and jittery for about 3 hours.  She states fluids helped.  She also has been feeling fatigued and having low back pain.  She also notes diarrhea over the past week.  She was found to have low magnesium and potassium and this was replaced and she felt better.  She had been having bowel movements about 3 times per day.  She does continue with her chronic right lower abdominal discomfort that she has had for a long time.  Reviewed 2023 CT scan of abdomen.  She denies blood in stool.  Overall she feels better.    Review of Systems    Past Medical History:   Diagnosis Date    Allergic rhinitis     Anxiety     Ascending aorta dilatation (HCC)     Atrial tachycardia (HCC)     dr glynn ([facility])    Carpal tunnel syndrome     Cervicalgia     Chronic back pain     low    Depression     DVT (deep venous thrombosis) ([facility])     after surgery, bilateral    Fatty liver     GERD (gastroesophageal reflux disease)     has schatzki ring    Headache(784.0)     hemiplegic migraines, improved    Hernia hiatal     Histoplasmosis     Hot flashes     takes wellbutrin    Hyperlipidemia     Hypertension     MGUS (monoclonal gammopathy of unknown significance)     Obesity     Osteoarthritis     multiple joints    Other partial intestinal obstruction (HCC)     wound dehiscence    PONV (postoperative

## 2024-05-14 NOTE — PROGRESS NOTES
Dr Dustin Cotton      Date /Time 5/7/2024       6:59 PM EDT  Name Laly Mckeon             1955   Location  [facility] DR ORTHOPEDIC SURG  MRN [id number]                Chief Complaint   Patient presents with    New Patient     NP bilat knee --rt greater than left  No recent imaging            History of Present Illness  Laly Mckeon is a 69 y.o. female who presents with B knee pain. Remote Hx of B TKAs. Previously functioned well. Having R > L anteriorly based, atraumatic knee pain. No recent treatment. No speicific worsening or improving factors.           Sent in consultation by Tammy Sams MD.        Past History  Past Medical History:   Diagnosis Date    Allergic rhinitis     Anxiety     Ascending aorta dilatation (HCC)     Atrial tachycardia (HCC)     dr glynn ([facility])    Carpal tunnel syndrome     Cervicalgia     Chronic back pain     low    Depression     DVT (deep venous thrombosis) (HCC) 2021    after surgery, bilateral    Fatty liver     GERD (gastroesophageal reflux disease)     has schatzki ring    Headache(784.0)     hemiplegic migraines, improved    Hernia hiatal     Histoplasmosis     Hot flashes     takes wellbutrin    Hyperlipidemia     Hypertension     MGUS (monoclonal gammopathy of unknown significance)     Obesity     Osteoarthritis     multiple joints    Other partial intestinal obstruction (HCC) 2021    wound dehiscence    PONV (postoperative nausea and vomiting)     PVD (peripheral vascular disease) (HCC)     right darian reduced on screening    Shingles     nerve pain in her low back persists    Sleep apnea     CPAP set of 4    Tricuspid regurgitation     moderate     Past Surgical History:   Procedure Laterality Date    ABDOMEN SURGERY N/A 08/01/2021    REPAIR OF WOUND DEHISCENCE performed by Mario Richardson MD at [facility] OR    BREAST BIOPSY Right 2013    benign    BREAST SURGERY Right     Biopsy w/clip    BUNIONECTOMY      right     CARPAL TUNNEL RELEASE Left 10/11/2022

## 2024-05-24 ENCOUNTER — TELEPHONE (OUTPATIENT)
Dept: INTERNAL MEDICINE CLINIC | Age: 69
End: 2024-05-24

## 2024-05-24 NOTE — TELEPHONE ENCOUNTER
It looks like the report is scanned into epic already under media. Do you want to schedule a telephone call with her?

## 2024-05-24 NOTE — TELEPHONE ENCOUNTER
Patient would like to know the results of her colonoscopy she had done on 5/20/24 with Dr. Elan Vitale, she was told to go in [facility] to see the results, but was wondering if Dr. Sams can give the results instead, please advise.    Patients callback# [phone]

## 2024-05-28 NOTE — TELEPHONE ENCOUNTER
DAWN    Called patient back and she no longer needs the appointment, was able to get the results from Dr. Vitale and patient stated that Dr. Vitale will be putting her on a Steroid, was diagnosed with Microscopic colitis. Patient didn't know the name of there steroid she will be put on until the pharmacy fills it.

## 2024-06-03 ENCOUNTER — OFFICE VISIT (OUTPATIENT)
Dept: PULMONOLOGY | Age: 69
End: 2024-06-03
Payer: MEDICARE

## 2024-06-03 VITALS
HEIGHT: 63 IN | BODY MASS INDEX: 43.52 KG/M2 | DIASTOLIC BLOOD PRESSURE: 78 MMHG | OXYGEN SATURATION: 95 % | SYSTOLIC BLOOD PRESSURE: 138 MMHG | WEIGHT: 245.6 LBS | HEART RATE: 58 BPM

## 2024-06-03 DIAGNOSIS — R05.3 CHRONIC COUGH: ICD-10-CM

## 2024-06-03 DIAGNOSIS — G47.33 OSA ON CPAP: Primary | ICD-10-CM

## 2024-06-03 PROCEDURE — 1123F ACP DISCUSS/DSCN MKR DOCD: CPT | Performed by: INTERNAL MEDICINE

## 2024-06-03 PROCEDURE — 1036F TOBACCO NON-USER: CPT | Performed by: INTERNAL MEDICINE

## 2024-06-03 PROCEDURE — 99213 OFFICE O/P EST LOW 20 MIN: CPT | Performed by: INTERNAL MEDICINE

## 2024-06-03 PROCEDURE — 1090F PRES/ABSN URINE INCON ASSESS: CPT | Performed by: INTERNAL MEDICINE

## 2024-06-03 PROCEDURE — G8399 PT W/DXA RESULTS DOCUMENT: HCPCS | Performed by: INTERNAL MEDICINE

## 2024-06-03 PROCEDURE — G8417 CALC BMI ABV UP PARAM F/U: HCPCS | Performed by: INTERNAL MEDICINE

## 2024-06-03 PROCEDURE — G8428 CUR MEDS NOT DOCUMENT: HCPCS | Performed by: INTERNAL MEDICINE

## 2024-06-03 PROCEDURE — 3017F COLORECTAL CA SCREEN DOC REV: CPT | Performed by: INTERNAL MEDICINE

## 2024-06-03 PROCEDURE — 3075F SYST BP GE 130 - 139MM HG: CPT | Performed by: INTERNAL MEDICINE

## 2024-06-03 PROCEDURE — 3078F DIAST BP <80 MM HG: CPT | Performed by: INTERNAL MEDICINE

## 2024-06-03 RX ORDER — BUDESONIDE 3 MG/1
3 CAPSULE, COATED PELLETS ORAL 3 TIMES DAILY
COMMUNITY

## 2024-06-03 NOTE — PROGRESS NOTES
[facility]    Outpatient follow-up note    Subjective:   Referring Physician: Latrell  CHIEF COMPLAINT / HPI:     The patient is 69 y.o. female who presents today for a follow up visit for obstructive sleep apnea and cough.  Laly says her cough is about the same, and tolerable.  She was recently diagnosed colitis and has to change her whole diet.  Her  passed away this past December.  She was started on budesonide capsules about a week ago.  The cough is a little better with the systemic steroids.    Last visit:  Laly has had an interesting start to 2023.  Her  Tab is currently in the hospital again for his Kaposi Sarcoma.  She's had Covid and some dizzy spells and just had a CT angio of her head because of concern for a cerebral aneurysm.  Fortunately there was no aneurysm.  She's doing well with her CPAP and her cough is tolerable on zyrtec.    Interval history:  Laly was able to get a new sleep device and is sleeping well with it.  She does still have some chronic cough but it improves with treatments for sinus disease.  Since last visit she was noted to have M spike proteins in her blood and has been diagnosed with MGUS.  As part of the work-up she did have imaging that showed some lucencies in her skull and some chronic sinusitis on the left side for which she is now seeing ear nose and throat.    Interval history:  Patient says she is compliant with her auto titrating CPAP but notes that sometimes she wakes up in the pressures so high that it is leaking out of her mask and it wakes her.  She uses [employer].  She is on a autotitrator that goes from 4-20 on the CPAP.  She also has a cough that is nonproductive and will wake her up from sleep at night.  She had a recent sinus infection in January noted on a head CT that was performed because she fell and hit her head while on anticoagulation.  Sinus inflammation was the only abnormality on the CT

## 2024-06-04 LAB
ALBUMIN SERPL-MCNC: 4.1 G/DL
ALP BLD-CCNC: 93 U/L
ALT SERPL-CCNC: 24 U/L
ANION GAP SERPL CALCULATED.3IONS-SCNC: 9 MMOL/L
AST SERPL-CCNC: 17 U/L
BASOPHILS ABSOLUTE: 0.05 /ΜL
BASOPHILS RELATIVE PERCENT: 0.5 %
BILIRUB SERPL-MCNC: 0.3 MG/DL (ref 0.1–1.4)
BUN BLDV-MCNC: 17 MG/DL
CALCIUM SERPL-MCNC: 10 MG/DL
CHLORIDE BLD-SCNC: 106 MMOL/L
CO2: 24 MMOL/L
CREAT SERPL-MCNC: 0.65 MG/DL
EGFR: >60
EOSINOPHILS ABSOLUTE: 0.15 /ΜL
EOSINOPHILS RELATIVE PERCENT: 1.5 %
GLUCOSE BLD-MCNC: 126 MG/DL
HCT VFR BLD CALC: 41.6 % (ref 36–46)
HEMOGLOBIN: 14.5 G/DL (ref 12–16)
LYMPHOCYTES ABSOLUTE: 2.55 /ΜL
LYMPHOCYTES RELATIVE PERCENT: 24.9 %
MCH RBC QN AUTO: 31.1 PG
MCHC RBC AUTO-ENTMCNC: 34.9 G/DL
MCV RBC AUTO: 89.3 FL
MONOCYTES ABSOLUTE: 0.72 /ΜL
MONOCYTES RELATIVE PERCENT: 7 %
NEUTROPHILS ABSOLUTE: 6.76 /ΜL
NEUTROPHILS RELATIVE PERCENT: 66.1 %
PDW BLD-RTO: ABNORMAL %
PLATELET # BLD: 285 K/ΜL
PMV BLD AUTO: ABNORMAL FL
POTASSIUM SERPL-SCNC: 4 MMOL/L
RBC # BLD: 4.66 10^6/ΜL
SODIUM BLD-SCNC: 139 MMOL/L
TOTAL PROTEIN: 7
WBC # BLD: 10.2 10^3/ML

## 2024-06-07 RX ORDER — TRAZODONE HYDROCHLORIDE 50 MG/1
TABLET ORAL
Qty: 90 TABLET | Refills: 1 | OUTPATIENT
Start: 2024-06-07 | End: 2024-12-04

## 2024-06-07 NOTE — TELEPHONE ENCOUNTER
Called pharmacy   Who verified that script sent on 5/2/24 was rec and is actually ready for .     I called patient and let her know it was ready.     Refusing this request

## 2024-06-07 NOTE — TELEPHONE ENCOUNTER
Last appointment: 5/13/2024  Next appointment: 6/17/2024  Last refill: 05/02/2024    Patient was prescribed a 90 day supply on 05/02/2024, this medication may not be appropriate for refill yet. Please advise

## 2024-06-11 ENCOUNTER — OFFICE VISIT (OUTPATIENT)
Dept: FAMILY MEDICINE CLINIC | Age: 69
End: 2024-06-11

## 2024-06-11 ENCOUNTER — TELEPHONE (OUTPATIENT)
Dept: INTERNAL MEDICINE CLINIC | Age: 69
End: 2024-06-11

## 2024-06-11 VITALS
TEMPERATURE: 98.1 F | HEART RATE: 66 BPM | RESPIRATION RATE: 14 BRPM | SYSTOLIC BLOOD PRESSURE: 120 MMHG | BODY MASS INDEX: 43.91 KG/M2 | WEIGHT: 247.8 LBS | DIASTOLIC BLOOD PRESSURE: 62 MMHG | OXYGEN SATURATION: 97 %

## 2024-06-11 DIAGNOSIS — J40 BRONCHITIS: Primary | ICD-10-CM

## 2024-06-11 RX ORDER — ALBUTEROL SULFATE 90 UG/1
2 AEROSOL, METERED RESPIRATORY (INHALATION) 4 TIMES DAILY PRN
Qty: 54 G | Refills: 1 | Status: SHIPPED | OUTPATIENT
Start: 2024-06-11

## 2024-06-11 RX ORDER — METHYLPREDNISOLONE 4 MG/1
TABLET ORAL
Qty: 1 KIT | Refills: 0 | Status: SHIPPED | OUTPATIENT
Start: 2024-06-11 | End: 2024-06-17

## 2024-06-11 NOTE — TELEPHONE ENCOUNTER
Advise home COVID test.  To be seen today, offer her overflow clinic or virtualist if available.  Otherwise if not feeling that badly, she can attempt an e-visit.  If okay with not being seen today, I will work her in tomorrow at 345 PM.

## 2024-06-11 NOTE — TELEPHONE ENCOUNTER
Patient called for Dr. Sams in regards to getting an appointment to see her for cold symptoms. Patient stated she was having a bad cough that is dry, throat feels raw from coughing, has some mucous in chest (feels gargly according to patient). Hasn't taken any OTC medication to treat yet, had some wheezing, no temp, and no body aches outside of the normal. Patient cough did start a wk ago and mucous formed in chest a couple of days ago.  Please advise where we can squeeze patient in and did offer patient to submit an E-visit.

## 2024-06-11 NOTE — TELEPHONE ENCOUNTER
Called patient back to advise per Dr. Sams:         Advise home COVID test.  To be seen today, offer her overflow clinic or virtualist if available.  Otherwise if not feeling that badly, she can attempt an e-visit.  If okay with not being seen today, I will work her in tomorrow at 345 PM.           Patient decided to schedule with the offsite NP with Evelyn Yu to be seen about her symptoms and will take an at home covid test as advised.

## 2024-06-11 NOTE — PROGRESS NOTES
Left Ear: Tympanic membrane normal.      Nose: Rhinorrhea present.      Right Sinus: Maxillary sinus tenderness present.      Left Sinus: Maxillary sinus tenderness present.      Mouth/Throat:      Pharynx: Oropharynx is clear.   Eyes:      Extraocular Movements: Extraocular movements intact.      Conjunctiva/sclera: Conjunctivae normal.      Pupils: Pupils are equal, round, and reactive to light.   Neck:      Thyroid: No thyromegaly.      Vascular: No JVD.   Cardiovascular:      Rate and Rhythm: Normal rate and regular rhythm.      Heart sounds: Normal heart sounds.   Pulmonary:      Effort: Pulmonary effort is normal. No respiratory distress.      Breath sounds: Normal breath sounds. No wheezing.   Musculoskeletal:         General: Normal range of motion.      Cervical back: Normal range of motion and neck supple.   Skin:     General: Skin is warm and dry.      Capillary Refill: Capillary refill takes less than 2 seconds.      Coloration: Skin is not jaundiced or pale.   Neurological:      General: No focal deficit present.      Mental Status: She is alert and oriented to person, place, and time.   Psychiatric:         Mood and Affect: Mood normal.         Behavior: Behavior normal.         Thought Content: Thought content normal.                 An electronic signature was used to authenticate this note.    --Evelyn Yu, APRN - CNP

## 2024-06-13 ENCOUNTER — TELEPHONE (OUTPATIENT)
Dept: INTERNAL MEDICINE CLINIC | Age: 69
End: 2024-06-13

## 2024-06-13 PROBLEM — J40 BRONCHITIS: Status: ACTIVE | Noted: 2024-06-13

## 2024-06-13 RX ORDER — BENZONATATE 200 MG/1
200 CAPSULE ORAL 3 TIMES DAILY PRN
Qty: 21 CAPSULE | Refills: 0 | Status: SHIPPED | OUTPATIENT
Start: 2024-06-13 | End: 2024-06-20

## 2024-06-13 RX ORDER — IPRATROPIUM BROMIDE 42 UG/1
2 SPRAY, METERED NASAL 3 TIMES DAILY
Qty: 18 EACH | Refills: 0 | Status: SHIPPED | OUTPATIENT
Start: 2024-06-13 | End: 2024-07-13

## 2024-06-13 ASSESSMENT — ENCOUNTER SYMPTOMS
SHORTNESS OF BREATH: 0
COUGH: 1
RHINORRHEA: 1
WHEEZING: 0
CHOKING: 1
SORE THROAT: 1

## 2024-06-13 NOTE — TELEPHONE ENCOUNTER
Patient called back in for Dr. Sams and was able to get some of her URI symptoms  treated. Was diagnosed with Bronchitis by Evelyn Yu on 6/11 and was put on methylPREDNISolone (MEDROL DOSEPACK) 4 MG tablet  and a albuterol sulfate HFA (VENTOLIN HFA) 108 (90 Base) MCG/ACT inhaler. Patient is still experiencing a bad runny nose with a cough cough on the second day of the Mederol Dosepack and would like if Dr. Sams can prescribe her something stop the drainage, please advise.     Patient mentioned having an appointment coming up on Monday. Patient would like a callback regarding her next steps.     Patients callback# [phone]    Preferred Pharmacy     [id number] - [place], [place] - [address] - P [phone] - F [phone] [address] [facility] [address]  Phone: [phone]  Fax: [phone]

## 2024-06-13 NOTE — TELEPHONE ENCOUNTER
patient has been advised and voices understanding  However states she does take afrin, should she discontinue that and onnly that the atrovent?

## 2024-06-13 NOTE — TELEPHONE ENCOUNTER
Was seen by cnp on 6/11 acutely.     Sends following message.     Pt is sched to see you next on 6/17.

## 2024-06-13 NOTE — ASSESSMENT & PLAN NOTE
Will start medrol dose pack   Albuterol inhaler prn   Continue OTC meds for symptoms relief   Call if no better

## 2024-06-17 ENCOUNTER — OFFICE VISIT (OUTPATIENT)
Dept: INTERNAL MEDICINE CLINIC | Age: 69
End: 2024-06-17

## 2024-06-17 ENCOUNTER — TELEPHONE (OUTPATIENT)
Dept: INTERNAL MEDICINE CLINIC | Age: 69
End: 2024-06-17

## 2024-06-17 VITALS
WEIGHT: 244 LBS | OXYGEN SATURATION: 94 % | TEMPERATURE: 98.3 F | DIASTOLIC BLOOD PRESSURE: 80 MMHG | BODY MASS INDEX: 43.23 KG/M2 | HEART RATE: 63 BPM | SYSTOLIC BLOOD PRESSURE: 150 MMHG

## 2024-06-17 DIAGNOSIS — I10 ESSENTIAL HYPERTENSION: ICD-10-CM

## 2024-06-17 DIAGNOSIS — J40 BRONCHITIS: Primary | ICD-10-CM

## 2024-06-17 DIAGNOSIS — I73.9 PAD (PERIPHERAL ARTERY DISEASE) (HCC): ICD-10-CM

## 2024-06-17 DIAGNOSIS — E78.5 HYPERLIPIDEMIA, UNSPECIFIED HYPERLIPIDEMIA TYPE: ICD-10-CM

## 2024-06-17 DIAGNOSIS — F32.4 MAJOR DEPRESSIVE DISORDER WITH SINGLE EPISODE, IN PARTIAL REMISSION (HCC): ICD-10-CM

## 2024-06-17 RX ORDER — AZITHROMYCIN 250 MG/1
TABLET, FILM COATED ORAL
Qty: 6 TABLET | Refills: 0 | Status: SHIPPED | OUTPATIENT
Start: 2024-06-17 | End: 2024-06-27

## 2024-06-17 NOTE — PATIENT INSTRUCTIONS
Skip doxycycline while taking Zpac    Breztri sample    Check home bp readings, off Medrol dose pack and call with results in 2 weeks.

## 2024-06-17 NOTE — PROGRESS NOTES
GEL Apply 4 g topically 4 times daily 50 g 0    pravastatin (PRAVACHOL) 20 MG tablet TAKE 1 TABLET BY MOUTH EVERY DAY 90 tablet 3    loratadine (CLARITIN) 10 MG tablet TAKE 1 TABLET BY MOUTH DAILY 90 tablet 3    verapamil (CALAN SR) 180 MG extended release tablet Take 1 tablet by mouth nightly 90 tablet 3    metoprolol succinate (TOPROL XL) 25 MG extended release tablet Take 1 tablet by mouth daily 90 tablet 3    buPROPion (WELLBUTRIN XL) 150 MG extended release tablet TAKE 1 TABLET BY MOUTH EVERY MORNING 90 tablet 1    pantoprazole (PROTONIX) 40 MG tablet TAKE 1 TABLET BY MOUTH TWICE DAILY 180 tablet 0    furosemide (LASIX) 20 MG tablet Take 1 tablet by mouth 2 times daily as needed      vitamin D3 (CHOLECALCIFEROL) 25 MCG (1000 UT) TABS tablet Take 1 tablet by mouth daily      losartan (COZAAR) 50 MG tablet Take 1 tablet by mouth daily 90 tablet 3    fluticasone (FLONASE) 50 MCG/ACT nasal spray 1 spray by Each Nostril route daily 16 g 3    aspirin EC 81 MG EC tablet Take 1 tablet by mouth daily 30 tablet 3    Omega-3 Fatty Acids (FISH OIL) 1000 MG CAPS Take 2 capsules by mouth daily      acetaminophen (TYLENOL) 500 MG tablet Take 1 tablet by mouth every 6 hours as needed for Pain      Multiple Vitamins-Minerals (CENTRUM SILVER PO) Take 1 tablet by mouth daily       gabapentin (NEURONTIN) 100 MG capsule Take 2 capsules by mouth 2 times daily for 30 days. 120 capsule 0     No current facility-administered medications for this visit.       Physical Exam  Vitals and nursing note reviewed.   Constitutional:       General: She is not in acute distress.     Appearance: She is well-developed. She is not diaphoretic.   HENT:      Right Ear: External ear normal.      Left Ear: External ear normal.      Nose: Nose normal.      Mouth/Throat:      Pharynx: No oropharyngeal exudate.   Eyes:      General:         Right eye: No discharge.         Left eye: No discharge.      Conjunctiva/sclera: Conjunctivae normal.

## 2024-06-18 LAB
CHOLEST SERPL-MCNC: 160 MG/DL (ref 0–199)
HDLC SERPL-MCNC: 66 MG/DL (ref 40–60)
LDLC SERPL CALC-MCNC: 62 MG/DL
ORGANISM: ABNORMAL
REPORT: NORMAL
RESP PATH DNA+RNA PNL NPH NAA+NON-PROBE: ABNORMAL
TRIGL SERPL-MCNC: 160 MG/DL (ref 0–150)
VLDLC SERPL CALC-MCNC: 32 MG/DL

## 2024-06-21 DIAGNOSIS — G44.039 EPISODIC PAROXYSMAL HEMICRANIA, NOT INTRACTABLE: ICD-10-CM

## 2024-06-21 RX ORDER — GABAPENTIN 100 MG/1
200 CAPSULE ORAL 2 TIMES DAILY
Qty: 120 CAPSULE | Refills: 0 | Status: SHIPPED | OUTPATIENT
Start: 2024-06-21 | End: 2024-07-21

## 2024-06-21 NOTE — TELEPHONE ENCOUNTER
Last appointment: 6/17/2024  Next appointment: Visit date not found  Last refill: 2/13/24    Next apt due in October.   Message sent to schedule.

## 2024-07-04 DIAGNOSIS — F32.4 MAJOR DEPRESSIVE DISORDER WITH SINGLE EPISODE, IN PARTIAL REMISSION (HCC): ICD-10-CM

## 2024-07-05 RX ORDER — BUPROPION HYDROCHLORIDE 150 MG/1
150 TABLET ORAL EVERY MORNING
Qty: 90 TABLET | Refills: 1 | Status: SHIPPED | OUTPATIENT
Start: 2024-07-05

## 2024-07-05 NOTE — TELEPHONE ENCOUNTER
Last appointment: 6/17/2024  Next appointment: 10/21/2024  Last refill: 1/9/24    Requested Prescriptions     Pending Prescriptions Disp Refills    buPROPion (WELLBUTRIN XL) 150 MG extended release tablet [Pharmacy Med Name: BUPROPION XL 150MG TABLETS (24 H)] 90 tablet 1     Sig: TAKE 1 TABLET BY MOUTH EVERY MORNING

## 2024-07-19 DIAGNOSIS — G44.039 EPISODIC PAROXYSMAL HEMICRANIA, NOT INTRACTABLE: ICD-10-CM

## 2024-07-19 RX ORDER — GABAPENTIN 100 MG/1
200 CAPSULE ORAL 2 TIMES DAILY
Qty: 120 CAPSULE | Refills: 0 | Status: SHIPPED | OUTPATIENT
Start: 2024-07-19 | End: 2024-08-18

## 2024-07-19 NOTE — TELEPHONE ENCOUNTER
Last appointment: 6/17/2024  Next appointment: 10/21/2024  Last refill: 6/21/24    Requested Prescriptions     Pending Prescriptions Disp Refills    gabapentin (NEURONTIN) 100 MG capsule [Pharmacy Med Name: GABAPENTIN 100MG CAPSULES] 120 capsule 0     Sig: Take 2 capsules by mouth 2 times daily.

## 2024-07-24 ENCOUNTER — OFFICE VISIT (OUTPATIENT)
Dept: GYNECOLOGY | Age: 69
End: 2024-07-24
Payer: MEDICARE

## 2024-07-24 VITALS
WEIGHT: 249.12 LBS | DIASTOLIC BLOOD PRESSURE: 70 MMHG | BODY MASS INDEX: 44.14 KG/M2 | SYSTOLIC BLOOD PRESSURE: 122 MMHG | OXYGEN SATURATION: 98 % | HEART RATE: 52 BPM

## 2024-07-24 DIAGNOSIS — R82.90 UNSPECIFIED ABNORMAL FINDINGS IN URINE: ICD-10-CM

## 2024-07-24 DIAGNOSIS — R10.2 PELVIC CRAMPING: Primary | ICD-10-CM

## 2024-07-24 DIAGNOSIS — N93.9 VAGINAL BLEEDING: ICD-10-CM

## 2024-07-24 LAB
BACTERIA URNS QL MICRO: NORMAL /HPF
BILIRUB UR QL STRIP.AUTO: NEGATIVE
CLARITY UR: CLEAR
COLOR UR: YELLOW
EPI CELLS #/AREA URNS AUTO: 0 /HPF (ref 0–5)
GLUCOSE UR STRIP.AUTO-MCNC: NEGATIVE MG/DL
HGB UR QL STRIP.AUTO: NEGATIVE
HYALINE CASTS #/AREA URNS AUTO: 0 /LPF (ref 0–8)
KETONES UR STRIP.AUTO-MCNC: NEGATIVE MG/DL
LEUKOCYTE ESTERASE UR QL STRIP.AUTO: NEGATIVE
NITRITE UR QL STRIP.AUTO: NEGATIVE
PH UR STRIP.AUTO: 6.5 [PH] (ref 5–8)
PROT UR STRIP.AUTO-MCNC: NEGATIVE MG/DL
RBC CLUMPS #/AREA URNS AUTO: 0 /HPF (ref 0–4)
SP GR UR STRIP.AUTO: 1.01 (ref 1–1.03)
UA DIPSTICK W REFLEX MICRO PNL UR: NORMAL
URN SPEC COLLECT METH UR: NORMAL
UROBILINOGEN UR STRIP-ACNC: 0.2 E.U./DL
WBC #/AREA URNS AUTO: 0 /HPF (ref 0–5)

## 2024-07-24 PROCEDURE — 3078F DIAST BP <80 MM HG: CPT | Performed by: OBSTETRICS & GYNECOLOGY

## 2024-07-24 PROCEDURE — 1090F PRES/ABSN URINE INCON ASSESS: CPT | Performed by: OBSTETRICS & GYNECOLOGY

## 2024-07-24 PROCEDURE — G8399 PT W/DXA RESULTS DOCUMENT: HCPCS | Performed by: OBSTETRICS & GYNECOLOGY

## 2024-07-24 PROCEDURE — 3074F SYST BP LT 130 MM HG: CPT | Performed by: OBSTETRICS & GYNECOLOGY

## 2024-07-24 PROCEDURE — G8427 DOCREV CUR MEDS BY ELIG CLIN: HCPCS | Performed by: OBSTETRICS & GYNECOLOGY

## 2024-07-24 PROCEDURE — 99213 OFFICE O/P EST LOW 20 MIN: CPT | Performed by: OBSTETRICS & GYNECOLOGY

## 2024-07-24 PROCEDURE — 3017F COLORECTAL CA SCREEN DOC REV: CPT | Performed by: OBSTETRICS & GYNECOLOGY

## 2024-07-24 PROCEDURE — 1036F TOBACCO NON-USER: CPT | Performed by: OBSTETRICS & GYNECOLOGY

## 2024-07-24 PROCEDURE — G8417 CALC BMI ABV UP PARAM F/U: HCPCS | Performed by: OBSTETRICS & GYNECOLOGY

## 2024-07-24 PROCEDURE — 1123F ACP DISCUSS/DSCN MKR DOCD: CPT | Performed by: OBSTETRICS & GYNECOLOGY

## 2024-07-24 ASSESSMENT — ENCOUNTER SYMPTOMS
GASTROINTESTINAL NEGATIVE: 1
EYES NEGATIVE: 1
ALLERGIC/IMMUNOLOGIC NEGATIVE: 1
RESPIRATORY NEGATIVE: 1

## 2024-07-25 LAB — BACTERIA UR CULT: NORMAL

## 2024-07-25 NOTE — PROGRESS NOTES
Right: No rash, tenderness, lesion or injury.         Left: No rash, tenderness, lesion or injury.       Urethra: No prolapse, urethral pain, urethral swelling or urethral lesion.      Vagina: No signs of injury and foreign body. No vaginal discharge, erythema, tenderness, bleeding, lesions or prolapsed vaginal walls.      Adnexa:         Right: No mass, tenderness or fullness.          Left: No mass, tenderness or fullness.        Rectum: Guaiac result negative. External hemorrhoid present. No mass, tenderness, anal fissure or internal hemorrhoid. Normal anal tone.      Comments: Normal urethral meatus, nl urethra, nl bladder.  Vaginal atrophy  Musculoskeletal:         General: No tenderness. Normal range of motion.      Cervical back: Normal range of motion.   Skin:     General: Skin is warm and dry.      Coloration: Skin is not pale.      Findings: No erythema or rash.   Neurological:      Mental Status: She is alert and oriented to person, place, and time.   Psychiatric:         Behavior: Behavior normal.         Thought Content: Thought content normal.         Judgment: Judgment normal.         ASSESSMENT/PLAN:  1. Pelvic cramping    - US PELVIS COMPLETE NON-OB TRANSABDOMINAL AND TRANSVAGINAL; Future    2. Vaginal bleeding  -does have some vaginal atrophy-try hyaluronic acid  - Urinalysis with Microscopic ([place] ONLY)  - Culture, Urine    3. Unspecified abnormal findings in urine    - Culture, Urine  -UA also    Hemocult negative

## 2024-08-05 ENCOUNTER — HOSPITAL ENCOUNTER (OUTPATIENT)
Dept: ULTRASOUND IMAGING | Age: 69
Discharge: HOME | End: 2024-08-05
Attending: OBSTETRICS & GYNECOLOGY
Payer: MEDICARE

## 2024-08-05 DIAGNOSIS — R10.2 PELVIC CRAMPING: ICD-10-CM

## 2024-08-05 PROCEDURE — 76830 TRANSVAGINAL US NON-OB: CPT

## 2024-08-23 DIAGNOSIS — G44.039 EPISODIC PAROXYSMAL HEMICRANIA, NOT INTRACTABLE: ICD-10-CM

## 2024-08-24 RX ORDER — GABAPENTIN 100 MG/1
200 CAPSULE ORAL 2 TIMES DAILY
Qty: 120 CAPSULE | Refills: 0 | Status: SHIPPED | OUTPATIENT
Start: 2024-08-24 | End: 2024-09-23

## 2024-08-26 ENCOUNTER — APPOINTMENT (OUTPATIENT)
Dept: CT IMAGING | Age: 69
End: 2024-08-26
Payer: MEDICARE

## 2024-08-26 ENCOUNTER — HOSPITAL ENCOUNTER (EMERGENCY)
Age: 69
Discharge: HOME | End: 2024-08-26
Attending: STUDENT IN AN ORGANIZED HEALTH CARE EDUCATION/TRAINING PROGRAM
Payer: MEDICARE

## 2024-08-26 VITALS
OXYGEN SATURATION: 100 % | SYSTOLIC BLOOD PRESSURE: 147 MMHG | BODY MASS INDEX: 46.65 KG/M2 | WEIGHT: 253.5 LBS | HEIGHT: 62 IN | HEART RATE: 60 BPM | RESPIRATION RATE: 18 BRPM | TEMPERATURE: 98.1 F | DIASTOLIC BLOOD PRESSURE: 75 MMHG

## 2024-08-26 DIAGNOSIS — R42 DIZZINESS: Primary | ICD-10-CM

## 2024-08-26 DIAGNOSIS — E86.0 DEHYDRATION: ICD-10-CM

## 2024-08-26 LAB
ALBUMIN SERPL-MCNC: 4.2 G/DL (ref 3.4–5)
ALBUMIN/GLOB SERPL: 1.7 {RATIO} (ref 1.1–2.2)
ALP SERPL-CCNC: 98 U/L (ref 40–129)
ALT SERPL-CCNC: 23 U/L (ref 10–40)
ANION GAP SERPL CALCULATED.3IONS-SCNC: 13 MMOL/L (ref 3–16)
AST SERPL-CCNC: 23 U/L (ref 15–37)
BASOPHILS # BLD: 0.1 K/UL (ref 0–0.2)
BASOPHILS NFR BLD: 1 %
BILIRUB SERPL-MCNC: 0.3 MG/DL (ref 0–1)
BUN SERPL-MCNC: 10 MG/DL (ref 7–20)
CALCIUM SERPL-MCNC: 9.1 MG/DL (ref 8.3–10.6)
CHLORIDE SERPL-SCNC: 101 MMOL/L (ref 99–110)
CO2 SERPL-SCNC: 23 MMOL/L (ref 21–32)
CREAT SERPL-MCNC: <0.5 MG/DL (ref 0.6–1.2)
DEPRECATED RDW RBC AUTO: 13.3 % (ref 12.4–15.4)
EKG ATRIAL RATE: 54 BPM
EKG DIAGNOSIS: NORMAL
EKG P AXIS: 61 DEGREES
EKG P-R INTERVAL: 180 MS
EKG Q-T INTERVAL: 438 MS
EKG QRS DURATION: 88 MS
EKG QTC CALCULATION (BAZETT): 415 MS
EKG R AXIS: 14 DEGREES
EKG T AXIS: 32 DEGREES
EKG VENTRICULAR RATE: 54 BPM
EOSINOPHIL # BLD: 0.1 K/UL (ref 0–0.6)
EOSINOPHIL NFR BLD: 1.7 %
GFR SERPLBLD CREATININE-BSD FMLA CKD-EPI: >90 ML/MIN/{1.73_M2}
GLUCOSE SERPL-MCNC: 110 MG/DL (ref 70–99)
HCT VFR BLD AUTO: 39.9 % (ref 36–48)
HGB BLD-MCNC: 13.5 G/DL (ref 12–16)
LYMPHOCYTES # BLD: 2.2 K/UL (ref 1–5.1)
LYMPHOCYTES NFR BLD: 33.1 %
MCH RBC QN AUTO: 30.8 PG (ref 26–34)
MCHC RBC AUTO-ENTMCNC: 34 G/DL (ref 31–36)
MCV RBC AUTO: 90.8 FL (ref 80–100)
MONOCYTES # BLD: 0.6 K/UL (ref 0–1.3)
MONOCYTES NFR BLD: 9 %
NEUTROPHILS # BLD: 3.7 K/UL (ref 1.7–7.7)
NEUTROPHILS NFR BLD: 55.2 %
PLATELET # BLD AUTO: 241 K/UL (ref 135–450)
PMV BLD AUTO: 7.4 FL (ref 5–10.5)
POTASSIUM SERPL-SCNC: 3.7 MMOL/L (ref 3.5–5.1)
PROT SERPL-MCNC: 6.7 G/DL (ref 6.4–8.2)
RBC # BLD AUTO: 4.39 M/UL (ref 4–5.2)
SODIUM SERPL-SCNC: 137 MMOL/L (ref 136–145)
TROPONIN, HIGH SENSITIVITY: 12 NG/L (ref 0–14)
TROPONIN, HIGH SENSITIVITY: 8 NG/L (ref 0–14)
WBC # BLD AUTO: 6.6 K/UL (ref 4–11)

## 2024-08-26 PROCEDURE — 2580000003 HC RX 258

## 2024-08-26 PROCEDURE — 99284 EMERGENCY DEPT VISIT MOD MDM: CPT

## 2024-08-26 PROCEDURE — 93005 ELECTROCARDIOGRAM TRACING: CPT

## 2024-08-26 PROCEDURE — 96360 HYDRATION IV INFUSION INIT: CPT

## 2024-08-26 PROCEDURE — 85025 COMPLETE CBC W/AUTO DIFF WBC: CPT

## 2024-08-26 PROCEDURE — 70450 CT HEAD/BRAIN W/O DYE: CPT

## 2024-08-26 PROCEDURE — 96361 HYDRATE IV INFUSION ADD-ON: CPT

## 2024-08-26 PROCEDURE — 6370000000 HC RX 637 (ALT 250 FOR IP): Performed by: STUDENT IN AN ORGANIZED HEALTH CARE EDUCATION/TRAINING PROGRAM

## 2024-08-26 PROCEDURE — 84484 ASSAY OF TROPONIN QUANT: CPT

## 2024-08-26 PROCEDURE — 80053 COMPREHEN METABOLIC PANEL: CPT

## 2024-08-26 RX ORDER — SODIUM CHLORIDE, SODIUM LACTATE, POTASSIUM CHLORIDE, AND CALCIUM CHLORIDE .6; .31; .03; .02 G/100ML; G/100ML; G/100ML; G/100ML
1000 INJECTION, SOLUTION INTRAVENOUS ONCE
Status: COMPLETED | OUTPATIENT
Start: 2024-08-26 | End: 2024-08-26

## 2024-08-26 RX ORDER — ACETAMINOPHEN 500 MG
1000 TABLET ORAL
Status: COMPLETED | OUTPATIENT
Start: 2024-08-26 | End: 2024-08-26

## 2024-08-26 RX ADMIN — SODIUM CHLORIDE, POTASSIUM CHLORIDE, SODIUM LACTATE AND CALCIUM CHLORIDE 1000 ML: 600; 310; 30; 20 INJECTION, SOLUTION INTRAVENOUS at 14:00

## 2024-08-26 RX ADMIN — ACETAMINOPHEN 1000 MG: 500 TABLET ORAL at 14:51

## 2024-08-26 ASSESSMENT — LIFESTYLE VARIABLES
HOW OFTEN DO YOU HAVE A DRINK CONTAINING ALCOHOL: NEVER
HOW MANY STANDARD DRINKS CONTAINING ALCOHOL DO YOU HAVE ON A TYPICAL DAY: PATIENT DOES NOT DRINK

## 2024-08-26 ASSESSMENT — PAIN DESCRIPTION - ORIENTATION: ORIENTATION: POSTERIOR;ANTERIOR

## 2024-08-26 ASSESSMENT — ENCOUNTER SYMPTOMS
COUGH: 0
ABDOMINAL PAIN: 0
DIARRHEA: 1
SHORTNESS OF BREATH: 0
VOMITING: 0
NAUSEA: 0

## 2024-08-26 ASSESSMENT — PAIN SCALES - GENERAL
PAINLEVEL_OUTOF10: 7
PAINLEVEL_OUTOF10: 5
PAINLEVEL_OUTOF10: 5

## 2024-08-26 ASSESSMENT — PAIN DESCRIPTION - DESCRIPTORS: DESCRIPTORS: ACHING;DULL

## 2024-08-26 ASSESSMENT — PAIN DESCRIPTION - LOCATION
LOCATION: HEAD

## 2024-08-26 ASSESSMENT — PAIN - FUNCTIONAL ASSESSMENT: PAIN_FUNCTIONAL_ASSESSMENT: 0-10

## 2024-08-26 ASSESSMENT — PAIN DESCRIPTION - PAIN TYPE: TYPE: ACUTE PAIN

## 2024-08-26 NOTE — ED PROVIDER NOTES
THE [facility]  EMERGENCY DEPARTMENT ENCOUNTER          [facility] RESIDENT NOTE       Date of evaluation: 8/26/2024    Chief Complaint     Dizziness      History of Present Illness     Laly Mckeon is a 69 y.o. female who presents to the emergency department due to dizziness.  [facility] [place] [facility] [employer] [facility]  She then went to the shower where she had her first episode of dizziness.  Patient states that at that time she was momentarily dizzy and then was able to go about her day.  Patient states that earlier a few hours later she was in her kitchen where she started to experience more dizziness.  She states that she was able to get to a wall and walk herself to her cell phone and call 911.  Patient denies any falls and denies hitting her head on the floor.  Patient states that this is never happened before.  Patient denies any sick contacts recent illnesses.  When the patient arrived to the ED patient stated that she was having chest pressure.  However at the time of evaluating the patient patient denied any chest pain palpitations or shortness of breath.  Patient does have baseline diarrhea due to microscopic colitis.  Patient denies having any abnormal amounts of diarrhea nausea or vomiting this morning.  Patient states that she has been drinking enough water as well as ate her meal this morning.    ASSESSMENT / PLAN  (MEDICAL DECISION MAKING)     INITIAL VITALS: BP: (!) 156/76, Temp: 98.1 °F (36.7 °C), Pulse: 63, Respirations: 20, SpO2: 93 %     Laly Mckeon is a 69 y.o. female who presented to the emergency department for dizziness.  Since the patient was having chest pressure/pain on the time of arrival to the ED I have ordered an EKG. 1 L bolus of LR given to patient.  EKG reveals sinus bradycardia with no concerns for ST changes or T wave inversions.  Trops initially was 12 and then down trended to 8.  Tylenol was given for headache.  CBC unremarkable CMP unremarkable.

## 2024-08-26 NOTE — ED NOTES
Pt reports dizziness is worse with eyes closed and has nausea dt dizziness. And now co chest pressure that goes to her back      Chitra Crooks, RN  08/26/24 [id number]

## 2024-08-26 NOTE — ED PROVIDER NOTES
ED Attending Attestation Note     Date of evaluation: 8/26/2024    I have discussed the case with the resident. I have personally performed a history, physical exam, and my own medical decision making. I have reviewed the note and agree with the findings and plan.  I have reviewed the ECG and concur with the resident's interpretation.     INITIAL VITALS: BP: (!) 156/76, Temp: 98.1 °F (36.7 °C), Pulse: 63, Respirations: 20, SpO2: 93 %       MDM:  My assessment reveals a well-appearing 69-year-old female presenting with an episode of room spinning sensation at a workout class.  Her workup is reassuring today and she was fluid resuscitated.  She is neurologically intact and feels back to baseline.  She attributes her episode of room spinning sensation to her decreased p.o. fluid intake.  She will be discharged home with strict return precautions and PCP follow-up.       Nolan Tompkins MD  08/26/24 [id number]    Addendum:  Clinical Impression:  Dizziness     Nolan Tompkins MD  08/28/24 [id number]

## 2024-08-26 NOTE — ED TRIAGE NOTES
Pt had slight dizziness this morning after her workout class this morning and then sudden onset severe dizziness around 1130 today. Pt did not pass out or fall. Pt now co sever headache like a band is around head and a lot of pressure. Pt reports vision \"feels like I don;t have the right prescription on\".

## 2024-08-26 NOTE — TELEPHONE ENCOUNTER
Requested Prescriptions     Pending Prescriptions Disp Refills    losartan (COZAAR) 50 MG tablet [Pharmacy Med Name: LOSARTAN 50MG TABLETS] 90 tablet 3     Sig: TAKE 1 TABLET BY MOUTH DAILY            Checked Correct Pharmacy: Yes    Any changes since last refill? No     Number: 90    Refills: 3    Last Office Visit: 3/13/2024     Next Office Visit: 9/4/2024     Last Refill: 06/28/2023    Last Labs: 06/2024

## 2024-08-26 NOTE — DISCHARGE INSTRUCTIONS
Please try to incorporate more water intake daily.  Try to cut down on the amount of pop every day.  Please do not hesitate to come back to the emergency department if you have any fevers, chills, shortness of breath, or difficulty breathing.

## 2024-08-27 RX ORDER — LOSARTAN POTASSIUM 50 MG/1
50 TABLET ORAL DAILY
Qty: 90 TABLET | Refills: 3 | Status: SHIPPED | OUTPATIENT
Start: 2024-08-27 | End: 2024-08-29

## 2024-08-29 ENCOUNTER — OFFICE VISIT (OUTPATIENT)
Dept: INTERNAL MEDICINE CLINIC | Age: 69
End: 2024-08-29

## 2024-08-29 VITALS
DIASTOLIC BLOOD PRESSURE: 78 MMHG | HEART RATE: 65 BPM | SYSTOLIC BLOOD PRESSURE: 142 MMHG | BODY MASS INDEX: 45.54 KG/M2 | OXYGEN SATURATION: 95 % | WEIGHT: 249 LBS

## 2024-08-29 DIAGNOSIS — R42 DIZZINESS: ICD-10-CM

## 2024-08-29 DIAGNOSIS — I63.9 CEREBRAL INFARCTION, UNSPECIFIED MECHANISM (HCC): ICD-10-CM

## 2024-08-29 DIAGNOSIS — G44.52 NEW DAILY PERSISTENT HEADACHE: Primary | ICD-10-CM

## 2024-08-29 DIAGNOSIS — I10 ESSENTIAL HYPERTENSION: ICD-10-CM

## 2024-08-29 RX ORDER — MECLIZINE HYDROCHLORIDE 25 MG/1
25 TABLET ORAL 3 TIMES DAILY PRN
Qty: 30 TABLET | Refills: 0 | Status: SHIPPED | OUTPATIENT
Start: 2024-08-29 | End: 2024-09-08

## 2024-08-29 RX ORDER — LOSARTAN POTASSIUM 50 MG/1
75 TABLET ORAL DAILY
Qty: 135 TABLET | Refills: 0 | Status: SHIPPED | OUTPATIENT
Start: 2024-08-29 | End: 2024-11-27

## 2024-08-29 NOTE — PROGRESS NOTES
inaccuracies due to incorrect word recognition.  If further clarification is needed please contact the office at [phone]          An electronic signature was used to authenticate this note.    --FARHEEN TORREZ MD

## 2024-08-29 NOTE — PATIENT INSTRUCTIONS
Increase to losartan 75 mg    Antivert if another episode of spinning  Augmentin for sinusitis which may have caused dizziness    Consider massage gun on [employer] and voltaren gel for low back pain    Check covid test  Tylenol for headache  Neurology to FU abnormal Head CT

## 2024-09-04 ENCOUNTER — TELEPHONE (OUTPATIENT)
Dept: ENT CLINIC | Age: 69
End: 2024-09-04

## 2024-09-04 ENCOUNTER — OFFICE VISIT (OUTPATIENT)
Dept: CARDIOLOGY CLINIC | Age: 69
End: 2024-09-04
Payer: MEDICARE

## 2024-09-04 VITALS
BODY MASS INDEX: 45.51 KG/M2 | SYSTOLIC BLOOD PRESSURE: 148 MMHG | HEART RATE: 72 BPM | DIASTOLIC BLOOD PRESSURE: 68 MMHG | WEIGHT: 248.8 LBS

## 2024-09-04 DIAGNOSIS — I47.19 ATRIAL TACHYCARDIA (HCC): Primary | ICD-10-CM

## 2024-09-04 PROCEDURE — 3077F SYST BP >= 140 MM HG: CPT | Performed by: INTERNAL MEDICINE

## 2024-09-04 PROCEDURE — 1036F TOBACCO NON-USER: CPT | Performed by: INTERNAL MEDICINE

## 2024-09-04 PROCEDURE — 3017F COLORECTAL CA SCREEN DOC REV: CPT | Performed by: INTERNAL MEDICINE

## 2024-09-04 PROCEDURE — 3078F DIAST BP <80 MM HG: CPT | Performed by: INTERNAL MEDICINE

## 2024-09-04 PROCEDURE — G8427 DOCREV CUR MEDS BY ELIG CLIN: HCPCS | Performed by: INTERNAL MEDICINE

## 2024-09-04 PROCEDURE — G8399 PT W/DXA RESULTS DOCUMENT: HCPCS | Performed by: INTERNAL MEDICINE

## 2024-09-04 PROCEDURE — G8417 CALC BMI ABV UP PARAM F/U: HCPCS | Performed by: INTERNAL MEDICINE

## 2024-09-04 PROCEDURE — 1123F ACP DISCUSS/DSCN MKR DOCD: CPT | Performed by: INTERNAL MEDICINE

## 2024-09-04 PROCEDURE — 99215 OFFICE O/P EST HI 40 MIN: CPT | Performed by: INTERNAL MEDICINE

## 2024-09-04 PROCEDURE — 1090F PRES/ABSN URINE INCON ASSESS: CPT | Performed by: INTERNAL MEDICINE

## 2024-09-04 NOTE — TELEPHONE ENCOUNTER
Patient came into the office after going to the ER regarding Vertigo, she says the ER physicians told her the Vertigo could've possible started due to the Cyst in her nasal cavity being inflamed. She is currently on antibiotics and would like to know if Dr. Henley wants to see her and check out her nose.    Please give her a call back with suggestions, thanks!

## 2024-09-04 NOTE — PROGRESS NOTES
is controlled, S1, S2 normal, there is no murmur, there is no rub or gallop, cannot assess jvd, no bilateral lower extremity edema   Abdomen:   Soft, non-tender, bowel sounds active all four quadrants,  no masses, no organomegaly       Extremities: Extremities normal, atraumatic, no cyanosis   Pulses: 2+ and symmetric   Skin: Skin color, texture, turgor normal, no rashes or lesions   Pysch: Normal mood and affect   Neurologic: Normal gross motor and sensory exam.  Cranial nerves intact        Labs:     Lab Results   Component Value Date    WBC 6.6 08/26/2024    HGB 13.5 08/26/2024    HCT 39.9 08/26/2024    MCV 90.8 08/26/2024     08/26/2024     Lab Results   Component Value Date     08/26/2024    K 3.7 08/26/2024     08/26/2024    CO2 23 08/26/2024    BUN 10 08/26/2024    CREATININE <0.5 (L) 08/26/2024    GLUCOSE 110 (H) 08/26/2024    CALCIUM 9.1 08/26/2024    BILITOT 0.3 08/26/2024    ALKPHOS 98 08/26/2024    AST 23 08/26/2024    ALT 23 08/26/2024    LABGLOM >90 08/26/2024    GFRAA >60 09/26/2022    AGRATIO 1.7 08/26/2024    GLOB 2.3 09/03/2021         Lab Results   Component Value Date    CHOL 160 06/17/2024    CHOL 151 07/05/2023    CHOL 150 06/23/2022     Lab Results   Component Value Date    TRIG 160 (H) 06/17/2024    TRIG 124 07/05/2023    TRIG 86 06/23/2022     Lab Results   Component Value Date    HDL 66 (H) 06/17/2024    HDL 61 (H) 07/05/2023    HDL 56 06/23/2022     No components found for: \"LDLCHOLESTEROL\", \"LDLCALC\"    Lab Results   Component Value Date    VLDL 32 06/17/2024    VLDL 25 07/05/2023    VLDL 17 06/23/2022       Lab Results   Component Value Date    CHOLHDLRATIO 2.8 04/16/2011    CHOLHDLRATIO 2.5 01/08/2011    CHOLHDLRATIO 2.9 10/02/2010       Lab Results   Component Value Date    INR 0.94 03/13/2023    INR 0.97 01/10/2023    INR 1.07 08/13/2021    PROTIME 12.5 03/13/2023    PROTIME 12.8 01/10/2023    PROTIME 12.1 08/13/2021       The 10-year ASCVD risk score (Naz DE GUZMAN, et

## 2024-09-05 NOTE — TELEPHONE ENCOUNTER
Called and informed patient of Dr Henley note. Verbalized understanding. States she is currently on antibiotic for sinusitis and if she does note feel better will call for appointment.

## 2024-09-19 ENCOUNTER — HOSPITAL ENCOUNTER (OUTPATIENT)
Dept: MRI IMAGING | Age: 69
Discharge: HOME | End: 2024-09-19
Attending: PSYCHIATRY & NEUROLOGY
Payer: MEDICARE

## 2024-09-19 DIAGNOSIS — R42 DIZZINESS AND GIDDINESS: ICD-10-CM

## 2024-09-19 DIAGNOSIS — R90.89 ABNORMAL COMPUTERIZED TOMOGRAPHY OF BRAIN: ICD-10-CM

## 2024-09-19 DIAGNOSIS — G43.109 BASILAR MIGRAINE: ICD-10-CM

## 2024-09-19 PROCEDURE — 70551 MRI BRAIN STEM W/O DYE: CPT

## 2024-10-01 ENCOUNTER — PATIENT MESSAGE (OUTPATIENT)
Dept: INTERNAL MEDICINE CLINIC | Age: 69
End: 2024-10-01

## 2024-10-02 DIAGNOSIS — I49.1 ATRIAL ECTOPY: Primary | ICD-10-CM

## 2024-10-03 ENCOUNTER — PATIENT MESSAGE (OUTPATIENT)
Dept: INTERNAL MEDICINE CLINIC | Age: 69
End: 2024-10-03

## 2024-10-03 ENCOUNTER — TELEPHONE (OUTPATIENT)
Dept: INTERNAL MEDICINE CLINIC | Age: 69
End: 2024-10-03

## 2024-10-03 NOTE — TELEPHONE ENCOUNTER
Dr Sams,   Where can we put this patient next week?  There is absolutely nothing that I can see besides maybe a 5pm on one of the days. Or a double book??    Let me know, and I will call and schedule her.    Thanks.

## 2024-10-03 NOTE — TELEPHONE ENCOUNTER
Called pt to let her know Dr. Sams didn't have anything for next week, pt wasn't available.,LVM to make pt aware.

## 2024-10-03 NOTE — TELEPHONE ENCOUNTER
----- Message from Edyta APARICIO sent at 10/3/2024 11:06 AM EDT -----  Regarding: ECC Appointment Request  ECC Appointment Request    Patient needs appointment for ECC Appointment Type: Existing Condition Follow Up.    Patient Requested Dates(s): first of next week  Patient Requested Time: Monday - Wednesday (late morning )- Thursday- Friday (after 2:30 pm)  Provider Name: Tammy Sams MD     Reason for Appointment Request: Established Patient - Available appointments did not meet patient need    Patient already have a appointment dated October 21, 2024 at 2:00 pm and she wants to reschedule it for next week .   --------------------------------------------------------------------------------------------------------------------------    Relationship to Patient: Self     Call Back Information: OK to leave message on voicemail  Preferred Call Back Number: Phone [phone]

## 2024-10-07 ENCOUNTER — HOSPITAL ENCOUNTER (OUTPATIENT)
Age: 69
Discharge: HOME | End: 2024-10-09
Payer: MEDICARE

## 2024-10-07 DIAGNOSIS — I49.1 ATRIAL ECTOPY: ICD-10-CM

## 2024-10-07 PROCEDURE — 93225 XTRNL ECG REC<48 HRS REC: CPT

## 2024-10-10 ENCOUNTER — OFFICE VISIT (OUTPATIENT)
Dept: CARDIOLOGY CLINIC | Age: 69
End: 2024-10-10

## 2024-10-10 ENCOUNTER — OFFICE VISIT (OUTPATIENT)
Dept: INTERNAL MEDICINE CLINIC | Age: 69
End: 2024-10-10

## 2024-10-10 VITALS
DIASTOLIC BLOOD PRESSURE: 80 MMHG | WEIGHT: 251 LBS | BODY MASS INDEX: 45.91 KG/M2 | HEART RATE: 68 BPM | OXYGEN SATURATION: 94 % | SYSTOLIC BLOOD PRESSURE: 140 MMHG

## 2024-10-10 VITALS
BODY MASS INDEX: 45.65 KG/M2 | WEIGHT: 249.6 LBS | DIASTOLIC BLOOD PRESSURE: 70 MMHG | SYSTOLIC BLOOD PRESSURE: 116 MMHG | HEART RATE: 64 BPM

## 2024-10-10 DIAGNOSIS — R42 DIZZINESS: Primary | ICD-10-CM

## 2024-10-10 DIAGNOSIS — E78.2 MIXED HYPERLIPIDEMIA: ICD-10-CM

## 2024-10-10 DIAGNOSIS — R90.89 ABNORMAL BRAIN MRI: ICD-10-CM

## 2024-10-10 DIAGNOSIS — I50.31 ACUTE HEART FAILURE WITH PRESERVED EJECTION FRACTION (HFPEF) (HCC): ICD-10-CM

## 2024-10-10 DIAGNOSIS — I47.19 PAT (PAROXYSMAL ATRIAL TACHYCARDIA) (HCC): ICD-10-CM

## 2024-10-10 DIAGNOSIS — I10 ESSENTIAL HYPERTENSION: ICD-10-CM

## 2024-10-10 DIAGNOSIS — R12 HEARTBURN: ICD-10-CM

## 2024-10-10 RX ORDER — FAMOTIDINE 20 MG/1
20 TABLET, FILM COATED ORAL 2 TIMES DAILY PRN
Qty: 30 TABLET | Refills: 0
Start: 2024-10-10 | End: 2024-11-09

## 2024-10-10 RX ORDER — CHLORAL HYDRATE 500 MG
1000 CAPSULE ORAL DAILY
Status: SHIPPED | COMMUNITY
Start: 2024-10-10 | End: 2025-01-08

## 2024-10-10 RX ORDER — ONDANSETRON 4 MG/1
4 TABLET, FILM COATED ORAL 3 TIMES DAILY PRN
Qty: 15 TABLET | Refills: 0 | Status: SHIPPED | OUTPATIENT
Start: 2024-10-10

## 2024-10-10 NOTE — PATIENT INSTRUCTIONS
Await results of heart monitor    Get brain mri w contrast    Zofran symptoms    Touch base after both of those are done    Call Dr Henley due to dizziness  and possible right TM abnormality??

## 2024-10-10 NOTE — PROGRESS NOTES
90 tablet 1    diclofenac sodium (VOLTAREN) 1 % GEL Apply 4 g topically 4 times daily 50 g 0    pravastatin (PRAVACHOL) 20 MG tablet TAKE 1 TABLET BY MOUTH EVERY DAY 90 tablet 3    loratadine (CLARITIN) 10 MG tablet TAKE 1 TABLET BY MOUTH DAILY 90 tablet 3    verapamil (CALAN SR) 180 MG extended release tablet Take 1 tablet by mouth nightly 90 tablet 3    metoprolol succinate (TOPROL XL) 25 MG extended release tablet Take 1 tablet by mouth daily 90 tablet 3    pantoprazole (PROTONIX) 40 MG tablet TAKE 1 TABLET BY MOUTH TWICE DAILY 180 tablet 0    furosemide (LASIX) 20 MG tablet Take 1 tablet by mouth 2 times daily as needed      vitamin D3 (CHOLECALCIFEROL) 25 MCG (1000 UT) TABS tablet Take 1 tablet by mouth daily      fluticasone (FLONASE) 50 MCG/ACT nasal spray 1 spray by Each Nostril route daily 16 g 3    aspirin EC 81 MG EC tablet Take 1 tablet by mouth daily 30 tablet 3    Omega-3 Fatty Acids (FISH OIL) 1000 MG CAPS Take 2 capsules by mouth daily      acetaminophen (TYLENOL) 500 MG tablet Take 1 tablet by mouth every 6 hours as needed for Pain      Multiple Vitamins-Minerals (CENTRUM SILVER PO) Take 1 tablet by mouth daily        No current facility-administered medications for this visit.       Assessment:  1. Dizziness    2. Acute heart failure with preserved ejection fraction (HFpEF) ([facility])    3. Essential hypertension    4. PAT (paroxysmal atrial tachycardia) ([facility])    5. Mixed hyperlipidemia        Plan:    ECG: Sinus HR 64    Dizziness: acute on chronic    BP and HR stable   Will await 2 day monitor results   Unless monitor shows arrhthymias, I do not think the blood pressure are causes the dizziness.    Cont losartan and toprol and verapamil     pSVT/pAT:   HR 50-68   Awaiting monitor results   Cont verapamil and toprol     Acute HFpEF   Weight gain, edema, sob, bloating   Take the lasix 20 mg po bid    Toprol   Losartan     HTN: stable    BP stable   Losartan   Toprol   Verapamil     HLD: stable    LDL

## 2024-10-10 NOTE — PROGRESS NOTES
Laly Mckeon (:  1955) is a 69 y.o. female, here for evaluation of the following chief complaint(s):    Results (Discuss MRI) and Dizziness (Dizziness for approx 2 months)      ASSESSMENT/PLAN:  1. Dizziness  Await results of heart monitor.  Consider ENT due to possible right tympanic membrane abnormality that did not improve with antibiotics.  -     [facility] - Chrissy  2. Heartburn  -Advised Zofran for nausea and Pepcid for heartburn symptoms.    3. Abnormal brain MRI  Will obtain brain MRI with contrast to further evaluate for demyelinating disorder    Return in about 4 weeks (around 2024).    SUBJECTIVE/OBJECTIVE:  HPI    Patient is here due to persistent dizziness and headaches.  She has been evaluated by neurology and had a brain MRI which was suggestive of demyelinating disorder.  She is hoping to hear back from her neurologist.  She states meclizine makes her feel worse.    She also saw the cardiology NP today.  Heart monitor results are pending.    She has been having heartburn.  Pepcid seems to help.    She has had foot cramps.    She took antibiotics for possible ear infection but they did not help.    Review of Systems    Past Medical History:   Diagnosis Date    Allergic rhinitis     Anxiety     Ascending aorta dilatation (HCC)     Atrial tachycardia ([facility])     dr glynn ([facility])    Carpal tunnel syndrome     Cervicalgia     Chronic back pain     low    Depression     DVT (deep venous thrombosis) ([facility])     after surgery, bilateral    Fatty liver     GERD (gastroesophageal reflux disease)     has schatzki ring    Headache(784.0)     hemiplegic migraines, improved    Hernia hiatal     Histoplasmosis     Hot flashes     takes wellbutrin    Hyperlipidemia     Hypertension     MGUS (monoclonal gammopathy of unknown significance)     Microscopic colitis 2024    Obesity     Osteoarthritis     multiple joints    Other partial intestinal obstruction (HCC)     wound

## 2024-10-11 ENCOUNTER — TELEPHONE (OUTPATIENT)
Dept: INTERNAL MEDICINE CLINIC | Age: 69
End: 2024-10-11

## 2024-10-11 DIAGNOSIS — R90.89 ABNORMAL BRAIN MRI: ICD-10-CM

## 2024-10-11 DIAGNOSIS — R42 DIZZINESS: Primary | ICD-10-CM

## 2024-10-11 NOTE — TELEPHONE ENCOUNTER
She just had Brain MRI without contrast on 9/19/24.  Please find out if that is needed again per Radiology protocol?

## 2024-10-11 NOTE — TELEPHONE ENCOUNTER
[facility] Scheduling is calling in for  in regards to MRI order, per central scheduling order to do the imaging it need to be changed to MRI brain w wo contrast .     Please advise

## 2024-10-14 ENCOUNTER — OFFICE VISIT (OUTPATIENT)
Dept: ENT CLINIC | Age: 69
End: 2024-10-14
Payer: MEDICARE

## 2024-10-14 VITALS
TEMPERATURE: 97.7 F | HEART RATE: 72 BPM | BODY MASS INDEX: 44.3 KG/M2 | DIASTOLIC BLOOD PRESSURE: 68 MMHG | SYSTOLIC BLOOD PRESSURE: 119 MMHG | HEIGHT: 63 IN | WEIGHT: 250 LBS

## 2024-10-14 DIAGNOSIS — R42 DIZZINESS: Primary | ICD-10-CM

## 2024-10-14 DIAGNOSIS — J34.1 MUCOCELE OF ETHMOID SINUS: ICD-10-CM

## 2024-10-14 PROCEDURE — 3017F COLORECTAL CA SCREEN DOC REV: CPT | Performed by: OTOLARYNGOLOGY

## 2024-10-14 PROCEDURE — 3074F SYST BP LT 130 MM HG: CPT | Performed by: OTOLARYNGOLOGY

## 2024-10-14 PROCEDURE — 1036F TOBACCO NON-USER: CPT | Performed by: OTOLARYNGOLOGY

## 2024-10-14 PROCEDURE — G8399 PT W/DXA RESULTS DOCUMENT: HCPCS | Performed by: OTOLARYNGOLOGY

## 2024-10-14 PROCEDURE — G8427 DOCREV CUR MEDS BY ELIG CLIN: HCPCS | Performed by: OTOLARYNGOLOGY

## 2024-10-14 PROCEDURE — 1123F ACP DISCUSS/DSCN MKR DOCD: CPT | Performed by: OTOLARYNGOLOGY

## 2024-10-14 PROCEDURE — 3078F DIAST BP <80 MM HG: CPT | Performed by: OTOLARYNGOLOGY

## 2024-10-14 PROCEDURE — G8484 FLU IMMUNIZE NO ADMIN: HCPCS | Performed by: OTOLARYNGOLOGY

## 2024-10-14 PROCEDURE — 1090F PRES/ABSN URINE INCON ASSESS: CPT | Performed by: OTOLARYNGOLOGY

## 2024-10-14 PROCEDURE — G8417 CALC BMI ABV UP PARAM F/U: HCPCS | Performed by: OTOLARYNGOLOGY

## 2024-10-14 PROCEDURE — 99214 OFFICE O/P EST MOD 30 MIN: CPT | Performed by: OTOLARYNGOLOGY

## 2024-10-14 ASSESSMENT — ENCOUNTER SYMPTOMS
COUGH: 0
CHOKING: 0
SORE THROAT: 0
FACIAL SWELLING: 0
EYE REDNESS: 0
TROUBLE SWALLOWING: 0
NAUSEA: 0
RHINORRHEA: 0
SHORTNESS OF BREATH: 0
EYE ITCHING: 0
SINUS PRESSURE: 0
VOICE CHANGE: 0
EYE PAIN: 0
DIARRHEA: 0
SINUS PAIN: 0

## 2024-10-14 NOTE — PROGRESS NOTES
frontal sinuses.  The  remaining paranasal sinuses and mastoid air cells are clear.     BONES/SOFT TISSUES: The bone marrow signal intensity appears normal. The soft  tissues demonstrate no acute abnormality.     IMPRESSION:  Increased T2/FLAIR signal in the bilateral supratentorial white matter.  The  distribution is concerning for multiple sclerosis.  Additional differential  considerations include moderate chronic microvascular ischemic changes.  Clinical correlation is recommended.     No acute intracranial infarction or hemorrhage.     Sinus disease as detailed.     Assessment:      Diagnosis Orders   1. Dizziness  Michelle Carrero Au.D., VNG Testing, [facility]    Audiometry with tympanometry      2. Mucocele of ethmoid sinus               Plan:     Ordered audio and VNG.   Call with results and plan.     Reviewed and interpreted MRI  Sinus stable.   Will follow.       Horace Henley MD

## 2024-10-15 NOTE — TELEPHONE ENCOUNTER
Pt called in for Dr. Sams about getting MRI order updated. Pt stated she was told it has to say w/w/o contrast has to be both on there. Also pt asked about the results from hear monitor from last wk, looks like results hasn't been released yet.

## 2024-10-16 LAB
ACQUISITION DURATION: NORMAL S
AVERAGE HEART RATE: 61 BPM
HOOKUP DATE: NORMAL
HOOKUP TIME: NORMAL
MAX HEART RATE TIME/DATE: NORMAL
MAX HEART RATE: 85 BPM
MIN HEART RATE TIME/DATE: NORMAL
MIN HEART RATE: 47 BPM
NUMBER OF QRS COMPLEXES: NORMAL
NUMBER OF SUPRAVENTRICULAR ECTOPICS: 357
NUMBER OF SUPRAVENTRICULAR ISOLATED BEATS: 321
NUMBER OF VENTRICULAR BIGEMINAL CYCLES: 0
NUMBER OF VENTRICULAR COUPLETS: 0
NUMBER OF VENTRICULAR ECTOPICS: 0

## 2024-10-17 NOTE — TELEPHONE ENCOUNTER
Tell her the monitor was overall normal except a few premature atrial beats that likely dont explain her dizziness. She should further review the heart monitor with her cardiologist.

## 2024-10-17 NOTE — TELEPHONE ENCOUNTER
Message sent relaying provider comments below.     I did find the cardiac heart monitor results under pt media as well as cardiac tab in chart.   Scanned in on 10/15

## 2024-10-22 ENCOUNTER — TELEPHONE (OUTPATIENT)
Dept: CARDIOLOGY CLINIC | Age: 69
End: 2024-10-22

## 2024-10-22 NOTE — TELEPHONE ENCOUNTER
Pt called to go over monitor results. Per Dr. Sams she mentioned in an encounter on 10/17 to review with cardio.     [phone]

## 2024-10-30 ENCOUNTER — TELEPHONE (OUTPATIENT)
Dept: INTERNAL MEDICINE CLINIC | Age: 69
End: 2024-10-30

## 2024-10-30 DIAGNOSIS — R93.89 ABNORMAL FINDING ON IMAGING: Primary | ICD-10-CM

## 2024-10-30 NOTE — TELEPHONE ENCOUNTER
[facility]  is calling in for  in regards to patient scheduled for an MRI on 11/5/24 and they are needing patient to have her creatine checked before hand.     [facility] is requesting an order for creatine

## 2024-10-30 NOTE — TELEPHONE ENCOUNTER
Verbally spoke to  who placed the creatine order for patent.     Per patient will come in to our lab to have blood work done due to her having an open MRI and hem not able to do the lab on same date or in the facility.

## 2024-10-31 ENCOUNTER — TELEPHONE (OUTPATIENT)
Dept: INTERNAL MEDICINE CLINIC | Age: 69
End: 2024-10-31

## 2024-10-31 NOTE — TELEPHONE ENCOUNTER
Last AWV:  6/22/23      Due:  NOW   Last appointment: 10/10/2024  Next appointment: 11/13/2024    Communication:     NISREEN

## 2024-11-02 DIAGNOSIS — K21.9 GASTROESOPHAGEAL REFLUX DISEASE, UNSPECIFIED WHETHER ESOPHAGITIS PRESENT: ICD-10-CM

## 2024-11-04 RX ORDER — PANTOPRAZOLE SODIUM 40 MG/1
TABLET, DELAYED RELEASE ORAL
Qty: 180 TABLET | Refills: 0 | Status: SHIPPED | OUTPATIENT
Start: 2024-11-04

## 2024-11-04 NOTE — TELEPHONE ENCOUNTER
Last appointment: 10/10/2024  Next appointment: 11/13/2024  Last refill: 11/27/23  Requested Prescriptions     Pending Prescriptions Disp Refills    pantoprazole (PROTONIX) 40 MG tablet [Pharmacy Med Name: PANTOPRAZOLE 40MG TABLETS] 180 tablet 0     Sig: TAKE 1 TABLET BY MOUTH TWICE DAILY

## 2024-11-05 ENCOUNTER — HOSPITAL ENCOUNTER (OUTPATIENT)
Dept: MRI IMAGING | Age: 69
Discharge: HOME | End: 2024-11-05
Payer: MEDICARE

## 2024-11-05 DIAGNOSIS — R42 DIZZINESS: ICD-10-CM

## 2024-11-05 DIAGNOSIS — R90.89 ABNORMAL BRAIN MRI: ICD-10-CM

## 2024-11-05 DIAGNOSIS — R93.89 ABNORMAL FINDING ON IMAGING: ICD-10-CM

## 2024-11-05 LAB
CREAT SERPL-MCNC: 0.5 MG/DL (ref 0.6–1.2)
GFR SERPLBLD CREATININE-BSD FMLA CKD-EPI: >90 ML/MIN/{1.73_M2}

## 2024-11-05 PROCEDURE — A9579 GAD-BASE MR CONTRAST NOS,1ML: HCPCS | Performed by: INTERNAL MEDICINE

## 2024-11-05 PROCEDURE — 6360000004 HC RX CONTRAST MEDICATION: Performed by: INTERNAL MEDICINE

## 2024-11-05 PROCEDURE — 70553 MRI BRAIN STEM W/O & W/DYE: CPT

## 2024-11-05 RX ADMIN — GADOTERIDOL 22 ML: 279.3 INJECTION, SOLUTION INTRAVENOUS at 17:04

## 2024-11-12 DIAGNOSIS — G44.039 EPISODIC PAROXYSMAL HEMICRANIA, NOT INTRACTABLE: ICD-10-CM

## 2024-11-12 RX ORDER — GABAPENTIN 100 MG/1
200 CAPSULE ORAL 2 TIMES DAILY
Qty: 120 CAPSULE | Refills: 0 | Status: SHIPPED | OUTPATIENT
Start: 2024-11-12 | End: 2024-12-12

## 2024-11-13 ENCOUNTER — OFFICE VISIT (OUTPATIENT)
Dept: INTERNAL MEDICINE CLINIC | Age: 69
End: 2024-11-13

## 2024-11-13 VITALS
OXYGEN SATURATION: 95 % | SYSTOLIC BLOOD PRESSURE: 150 MMHG | WEIGHT: 253.4 LBS | HEART RATE: 54 BPM | BODY MASS INDEX: 44.89 KG/M2 | DIASTOLIC BLOOD PRESSURE: 82 MMHG

## 2024-11-13 DIAGNOSIS — R42 DIZZINESS: Primary | ICD-10-CM

## 2024-11-13 DIAGNOSIS — I10 ESSENTIAL HYPERTENSION: ICD-10-CM

## 2024-11-13 DIAGNOSIS — Z23 NEED FOR IMMUNIZATION AGAINST INFLUENZA: ICD-10-CM

## 2024-11-13 DIAGNOSIS — R90.89 ABNORMAL BRAIN MRI: ICD-10-CM

## 2024-11-13 RX ORDER — LOSARTAN POTASSIUM 100 MG/1
100 TABLET ORAL DAILY
Qty: 90 TABLET | Refills: 1 | Status: SHIPPED | OUTPATIENT
Start: 2024-11-13 | End: 2025-05-12

## 2024-11-13 NOTE — PROGRESS NOTES
Laly Mckeon (:  1955) is a 69 y.o. female, here for evaluation of the following chief complaint(s):    Follow-up (Discuss MRI)      ASSESSMENT/PLAN:  1. Dizziness  Has overall improved.  Brain MRI reviewed.  I did encourage balance classes.  2. Need for immunization against influenza  -     Influenza, FLUAD Trivalent, (age 65 y+), IM, Preservative Free, 0.5mL  3.  Essential hypertension-Fair control.  Increase to losartan 100 mg.  Continue verapamil and metoprolol and Lasix.  4. Abnormal Brain MRI  2 findings on the MRI that can be further discussed with specialists regarding whether they are contributing to your dizziness.  Reach out to Dr. Henley from ENT about finding #1.  Reach out to Dr. Saunders from Neurology regarding finding #2.  The possibility of demyelinating disease does not seem likely based on this MRI.  More likely it is age-related small vessel disease.  ----  1. SINUSES: There is a round focus of signal  abnormality involving the posterior ethmoid air cells on the left  which may represent secretions or fungal sinusitis.   2. Probable small vessel ischemic changes      Cerebral infarction, unspecified mechanism (HCC)  Remote infarct noted on head CT.  Remain on aspirin and statin    PAD (peripheral artery disease) (HCC)  stable on pravastatin. For PAD, continue low dose aspirin      Major depressive disorder with single episode, 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HCC)  Reasonably controlled on Wellbutrin and Trazodone.  --  Gastroesophageal reflux disease, unspecified whether esophagitis present  Stable on PPI and pepcid  Rosacea  Doxycycline is effective   Episodic paroxysmal hemicrania, not intractable  -     usually controlled on gabapentin  MGUS- est'd Hematology  Microscopic colitis  Stable on budesonide      Return in about 3 months (around 2025).    SUBJECTIVE/OBJECTIVE:  HPI  Patient states her dizziness seems overall better.  She still notes a difficult time focusing and that lighting can

## 2024-12-01 DIAGNOSIS — F32.4 MAJOR DEPRESSIVE DISORDER WITH SINGLE EPISODE, IN PARTIAL REMISSION (HCC): Primary | ICD-10-CM

## 2024-12-02 RX ORDER — TRAZODONE HYDROCHLORIDE 50 MG/1
TABLET, FILM COATED ORAL
Qty: 90 TABLET | Refills: 1 | Status: SHIPPED | OUTPATIENT
Start: 2024-12-02 | End: 2025-05-31

## 2024-12-02 NOTE — TELEPHONE ENCOUNTER
Last appointment: 11/13/2024  Next appointment: 2/13/2025  Last refill: 5/2/24  Requested Prescriptions     Pending Prescriptions Disp Refills    traZODone (DESYREL) 50 MG tablet [Pharmacy Med Name: TRAZODONE 50MG TABLETS] 90 tablet 1     Sig: TAKE 1 TABLET BY MOUTH EVERY NIGHT

## 2024-12-04 ENCOUNTER — OFFICE VISIT (OUTPATIENT)
Dept: CARDIOLOGY CLINIC | Age: 69
End: 2024-12-04
Payer: MEDICARE

## 2024-12-04 VITALS
WEIGHT: 252.4 LBS | DIASTOLIC BLOOD PRESSURE: 70 MMHG | BODY MASS INDEX: 44.71 KG/M2 | HEART RATE: 71 BPM | SYSTOLIC BLOOD PRESSURE: 110 MMHG

## 2024-12-04 DIAGNOSIS — I47.19 ATRIAL TACHYCARDIA (HCC): Primary | ICD-10-CM

## 2024-12-04 PROCEDURE — 1159F MED LIST DOCD IN RCRD: CPT | Performed by: INTERNAL MEDICINE

## 2024-12-04 PROCEDURE — G8482 FLU IMMUNIZE ORDER/ADMIN: HCPCS | Performed by: INTERNAL MEDICINE

## 2024-12-04 PROCEDURE — 1036F TOBACCO NON-USER: CPT | Performed by: INTERNAL MEDICINE

## 2024-12-04 PROCEDURE — 3074F SYST BP LT 130 MM HG: CPT | Performed by: INTERNAL MEDICINE

## 2024-12-04 PROCEDURE — G8399 PT W/DXA RESULTS DOCUMENT: HCPCS | Performed by: INTERNAL MEDICINE

## 2024-12-04 PROCEDURE — G8417 CALC BMI ABV UP PARAM F/U: HCPCS | Performed by: INTERNAL MEDICINE

## 2024-12-04 PROCEDURE — 1090F PRES/ABSN URINE INCON ASSESS: CPT | Performed by: INTERNAL MEDICINE

## 2024-12-04 PROCEDURE — 99214 OFFICE O/P EST MOD 30 MIN: CPT | Performed by: INTERNAL MEDICINE

## 2024-12-04 PROCEDURE — 3078F DIAST BP <80 MM HG: CPT | Performed by: INTERNAL MEDICINE

## 2024-12-04 PROCEDURE — 3017F COLORECTAL CA SCREEN DOC REV: CPT | Performed by: INTERNAL MEDICINE

## 2024-12-04 PROCEDURE — G8427 DOCREV CUR MEDS BY ELIG CLIN: HCPCS | Performed by: INTERNAL MEDICINE

## 2024-12-04 PROCEDURE — 1123F ACP DISCUSS/DSCN MKR DOCD: CPT | Performed by: INTERNAL MEDICINE

## 2024-12-04 NOTE — PROGRESS NOTES
11/05/2024    GFRAA >60 09/26/2022    AGRATIO 1.7 08/26/2024    GLOB 2.3 09/03/2021         Lab Results   Component Value Date    CHOL 160 06/17/2024    CHOL 151 07/05/2023    CHOL 150 06/23/2022     Lab Results   Component Value Date    TRIG 160 (H) 06/17/2024    TRIG 124 07/05/2023    TRIG 86 06/23/2022     Lab Results   Component Value Date    HDL 66 (H) 06/17/2024    HDL 61 (H) 07/05/2023    HDL 56 06/23/2022     No components found for: \"LDLCHOLESTEROL\", \"LDLCALC\"    Lab Results   Component Value Date    VLDL 32 06/17/2024    VLDL 25 07/05/2023    VLDL 17 06/23/2022       Lab Results   Component Value Date    CHOLHDLRATIO 2.8 04/16/2011    CHOLHDLRATIO 2.5 01/08/2011    CHOLHDLRATIO 2.9 10/02/2010       Lab Results   Component Value Date    INR 0.94 03/13/2023    INR 0.97 01/10/2023    INR 1.07 08/13/2021    PROTIME 12.5 03/13/2023    PROTIME 12.8 01/10/2023    PROTIME 12.1 08/13/2021       The 10-year ASCVD risk score (Naz DE GUZMAN, et al., 2019) is: 7.5%    Values used to calculate the score:      Age: 69 years      Sex: Female      Is Non- : No      Diabetic: No      Tobacco smoker: No      Systolic Blood Pressure: 110 mmHg      Is BP treated: Yes      HDL Cholesterol: 66 mg/dL      Total Cholesterol: 160 mg/dL      Assessment / Plan:      Diagnosis Orders   1. Atrial tachycardia (HCC)            1.  PSVT.  Symptoms have significantly improved.  2.  Hypertension.  BP appears to be controlled on current medications.  3.  Hyperlipidemia. Patient was having myalgias (increasing lower back pain) on intermediate dose Crestor.  4.  PAD. Patient has evidence of mild PAD in the right lower extremity.  5.  TAA. Mild ascending aortic dilatation of 3.8 cm per last echo.  SBP goal less than 130 mmHg.      -Continue baby aspirin, Lasix 20 mg as needed, losartan 100 daily, Toprol 25 daily, pravastatin 20 daily, verapamil  mg daily.  - I asked patient to start monitoring BP and HR daily and

## 2024-12-06 ENCOUNTER — PROCEDURE VISIT (OUTPATIENT)
Dept: AUDIOLOGY | Age: 69
End: 2024-12-06

## 2024-12-06 DIAGNOSIS — H93.13 TINNITUS OF BOTH EARS: ICD-10-CM

## 2024-12-06 DIAGNOSIS — R42 DIZZINESS AND GIDDINESS: Primary | ICD-10-CM

## 2024-12-06 DIAGNOSIS — R42 DIZZINESS AND GIDDINESS: ICD-10-CM

## 2024-12-06 DIAGNOSIS — H90.3 SENSORINEURAL HEARING LOSS, BILATERAL: Primary | ICD-10-CM

## 2024-12-06 NOTE — PROGRESS NOTES
Laly Mckeon   1955, 69 y.o. female   [id number]       Referring Provider: Horace Henley MD, PhD  Referral Type: In an order in Epic    Reason for Visit: Evaluation of suspected change in hearing, tinnitus, or balance.    ADULT AUDIOLOGIC EVALUATION      Laly Mckeon is a 69 y.o. female seen today, 12/6/2024 , for a recheck audiologic evaluation.  Patient was alone.    AUDIOLOGIC AND OTHER PERTINENT MEDICAL HISTORY:      Laly Mckeon noted tinnitus and dizziness.  Patient reports sudden onset of room spinning sensation with headache lasting for a few minutes around three months ago.  She stated she had another episode since but only lasted a few seconds.  Patient mentioned having constant bilateral tinnitus.  Previous audiologic evaluation was completed on 12/17/2021.  Medical history is reportedly significant for migraines.     Laly Mckeon denied otalgia and aural fullness.    Date: 12/6/2024     IMPRESSIONS:      Normal middle ear pressure and compliance, bilaterally.  Abnormal high frequency hearing sensitivity, bilaterally, which can affect difficult listening environments.  Word understanding was excellent presented at normal conversation level, bilaterally.  Stable hearing since previous audiologic evaluation.  Follow medical recommendations of Horace Henley MD, PhD.     ASSESSMENT AND FINDINGS:     Otoscopy revealed: Clear ear canals bilaterally    RIGHT EAR:  Hearing Sensitivity: Normal hearing sensitivity to a profound sensorineural hearing loss from 250-8000 Hz  Speech Recognition Threshold: 15 dB HL  Word Recognition:Excellent (100%), based on NU-6 25-word list at 50 dBHL using recorded speech stimuli.    Tympanometry: Normal peak pressure and compliance, Type A tympanogram, consistent with normal middle ear function.      LEFT EAR:  Hearing Sensitivity: Normal hearing sensitivity to a profound sensorineural hearing loss from 250-8000 Hz  Speech Recognition Threshold: 10 dB HL  Word Recognition:

## 2024-12-06 NOTE — PROGRESS NOTES
Laly Mckeon  1955, 69 y.o. female   [id number]     Referring Provider: Horace Henley MD, PhD  Referral Type: In an order in Epic    Reason for Visit: Evaluation of suspected change in hearing, tinnitus, or balance.    VESTIBULAR EVALUATION - VIDEONYSTAGMOGRAPHY (VNG)    Laly Mckeon was seen today, 12/6/2024, for videonystagmography (VNG) evaluation.  The VNG is an examination of the central vestibulo-ocular pathway that is measured via eye movements.    IMPRESSIONS:      Today's results indicate Central findings due to an Abnormal Optokinetic assessment and the presence of square wave jerks in Positional assessments. Caloric irrigations and all sub test results within normal limits.     Recommended follow-up with Neurology for etiology and assessment of symptoms.         See scanned for full report 12/6/2024    VESTIBULAR/AUDIOLOGIC AND PERTINENT MEDICAL HISTORY:      Chief Complaint/Description of Symptoms:   Description: true vertigo, loss of balance, disequilibrium, unsteadiness, light headedness, nausea, faintness, headaches  Onset: second(s)  Duration: It lasts until she can sit down (seconds to minutes). She typically gets a headache after each dizzy spell and has to take Tylenol for it  Frequency: Had a dizzy spell today. Everyday, especially if she is up moving around a lot   - She feels her vision and ears \"close up\". Experiences a narrowing field of vision.   - Symptoms started 3 months ago. She went to the hospital when it first started due to the severity of the dizzy spell.   Symptoms slightly better since onset.   - Her first dizzy spell was so significant that she had to go to the hospital. They ruled it as dehydration but she believes it might be something else contributing to her symptoms.     Symptoms alleviated by: sitting down and closing her eyes, relaxing    Symptoms made worse by: Bright lights, moving head or body too quickly, migraines might be related    Patient's current ranking of

## 2024-12-17 DIAGNOSIS — G44.039 EPISODIC PAROXYSMAL HEMICRANIA, NOT INTRACTABLE: ICD-10-CM

## 2024-12-18 RX ORDER — GABAPENTIN 100 MG/1
200 CAPSULE ORAL 2 TIMES DAILY
Qty: 120 CAPSULE | Refills: 0 | Status: SHIPPED | OUTPATIENT
Start: 2024-12-18 | End: 2025-01-17

## 2024-12-23 ENCOUNTER — OFFICE VISIT (OUTPATIENT)
Dept: ENT CLINIC | Age: 69
End: 2024-12-23
Payer: MEDICARE

## 2024-12-23 VITALS — HEART RATE: 45 BPM | TEMPERATURE: 97.5 F | SYSTOLIC BLOOD PRESSURE: 130 MMHG | DIASTOLIC BLOOD PRESSURE: 60 MMHG

## 2024-12-23 DIAGNOSIS — R42 DIZZINESS: Primary | ICD-10-CM

## 2024-12-23 DIAGNOSIS — J34.1 MUCOCELE OF ETHMOID SINUS: ICD-10-CM

## 2024-12-23 PROCEDURE — G8427 DOCREV CUR MEDS BY ELIG CLIN: HCPCS | Performed by: OTOLARYNGOLOGY

## 2024-12-23 PROCEDURE — 1123F ACP DISCUSS/DSCN MKR DOCD: CPT | Performed by: OTOLARYNGOLOGY

## 2024-12-23 PROCEDURE — 1125F AMNT PAIN NOTED PAIN PRSNT: CPT | Performed by: OTOLARYNGOLOGY

## 2024-12-23 PROCEDURE — G8482 FLU IMMUNIZE ORDER/ADMIN: HCPCS | Performed by: OTOLARYNGOLOGY

## 2024-12-23 PROCEDURE — 3075F SYST BP GE 130 - 139MM HG: CPT | Performed by: OTOLARYNGOLOGY

## 2024-12-23 PROCEDURE — 1036F TOBACCO NON-USER: CPT | Performed by: OTOLARYNGOLOGY

## 2024-12-23 PROCEDURE — 3017F COLORECTAL CA SCREEN DOC REV: CPT | Performed by: OTOLARYNGOLOGY

## 2024-12-23 PROCEDURE — G8399 PT W/DXA RESULTS DOCUMENT: HCPCS | Performed by: OTOLARYNGOLOGY

## 2024-12-23 PROCEDURE — 3078F DIAST BP <80 MM HG: CPT | Performed by: OTOLARYNGOLOGY

## 2024-12-23 PROCEDURE — 1159F MED LIST DOCD IN RCRD: CPT | Performed by: OTOLARYNGOLOGY

## 2024-12-23 PROCEDURE — 1090F PRES/ABSN URINE INCON ASSESS: CPT | Performed by: OTOLARYNGOLOGY

## 2024-12-23 PROCEDURE — 99214 OFFICE O/P EST MOD 30 MIN: CPT | Performed by: OTOLARYNGOLOGY

## 2024-12-23 PROCEDURE — G8417 CALC BMI ABV UP PARAM F/U: HCPCS | Performed by: OTOLARYNGOLOGY

## 2024-12-23 ASSESSMENT — ENCOUNTER SYMPTOMS
EYE REDNESS: 0
VOICE CHANGE: 0
SINUS PAIN: 0
NAUSEA: 0
TROUBLE SWALLOWING: 0
EYE PAIN: 0
SORE THROAT: 0
CHOKING: 0
DIARRHEA: 0
COUGH: 0
SHORTNESS OF BREATH: 0
RHINORRHEA: 0
FACIAL SWELLING: 0
EYE ITCHING: 0
SINUS PRESSURE: 0

## 2024-12-26 ENCOUNTER — PATIENT MESSAGE (OUTPATIENT)
Dept: INTERNAL MEDICINE CLINIC | Age: 69
End: 2024-12-26

## 2024-12-28 NOTE — TELEPHONE ENCOUNTER
If we get papers to sign about her eye drops, those should go to her eye doctor. Call her to pick them up or if she prefers, we can fax them.

## 2024-12-30 DIAGNOSIS — F32.4 MAJOR DEPRESSIVE DISORDER WITH SINGLE EPISODE, IN PARTIAL REMISSION (HCC): ICD-10-CM

## 2024-12-30 DIAGNOSIS — I47.19 PAT (PAROXYSMAL ATRIAL TACHYCARDIA) (HCC): ICD-10-CM

## 2024-12-30 RX ORDER — BUPROPION HYDROCHLORIDE 150 MG/1
150 TABLET ORAL EVERY MORNING
Qty: 90 TABLET | Refills: 1 | Status: SHIPPED | OUTPATIENT
Start: 2024-12-30

## 2024-12-30 RX ORDER — VERAPAMIL HYDROCHLORIDE 180 MG/1
180 TABLET, EXTENDED RELEASE ORAL NIGHTLY
Qty: 90 TABLET | Refills: 3 | Status: SHIPPED | OUTPATIENT
Start: 2024-12-30

## 2024-12-30 NOTE — TELEPHONE ENCOUNTER
Requested Prescriptions     Pending Prescriptions Disp Refills    verapamil (CALAN SR) 180 MG extended release tablet [Pharmacy Med Name: VERAPAMIL ER 180MG TABLETS] 90 tablet 3     Sig: TAKE 1 TABLET BY MOUTH EVERY NIGHT            Checked Correct Pharmacy: Yes    Any changes since last refill? No     Number: 90  Refills: 3    Last OV: 12/4/2024 Provider: JULY    Next OV: 6/6/2025 Provider: JULY    Last Labs:   CBC:   Lab Results   Component Value Date    WBC 6.6 08/26/2024    HGB 13.5 08/26/2024    HCT 39.9 08/26/2024    MCV 90.8 08/26/2024     08/26/2024     CMP:   Lab Results   Component Value Date     08/26/2024    K 3.7 08/26/2024     08/26/2024    CO2 23 08/26/2024    BUN 10 08/26/2024    CREATININE 0.5 (L) 11/05/2024    GLUCOSE 110 (H) 08/26/2024    CALCIUM 9.1 08/26/2024    BILITOT 0.3 08/26/2024    ALKPHOS 98 08/26/2024    AST 23 08/26/2024    ALT 23 08/26/2024    LABGLOM >90 11/05/2024    GFRAA >60 09/26/2022    AGRATIO 1.7 08/26/2024    GLOB 2.3 09/03/2021

## 2024-12-30 NOTE — TELEPHONE ENCOUNTER
I sent the patient a message as I have not seen them although I have been out for a few days. I did not see anything when I was looking through papers. I sent the patient a message letting her know. Closing this encounter.

## 2024-12-30 NOTE — TELEPHONE ENCOUNTER
Last appointment: 11/13/2024  Next appointment: 2/13/2025  Last refill  Last ordered: 5 months ago (7/5/2024) by Tammy Sams MD         :     Requested Prescriptions     Pending Prescriptions Disp Refills    buPROPion (WELLBUTRIN XL) 150 MG extended release tablet [Pharmacy Med Name: BUPROPION XL 150MG TABLETS (24 H)] 90 tablet 1     Sig: TAKE 1 TABLET BY MOUTH EVERY MORNING

## 2025-01-07 DIAGNOSIS — J30.89 NON-SEASONAL ALLERGIC RHINITIS, UNSPECIFIED TRIGGER: ICD-10-CM

## 2025-01-07 RX ORDER — LORATADINE 10 MG/1
TABLET ORAL
Qty: 90 TABLET | Refills: 3 | Status: SHIPPED | OUTPATIENT
Start: 2025-01-07

## 2025-01-07 NOTE — TELEPHONE ENCOUNTER
Last appointment: 11/13/2024  Next appointment: 2/13/2025  Last refill:   Last ordered: 9 months ago (4/3/2024) by Tammy Sams MD           Requested Prescriptions     Pending Prescriptions Disp Refills    loratadine (CLARITIN) 10 MG tablet [Pharmacy Med Name: LORATADINE 10MG TABLETS] 90 tablet 3     Sig: TAKE 1 TABLET BY MOUTH DAILY

## 2025-01-10 ENCOUNTER — HOSPITAL ENCOUNTER (OUTPATIENT)
Dept: MAMMOGRAPHY | Age: 70
Discharge: HOME | End: 2025-01-10
Payer: MEDICARE

## 2025-01-10 VITALS — WEIGHT: 252 LBS | HEIGHT: 63 IN | BODY MASS INDEX: 44.65 KG/M2

## 2025-01-10 DIAGNOSIS — Z12.31 VISIT FOR SCREENING MAMMOGRAM: ICD-10-CM

## 2025-01-10 PROCEDURE — 77063 BREAST TOMOSYNTHESIS BI: CPT

## 2025-01-16 ENCOUNTER — APPOINTMENT (OUTPATIENT)
Dept: CT IMAGING | Age: 70
DRG: 392 | End: 2025-01-16
Payer: MEDICARE

## 2025-01-16 ENCOUNTER — APPOINTMENT (OUTPATIENT)
Dept: GENERAL RADIOLOGY | Age: 70
DRG: 392 | End: 2025-01-16
Payer: MEDICARE

## 2025-01-16 ENCOUNTER — HOSPITAL ENCOUNTER (INPATIENT)
Age: 70
LOS: 2 days | Discharge: HOME | DRG: 392 | End: 2025-01-21
Attending: EMERGENCY MEDICINE | Admitting: INTERNAL MEDICINE
Payer: MEDICARE

## 2025-01-16 DIAGNOSIS — R10.13 EPIGASTRIC PAIN: Primary | ICD-10-CM

## 2025-01-16 DIAGNOSIS — R12 HEARTBURN: ICD-10-CM

## 2025-01-16 PROBLEM — R10.9 ABDOMINAL PAIN: Status: ACTIVE | Noted: 2025-01-16

## 2025-01-16 LAB
ALBUMIN SERPL-MCNC: 3.9 G/DL (ref 3.4–5)
ALBUMIN/GLOB SERPL: 1.6 {RATIO} (ref 1.1–2.2)
ALP SERPL-CCNC: 97 U/L (ref 40–129)
ALT SERPL-CCNC: 19 U/L (ref 10–40)
ANION GAP SERPL CALCULATED.3IONS-SCNC: 9 MMOL/L (ref 3–16)
AST SERPL-CCNC: 20 U/L (ref 15–37)
BASOPHILS # BLD: 0 K/UL (ref 0–0.2)
BASOPHILS NFR BLD: 0.5 %
BILIRUB SERPL-MCNC: 0.4 MG/DL (ref 0–1)
BILIRUB UR QL STRIP.AUTO: NEGATIVE
BUN SERPL-MCNC: 12 MG/DL (ref 7–20)
CALCIUM SERPL-MCNC: 9.3 MG/DL (ref 8.3–10.6)
CHLORIDE SERPL-SCNC: 105 MMOL/L (ref 99–110)
CLARITY UR: CLEAR
CO2 SERPL-SCNC: 26 MMOL/L (ref 21–32)
COLOR UR: YELLOW
CREAT SERPL-MCNC: 0.6 MG/DL (ref 0.6–1.2)
DEPRECATED RDW RBC AUTO: 13.2 % (ref 12.4–15.4)
EKG ATRIAL RATE: 58 BPM
EKG DIAGNOSIS: NORMAL
EKG P AXIS: 58 DEGREES
EKG P-R INTERVAL: 194 MS
EKG Q-T INTERVAL: 420 MS
EKG QRS DURATION: 78 MS
EKG QTC CALCULATION (BAZETT): 412 MS
EKG R AXIS: 29 DEGREES
EKG T AXIS: 39 DEGREES
EKG VENTRICULAR RATE: 58 BPM
EOSINOPHIL # BLD: 0.1 K/UL (ref 0–0.6)
EOSINOPHIL NFR BLD: 2.2 %
FLUAV RNA RESP QL NAA+PROBE: NOT DETECTED
FLUBV RNA RESP QL NAA+PROBE: NOT DETECTED
GFR SERPLBLD CREATININE-BSD FMLA CKD-EPI: >90 ML/MIN/{1.73_M2}
GLUCOSE SERPL-MCNC: 93 MG/DL (ref 70–99)
GLUCOSE UR STRIP.AUTO-MCNC: NEGATIVE MG/DL
HCT VFR BLD AUTO: 40.7 % (ref 36–48)
HGB BLD-MCNC: 13.6 G/DL (ref 12–16)
HGB UR QL STRIP.AUTO: NEGATIVE
KETONES UR STRIP.AUTO-MCNC: NEGATIVE MG/DL
LEUKOCYTE ESTERASE UR QL STRIP.AUTO: NEGATIVE
LIPASE SERPL-CCNC: 33 U/L (ref 13–60)
LYMPHOCYTES # BLD: 0.7 K/UL (ref 1–5.1)
LYMPHOCYTES NFR BLD: 10.8 %
MCH RBC QN AUTO: 30.1 PG (ref 26–34)
MCHC RBC AUTO-ENTMCNC: 33.4 G/DL (ref 31–36)
MCV RBC AUTO: 90.1 FL (ref 80–100)
MONOCYTES # BLD: 0.3 K/UL (ref 0–1.3)
MONOCYTES NFR BLD: 4.1 %
NEUTROPHILS # BLD: 5.6 K/UL (ref 1.7–7.7)
NEUTROPHILS NFR BLD: 82.4 %
NITRITE UR QL STRIP.AUTO: NEGATIVE
NT-PROBNP SERPL-MCNC: 88 PG/ML (ref 0–124)
PH UR STRIP.AUTO: 6 [PH] (ref 5–8)
PLATELET # BLD AUTO: 234 K/UL (ref 135–450)
PMV BLD AUTO: 6.9 FL (ref 5–10.5)
POTASSIUM SERPL-SCNC: 4 MMOL/L (ref 3.5–5.1)
PROT SERPL-MCNC: 6.4 G/DL (ref 6.4–8.2)
PROT UR STRIP.AUTO-MCNC: NEGATIVE MG/DL
RBC # BLD AUTO: 4.51 M/UL (ref 4–5.2)
SARS-COV-2 RNA RESP QL NAA+PROBE: NOT DETECTED
SODIUM SERPL-SCNC: 140 MMOL/L (ref 136–145)
SP GR UR STRIP.AUTO: 1.02 (ref 1–1.03)
TROPONIN, HIGH SENSITIVITY: 15 NG/L (ref 0–14)
TROPONIN, HIGH SENSITIVITY: 8 NG/L (ref 0–14)
TROPONIN, HIGH SENSITIVITY: 9 NG/L (ref 0–14)
UA COMPLETE W REFLEX CULTURE PNL UR: NORMAL
UA DIPSTICK W REFLEX MICRO PNL UR: NORMAL
URN SPEC COLLECT METH UR: NORMAL
UROBILINOGEN UR STRIP-ACNC: 0.2 E.U./DL
WBC # BLD AUTO: 6.8 K/UL (ref 4–11)

## 2025-01-16 PROCEDURE — 2580000003 HC RX 258: Performed by: INTERNAL MEDICINE

## 2025-01-16 PROCEDURE — G0378 HOSPITAL OBSERVATION PER HR: HCPCS

## 2025-01-16 PROCEDURE — 6360000002 HC RX W HCPCS

## 2025-01-16 PROCEDURE — 87636 SARSCOV2 & INF A&B AMP PRB: CPT

## 2025-01-16 PROCEDURE — 6370000000 HC RX 637 (ALT 250 FOR IP): Performed by: INTERNAL MEDICINE

## 2025-01-16 PROCEDURE — 96375 TX/PRO/DX INJ NEW DRUG ADDON: CPT

## 2025-01-16 PROCEDURE — 6360000004 HC RX CONTRAST MEDICATION

## 2025-01-16 PROCEDURE — 83690 ASSAY OF LIPASE: CPT

## 2025-01-16 PROCEDURE — 71260 CT THORAX DX C+: CPT

## 2025-01-16 PROCEDURE — 99285 EMERGENCY DEPT VISIT HI MDM: CPT

## 2025-01-16 PROCEDURE — 81003 URINALYSIS AUTO W/O SCOPE: CPT

## 2025-01-16 PROCEDURE — 6360000002 HC RX W HCPCS: Performed by: INTERNAL MEDICINE

## 2025-01-16 PROCEDURE — 2500000003 HC RX 250 WO HCPCS: Performed by: INTERNAL MEDICINE

## 2025-01-16 PROCEDURE — 71045 X-RAY EXAM CHEST 1 VIEW: CPT

## 2025-01-16 PROCEDURE — 96372 THER/PROPH/DIAG INJ SC/IM: CPT

## 2025-01-16 PROCEDURE — 74177 CT ABD & PELVIS W/CONTRAST: CPT

## 2025-01-16 PROCEDURE — 84484 ASSAY OF TROPONIN QUANT: CPT

## 2025-01-16 PROCEDURE — 93005 ELECTROCARDIOGRAM TRACING: CPT

## 2025-01-16 PROCEDURE — 83880 ASSAY OF NATRIURETIC PEPTIDE: CPT

## 2025-01-16 PROCEDURE — 6370000000 HC RX 637 (ALT 250 FOR IP): Performed by: NURSE PRACTITIONER

## 2025-01-16 PROCEDURE — 96376 TX/PRO/DX INJ SAME DRUG ADON: CPT

## 2025-01-16 PROCEDURE — 6370000000 HC RX 637 (ALT 250 FOR IP)

## 2025-01-16 PROCEDURE — 96374 THER/PROPH/DIAG INJ IV PUSH: CPT

## 2025-01-16 PROCEDURE — 85025 COMPLETE CBC W/AUTO DIFF WBC: CPT

## 2025-01-16 PROCEDURE — 80053 COMPREHEN METABOLIC PANEL: CPT

## 2025-01-16 RX ORDER — SODIUM CHLORIDE 0.9 % (FLUSH) 0.9 %
5-40 SYRINGE (ML) INJECTION PRN
Status: DISCONTINUED | OUTPATIENT
Start: 2025-01-16 | End: 2025-01-21 | Stop reason: HOSPADM

## 2025-01-16 RX ORDER — METOPROLOL SUCCINATE 25 MG/1
25 TABLET, EXTENDED RELEASE ORAL DAILY
Status: DISCONTINUED | OUTPATIENT
Start: 2025-01-17 | End: 2025-01-21 | Stop reason: HOSPADM

## 2025-01-16 RX ORDER — ACETAMINOPHEN 500 MG
1000 TABLET ORAL
Status: COMPLETED | OUTPATIENT
Start: 2025-01-16 | End: 2025-01-16

## 2025-01-16 RX ORDER — METHOCARBAMOL 500 MG/1
750 TABLET, FILM COATED ORAL ONCE
Status: COMPLETED | OUTPATIENT
Start: 2025-01-16 | End: 2025-01-16

## 2025-01-16 RX ORDER — ENOXAPARIN SODIUM 100 MG/ML
30 INJECTION SUBCUTANEOUS 2 TIMES DAILY
Status: DISCONTINUED | OUTPATIENT
Start: 2025-01-16 | End: 2025-01-21 | Stop reason: HOSPADM

## 2025-01-16 RX ORDER — LOSARTAN POTASSIUM 100 MG/1
100 TABLET ORAL DAILY
Status: DISCONTINUED | OUTPATIENT
Start: 2025-01-16 | End: 2025-01-21 | Stop reason: HOSPADM

## 2025-01-16 RX ORDER — DIPHENHYDRAMINE HYDROCHLORIDE 50 MG/ML
25 INJECTION INTRAMUSCULAR; INTRAVENOUS ONCE
Status: COMPLETED | OUTPATIENT
Start: 2025-01-16 | End: 2025-01-16

## 2025-01-16 RX ORDER — ONDANSETRON 2 MG/ML
4 INJECTION INTRAMUSCULAR; INTRAVENOUS EVERY 6 HOURS PRN
Status: DISCONTINUED | OUTPATIENT
Start: 2025-01-16 | End: 2025-01-21 | Stop reason: HOSPADM

## 2025-01-16 RX ORDER — POLYETHYLENE GLYCOL 3350 17 G/17G
17 POWDER, FOR SOLUTION ORAL DAILY PRN
Status: DISCONTINUED | OUTPATIENT
Start: 2025-01-16 | End: 2025-01-21 | Stop reason: HOSPADM

## 2025-01-16 RX ORDER — KETOROLAC TROMETHAMINE 30 MG/ML
15 INJECTION, SOLUTION INTRAMUSCULAR; INTRAVENOUS ONCE
Status: COMPLETED | OUTPATIENT
Start: 2025-01-16 | End: 2025-01-16

## 2025-01-16 RX ORDER — IOPAMIDOL 755 MG/ML
75 INJECTION, SOLUTION INTRAVASCULAR
Status: COMPLETED | OUTPATIENT
Start: 2025-01-16 | End: 2025-01-16

## 2025-01-16 RX ORDER — MAGNESIUM SULFATE IN WATER 40 MG/ML
2000 INJECTION, SOLUTION INTRAVENOUS PRN
Status: DISCONTINUED | OUTPATIENT
Start: 2025-01-16 | End: 2025-01-21 | Stop reason: HOSPADM

## 2025-01-16 RX ORDER — ONDANSETRON 4 MG/1
4 TABLET, ORALLY DISINTEGRATING ORAL EVERY 8 HOURS PRN
Status: DISCONTINUED | OUTPATIENT
Start: 2025-01-16 | End: 2025-01-21 | Stop reason: HOSPADM

## 2025-01-16 RX ORDER — POTASSIUM CHLORIDE 1500 MG/1
40 TABLET, EXTENDED RELEASE ORAL PRN
Status: ACTIVE | OUTPATIENT
Start: 2025-01-16 | End: 2025-01-17

## 2025-01-16 RX ORDER — BUPROPION HYDROCHLORIDE 150 MG/1
150 TABLET ORAL EVERY MORNING
Status: DISCONTINUED | OUTPATIENT
Start: 2025-01-17 | End: 2025-01-21 | Stop reason: HOSPADM

## 2025-01-16 RX ORDER — POTASSIUM CHLORIDE 7.45 MG/ML
10 INJECTION INTRAVENOUS PRN
Status: ACTIVE | OUTPATIENT
Start: 2025-01-16 | End: 2025-01-17

## 2025-01-16 RX ORDER — HYDROMORPHONE HYDROCHLORIDE 1 MG/ML
0.5 INJECTION, SOLUTION INTRAMUSCULAR; INTRAVENOUS; SUBCUTANEOUS
Status: COMPLETED | OUTPATIENT
Start: 2025-01-16 | End: 2025-01-16

## 2025-01-16 RX ORDER — ACETAMINOPHEN 325 MG/1
650 TABLET ORAL EVERY 6 HOURS PRN
Status: DISCONTINUED | OUTPATIENT
Start: 2025-01-16 | End: 2025-01-17 | Stop reason: SDUPTHER

## 2025-01-16 RX ORDER — TRAZODONE HYDROCHLORIDE 50 MG/1
50 TABLET, FILM COATED ORAL NIGHTLY
Status: DISCONTINUED | OUTPATIENT
Start: 2025-01-16 | End: 2025-01-21 | Stop reason: HOSPADM

## 2025-01-16 RX ORDER — PROCHLORPERAZINE EDISYLATE 5 MG/ML
5 INJECTION INTRAMUSCULAR; INTRAVENOUS ONCE
Status: COMPLETED | OUTPATIENT
Start: 2025-01-16 | End: 2025-01-16

## 2025-01-16 RX ORDER — HYDROMORPHONE HYDROCHLORIDE 1 MG/ML
0.5 INJECTION, SOLUTION INTRAMUSCULAR; INTRAVENOUS; SUBCUTANEOUS
Status: COMPLETED | OUTPATIENT
Start: 2025-01-16 | End: 2025-01-17

## 2025-01-16 RX ORDER — CALCIUM CARBONATE 500 MG/1
500 TABLET, CHEWABLE ORAL
Status: COMPLETED | OUTPATIENT
Start: 2025-01-16 | End: 2025-01-16

## 2025-01-16 RX ORDER — SODIUM CHLORIDE 0.9 % (FLUSH) 0.9 %
5-40 SYRINGE (ML) INJECTION EVERY 12 HOURS SCHEDULED
Status: DISCONTINUED | OUTPATIENT
Start: 2025-01-16 | End: 2025-01-21 | Stop reason: HOSPADM

## 2025-01-16 RX ORDER — ONDANSETRON 2 MG/ML
4 INJECTION INTRAMUSCULAR; INTRAVENOUS ONCE
Status: COMPLETED | OUTPATIENT
Start: 2025-01-16 | End: 2025-01-16

## 2025-01-16 RX ORDER — OXYCODONE HYDROCHLORIDE 5 MG/1
5 TABLET ORAL EVERY 4 HOURS PRN
Status: DISCONTINUED | OUTPATIENT
Start: 2025-01-16 | End: 2025-01-21 | Stop reason: HOSPADM

## 2025-01-16 RX ORDER — ACETAMINOPHEN 650 MG/1
650 SUPPOSITORY RECTAL EVERY 6 HOURS PRN
Status: DISCONTINUED | OUTPATIENT
Start: 2025-01-16 | End: 2025-01-17 | Stop reason: SDUPTHER

## 2025-01-16 RX ORDER — PROCHLORPERAZINE EDISYLATE 5 MG/ML
10 INJECTION INTRAMUSCULAR; INTRAVENOUS ONCE
Status: DISCONTINUED | OUTPATIENT
Start: 2025-01-16 | End: 2025-01-21 | Stop reason: HOSPADM

## 2025-01-16 RX ORDER — PANTOPRAZOLE SODIUM 40 MG/1
40 TABLET, DELAYED RELEASE ORAL
Status: CANCELLED | OUTPATIENT
Start: 2025-01-16

## 2025-01-16 RX ORDER — VERAPAMIL HYDROCHLORIDE 180 MG/1
180 TABLET, EXTENDED RELEASE ORAL NIGHTLY
Status: DISCONTINUED | OUTPATIENT
Start: 2025-01-16 | End: 2025-01-21 | Stop reason: HOSPADM

## 2025-01-16 RX ORDER — SODIUM CHLORIDE 9 MG/ML
INJECTION, SOLUTION INTRAVENOUS PRN
Status: DISCONTINUED | OUTPATIENT
Start: 2025-01-16 | End: 2025-01-21 | Stop reason: HOSPADM

## 2025-01-16 RX ADMIN — HYDROMORPHONE HYDROCHLORIDE 0.5 MG: 1 INJECTION, SOLUTION INTRAMUSCULAR; INTRAVENOUS; SUBCUTANEOUS at 22:43

## 2025-01-16 RX ADMIN — SODIUM CHLORIDE, PRESERVATIVE FREE 40 MG: 5 INJECTION INTRAVENOUS at 17:53

## 2025-01-16 RX ADMIN — DIPHENHYDRAMINE HYDROCHLORIDE 25 MG: 50 INJECTION INTRAMUSCULAR; INTRAVENOUS at 16:22

## 2025-01-16 RX ADMIN — PROCHLORPERAZINE EDISYLATE 5 MG: 5 INJECTION INTRAMUSCULAR; INTRAVENOUS at 16:21

## 2025-01-16 RX ADMIN — LOSARTAN POTASSIUM 100 MG: 50 TABLET, FILM COATED ORAL at 20:08

## 2025-01-16 RX ADMIN — ACETAMINOPHEN 1000 MG: 500 TABLET, FILM COATED ORAL at 12:22

## 2025-01-16 RX ADMIN — VERAPAMIL HYDROCHLORIDE 180 MG: 180 TABLET, FILM COATED, EXTENDED RELEASE ORAL at 20:09

## 2025-01-16 RX ADMIN — IOPAMIDOL 75 ML: 755 INJECTION, SOLUTION INTRAVENOUS at 14:18

## 2025-01-16 RX ADMIN — SODIUM CHLORIDE, PRESERVATIVE FREE 10 ML: 5 INJECTION INTRAVENOUS at 20:10

## 2025-01-16 RX ADMIN — KETOROLAC TROMETHAMINE 15 MG: 30 INJECTION INTRAMUSCULAR; INTRAVENOUS at 17:53

## 2025-01-16 RX ADMIN — ACETAMINOPHEN 650 MG: 325 TABLET ORAL at 21:30

## 2025-01-16 RX ADMIN — METHOCARBAMOL 750 MG: 500 TABLET ORAL at 16:26

## 2025-01-16 RX ADMIN — LIDOCAINE HYDROCHLORIDE: 20 SOLUTION ORAL at 14:05

## 2025-01-16 RX ADMIN — ANTACID TABLETS 500 MG: 500 TABLET, CHEWABLE ORAL at 21:58

## 2025-01-16 RX ADMIN — KETOROLAC TROMETHAMINE 15 MG: 30 INJECTION INTRAMUSCULAR; INTRAVENOUS at 12:16

## 2025-01-16 RX ADMIN — ENOXAPARIN SODIUM 30 MG: 100 INJECTION SUBCUTANEOUS at 20:09

## 2025-01-16 RX ADMIN — HYDROMORPHONE HYDROCHLORIDE 0.5 MG: 1 INJECTION, SOLUTION INTRAMUSCULAR; INTRAVENOUS; SUBCUTANEOUS at 12:19

## 2025-01-16 RX ADMIN — OXYCODONE 5 MG: 5 TABLET ORAL at 20:08

## 2025-01-16 RX ADMIN — ONDANSETRON 4 MG: 2 INJECTION INTRAMUSCULAR; INTRAVENOUS at 22:46

## 2025-01-16 RX ADMIN — ONDANSETRON 4 MG: 2 INJECTION, SOLUTION INTRAMUSCULAR; INTRAVENOUS at 12:16

## 2025-01-16 ASSESSMENT — PAIN - FUNCTIONAL ASSESSMENT
PAIN_FUNCTIONAL_ASSESSMENT: ACTIVITIES ARE NOT PREVENTED
PAIN_FUNCTIONAL_ASSESSMENT: 0-10
PAIN_FUNCTIONAL_ASSESSMENT: ACTIVITIES ARE NOT PREVENTED
PAIN_FUNCTIONAL_ASSESSMENT: ACTIVITIES ARE NOT PREVENTED

## 2025-01-16 ASSESSMENT — PAIN DESCRIPTION - LOCATION
LOCATION: ABDOMEN
LOCATION: HEAD
LOCATION: ABDOMEN;HEAD
LOCATION: ABDOMEN;HEAD
LOCATION: HEAD
LOCATION: ABDOMEN;HEAD
LOCATION: ABDOMEN

## 2025-01-16 ASSESSMENT — PAIN DESCRIPTION - ORIENTATION
ORIENTATION: MID;UPPER
ORIENTATION: MID;UPPER
ORIENTATION: UPPER
ORIENTATION: UPPER;ANTERIOR

## 2025-01-16 ASSESSMENT — PAIN SCALES - GENERAL
PAINLEVEL_OUTOF10: 9
PAINLEVEL_OUTOF10: 8
PAINLEVEL_OUTOF10: 9
PAINLEVEL_OUTOF10: 9
PAINLEVEL_OUTOF10: 6
PAINLEVEL_OUTOF10: 10
PAINLEVEL_OUTOF10: 10
PAINLEVEL_OUTOF10: 7
PAINLEVEL_OUTOF10: 7
PAINLEVEL_OUTOF10: 5

## 2025-01-16 ASSESSMENT — PAIN DESCRIPTION - ONSET
ONSET: ON-GOING

## 2025-01-16 ASSESSMENT — PAIN DESCRIPTION - FREQUENCY
FREQUENCY: CONTINUOUS

## 2025-01-16 ASSESSMENT — PAIN DESCRIPTION - PAIN TYPE
TYPE: ACUTE PAIN

## 2025-01-16 ASSESSMENT — PAIN DESCRIPTION - DESCRIPTORS
DESCRIPTORS: ACHING
DESCRIPTORS: ACHING
DESCRIPTORS: ACHING;THROBBING

## 2025-01-16 NOTE — ED PROVIDER NOTES
[facility]  EMERGENCY DEPARTMENT ENCOUNTER          EM RESIDENT NOTE     Date of evaluation: 1/16/2025    Chief Complaint     Abdominal Pain (Pt presents to the ED with complaints of abdominal pain, headache, diarrhea, and weakness that started yesterday. Pt states she began having diarrhea yesterday and then progressively started having headache and abdominal pain this morning. Pt reports she has history of hiatal hernia.)    History of Present Illness     Laly Mckeon is a 69 y.o. femalewith a history of DVT, GERD, HTN, HLD, Tricuspid regurg who presents for abdominal pain.  She reports that yesterday evening she started experiencing bilateral lower quadrant abdominal pain, took a Pepcid and went to bed but this morning when she woke up was experiencing achy constant epigastric pain.  She again took a Pepcid but did not appreciate any change to her discomfort.  She also relates that she has had some left axillary/chest pain and exertional dyspnea.  Her chest pain comes and goes and affects primarily near her left armpit.  She reports that her exertional dyspnea is such that she had to take a break when she took the trash out.  She reports that she has a chronic nonproductive cough which has been slightly more frequent lately, she also usually gets congestion/rhinorrhea this time of the year.  She denies any fevers or chills.  She has chronic headaches for which she is evaluated in the outpatient setting and recently had a MRI brain.  Her headache is more severe today than her typical but in the same location and quality.  She reports some nausea associated her abdominal pain but has not vomited, she is had a few episodes of diarrhea, was somewhat formed yesterday but is liquid today..  She denies any UTI symptoms, lower extremity edema.     Chronic nonproductive cough, rhinorrhea and stuffy nose x 1 month.     Chilled but no fevers, no lightheadedness    MEDICAL DECISION MAKING / ASSESSMENT / PLAN  Blood, Urine Negative Negative    pH, Urine 6.0 5.0 - 8.0    Protein, UA Negative Negative mg/dL    Urobilinogen, Urine 0.2 <2.0 E.U./dL    Nitrite, Urine Negative Negative    Leukocyte Esterase, Urine Negative Negative    Microscopic Examination Not Indicated     Urine Type Voided     Urine Reflex to Culture Not Indicated    Troponin   Result Value Ref Range    Troponin, High Sensitivity 9 0 - 14 ng/L   Troponin   Result Value Ref Range    Troponin, High Sensitivity 15 (H) 0 - 14 ng/L   BNP   Result Value Ref Range    NT Pro-BNP 88 0 - 124 pg/mL   Troponin   Result Value Ref Range    Troponin, High Sensitivity 8 0 - 14 ng/L   EKG 12 Lead   Result Value Ref Range    Ventricular Rate 58 BPM    Atrial Rate 58 BPM    P-R Interval 194 ms    QRS Duration 78 ms    Q-T Interval 420 ms    QTc Calculation (Bazett) 412 ms    P Axis 58 degrees    R Axis 29 degrees    T Axis 39 degrees    Diagnosis       Sinus bradycardiaOtherwise normal ECGEKG performed in ER and to be interpreted by ER physician.Confirmed by MD, ER ([id number]),  KESHAV COOPER ([id number]) on 1/16/2025 12:23:44 PM     EKG   Interpreted in conjunction with emergency department physician No att. providers found  See ED Course    RECENT VITALS:  BP: 139/66, Temp: 98.6 °F (37 °C), Pulse: 69,Respirations: 18, SpO2: 92 %     Procedures     None    ED Course     The patient was given the following medications:  Orders Placed This Encounter   Medications    ondansetron (ZOFRAN) injection 4 mg    ketorolac (TORADOL) injection 15 mg    acetaminophen (TYLENOL) tablet 1,000 mg    HYDROmorphone HCl PF (DILAUDID) injection 0.5 mg    aluminum & magnesium hydroxide-simethicone (MAALOX) 30 mL, lidocaine viscous hcl (XYLOCAINE) 5 mL (GI COCKTAIL)    iopamidol (ISOVUE-370) 76 % injection 75 mL    prochlorperazine (COMPAZINE) injection 5 mg    methocarbamol (ROBAXIN) tablet 750 mg    diphenhydrAMINE (BENADRYL) injection 25 mg    buPROPion (WELLBUTRIN XL) extended release tablet 150 mg

## 2025-01-16 NOTE — ED NOTES
Patient Name: Laly Mckeon  : 1955 69 y.o.  MRN: [id number]  ED Room #: B20/B20-20     Chief complaint:   Chief Complaint   Patient presents with    Abdominal Pain     Pt presents to the ED with complaints of abdominal pain, headache, diarrhea, and weakness that started yesterday. Pt states she began having diarrhea yesterday and then progressively started having headache and abdominal pain this morning. Pt reports she has history of hiatal hernia.     Hospital Problem/Diagnosis:   Hospital Problems             Last Modified POA    * (Principal) Abdominal pain 2025 Yes         O2 Flow Rate:O2 Device: None (Room air)   (if applicable)  Cardiac Rhythm:   (if applicable)  Active LDA's:   Peripheral IV 25 Right Antecubital (Active)            How does patient ambulate? Low Fall Risk (Ambulates by themselves without support    2. How does patient take pills? Whole with Water    3. Is patient alert? Alert    4. Is patient oriented? To Person, To Place, To Time, To Situation, and Follows Commands    5.   Patient arrived from:  home  Facility Name: ___________________________________________    6. If patient is disoriented or from a Skill Nursing Facility has family been notified of admission? No    7. Patient belongings? Belongings: Cell Phone and Clothing    Disposition of belongings? Kept with Patient     8. Any specific patient or family belongings/needs/dynamics?   a. NA    9. Miscellaneous comments/pending orders?  a. Pt presented to the Ed with complaints of diarrhea, abdominal pain, and headache. Pt Has been having unspecified abdominal pain since yesterday. Pt has hx of hiatal hernia. Pt is alert and oriented x4 on room air. Pt ambulates independently. Pt has 20G IV in right AC.       If there are any additional questions please reach out to the Emergency Department.      Parminder Goodman RN  [phone]

## 2025-01-16 NOTE — H&P
V2.0  History and Physical      Name:  Laly Mckeon /Age/Sex: 1955  (69 y.o. female)   MRN & CSN:  [id number] & [id number] Encounter Date/Time: 2025 4:57 PM EST   Location:  [place] [address] PCP: Tammy Sams MD       Hospital Day: 1    Assessment and Plan:   Laly Mckeon is a 69 y.o. female with a pmh of hemiplegic migraine, chronic low back pain, osteoarthrosis multiple sites, vitamin D deficiency, GERD, large hiatal hernia, essential hypertension, major depressive disorder, hyperlipidemia, PAD, morbid obesity who presents with Abdominal pain    Hospital Problems             Last Modified POA    * (Principal) Abdominal pain 2025 Yes   #Abdominal pain-in epigastric area.  Has history of large hiatal hernia.  -Denies alcohol or tobacco use, NSAID use  -Takes Protonix twice daily, last use yesterday    #Chronic intermittent diarrhea secondary to microscopic colitis    #Chronic medical conditions as mentioned above    #Morbid obesity with BMI of 46    Plan:  CT chest, abdomen/pelvis with no acute pulmonary embolism as well as no acute intra-abdominal pathology  Lipase-normal, UA not consistent with infection, LFTs-normal, no electrolyte abnormalities, no leukocytosis  Will start on Protonix 40 mg IV twice daily, GI cocktail as needed  Other options for pain relief as needed  Diet as tolerated  Supportive therapy  Consider GI consult in a.m. if patient continues to have abdominal pain    Disposition:   Current Living situation: Home  Expected Disposition: Home  Estimated D/C: 2 days    Diet ADULT DIET; Regular   DVT Prophylaxis [x] Lovenox, []  Heparin, [] SCDs, [] Ambulation,  [] Eliquis, [] Xarelto, [] Coumadin   Code Status Full   Surrogate Decision Maker/ POA Sister     Personally reviewed Lab Studies CBC, CMP, high-sensitivity troponin, UA reflex to culture, lipase levels and Imaging     Discussed management of the case with ED provider who recommended that mission for further

## 2025-01-16 NOTE — ED PROVIDER NOTES
ED Attending Attestation Note     Date of evaluation: 1/16/2025    This patient was seen by the resident.  I have seen and examined the patient, agree with the workup, evaluation, management and diagnosis. The care plan has been discussed.  I have reviewed the EKG and agree with interpretation as documented by resident physician.     Briefly, Laly Mckeon is a 69 y.o. female with a PMH inclusive of hiatal hernia who presents for evaluation of bilat lower abd pain. Took Pepcid and went to bed. Pain more in upper abdomen this morning. Has nausea but no vomiting.     Notable exam findings include appears uncomfortable    Assessment/ Medical Decision Making:     Will perform workup for symptoms and provice symptomatic treatment.      Saul Trent MD  01/17/25 [phone]

## 2025-01-17 DIAGNOSIS — G44.039 EPISODIC PAROXYSMAL HEMICRANIA, NOT INTRACTABLE: ICD-10-CM

## 2025-01-17 PROCEDURE — 6360000002 HC RX W HCPCS: Performed by: INTERNAL MEDICINE

## 2025-01-17 PROCEDURE — 2500000003 HC RX 250 WO HCPCS: Performed by: INTERNAL MEDICINE

## 2025-01-17 PROCEDURE — 2580000003 HC RX 258: Performed by: SURGERY

## 2025-01-17 PROCEDURE — 2700000000 HC OXYGEN THERAPY PER DAY

## 2025-01-17 PROCEDURE — 2580000003 HC RX 258: Performed by: INTERNAL MEDICINE

## 2025-01-17 PROCEDURE — 6360000002 HC RX W HCPCS: Performed by: SURGERY

## 2025-01-17 PROCEDURE — 6370000000 HC RX 637 (ALT 250 FOR IP): Performed by: INTERNAL MEDICINE

## 2025-01-17 PROCEDURE — 96376 TX/PRO/DX INJ SAME DRUG ADON: CPT

## 2025-01-17 PROCEDURE — 6370000000 HC RX 637 (ALT 250 FOR IP): Performed by: SURGERY

## 2025-01-17 PROCEDURE — 94761 N-INVAS EAR/PLS OXIMETRY MLT: CPT

## 2025-01-17 PROCEDURE — 87506 IADNA-DNA/RNA PROBE TQ 6-11: CPT

## 2025-01-17 PROCEDURE — 96365 THER/PROPH/DIAG IV INF INIT: CPT

## 2025-01-17 PROCEDURE — G0378 HOSPITAL OBSERVATION PER HR: HCPCS

## 2025-01-17 PROCEDURE — 96372 THER/PROPH/DIAG INJ SC/IM: CPT

## 2025-01-17 PROCEDURE — 96366 THER/PROPH/DIAG IV INF ADDON: CPT

## 2025-01-17 RX ORDER — METOPROLOL SUCCINATE 25 MG/1
25 TABLET, EXTENDED RELEASE ORAL DAILY
Qty: 90 TABLET | Refills: 3 | Status: SHIPPED | OUTPATIENT
Start: 2025-01-17

## 2025-01-17 RX ORDER — 0.9 % SODIUM CHLORIDE 0.9 %
1000 INTRAVENOUS SOLUTION INTRAVENOUS ONCE
Status: COMPLETED | OUTPATIENT
Start: 2025-01-17 | End: 2025-01-17

## 2025-01-17 RX ORDER — MORPHINE SULFATE 2 MG/ML
2 INJECTION, SOLUTION INTRAMUSCULAR; INTRAVENOUS EVERY 4 HOURS PRN
Status: DISCONTINUED | OUTPATIENT
Start: 2025-01-17 | End: 2025-01-21 | Stop reason: HOSPADM

## 2025-01-17 RX ORDER — ACETAMINOPHEN 160 MG/5ML
650 LIQUID ORAL EVERY 4 HOURS PRN
Status: DISCONTINUED | OUTPATIENT
Start: 2025-01-17 | End: 2025-01-21 | Stop reason: HOSPADM

## 2025-01-17 RX ORDER — PROMETHAZINE HYDROCHLORIDE 25 MG/ML
6.25 INJECTION, SOLUTION INTRAMUSCULAR; INTRAVENOUS EVERY 6 HOURS PRN
Status: DISCONTINUED | OUTPATIENT
Start: 2025-01-17 | End: 2025-01-17

## 2025-01-17 RX ORDER — GUAIFENESIN/DEXTROMETHORPHAN 100-10MG/5
5 SYRUP ORAL EVERY 4 HOURS PRN
Status: DISCONTINUED | OUTPATIENT
Start: 2025-01-17 | End: 2025-01-21 | Stop reason: HOSPADM

## 2025-01-17 RX ADMIN — HYDROMORPHONE HYDROCHLORIDE 0.5 MG: 1 INJECTION, SOLUTION INTRAMUSCULAR; INTRAVENOUS; SUBCUTANEOUS at 13:51

## 2025-01-17 RX ADMIN — ONDANSETRON 4 MG: 2 INJECTION INTRAMUSCULAR; INTRAVENOUS at 23:19

## 2025-01-17 RX ADMIN — LOSARTAN POTASSIUM 100 MG: 50 TABLET, FILM COATED ORAL at 21:05

## 2025-01-17 RX ADMIN — ACETAMINOPHEN 650 MG: 650 SOLUTION ORAL at 17:21

## 2025-01-17 RX ADMIN — ONDANSETRON 4 MG: 2 INJECTION INTRAMUSCULAR; INTRAVENOUS at 13:51

## 2025-01-17 RX ADMIN — PROMETHAZINE HYDROCHLORIDE 6.25 MG: 25 INJECTION INTRAMUSCULAR; INTRAVENOUS at 16:05

## 2025-01-17 RX ADMIN — SODIUM CHLORIDE: 9 INJECTION, SOLUTION INTRAVENOUS at 09:54

## 2025-01-17 RX ADMIN — PROMETHAZINE HYDROCHLORIDE 6.25 MG: 25 INJECTION INTRAMUSCULAR; INTRAVENOUS at 09:56

## 2025-01-17 RX ADMIN — VERAPAMIL HYDROCHLORIDE 180 MG: 180 TABLET, FILM COATED, EXTENDED RELEASE ORAL at 21:05

## 2025-01-17 RX ADMIN — ONDANSETRON 4 MG: 2 INJECTION INTRAMUSCULAR; INTRAVENOUS at 06:45

## 2025-01-17 RX ADMIN — SODIUM CHLORIDE 1000 ML: 9 INJECTION, SOLUTION INTRAVENOUS at 10:42

## 2025-01-17 RX ADMIN — ENOXAPARIN SODIUM 30 MG: 100 INJECTION SUBCUTANEOUS at 21:05

## 2025-01-17 RX ADMIN — HYDROMORPHONE HYDROCHLORIDE 0.5 MG: 1 INJECTION, SOLUTION INTRAMUSCULAR; INTRAVENOUS; SUBCUTANEOUS at 07:54

## 2025-01-17 RX ADMIN — SODIUM CHLORIDE, PRESERVATIVE FREE 40 MG: 5 INJECTION INTRAVENOUS at 06:45

## 2025-01-17 RX ADMIN — ENOXAPARIN SODIUM 30 MG: 100 INJECTION SUBCUTANEOUS at 07:58

## 2025-01-17 RX ADMIN — SODIUM CHLORIDE, PRESERVATIVE FREE 40 MG: 5 INJECTION INTRAVENOUS at 16:40

## 2025-01-17 RX ADMIN — SODIUM CHLORIDE, PRESERVATIVE FREE 10 ML: 5 INJECTION INTRAVENOUS at 07:55

## 2025-01-17 RX ADMIN — SODIUM CHLORIDE, PRESERVATIVE FREE 10 ML: 5 INJECTION INTRAVENOUS at 21:05

## 2025-01-17 ASSESSMENT — PAIN SCALES - GENERAL
PAINLEVEL_OUTOF10: 9
PAINLEVEL_OUTOF10: 10
PAINLEVEL_OUTOF10: 10
PAINLEVEL_OUTOF10: 9
PAINLEVEL_OUTOF10: 9
PAINLEVEL_OUTOF10: 10
PAINLEVEL_OUTOF10: 9

## 2025-01-17 ASSESSMENT — PAIN DESCRIPTION - ORIENTATION
ORIENTATION: MID;UPPER
ORIENTATION: MID
ORIENTATION: MID

## 2025-01-17 ASSESSMENT — PAIN DESCRIPTION - LOCATION
LOCATION: ABDOMEN

## 2025-01-17 ASSESSMENT — PAIN DESCRIPTION - DESCRIPTORS
DESCRIPTORS: ACHING
DESCRIPTORS: SORE

## 2025-01-17 NOTE — TELEPHONE ENCOUNTER
Last appointment: 11/13/2024  Next appointment: 2/13/2025  Last refill: 12/18/2024    Patient currently in the hospital

## 2025-01-17 NOTE — PROGRESS NOTES
4 Eyes Skin Assessment     NAME:  Laly Mckeon  YOB: 1955  MEDICAL RECORD NUMBER:  [id number]    The patient is being assessed for  Admission    I agree that at least one RN has performed a thorough Head to Toe Skin Assessment on the patient. ALL assessment sites listed below have been assessed.      Areas assessed by both nurses:    Head, Face, Ears, Shoulders, Back, Chest, Arms, Elbows, Hands, Sacrum. Buttock, Coccyx, Ischium, Legs. Feet and Heels, and Under Medical Devices         Does the Patient have a Wound? No noted wound(s)       Rudy Prevention initiated by RN: Yes  Wound Care Orders initiated by RN: No    Pressure Injury (Stage 3,4, Unstageable, DTI, NWPT, and Complex wounds) if present, place Wound referral order by RN under : No    New Ostomies, if present place, Ostomy referral order under : No     Nurse 1 eSignature: Electronically signed by David Youssef RN on 1/16/25 at 7:08 PM EST    **SHARE this note so that the co-signing nurse can place an eSignature**    Nurse 2 eSignature: Electronically signed by David Burch RN on 1/16/25 at 8:56 PM EST

## 2025-01-17 NOTE — CARE COORDINATION
Case Management Assessment  Initial Evaluation    Date/Time of Evaluation: 1/17/2025 2:52 PM  Assessment Completed by: MAURICIO JONES    If patient is discharged prior to next notation, then this note serves as note for discharge by case management.    Patient Name: Laly Mckeon                   YOB: 1955  Diagnosis: Abdominal pain [R10.9]                   Date / Time: 1/16/2025 11:09 AM    Patient Admission Status: Observation   Readmission Risk (Low < 19, Mod (19-27), High > 27): No data recorded  Current PCP: Tammy Sams MD  PCP verified by CM? Yes    Chart Reviewed: Yes      History Provided by: Patient  Patient Orientation: Alert and Oriented    Patient Cognition: Alert    Hospitalization in the last 30 days (Readmission):  No    If yes, Readmission Assessment in  Navigator will be completed.    Advance Directives:      Code Status: Full Code   Patient's Primary Decision Maker is: Legal Next of Kin    Primary Decision Maker: Ileana Mckeon [facility] - [phone]    Discharge Planning:    Patient lives with: Alone Type of Home: House  Primary Care Giver: Self  Patient Support Systems include: Children   Current Financial resources: Medicare  Current community resources: None  Current services prior to admission: C-pap            Current DME:              Type of Home Care services:  None    ADLS  Prior functional level: Independent in ADLs/IADLs  Current functional level: Independent in ADLs/IADLs    PT AM-PAC:   /24  OT AM-PAC:   /24    Family can provide assistance at DC: Yes  Would you like Case Management to discuss the discharge plan with any other family members/significant others, and if so, who? No  Plans to Return to Present Housing: Yes  Other Identified Issues/Barriers to RETURNING to current housing: n/a  Potential Assistance needed at discharge: N/A            Potential DME:    Patient expects to discharge to: House  Plan for transportation at discharge:

## 2025-01-17 NOTE — PLAN OF CARE
Problem: Discharge Planning  Goal: Discharge to home or other facility with appropriate resources  Outcome: Progressing  Flowsheets (Taken 1/17/2025 0035)  Discharge to home or other facility with appropriate resources:   Identify discharge learning needs (meds, wound care, etc)   Identify barriers to discharge with patient and caregiver   Arrange for needed discharge resources and transportation as appropriate     Problem: Pain  Goal: Verbalizes/displays adequate comfort level or baseline comfort level  Outcome: Progressing  Flowsheets (Taken 1/17/2025 0035)  Verbalizes/displays adequate comfort level or baseline comfort level:   Encourage patient to monitor pain and request assistance   Administer analgesics based on type and severity of pain and evaluate response   Consider cultural and social influences on pain and pain management   Assess pain using appropriate pain scale   Implement non-pharmacological measures as appropriate and evaluate response   Notify Licensed Independent Practitioner if interventions unsuccessful or patient reports new pain     Problem: Safety - Adult  Goal: Free from fall injury  Outcome: Progressing  Flowsheets (Taken 1/17/2025 0035)  Free From Fall Injury:   Instruct family/caregiver on patient safety   Based on caregiver fall risk screen, instruct family/caregiver to ask for assistance with transferring infant if caregiver noted to have fall risk factors

## 2025-01-17 NOTE — PROGRESS NOTES
Hospital Medicine Progress Note      Date of Admission: 1/16/2025  Hospital Day: 2    Chief Admission Complaint:  dry heaving, diarrhea, epigastric pain     Subjective:  patient reporting persistent chief complaints, denies vomiting    Presenting Admission History:       Laly Mckeon is a 69 y.o. female with pmh as mentioned above presents with complaints of abdominal pain that started yesterday.  Pain is mainly in epigastric area.  Denies any nausea or vomiting.  Has intermittent diarrhea due to her diagnosis of microscopic colitis  Denies blood in stool, melanotic stool, urinary symptoms  Denies fever, chills, cough, SOB, chest pain    Assessment/Plan:      Current Principal Problem:  Abdominal pain    Abdominal pain-in epigastric area.  Has history of large hiatal hernia.  -Denies alcohol or tobacco use, NSAID use  -Takes Protonix twice daily, last use yesterday  - has large hiatal hernia, but I don't believe this to be the etiology of her current presentation as she has having acute on chronic diarrhea as well  - fluids, supportive care  - f/u GI PCR and c diff     Chronic intermittent diarrhea secondary to microscopic colitis  - patient states she was prescribed steroids but stopped taking them after just 3 days d/t constipation, consider restarting on outpatient GI f/u     Chronic medical conditions as mentioned above     Morbid obesity with BMI of 46       Physical Exam Performed:      General: NAD  Eyes: EOMI  ENT: neck supple  Cardiovascular: Regular rate.  Respiratory: Clear to auscultation  Gastrointestinal: Soft, non tender  Genitourinary: no suprapubic tenderness  Musculoskeletal: No edema  Skin: warm, dry  Neuro: Alert.  Psych: Mood appropriate.     /67   Pulse 62   Temp 98.1 °F (36.7 °C) (Oral)   Resp 16   Ht 1.6 m (5' 3\")   Wt 117.9 kg (259 lb 14.4 oz)   LMP 01/01/2000   SpO2 97%   BMI 46.04 kg/m²     Diet: ADULT DIET; Regular; Low Fat/Low Chol/High Fiber/2 gm Na  DVT Prophylaxis:

## 2025-01-17 NOTE — PROGRESS NOTES
Patient admitted to room 3306 from the ED for abdominal pain. VSS on room air. Denies N/V. Daughter bringing home CPAP to wear at night. Currently rates pain 7/10 to head and abdomen. Patient gait steady, up independently. Skin assessment completed. All fall precautions and safety measures are in place. Patient understands to call with any needs or concerns that arise. Call light and over bed table are within reach. Will continue to monitor patient.

## 2025-01-18 LAB
ANION GAP SERPL CALCULATED.3IONS-SCNC: 8 MMOL/L (ref 3–16)
BASOPHILS # BLD: 0 K/UL (ref 0–0.2)
BASOPHILS NFR BLD: 0.6 %
BUN SERPL-MCNC: 9 MG/DL (ref 7–20)
C DIFF TOX A+B STL QL IA: NORMAL
CALCIUM SERPL-MCNC: 8.9 MG/DL (ref 8.3–10.6)
CHLORIDE SERPL-SCNC: 106 MMOL/L (ref 99–110)
CO2 SERPL-SCNC: 26 MMOL/L (ref 21–32)
CREAT SERPL-MCNC: 0.5 MG/DL (ref 0.6–1.2)
DEPRECATED RDW RBC AUTO: 12.9 % (ref 12.4–15.4)
EOSINOPHIL # BLD: 0.1 K/UL (ref 0–0.6)
EOSINOPHIL NFR BLD: 2.9 %
GFR SERPLBLD CREATININE-BSD FMLA CKD-EPI: >90 ML/MIN/{1.73_M2}
GLUCOSE SERPL-MCNC: 98 MG/DL (ref 70–99)
HCT VFR BLD AUTO: 36.3 % (ref 36–48)
HGB BLD-MCNC: 12.3 G/DL (ref 12–16)
LYMPHOCYTES # BLD: 1.7 K/UL (ref 1–5.1)
LYMPHOCYTES NFR BLD: 38.2 %
MCH RBC QN AUTO: 30.4 PG (ref 26–34)
MCHC RBC AUTO-ENTMCNC: 33.9 G/DL (ref 31–36)
MCV RBC AUTO: 89.7 FL (ref 80–100)
MONOCYTES # BLD: 0.5 K/UL (ref 0–1.3)
MONOCYTES NFR BLD: 11.6 %
NEUTROPHILS # BLD: 2 K/UL (ref 1.7–7.7)
NEUTROPHILS NFR BLD: 46.7 %
PLATELET # BLD AUTO: 202 K/UL (ref 135–450)
PMV BLD AUTO: 6.9 FL (ref 5–10.5)
POTASSIUM SERPL-SCNC: 3.6 MMOL/L (ref 3.5–5.1)
RBC # BLD AUTO: 4.04 M/UL (ref 4–5.2)
SODIUM SERPL-SCNC: 140 MMOL/L (ref 136–145)
WBC # BLD AUTO: 4.3 K/UL (ref 4–11)

## 2025-01-18 PROCEDURE — 6360000002 HC RX W HCPCS: Performed by: INTERNAL MEDICINE

## 2025-01-18 PROCEDURE — 6360000002 HC RX W HCPCS: Performed by: SURGERY

## 2025-01-18 PROCEDURE — 2580000003 HC RX 258: Performed by: SURGERY

## 2025-01-18 PROCEDURE — G0378 HOSPITAL OBSERVATION PER HR: HCPCS

## 2025-01-18 PROCEDURE — 36415 COLL VENOUS BLD VENIPUNCTURE: CPT

## 2025-01-18 PROCEDURE — 96372 THER/PROPH/DIAG INJ SC/IM: CPT

## 2025-01-18 PROCEDURE — 6370000000 HC RX 637 (ALT 250 FOR IP): Performed by: SURGERY

## 2025-01-18 PROCEDURE — 96366 THER/PROPH/DIAG IV INF ADDON: CPT

## 2025-01-18 PROCEDURE — 2580000003 HC RX 258: Performed by: INTERNAL MEDICINE

## 2025-01-18 PROCEDURE — 96376 TX/PRO/DX INJ SAME DRUG ADON: CPT

## 2025-01-18 PROCEDURE — 87449 NOS EACH ORGANISM AG IA: CPT

## 2025-01-18 PROCEDURE — 85025 COMPLETE CBC W/AUTO DIFF WBC: CPT

## 2025-01-18 PROCEDURE — 2500000003 HC RX 250 WO HCPCS: Performed by: INTERNAL MEDICINE

## 2025-01-18 PROCEDURE — 6370000000 HC RX 637 (ALT 250 FOR IP): Performed by: INTERNAL MEDICINE

## 2025-01-18 PROCEDURE — 87324 CLOSTRIDIUM AG IA: CPT

## 2025-01-18 PROCEDURE — 80048 BASIC METABOLIC PNL TOTAL CA: CPT

## 2025-01-18 RX ORDER — LIDOCAINE 4 G/G
1 PATCH TOPICAL DAILY
Status: DISCONTINUED | OUTPATIENT
Start: 2025-01-18 | End: 2025-01-21 | Stop reason: HOSPADM

## 2025-01-18 RX ORDER — VITAMIN B COMPLEX
1 CAPSULE ORAL DAILY
Status: DISCONTINUED | OUTPATIENT
Start: 2025-01-18 | End: 2025-01-21 | Stop reason: HOSPADM

## 2025-01-18 RX ORDER — DIPHENHYDRAMINE HYDROCHLORIDE 50 MG/ML
50 INJECTION INTRAMUSCULAR; INTRAVENOUS ONCE
Status: DISCONTINUED | OUTPATIENT
Start: 2025-01-18 | End: 2025-01-18

## 2025-01-18 RX ORDER — LOPERAMIDE HYDROCHLORIDE 2 MG/1
4 CAPSULE ORAL 4 TIMES DAILY PRN
Status: DISCONTINUED | OUTPATIENT
Start: 2025-01-18 | End: 2025-01-21 | Stop reason: HOSPADM

## 2025-01-18 RX ADMIN — PROMETHAZINE HYDROCHLORIDE 6.25 MG: 25 INJECTION INTRAMUSCULAR; INTRAVENOUS at 17:03

## 2025-01-18 RX ADMIN — ENOXAPARIN SODIUM 30 MG: 100 INJECTION SUBCUTANEOUS at 20:26

## 2025-01-18 RX ADMIN — SODIUM CHLORIDE, PRESERVATIVE FREE 10 ML: 5 INJECTION INTRAVENOUS at 20:27

## 2025-01-18 RX ADMIN — SODIUM CHLORIDE, PRESERVATIVE FREE 40 MG: 5 INJECTION INTRAVENOUS at 06:08

## 2025-01-18 RX ADMIN — ACETAMINOPHEN 650 MG: 650 SOLUTION ORAL at 05:52

## 2025-01-18 RX ADMIN — BUPROPION HYDROCHLORIDE 150 MG: 150 TABLET, EXTENDED RELEASE ORAL at 09:07

## 2025-01-18 RX ADMIN — METOPROLOL SUCCINATE 25 MG: 25 TABLET, FILM COATED, EXTENDED RELEASE ORAL at 09:07

## 2025-01-18 RX ADMIN — SODIUM CHLORIDE, PRESERVATIVE FREE 10 ML: 5 INJECTION INTRAVENOUS at 09:07

## 2025-01-18 RX ADMIN — ONDANSETRON 4 MG: 2 INJECTION INTRAMUSCULAR; INTRAVENOUS at 11:51

## 2025-01-18 RX ADMIN — PROMETHAZINE HYDROCHLORIDE 6.25 MG: 25 INJECTION INTRAMUSCULAR; INTRAVENOUS at 00:10

## 2025-01-18 RX ADMIN — LOSARTAN POTASSIUM 100 MG: 50 TABLET, FILM COATED ORAL at 20:26

## 2025-01-18 RX ADMIN — VERAPAMIL HYDROCHLORIDE 180 MG: 180 TABLET, FILM COATED, EXTENDED RELEASE ORAL at 22:05

## 2025-01-18 RX ADMIN — ONDANSETRON 4 MG: 2 INJECTION INTRAMUSCULAR; INTRAVENOUS at 22:08

## 2025-01-18 RX ADMIN — PROMETHAZINE HYDROCHLORIDE 6.25 MG: 25 INJECTION INTRAMUSCULAR; INTRAVENOUS at 06:15

## 2025-01-18 RX ADMIN — LOPERAMIDE HYDROCHLORIDE 4 MG: 2 CAPSULE ORAL at 09:43

## 2025-01-18 RX ADMIN — ENOXAPARIN SODIUM 30 MG: 100 INJECTION SUBCUTANEOUS at 09:07

## 2025-01-18 RX ADMIN — Medication 1 CAPSULE: at 14:06

## 2025-01-18 RX ADMIN — SODIUM CHLORIDE, PRESERVATIVE FREE 40 MG: 5 INJECTION INTRAVENOUS at 18:37

## 2025-01-18 RX ADMIN — ACETAMINOPHEN 650 MG: 650 SOLUTION ORAL at 22:05

## 2025-01-18 ASSESSMENT — PAIN SCALES - GENERAL
PAINLEVEL_OUTOF10: 3
PAINLEVEL_OUTOF10: 0

## 2025-01-18 NOTE — PROGRESS NOTES
Pt ao4, vss, son is by bedside. Pt continued to have nausea overnight, no emesis noted, phenergan given with relief. Abd pain is controlled w prn tylenol solution. Pt continued to have diarrhea with small chunks mixed in. Stool sample collected for cdiff. Pt calls out appropriately for assistance. All needs met at this time.

## 2025-01-18 NOTE — PROGRESS NOTES
Hospital Medicine Progress Note      Date of Admission: 1/16/2025  Hospital Day: 3    Chief Admission Complaint:  dry heaving, diarrhea, epigastric pain     Subjective:  patient reporting persistent chief complaints, denies vomiting    Presenting Admission History:       Laly Mckeon is a 69 y.o. female with pmh as mentioned above presents with complaints of abdominal pain that started yesterday.  Pain is mainly in epigastric area.  Denies any nausea or vomiting.  Has intermittent diarrhea due to her diagnosis of microscopic colitis  Denies blood in stool, melanotic stool, urinary symptoms  Denies fever, chills, cough, SOB, chest pain    Assessment/Plan:      Current Principal Problem:  Abdominal pain    Abdominal pain-in epigastric area.  Has history of large hiatal hernia.  -Denies alcohol or tobacco use, NSAID use  -Takes Protonix twice daily, last use yesterday  - has large hiatal hernia, but I don't believe this to be the etiology of her current presentation as she has having acute on chronic diarrhea as well  - fluids, supportive care  - c diff negative  - f/u GI PCR    Acute onset glossitis  Hx of microscopic colitis  - glossy smooth appearance to parts of her tongue  - right side of tongue seems to have a circular lesion, however this could also just be small patch of atrophic glossitis as well  - known to occur in patients with IBD including microscopic colitis  - patient states she was had issues with constipation after being prescribed steroids for her colitis, however she only took the steroids for 3 days, so I doubt her constipation was d/t steroids  - start B complex supplementation (pharmacy verified that it contains all of the b vitamins)  - GI consulted     Chronic medical conditions as mentioned above     Morbid obesity with BMI of 46       Physical Exam Performed:      General: NAD  Eyes: EOMI  ENT: neck supple  Cardiovascular: Regular rate.  Respiratory: Clear to

## 2025-01-19 PROBLEM — R19.7 DIARRHEA: Status: ACTIVE | Noted: 2025-01-19

## 2025-01-19 LAB — GI PATHOGENS PNL STL NAA+PROBE: NORMAL

## 2025-01-19 PROCEDURE — 2580000003 HC RX 258: Performed by: INTERNAL MEDICINE

## 2025-01-19 PROCEDURE — 6360000002 HC RX W HCPCS: Performed by: INTERNAL MEDICINE

## 2025-01-19 PROCEDURE — 1200000000 HC SEMI PRIVATE

## 2025-01-19 PROCEDURE — 6370000000 HC RX 637 (ALT 250 FOR IP): Performed by: SURGERY

## 2025-01-19 PROCEDURE — 6370000000 HC RX 637 (ALT 250 FOR IP): Performed by: INTERNAL MEDICINE

## 2025-01-19 PROCEDURE — 96376 TX/PRO/DX INJ SAME DRUG ADON: CPT

## 2025-01-19 PROCEDURE — 2500000003 HC RX 250 WO HCPCS: Performed by: INTERNAL MEDICINE

## 2025-01-19 RX ORDER — VITAMIN B COMPLEX
1 CAPSULE ORAL DAILY
Qty: 30 CAPSULE | Refills: 3 | Status: SHIPPED | OUTPATIENT
Start: 2025-01-20

## 2025-01-19 RX ORDER — LOPERAMIDE HYDROCHLORIDE 2 MG/1
4 CAPSULE ORAL 4 TIMES DAILY PRN
Qty: 30 CAPSULE | Refills: 0 | Status: SHIPPED | OUTPATIENT
Start: 2025-01-19 | End: 2025-01-29

## 2025-01-19 RX ORDER — GABAPENTIN 100 MG/1
100 CAPSULE ORAL 3 TIMES DAILY
Qty: 90 CAPSULE | Refills: 0 | Status: SHIPPED | OUTPATIENT
Start: 2025-01-19 | End: 2025-02-18

## 2025-01-19 RX ADMIN — ONDANSETRON 4 MG: 2 INJECTION INTRAMUSCULAR; INTRAVENOUS at 15:13

## 2025-01-19 RX ADMIN — SODIUM CHLORIDE, PRESERVATIVE FREE 40 MG: 5 INJECTION INTRAVENOUS at 04:27

## 2025-01-19 RX ADMIN — Medication 1 CAPSULE: at 08:23

## 2025-01-19 RX ADMIN — METOPROLOL SUCCINATE 25 MG: 25 TABLET, FILM COATED, EXTENDED RELEASE ORAL at 08:23

## 2025-01-19 RX ADMIN — VERAPAMIL HYDROCHLORIDE 180 MG: 180 TABLET, FILM COATED, EXTENDED RELEASE ORAL at 21:14

## 2025-01-19 RX ADMIN — LOSARTAN POTASSIUM 100 MG: 50 TABLET, FILM COATED ORAL at 21:13

## 2025-01-19 RX ADMIN — TRAZODONE HYDROCHLORIDE 50 MG: 50 TABLET ORAL at 23:46

## 2025-01-19 RX ADMIN — ONDANSETRON 4 MG: 2 INJECTION INTRAMUSCULAR; INTRAVENOUS at 21:13

## 2025-01-19 RX ADMIN — SODIUM CHLORIDE, PRESERVATIVE FREE 10 ML: 5 INJECTION INTRAVENOUS at 21:18

## 2025-01-19 RX ADMIN — SODIUM CHLORIDE, PRESERVATIVE FREE 40 MG: 5 INJECTION INTRAVENOUS at 18:05

## 2025-01-19 RX ADMIN — ACETAMINOPHEN 650 MG: 650 SOLUTION ORAL at 15:21

## 2025-01-19 RX ADMIN — ENOXAPARIN SODIUM 30 MG: 100 INJECTION SUBCUTANEOUS at 21:13

## 2025-01-19 RX ADMIN — BUPROPION HYDROCHLORIDE 150 MG: 150 TABLET, EXTENDED RELEASE ORAL at 08:23

## 2025-01-19 RX ADMIN — TRAZODONE HYDROCHLORIDE 50 MG: 50 TABLET ORAL at 00:04

## 2025-01-19 RX ADMIN — ENOXAPARIN SODIUM 30 MG: 100 INJECTION SUBCUTANEOUS at 08:24

## 2025-01-19 RX ADMIN — ONDANSETRON 4 MG: 2 INJECTION INTRAMUSCULAR; INTRAVENOUS at 08:20

## 2025-01-19 ASSESSMENT — PAIN SCALES - GENERAL
PAINLEVEL_OUTOF10: 4
PAINLEVEL_OUTOF10: 3
PAINLEVEL_OUTOF10: 0
PAINLEVEL_OUTOF10: 0

## 2025-01-19 ASSESSMENT — PAIN - FUNCTIONAL ASSESSMENT: PAIN_FUNCTIONAL_ASSESSMENT: ACTIVITIES ARE NOT PREVENTED

## 2025-01-19 ASSESSMENT — PAIN DESCRIPTION - ORIENTATION: ORIENTATION: MID

## 2025-01-19 ASSESSMENT — PAIN DESCRIPTION - DESCRIPTORS: DESCRIPTORS: SORE

## 2025-01-19 ASSESSMENT — PAIN DESCRIPTION - PAIN TYPE: TYPE: ACUTE PAIN

## 2025-01-19 ASSESSMENT — PAIN DESCRIPTION - LOCATION: LOCATION: ABDOMEN

## 2025-01-19 ASSESSMENT — PAIN DESCRIPTION - FREQUENCY: FREQUENCY: CONTINUOUS

## 2025-01-19 ASSESSMENT — PAIN DESCRIPTION - ONSET: ONSET: ON-GOING

## 2025-01-19 NOTE — PLAN OF CARE
Problem: Pain  Goal: Verbalizes/displays adequate comfort level or baseline comfort level  Outcome: Progressing     Problem: Safety - Adult  Goal: Free from fall injury  1/19/2025 0510 by Babs Waldron RN  Outcome: Progressing     Problem: Discharge Planning  Goal: Discharge to home or other facility with appropriate resources

## 2025-01-19 NOTE — PROGRESS NOTES
Hospital Medicine Progress Note      Date of Admission: 1/16/2025  Hospital Day: 4    Chief Admission Complaint:  dry heaving, diarrhea, epigastric pain     Subjective:  patient reporting feeling much better today. GI recommending EGD Monday to evaluate upper GI symptoms in setting of hiatal hernia.    Presenting Admission History:       Laly Mckeon is a 69 y.o. female with pmh as mentioned above presents with complaints of abdominal pain that started yesterday.  Pain is mainly in epigastric area.  Denies any nausea or vomiting.  Has intermittent diarrhea due to her diagnosis of microscopic colitis  Denies blood in stool, melanotic stool, urinary symptoms  Denies fever, chills, cough, SOB, chest pain    Assessment/Plan:      Current Principal Problem:  Abdominal pain    Abdominal pain-in epigastric area.  Has history of large hiatal hernia.  -Denies alcohol or tobacco use, NSAID use  -Takes Protonix twice daily, last use yesterday  - has large hiatal hernia, but I don't believe this to be the etiology of her current presentation as she has having acute on chronic diarrhea as well  - fluids, supportive care  - c diff negative  - f/u GI PCR    Acute onset glossitis  Hx of microscopic colitis  - glossy smooth appearance to parts of her tongue  - right side of tongue seems to have a circular lesion, however this could also just be small patch of atrophic glossitis as well  - known to occur in patients with IBD including microscopic colitis  - patient states she was had issues with constipation after being prescribed steroids for her colitis, however she only took the steroids for 3 days, so I doubt her constipation was d/t steroids  - start B complex supplementation (pharmacy verified that it contains all of the b vitamins)  - GI consulted     Chronic medical conditions as mentioned above     Morbid obesity with BMI of 46       Physical Exam Performed:      General: NAD  Eyes: EOMI  ENT: neck supple  Cardiovascular:

## 2025-01-20 ENCOUNTER — ANESTHESIA (OUTPATIENT)
Dept: ENDOSCOPY | Age: 70
End: 2025-01-20
Payer: MEDICARE

## 2025-01-20 ENCOUNTER — ANESTHESIA EVENT (OUTPATIENT)
Dept: ENDOSCOPY | Age: 70
End: 2025-01-20
Payer: MEDICARE

## 2025-01-20 ENCOUNTER — APPOINTMENT (OUTPATIENT)
Dept: ENDOSCOPY | Age: 70
DRG: 392 | End: 2025-01-20
Attending: INTERNAL MEDICINE
Payer: MEDICARE

## 2025-01-20 PROCEDURE — 6370000000 HC RX 637 (ALT 250 FOR IP): Performed by: INTERNAL MEDICINE

## 2025-01-20 PROCEDURE — 6360000002 HC RX W HCPCS: Performed by: NURSE ANESTHETIST, CERTIFIED REGISTERED

## 2025-01-20 PROCEDURE — 88305 TISSUE EXAM BY PATHOLOGIST: CPT

## 2025-01-20 PROCEDURE — 2709999900 HC NON-CHARGEABLE SUPPLY: Performed by: INTERNAL MEDICINE

## 2025-01-20 PROCEDURE — 6360000002 HC RX W HCPCS: Performed by: SURGERY

## 2025-01-20 PROCEDURE — 2580000003 HC RX 258: Performed by: INTERNAL MEDICINE

## 2025-01-20 PROCEDURE — 2500000003 HC RX 250 WO HCPCS: Performed by: INTERNAL MEDICINE

## 2025-01-20 PROCEDURE — 7100000010 HC PHASE II RECOVERY - FIRST 15 MIN: Performed by: INTERNAL MEDICINE

## 2025-01-20 PROCEDURE — 6360000002 HC RX W HCPCS: Performed by: INTERNAL MEDICINE

## 2025-01-20 PROCEDURE — 3700000000 HC ANESTHESIA ATTENDED CARE: Performed by: INTERNAL MEDICINE

## 2025-01-20 PROCEDURE — 7100000011 HC PHASE II RECOVERY - ADDTL 15 MIN: Performed by: INTERNAL MEDICINE

## 2025-01-20 PROCEDURE — 2580000003 HC RX 258: Performed by: SURGERY

## 2025-01-20 PROCEDURE — 94660 CPAP INITIATION&MGMT: CPT

## 2025-01-20 PROCEDURE — 3609012400 HC EGD TRANSORAL BIOPSY SINGLE/MULTIPLE: Performed by: INTERNAL MEDICINE

## 2025-01-20 PROCEDURE — 6370000000 HC RX 637 (ALT 250 FOR IP): Performed by: SURGERY

## 2025-01-20 PROCEDURE — 1200000000 HC SEMI PRIVATE

## 2025-01-20 PROCEDURE — 0DB68ZX EXCISION OF STOMACH, VIA NATURAL OR ARTIFICIAL OPENING ENDOSCOPIC, DIAGNOSTIC: ICD-10-PCS | Performed by: INTERNAL MEDICINE

## 2025-01-20 PROCEDURE — 2580000003 HC RX 258: Performed by: NURSE ANESTHETIST, CERTIFIED REGISTERED

## 2025-01-20 RX ORDER — LANOLIN ALCOHOL/MO/W.PET/CERES
50 CREAM (GRAM) TOPICAL DAILY
Status: CANCELLED | OUTPATIENT
Start: 2025-01-20

## 2025-01-20 RX ORDER — PROPOFOL 10 MG/ML
INJECTION, EMULSION INTRAVENOUS
Status: DISCONTINUED | OUTPATIENT
Start: 2025-01-20 | End: 2025-01-20 | Stop reason: SDUPTHER

## 2025-01-20 RX ORDER — GLYCOPYRROLATE 0.2 MG/ML
INJECTION INTRAMUSCULAR; INTRAVENOUS
Status: DISCONTINUED | OUTPATIENT
Start: 2025-01-20 | End: 2025-01-20 | Stop reason: SDUPTHER

## 2025-01-20 RX ORDER — SODIUM CHLORIDE 9 MG/ML
INJECTION, SOLUTION INTRAVENOUS
Status: DISCONTINUED | OUTPATIENT
Start: 2025-01-20 | End: 2025-01-20 | Stop reason: SDUPTHER

## 2025-01-20 RX ORDER — SODIUM CHLORIDE 9 MG/ML
INJECTION, SOLUTION INTRAVENOUS ONCE
Status: DISCONTINUED | OUTPATIENT
Start: 2025-01-20 | End: 2025-01-20 | Stop reason: HOSPADM

## 2025-01-20 RX ORDER — NIACIN 500 MG/1
500 TABLET, EXTENDED RELEASE ORAL NIGHTLY
Status: CANCELLED | OUTPATIENT
Start: 2025-01-20

## 2025-01-20 RX ORDER — LIDOCAINE HYDROCHLORIDE 20 MG/ML
INJECTION, SOLUTION INTRAVENOUS
Status: DISCONTINUED | OUTPATIENT
Start: 2025-01-20 | End: 2025-01-20 | Stop reason: SDUPTHER

## 2025-01-20 RX ORDER — CALCIUM CARBONATE 500 MG/1
1000 TABLET, CHEWABLE ORAL 3 TIMES DAILY PRN
Status: DISCONTINUED | OUTPATIENT
Start: 2025-01-20 | End: 2025-01-21 | Stop reason: HOSPADM

## 2025-01-20 RX ORDER — PANTOPRAZOLE SODIUM 40 MG/1
40 TABLET, DELAYED RELEASE ORAL
Status: DISCONTINUED | OUTPATIENT
Start: 2025-01-20 | End: 2025-01-21 | Stop reason: HOSPADM

## 2025-01-20 RX ADMIN — ACETAMINOPHEN 650 MG: 650 SOLUTION ORAL at 22:37

## 2025-01-20 RX ADMIN — PANTOPRAZOLE SODIUM 40 MG: 40 TABLET, DELAYED RELEASE ORAL at 17:04

## 2025-01-20 RX ADMIN — PROPOFOL 30 MG: 10 INJECTION, EMULSION INTRAVENOUS at 14:37

## 2025-01-20 RX ADMIN — PROMETHAZINE HYDROCHLORIDE 6.25 MG: 25 INJECTION INTRAMUSCULAR; INTRAVENOUS at 17:29

## 2025-01-20 RX ADMIN — VERAPAMIL HYDROCHLORIDE 180 MG: 180 TABLET, FILM COATED, EXTENDED RELEASE ORAL at 20:57

## 2025-01-20 RX ADMIN — GLYCOPYRROLATE 0.2 MG: 0.2 INJECTION INTRAMUSCULAR; INTRAVENOUS at 14:31

## 2025-01-20 RX ADMIN — LIDOCAINE HYDROCHLORIDE 80 MG: 20 INJECTION, SOLUTION INTRAVENOUS at 14:28

## 2025-01-20 RX ADMIN — SODIUM CHLORIDE, PRESERVATIVE FREE 40 MG: 5 INJECTION INTRAVENOUS at 04:53

## 2025-01-20 RX ADMIN — PROPOFOL 80 MG: 10 INJECTION, EMULSION INTRAVENOUS at 14:28

## 2025-01-20 RX ADMIN — ENOXAPARIN SODIUM 30 MG: 100 INJECTION SUBCUTANEOUS at 20:59

## 2025-01-20 RX ADMIN — ENOXAPARIN SODIUM 30 MG: 100 INJECTION SUBCUTANEOUS at 09:54

## 2025-01-20 RX ADMIN — ONDANSETRON 4 MG: 2 INJECTION INTRAMUSCULAR; INTRAVENOUS at 04:00

## 2025-01-20 RX ADMIN — LOSARTAN POTASSIUM 100 MG: 50 TABLET, FILM COATED ORAL at 20:18

## 2025-01-20 RX ADMIN — SODIUM CHLORIDE: 9 INJECTION, SOLUTION INTRAVENOUS at 14:21

## 2025-01-20 RX ADMIN — METOPROLOL SUCCINATE 25 MG: 25 TABLET, FILM COATED, EXTENDED RELEASE ORAL at 09:54

## 2025-01-20 RX ADMIN — ANTACID TABLETS 1000 MG: 500 TABLET, CHEWABLE ORAL at 19:38

## 2025-01-20 RX ADMIN — PROPOFOL 20 MG: 10 INJECTION, EMULSION INTRAVENOUS at 14:35

## 2025-01-20 RX ADMIN — PROPOFOL 20 MG: 10 INJECTION, EMULSION INTRAVENOUS at 14:32

## 2025-01-20 RX ADMIN — PROPOFOL 20 MG: 10 INJECTION, EMULSION INTRAVENOUS at 14:33

## 2025-01-20 RX ADMIN — PROMETHAZINE HYDROCHLORIDE 6.25 MG: 25 INJECTION INTRAMUSCULAR; INTRAVENOUS at 00:27

## 2025-01-20 RX ADMIN — PROPOFOL 20 MG: 10 INJECTION, EMULSION INTRAVENOUS at 14:29

## 2025-01-20 RX ADMIN — PROPOFOL 10 MG: 10 INJECTION, EMULSION INTRAVENOUS at 14:36

## 2025-01-20 RX ADMIN — SODIUM CHLORIDE, PRESERVATIVE FREE 10 ML: 5 INJECTION INTRAVENOUS at 20:18

## 2025-01-20 RX ADMIN — TRAZODONE HYDROCHLORIDE 50 MG: 50 TABLET ORAL at 20:18

## 2025-01-20 ASSESSMENT — PAIN SCALES - GENERAL
PAINLEVEL_OUTOF10: 0
PAINLEVEL_OUTOF10: 10
PAINLEVEL_OUTOF10: 0
PAINLEVEL_OUTOF10: 0

## 2025-01-20 ASSESSMENT — PAIN DESCRIPTION - FREQUENCY: FREQUENCY: INTERMITTENT

## 2025-01-20 ASSESSMENT — PAIN DESCRIPTION - DESCRIPTORS: DESCRIPTORS: ACHING

## 2025-01-20 ASSESSMENT — PAIN DESCRIPTION - ORIENTATION: ORIENTATION: ANTERIOR

## 2025-01-20 ASSESSMENT — PAIN - FUNCTIONAL ASSESSMENT: PAIN_FUNCTIONAL_ASSESSMENT: PREVENTS OR INTERFERES SOME ACTIVE ACTIVITIES AND ADLS

## 2025-01-20 ASSESSMENT — PAIN DESCRIPTION - LOCATION: LOCATION: HEAD

## 2025-01-20 ASSESSMENT — PAIN DESCRIPTION - ONSET: ONSET: ON-GOING

## 2025-01-20 ASSESSMENT — PAIN DESCRIPTION - PAIN TYPE: TYPE: ACUTE PAIN

## 2025-01-20 NOTE — PROCEDURES
PROCEDURE NOTE  Date: 2025   Name: Laly Mckeon  YOB: 1955    Procedures            Endoscopy Note    Patient: Laly Mckeon  : 1955  CSN: [id number]    Procedure: Esophagogastroduodenoscopy with biopsy    Date:  2025     Surgeon:  Paula Forbes MD     Referring Physician:  Tammy Sams MD    Preoperative Diagnosis:  Epigastric pain [R10.13]    Postoperative Diagnosis: The GE junction was located at 35 cm, no erosive esophagitis or Quinones's esophagus noted.  A 4 to 5 cm simple sliding hiatal hernia noted with the diaphragmatic pinch at 39 cm.  No Jonas erosions or ulceration noted.  Mild antral gastritis otherwise normal stomach, random gastric biopsy obtained to rule out H. pylori.  Normal duodenum    Anesthesia:  MAC    EBL: minimal to none.    Indications: This is a 69 y.o. year old female who presents today with  epigastric pain and nausea in a patient with history of GERD and hiatal hernia .  CT chest, abdomen and pelvis normal.    Description of Procedure:  Informed consent was obtained from the patient after explanation of indications, benefits and possible risks and complications of the procedure.  The patient was then taken to the endoscopy suite, placed in the left lateral decubitus position and the above IV sedation was administrered.    The Olympus video endoscope was passed through the hypopharynx into the esophagus. The scope was advanced all the way to the second portion of the duodenum. The scope was slowly withdrawn and mucosa was carefully evaluated including a retroflex view of the proximal stomach.  Findings and interventions are described below.  The patient was decompressed and the scope was then withdrawn.    Gastric or Duodenal ulcer present: No    The patient tolerated the procedure well and was taken to the post anesthesia care unit in good condition.  There were no immediate complications.      Impression:  -  1.  The GE junction was

## 2025-01-20 NOTE — CONSULTS
Consultation Note    Patient Name: Laly Mckeon  : 1955  Age: 69 y.o.     Admitting Physician: Bill Bashir MD   Date of Admission: 2025 11:09 AM   Primary Care Physician: Tammy Sams MD        Laly Mckeon is being seen at the request of Bill Bashir MD for epigastric pain, n/V.    History of Present Illness:  69 year old with history of microscopic colitis, GERD, HTN, who presents with epigastric pain, N/V.    CT A/P 25: no acute abnormalities.    Patient with sudden onset of epigastric pain last Thursday. Did have nausea without emesis. Denies any new medications. Denies significant NSAID use. No recent changes in medications. She is on pantoprazole 40 mg BID and states that she continues to have breakthrough GERD symptoms.    This weekend noted stiffness in tongue when eating eggs. This has improved since admission.    GI History:  Colonoscopy 2024: Normal terminal ileum. Normal colon. Biopsies were taken to evaluate for microscopic colitis.  Path: Colonic mucosa with isolated increase in intraepithelial lymphocytes  10/2018 colon (AG) nl to TI         2021 SBO tx'd with NGT. had midline wound dehiscence with repair with vasc surg         2023 EGD (AG): medium HH, reflux esoph      Past Medical History:  Past Medical History:   Diagnosis Date    Allergic rhinitis     Anxiety     Ascending aorta dilatation (HCC)     Atrial tachycardia (HCC)     dr glynn ([facility])    Carpal tunnel syndrome     Cervicalgia     Chronic back pain     low    Depression     DVT (deep venous thrombosis) ([facility])     after surgery, bilateral    Fatty liver     GERD (gastroesophageal reflux disease)     has schatzki ring    Headache(784.0)     hemiplegic migraines, improved    Hernia hiatal     Histoplasmosis     Hot flashes     takes wellbutrin    Hyperlipidemia     Hypertension     MGUS (monoclonal gammopathy of unknown significance)     Microscopic colitis 2024    Non-melanoma skin

## 2025-01-20 NOTE — ANESTHESIA PRE PROCEDURE
01:08 PM    CO2 26 01/18/2025 01:08 PM    BUN 9 01/18/2025 01:08 PM    CREATININE 0.5 01/18/2025 01:08 PM    GFRAA >60 09/26/2022 02:30 PM    GFRAA >60 04/20/2013 10:36 AM    AGRATIO 1.6 01/16/2025 12:23 PM    LABGLOM >90 01/18/2025 01:08 PM    LABGLOM >60.0 06/04/2024 12:00 AM    GLUCOSE 98 01/18/2025 01:08 PM    GLUCOSE 85 07/23/2011 09:02 AM    CALCIUM 8.9 01/18/2025 01:08 PM    BILITOT 0.4 01/16/2025 12:23 PM    ALKPHOS 97 01/16/2025 12:23 PM    AST 20 01/16/2025 12:23 PM    ALT 19 01/16/2025 12:23 PM       POC Tests: No results for input(s): \"POCGLU\", \"POCNA\", \"POCK\", \"POCCL\", \"POCBUN\", \"POCHEMO\", \"POCHCT\" in the last 72 hours.    Coags:   Lab Results   Component Value Date/Time    PROTIME 12.5 03/13/2023 08:38 AM    INR 0.94 03/13/2023 08:38 AM    APTT 30.4 07/16/2021 11:43 AM       HCG (If Applicable): No results found for: \"PREGTESTUR\", \"PREGSERUM\", \"HCG\", \"HCGQUANT\"     ABGs: No results found for: \"PHART\", \"PO2ART\", \"HRS9YPJ\", \"QBO5ADR\", \"BEART\", \"C7GSTQWM\"     Type & Screen (If Applicable):  Lab Results   Component Value Date    ABORH A POS 07/29/2021    LABANTI NEG 07/29/2021       Drug/Infectious Status (If Applicable):  No results found for: \"HIV\", \"HEPCAB\"    COVID-19 Screening (If Applicable):   Lab Results   Component Value Date/Time    COVID19 NOT DETECTED 01/16/2025 12:15 PM           Anesthesia Evaluation     no history of anesthetic complications:   Airway: Mallampati: I  TM distance: >3 FB   Neck ROM: full  Mouth opening: > = 3 FB   Dental:    (+) upper dentures      Pulmonary: breath sounds clear to auscultation  (+)     sleep apnea:           (-) COPD, asthma, rhonchi, wheezes and no decreased breath sounds                           Cardiovascular:  Exercise tolerance: good (>4 METS)  (+) hypertension:    (-) past MI, CABG/stent and murmur      Rhythm: regular  Rate: normal                    Neuro/Psych:      (-) seizures, TIA and CVA           GI/Hepatic/Renal:   (+) GERD:     (-) hepatitis,

## 2025-01-20 NOTE — PROGRESS NOTES
Hospital Medicine Progress Note      Date of Admission: 1/16/2025  Hospital Day: 5    Chief Admission Complaint:  dry heaving, diarrhea, epigastric pain     Subjective:  EGD today, possible discharge afterward.     Presenting Admission History:       Laly Mckeon is a 69 y.o. female with pmh as mentioned above presents with complaints of abdominal pain that started yesterday.  Pain is mainly in epigastric area.  Denies any nausea or vomiting.  Has intermittent diarrhea due to her diagnosis of microscopic colitis  Denies blood in stool, melanotic stool, urinary symptoms  Denies fever, chills, cough, SOB, chest pain    Assessment/Plan:      Current Principal Problem:  Abdominal pain    Abdominal pain-in epigastric area.  Has history of large hiatal hernia.  -Denies alcohol or tobacco use, NSAID use  -Takes Protonix twice daily, last use yesterday  - has large hiatal hernia, but I don't believe this to be the etiology of her current presentation as she has having acute on chronic diarrhea as well  - fluids, supportive care  - c diff negative  - f/u GI PCR    Acute onset glossitis  Hx of microscopic colitis  - glossy smooth appearance to parts of her tongue  - right side of tongue seems to have a circular lesion, however this could also just be small patch of atrophic glossitis as well  - known to occur in patients with IBD including microscopic colitis  - patient states she was had issues with constipation after being prescribed steroids for her colitis, however she only took the steroids for 3 days, so I doubt her constipation was d/t steroids  - start B complex supplementation (pharmacy verified that it contains all of the b vitamins)  - GI consulted     Chronic medical conditions as mentioned above     Morbid obesity with BMI of 46       Physical Exam Performed:      General: NAD  Eyes: EOMI  ENT: neck supple  Cardiovascular: Regular rate.  Respiratory: Clear to auscultation  Gastrointestinal: Soft, non  factors  [] Other -  [] Change in code status:    [] Decision to de-escalate care this DOS due to change in treatment goals:    [] Decision to escalate care to:   [] Major surgery/procedure with associated risk factors (any 1):     [] Elective procedure with patient risk factors:    []  Emergent procedure:  [] IV/IM controlled substances (any 1):   [] One-time Order including Drug Name/Route, Reason Ordered:    [] Scheduled Order including Drug Name/Route, Reason Ordered or Continued:    [] PRN Order including Drug Name/Route, Reason Ordered or Continued:   ----------------------------------------------------------------------  C. Data (any 2)  [] Discussion of Management with External Professional (any 1):   [] Discussed current management and discharge planning options with Case Management.   [] Discussed management of the case with:    [] Study interpreted by me (any 1):   [] Telemetry personally reviewed and interpreted:     [] Imaging personally reviewed and interpreted, includes:    [] Data Review (3+ points):  [] Collateral history obtained from:    [] Consultant Note reviewed with note date, specialty, and summary (1 point each):  [] Studies Reviewed (1 point each):    [] Lab Tests Reviewed (1 point each):   [] Appropriate follow-up labs were ordered:    Medications:  Personally reviewed in detail in conjunction w/ labs as documented for evidence of drug toxicity.     Infusion Medications    sodium chloride 100 mL/hr at 01/17/25 1410     Scheduled Medications    lidocaine  1 patch TransDERmal Daily    b complex vitamins  1 capsule Oral Daily    buPROPion  150 mg Oral QAM    losartan  100 mg Oral Daily    metoprolol succinate  25 mg Oral Daily    traZODone  50 mg Oral Nightly    verapamil  180 mg Oral Nightly    sodium chloride flush  5-40 mL IntraVENous 2 times per day    enoxaparin  30 mg SubCUTAneous BID    pantoprazole (PROTONIX) 40 mg in sodium chloride (PF) 0.9 % 10 mL injection  40 mg IntraVENous Q12H

## 2025-01-20 NOTE — ANESTHESIA POSTPROCEDURE EVALUATION
Department of Anesthesiology  Postprocedure Note    Patient: Laly Mckeon  MRN: [id number]  YOB: 1955  Date of evaluation: 1/20/2025    Procedure Summary       Date: 01/20/25 Room / Location: [place] [address] [facility]    Anesthesia Start: 1421 Anesthesia Stop: 1442    Procedure: ESOPHAGOGASTRODUODENOSCOPY BIOPSY Diagnosis:       Epigastric pain      (Epigastric pain [R10.13])    Surgeons: Paula Forbes MD Responsible Provider: Nikita Shankar DO    Anesthesia Type: MAC ASA Status: 3            Anesthesia Type: No value filed.    Halle Phase I: Halle Score: 10    Halle Phase II: Halle Score: 10    Vitals:    01/20/25 1530   BP: (!) 159/82   Pulse: 60   Resp: 17   Temp: 97.8 °F (36.6 °C)   SpO2: 94%       Anesthesia Post Evaluation    Patient location during evaluation: bedside  Patient participation: complete - patient participated  Level of consciousness: awake and awake and alert  Pain score: 0  Airway patency: patent  Nausea & Vomiting: no nausea and no vomiting  Cardiovascular status: hemodynamically stable  Respiratory status: acceptable  Hydration status: euvolemic  Pain management: adequate and satisfactory to patient        No notable events documented.

## 2025-01-21 ENCOUNTER — APPOINTMENT (OUTPATIENT)
Dept: NUCLEAR MEDICINE | Age: 70
DRG: 392 | End: 2025-01-21
Payer: MEDICARE

## 2025-01-21 VITALS
DIASTOLIC BLOOD PRESSURE: 72 MMHG | HEIGHT: 63 IN | HEART RATE: 47 BPM | OXYGEN SATURATION: 93 % | BODY MASS INDEX: 46.05 KG/M2 | WEIGHT: 259.9 LBS | SYSTOLIC BLOOD PRESSURE: 135 MMHG | RESPIRATION RATE: 18 BRPM | TEMPERATURE: 98 F

## 2025-01-21 PROCEDURE — 94660 CPAP INITIATION&MGMT: CPT

## 2025-01-21 PROCEDURE — 2500000003 HC RX 250 WO HCPCS: Performed by: INTERNAL MEDICINE

## 2025-01-21 PROCEDURE — 6360000002 HC RX W HCPCS: Performed by: INTERNAL MEDICINE

## 2025-01-21 PROCEDURE — 6370000000 HC RX 637 (ALT 250 FOR IP): Performed by: INTERNAL MEDICINE

## 2025-01-21 PROCEDURE — 6370000000 HC RX 637 (ALT 250 FOR IP): Performed by: SURGERY

## 2025-01-21 RX ORDER — FAMOTIDINE 20 MG/1
20 TABLET, FILM COATED ORAL NIGHTLY
Qty: 30 TABLET | Refills: 0 | Status: SHIPPED | OUTPATIENT
Start: 2025-01-21 | End: 2025-02-20

## 2025-01-21 RX ORDER — ONDANSETRON 4 MG/1
4 TABLET, ORALLY DISINTEGRATING ORAL EVERY 8 HOURS PRN
Qty: 21 TABLET | Refills: 0 | Status: SHIPPED | OUTPATIENT
Start: 2025-01-21

## 2025-01-21 RX ADMIN — Medication 1 CAPSULE: at 09:43

## 2025-01-21 RX ADMIN — METOPROLOL SUCCINATE 25 MG: 25 TABLET, FILM COATED, EXTENDED RELEASE ORAL at 09:43

## 2025-01-21 RX ADMIN — ENOXAPARIN SODIUM 30 MG: 100 INJECTION SUBCUTANEOUS at 09:44

## 2025-01-21 RX ADMIN — SODIUM CHLORIDE, PRESERVATIVE FREE 10 ML: 5 INJECTION INTRAVENOUS at 09:45

## 2025-01-21 RX ADMIN — PANTOPRAZOLE SODIUM 40 MG: 40 TABLET, DELAYED RELEASE ORAL at 06:10

## 2025-01-21 RX ADMIN — BUPROPION HYDROCHLORIDE 150 MG: 150 TABLET, EXTENDED RELEASE ORAL at 09:43

## 2025-01-21 RX ADMIN — ACETAMINOPHEN 650 MG: 650 SOLUTION ORAL at 06:14

## 2025-01-21 ASSESSMENT — PAIN DESCRIPTION - DESCRIPTORS: DESCRIPTORS: ACHING

## 2025-01-21 ASSESSMENT — PAIN DESCRIPTION - ONSET: ONSET: ON-GOING

## 2025-01-21 ASSESSMENT — PAIN DESCRIPTION - FREQUENCY: FREQUENCY: INTERMITTENT

## 2025-01-21 ASSESSMENT — PAIN DESCRIPTION - ORIENTATION: ORIENTATION: ANTERIOR

## 2025-01-21 ASSESSMENT — PAIN SCALES - GENERAL
PAINLEVEL_OUTOF10: 0
PAINLEVEL_OUTOF10: 6
PAINLEVEL_OUTOF10: 0

## 2025-01-21 ASSESSMENT — PAIN DESCRIPTION - LOCATION: LOCATION: HEAD

## 2025-01-21 ASSESSMENT — PAIN - FUNCTIONAL ASSESSMENT: PAIN_FUNCTIONAL_ASSESSMENT: PREVENTS OR INTERFERES SOME ACTIVE ACTIVITIES AND ADLS

## 2025-01-21 ASSESSMENT — PAIN DESCRIPTION - PAIN TYPE: TYPE: ACUTE PAIN

## 2025-01-21 NOTE — DISCHARGE INSTR - COC
Continuity of Care Form    Patient Name: Laly Mckeon   :  1955  MRN:  [id number]    Admit date:  2025  Discharge date:  ***    Code Status Order: Full Code   Advance Directives:   Advance Care Flowsheet Documentation             Admitting Physician:  Bill Bashir MD  PCP: Tammy Sams MD    Discharging Nurse: ***  Discharging Hospital Unit/Room#: 5317/5317-01  Discharging Unit Phone Number: ***    Emergency Contact:   Extended Emergency Contact Information  Primary Emergency Contact: Ileana Mckeon  Address: [address] [place] [address]  Home Phone: [phone]  Relation: Other  Secondary Emergency Contact: LorenaVa  Address: [address] [place] [address]  Home Phone: [phone]  Relation: Brother/Sister    Past Surgical History:  Past Surgical History:   Procedure Laterality Date    ABDOMEN SURGERY N/A 2021    REPAIR OF WOUND DEHISCENCE performed by Mario Richardson MD at [facility] OR    BREAST BIOPSY Right 2013    benign    BREAST SURGERY Right     Biopsy w/clip    BUNIONECTOMY      right     CARPAL TUNNEL RELEASE Left 10/11/2022    LEFT CARPAL TUNNEL RELEASE performed by Alfonso Segovia MD at [facility] OR    COLONOSCOPY  10/2018    fu 10 yrs    CYSTOCELE REPAIR      x 2    ESOPHAGOGASTRODUODENOSCOPY      ?    ESOPHAGOGASTRODUODENOSCOPY  2023    KNEE ARTHROSCOPY      KNEE CARTILAGE SURGERY      LOBECTOMY      partial right lung lobectomy    LUMBAR FUSION N/A 2021    L4-S1 ANTERIOR LUMBAR INTERBODY FUSION WITH PEDICLE SCREW FIXATION performed by Diogo Langley MD at [facility] OR    LUNG BIOPSY      MOHS SURGERY      PARATHYROID GLAND SURGERY      PARTIAL HYSTERECTOMY (CERVIX NOT REMOVED)      ovaries retained-prolapse    SINUS SURGERY      TOTAL KNEE ARTHROPLASTY Bilateral 2015    UPPER GASTROINTESTINAL ENDOSCOPY N/A 2025    ESOPHAGOGASTRODUODENOSCOPY BIOPSY performed by

## 2025-01-21 NOTE — DISCHARGE INSTRUCTIONS
Please continue taking your pantoprazole twice per day as previously ordered. The GI doctor has also recommended taking the famotidine (Pepcid) every night.    Please follow up with the GI doctors in their office in 4 weeks. Please have the gastric emptying study done. You will need to call central scheduling to have this scheduled. Case management will provide you with this number on discharge.    I have also provided you with a prescription for Zofran for nausea, as needed.    Please follow up with your primary care physician within 1 week following discharge.     Please return to the hospital with any new or worsening symptoms.

## 2025-01-21 NOTE — PROGRESS NOTES
Progress Note    Patient Laly Mckeon  MRN: [id number]  YOB: 1955 Age: 69 y.o. Sex: female  Room: [address] [place] [address]       Admitting Physician: Bill Bashir MD   Date of Admission: 1/16/2025 11:09 AM   Primary Care Physician: Tammy Sams MD     Subjective:  Laly Mckeon was seen and examined. We are following for epigastric pain.  -- had terrible reflux yesterday so was not discharged. Gastric empyting can not be completed due to protoix use.  No BM since Sunday. Feels bloated.  Tolerated or solid breakfast with eggs and toast this morning    ROS:  Constitutional: Denies fever, no change in appetite  Respiratory: Denies cough or shortness of breath  Cardiovascular: Denies chest pain or edema    Objective:  Vital Signs:   Vitals:    01/21/25 0830   BP: (!) 140/58   Pulse: 69   Resp:    Temp: 98.2 °F (36.8 °C)   SpO2: 97%         Physical Exam:  Constitutional: Alert and oriented x 4. No acute distress.   HEENT: Sclera anicteric, mucosal membranes moist  Cardiovascular: Regular rate and rhythm.  No murmurs.  Respiratory: Respirations nonlabored, no crepitus  GI: Abdomen nondistended, soft, and nontender.  Normal active bowel sounds.  No masses palpable.   Rectal: Deferred  Musculoskeletal:  No pitting edema of the lower legs.  Neurological: Gross memory appears intact.       Intake/Output:    Intake/Output Summary (Last 24 hours) at 1/21/2025 0856  Last data filed at 1/20/2025 2320  Gross per 24 hour   Intake 320 ml   Output --   Net 320 ml        Current Medications:  Current Facility-Administered Medications   Medication Dose Route Frequency Provider Last Rate Last Admin    pantoprazole (PROTONIX) tablet 40 mg  40 mg Oral BID AC Bill Bashir MD   40 mg at 01/21/25 0610    calcium carbonate (TUMS) chewable tablet 1,000 mg  1,000 mg Oral TID PRN Bill Bashir MD   1,000 mg at 01/20/25 1938    loperamide (IMODIUM) capsule 4 mg  4 mg Oral 4x Daily PRN Bill Bashir MD   4 mg at 01/18/25

## 2025-01-21 NOTE — PROGRESS NOTES
Pt shared that she was ready to return home. Pt misses her dogs, but her children are caring for them at this time.  gave Holy Communion to pt. Pt expressed gratitude.

## 2025-01-21 NOTE — PROGRESS NOTES
Patient alert and oriented x4. Patient denies  nausea. Patient complained headache and managed with PRN Tylenol and cold pack. Assessment done.see flowsheet. Patient in bed lowest position call light and bedside table within reach. All needs are met at this time. Patient aware to call if any help needed.Plan of care ongoing BP (!) 145/73   Pulse 55   Temp 98 °F (36.7 °C) (Oral)   Resp 18   Ht 1.6 m (5' 3\")   Wt 117.9 kg (259 lb 14.4 oz)   LMP 01/01/2000   SpO2 92%   BMI 46.04 kg/m²

## 2025-01-21 NOTE — CARE COORDINATION
Case Management Assessment            Discharge Note                    Date / Time of Note: 1/21/2025 11:35 AM                  Discharge Note Completed by: Karissa Everett RN    Patient Name: Laly Mckeon   YOB: 1955  Diagnosis: Abdominal pain [R10.9]  Diarrhea [R19.7]   Date / Time: 1/16/2025 11:09 AM    Current PCP: Tammy Sams MD  Clinic patient: No    Hospitalization in the last 30 days: No       Advance Directives:  Code Status: Full Code  [place] DNR form completed and on chart: No    Financial:  Payor: HU[employer]NA MEDICARE / Plan: HU[employer]NA GOLD PLUS HMO / Product Type: *No Product type* /      Pharmacy:    [id number] - [place], [place] - [address]  - P [phone] - F [phone]  [address] [facility] [address]  Phone: [phone] Fax: [phone]      Assistance purchasing medications?: Potential Assistance Purchasing Medications: No  Assistance provided by Case Management: None at this time    Does patient want to participate in local refill/ meds to beds program?: Yes    Meds To Beds General Rules:  1. Can ONLY be done Monday- Friday between 8:30am-5pm  2. Prescription(s) must be in pharmacy by 3pm to be filled same day  3.Copy of patient's insurance/ prescription drug card and patient face sheet must be sent along with the prescription(s)  4. Cost of Rx cannot be added to hospital bill. If financial assistance is needed, please contact unit  or ;  or  CANNOT provide pharmacy voucher for patients co-pays  5. Patients can then  the prescription on their way out of the hospital at discharge, or pharmacy can deliver to the bedside if staff is available. (payment due at time of pick-up or delivery - cash, check, or card accepted)     Able to afford home medications/ co-pay costs: Yes    ADLS:  Current PT AM-PAC Score:   /24  Current OT AM-PAC Score:   /24    DISCHARGE Disposition:

## 2025-01-22 ENCOUNTER — CARE COORDINATION (OUTPATIENT)
Dept: CASE MANAGEMENT | Age: 70
End: 2025-01-22

## 2025-01-22 DIAGNOSIS — R10.9 ABDOMINAL PAIN, UNSPECIFIED ABDOMINAL LOCATION: Primary | ICD-10-CM

## 2025-01-22 PROCEDURE — 1111F DSCHRG MED/CURRENT MED MERGE: CPT | Performed by: INTERNAL MEDICINE

## 2025-01-22 NOTE — CARE COORDINATION
Care Transitions Note    Initial Call - Call within 2 business days of discharge: Yes    Patient Current Location:  [place]    Care Transition Nurse contacted the patient by telephone to perform post hospital discharge assessment, verified name and  as identifiers. Provided introduction to self, and explanation of the Care Transition Nurse role.     Patient: Laly Mckeon    Patient : 1955   MRN: [id number]    Reason for Admission: Abd pain diarrhea heartburn  Discharge Date: 25  RURS: Readmission Risk Score: 8.7      Last Discharge Facility       Date Complaint Diagnosis Description Type Department Provider    25 Abdominal Pain Epigastric pain ... ED to Hosp-Admission (Discharged) (ADMITTED) [facility] George Rubi DO; Saul Trent...            Was this an external facility discharge? No    Additional needs identified to be addressed with provider   No needs identified             Method of communication with provider: none.    Patients top risk factors for readmission: medical condition-.    Interventions to address risk factors:   Education: .  Review of patient management of conditions/medications: .    Care Summary Note: CTN spoke with patient who reported she is feeling a little better. Patient reported her heartburn has improved and she hasn't had any diarrhea since discharge. CTN reviewed all medications with patient who reported she is taking all as prescribed. Patient aware of the need to schedule op gastric emptying study and CTN will send [employer] message with central scheduling number. Patient to follow up with GI doctor in four weeks. CTN discussed s/sx to monitor for and when to report to provider.     Care Transition Nurse reviewed discharge instructions, medical action plan, and red flags with patient. The patient was given an opportunity to ask questions; all questions answered at this time.. The patient verbalized understanding.   Were discharge instructions available to

## 2025-01-22 NOTE — DISCHARGE SUMMARY
V2.0  Discharge Summary    Name:  Laly Mckeon /Age/Sex: 1955 (69 y.o. female)   Admit Date: 2025  Discharge Date: 25    MRN & CSN:  [id number] & [id number] Encounter Date and Time 25 7:31 PM EST    Attending:  No att. providers found Discharging Provider: George Ding DO       Hospital Course:     Brief HPI: Laly Mckeon is a 69 y.o. female who presented with complaints of abdominal pain that started yesterday.  Pain is mainly in epigastric area.  Denies any nausea or vomiting.  Has intermittent diarrhea due to her diagnosis of microscopic colitis  Denies blood in stool, melanotic stool, urinary symptoms  Denies fever, chills, cough, SOB, chest pain    Brief Problem Based Course:     Epigastric abdominal pain  - Patient presented with worsening epigastric abdominal pain, unrelieved by her medications at home. Also reported some nausea, no vomiting.  - Work up included CTPE which showed no pulmonary embolism, CT abd/pelvis w/ IV contrast  which showed no acute intra-abdominopelvic abnormality. GI consulted and patient taken for EGD which aside from her known hiatal hernia did not show any acute cause of her epigastric abdominal pain. Biopsies taken to rule out H pylori. GI recommended a gastric emptying study, which unfortunately could not be done date of discharge as she had received her oral Protonix this morning. Patient would like to go home and have this study done as an outpatient. Patient is being discharged to home in stable condition with instructions to follow up with the GI doctor and to also call central scheduling to have the gastric emptying study setup as an outpatient. Pain has otherwise improved and she is tolerating a regular diet.     The patient expressed appropriate understanding of, and agreement with the discharge recommendations, medications, and plan.     Consults this admission:  IP CONSULT TO GI    Discharge Diagnosis:   Abdominal pain      Discharge

## 2025-01-24 ENCOUNTER — CARE COORDINATION (OUTPATIENT)
Dept: CASE MANAGEMENT | Age: 70
End: 2025-01-24

## 2025-01-24 ENCOUNTER — TELEPHONE (OUTPATIENT)
Dept: INTERNAL MEDICINE CLINIC | Age: 70
End: 2025-01-24

## 2025-01-24 NOTE — TELEPHONE ENCOUNTER
Tcm call already done by . On 1/22/25  Pt just needs scheduled for hosp f/u     [facility] 1/16/25 - 1/21/24        Judy De La Rosa, RN  [facility] [id number] minutes ago (10:27 AM)     EM  Patient discharged from . Needs HFU apt. Please reach out to patient to setup HFU apt. Thanks     Judy De La Rosa RN23 minutes ago (10:27 AM)     EM  Per chart review no HFU apt scheduled. CTN will send additional message to PCP office.      Judy De La Rosa, MSN, RN, [facility]  Care Transition Nurse  [phone] mobile

## 2025-01-24 NOTE — CARE COORDINATION
Per chart review no HFU apt scheduled. CTN will send additional message to PCP office.     Judy De La Rosa MSN, RN, [facility]  Care Transition Nurse  [phone] mobile

## 2025-01-29 ENCOUNTER — PATIENT MESSAGE (OUTPATIENT)
Dept: INTERNAL MEDICINE CLINIC | Age: 70
End: 2025-01-29

## 2025-01-29 ENCOUNTER — CARE COORDINATION (OUTPATIENT)
Dept: CASE MANAGEMENT | Age: 70
End: 2025-01-29

## 2025-01-29 NOTE — CARE COORDINATION
Care Transitions Note    Follow Up Call     Patient Current Location:  [place]    Care Transition Nurse contacted the patient by telephone. Verified name and  as identifiers.    Additional needs identified to be addressed with provider   No needs identified                 Method of communication with provider: none.    Care Summary Note: CTN spoke with patient who reported she is doing ok but continues to have stomach pain. Patient reports the pain feels like a \"gnawing\" sensation and reports the feeling subsides if she eats. Patient continues to follow a bland diet and her bowel movements remain soft and no longer having diarrhea. Patient reported she has some medications to take if needed to help with bowel movements. Patient is scheduled for gastric emptying study on 2/10. CTN encouraged patient to reach out to her provider if symptoms worsen.     Plan of care updates since last contact:  Review of patient management of conditions/medications: .       Advance Care Planning:   Does patient have an Advance Directive: reviewed during previous call, see note. .    Medication Review:  No changes since last call.     Remote Patient Monitoring:  Offered patient enrollment in the Remote Patient Monitoring (RPM) program for in-home monitoring: discuss on follow up     Assessments:  Care Transitions Subsequent and Final Call    Subsequent and Final Calls  Are you currently active with any services?: Home Health  Care Transitions Interventions  Other Interventions:              Follow Up Appointment:   Reviewed upcoming appointment(s).  Future Appointments         Provider Specialty Dept Phone    2/3/2025 11:30 AM (Arrive by 11:15 AM) Tammy Sams MD Internal Medicine [phone]    2/10/2025 7:30 AM (Arrive by 7:00 AM) [facility] 1 Radiology [phone]    2025 4:00 PM (Arrive by 3:45 PM) Tammy Sams MD Internal Medicine [phone]    2025 3:20 PM Kiko Elaine MD

## 2025-02-01 DIAGNOSIS — K21.9 GASTROESOPHAGEAL REFLUX DISEASE, UNSPECIFIED WHETHER ESOPHAGITIS PRESENT: ICD-10-CM

## 2025-02-03 ENCOUNTER — OFFICE VISIT (OUTPATIENT)
Dept: INTERNAL MEDICINE CLINIC | Age: 70
End: 2025-02-03

## 2025-02-03 VITALS
DIASTOLIC BLOOD PRESSURE: 68 MMHG | BODY MASS INDEX: 43.68 KG/M2 | HEART RATE: 57 BPM | SYSTOLIC BLOOD PRESSURE: 130 MMHG | OXYGEN SATURATION: 95 % | WEIGHT: 246.6 LBS

## 2025-02-03 DIAGNOSIS — R10.13 EPIGASTRIC PAIN: Primary | ICD-10-CM

## 2025-02-03 DIAGNOSIS — K21.9 GASTROESOPHAGEAL REFLUX DISEASE, UNSPECIFIED WHETHER ESOPHAGITIS PRESENT: ICD-10-CM

## 2025-02-03 DIAGNOSIS — F32.4 MAJOR DEPRESSIVE DISORDER WITH SINGLE EPISODE, IN PARTIAL REMISSION (HCC): ICD-10-CM

## 2025-02-03 DIAGNOSIS — I11.0 HYPERTENSIVE HEART DISEASE WITH HEART FAILURE (HCC): ICD-10-CM

## 2025-02-03 RX ORDER — LOTILANER OPHTHALMIC SOLUTION 2.5 MG/ML
SOLUTION/ DROPS OPHTHALMIC
COMMUNITY
Start: 2025-01-03

## 2025-02-03 RX ORDER — PANTOPRAZOLE SODIUM 40 MG/1
TABLET, DELAYED RELEASE ORAL
Qty: 180 TABLET | Refills: 1 | Status: SHIPPED | OUTPATIENT
Start: 2025-02-03

## 2025-02-03 ASSESSMENT — PATIENT HEALTH QUESTIONNAIRE - PHQ9
7. TROUBLE CONCENTRATING ON THINGS, SUCH AS READING THE NEWSPAPER OR WATCHING TELEVISION: MORE THAN HALF THE DAYS
SUM OF ALL RESPONSES TO PHQ QUESTIONS 1-9: 12
8. MOVING OR SPEAKING SO SLOWLY THAT OTHER PEOPLE COULD HAVE NOTICED. OR THE OPPOSITE, BEING SO FIGETY OR RESTLESS THAT YOU HAVE BEEN MOVING AROUND A LOT MORE THAN USUAL: NOT AT ALL
SUM OF ALL RESPONSES TO PHQ QUESTIONS 1-9: 12
5. POOR APPETITE OR OVEREATING: MORE THAN HALF THE DAYS
SUM OF ALL RESPONSES TO PHQ QUESTIONS 1-9: 12
10. IF YOU CHECKED OFF ANY PROBLEMS, HOW DIFFICULT HAVE THESE PROBLEMS MADE IT FOR YOU TO DO YOUR WORK, TAKE CARE OF THINGS AT HOME, OR GET ALONG WITH OTHER PEOPLE: SOMEWHAT DIFFICULT
4. FEELING TIRED OR HAVING LITTLE ENERGY: MORE THAN HALF THE DAYS
2. FEELING DOWN, DEPRESSED OR HOPELESS: NEARLY EVERY DAY
3. TROUBLE FALLING OR STAYING ASLEEP: NEARLY EVERY DAY
6. FEELING BAD ABOUT YOURSELF - OR THAT YOU ARE A FAILURE OR HAVE LET YOURSELF OR YOUR FAMILY DOWN: NOT AT ALL
SUM OF ALL RESPONSES TO PHQ QUESTIONS 1-9: 12
1. LITTLE INTEREST OR PLEASURE IN DOING THINGS: NOT AT ALL
SUM OF ALL RESPONSES TO PHQ9 QUESTIONS 1 & 2: 3
9. THOUGHTS THAT YOU WOULD BE BETTER OFF DEAD, OR OF HURTING YOURSELF: NOT AT ALL

## 2025-02-03 NOTE — PROGRESS NOTES
Laly Mckeon (:  1955) is a 69 y.o. female, here for evaluation of the following chief complaint(s):    Follow-Up from Hospital      ASSESSMENT/PLAN:  1. Epigastric pain  Patient has follow-up with gastroenterology for gastric emptying study.  We discussed the possibility of SIBO as well and I recommended a low FODMAP diet and breath test if gastric emptying study is normal.  We might also consider adding Carafate.  2. Hypertensive heart disease with heart failure (HCC)  Well-controlled on current medications which include losartan and verapamil and metoprolol and Lasix.   Cerebral infarction, unspecified mechanism (HCC)  Remote infarct noted on head CT.  Remain on aspirin and statin   PAD (peripheral artery disease) (HCC)  stable on pravastatin. For PAD, continue low dose aspirin   Major depressive disorder with single episode, 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HCC)  Reasonably controlled on Wellbutrin and Trazodone.  --  Gastroesophageal reflux disease, unspecified whether esophagitis present  Usually stable on PPI and pepcid   Episodic paroxysmal hemicrania, not intractable  -     doing okay off gabapentin  [facility]- est'd Hematology  Microscopic colitis  Doing okay off budesonide    Return in about 3 months (around 5/3/2025).    SUBJECTIVE/OBJECTIVE:  HPI    Patient is here to follow-up recent hospital stay for abdominal pain.  She was admitted from  to .  She was evaluated for abdominal pain.  Labs and CAT scans were unrevealing.  EGD was unrevealing.  She has a gastric emptying study pending.  She continues with epigastric tenderness.  She also complains of bloating.  Based on chart review, it appears she may no longer have her ovaries.  She has a remote history of bowel obstruction she says.  She has a history of microscopic colitis but is not currently taking budesonide.  She states her bowels are reasonably normal.  She took doxycycline for a long time but no longer.  She states Pepcid is

## 2025-02-05 ENCOUNTER — CARE COORDINATION (OUTPATIENT)
Dept: CASE MANAGEMENT | Age: 70
End: 2025-02-05

## 2025-02-05 NOTE — CARE COORDINATION
Care Transitions Note    Follow Up Call     Attempted to reach patient for transitions of care follow up.  Unable to reach patient.      Outreach Attempts:   Multiple attempts to contact patient at phone numbers on file.     Care Summary Note: LVM     Follow Up Appointment:   Future Appointments         Provider Specialty Dept Phone    2/10/2025 7:30 AM (Arrive by 7:00 AM) [facility] 1 Radiology [phone]    4/23/2025 3:20 PM Kiko Elaine MD Pulmonology [phone]    5/5/2025 4:30 PM (Arrive by 4:15 PM) Tammy Sams MD Internal Medicine [phone]    6/6/2025 3:45 PM Devyn Maldonado MD Cardiology [phone]    12/22/2025 8:40 AM Horace Henley MD Otolaryngology [phone]            Plan for follow-up call in 6-10 days based on severity of symptoms and risk factors. Plan for next call: self management-.    Judy De La Rosa, MSN, RN, [facility]  Care Transition Nurse  [phone] mobile

## 2025-02-10 ENCOUNTER — HOSPITAL ENCOUNTER (OUTPATIENT)
Dept: NUCLEAR MEDICINE | Age: 70
Discharge: HOME | End: 2025-02-10
Attending: INTERNAL MEDICINE
Payer: MEDICARE

## 2025-02-10 DIAGNOSIS — R68.81 EARLY SATIETY: ICD-10-CM

## 2025-02-10 PROCEDURE — A9541 TC99M SULFUR COLLOID: HCPCS | Performed by: INTERNAL MEDICINE

## 2025-02-10 PROCEDURE — 78264 GASTRIC EMPTYING IMG STUDY: CPT

## 2025-02-10 PROCEDURE — 3430000000 HC RX DIAGNOSTIC RADIOPHARMACEUTICAL: Performed by: INTERNAL MEDICINE

## 2025-02-10 RX ADMIN — Medication 1 MILLICURIE: at 08:08

## 2025-02-12 ENCOUNTER — CARE COORDINATION (OUTPATIENT)
Dept: CASE MANAGEMENT | Age: 70
End: 2025-02-12

## 2025-02-12 NOTE — CARE COORDINATION
Care Transitions Note    Follow Up Call     Patient Current Location:  [place]    Care Transition Nurse contacted the patient by telephone. Verified name and  as identifiers.    Additional needs identified to be addressed with provider   No needs identified                 Method of communication with provider: none.    Care Summary Note: CTN spoke with patient who reported she is doing alright but continues to have stomach pain. Patient had gastric emptying study yesterday and per what she could tell from mychart her results are normal. CTN encouraged patient to still follow up with GI provider to discuss results and ongoing symptoms. Patient reported she is eating yogurt daily which has helped her loose bowel movements.     Plan of care updates since last contact:  Review of patient management of conditions/medications: .        Advance Care Planning:   Does patient have an Advance Directive: reviewed during previous call, see note. .    Medication Review:  No changes since last call.     Remote Patient Monitoring:  Offered patient enrollment in the Remote Patient Monitoring (RPM) program for in-home monitoring: discuss on follow up HTN     Assessments:  Care Transitions Subsequent and Final Call    Subsequent and Final Calls  Do you have any ongoing symptoms?: No  Have your medications changed?: No  Do you have any questions related to your medications?: No  Do you currently have any active services?: No  Are you currently active with any services?: Home Health  Do you have any needs or concerns that I can assist you with?: No  Identified Barriers: None  Care Transitions Interventions  Other Interventions:              Follow Up Appointment:   Reviewed upcoming appointment(s).  Future Appointments         Provider Specialty Dept Phone    2025 3:20 PM Kiko Elaine MD Pulmonology [phone]    2025 4:30 PM (Arrive by 4:15 PM) Tammy Sams MD Internal Medicine [phone]    2025

## 2025-02-12 NOTE — PROGRESS NOTES
Follow up in 2 months if better at the next visit we will discuss another MRI.    Out patient Physical Therapy has been ordered by your provider. [facility] will call you to set up therapy. If you have not heard from them within 24-48 hours of today's appointment, please reach out to them at [phone]    We are committed to providing you the best care possible.  If you receive a survey after visiting one of our offices, please take time to share your experience concerning your physician office visit.  These surveys are confidential and no health information about you is shared.  We are eager to improve for you and we are counting on your feedback to help make that happen.   Patient arrived to PACU post [address] [place]- abd by dr Mackenzie Simon. Patient is awake moaning and rolling around in bed c/o severe pain and nausea. Patient hooked up to pacu monitors, O2 86% on 4L, placed on non-rebreather at 10L, 's . Report received from Dr. Jason Salas. Patient recived 5mg IV hadralazine intra op for elevated BP. Crackles noted throughout lungs, Dr. Jason Salas aware, order received for xopenex tx, respiratory called and aware. Abd binder in place, midline incision to lower abd CD&I.

## 2025-02-19 ENCOUNTER — CARE COORDINATION (OUTPATIENT)
Dept: CASE MANAGEMENT | Age: 70
End: 2025-02-19

## 2025-02-19 NOTE — CARE COORDINATION
Care Transitions Note    Final Call     Attempted to reach patient for transitions of care follow up.  Unable to reach patient.      Outreach Attempts:   HIPAA compliant voicemail left for patient.     Patient graduated from the Care Transitions program on 2/19/25.      Handoff:   Patient was not referred to the ACM team due to unable to contact patient.      Care Summary Note: lvm    Assessments:  Care Transitions Subsequent and Final Call    Subsequent and Final Calls  Are you currently active with any services?: Home Health  Care Transitions Interventions  Other Interventions:              Upcoming Appointments:    Future Appointments         Provider Specialty Dept Phone    4/23/2025 3:20 PM Kiko Elaine MD Pulmonology [phone]    5/5/2025 4:30 PM (Arrive by 4:15 PM) Tammy Sams MD Internal Medicine [phone]    6/6/2025 3:45 PM Devyn Maldonado MD Cardiology [phone]    12/22/2025 8:40 AM Horace Henley MD Otolaryngology [phone]            Judy De La Rosa, MSN, RN, [facility]  Care Transition Nurse  [phone] mobile

## 2025-04-15 DIAGNOSIS — E78.2 MIXED HYPERLIPIDEMIA: ICD-10-CM

## 2025-04-15 RX ORDER — PRAVASTATIN SODIUM 20 MG
20 TABLET ORAL DAILY
Qty: 90 TABLET | Refills: 3 | Status: SHIPPED | OUTPATIENT
Start: 2025-04-15

## 2025-04-15 NOTE — TELEPHONE ENCOUNTER
Requested Prescriptions     Pending Prescriptions Disp Refills    pravastatin (PRAVACHOL) 20 MG tablet [Pharmacy Med Name: PRAVASTATIN 20MG TABLETS] 90 tablet 3     Sig: TAKE 1 TABLET BY MOUTH EVERY DAY            Checked Correct Pharmacy: Yes    Any changes since last refill? No     Number: 90  Refills: 3    Last OV: 12/4/2024 Provider: JULY    Next OV: 6/6/2025 Provider: JULY    Last Labs:   CBC:   Lab Results   Component Value Date    WBC 4.3 01/18/2025    HGB 12.3 01/18/2025    HCT 36.3 01/18/2025    MCV 89.7 01/18/2025     01/18/2025     BMP:   Lab Results   Component Value Date/Time     01/18/2025 01:08 PM    K 3.6 01/18/2025 01:08 PM     01/18/2025 01:08 PM    CO2 26 01/18/2025 01:08 PM    BUN 9 01/18/2025 01:08 PM    CREATININE 0.5 01/18/2025 01:08 PM    GLUCOSE 98 01/18/2025 01:08 PM    GLUCOSE 85 07/23/2011 09:02 AM    CALCIUM 8.9 01/18/2025 01:08 PM    LABGLOM >90 01/18/2025 01:08 PM    LABGLOM >60.0 06/04/2024 12:00 AM

## 2025-04-23 ENCOUNTER — OFFICE VISIT (OUTPATIENT)
Dept: PULMONOLOGY | Age: 70
End: 2025-04-23
Payer: MEDICARE

## 2025-04-23 VITALS
WEIGHT: 246 LBS | BODY MASS INDEX: 43.59 KG/M2 | OXYGEN SATURATION: 94 % | HEART RATE: 58 BPM | RESPIRATION RATE: 16 BRPM | HEIGHT: 63 IN | SYSTOLIC BLOOD PRESSURE: 130 MMHG | DIASTOLIC BLOOD PRESSURE: 70 MMHG

## 2025-04-23 DIAGNOSIS — G47.33 OSA ON CPAP: Primary | ICD-10-CM

## 2025-04-23 PROCEDURE — 1036F TOBACCO NON-USER: CPT | Performed by: INTERNAL MEDICINE

## 2025-04-23 PROCEDURE — G8399 PT W/DXA RESULTS DOCUMENT: HCPCS | Performed by: INTERNAL MEDICINE

## 2025-04-23 PROCEDURE — 99213 OFFICE O/P EST LOW 20 MIN: CPT | Performed by: INTERNAL MEDICINE

## 2025-04-23 PROCEDURE — 3017F COLORECTAL CA SCREEN DOC REV: CPT | Performed by: INTERNAL MEDICINE

## 2025-04-23 PROCEDURE — 3078F DIAST BP <80 MM HG: CPT | Performed by: INTERNAL MEDICINE

## 2025-04-23 PROCEDURE — G2211 COMPLEX E/M VISIT ADD ON: HCPCS | Performed by: INTERNAL MEDICINE

## 2025-04-23 PROCEDURE — 1090F PRES/ABSN URINE INCON ASSESS: CPT | Performed by: INTERNAL MEDICINE

## 2025-04-23 PROCEDURE — 1159F MED LIST DOCD IN RCRD: CPT | Performed by: INTERNAL MEDICINE

## 2025-04-23 PROCEDURE — G8417 CALC BMI ABV UP PARAM F/U: HCPCS | Performed by: INTERNAL MEDICINE

## 2025-04-23 PROCEDURE — G8427 DOCREV CUR MEDS BY ELIG CLIN: HCPCS | Performed by: INTERNAL MEDICINE

## 2025-04-23 PROCEDURE — 1123F ACP DISCUSS/DSCN MKR DOCD: CPT | Performed by: INTERNAL MEDICINE

## 2025-04-23 PROCEDURE — 3075F SYST BP GE 130 - 139MM HG: CPT | Performed by: INTERNAL MEDICINE

## 2025-04-23 NOTE — PROGRESS NOTES
[facility]    Outpatient follow-up note    Subjective:   Referring Physician: Latrell  History of Present Illness  The patient came in for evaluation of her sleep apnea, sinus infection, dizziness, microscopic colitis, and MGUS.    She has been having stomach problems, including microscopic colitis and issues with her large bowel. She manages these conditions by watching what she eats and taking 40 mg of Pepcid as prescribed by her GI doctor. She had a CT scan of her chest and abdomen in January 2025 when she was feeling really sick to her stomach and had pain in her upper belly.    She uses a CPAP machine but without the humidifier because the humidity makes her cough. Even without the humidifier, she still has a cough, which she thinks is due to a recent sinus infection. She hasn't used nasal spray in a long time, only using it when she gets really stuffy. She is also seeing Dr. Henley, who is keeping an eye on her condition. Stopping the budesonide nasal spray didn't seem to affect her cough. She had a lung biopsy in the past because of histoplasmosis.    She is still following up with Dr. Messina for MGUS and hasn't reported any new issues.    She also has nerve damage from shingles and started back on her water pill today.    FAMILY HISTORY  Her sister had an ischemic stroke a week ago and lost her whole right side and speech.    MEDICATIONS  Pepcid, Zyrtec, budesonide (discontinued).       The patient is 70 y.o. female who presents today for a follow up visit for obstructive sleep apnea and cough.  Laly says her cough is about the same, and tolerable.  She was recently diagnosed colitis and has to change her whole diet.  Her  passed away this past December.  She was started on budesonide capsules about a week ago.  The cough is a little better with the systemic steroids.    Last visit:  Laly has had an interesting start to 2023.  Her  Tab is currently in

## 2025-05-05 ENCOUNTER — OFFICE VISIT (OUTPATIENT)
Dept: INTERNAL MEDICINE CLINIC | Age: 70
End: 2025-05-05
Payer: MEDICARE

## 2025-05-05 VITALS
DIASTOLIC BLOOD PRESSURE: 74 MMHG | WEIGHT: 255 LBS | OXYGEN SATURATION: 96 % | BODY MASS INDEX: 45.17 KG/M2 | HEART RATE: 59 BPM | SYSTOLIC BLOOD PRESSURE: 140 MMHG

## 2025-05-05 DIAGNOSIS — F32.4 MAJOR DEPRESSIVE DISORDER WITH SINGLE EPISODE, IN PARTIAL REMISSION: ICD-10-CM

## 2025-05-05 DIAGNOSIS — M25.551 RIGHT HIP PAIN: ICD-10-CM

## 2025-05-05 DIAGNOSIS — I11.0 HYPERTENSIVE HEART DISEASE WITH HEART FAILURE (HCC): Primary | ICD-10-CM

## 2025-05-05 DIAGNOSIS — R41.3 MEMORY LOSS: ICD-10-CM

## 2025-05-05 DIAGNOSIS — R90.89 ABNORMAL BRAIN MRI: ICD-10-CM

## 2025-05-05 DIAGNOSIS — R12 HEARTBURN: ICD-10-CM

## 2025-05-05 PROCEDURE — 1123F ACP DISCUSS/DSCN MKR DOCD: CPT | Performed by: INTERNAL MEDICINE

## 2025-05-05 PROCEDURE — 1159F MED LIST DOCD IN RCRD: CPT | Performed by: INTERNAL MEDICINE

## 2025-05-05 PROCEDURE — 1160F RVW MEDS BY RX/DR IN RCRD: CPT | Performed by: INTERNAL MEDICINE

## 2025-05-05 PROCEDURE — G8399 PT W/DXA RESULTS DOCUMENT: HCPCS | Performed by: INTERNAL MEDICINE

## 2025-05-05 PROCEDURE — G8427 DOCREV CUR MEDS BY ELIG CLIN: HCPCS | Performed by: INTERNAL MEDICINE

## 2025-05-05 PROCEDURE — G2211 COMPLEX E/M VISIT ADD ON: HCPCS | Performed by: INTERNAL MEDICINE

## 2025-05-05 PROCEDURE — 3017F COLORECTAL CA SCREEN DOC REV: CPT | Performed by: INTERNAL MEDICINE

## 2025-05-05 PROCEDURE — 1090F PRES/ABSN URINE INCON ASSESS: CPT | Performed by: INTERNAL MEDICINE

## 2025-05-05 PROCEDURE — G8417 CALC BMI ABV UP PARAM F/U: HCPCS | Performed by: INTERNAL MEDICINE

## 2025-05-05 PROCEDURE — 3077F SYST BP >= 140 MM HG: CPT | Performed by: INTERNAL MEDICINE

## 2025-05-05 PROCEDURE — 1036F TOBACCO NON-USER: CPT | Performed by: INTERNAL MEDICINE

## 2025-05-05 PROCEDURE — 99214 OFFICE O/P EST MOD 30 MIN: CPT | Performed by: INTERNAL MEDICINE

## 2025-05-05 PROCEDURE — 3078F DIAST BP <80 MM HG: CPT | Performed by: INTERNAL MEDICINE

## 2025-05-05 RX ORDER — TRAZODONE HYDROCHLORIDE 100 MG/1
100 TABLET ORAL NIGHTLY
Qty: 90 TABLET | Refills: 0 | Status: SHIPPED | OUTPATIENT
Start: 2025-05-05 | End: 2025-08-03

## 2025-05-05 RX ORDER — GABAPENTIN 100 MG/1
100 CAPSULE ORAL 2 TIMES DAILY
Qty: 60 CAPSULE | Refills: 0 | Status: SHIPPED | OUTPATIENT
Start: 2025-05-05 | End: 2025-06-04

## 2025-05-05 RX ORDER — LOSARTAN POTASSIUM 100 MG/1
100 TABLET ORAL DAILY
Qty: 90 TABLET | Refills: 1 | Status: SHIPPED | OUTPATIENT
Start: 2025-05-05 | End: 2025-11-01

## 2025-05-05 RX ORDER — METOPROLOL SUCCINATE 25 MG/1
25 TABLET, EXTENDED RELEASE ORAL DAILY
Qty: 90 TABLET | Refills: 1 | Status: SHIPPED | OUTPATIENT
Start: 2025-05-05

## 2025-05-05 RX ORDER — OMEPRAZOLE 40 MG/1
40 CAPSULE, DELAYED RELEASE ORAL DAILY
COMMUNITY
Start: 2025-04-10

## 2025-05-05 RX ORDER — BUPROPION HYDROCHLORIDE 150 MG/1
150 TABLET ORAL EVERY MORNING
Qty: 90 TABLET | Refills: 1 | Status: SHIPPED | OUTPATIENT
Start: 2025-05-05

## 2025-05-05 RX ORDER — FUROSEMIDE 20 MG/1
20 TABLET ORAL DAILY PRN
Qty: 90 TABLET | Refills: 0 | Status: SHIPPED | OUTPATIENT
Start: 2025-05-05 | End: 2025-08-03

## 2025-05-05 NOTE — PROGRESS NOTES
Laly Mckeon (:  1955) is a 70 y.o. female, here for evaluation of the following chief complaint(s):    3 Month Follow-Up (Volartan joint health and bone strength, vitamin C 45mg, Vitamin D2 25 mcg , calcium 600mg, magnesium 50mg, Zinc 7.5mg, copper 0.5mg, Manganese 1.8mg/UC II Standardized Cartilage 40mg (Supplying 10mg collagen)) and Letter (Handicap letter/ broke apart due to sun damage and needs a new one)      ASSESSMENT/PLAN:  1. Hypertensive heart disease with heart failure (HCC)  Reasonable control on current medications which include losartan and verapamil and metoprolol and Lasix. Also work on weight loss  -     Lipid Panel; Future  -     Magnesium; Future  -     Comprehensive Metabolic Panel; Future  2. Major depressive disorder with single episode, in partial remission  - Stable on Wellbutrin and trazodone  3. Heartburn  Usually stable on PPI and pepcid. With continued bloating, told her to further discuss options to treat for SIBO with her gastroenterologist.  She was unable to afford the Xifaxan  4. Right hip pain  -     XR HIP 2-3 VW W PELVIS RIGHT; Future  5. Abnormal brain MRI  Overall dizziness is improved but patient would like to continue to follow with neurology and is requesting an alternate referral.  -     Peter Hayes MD, Neurology, [facility]  6. Memory loss  -     Peter Hayes MD, Neurology, [facility]  --  Cerebral infarction, unspecified mechanism (HCC)  Remote infarct noted on head CT.  Remain on aspirin and statin   PAD (peripheral artery disease) (HCC)  stable on pravastatin. For PAD, continue low dose aspirin   MGUS- est'd Hematology    Return in about 3 months (around 2025) for ivan.    SUBJECTIVE/OBJECTIVE:  HPI  Patient is here for routine visit.  She is a bit upset with herself for gaining weight.  She has been very busy with visiting her sister who had a stroke and also babysitting for her grandson.  She has seen her gastroenterologist

## 2025-05-05 NOTE — PATIENT INSTRUCTIONS
Tdap and shingrix at pharmacy    Fasting labs    Mammogram 1/26    Colonoscopy due 2029 ??  Ask tadiparthi when due  Ask about sibo and breath test, low fodmap diet  Ask about alternatives to xifaxin due to cost - ask if doxycycline is okay     Work on weight loss to help blood pressure    Xray  [address]  Gabapentin    Trazodone to 100 mg -call if too much   Eucrisa Counseling: Patient may experience a mild burning sensation during topical application. Eucrisa is not approved in children less than 2 years of age.

## 2025-05-08 ENCOUNTER — HOSPITAL ENCOUNTER (OUTPATIENT)
Dept: GENERAL RADIOLOGY | Age: 70
Discharge: HOME | End: 2025-05-08
Payer: MEDICARE

## 2025-05-08 DIAGNOSIS — M25.551 RIGHT HIP PAIN: ICD-10-CM

## 2025-05-08 PROCEDURE — 73502 X-RAY EXAM HIP UNI 2-3 VIEWS: CPT

## 2025-05-10 ENCOUNTER — RESULTS FOLLOW-UP (OUTPATIENT)
Dept: INTERNAL MEDICINE CLINIC | Age: 70
End: 2025-05-10

## 2025-05-10 DIAGNOSIS — I11.0 HYPERTENSIVE HEART DISEASE WITH HEART FAILURE (HCC): ICD-10-CM

## 2025-05-10 LAB
ALBUMIN SERPL-MCNC: 4.4 G/DL (ref 3.4–5)
ALBUMIN/GLOB SERPL: 2 {RATIO} (ref 1.1–2.2)
ALP SERPL-CCNC: 100 U/L (ref 40–129)
ALT SERPL-CCNC: 31 U/L (ref 10–40)
ANION GAP SERPL CALCULATED.3IONS-SCNC: 10 MMOL/L (ref 3–16)
AST SERPL-CCNC: 25 U/L (ref 15–37)
BILIRUB SERPL-MCNC: 0.3 MG/DL (ref 0–1)
BUN SERPL-MCNC: 11 MG/DL (ref 7–20)
CALCIUM SERPL-MCNC: 9.8 MG/DL (ref 8.3–10.6)
CHLORIDE SERPL-SCNC: 103 MMOL/L (ref 99–110)
CHOLEST SERPL-MCNC: 156 MG/DL (ref 0–199)
CO2 SERPL-SCNC: 26 MMOL/L (ref 21–32)
CREAT SERPL-MCNC: 0.6 MG/DL (ref 0.6–1.2)
GFR SERPLBLD CREATININE-BSD FMLA CKD-EPI: >90 ML/MIN/{1.73_M2}
GLUCOSE SERPL-MCNC: 98 MG/DL (ref 70–99)
HDLC SERPL-MCNC: 61 MG/DL (ref 40–60)
LDLC SERPL CALC-MCNC: 70 MG/DL
MAGNESIUM SERPL-MCNC: 1.9 MG/DL (ref 1.8–2.4)
POTASSIUM SERPL-SCNC: 4.1 MMOL/L (ref 3.5–5.1)
PROT SERPL-MCNC: 6.6 G/DL (ref 6.4–8.2)
SODIUM SERPL-SCNC: 139 MMOL/L (ref 136–145)
TRIGL SERPL-MCNC: 127 MG/DL (ref 0–150)
VLDLC SERPL CALC-MCNC: 25 MG/DL

## 2025-06-06 ENCOUNTER — OFFICE VISIT (OUTPATIENT)
Dept: CARDIOLOGY CLINIC | Age: 70
End: 2025-06-06
Payer: MEDICARE

## 2025-06-06 VITALS
SYSTOLIC BLOOD PRESSURE: 130 MMHG | DIASTOLIC BLOOD PRESSURE: 74 MMHG | WEIGHT: 257.2 LBS | BODY MASS INDEX: 45.56 KG/M2 | HEART RATE: 72 BPM

## 2025-06-06 DIAGNOSIS — I50.32 CHRONIC HEART FAILURE WITH PRESERVED EJECTION FRACTION (HFPEF) (HCC): ICD-10-CM

## 2025-06-06 DIAGNOSIS — I10 ESSENTIAL HYPERTENSION: Primary | ICD-10-CM

## 2025-06-06 PROCEDURE — 99214 OFFICE O/P EST MOD 30 MIN: CPT | Performed by: INTERNAL MEDICINE

## 2025-06-06 PROCEDURE — 1159F MED LIST DOCD IN RCRD: CPT | Performed by: INTERNAL MEDICINE

## 2025-06-06 PROCEDURE — G8399 PT W/DXA RESULTS DOCUMENT: HCPCS | Performed by: INTERNAL MEDICINE

## 2025-06-06 PROCEDURE — 3017F COLORECTAL CA SCREEN DOC REV: CPT | Performed by: INTERNAL MEDICINE

## 2025-06-06 PROCEDURE — 1090F PRES/ABSN URINE INCON ASSESS: CPT | Performed by: INTERNAL MEDICINE

## 2025-06-06 PROCEDURE — G8427 DOCREV CUR MEDS BY ELIG CLIN: HCPCS | Performed by: INTERNAL MEDICINE

## 2025-06-06 PROCEDURE — G2211 COMPLEX E/M VISIT ADD ON: HCPCS | Performed by: INTERNAL MEDICINE

## 2025-06-06 PROCEDURE — G8417 CALC BMI ABV UP PARAM F/U: HCPCS | Performed by: INTERNAL MEDICINE

## 2025-06-06 PROCEDURE — 1123F ACP DISCUSS/DSCN MKR DOCD: CPT | Performed by: INTERNAL MEDICINE

## 2025-06-06 PROCEDURE — 3075F SYST BP GE 130 - 139MM HG: CPT | Performed by: INTERNAL MEDICINE

## 2025-06-06 PROCEDURE — 1036F TOBACCO NON-USER: CPT | Performed by: INTERNAL MEDICINE

## 2025-06-06 PROCEDURE — 3078F DIAST BP <80 MM HG: CPT | Performed by: INTERNAL MEDICINE

## 2025-06-06 RX ORDER — GABAPENTIN 100 MG/1
100 CAPSULE ORAL 2 TIMES DAILY
Qty: 60 CAPSULE | Refills: 2 | Status: SHIPPED | OUTPATIENT
Start: 2025-06-06 | End: 2025-07-10

## 2025-06-06 NOTE — PROGRESS NOTES
Cc: HTN, HLP, atypical CP, PAD, PAT    HPI:     Mike is a 69 yo overweight woman (BMI 45) with h/o SAL on CPAP, GERD, HTN, HLP, past smoker (quit '89), SVT/PAT, PAD, post-op DVT's on eliquis, MGUS (diagnosed 07/2022, follows at [facility]), varicose veins, old R frontal ischemic stroke (CT head 08/2024).     Patient was seen by Dr. Garrido and Dr Garcia at [facility] in the past, last visit in 2014. She had chest pains at that time and underwent cardiac evaluation.     Echo 07/2014: normal except for diastolic I (note, no valvular disease).     Cath 07/2014: normal coronaries, normal EF 70%.     ECG 3/26/19: normal.     Patient reported strong family history of premature CAD and heart failure. Her brother had a cardiac transplant in his 40's and her sisters had stents in their 40-50s. Patient reported palpitations with lightheadedness. 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     Echo 04/2019: normal (no mitral valve prolapse) except for moderate TR, RVSP 44.     Zio monitor x 14 days (4/22/19): NSR with frequent SVT's.     Patient was seen by Dr. Kelsey and was initially started on flecainide in addition to Toprol however patient developed blurry vision and flecainide was changed to verapamil.     Exe nuc stress 5/20/20: small mild reversible defect in distal anteroseptal wall (apex is spared), nl EF and wall motion. Patient had mild CP prior to test, increased pain with exertion, improving with rest. ECG no changes, normal, duration only 3 minutes.      C 5/21/2020: Normal coronaries, LVEDP 13 mmHg, LVEF normal.     Bilateral lower extremity arterial ultrasound 2/18/2021: Right mid SFA less than 50% stenosis     FLP 06/2022: , HDL 56, LDL 77, TG 86 on Pravachol 20 mg daily.     Echo 11/2022: Moderate LVH, LVEF 60%, indeterminate diastolic function, normal RV, moderate LAE, normal valves, AoRoot 3.8 cm.     Coronary CTA 05/2023: Normal coronaries    48-hour

## 2025-06-30 ENCOUNTER — HOSPITAL ENCOUNTER (EMERGENCY)
Age: 70
Discharge: HOME | End: 2025-06-30
Attending: STUDENT IN AN ORGANIZED HEALTH CARE EDUCATION/TRAINING PROGRAM
Payer: MEDICARE

## 2025-06-30 ENCOUNTER — APPOINTMENT (OUTPATIENT)
Dept: CT IMAGING | Age: 70
End: 2025-06-30
Payer: MEDICARE

## 2025-06-30 VITALS
OXYGEN SATURATION: 98 % | HEART RATE: 54 BPM | TEMPERATURE: 97.7 F | SYSTOLIC BLOOD PRESSURE: 120 MMHG | RESPIRATION RATE: 20 BRPM | BODY MASS INDEX: 46.6 KG/M2 | WEIGHT: 263 LBS | DIASTOLIC BLOOD PRESSURE: 55 MMHG | HEIGHT: 63 IN

## 2025-06-30 DIAGNOSIS — G89.29 ACUTE EXACERBATION OF CHRONIC LOW BACK PAIN: Primary | ICD-10-CM

## 2025-06-30 DIAGNOSIS — M54.50 ACUTE EXACERBATION OF CHRONIC LOW BACK PAIN: Primary | ICD-10-CM

## 2025-06-30 PROCEDURE — 72131 CT LUMBAR SPINE W/O DYE: CPT

## 2025-06-30 PROCEDURE — 99284 EMERGENCY DEPT VISIT MOD MDM: CPT

## 2025-06-30 PROCEDURE — 96372 THER/PROPH/DIAG INJ SC/IM: CPT

## 2025-06-30 PROCEDURE — 6360000002 HC RX W HCPCS: Performed by: STUDENT IN AN ORGANIZED HEALTH CARE EDUCATION/TRAINING PROGRAM

## 2025-06-30 RX ORDER — METHOCARBAMOL 500 MG/1
500 TABLET, FILM COATED ORAL 4 TIMES DAILY
Qty: 40 TABLET | Refills: 0 | Status: SHIPPED | OUTPATIENT
Start: 2025-06-30 | End: 2025-07-10

## 2025-06-30 RX ORDER — METHOCARBAMOL 500 MG/1
750 TABLET, FILM COATED ORAL ONCE
Status: DISCONTINUED | OUTPATIENT
Start: 2025-06-30 | End: 2025-06-30

## 2025-06-30 RX ORDER — KETOROLAC TROMETHAMINE 30 MG/ML
15 INJECTION, SOLUTION INTRAMUSCULAR; INTRAVENOUS ONCE
Status: COMPLETED | OUTPATIENT
Start: 2025-06-30 | End: 2025-06-30

## 2025-06-30 RX ADMIN — KETOROLAC TROMETHAMINE 15 MG: 30 INJECTION, SOLUTION INTRAMUSCULAR at 11:20

## 2025-06-30 ASSESSMENT — PAIN DESCRIPTION - LOCATION
LOCATION: BACK;HIP
LOCATION: BACK

## 2025-06-30 ASSESSMENT — ENCOUNTER SYMPTOMS
PHOTOPHOBIA: 0
BACK PAIN: 1
VOMITING: 0
ABDOMINAL PAIN: 0
NAUSEA: 0
SHORTNESS OF BREATH: 0
COUGH: 0

## 2025-06-30 ASSESSMENT — PAIN DESCRIPTION - PAIN TYPE: TYPE: ACUTE PAIN

## 2025-06-30 ASSESSMENT — PAIN DESCRIPTION - DESCRIPTORS: DESCRIPTORS: SHARP;ACHING

## 2025-06-30 ASSESSMENT — PAIN - FUNCTIONAL ASSESSMENT
PAIN_FUNCTIONAL_ASSESSMENT: PREVENTS OR INTERFERES WITH MANY ACTIVE NOT PASSIVE ACTIVITIES
PAIN_FUNCTIONAL_ASSESSMENT: 0-10

## 2025-06-30 ASSESSMENT — PAIN DESCRIPTION - FREQUENCY: FREQUENCY: CONTINUOUS

## 2025-06-30 ASSESSMENT — PAIN SCALES - GENERAL
PAINLEVEL_OUTOF10: 10
PAINLEVEL_OUTOF10: 10

## 2025-06-30 ASSESSMENT — PAIN DESCRIPTION - ORIENTATION
ORIENTATION: RIGHT
ORIENTATION: LOWER

## 2025-06-30 NOTE — ED PROVIDER NOTES
[facility]  EMERGENCY DEPARTMENT ENCOUNTER          ATTENDING PHYSICIAN NOTE       Date of evaluation: 6/30/2025    Chief Complaint     Back Pain (R lower back pain x1 yr. Was going to f/u with pcp but pain was too severe )      History of Present Illness     Laly Mckeon is a 70 y.o. female who presents with right lower back pain.  Patient reports chronic issues with back pain.  She notes that she had a surgery in 2022.  She has been dealing with right hip and right lower back pain for the last 1 year now but it has been gradually increasing.  She says she has been here multiple times for these complaints.  She recently made an appointment with her back specialist but is not scheduled to be seen by them in 1 month.  When she told them her pain was increasing they told her to either come to the ER or see her PCP.  Her PCP recently retired and is now seeing a new PCP in 2 weeks.  Apparently her back specialist told her she might need an MRI and that she should try to get this before seeing them.  Patient points to her right lower back as the area of most pain.  Does radiate into her buttock and right hip.  She has been taking Tylenol and using lidocaine patches without relief.  She denies any changes in bowel or bladder.  Denies any fevers or chills.  Denies any known trauma.  Denies any weakness in her legs.  She did walk-in.    ASSESSMENT / PLAN  (MEDICAL DECISION MAKING)     INITIAL VITALS: BP: (!) 141/76, Temp: 97.7 °F (36.5 °C), Pulse: 54, Respirations: 20, SpO2: 97 %      Laly Mckeon is a 70 y.o. female with history of chronic lower back pain with prior surgeries who is presenting with a gradual worsening of the lower back pain.    Patient describes predominantly right paraspinal lumbar pain as well as buttock pain.  She is not having any symptoms of cauda equina and was ambulatory here to the ER.    She made an appointment with her back specialist but was not able to be seen for another month and

## 2025-07-10 ENCOUNTER — TELEPHONE (OUTPATIENT)
Dept: INTERNAL MEDICINE CLINIC | Age: 70
End: 2025-07-10

## 2025-07-10 ENCOUNTER — OFFICE VISIT (OUTPATIENT)
Dept: INTERNAL MEDICINE CLINIC | Age: 70
End: 2025-07-10

## 2025-07-10 VITALS
SYSTOLIC BLOOD PRESSURE: 125 MMHG | OXYGEN SATURATION: 98 % | TEMPERATURE: 97.8 F | BODY MASS INDEX: 46.45 KG/M2 | WEIGHT: 262.2 LBS | HEART RATE: 58 BPM | DIASTOLIC BLOOD PRESSURE: 68 MMHG

## 2025-07-10 DIAGNOSIS — M54.41 CHRONIC RIGHT-SIDED LOW BACK PAIN WITH BILATERAL SCIATICA: Primary | ICD-10-CM

## 2025-07-10 DIAGNOSIS — M54.42 CHRONIC RIGHT-SIDED LOW BACK PAIN WITH BILATERAL SCIATICA: Primary | ICD-10-CM

## 2025-07-10 DIAGNOSIS — G89.29 CHRONIC RIGHT-SIDED LOW BACK PAIN WITH BILATERAL SCIATICA: Primary | ICD-10-CM

## 2025-07-10 RX ORDER — GABAPENTIN 100 MG/1
200 CAPSULE ORAL 2 TIMES DAILY
Qty: 360 CAPSULE | Refills: 1 | Status: SHIPPED | OUTPATIENT
Start: 2025-07-10 | End: 2026-01-06

## 2025-07-10 ASSESSMENT — ENCOUNTER SYMPTOMS
SORE THROAT: 0
ABDOMINAL PAIN: 0
BACK PAIN: 1
VOMITING: 0
SHORTNESS OF BREATH: 0
NAUSEA: 0
BLOOD IN STOOL: 0
COUGH: 0

## 2025-07-10 NOTE — PROGRESS NOTES
Laly Mckeon (:  1955) is a 70 y.o. female, New patient, here for evaluation of the following chief complaint(s):  Back Pain (Back surgery in , Right side stays there, lower back middle, [facility] wants an MRI for 2025 appmnt.)      Assessment & Plan   ASSESSMENT/PLAN:  1. Chronic right-sided low back pain with bilateral sciatica  Uncontrolled  Patient is following with [facility], they recommended patient to get an MRI as soon as possible for evaluation.  Reviewed CT scan of the lumbar spine results performed on 2025 that shows mild central canal stenosis L3-L4 and significant central canal or foraminal stenosis L2-L3. There is satisfactory anterior and interbody arthrodesis at L4-5 and L5-S1 with posterior L5-S1 and sacral screws in satisfactory position.  Increasing dose of gabapentin to 200 mg twice daily for pain control.  Continue methocarbamol and acetaminophen as needed for back pain.  Advised patient to follow-up with [facility] for further management of back pain  -     MRI LUMBAR SPINE W WO CONTRAST; Future  -     gabapentin (NEURONTIN) 100 MG capsule; Take 2 capsules by mouth 2 times daily for 180 days., Disp-360 capsule, R-1Normal      Return in about 11 days (around 2025).         Subjective   SUBJECTIVE/OBJECTIVE:  Back Pain  This is a chronic problem. The current episode started more than 1 month ago. The problem occurs constantly. The problem has been gradually worsening since onset. The pain is present in the gluteal and lumbar spine. The quality of the pain is described as aching. The pain radiates to the right thigh and left thigh. The pain is moderate. The pain is The same all the time. The symptoms are aggravated by bending and position. Pertinent negatives include no abdominal pain, chest pain, fever or weakness. She has tried analgesics and muscle relaxant for the symptoms. The treatment provided no relief.       Review of Systems

## 2025-07-10 NOTE — TELEPHONE ENCOUNTER
[facility] called into the office for Dr. Barry in regards to getting a lab order placed for the patient to have 24 hrs prior to her MRI. They would like to see if he will place a lab order for Creatinine since contrast dye is being used ans also due to the patient age and her having hypertension. Please advise.

## 2025-07-20 SDOH — HEALTH STABILITY: PHYSICAL HEALTH: ON AVERAGE, HOW MANY DAYS PER WEEK DO YOU ENGAGE IN MODERATE TO STRENUOUS EXERCISE (LIKE A BRISK WALK)?: 3 DAYS

## 2025-07-20 SDOH — HEALTH STABILITY: PHYSICAL HEALTH: ON AVERAGE, HOW MANY MINUTES DO YOU ENGAGE IN EXERCISE AT THIS LEVEL?: 40 MIN

## 2025-07-21 ENCOUNTER — OFFICE VISIT (OUTPATIENT)
Dept: INTERNAL MEDICINE CLINIC | Age: 70
End: 2025-07-21

## 2025-07-21 VITALS
WEIGHT: 266 LBS | DIASTOLIC BLOOD PRESSURE: 82 MMHG | OXYGEN SATURATION: 94 % | BODY MASS INDEX: 47.12 KG/M2 | SYSTOLIC BLOOD PRESSURE: 138 MMHG | TEMPERATURE: 98.4 F | HEART RATE: 62 BPM

## 2025-07-21 DIAGNOSIS — M54.42 CHRONIC RIGHT-SIDED LOW BACK PAIN WITH BILATERAL SCIATICA: ICD-10-CM

## 2025-07-21 DIAGNOSIS — J30.89 NON-SEASONAL ALLERGIC RHINITIS, UNSPECIFIED TRIGGER: ICD-10-CM

## 2025-07-21 DIAGNOSIS — M54.41 CHRONIC RIGHT-SIDED LOW BACK PAIN WITH BILATERAL SCIATICA: Primary | ICD-10-CM

## 2025-07-21 DIAGNOSIS — F32.4 MAJOR DEPRESSIVE DISORDER WITH SINGLE EPISODE, IN PARTIAL REMISSION: ICD-10-CM

## 2025-07-21 DIAGNOSIS — Z23 NEED FOR TDAP VACCINATION: ICD-10-CM

## 2025-07-21 DIAGNOSIS — M54.41 CHRONIC RIGHT-SIDED LOW BACK PAIN WITH BILATERAL SCIATICA: ICD-10-CM

## 2025-07-21 DIAGNOSIS — M54.42 CHRONIC RIGHT-SIDED LOW BACK PAIN WITH BILATERAL SCIATICA: Primary | ICD-10-CM

## 2025-07-21 DIAGNOSIS — G89.29 CHRONIC RIGHT-SIDED LOW BACK PAIN WITH BILATERAL SCIATICA: ICD-10-CM

## 2025-07-21 DIAGNOSIS — K21.9 GASTROESOPHAGEAL REFLUX DISEASE, UNSPECIFIED WHETHER ESOPHAGITIS PRESENT: ICD-10-CM

## 2025-07-21 DIAGNOSIS — Z23 NEED FOR SHINGLES VACCINE: ICD-10-CM

## 2025-07-21 DIAGNOSIS — M25.562 CHRONIC PAIN OF BOTH KNEES: ICD-10-CM

## 2025-07-21 DIAGNOSIS — I11.0 HYPERTENSIVE HEART DISEASE WITH HEART FAILURE (HCC): ICD-10-CM

## 2025-07-21 DIAGNOSIS — I10 ESSENTIAL HYPERTENSION: ICD-10-CM

## 2025-07-21 DIAGNOSIS — I73.9 PAD (PERIPHERAL ARTERY DISEASE): ICD-10-CM

## 2025-07-21 DIAGNOSIS — M25.561 CHRONIC PAIN OF BOTH KNEES: ICD-10-CM

## 2025-07-21 DIAGNOSIS — E66.813 OBESITY, CLASS 3 (HCC): ICD-10-CM

## 2025-07-21 DIAGNOSIS — G89.29 CHRONIC PAIN OF BOTH KNEES: ICD-10-CM

## 2025-07-21 DIAGNOSIS — I63.9 CEREBRAL INFARCTION, UNSPECIFIED MECHANISM (HCC): ICD-10-CM

## 2025-07-21 DIAGNOSIS — G89.29 CHRONIC RIGHT-SIDED LOW BACK PAIN WITH BILATERAL SCIATICA: Primary | ICD-10-CM

## 2025-07-21 DIAGNOSIS — R73.9 INCREASED BLOOD GLUCOSE: ICD-10-CM

## 2025-07-21 LAB — HBA1C MFR BLD: 5.1 %

## 2025-07-21 RX ORDER — ZOSTER VACCINE RECOMBINANT, ADJUVANTED 50 MCG/0.5
0.5 KIT INTRAMUSCULAR SEE ADMIN INSTRUCTIONS
Qty: 0.5 ML | Refills: 0 | Status: SHIPPED | OUTPATIENT
Start: 2025-07-21 | End: 2026-01-17

## 2025-07-21 ASSESSMENT — ENCOUNTER SYMPTOMS
VOMITING: 0
SHORTNESS OF BREATH: 0
SORE THROAT: 0
ABDOMINAL PAIN: 0
NAUSEA: 0
BLOOD IN STOOL: 0
BACK PAIN: 1
COUGH: 0

## 2025-07-21 NOTE — PROGRESS NOTES
normal.      Breath sounds: Normal breath sounds.   Musculoskeletal:         General: No swelling.   Skin:     General: Skin is warm and dry.   Neurological:      Mental Status: She is alert and oriented to person, place, and time. Mental status is at baseline.   Psychiatric:         Mood and Affect: Mood normal.         Behavior: Behavior normal.            During this visit I have spent 40 minutes reviewing previous notes, test results, medications and to perform face to face encounter with the patient obtaining history, performing physical exam and discussing the diagnosis, lab results, medications and importance of compliance with the treatment plan as well as to complete documentation in electronic health records.    An electronic signature was used to authenticate this note.    --Michael Barry MD

## 2025-07-22 ENCOUNTER — HOSPITAL ENCOUNTER (OUTPATIENT)
Dept: MRI IMAGING | Age: 70
Discharge: HOME | End: 2025-07-22
Payer: MEDICARE

## 2025-07-22 DIAGNOSIS — G89.29 CHRONIC RIGHT-SIDED LOW BACK PAIN WITH BILATERAL SCIATICA: ICD-10-CM

## 2025-07-22 DIAGNOSIS — M54.42 CHRONIC RIGHT-SIDED LOW BACK PAIN WITH BILATERAL SCIATICA: ICD-10-CM

## 2025-07-22 DIAGNOSIS — M54.41 CHRONIC RIGHT-SIDED LOW BACK PAIN WITH BILATERAL SCIATICA: ICD-10-CM

## 2025-07-22 LAB
CREAT SERPL-MCNC: 0.6 MG/DL (ref 0.6–1.2)
GFR SERPLBLD CREATININE-BSD FMLA CKD-EPI: >90 ML/MIN/{1.73_M2}

## 2025-07-22 PROCEDURE — A9577 INJ MULTIHANCE: HCPCS | Performed by: INTERNAL MEDICINE

## 2025-07-22 PROCEDURE — 6360000004 HC RX CONTRAST MEDICATION: Performed by: INTERNAL MEDICINE

## 2025-07-22 PROCEDURE — 72158 MRI LUMBAR SPINE W/O & W/DYE: CPT

## 2025-07-22 RX ADMIN — GADOBENATE DIMEGLUMINE 22 ML: 529 INJECTION, SOLUTION INTRAVENOUS at 13:49

## 2025-08-04 ENCOUNTER — OFFICE VISIT (OUTPATIENT)
Dept: NEUROLOGY | Age: 70
End: 2025-08-04
Payer: MEDICARE

## 2025-08-04 VITALS
HEIGHT: 63 IN | BODY MASS INDEX: 46.07 KG/M2 | SYSTOLIC BLOOD PRESSURE: 132 MMHG | HEART RATE: 58 BPM | WEIGHT: 260 LBS | DIASTOLIC BLOOD PRESSURE: 75 MMHG

## 2025-08-04 DIAGNOSIS — R42 DIZZINESS: ICD-10-CM

## 2025-08-04 DIAGNOSIS — R93.0 RADIOLOGICALLY ISOLATED SYNDROME: ICD-10-CM

## 2025-08-04 DIAGNOSIS — G43.409 HEMIPLEGIC MIGRAINE WITHOUT STATUS MIGRAINOSUS, NOT INTRACTABLE: ICD-10-CM

## 2025-08-04 DIAGNOSIS — R90.82 WHITE MATTER ABNORMALITY ON MRI OF BRAIN: Primary | ICD-10-CM

## 2025-08-04 PROCEDURE — G8417 CALC BMI ABV UP PARAM F/U: HCPCS | Performed by: PSYCHIATRY & NEUROLOGY

## 2025-08-04 PROCEDURE — G8399 PT W/DXA RESULTS DOCUMENT: HCPCS | Performed by: PSYCHIATRY & NEUROLOGY

## 2025-08-04 PROCEDURE — 1160F RVW MEDS BY RX/DR IN RCRD: CPT | Performed by: PSYCHIATRY & NEUROLOGY

## 2025-08-04 PROCEDURE — 1090F PRES/ABSN URINE INCON ASSESS: CPT | Performed by: PSYCHIATRY & NEUROLOGY

## 2025-08-04 PROCEDURE — 3078F DIAST BP <80 MM HG: CPT | Performed by: PSYCHIATRY & NEUROLOGY

## 2025-08-04 PROCEDURE — 3017F COLORECTAL CA SCREEN DOC REV: CPT | Performed by: PSYCHIATRY & NEUROLOGY

## 2025-08-04 PROCEDURE — 1036F TOBACCO NON-USER: CPT | Performed by: PSYCHIATRY & NEUROLOGY

## 2025-08-04 PROCEDURE — 1123F ACP DISCUSS/DSCN MKR DOCD: CPT | Performed by: PSYCHIATRY & NEUROLOGY

## 2025-08-04 PROCEDURE — 1159F MED LIST DOCD IN RCRD: CPT | Performed by: PSYCHIATRY & NEUROLOGY

## 2025-08-04 PROCEDURE — G8427 DOCREV CUR MEDS BY ELIG CLIN: HCPCS | Performed by: PSYCHIATRY & NEUROLOGY

## 2025-08-04 PROCEDURE — 3075F SYST BP GE 130 - 139MM HG: CPT | Performed by: PSYCHIATRY & NEUROLOGY

## 2025-08-04 PROCEDURE — 99204 OFFICE O/P NEW MOD 45 MIN: CPT | Performed by: PSYCHIATRY & NEUROLOGY

## 2025-08-04 RX ORDER — TRAMADOL HYDROCHLORIDE 50 MG/1
50 TABLET ORAL EVERY 6 HOURS
COMMUNITY
Start: 2025-07-25

## 2025-08-06 ENCOUNTER — TELEPHONE (OUTPATIENT)
Dept: INTERNAL MEDICINE CLINIC | Age: 70
End: 2025-08-06

## 2025-08-06 DIAGNOSIS — I11.0 HYPERTENSIVE HEART DISEASE WITH HEART FAILURE (HCC): Primary | ICD-10-CM

## 2025-08-07 DIAGNOSIS — F32.4 MAJOR DEPRESSIVE DISORDER WITH SINGLE EPISODE, IN PARTIAL REMISSION: ICD-10-CM

## 2025-08-07 RX ORDER — FUROSEMIDE 20 MG/1
20 TABLET ORAL DAILY PRN
Qty: 90 TABLET | Refills: 0 | Status: SHIPPED | OUTPATIENT
Start: 2025-08-07 | End: 2025-11-05

## 2025-08-07 RX ORDER — VITAMIN B COMPLEX
1 CAPSULE ORAL DAILY
Qty: 30 CAPSULE | Refills: 0 | Status: SHIPPED | OUTPATIENT
Start: 2025-08-07

## 2025-08-07 RX ORDER — TRAZODONE HYDROCHLORIDE 100 MG/1
100 TABLET ORAL NIGHTLY
Qty: 90 TABLET | Refills: 0 | Status: SHIPPED | OUTPATIENT
Start: 2025-08-07 | End: 2025-11-05

## 2025-08-14 ENCOUNTER — OFFICE VISIT (OUTPATIENT)
Dept: ORTHOPEDIC SURGERY | Age: 70
End: 2025-08-14
Payer: MEDICARE

## 2025-08-14 VITALS — HEIGHT: 63 IN | WEIGHT: 260 LBS | BODY MASS INDEX: 46.07 KG/M2

## 2025-08-14 DIAGNOSIS — M25.551 PAIN OF RIGHT HIP: ICD-10-CM

## 2025-08-14 DIAGNOSIS — M70.61 GREATER TROCHANTERIC BURSITIS OF RIGHT HIP: ICD-10-CM

## 2025-08-14 DIAGNOSIS — M76.891 TENDINITIS INVOLVING RIGHT HIP ABDUCTORS: Primary | ICD-10-CM

## 2025-08-14 PROCEDURE — 3017F COLORECTAL CA SCREEN DOC REV: CPT | Performed by: ORTHOPAEDIC SURGERY

## 2025-08-14 PROCEDURE — 1090F PRES/ABSN URINE INCON ASSESS: CPT | Performed by: ORTHOPAEDIC SURGERY

## 2025-08-14 PROCEDURE — 1125F AMNT PAIN NOTED PAIN PRSNT: CPT | Performed by: ORTHOPAEDIC SURGERY

## 2025-08-14 PROCEDURE — 1159F MED LIST DOCD IN RCRD: CPT | Performed by: ORTHOPAEDIC SURGERY

## 2025-08-14 PROCEDURE — 1123F ACP DISCUSS/DSCN MKR DOCD: CPT | Performed by: ORTHOPAEDIC SURGERY

## 2025-08-14 PROCEDURE — G8399 PT W/DXA RESULTS DOCUMENT: HCPCS | Performed by: ORTHOPAEDIC SURGERY

## 2025-08-14 PROCEDURE — G8417 CALC BMI ABV UP PARAM F/U: HCPCS | Performed by: ORTHOPAEDIC SURGERY

## 2025-08-14 PROCEDURE — G8427 DOCREV CUR MEDS BY ELIG CLIN: HCPCS | Performed by: ORTHOPAEDIC SURGERY

## 2025-08-14 PROCEDURE — 99215 OFFICE O/P EST HI 40 MIN: CPT | Performed by: ORTHOPAEDIC SURGERY

## 2025-08-14 PROCEDURE — 1036F TOBACCO NON-USER: CPT | Performed by: ORTHOPAEDIC SURGERY

## 2025-08-15 ENCOUNTER — HOSPITAL ENCOUNTER (OUTPATIENT)
Dept: MRI IMAGING | Age: 70
Discharge: HOME | End: 2025-08-15
Attending: PSYCHIATRY & NEUROLOGY
Payer: MEDICARE

## 2025-08-15 DIAGNOSIS — R90.82 WHITE MATTER ABNORMALITY ON MRI OF BRAIN: ICD-10-CM

## 2025-08-15 DIAGNOSIS — R42 DIZZINESS: ICD-10-CM

## 2025-08-15 PROCEDURE — 70553 MRI BRAIN STEM W/O & W/DYE: CPT

## 2025-08-15 PROCEDURE — 70549 MR ANGIOGRAPH NECK W/O&W/DYE: CPT

## 2025-08-15 PROCEDURE — A9579 GAD-BASE MR CONTRAST NOS,1ML: HCPCS | Performed by: PSYCHIATRY & NEUROLOGY

## 2025-08-15 PROCEDURE — 6360000004 HC RX CONTRAST MEDICATION: Performed by: PSYCHIATRY & NEUROLOGY

## 2025-08-15 RX ORDER — GADOTERIDOL 279.3 MG/ML
20 INJECTION INTRAVENOUS
Status: COMPLETED | OUTPATIENT
Start: 2025-08-15 | End: 2025-08-15

## 2025-08-15 RX ADMIN — GADOTERIDOL 20 ML: 279.3 INJECTION, SOLUTION INTRAVENOUS at 14:39

## 2025-08-20 ENCOUNTER — RESULTS FOLLOW-UP (OUTPATIENT)
Dept: NEUROLOGY | Age: 70
End: 2025-08-20

## (undated) DEVICE — TUBING, SUCTION, 1/4" X 12', STRAIGHT: Brand: MEDLINE

## (undated) DEVICE — SUTURE NONABSORBABLE MONOFILAMENT 2-0 FS 18 IN ETHILON 664H

## (undated) DEVICE — Device

## (undated) DEVICE — MAJOR SET UP PK

## (undated) DEVICE — CORD,CAUTERY,BIPOLAR,STERILE: Brand: MEDLINE

## (undated) DEVICE — Z DUP USE 2701075 SYSTEM SKIN CLSR 42CM DERMBND PRINEO

## (undated) DEVICE — ULTRA CMFRT CVR M

## (undated) DEVICE — BANDAGE COMPR W2INXL5YD TAN BRTH SELF ADH WRP W/ HND TEAR

## (undated) DEVICE — APPLIER CLP L9.375IN APER 2.1MM CLS L3.8MM 20 SM TI CLP

## (undated) DEVICE — GLOVE SURG SZ 8 CRM LTX FREE POLYISOPRENE POLYMER BEAD ANTI

## (undated) DEVICE — GLOVE SURG SZ 75 L12IN FNGR THK79MIL GRN LTX FREE

## (undated) DEVICE — ADHESIVE SKIN CLSR 0.7ML TOP DERMBND ADV

## (undated) DEVICE — SOLUTION IV 1000ML 0.9% SOD CHL

## (undated) DEVICE — NEURO SPONGES: Brand: DEROYAL

## (undated) DEVICE — ELECTRODE NERVE STIM FOR SPNL CRD MONITORING

## (undated) DEVICE — CATHETER IV 14GA L5.25IN PERIPH ORNG FEP POLYMER 3 BVL

## (undated) DEVICE — SUTURE ETHLN SZ 3-0 L30IN NONABSORBABLE BLK L36MM FSLX 3/8 1673BH

## (undated) DEVICE — SUTURE VCRL SZ 2-0 L18IN ABSRB UD CT-1 L36MM 1/2 CIR J839D

## (undated) DEVICE — SUTURE VCRL SZ 3-0 L36IN ABSRB UD L36MM CT-1 1/2 CIR J944H

## (undated) DEVICE — SUTURE CHROMIC GUT SZ 4-0 L18IN ABSRB BRN L19MM PS-2 3/8 1637G

## (undated) DEVICE — DRAPE MICSCP W54XL150IN W/ 4 BINOC GLS LENS LEICA

## (undated) DEVICE — SUTURE PERMAHAND SZ 2-0 L30IN NONABSORBABLE BLK SILK W/O A305H

## (undated) DEVICE — BLADE ES L4IN INSUL EDGE

## (undated) DEVICE — SHEET, T, LAPAROTOMY, STERILE: Brand: MEDLINE

## (undated) DEVICE — BINDER ABD CNTRCT CLOSURE XL 62-72 IN AD 12 IN UNISX PROCARE

## (undated) DEVICE — SPONGE,NEURO,0.5"X3",XR,STRL,LF,10/PK: Brand: MEDLINE

## (undated) DEVICE — SUTURE PDS II SZ 0 L60IN ABSRB VLT L48MM CTX 1/2 CIR Z990G

## (undated) DEVICE — ZIMMER® STERILE DISPOSABLE TOURNIQUET CUFF WITH PLC, DUAL PORT, SINGLE BLADDER, 18 IN. (46 CM)

## (undated) DEVICE — GOWN,SIRUS,NON REINFRCD,LARGE,SET IN SL: Brand: MEDLINE

## (undated) DEVICE — CABLE BPLR L12FT FLYING LD DISPOSABLE

## (undated) DEVICE — ELECTROSURGICAL PENCIL ROCKER SWITCH NON COATED BLADE ELECTRODE 10 FT (3 M) CORD HOLSTER: Brand: MEGADYNE

## (undated) DEVICE — GLOVE SURG SZ 8 L12IN FNGR THK79MIL GRN LTX FREE

## (undated) DEVICE — SUTURE VCRL SZ 2 L27IN ABSRB VLT L65MM TP-1 1/2 CIR J649G

## (undated) DEVICE — SYRINGE IRRIG 60ML SFT PLIABLE BLB EZ TO GRP 1 HND USE W/

## (undated) DEVICE — E-Z CLEAN, NON-STICK, PTFE COATED, ELECTROSURGICAL BLADE ELECTRODE, MODIFIED EXTENDED INSULATION, 2.5 INCH (6.35 CM): Brand: MEGADYNE

## (undated) DEVICE — PACK,UNIVERSAL,NO GOWNS: Brand: MEDLINE

## (undated) DEVICE — APPLICATOR MEDICATED 26 CC SOLUTION HI LT ORNG CHLORAPREP

## (undated) DEVICE — SUTURE VCRL SZ 2-0 L18IN ABSRB UD POLYGLACTIN 910 BRAID TIE J111T

## (undated) DEVICE — SPONGE LAP W18XL18IN WHT COT 4 PLY FLD STRUNG RADPQ DISP ST

## (undated) DEVICE — GARMENT,MEDLINE,DVT,INT,CALF,MED, GEN2: Brand: MEDLINE

## (undated) DEVICE — GLOVE ORANGE PI 7   MSG9070

## (undated) DEVICE — TRAY CATHETER 16FR F INCLUDE BARDX IC COMPLT CARE DRNGE BG

## (undated) DEVICE — APPLICATOR PREP 26ML 0.7% IOD POVACRYLEX 74% ISO ALC ST

## (undated) DEVICE — SUTURE VCRL SZ 0 L18IN ABSRB UD L36MM CT-1 1/2 CIR J840D

## (undated) DEVICE — NEEDLE HYPO 18GA L1.5IN THN WALL PIVOTING SHLD BVL ORIENTED

## (undated) DEVICE — SPONGE,PEANUT,XRAY,ST,SM,3/8",5/CARD: Brand: MEDLINE INDUSTRIES, INC.

## (undated) DEVICE — GLOVE SURG SZ 75 L12IN FNGR THK87MIL DK GRN LTX FREE ISOLEX

## (undated) DEVICE — LAMINECTOMY PK

## (undated) DEVICE — 3M™ IOBAN™ 2 ANTIMICROBIAL INCISE DRAPE 6648EZ: Brand: IOBAN™ 2

## (undated) DEVICE — SURE SET-DOUBLE BASIN-LF: Brand: MEDLINE INDUSTRIES, INC.

## (undated) DEVICE — ORTHO PRE OP PACK: Brand: MEDLINE INDUSTRIES, INC.

## (undated) DEVICE — SUTURE PDS II SZ 1 L96IN ABSRB VLT TP-1 L65MM 1/2 CIR Z880G

## (undated) DEVICE — SOLUTION IV IRRIG POUR BRL 0.9% SODIUM CHL 2F7124

## (undated) DEVICE — JEWISH HOSPITAL TURNOVER KIT: Brand: MEDLINE INDUSTRIES, INC.

## (undated) DEVICE — GLOVE SURG SZ 65 CRM LTX FREE POLYISOPRENE POLYMER BEAD ANTI

## (undated) DEVICE — BANDAGE COMPR W4INXL12FT E DISP ESMARCH EVEN

## (undated) DEVICE — STAPLER SKIN H3.9MM WIRE DIA0.58MM CRWN 6.9MM 35 STPL ROT

## (undated) DEVICE — CRADLE ARM INDIV PT CARE KT COMP FOR FOR RADLUC WILSON FRME

## (undated) DEVICE — COVER,TABLE,HEAVY DUTY,77"X90",STRL: Brand: MEDLINE

## (undated) DEVICE — GLOVE ORANGE PI 7 1/2   MSG9075

## (undated) DEVICE — CORD ES L12FT BPLR FRCP

## (undated) DEVICE — TOOL 14MH30 LEGEND 14CM 3MM: Brand: MIDAS REX ™

## (undated) DEVICE — FORCEPS BX L240CM JAW DIA2.4MM ORNG L CAP W/ NDL DISP RAD

## (undated) DEVICE — APPLIER LIG CLP M L11IN TI STR RNG HNDL FOR 20 CLP DISP

## (undated) DEVICE — SYRINGE MED 10ML LUERLOCK TIP W/O SFTY DISP

## (undated) DEVICE — PLATE ES AD W 9FT CRD 2

## (undated) DEVICE — DRAPE 33X23IN INCISE ANTIMICROB IOBAN 2

## (undated) DEVICE — SUTURE PDS II SZ 0 L18IN ABSRB VLT L36MM CT-1 1/2 CIR Z740D

## (undated) DEVICE — SPONGE,LAP,18"X18",DLX,XR,ST,5/PK,40/PK: Brand: MEDLINE

## (undated) DEVICE — STANDARD HYPODERMIC NEEDLE,POLYPROPYLENE HUB: Brand: MONOJECT

## (undated) DEVICE — MERCY FAIRFIELD TURNOVER KIT: Brand: MEDLINE INDUSTRIES, INC.

## (undated) DEVICE — POSITIONER HD REST FRME

## (undated) DEVICE — Device: Brand: DISPOSABLE TROCAR INSERT PEDICLE ACCESS NEEDLE (1 PCS.)

## (undated) DEVICE — SOLUTION IV IRRIG 500ML 0.9% SODIUM CHL 2F7123

## (undated) DEVICE — COVER LT HNDL CAM BLU DISP W/ SURG CTRL

## (undated) DEVICE — COVER LT HNDL BLU PLAS

## (undated) DEVICE — NEEDLE HYPO 25GA L1.5IN BLU POLYPR HUB S STL REG BVL STR

## (undated) DEVICE — SUTURE MCRYL SZ 4-0 L27IN ABSRB UD L19MM PS-2 1/2 CIR PRIM Y426H

## (undated) DEVICE — SUTURE VCRL SZ 3-0 L27IN ABSRB UD L36MM CT-1 1/2 CIR J258H

## (undated) DEVICE — GLOVE SURG SZ 8 L12IN FNGR THK94MIL STD WHT LTX FREE